# Patient Record
Sex: MALE | Race: ASIAN | NOT HISPANIC OR LATINO | Employment: FULL TIME | URBAN - METROPOLITAN AREA
[De-identification: names, ages, dates, MRNs, and addresses within clinical notes are randomized per-mention and may not be internally consistent; named-entity substitution may affect disease eponyms.]

---

## 2018-06-18 DIAGNOSIS — Z76.89 ENCOUNTER TO ESTABLISH CARE: Primary | ICD-10-CM

## 2018-06-18 DIAGNOSIS — B19.10 HEPATITIS B INFECTION WITHOUT DELTA AGENT WITHOUT HEPATIC COMA, UNSPECIFIED CHRONICITY: ICD-10-CM

## 2018-06-20 ENCOUNTER — TELEPHONE (OUTPATIENT)
Dept: GASTROENTEROLOGY | Facility: CLINIC | Age: 58
End: 2018-06-20

## 2018-06-20 NOTE — TELEPHONE ENCOUNTER
Left message for pt reminding them of upcoming appointment.  Instructed pt to bring updated medications list.  Instructed pt to arrive an hour and a half to two hours prior to appt time for labs.  Gonsalo Nieves, CMA

## 2018-06-21 ENCOUNTER — OFFICE VISIT (OUTPATIENT)
Dept: GASTROENTEROLOGY | Facility: CLINIC | Age: 58
End: 2018-06-21
Payer: MEDICARE

## 2018-06-21 VITALS
BODY MASS INDEX: 24.36 KG/M2 | HEIGHT: 65 IN | SYSTOLIC BLOOD PRESSURE: 140 MMHG | HEART RATE: 70 BPM | DIASTOLIC BLOOD PRESSURE: 87 MMHG | OXYGEN SATURATION: 98 % | WEIGHT: 146.2 LBS | TEMPERATURE: 98 F

## 2018-06-21 DIAGNOSIS — K74.60 HEPATIC CIRRHOSIS, UNSPECIFIED HEPATIC CIRRHOSIS TYPE (H): Primary | ICD-10-CM

## 2018-06-21 DIAGNOSIS — B19.10 HEPATITIS B INFECTION WITHOUT DELTA AGENT WITHOUT HEPATIC COMA, UNSPECIFIED CHRONICITY: ICD-10-CM

## 2018-06-21 DIAGNOSIS — Z76.89 ENCOUNTER TO ESTABLISH CARE: ICD-10-CM

## 2018-06-21 DIAGNOSIS — B18.1 CHRONIC VIRAL HEPATITIS B WITHOUT DELTA AGENT AND WITHOUT COMA (H): ICD-10-CM

## 2018-06-21 LAB
ALBUMIN SERPL-MCNC: 3 G/DL (ref 3.4–5)
ALP SERPL-CCNC: 177 U/L (ref 40–150)
ALT SERPL W P-5'-P-CCNC: 47 U/L (ref 0–70)
ANION GAP SERPL CALCULATED.3IONS-SCNC: 6 MMOL/L (ref 3–14)
AST SERPL W P-5'-P-CCNC: 46 U/L (ref 0–45)
BILIRUB DIRECT SERPL-MCNC: 0.4 MG/DL (ref 0–0.2)
BILIRUB SERPL-MCNC: 1.1 MG/DL (ref 0.2–1.3)
BUN SERPL-MCNC: 9 MG/DL (ref 7–30)
CALCIUM SERPL-MCNC: 7.9 MG/DL (ref 8.5–10.1)
CHLORIDE SERPL-SCNC: 107 MMOL/L (ref 94–109)
CO2 SERPL-SCNC: 27 MMOL/L (ref 20–32)
CREAT SERPL-MCNC: 0.91 MG/DL (ref 0.66–1.25)
ERYTHROCYTE [DISTWIDTH] IN BLOOD BY AUTOMATED COUNT: 14.1 % (ref 10–15)
GFR SERPL CREATININE-BSD FRML MDRD: 86 ML/MIN/1.7M2
GLUCOSE SERPL-MCNC: 138 MG/DL (ref 70–99)
HCT VFR BLD AUTO: 43.6 % (ref 40–53)
HCV AB SERPL QL IA: NONREACTIVE
HGB BLD-MCNC: 14.5 G/DL (ref 13.3–17.7)
INR PPP: 1.37 (ref 0.86–1.14)
MCH RBC QN AUTO: 29.4 PG (ref 26.5–33)
MCHC RBC AUTO-ENTMCNC: 33.3 G/DL (ref 31.5–36.5)
MCV RBC AUTO: 88 FL (ref 78–100)
PLATELET # BLD AUTO: 86 10E9/L (ref 150–450)
POTASSIUM SERPL-SCNC: 4.1 MMOL/L (ref 3.4–5.3)
PROT SERPL-MCNC: 7.7 G/DL (ref 6.8–8.8)
RBC # BLD AUTO: 4.94 10E12/L (ref 4.4–5.9)
SODIUM SERPL-SCNC: 140 MMOL/L (ref 133–144)
WBC # BLD AUTO: 2.9 10E9/L (ref 4–11)

## 2018-06-21 PROCEDURE — G0463 HOSPITAL OUTPT CLINIC VISIT: HCPCS | Mod: ZF

## 2018-06-21 PROCEDURE — 80076 HEPATIC FUNCTION PANEL: CPT | Performed by: PHYSICIAN ASSISTANT

## 2018-06-21 PROCEDURE — 85610 PROTHROMBIN TIME: CPT | Performed by: PHYSICIAN ASSISTANT

## 2018-06-21 PROCEDURE — 36415 COLL VENOUS BLD VENIPUNCTURE: CPT | Performed by: PHYSICIAN ASSISTANT

## 2018-06-21 PROCEDURE — 80048 BASIC METABOLIC PNL TOTAL CA: CPT | Performed by: PHYSICIAN ASSISTANT

## 2018-06-21 PROCEDURE — 87517 HEPATITIS B DNA QUANT: CPT | Performed by: PHYSICIAN ASSISTANT

## 2018-06-21 PROCEDURE — 85027 COMPLETE CBC AUTOMATED: CPT | Performed by: PHYSICIAN ASSISTANT

## 2018-06-21 PROCEDURE — G0472 HEP C SCREEN HIGH RISK/OTHER: HCPCS | Performed by: PHYSICIAN ASSISTANT

## 2018-06-21 RX ORDER — TENOFOVIR DISOPROXIL FUMARATE 300 MG/1
300 TABLET, FILM COATED ORAL DAILY
Qty: 30 TABLET | Refills: 5 | Status: SHIPPED | OUTPATIENT
Start: 2018-06-21 | End: 2019-04-04

## 2018-06-21 RX ORDER — TENOFOVIR DISOPROXIL FUMARATE 300 MG/1
300 TABLET, FILM COATED ORAL DAILY
COMMUNITY
End: 2019-04-04

## 2018-06-21 ASSESSMENT — PAIN SCALES - GENERAL: PAINLEVEL: MODERATE PAIN (5)

## 2018-06-21 NOTE — LETTER
6/21/2018      RE: Rishabh Cabrera  [address]  [facility] [address]         Hepatology Clinic note  Rishabh Cabrera   Date of Birth 1960  Date of Service 6/21/2018    REASON FOR CONSULTATION: Hepatitis B  REFERRING PROVIDER: Helena Camejo MD          Assessment/plan:   Rishabh Cabrera is a 58 year old male with cirrhosis and chronic hepatitis B. 6/8/2018 labs showing HBV DNA level of 250016. His transaminases are mildly elevated. Patient admits taking Viread intermittently due to cost of prescriptions which would explain elevated Hep B viral level. He has history of low platelets and imaging noting cirrhotic configuration. Patient does have some mild liver dysfuction at this time likely due to active Hepatitis B. For HCC screening, will obtain a baseline MRI and continue with abdominal ultrasounds every six months. We discussed the importance of medication compliance to prevent worsening of his liver disease. He does have some liver dysfunction at this time, which will hopefully improve with consistent use of anti-viral medication. He otherwise appears well-compensated without signs of hepatic encephalopathy     - Start tenofovir  mg daily . Instructed patient to contact clinic if having difficulty affording medications. May need to involve social work.   - HCC screening: will obtain baseline abdominal MRI in near future  - Will discuss variceal screening in the future with EGD  - Follow-up in clinic in one month. Encouraged patient to bring his girlfriend or family to visit      Dorian Levy PA-C   [facility] Hepatology     -----------------------------------------------------       HPI:   Rishabh Cabrera is a 58 year [place] [facility] [place] for the evaluation of Hepatitis B    Hepatitis B  - e antigen - negative 12/2006  - e antibody - positive 12/18/2006  Diagnosed: 2013  Treatment:   - Started tenofovir a few years ago     Cirrhosis:   - diagnosed 2015  - No history GI bleed  - No history  of ascites  - No history of HE  HCC screening: 3/2017 -   EGD: Done in 2015 due to symptoms of GERD, gastric ulcer     Patient states that he was diagnosed with Hepatitis B in 2013. He has been prescribed Viread but he has taken it intermittently due to cost of co-payment. He has been troubled by muscle pains in his legs, feet and hands. He has traveled over to [facility] and [place] and sought treatment traditional herbs for his aches for a few months at a time. During this time, he was not taking anti-viral medication. Patient had a partial colectomy for tubulovillous adenoma in 2015 and his liver was noted to be cirrhotic at the time of operation. Surgical was complicated by leak/abscess at which time his weight dropped from 175 to 130. He states he is now at 145 lbs. He admits to being very fatigued and tired. He has a fair appetite and regular bowel movements.     Patient denies jaundice, lower extremity edema, abdominal distension or confusion.  Patient also denies melena, hematochezia or hematemesis. Patient denies weight loss, fevers, sweats or chills.    PMH: diabetes mellitus, chronic hepatitis B, history of gastritis     SMH: extended right hemicolectomy for tubullovillous adenoma     Medications: metformin and Viread, advil on occasion     No alcohol intake since 2015. He states he previously would have one to two drinks with friends on occasion. No history of smoking or IV/RASHMI. He has five children in their 20's. He lives with long-term girlfriend. He denies a known family history of liver disease or liver cancer. He is not sure if his kids have been all screening for Hepatitis B.     Labs:   HBV surface antibody - nonreactive  Hep C antibody - negative  Hep B surface antigen - 2006  Hep B e antigen - negative 12/2006    Medical hx Surgical hx   Past Medical History:   Diagnosis Date     Chronic hepatitis B (H)      Diabetes mellitus (H)     No past surgical history on file.              Medications:  "    Current Outpatient Prescriptions   Medication     METFORMIN HCL PO     tenofovir (VIREAD) 300 MG tablet     tenofovir (VIREAD) 300 MG tablet     No current facility-administered medications for this visit.             Allergies:     Allergies   Allergen Reactions     Crabs [Crustaceans]             Social History:     Social History     Social History     Marital status:      Spouse name: N/A     Number of children: N/A     Years of education: N/A     Occupational History     Not on file.     Social History Main Topics     Smoking status: Not on file     Smokeless tobacco: Not on file     Alcohol use Not on file     Drug use: Not on file     Sexual activity: Not on file     Other Topics Concern     Not on file     Social History Narrative     No narrative on file            Family History:   No family history on file.         Review of Systems:   GEN: See HPI  HEENT: No change in vision or hearing, mouth sores, dysphagia, lymph nodes  Resp: No shortness of breath, coughing, hx of asthma  CV: No chest pain, palpitations, syncope   GI: See HPI  : No dysuria, history of stones, urine color    Skin: No rash; no pruritus or psoriasis  MS: No arthralgias, myalgias, joint swelling  Neuro: No memory changes, confusion, numbness    Heme: No difficulty clotting, bruising, bleeding  Psych:  No anxiety, depression, agitation          Physical Exam:   VS:  /87  Pulse 70  Temp 98  F (36.7  C) (Oral)  Ht 1.651 m (5' 5\")  Wt 66.3 kg (146 lb 3.2 oz)  SpO2 98%  BMI 24.33 kg/m2      Gen: A&Ox3, NAD, well developed  HEENT: non-icteric, no cervical lymphadenopathy, no lesions or ulcers in oropharynx  CV: RRR, no overt murmurs  Lung: CTA Bilatererally, no wheezing or crackles.   Lym- no palpable lymphadenopathy  Abd: soft, NT, ND,  no organomegaly.   Ext: no edema, intact pulses.   Skin: No rash, no palmar erythema, telangiectasias or jaundice  Neuro: grossly intact, no asterixis   Psych: appropriate mood and " affects         Data:   Reviewed in person and significant for:    Lab Results   Component Value Date     09/21/2009      Lab Results   Component Value Date    POTASSIUM 4.1 09/21/2009     Lab Results   Component Value Date    CHLORIDE 109 09/21/2009     Lab Results   Component Value Date    CO2 26 09/21/2009     Lab Results   Component Value Date    BUN 14 09/21/2009     Lab Results   Component Value Date    CR 0.87 09/21/2009       Lab Results   Component Value Date    WBC 2.9 06/21/2018     Lab Results   Component Value Date    HGB 14.5 06/21/2018     Lab Results   Component Value Date    HCT 43.6 06/21/2018     Lab Results   Component Value Date    MCV 88 06/21/2018     Lab Results   Component Value Date    PLT 86 06/21/2018       No results found for: AST  No results found for: ALT  No results found for: BILICONJ   No results found for: BILITOTAL    No results found for: ALBUMIN  No results found for: PROTTOTAL   No results found for: ALKPHOS    Lab Results   Component Value Date    INR 1.37 06/21/2018       Dorian Levy PA-C

## 2018-06-21 NOTE — MR AVS SNAPSHOT
After Visit Summary   6/21/2018    Rishabh Cabrera    MRN: [id number]           Patient Information     Date Of Birth          1960        Visit Information        Provider Department      6/21/2018 11:00 AM Dorian Levy PA-C [facility] Hepatology        Today's Diagnoses     Chronic hepatitis C without hepatic coma (H)    -  1    Hepatic cirrhosis, unspecified hepatic cirrhosis type (H)           Follow-ups after your visit        Follow-up notes from your care team     Return in about 1 month (around 7/21/2018).      Your next 10 appointments already scheduled     Jun 30, 2018  7:45 AM CDT   (Arrive by 7:15 AM)   MR ABDOMEN W/O & W CONTRAST with [address] MRI ([facility])    [address] [place] [address] [phone]           Take your medicines as usual, unless your doctor tells you not to. Bring a list of your current medicines to your exam (including vitamins, minerals and over-the-counter drugs). Also bring the results of similar scans you may have had.    You may or may not receive IV contrast for this exam pending the discretion of the Radiologist.   Do not eat or drink for 6 hours prior to exam.  The MRI machine uses a strong magnet. Please wear clothes without metal (snaps, zippers). A sweatsuit works well, or we may give you a hospital gown.  Please remove any body piercings and hair extensions before you arrive. You will also remove watches, jewelry, hairpins, wallets, dentures, partial dental plates and hearing aids. You may wear contact lenses, and you may be able to wear your rings. We have a safe place to keep your personal items, but it is safer to leave them at home.  **IMPORTANT** THE INSTRUCTIONS BELOW ARE ONLY FOR THOSE PATIENTS WHO HAVE BEEN PRESCRIBED SEDATION OR GENERAL ANESTHESIA DURING THEIR MRI PROCEDURE:  IF YOUR DOCTOR PRESCRIBED ORAL SEDATION (take medicine to help you relax during your  exam):   You must get the medicine from your doctor (oral medication) before you arrive. Bring the medicine to the exam. Do not take it at home. You ll be told when to take it upon arriving for your exam.   Arrive one hour early. Bring someone who can take you home after the test. Your medicine will make you sleepy. After the exam, you may not drive, take a bus or take a taxi by yourself.  IF YOUR DOCTOR PRESCRIBED IV SEDATION:   Arrive one hour early. Bring someone who can take you home after the test. Your medicine will make you sleepy. After the exam, you may not drive, take a bus or take a taxi by yourself.   No eating 6 hours before your exam. You may have clear liquids up until 4 hours before your exam. (Clear liquids include water, clear tea, black coffee and fruit juice without pulp.)  IF YOUR DOCTOR PRESCRIBED ANESTHESIA (be asleep for your exam):   Arrive 1 1/2 hours early. Bring someone who can take you home after the test. You may not drive, take a bus or take a taxi by yourself.   No eating 8 hours before your exam. You may have clear liquids up until 4 hours before your exam. (Clear liquids include water, clear tea, black coffee and fruit juice without pulp.)   You will spend four to five hours in the recovery room.  If you have any questions, please contact your Imaging Department exam site.            Jul 16, 2018  9:45 AM CDT   Lab with  LAB   [facility] ([facility])    [address]  1st Floor  [facility] [address] [phone]            Jul 16, 2018 10:30 AM CDT   (Arrive by 10:15 AM)   Return General Liver with Dorian Levy PA-C   [facility] Hepatology ([facility])    [address]  [address]  [facility] [address] [phone]              Future tests that were ordered for you today     Open Future Orders        Priority Expected Expires Ordered    Fibrosis Scan (In-Clinic) Routine 6/21/2018 6/21/2019  "2018    MR Abdomen w/o & w Contrast Routine  2019            Who to contact     If you have questions or need follow up information about today's clinic visit or your schedule please contact [facility] HEPATOLOGY directly at [phone].  Normal or non-critical lab and imaging results will be communicated to you by MyChart, letter or phone within 4 business days after the clinic has received the results. If you do not hear from us within 7 days, please contact the clinic through [employer] or phone. If you have a critical or abnormal lab result, we will notify you by phone as soon as possible.  Submit refill requests through [employer] or call your pharmacy and they will forward the refill request to us. Please allow 3 business days for your refill to be completed.          Additional Information About Your Visit        MyChart Information     [employer] lets you send messages to your doctor, view your test results, renew your prescriptions, schedule appointments and more. To sign up, go to [email or web address] . Click on \"Log in\" on the left side of the screen, which will take you to the Welcome page. Then click on \"Sign up Now\" on the right side of the page.     You will be asked to enter the access code listed below, as well as some personal information. Please follow the directions to create your username and password.     Your access code is: [email or web address]  Expires: 2018  6:30 AM     Your access code will  in 90 days. If you need help or a new code, please call your [facility] clinic or [phone].        Care EveryWhere ID     This is your Care EveryWhere ID. This could be used by other organizations to access your [facility] medical records  [id number]        Your Vitals Were     Pulse Temperature Height Pulse Oximetry BMI (Body Mass Index)       70 98  F (36.7  C) (Oral) 1.651 m (5' 5\") 98% 24.33 kg/m2        Blood Pressure from Last 3 Encounters:   18 140/87    Weight from Last " 3 Encounters:   06/21/18 66.3 kg (146 lb 3.2 oz)                 Today's Medication Changes          These changes are accurate as of 6/21/18 11:58 AM.  If you have any questions, ask your nurse or doctor.               These medicines have changed or have updated prescriptions.        Dose/Directions    * tenofovir 300 MG tablet   Commonly known as:  VIREAD   This may have changed:  Another medication with the same name was added. Make sure you understand how and when to take each.   Changed by:  Dorian Levy PA-C        Dose:  300 mg   Take 300 mg by mouth daily   Refills:  0       * tenofovir 300 MG tablet   Commonly known as:  VIREAD   This may have changed:  You were already taking a medication with the same name, and this prescription was added. Make sure you understand how and when to take each.   Used for:  Chronic hepatitis C without hepatic coma (H), Hepatic cirrhosis, unspecified hepatic cirrhosis type (H)   Changed by:  Dorian Levy PA-C        Dose:  300 mg   Take 1 tablet (300 mg) by mouth daily   Quantity:  30 tablet   Refills:  5       * Notice:  This list has 2 medication(s) that are the same as other medications prescribed for you. Read the directions carefully, and ask your doctor or other care provider to review them with you.         Where to get your medicines      These medications were sent to [facility] - [address], [facility] [address]    Hours:  Mon-Fri 8:30am-5:00pm Toll Free [phone] Phone:  [phone]     tenofovir 300 MG tablet                Primary Care Provider Fax #    Physician No Ref-Primary [phone]       No address on file        Equal Access to Services     [facility] : Miah Hansen, anabella milner, everett phipps. So [facility] [phone].    ATENCIÓN: Si habla español, tiene a belle disposición  servicios gratuitos de asistencia lingüística. S[employer] sage [phone].    We comply with applicable federal civil rights laws and [place] laws. We do not discriminate on the basis of race, color, national origin, age, disability, sex, sexual orientation, or gender identity.            Thank you!     Thank you for choosing [facility] HEPATOLOGY  for your care. Our goal is always to provide you with excellent care. Hearing back from our patients is one way we can continue to improve our services. Please take a few minutes to complete the written survey that you may receive in the mail after your visit with us. Thank you!             Your Updated Medication List - Protect others around you: Learn how to safely use, store and throw away your medicines at www.disposemymeds.org.          This list is accurate as of 6/21/18 11:58 AM.  Always use your most recent med list.                   Brand Name Dispense Instructions for use Diagnosis    METFORMIN HCL PO           * tenofovir 300 MG tablet    VIREAD     Take 300 mg by mouth daily        * tenofovir 300 MG tablet    VIREAD    30 tablet    Take 1 tablet (300 mg) by mouth daily    Chronic hepatitis C without hepatic coma (H), Hepatic cirrhosis, unspecified hepatic cirrhosis type (H)       * Notice:  This list has 2 medication(s) that are the same as other medications prescribed for you. Read the directions carefully, and ask your doctor or other care provider to review them with you.

## 2018-06-21 NOTE — PROGRESS NOTES
Hepatology Clinic note  Rishabh Cabrera   Date of Birth 1960  Date of Service 6/21/2018    REASON FOR CONSULTATION: Hepatitis B  REFERRING PROVIDER: Helena Camejo MD          Assessment/plan:   Rishabh Cabrera is a 58 year old male with cirrhosis and chronic hepatitis B. 6/8/2018 labs showing HBV DNA level of 616823. His transaminases are mildly elevated. Patient admits taking Viread intermittently due to cost of prescriptions which would explain elevated Hep B viral level. He has history of low platelets and imaging noting cirrhotic configuration. Patient does have some mild liver dysfuction at this time likely due to active Hepatitis B. For HCC screening, will obtain a baseline MRI and continue with abdominal ultrasounds every six months. We discussed the importance of medication compliance to prevent worsening of his liver disease. He does have some liver dysfunction at this time, which will hopefully improve with consistent use of anti-viral medication. He otherwise appears well-compensated without signs of hepatic encephalopathy     - Start tenofovir  mg daily . Instructed patient to contact clinic if having difficulty affording medications. May need to involve social work.   - HCC screening: will obtain baseline abdominal MRI in near future  - Will discuss variceal screening in the future with EGD  - Follow-up in clinic in one month. Encouraged patient to bring his girlfriend or family to visit      Dorian Levy PA-C   [facility] Hepatology     -----------------------------------------------------       HPI:   Rishabh Cabrera is a 58 [place] [facility] [place] for the evaluation of Hepatitis B    Hepatitis B  - e antigen - negative 12/2006  - e antibody - positive 12/18/2006  Diagnosed: 2013  Treatment:   - Started tenofovir a few years ago     Cirrhosis:   - diagnosed 2015  - No history GI bleed  - No history of ascites  - No history of HE  HCC screening: 3/2017 -   EGD: Done in 2015 due to  symptoms of GERD, gastric ulcer     Patient states that he was diagnosed with Hepatitis B in 2013. He has been prescribed Viread but he has taken it intermittently due to cost of co-payment. He has been troubled by muscle pains in his legs, feet and hands. He has traveled over to [facility] and [place] and sought treatment traditional herbs for his aches for a few months at a time. During this time, he was not taking anti-viral medication. Patient had a partial colectomy for tubulovillous adenoma in 2015 and his liver was noted to be cirrhotic at the time of operation. Surgical was complicated by leak/abscess at which time his weight dropped from 175 to 130. He states he is now at 145 lbs. He admits to being very fatigued and tired. He has a fair appetite and regular bowel movements.     Patient denies jaundice, lower extremity edema, abdominal distension or confusion.  Patient also denies melena, hematochezia or hematemesis. Patient denies weight loss, fevers, sweats or chills.    PMH: diabetes mellitus, chronic hepatitis B, history of gastritis     SMH: extended right hemicolectomy for tubullovillous adenoma     Medications: metformin and Viread, advil on occasion     No alcohol intake since 2015. He states he previously would have one to two drinks with friends on occasion. No history of smoking or IV/RASHMI. He has five children in their 20's. He lives with long-term girlfriend. He denies a known family history of liver disease or liver cancer. He is not sure if his kids have been all screening for Hepatitis B.     Labs:   HBV surface antibody - nonreactive  Hep C antibody - negative  Hep B surface antigen - 2006  Hep B e antigen - negative 12/2006    Medical hx Surgical hx   Past Medical History:   Diagnosis Date     Chronic hepatitis B (H)      Diabetes mellitus (H)     No past surgical history on file.              Medications:     Current Outpatient Prescriptions   Medication     METFORMIN HCL PO      "tenofovir (VIREAD) 300 MG tablet     tenofovir (VIREAD) 300 MG tablet     No current facility-administered medications for this visit.             Allergies:     Allergies   Allergen Reactions     Crabs [Crustaceans]             Social History:     Social History     Social History     Marital status:      Spouse name: N/A     Number of children: N/A     Years of education: N/A     Occupational History     Not on file.     Social History Main Topics     Smoking status: Not on file     Smokeless tobacco: Not on file     Alcohol use Not on file     Drug use: Not on file     Sexual activity: Not on file     Other Topics Concern     Not on file     Social History Narrative     No narrative on file            Family History:   No family history on file.         Review of Systems:   GEN: See HPI  HEENT: No change in vision or hearing, mouth sores, dysphagia, lymph nodes  Resp: No shortness of breath, coughing, hx of asthma  CV: No chest pain, palpitations, syncope   GI: See HPI  : No dysuria, history of stones, urine color    Skin: No rash; no pruritus or psoriasis  MS: No arthralgias, myalgias, joint swelling  Neuro: No memory changes, confusion, numbness    Heme: No difficulty clotting, bruising, bleeding  Psych:  No anxiety, depression, agitation          Physical Exam:   VS:  /87  Pulse 70  Temp 98  F (36.7  C) (Oral)  Ht 1.651 m (5' 5\")  Wt 66.3 kg (146 lb 3.2 oz)  SpO2 98%  BMI 24.33 kg/m2      Gen: A&Ox3, NAD, well developed  HEENT: non-icteric, no cervical lymphadenopathy, no lesions or ulcers in oropharynx  CV: RRR, no overt murmurs  Lung: CTA Bilatererally, no wheezing or crackles.   Lym- no palpable lymphadenopathy  Abd: soft, NT, ND,  no organomegaly.   Ext: no edema, intact pulses.   Skin: No rash, no palmar erythema, telangiectasias or jaundice  Neuro: grossly intact, no asterixis   Psych: appropriate mood and affects         Data:   Reviewed in person and significant for:    Lab " Results   Component Value Date     09/21/2009      Lab Results   Component Value Date    POTASSIUM 4.1 09/21/2009     Lab Results   Component Value Date    CHLORIDE 109 09/21/2009     Lab Results   Component Value Date    CO2 26 09/21/2009     Lab Results   Component Value Date    BUN 14 09/21/2009     Lab Results   Component Value Date    CR 0.87 09/21/2009       Lab Results   Component Value Date    WBC 2.9 06/21/2018     Lab Results   Component Value Date    HGB 14.5 06/21/2018     Lab Results   Component Value Date    HCT 43.6 06/21/2018     Lab Results   Component Value Date    MCV 88 06/21/2018     Lab Results   Component Value Date    PLT 86 06/21/2018       No results found for: AST  No results found for: ALT  No results found for: BILICONJ   No results found for: BILITOTAL    No results found for: ALBUMIN  No results found for: PROTTOTAL   No results found for: ALKPHOS    Lab Results   Component Value Date    INR 1.37 06/21/2018

## 2018-06-22 LAB
HBV DNA SERPL NAA+PROBE-ACNC: ABNORMAL [IU]/ML
HBV DNA SERPL NAA+PROBE-LOG IU: 4.6 {LOG_IU}/ML

## 2018-06-30 ENCOUNTER — RADIANT APPOINTMENT (OUTPATIENT)
Dept: MRI IMAGING | Facility: CLINIC | Age: 58
End: 2018-06-30
Attending: PHYSICIAN ASSISTANT
Payer: MEDICARE

## 2018-06-30 DIAGNOSIS — K74.60 HEPATIC CIRRHOSIS, UNSPECIFIED HEPATIC CIRRHOSIS TYPE (H): ICD-10-CM

## 2018-06-30 RX ORDER — GADOBUTROL 604.72 MG/ML
7.5 INJECTION INTRAVENOUS ONCE
Status: COMPLETED | OUTPATIENT
Start: 2018-06-30 | End: 2018-06-30

## 2018-06-30 RX ADMIN — GADOBUTROL 6.5 ML: 604.72 INJECTION INTRAVENOUS at 07:47

## 2018-06-30 NOTE — LETTER
"    July 3, 2018       TO: Rishabh Cabrera  [address]  [facility] [address]       DearMr.Rick,    We are writing to inform you of your test results. Plan on following with your primary care clinic/HC TB clinic regarding your findings in the lung. You also have a liver lesion. This requires follow up in 3 months to evaluate any potential changes.    Resulted Orders   MR Abdomen w/o & w Contrast    Addendum: 7/2/2018    The LIRADS 4 lesion described below could be better characterized as  having a \"nodule in nodule\" appearance.  The equivocal, though  probably enhancing component, is small and measures 8-9 mm.  This is  suspicious, though remains a LIRADS 4 lesion and 3 month follow up is  again recommended.    IMANI SELF MD      Narrative    MRI ABDOMEN    CLINICAL HISTORY:  Hepatocellular carcinoma screening    TECHNIQUE:  Images were acquired with and without intravenous contrast  through the abdomen. The following MR images were acquired: TrueFISP,  multiplanar T2 weighted, axial T1 in/out of phase, axial fat-saturated  T1, diffusion-weighted. Multiplanar T1-weighted images with fat  saturation were before contrast administration and at multiple time  points following the administration of intravenous contrast. Contrast  dose: 6.5 mL Gadavist     FINDINGS:    Comparison study: None available    Liver: Nodular, cirrhotic liver morphology with caudate hypertrophy  and widening of the fissures. There are a few scattered T2  hyperintense nonenhancing hepatic cysts. There is mild hepatic  steatosis. No features of iron deposition.    Lesion 1: There is a nonenhancing, precontrast T1 hyperintense, T2  hypointense rounded lesion peripherally in the right hepatic lobe,  segment 5 which measures 2.0 x 2.5 cm (series 18, image 21; series 5,  image 42). Allowing for slight image misregistration, there is not  convincing hyperenhancement  on the subtracted images. There is  washout and there is a pseudocapsule " on the 3 minute delayed phase. No  diffusion restriction. No features of vascular invasion. LIRADS 4.    There are geographic areas of atrophic hyperenhancement peripherally  in the right hepatic lobe, for example series 8, image 40) without  additional abnormal signal or enhancement. This is likely perfusional.    No suspicious arterial enhancing lesions. Patent portal and hepatic  veins. Small gastrohepatic and splenorenal varices. Few scattered  cysts.    Gallbladder: No cholelithiasis. Mild gallbladder wall edema presumably  relates to adjacent hepatic parenchymal disease.    Spleen: Splenomegaly measuring 14 cm.    Kidneys: Punctate cortical cysts bilaterally. No suspicious masses or  hydronephrosis.    Adrenal glands: Normal    Pancreas: Normal T1 signal. No pancreatic ductal dilatation. No  suspicious lesions.    Bowel: No dilated or thickened loops of bowel.    Lymph nodes: No significant adenopathy in the upper abdomen.    Blood vessels: The major abdominal vessels are widely patent. The  abdominal aorta is nonaneurysmal.    Lung bases: Right basilar pleural collection measuring 9.3 x 2.0 cm in  greatest axial diameter with complex internal septations and  enhancement as well as diffusion restriction. Heart size is normal.    Bones and soft tissues: Normal.    Mesentery and abdominal wall: Normal.    Ascites: None.      Impression    IMPRESSION:  1. Complex right pleural collection, likely empyematous, correlation  for symptoms of respiratory/pulmonary infection, including  tuberculous, would be beneficial. Consider dedicated chest CT and  serologic evaluation.   2. Cirrhotic hepatic appearance with evidence of portal hypertension  including splenomegaly and portosystemic collateral vessel formation.  3. No hepatic lesions which meet imaging criteria for HCC.  4. There is a LIRADS 4 lesion peripherally in the right hepatic lobe.  Short-term (3 month) MR follow-up is recommended.    [Result: Complex right  pleural collection]    Finding was identified on 6/30/2018 2:43 PM.     Dr Douglas was contacted by Dr. Castellano at 6/30/2018 3:08 PM and  verbalized understanding of the urgent finding.     I have personally reviewed the examination and initial interpretation  and I agree with the findings.    IMANI SELF MD       It was a pleasure to see you at your recent visit. Please let me know if you have any questions or concerns.     Clinic Staff - [phone] option 3     Sincerely,     KRYSTAL Conrad  [address] [place] [address], Mail [id number]  [place]  [address].

## 2018-06-30 NOTE — DISCHARGE INSTRUCTIONS
MRI Contrast Discharge Instructions    The IV contrast you received today will pass out of your body in your  urine. This will happen in the next 24 hours. You will not feel this process.  Your urine will not change color.    Drink at least 4 extra glasses of water or juice today (unless your doctor  has restricted your fluids). This reduces the stress on your kidneys.  You may take your regular medicines.    If you are on dialysis: It is best to have dialysis today.    If you have a reaction: Most reactions happen right away. If you have  any new symptoms after leaving the hospital (such as hives or swelling),  call your hospital at the correct number below. Or call your family doctor.  If you have breathing distress or wheezing, call 911.    Special instructions: ***    I have read and understand the above information.    Signature:______________________________________ Date:___________    Staff:__________________________________________ Date:___________     Time:__________    [place] [facility] Departments:    ___[place]: [phone]  ___[facility]: [phone]  ___[place]: [phone] ___[facility]: [phone]  ___[facility]: [phone]  ___[facility]: [phone]  ___[phone]  ___[facility]: [phone]  ___[phone]

## 2018-07-02 ENCOUNTER — TELEPHONE (OUTPATIENT)
Dept: GASTROENTEROLOGY | Facility: CLINIC | Age: 58
End: 2018-07-02

## 2018-07-02 DIAGNOSIS — K76.9 LIVER LESION: ICD-10-CM

## 2018-07-02 DIAGNOSIS — B18.1 CHRONIC VIRAL HEPATITIS B WITHOUT DELTA AGENT AND WITHOUT COMA (H): Primary | ICD-10-CM

## 2018-07-03 NOTE — TELEPHONE ENCOUNTER
Left message with pt's PCP clinic regarding results of recent MRI. Dorian Levy PA-C referring pt back to PCP for tuberculosis work up.    Did discuss the liver lesion and the need for repeat MRI in 3 months. Pt also has a follow up appointment in clinic with Dorian on 7/16/18.       Pt's PCP put in referral for pt to have tuberculosis work up through their system. Pt was contacted by PCP nurse too (Loyda Yanes RN - Available via [facility]).

## 2018-07-11 DIAGNOSIS — B18.1 CHRONIC VIRAL HEPATITIS B WITHOUT DELTA AGENT AND WITHOUT COMA (H): Primary | ICD-10-CM

## 2018-07-16 ENCOUNTER — HOSPITAL ENCOUNTER (OUTPATIENT)
Facility: CLINIC | Age: 58
End: 2018-07-16
Attending: INTERNAL MEDICINE | Admitting: INTERNAL MEDICINE
Payer: MEDICARE

## 2018-07-16 ENCOUNTER — OFFICE VISIT (OUTPATIENT)
Dept: GASTROENTEROLOGY | Facility: CLINIC | Age: 58
End: 2018-07-16
Attending: PHYSICIAN ASSISTANT
Payer: MEDICARE

## 2018-07-16 VITALS
HEART RATE: 74 BPM | BODY MASS INDEX: 24.22 KG/M2 | SYSTOLIC BLOOD PRESSURE: 146 MMHG | WEIGHT: 145.4 LBS | DIASTOLIC BLOOD PRESSURE: 86 MMHG | HEIGHT: 65 IN | OXYGEN SATURATION: 98 % | TEMPERATURE: 97.7 F

## 2018-07-16 DIAGNOSIS — B18.1 VIRAL HEPATITIS B CHRONIC (H): Primary | ICD-10-CM

## 2018-07-16 DIAGNOSIS — B18.1 CHRONIC VIRAL HEPATITIS B WITHOUT DELTA AGENT AND WITHOUT COMA (H): ICD-10-CM

## 2018-07-16 DIAGNOSIS — K74.60 HEPATIC CIRRHOSIS, UNSPECIFIED HEPATIC CIRRHOSIS TYPE (H): ICD-10-CM

## 2018-07-16 LAB
ALBUMIN SERPL-MCNC: 3.2 G/DL (ref 3.4–5)
ALP SERPL-CCNC: 144 U/L (ref 40–150)
ALT SERPL W P-5'-P-CCNC: 30 U/L (ref 0–70)
ANION GAP SERPL CALCULATED.3IONS-SCNC: 5 MMOL/L (ref 3–14)
AST SERPL W P-5'-P-CCNC: 24 U/L (ref 0–45)
BILIRUB DIRECT SERPL-MCNC: 0.3 MG/DL (ref 0–0.2)
BILIRUB SERPL-MCNC: 0.7 MG/DL (ref 0.2–1.3)
BUN SERPL-MCNC: 13 MG/DL (ref 7–30)
CALCIUM SERPL-MCNC: 7.9 MG/DL (ref 8.5–10.1)
CHLORIDE SERPL-SCNC: 109 MMOL/L (ref 94–109)
CO2 SERPL-SCNC: 25 MMOL/L (ref 20–32)
CREAT SERPL-MCNC: 0.88 MG/DL (ref 0.66–1.25)
ERYTHROCYTE [DISTWIDTH] IN BLOOD BY AUTOMATED COUNT: 14.6 % (ref 10–15)
GFR SERPL CREATININE-BSD FRML MDRD: 90 ML/MIN/1.7M2
GLUCOSE SERPL-MCNC: 116 MG/DL (ref 70–99)
HCT VFR BLD AUTO: 43.8 % (ref 40–53)
HGB BLD-MCNC: 14.7 G/DL (ref 13.3–17.7)
INR PPP: 1.3 (ref 0.86–1.14)
MCH RBC QN AUTO: 29.5 PG (ref 26.5–33)
MCHC RBC AUTO-ENTMCNC: 33.6 G/DL (ref 31.5–36.5)
MCV RBC AUTO: 88 FL (ref 78–100)
PLATELET # BLD AUTO: 85 10E9/L (ref 150–450)
POTASSIUM SERPL-SCNC: 4.6 MMOL/L (ref 3.4–5.3)
PROT SERPL-MCNC: 7.4 G/DL (ref 6.8–8.8)
RBC # BLD AUTO: 4.98 10E12/L (ref 4.4–5.9)
SODIUM SERPL-SCNC: 140 MMOL/L (ref 133–144)
WBC # BLD AUTO: 5.8 10E9/L (ref 4–11)

## 2018-07-16 PROCEDURE — 85610 PROTHROMBIN TIME: CPT | Performed by: PHYSICIAN ASSISTANT

## 2018-07-16 PROCEDURE — 80076 HEPATIC FUNCTION PANEL: CPT | Performed by: PHYSICIAN ASSISTANT

## 2018-07-16 PROCEDURE — 36415 COLL VENOUS BLD VENIPUNCTURE: CPT | Performed by: PHYSICIAN ASSISTANT

## 2018-07-16 PROCEDURE — 80048 BASIC METABOLIC PNL TOTAL CA: CPT | Performed by: PHYSICIAN ASSISTANT

## 2018-07-16 PROCEDURE — G0463 HOSPITAL OUTPT CLINIC VISIT: HCPCS | Mod: ZF

## 2018-07-16 PROCEDURE — 85027 COMPLETE CBC AUTOMATED: CPT | Performed by: PHYSICIAN ASSISTANT

## 2018-07-16 RX ORDER — INSULIN GLARGINE 100 [IU]/ML
6 INJECTION, SOLUTION SUBCUTANEOUS AT BEDTIME
Refills: 3 | COMMUNITY
Start: 2018-06-13 | End: 2022-05-13

## 2018-07-16 ASSESSMENT — PAIN SCALES - GENERAL: PAINLEVEL: MILD PAIN (3)

## 2018-07-16 NOTE — PATIENT INSTRUCTIONS
UPPER GI ENDOSCOPY is scheduled for Thursday August 16, 2018 at 8:30am, check-in at 7:30am.  Their phone number is [phone].  You will need a drive for this day

## 2018-07-16 NOTE — PROGRESS NOTES
Hepatology Clinic note  Rishabh Cabrera   Date of Birth 1960  Date of Service 7/16/2018         Assessment/plan:   Rishabh Cabrera is a 58 year old male with history of Hep B cirrhosis. His cirrhosis is well compensated at this time. HBV DNA was 44,041 and transaminases were mildly elevated last month,. He previously admitted to inconsistent compliance with Viread. He states he is taking it more consistently now.. Last MRI on 6/30/18 showing LIRADs 4 lesion, which we reviewed and discussed need for close follow-up. We discussed cirrhosis and need for regular screenings for varices and HCC. We also discussed the importance of medication compliance to prevent further decompensation of his liver disease. He has no clinical signs of ascites or HE.     - Continue 300 mg of Viread daily   - History of LIRADs 4 lesion in right hepatic lobe:  Repeat MRI in 2 months   - Variceal screening with EGD in the near future   - Follow-up in clinic in 3 months     Dorian Levy PA-C   [facility] Hepatology clinic    -----------------------------------------------------       HPI:   Rishabh Cabrera is a 58 year old male presenting for the follow-up.     Hepatitis B  - e antigen - negative 12/2006  - e antibody - positive 12/18/2006  Diagnosed: 2013  Treatment:   - Started tenofovir a few years ago      Cirrhosis:   - diagnosed 2015  - No history GI bleed  - No history of ascites  - No history of HE  HCC screening: MRI 6/30/2018  EGD: Done in 2015 due to symptoms of GERD, gastric ulcer     Patient was last seen by me on 6/21/2018, at which time he had been taking his Hep B medication intermittently. Last HBV DNA was 05144 on 6/21/2018. He had an MRI for HCC screening on 6/30/18 at which time he had a LIRADs-4 lesion in right hepatic lobe. He also had a complex right pleural collection was concerning for infectious etiology.  He has since followed up with ID at [facility] and his work-up is still underway.Initial labs for TB are negative.  "    No recent hospitalizations or ER visits. He does note that he does have some shortness of breath singing. His appetite is good and his weight has remained stable.     Patient denies jaundice, lower extremity edema, abdominal distension or confusion.  Patient also denies melena, hematochezia or hematemesis. Patient denies weight loss, fevers, sweats or chills.    He does not drink alcohol. He lives with his long-term girlfriend.       Medical hx Surgical hx   Past Medical History:   Diagnosis Date     Chronic hepatitis B (H)      Diabetes mellitus (H)     No past surgical history on file.              Medications:     Current Outpatient Prescriptions   Medication     METFORMIN HCL PO     tenofovir (VIREAD) 300 MG tablet     tenofovir (VIREAD) 300 MG tablet     LANTUS SOLOSTAR 100 UNIT/ML soln     No current facility-administered medications for this visit.             Allergies:     Allergies   Allergen Reactions     Crabs [Crustaceans]             Review of Systems:   10 points ROS was obtained and highlighted in the HPI, otherwise negative.          Physical Exam:   VS:  /86  Pulse 74  Temp 97.7  F (36.5  C) (Oral)  Ht 1.651 m (5' 5\")  Wt 66 kg (145 lb 6.4 oz)  SpO2 98%  BMI 24.2 kg/m2      Gen- well, NAD, A+Ox3, normal color  Lym- no palpable LAD  CVS- RRR  RS- CTA  Abd- soft, nontender. No palpable organomegaly. No ascites.   Extr- hands normal, no LEDY  Skin- no rash or jaundice  Neuro- no asterixis  Psych- normal mood         Data:   Reviewed in person and significant for:    Lab Results   Component Value Date     06/21/2018      Lab Results   Component Value Date    POTASSIUM 4.1 06/21/2018     Lab Results   Component Value Date    CHLORIDE 107 06/21/2018     Lab Results   Component Value Date    CO2 27 06/21/2018     Lab Results   Component Value Date    BUN 9 06/21/2018     Lab Results   Component Value Date    CR 0.91 06/21/2018       Lab Results   Component Value Date    WBC 2.9 " "06/21/2018     Lab Results   Component Value Date    HGB 14.5 06/21/2018     Lab Results   Component Value Date    HCT 43.6 06/21/2018     Lab Results   Component Value Date    MCV 88 06/21/2018     Lab Results   Component Value Date    PLT 86 06/21/2018       Lab Results   Component Value Date    AST 46 06/21/2018     Lab Results   Component Value Date    ALT 47 06/21/2018     No results found for: BILICONJ   Lab Results   Component Value Date    BILITOTAL 1.1 06/21/2018       Lab Results   Component Value Date    ALBUMIN 3.0 06/21/2018     Lab Results   Component Value Date    PROTTOTAL 7.7 06/21/2018      Lab Results   Component Value Date    ALKPHOS 177 06/21/2018       Lab Results   Component Value Date    INR 1.37 06/21/2018     MRI ABDOMEN:   1. Complex right pleural collection, likely empyematous, correlation  for symptoms of respiratory/pulmonary infection, including  tuberculous, would be beneficial. Consider dedicated chest CT and  serologic evaluation.   2. Cirrhotic hepatic appearance with evidence of portal hypertension  including splenomegaly and portosystemic collateral vessel formation.  3. No hepatic lesions which meet imaging criteria for HCC.  4. There is a LIRADS 4 lesion peripherally in the right hepatic lobe.  Short-term (3 month) MR follow-up is recommended.     The LIRADS 4 lesion described below could be better characterized as  having a \"nodule in nodule\" appearance.  The equivocal, though  probably enhancing component, is small and measures 8-9 mm.  This is  suspicious, though remains a LIRADS 4 lesion and 3 month follow up is  again recommended.  "

## 2018-07-16 NOTE — MR AVS SNAPSHOT
After Visit Summary   7/16/2018    Rishabh Cabrera    MRN: [id number]           Patient Information     Date Of Birth          1960        Visit Information        Provider Department      7/16/2018 10:30 AM Dorian Levy PA-C [facility] Hepatology        Today's Diagnoses     Viral hepatitis B chronic (H)    -  1    Hepatic cirrhosis, unspecified hepatic cirrhosis type (H)          Care Instructions    UPPER GI ENDOSCOPY is scheduled for Thursday August 16, 2018 at 8:30am, check-in at 7:30am.  Their phone number is [phone].  You will need a drive for this day            Follow-ups after your visit        Additional Services     GASTROENTEROLOGY ADULT REF PROCEDURE ONLY       Screen for varices   Schedule with Dr. Crockett, Dr. Mendoza, Dr. Noguera                  Your next 10 appointments already scheduled     Jul 28, 2018 10:45 AM CDT   MR ABDOMEN W/O & W CONTRAST with [address] [facility] ([facility])    [address] [address] [phone]           Take your medicines as usual, unless your doctor tells you not to. Bring a list of your current medicines to your exam (including vitamins, minerals and over-the-counter drugs). Also bring the results of similar scans you may have had.    You may or may not receive IV contrast for this exam pending the discretion of the Radiologist.   Do not eat or drink for 6 hours prior to exam.  The MRI machine uses a strong magnet. Please wear clothes without metal (snaps, zippers). A sweatsuit works well, or we may give you a hospital gown.  Please remove any body piercings and hair extensions before you arrive. You will also remove watches, jewelry, hairpins, wallets, dentures, partial dental plates and hearing aids. You may wear contact lenses, and you may be able to wear your rings. We have a safe place to keep your personal items, but it is safer to leave them at home.   **IMPORTANT** THE INSTRUCTIONS BELOW ARE ONLY FOR THOSE PATIENTS WHO HAVE BEEN PRESCRIBED SEDATION OR GENERAL ANESTHESIA DURING THEIR MRI PROCEDURE:  IF YOUR DOCTOR PRESCRIBED ORAL SEDATION (take medicine to help you relax during your exam):   You must get the medicine from your doctor (oral medication) before you arrive. Bring the medicine to the exam. Do not take it at home. You ll be told when to take it upon arriving for your exam.   Arrive one hour early. Bring someone who can take you home after the test. Your medicine will make you sleepy. After the exam, you may not drive, take a bus or take a taxi by yourself.  IF YOUR DOCTOR PRESCRIBED IV SEDATION:   Arrive one hour early. Bring someone who can take you home after the test. Your medicine will make you sleepy. After the exam, you may not drive, take a bus or take a taxi by yourself.   No eating 6 hours before your exam. You may have clear liquids up until 4 hours before your exam. (Clear liquids include water, clear tea, black coffee and fruit juice without pulp.)  IF YOUR DOCTOR PRESCRIBED ANESTHESIA (be asleep for your exam):   Arrive 1 1/2 hours early. Bring someone who can take you home after the test. You may not drive, take a bus or take a taxi by yourself.   No eating 8 hours before your exam. You may have clear liquids up until 4 hours before your exam. (Clear liquids include water, clear tea, black coffee and fruit juice without pulp.)   You will spend four to five hours in the recovery room.  If you have any questions, please contact your Imaging Department exam site.            Aug 16, 2018   Procedure with Elan Rajan MD   [facility], Endoscopy ([facility])    [address] [address]   [phone]           The [facility] is located on the corner of [address] on the [address]. It is easily accessible from  "virtually any point in the [place] [place] [facility] [place] [facility] [place] area, via [address] and [address].            Oct 15, 2018 11:00 AM CDT   Lab with  LAB   [facility] ([facility])    [address]  [address]  [facility] [address] [phone]            Oct 15, 2018 12:00 PM CDT   (Arrive by 11:45 AM)   Return General Liver with Dorian Levy PA-C   [facility] Hepatology ([facility])    [address]  [address]  [place] [facility] [place] [address] [phone]              Future tests that were ordered for you today     Open Future Orders        Priority Expected Expires Ordered    GASTROENTEROLOGY ADULT REF PROCEDURE ONLY Routine  1/16/2019 7/16/2018            Who to contact     If you have questions or need follow up information about today's clinic visit or your schedule please contact [facility] HEPATOLOGY directly at [phone].  Normal or non-critical lab and imaging results will be communicated to you by [employer], letter or phone within 4 business days after the clinic has received the results. If you do not hear from us within 7 days, please contact the clinic through [employer] or phone. If you have a critical or abnormal lab result, we will notify you by phone as soon as possible.  Submit refill requests through [employer] or call your pharmacy and they will forward the refill request to us. Please allow 3 business days for your refill to be completed.          Additional Information About Your Visit        [employer] Information     [employer] lets you send messages to your doctor, view your test results, renew your prescriptions, schedule appointments and more. To sign up, go to [email or web address] . Click on \"Log in\" on the left side of the screen, which will take you to the Welcome page. Then click on \"Sign up Now\" on the right side of the page.     You will be asked to enter the access code listed below, as well as some personal information. Please follow the directions to " "create your username and password.     Your access code is: [email or web address]  Expires: 2018  6:30 AM     Your access code will  in 90 days. If you need help or a new code, please call your [facility] or [phone].        Care EveryWhere ID     This is your Care EveryWhere ID. This could be used by other organizations to access your [facility] medical records  [id number]        Your Vitals Were     Pulse Temperature Height Pulse Oximetry BMI (Body Mass Index)       74 97.7  F (36.5  C) (Oral) 1.651 m (5' 5\") 98% 24.2 kg/m2        Blood Pressure from Last 3 Encounters:   18 146/86   18 140/87    Weight from Last 3 Encounters:   18 66 kg (145 lb 6.4 oz)   18 66.3 kg (146 lb 3.2 oz)               Primary Care Provider Fax #    Physician No Ref-Primary [phone]       No address on file        Equal Access to Services     [facility] : Hadii rachell maza hadasho Soomaali, waaxda luqadaha, qaybta kaalmada adeegyaumu, everett carreno . So [facility] [phone].    ATENCIÓN: Si habla español, tiene a belle disposición servicios gratuitos de asistencia lingüística. Llame al [phone].    We comply with applicable federal civil rights laws and [place] laws. We do not discriminate on the basis of race, color, national origin, age, disability, sex, sexual orientation, or gender identity.            Thank you!     Thank you for choosing [facility] HEPATOLOGY  for your care. Our goal is always to provide you with excellent care. Hearing back from our patients is one way we can continue to improve our services. Please take a few minutes to complete the written survey that you may receive in the mail after your visit with us. Thank you!             Your Updated Medication List - Protect others around you: Learn how to safely use, store and throw away your medicines at www.disposemymeds.org.          This list is accurate as of 7/16/18 12:02 PM.  Always use your most recent med " list.                   Brand Name Dispense Instructions for use Diagnosis    LANTUS SOLOSTAR 100 UNIT/ML injection   Generic drug:  insulin glargine           METFORMIN HCL PO           * tenofovir 300 MG tablet    VIREAD     Take 300 mg by mouth daily        * tenofovir 300 MG tablet    VIREAD    30 tablet    Take 1 tablet (300 mg) by mouth daily    Hepatic cirrhosis, unspecified hepatic cirrhosis type (H)       * Notice:  This list has 2 medication(s) that are the same as other medications prescribed for you. Read the directions carefully, and ask your doctor or other care provider to review them with you.

## 2018-07-16 NOTE — LETTER
7/16/2018      RE: Rishabh Cabrera  [address]  [facility] [id number]       Hepatology Clinic note  Rishabh Cabrera   Date of Birth 1960  Date of Service 7/16/2018         Assessment/plan:   Rishabh Cabrera is a 58 year old male with history of Hep B cirrhosis. His cirrhosis is well compensated at this time. HBV DNA was 44,041 and transaminases were mildly elevated last month,. He previously admitted to inconsistent compliance with Viread. He states he is taking it more consistently now.. Last MRI on 6/30/18 showing LIRADs 4 lesion, which we reviewed and discussed need for close follow-up. We discussed cirrhosis and need for regular screenings for varices and HCC. We also discussed the importance of medication compliance to prevent further decompensation of his liver disease. He has no clinical signs of ascites or HE.     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     Hepatitis B  - e antigen - negative 12/2006  - e antibody - positive 12/18/2006  Diagnosed: 2013  Treatment:   - Started tenofovir a few years ago      Cirrhosis:   - diagnosed 2015  - No history GI bleed  - No history of ascites  - No history of HE  HCC screening: MRI 6/30/2018  EGD: Done in 2015 due to symptoms of GERD, gastric ulcer     Patient was last seen by me on 6/21/2018, at which time he had been taking his Hep B medication intermittently. Last HBV DNA was 67936 on 6/21/2018. He had an MRI for HCC screening on 6/30/18 at which time he had a LIRADs-4 lesion in right hepatic lobe. He also had a complex right pleural collection was concerning for infectious etiology.  He has since followed up  "with ID at [facility] and his work-up is still underway.Initial labs for TB are negative.     No recent hospitalizations or ER visits. He does note that he does have some shortness of breath singing. His appetite is good and his weight has remained stable.     Patient denies jaundice, lower extremity edema, abdominal distension or confusion.  Patient also denies melena, hematochezia or hematemesis. Patient denies weight loss, fevers, sweats or chills.    He does not drink alcohol. He lives with his long-term girlfriend.       Medical hx Surgical hx   Past Medical History:   Diagnosis Date     Chronic hepatitis B (H)      Diabetes mellitus (H)     No past surgical history on file.              Medications:     Current Outpatient Prescriptions   Medication     METFORMIN HCL PO     tenofovir (VIREAD) 300 MG tablet     tenofovir (VIREAD) 300 MG tablet     LANTUS SOLOSTAR 100 UNIT/ML soln     No current facility-administered medications for this visit.             Allergies:     Allergies   Allergen Reactions     Crabs [Crustaceans]             Review of Systems:   10 points ROS was obtained and highlighted in the HPI, otherwise negative.          Physical Exam:   VS:  /86  Pulse 74  Temp 97.7  F (36.5  C) (Oral)  Ht 1.651 m (5' 5\")  Wt 66 kg (145 lb 6.4 oz)  SpO2 98%  BMI 24.2 kg/m2      Gen- well, NAD, A+Ox3, normal color  Lym- no palpable LAD  CVS- RRR  RS- CTA  Abd- soft, nontender. No palpable organomegaly. No ascites.   Extr- hands normal, no LEDY  Skin- no rash or jaundice  Neuro- no asterixis  Psych- normal mood         Data:   Reviewed in person and significant for:    Lab Results   Component Value Date     06/21/2018      Lab Results   Component Value Date    POTASSIUM 4.1 06/21/2018     Lab Results   Component Value Date    CHLORIDE 107 06/21/2018     Lab Results   Component Value Date    CO2 27 06/21/2018     Lab Results   Component Value Date    BUN 9 06/21/2018     Lab Results   Component Value " "Date    CR 0.91 06/21/2018       Lab Results   Component Value Date    WBC 2.9 06/21/2018     Lab Results   Component Value Date    HGB 14.5 06/21/2018     Lab Results   Component Value Date    HCT 43.6 06/21/2018     Lab Results   Component Value Date    MCV 88 06/21/2018     Lab Results   Component Value Date    PLT 86 06/21/2018       Lab Results   Component Value Date    AST 46 06/21/2018     Lab Results   Component Value Date    ALT 47 06/21/2018     No results found for: BILICONJ   Lab Results   Component Value Date    BILITOTAL 1.1 06/21/2018       Lab Results   Component Value Date    ALBUMIN 3.0 06/21/2018     Lab Results   Component Value Date    PROTTOTAL 7.7 06/21/2018      Lab Results   Component Value Date    ALKPHOS 177 06/21/2018       Lab Results   Component Value Date    INR 1.37 06/21/2018     MRI ABDOMEN:   1. Complex right pleural collection, likely empyematous, correlation  for symptoms of respiratory/pulmonary infection, including  tuberculous, would be beneficial. Consider dedicated chest CT and  serologic evaluation.   2. Cirrhotic hepatic appearance with evidence of portal hypertension  including splenomegaly and portosystemic collateral vessel formation.  3. No hepatic lesions which meet imaging criteria for HCC.  4. There is a LIRADS 4 lesion peripherally in the right hepatic lobe.  Short-term (3 month) MR follow-up is recommended.     The LIRADS 4 lesion described below could be better characterized as  having a \"nodule in nodule\" appearance.  The equivocal, though  probably enhancing component, is small and measures 8-9 mm.  This is  suspicious, though remains a LIRADS 4 lesion and 3 month follow up is  again recommended.    Dorian Lvey PA-C      "

## 2018-08-09 ENCOUNTER — TELEPHONE (OUTPATIENT)
Dept: GASTROENTEROLOGY | Facility: CLINIC | Age: 58
End: 2018-08-09

## 2018-08-14 ENCOUNTER — TELEPHONE (OUTPATIENT)
Dept: GASTROENTEROLOGY | Facility: CLINIC | Age: 58
End: 2018-08-14

## 2018-08-15 RX ORDER — LIDOCAINE 40 MG/G
CREAM TOPICAL
Status: CANCELLED | OUTPATIENT
Start: 2018-08-15

## 2018-08-15 RX ORDER — ONDANSETRON 2 MG/ML
4 INJECTION INTRAMUSCULAR; INTRAVENOUS
Status: CANCELLED | OUTPATIENT
Start: 2018-08-15

## 2018-10-10 DIAGNOSIS — B18.1 CHRONIC VIRAL HEPATITIS B WITHOUT DELTA AGENT AND WITHOUT COMA (H): Primary | ICD-10-CM

## 2019-04-04 ENCOUNTER — OFFICE VISIT (OUTPATIENT)
Dept: GASTROENTEROLOGY | Facility: CLINIC | Age: 59
End: 2019-04-04
Attending: PHYSICIAN ASSISTANT
Payer: MEDICARE

## 2019-04-04 VITALS
HEIGHT: 65 IN | DIASTOLIC BLOOD PRESSURE: 82 MMHG | HEART RATE: 47 BPM | BODY MASS INDEX: 22.89 KG/M2 | SYSTOLIC BLOOD PRESSURE: 158 MMHG | RESPIRATION RATE: 16 BRPM | OXYGEN SATURATION: 95 % | TEMPERATURE: 98.2 F | WEIGHT: 137.4 LBS

## 2019-04-04 DIAGNOSIS — K74.60 HEPATIC CIRRHOSIS, UNSPECIFIED HEPATIC CIRRHOSIS TYPE (H): ICD-10-CM

## 2019-04-04 DIAGNOSIS — B18.1 CHRONIC VIRAL HEPATITIS B WITHOUT DELTA AGENT AND WITHOUT COMA (H): ICD-10-CM

## 2019-04-04 LAB
ALBUMIN SERPL-MCNC: 2.8 G/DL (ref 3.4–5)
ALP SERPL-CCNC: 142 U/L (ref 40–150)
ALT SERPL W P-5'-P-CCNC: 64 U/L (ref 0–70)
ANION GAP SERPL CALCULATED.3IONS-SCNC: 4 MMOL/L (ref 3–14)
AST SERPL W P-5'-P-CCNC: 40 U/L (ref 0–45)
BILIRUB DIRECT SERPL-MCNC: 0.4 MG/DL (ref 0–0.2)
BILIRUB SERPL-MCNC: 1.2 MG/DL (ref 0.2–1.3)
BUN SERPL-MCNC: 12 MG/DL (ref 7–30)
CALCIUM SERPL-MCNC: 8.9 MG/DL (ref 8.5–10.1)
CHLORIDE SERPL-SCNC: 106 MMOL/L (ref 94–109)
CO2 SERPL-SCNC: 27 MMOL/L (ref 20–32)
CREAT SERPL-MCNC: 0.68 MG/DL (ref 0.66–1.25)
ERYTHROCYTE [DISTWIDTH] IN BLOOD BY AUTOMATED COUNT: 16 % (ref 10–15)
GFR SERPL CREATININE-BSD FRML MDRD: >90 ML/MIN/{1.73_M2}
GLUCOSE SERPL-MCNC: 285 MG/DL (ref 70–99)
HCT VFR BLD AUTO: 43.6 % (ref 40–53)
HGB BLD-MCNC: 14.6 G/DL (ref 13.3–17.7)
INR PPP: 1.32 (ref 0.86–1.14)
MCH RBC QN AUTO: 30.3 PG (ref 26.5–33)
MCHC RBC AUTO-ENTMCNC: 33.5 G/DL (ref 31.5–36.5)
MCV RBC AUTO: 91 FL (ref 78–100)
PLATELET # BLD AUTO: 77 10E9/L (ref 150–450)
POTASSIUM SERPL-SCNC: 3.8 MMOL/L (ref 3.4–5.3)
PROT SERPL-MCNC: 6.7 G/DL (ref 6.8–8.8)
RBC # BLD AUTO: 4.82 10E12/L (ref 4.4–5.9)
SODIUM SERPL-SCNC: 137 MMOL/L (ref 133–144)
WBC # BLD AUTO: 6.3 10E9/L (ref 4–11)

## 2019-04-04 PROCEDURE — 80048 BASIC METABOLIC PNL TOTAL CA: CPT | Performed by: PHYSICIAN ASSISTANT

## 2019-04-04 PROCEDURE — 36415 COLL VENOUS BLD VENIPUNCTURE: CPT | Performed by: PHYSICIAN ASSISTANT

## 2019-04-04 PROCEDURE — 80076 HEPATIC FUNCTION PANEL: CPT | Performed by: PHYSICIAN ASSISTANT

## 2019-04-04 PROCEDURE — 87517 HEPATITIS B DNA QUANT: CPT | Performed by: PHYSICIAN ASSISTANT

## 2019-04-04 PROCEDURE — 85027 COMPLETE CBC AUTOMATED: CPT | Performed by: PHYSICIAN ASSISTANT

## 2019-04-04 PROCEDURE — T1013 SIGN LANG/ORAL INTERPRETER: HCPCS | Mod: U3,ZF

## 2019-04-04 PROCEDURE — G0463 HOSPITAL OUTPT CLINIC VISIT: HCPCS | Mod: ZF

## 2019-04-04 PROCEDURE — 85610 PROTHROMBIN TIME: CPT | Performed by: PHYSICIAN ASSISTANT

## 2019-04-04 RX ORDER — TENOFOVIR DISOPROXIL FUMARATE 300 MG/1
300 TABLET, FILM COATED ORAL DAILY
Qty: 30 TABLET | Refills: 11 | Status: SHIPPED | OUTPATIENT
Start: 2019-04-04 | End: 2020-04-14

## 2019-04-04 RX ORDER — FUROSEMIDE 20 MG
40 TABLET ORAL DAILY
Qty: 60 TABLET | Refills: 5 | Status: SHIPPED | OUTPATIENT
Start: 2019-04-04 | End: 2021-06-11

## 2019-04-04 RX ORDER — NAPROXEN 500 MG/1
1 TABLET ORAL 2 TIMES DAILY PRN
Status: ON HOLD | COMMUNITY
Start: 2006-01-13 | End: 2022-05-13

## 2019-04-04 RX ORDER — SPIRONOLACTONE 50 MG/1
50 TABLET, FILM COATED ORAL DAILY
Qty: 30 TABLET | Refills: 5 | Status: SHIPPED | OUTPATIENT
Start: 2019-04-04 | End: 2021-06-11

## 2019-04-04 ASSESSMENT — PAIN SCALES - GENERAL: PAINLEVEL: SEVERE PAIN (7)

## 2019-04-04 ASSESSMENT — MIFFLIN-ST. JEOR: SCORE: 1375.12

## 2019-04-04 NOTE — PATIENT INSTRUCTIONS
You have scarring in your liver called cirrhosis.     Cirrhosis can lead to problems causing fluid in your abdomen or legs. Cirrhosis can cause you be tired and loss your muscles.     You likely have fluid in your abdomen:    - Start taking 50 mg spironolactone daily   - Start taking 40 mg lasix daily   These are water pills that will help you pee out the extra fluid.   - Repeat labs in one week to make sure you kidney     Continue taking Hepatitis B medication (viread) daily  - call if you need help with refills.   - This keeps the virus level low    Eat regular meals and try to keep up your weight. Eat good sources of protein in your diet.     Please schedule an MRI within the next week to evaluate the concerning spot in the liver. We will discuss the results at your next follow up.

## 2019-04-04 NOTE — NURSING NOTE
"Chief Complaint   Patient presents with     RECHECK     Hep B Cirrhosis       Vital signs:  Temp: 98.2  F (36.8  C) Temp src: Oral BP: 158/82 Pulse: (!) 47   Resp: 16 SpO2: 95 %     Height: 165.1 cm (5' 5\") Weight: 62.3 kg (137 lb 6.4 oz)  Estimated body mass index is 22.86 kg/m  as calculated from the following:    Height as of this encounter: 1.651 m (5' 5\").    Weight as of this encounter: 62.3 kg (137 lb 6.4 oz).        Lexii Clinton [facility]  4/4/2019 1:03 PM      "

## 2019-04-04 NOTE — LETTER
4/4/2019       RE: Rishabh Cabrera  [address]  [facility] [id number]     Dear Colleague,    Thank you for referring your patient, Rishabh Cabrera, to the [facility] HEPATOLOGY at [facility]. Please see a copy of my visit note below.    Hepatology Follow-up Clinic note  Rishabh Cabrera   Date of Birth 1960  Date of Service 4/4/2019    Reason for follow-up: Hep B cirrhosis          Assessment/plan:   Rishabh Cabrera is a 58 year old male with history of Hepatitis B cirrhosis, who has been maintained on tenofovir disoproxil.  He has a history of noncompliance and has recently been without his Hepatitis B medication for the past 2 months.Today on exam, he appears to have a moderate amount of ascites.  He does not have overt hepatic encephalopathy or asterixis on exam.  He has a history of a LIRADs-4 lesion on MRI in June 2018 and is overdue for repeat MRI. He will need imaging within the week to assess for ascites and evaluate previous LIRADs-4 lesion in the liver. I strongly encouraged patient to bring his wife or children along to next office visit to discuss his liver disease. He is also due for variceal screening, which he previous canceled. This was not discussed today.     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 He is accompanied by a Pashto  today.     Hepatitis B  - e antigen - negative 12/2006  - e antibody - positive 12/18/2006  Diagnosed:  2013  Treatment:   - Started tenofovir a few years ago      Cirrhosis:   - diagnosed 2015  - No history GI bleed  - No history of ascites  - No history of HE  HCC screening: MRI 6/30/2018  EGD: Done in 2015 due to symptoms of GERD, gastric ulcer     Patient was last seen by me July 2018. He had been recommended to have repeat MRI due to LIRADs 4 lesion around October 2018. He did not show for scheduled EGD or repeat MRI. He states he did not have the MRI last winter as he was feeling good. He was taking his Hepatitis B medications diligently until he ran out in February.    Patient states that he is still developed cold in March and purchased some Tylenol Cold and flu.  At this time his blood glucose levels jumped up to 500's and he noticed that his abdomen became very bloated.  It has remained bloated since that March.  He is only able to eat small meals throughout the day he gets full very quickly.  He is having regular bowel movements.  He does note decrease in muscle mass.  He denies any significant weight changes.   He denies any lower extremity edema. Per chart review, his weight is down 8 lbs. He has a lot less energy around the house.     Patient denies jaundice, lower extremity edema or confusion.  Patient also denies melena, hematochezia or hematemesis. Patient denies fevers, sweats or chills.     He does not drink alcohol. He lives with his wife. He has three adult children.     Medical hx Surgical hx   Past Medical History:   Diagnosis Date     Chronic hepatitis B (H)      Diabetes mellitus (H)     No past surgical history on file.              Medications:     Current Outpatient Medications   Medication     furosemide (LASIX) 20 MG tablet     LANTUS SOLOSTAR 100 UNIT/ML soln     metFORMIN (GLUCOPHAGE) 1000 MG tablet     naproxen (NAPROSYN) 500 MG tablet     spironolactone (ALDACTONE) 50 MG tablet     tenofovir (VIREAD) 300 MG tablet     No current facility-administered medications for this visit.   "    Facility-Administered Medications Ordered in Other Visits   Medication     gadobutrol (GADAVIST) injection 7.5 mL            Allergies:     Allergies   Allergen Reactions     Crabs [Crustaceans]             Review of Systems:   10 points ROS was obtained and highlighted in the HPI, otherwise negative.          Physical Exam:   VS:  /82 (BP Location: Right arm, Patient Position: Sitting, Cuff Size: Adult Regular)   Pulse (!) 47   Temp 98.2  F (36.8  C) (Oral)   Resp 16   Ht 1.651 m (5' 5\")   Wt 62.3 kg (137 lb 6.4 oz)   SpO2 95%   BMI 22.86 kg/m         Gen: A&Ox3, NAD, peripheral muscle wasting  HEENT: non-icteric   CV: RRR, no overt murmurs  Lung: CTA Bilatererally, no wheezing or crackles.   Lym- no palpable lymphadenopathy  Abd: distended, soft, NT, ND, no organomegaly, moderate amount of shifting dullness   Ext: no edema, intact pulses.   Skin: No rash,  no palmar erythema, telangiectasias or jaundice  Neuro: grossly intact, no asterixis   Psych: appropriate mood and affects           Data:   Reviewed in person and significant for:    Lab Results   Component Value Date     07/16/2018      Lab Results   Component Value Date    POTASSIUM 4.6 07/16/2018     Lab Results   Component Value Date    CHLORIDE 109 07/16/2018     Lab Results   Component Value Date    CO2 25 07/16/2018     Lab Results   Component Value Date    BUN 13 07/16/2018     Lab Results   Component Value Date    CR 0.88 07/16/2018       Lab Results   Component Value Date    WBC 5.8 07/16/2018     Lab Results   Component Value Date    HGB 14.7 07/16/2018     Lab Results   Component Value Date    HCT 43.8 07/16/2018     Lab Results   Component Value Date    MCV 88 07/16/2018     Lab Results   Component Value Date    PLT 85 07/16/2018       Lab Results   Component Value Date    AST 24 07/16/2018     Lab Results   Component Value Date    ALT 30 07/16/2018     No results found for: BILICONJ   Lab Results   Component Value Date    " BILITOTAL 0.7 07/16/2018       Lab Results   Component Value Date    ALBUMIN 3.2 07/16/2018     Lab Results   Component Value Date    PROTTOTAL 7.4 07/16/2018      Lab Results   Component Value Date    ALKPHOS 144 07/16/2018       Lab Results   Component Value Date    INR 1.30 07/16/2018 6/30/2018:   MR Abdomen w/o and w/ contrast:   1. Complex right pleural collection, likely empyematous, correlation  for symptoms of respiratory/pulmonary infection, including  tuberculous, would be beneficial. Consider dedicated chest CT and  serologic evaluation.   2. Cirrhotic hepatic appearance with evidence of portal hypertension  including splenomegaly and portosystemic collateral vessel formation.  3. No hepatic lesions which meet imaging criteria for HCC.  4. There is a LIRADS 4 lesion peripherally in the right hepatic lobe.  Short-term (3 month) MR follow-up is recommended.       Again, thank you for allowing me to participate in the care of your patient.      Sincerely,    Dorian Levy PA-C

## 2019-04-04 NOTE — PROGRESS NOTES
Hepatology Follow-up Clinic note  Rishabh Cabrera   Date of Birth 1960  Date of Service 4/4/2019    Reason for follow-up: Hep B cirrhosis          Assessment/plan:   Rishabh Cabrera is a 58 year old male with history of Hepatitis B cirrhosis, who has been maintained on tenofovir disoproxil.  He has a history of noncompliance and has recently been without his Hepatitis B medication for the past 2 months.Today on exam, he appears to have a moderate amount of ascites.  He does not have overt hepatic encephalopathy or asterixis on exam.  He has a history of a LIRADs-4 lesion on MRI in June 2018 and is overdue for repeat MRI. He will need imaging within the week to assess for ascites and evaluate previous LIRADs-4 lesion in the liver. I strongly encouraged patient to bring his wife or children along to next office visit to discuss his liver disease. He is also due for variceal screening, which he previous canceled. This was not discussed today.     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 He is accompanied by a Algerian  today.     Hepatitis B  - e antigen - negative 12/2006  - e antibody - positive 12/18/2006  Diagnosed: 2013  Treatment:   - Started tenofovir a few years ago      Cirrhosis:   - diagnosed 2015  - No history GI bleed  - No history of ascites  - No history of HE  HCC screening: MRI 6/30/2018  EGD: Done in 2015 due to symptoms of GERD, gastric ulcer     Patient was last seen by me  July 2018. He had been recommended to have repeat MRI due to LIRADs 4 lesion around October 2018. He did not show for scheduled EGD or repeat MRI. He states he did not have the MRI last winter as he was feeling good. He was taking his Hepatitis B medications diligently until he ran out in February.    Patient states that he is still developed cold in March and purchased some Tylenol Cold and flu.  At this time his blood glucose levels jumped up to 500's and he noticed that his abdomen became very bloated.  It has remained bloated since that March.  He is only able to eat small meals throughout the day he gets full very quickly.  He is having regular bowel movements.  He does note decrease in muscle mass.  He denies any significant weight changes.   He denies any lower extremity edema. Per chart review, his weight is down 8 lbs. He has a lot less energy around the house.     Patient denies jaundice, lower extremity edema or confusion.  Patient also denies melena, hematochezia or hematemesis. Patient denies fevers, sweats or chills.     He does not drink alcohol. He lives with his wife. He has three adult children.     Medical hx Surgical hx   Past Medical History:   Diagnosis Date     Chronic hepatitis B (H)      Diabetes mellitus (H)     No past surgical history on file.              Medications:     Current Outpatient Medications   Medication     furosemide (LASIX) 20 MG tablet     LANTUS SOLOSTAR 100 UNIT/ML soln     metFORMIN (GLUCOPHAGE) 1000 MG tablet     naproxen (NAPROSYN) 500 MG tablet     spironolactone (ALDACTONE) 50 MG tablet     tenofovir (VIREAD) 300 MG tablet     No current facility-administered medications for this visit.      Facility-Administered Medications Ordered in Other Visits   Medication     gadobutrol (GADAVIST) injection 7.5 mL            Allergies:     Allergies   Allergen Reactions     Crabs [Crustaceans]             Review of Systems:   10 points ROS was obtained and highlighted in the  "HPI, otherwise negative.          Physical Exam:   VS:  /82 (BP Location: Right arm, Patient Position: Sitting, Cuff Size: Adult Regular)   Pulse (!) 47   Temp 98.2  F (36.8  C) (Oral)   Resp 16   Ht 1.651 m (5' 5\")   Wt 62.3 kg (137 lb 6.4 oz)   SpO2 95%   BMI 22.86 kg/m        Gen: A&Ox3, NAD, peripheral muscle wasting  HEENT: non-icteric   CV: RRR, no overt murmurs  Lung: CTA Bilatererally, no wheezing or crackles.   Lym- no palpable lymphadenopathy  Abd: distended, soft, NT, ND, no organomegaly, moderate amount of shifting dullness   Ext: no edema, intact pulses.   Skin: No rash,  no palmar erythema, telangiectasias or jaundice  Neuro: grossly intact, no asterixis   Psych: appropriate mood and affects           Data:   Reviewed in person and significant for:    Lab Results   Component Value Date     07/16/2018      Lab Results   Component Value Date    POTASSIUM 4.6 07/16/2018     Lab Results   Component Value Date    CHLORIDE 109 07/16/2018     Lab Results   Component Value Date    CO2 25 07/16/2018     Lab Results   Component Value Date    BUN 13 07/16/2018     Lab Results   Component Value Date    CR 0.88 07/16/2018       Lab Results   Component Value Date    WBC 5.8 07/16/2018     Lab Results   Component Value Date    HGB 14.7 07/16/2018     Lab Results   Component Value Date    HCT 43.8 07/16/2018     Lab Results   Component Value Date    MCV 88 07/16/2018     Lab Results   Component Value Date    PLT 85 07/16/2018       Lab Results   Component Value Date    AST 24 07/16/2018     Lab Results   Component Value Date    ALT 30 07/16/2018     No results found for: BILICONJ   Lab Results   Component Value Date    BILITOTAL 0.7 07/16/2018       Lab Results   Component Value Date    ALBUMIN 3.2 07/16/2018     Lab Results   Component Value Date    PROTTOTAL 7.4 07/16/2018      Lab Results   Component Value Date    ALKPHOS 144 07/16/2018       Lab Results   Component Value Date    INR 1.30 " 07/16/2018 6/30/2018:   MR Abdomen w/o and w/ contrast:   1. Complex right pleural collection, likely empyematous, correlation  for symptoms of respiratory/pulmonary infection, including  tuberculous, would be beneficial. Consider dedicated chest CT and  serologic evaluation.   2. Cirrhotic hepatic appearance with evidence of portal hypertension  including splenomegaly and portosystemic collateral vessel formation.  3. No hepatic lesions which meet imaging criteria for HCC.  4. There is a LIRADS 4 lesion peripherally in the right hepatic lobe.  Short-term (3 month) MR follow-up is recommended.

## 2019-04-04 NOTE — LETTER
4/4/2019      RE: Rishabh Cabrera  [address]  [facility] [address]       Hepatology Follow-up Clinic note  Rishabh Cabrera   Date of Birth 1960  Date of Service 4/4/2019    Reason for follow-up: Hep B cirrhosis          Assessment/plan:   Rishabh Cabrera is a 58 year old male with history of Hepatitis B cirrhosis, who has been maintained on tenofovir disoproxil.  He has a history of noncompliance and has recently been without his Hepatitis B medication for the past 2 months.Today on exam, he appears to have a moderate amount of ascites.  He does not have overt hepatic encephalopathy or asterixis on exam.  He has a history of a LIRADs-4 lesion on MRI in June 2018 and is overdue for repeat MRI. He will need imaging within the week to assess for ascites and evaluate previous LIRADs-4 lesion in the liver. I strongly encouraged patient to bring his wife or children along to next office visit to discuss his liver disease. He is also due for variceal screening, which he previous canceled. This was not discussed today.     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 He is accompanied by a Latvian  today.     Hepatitis B  - e antigen - negative 12/2006  - e antibody - positive 12/18/2006  Diagnosed: 2013  Treatment:   - Started tenofovir a few years ago      Cirrhosis:   - diagnosed 2015  - No history GI bleed  - No history of ascites  - No history of HE  HCC screening: MRI 6/30/2018  EGD:  Done in 2015 due to symptoms of GERD, gastric ulcer     Patient was last seen by me July 2018. He had been recommended to have repeat MRI due to LIRADs 4 lesion around October 2018. He did not show for scheduled EGD or repeat MRI. He states he did not have the MRI last winter as he was feeling good. He was taking his Hepatitis B medications diligently until he ran out in February.    Patient states that he is still developed cold in March and purchased some Tylenol Cold and flu.  At this time his blood glucose levels jumped up to 500's and he noticed that his abdomen became very bloated.  It has remained bloated since that March.  He is only able to eat small meals throughout the day he gets full very quickly.  He is having regular bowel movements.  He does note decrease in muscle mass.  He denies any significant weight changes.   He denies any lower extremity edema. Per chart review, his weight is down 8 lbs. He has a lot less energy around the house.     Patient denies jaundice, lower extremity edema or confusion.  Patient also denies melena, hematochezia or hematemesis. Patient denies fevers, sweats or chills.     He does not drink alcohol. He lives with his wife. He has three adult children.     Medical hx Surgical hx   Past Medical History:   Diagnosis Date     Chronic hepatitis B (H)      Diabetes mellitus (H)     No past surgical history on file.              Medications:     Current Outpatient Medications   Medication     furosemide (LASIX) 20 MG tablet     LANTUS SOLOSTAR 100 UNIT/ML soln     metFORMIN (GLUCOPHAGE) 1000 MG tablet     naproxen (NAPROSYN) 500 MG tablet     spironolactone (ALDACTONE) 50 MG tablet     tenofovir (VIREAD) 300 MG tablet     No current facility-administered medications for this visit.      Facility-Administered Medications Ordered in Other Visits   Medication     gadobutrol (GADAVIST) injection 7.5 mL            Allergies:     Allergies   Allergen Reactions     Crabs [Crustaceans]   "           Review of Systems:   10 points ROS was obtained and highlighted in the HPI, otherwise negative.          Physical Exam:   VS:  /82 (BP Location: Right arm, Patient Position: Sitting, Cuff Size: Adult Regular)   Pulse (!) 47   Temp 98.2  F (36.8  C) (Oral)   Resp 16   Ht 1.651 m (5' 5\")   Wt 62.3 kg (137 lb 6.4 oz)   SpO2 95%   BMI 22.86 kg/m         Gen: A&Ox3, NAD, peripheral muscle wasting  HEENT: non-icteric   CV: RRR, no overt murmurs  Lung: CTA Bilatererally, no wheezing or crackles.   Lym- no palpable lymphadenopathy  Abd: distended, soft, NT, ND, no organomegaly, moderate amount of shifting dullness   Ext: no edema, intact pulses.   Skin: No rash,  no palmar erythema, telangiectasias or jaundice  Neuro: grossly intact, no asterixis   Psych: appropriate mood and affects           Data:   Reviewed in person and significant for:    Lab Results   Component Value Date     07/16/2018      Lab Results   Component Value Date    POTASSIUM 4.6 07/16/2018     Lab Results   Component Value Date    CHLORIDE 109 07/16/2018     Lab Results   Component Value Date    CO2 25 07/16/2018     Lab Results   Component Value Date    BUN 13 07/16/2018     Lab Results   Component Value Date    CR 0.88 07/16/2018       Lab Results   Component Value Date    WBC 5.8 07/16/2018     Lab Results   Component Value Date    HGB 14.7 07/16/2018     Lab Results   Component Value Date    HCT 43.8 07/16/2018     Lab Results   Component Value Date    MCV 88 07/16/2018     Lab Results   Component Value Date    PLT 85 07/16/2018       Lab Results   Component Value Date    AST 24 07/16/2018     Lab Results   Component Value Date    ALT 30 07/16/2018     No results found for: BILICONJ   Lab Results   Component Value Date    BILITOTAL 0.7 07/16/2018       Lab Results   Component Value Date    ALBUMIN 3.2 07/16/2018     Lab Results   Component Value Date    PROTTOTAL 7.4 07/16/2018      Lab Results   Component Value Date    " ALKPHOS 144 07/16/2018       Lab Results   Component Value Date    INR 1.30 07/16/2018 6/30/2018:   MR Abdomen w/o and w/ contrast:   1. Complex right pleural collection, likely empyematous, correlation  for symptoms of respiratory/pulmonary infection, including  tuberculous, would be beneficial. Consider dedicated chest CT and  serologic evaluation.   2. Cirrhotic hepatic appearance with evidence of portal hypertension  including splenomegaly and portosystemic collateral vessel formation.  3. No hepatic lesions which meet imaging criteria for HCC.  4. There is a LIRADS 4 lesion peripherally in the right hepatic lobe.  Short-term (3 month) MR follow-up is recommended.       Dorian Levy PA-C

## 2019-04-05 LAB
HBV DNA SERPL NAA+PROBE-ACNC: ABNORMAL [IU]/ML
HBV DNA SERPL NAA+PROBE-LOG IU: 5.1 {LOG_IU}/ML

## 2019-04-13 ENCOUNTER — ANCILLARY PROCEDURE (OUTPATIENT)
Dept: MRI IMAGING | Facility: CLINIC | Age: 59
End: 2019-04-13
Attending: PHYSICIAN ASSISTANT
Payer: MEDICARE

## 2019-04-13 DIAGNOSIS — K74.60 HEPATIC CIRRHOSIS, UNSPECIFIED HEPATIC CIRRHOSIS TYPE (H): ICD-10-CM

## 2019-04-13 LAB
ANION GAP SERPL CALCULATED.3IONS-SCNC: 2 MMOL/L (ref 3–14)
BUN SERPL-MCNC: 14 MG/DL (ref 7–30)
CALCIUM SERPL-MCNC: 8.8 MG/DL (ref 8.5–10.1)
CHLORIDE SERPL-SCNC: 102 MMOL/L (ref 94–109)
CO2 SERPL-SCNC: 33 MMOL/L (ref 20–32)
CREAT SERPL-MCNC: 0.96 MG/DL (ref 0.66–1.25)
GFR SERPL CREATININE-BSD FRML MDRD: 87 ML/MIN/{1.73_M2}
GLUCOSE SERPL-MCNC: 143 MG/DL (ref 70–99)
POTASSIUM SERPL-SCNC: 4 MMOL/L (ref 3.4–5.3)
SODIUM SERPL-SCNC: 137 MMOL/L (ref 133–144)

## 2019-04-13 RX ORDER — GADOBUTROL 604.72 MG/ML
7.5 INJECTION INTRAVENOUS ONCE
Status: COMPLETED | OUTPATIENT
Start: 2019-04-13 | End: 2019-04-13

## 2019-04-13 RX ADMIN — GADOBUTROL 7 ML: 604.72 INJECTION INTRAVENOUS at 08:58

## 2019-04-13 NOTE — DISCHARGE INSTRUCTIONS
MRI Contrast Discharge Instructions    The IV contrast you received today will pass out of your body in your  urine. This will happen in the next 24 hours. You will not feel this process.  Your urine will not change color.    Drink at least 4 extra glasses of water or juice today (unless your doctor  has restricted your fluids). This reduces the stress on your kidneys.  You may take your regular medicines.    If you are on dialysis: It is best to have dialysis today.    If you have a reaction: Most reactions happen right away. If you have  any new symptoms after leaving the hospital (such as hives or swelling),  call your hospital at the correct number below. Or call your family doctor.  If you have breathing distress or wheezing, call 911.    Special instructions: ***    I have read and understand the above information.    Signature:______________________________________ Date:___________    Staff:__________________________________________ Date:___________     Time:__________    [facility] Departments:    ___[place]: [phone]  ___[facility]: [phone]  ___[facility]: [phone] ___[facility]: [phone]  ___[facility]: [phone]  ___[facility]: [phone]  ___[phone]  ___[facility]: [phone]  ___[phone]

## 2019-04-19 ENCOUNTER — OFFICE VISIT (OUTPATIENT)
Dept: GASTROENTEROLOGY | Facility: CLINIC | Age: 59
End: 2019-04-19
Attending: PHYSICIAN ASSISTANT
Payer: MEDICARE

## 2019-04-19 VITALS
DIASTOLIC BLOOD PRESSURE: 80 MMHG | HEIGHT: 65 IN | WEIGHT: 129.8 LBS | OXYGEN SATURATION: 98 % | HEART RATE: 81 BPM | BODY MASS INDEX: 21.63 KG/M2 | SYSTOLIC BLOOD PRESSURE: 120 MMHG

## 2019-04-19 DIAGNOSIS — K74.60 HEPATIC CIRRHOSIS, UNSPECIFIED HEPATIC CIRRHOSIS TYPE (H): Primary | ICD-10-CM

## 2019-04-19 PROCEDURE — T1013 SIGN LANG/ORAL INTERPRETER: HCPCS | Mod: U3,ZF

## 2019-04-19 ASSESSMENT — PAIN SCALES - GENERAL: PAINLEVEL: NO PAIN (0)

## 2019-04-19 ASSESSMENT — MIFFLIN-ST. JEOR: SCORE: 1340.65

## 2019-04-19 NOTE — PROGRESS NOTES
Hepatology Follow-up Clinic note  Rishabh Cabrera   Date of Birth 1960  Date of Service 4/19/2019    Reason for follow-up: Hep B cirrhosis          Assessment/plan:   Rishabh Cabrera is a 58 year old male with history of Hep B cirrhosis that is well compensated at this time. He has been maintained on tenofovir DF for viral suppression but has history of noncompliance due to socioeconomic and cultural barriers. Recent HBV DNA level was 115,127. There was concern that he was developing ascites, but there was no ascites on recent abdominal MRI after he had started diuretics. He has no overt hepatic encephalopathy. His MELD-Na is 10. He has an elevated INR (1.32) and low platelets (77).      Diagnosis of cirrhosis was discussed with patient's wife and son. We also discussed potential complications and the need for variceal screening and HCC screening. Additionally, we discussed his compliance with Hepatitis B medication is important to prevent further decompensation of his liver disease. He had an MRI of abdomen last week that showed LIRAD's 4 lesions. He has not had variceal screening.     # Hepatitis B  - Continue taking 300 mg tenofovir DF (Viread) daily   # Continue 40 mg Lasix and 50 mg spironolactone  # 2000 mg sodium/high protein diet   # Variceal screening  - Needs EGD in the near future   # History of LIRADs-4 lesion  - Abdominal MRI in 3 months   # Social work referral help patient without dental insurance   # Follow-up in clinic in 3 months or sooner as needed    Dorian Levy PA-C   [facility] Hepatology clinic    -----------------------------------------------------       HPI:   Rishabh Cabrera is a 58 year old male presenting for follow-up. He is accompanied today by a ong , his wife and his son.     Hepatitis B  - e antigen - negative 12/2006  - e antibody - positive 12/18/2006  Diagnosed: 2013  Treatment:   - Started tenofovir a few years ago      Cirrhosis:   - diagnosed 2015  - No history GI  "bleed  - No history of ascites  - No history of HE  HCC screening: MRI   EGD: Done in 2015 due to symptoms of GERD, gastric ulcer     Patient was last seen by me on 4/4/2019. No recent hospitalizations, ER visits or new medications.     He did start taking Lasix and Spironolactone, which he thought seems to help bloating. He complains of muscle loss. Wife, son and patient note that he is very forgetful at times. No overt confusion. His HBV DNA level on 4/4/2019 was 115,127. Since last year, his weight is down 17 lbs.     He states he continues to get full very quickly. He prefers that his wife does more cooking, but it is difficult to do during the day as she works during the day. He states he protein intake right now is limited.     Patient denies jaundice, lower extremity edema, or confusion.  Patient also denies melena, hematochezia or hematemesis. Patient denies  fevers, sweats or chills.    He does not drink alcohol. He is not currently working.     Medical hx Surgical hx   Past Medical History:   Diagnosis Date     Chronic hepatitis B (H)      Diabetes mellitus (H)     No past surgical history on file.              Medications:     Current Outpatient Medications   Medication     metFORMIN (GLUCOPHAGE) 1000 MG tablet     furosemide (LASIX) 20 MG tablet     LANTUS SOLOSTAR 100 UNIT/ML soln     naproxen (NAPROSYN) 500 MG tablet     spironolactone (ALDACTONE) 50 MG tablet     tenofovir (VIREAD) 300 MG tablet     No current facility-administered medications for this visit.      Facility-Administered Medications Ordered in Other Visits   Medication     gadobutrol (GADAVIST) injection 7.5 mL            Allergies:     Allergies   Allergen Reactions     Crabs [Crustaceans]             Review of Systems:   10 points ROS was obtained and highlighted in the HPI, otherwise negative.          Physical Exam:   /80   Pulse 81   Ht 1.651 m (5' 5\")   Wt 58.9 kg (129 lb 12.8 oz)   SpO2 98%   BMI 21.60 kg/m        Gen: " A&Ox3, NAD, peripheral muscle wasting  HEENT: non-icteric  CV: RRR, no overt murmurs  Lung: CTA Bilatererally, no wheezing or crackles.   Lym- no palpable lymphadenopathy  Abd: soft, NT, distended, hepatomegaly.   Ext: no edema, intact pulses.   Skin: No rash, no palmar erythema, telangiectasias or jaundice  Neuro: grossly intact, no asterixis   Psych: appropriate mood and affects           Data:   Reviewed in person and significant for:    Lab Results   Component Value Date     04/13/2019      Lab Results   Component Value Date    POTASSIUM 4.0 04/13/2019     Lab Results   Component Value Date    CHLORIDE 102 04/13/2019     Lab Results   Component Value Date    CO2 33 04/13/2019     Lab Results   Component Value Date    BUN 14 04/13/2019     Lab Results   Component Value Date    CR 0.96 04/13/2019       Lab Results   Component Value Date    WBC 6.3 04/04/2019     Lab Results   Component Value Date    HGB 14.6 04/04/2019     Lab Results   Component Value Date    HCT 43.6 04/04/2019     Lab Results   Component Value Date    MCV 91 04/04/2019     Lab Results   Component Value Date    PLT 77 04/04/2019       Lab Results   Component Value Date    AST 40 04/04/2019     Lab Results   Component Value Date    ALT 64 04/04/2019     No results found for: BILICONJ   Lab Results   Component Value Date    BILITOTAL 1.2 04/04/2019       Lab Results   Component Value Date    ALBUMIN 2.8 04/04/2019     Lab Results   Component Value Date    PROTTOTAL 6.7 04/04/2019      Lab Results   Component Value Date    ALKPHOS 142 04/04/2019       Lab Results   Component Value Date    INR 1.32 04/04/2019     MRI ABDOMEN W/O and W contrast:   4/13/2019:   1. Cirrhotic hepatic morphology with evidence of portal hypertension,  including splenomegaly and small gastroesophageal varices.  2. Slightly increased size (since 6/30/2018) of a focus of late  arterial enhancement within a regenerative nodule in hepatic segment 5  without associated  washout, pseudocapsule, or other suspicious adrenal  abnormality. The washout or pseudocapsule associated with the  surrounding regenerative nodule.  LIRADS 4 based on slight growth of  the enhancing focus.  3. No new hepatic lesion.  4. Based on this exam only, the patient is within Girard criteria.  5. Stable chronic loculated pleural fluid along the lateral aspect of  the right lower lobe.

## 2019-04-19 NOTE — LETTER
4/19/2019       RE: Rishabh Cabrera  [address]  [facility] [address]     Dear Colleague,    Thank you for referring your patient, Rishabh Cabrera, to the [facility] HEPATOLOGY at [facility]. Please see a copy of my visit note below.    Hepatology Follow-up Clinic note  Rishabh Cabrera   Date of Birth 1960  Date of Service 4/19/2019    Reason for follow-up: Hep B cirrhosis          Assessment/plan:   Rishabh Cabrera is a 58 year old male with history of Hep B cirrhosis that is well compensated at this time. He has been maintained on tenofovir DF for viral suppression but has history of noncompliance due to socioeconomic and cultural barriers. Recent HBV DNA level was 115,127. There was concern that he was developing ascites, but there was no ascites on recent abdominal MRI after he had started diuretics. He has no overt hepatic encephalopathy. His MELD-Na is 10. He has an elevated INR (1.32) and low platelets (77).      Diagnosis of cirrhosis was discussed with patient's wife and son. We also discussed potential complications and the need for variceal screening and HCC screening. Additionally, we discussed his compliance with Hepatitis B medication is important to prevent further decompensation of his liver disease. He had an MRI of abdomen last week that showed LIRAD's 4 lesions. He has not had variceal screening.     # Hepatitis B  - Continue taking 300 mg tenofovir DF (Viread) daily   # Continue 40 mg Lasix and 50 mg spironolactone  # 2000 mg sodium/high protein diet   # Variceal screening  - Needs EGD in the near future   # History of LIRADs-4 lesion  - Abdominal MRI in October 2019  # Social work referral to   # Follow-up in clinic in 3 months or sooner as needed    Dorian Levy PA-C   [facility] Hepatology clinic    -----------------------------------------------------       HPI:   Rishabh Cabrera is a 58 year old male presenting for follow-up. He is accompanied today by  a [employer] , his wife and his son.     Hepatitis B  - e antigen - negative 12/2006  - e antibody - positive 12/18/2006  Diagnosed: 2013  Treatment:   - Started tenofovir a few years ago      Cirrhosis:   - diagnosed 2015  - No history GI bleed  - No history of ascites  - No history of HE  HCC screening: MRI   EGD: Done in 2015 due to symptoms of GERD, gastric ulcer     Patient was last seen by me on 4/4/2019. No recent hospitalizations, ER visits or new medications.     He did start taking Lasix and Spironolactone, which he thought seems to help bloating. He complains of muscle loss. Wife, son and patient note that he is very forgetful at times. No overt confusion. His HBV DNA level on 4/4/2019 was 115,127. Since last year, his weight is down 17 lbs.     He states he continues to get full very quickly. He prefers that his wife does more cooking, but it is difficult to do during the day as she works during the day. He states he protein intake right now is limited.     Patient denies jaundice, lower extremity edema, or confusion.  Patient also denies melena, hematochezia or hematemesis. Patient denies  fevers, sweats or chills.    He does not drink alcohol. He is not currently working.     Medical hx Surgical hx   Past Medical History:   Diagnosis Date     Chronic hepatitis B (H)      Diabetes mellitus (H)     No past surgical history on file.              Medications:     Current Outpatient Medications   Medication     metFORMIN (GLUCOPHAGE) 1000 MG tablet     furosemide (LASIX) 20 MG tablet     LANTUS SOLOSTAR 100 UNIT/ML soln     naproxen (NAPROSYN) 500 MG tablet     spironolactone (ALDACTONE) 50 MG tablet     tenofovir (VIREAD) 300 MG tablet     No current facility-administered medications for this visit.      Facility-Administered Medications Ordered in Other Visits   Medication     gadobutrol (GADAVIST) injection 7.5 mL            Allergies:     Allergies   Allergen Reactions     Crabs [Crustaceans]   "           Review of Systems:   10 points ROS was obtained and highlighted in the HPI, otherwise negative.          Physical Exam:   /80   Pulse 81   Ht 1.651 m (5' 5\")   Wt 58.9 kg (129 lb 12.8 oz)   SpO2 98%   BMI 21.60 kg/m         Gen: A&Ox3, NAD, peripheral muscle wasting  HEENT: non-icteric  CV: RRR, no overt murmurs  Lung: CTA Bilatererally, no wheezing or crackles.   Lym- no palpable lymphadenopathy  Abd: soft, NT, distended, hepatomegaly.   Ext: no edema, intact pulses.   Skin: No rash, no palmar erythema, telangiectasias or jaundice  Neuro: grossly intact, no asterixis   Psych: appropriate mood and affects           Data:   Reviewed in person and significant for:    Lab Results   Component Value Date     04/13/2019      Lab Results   Component Value Date    POTASSIUM 4.0 04/13/2019     Lab Results   Component Value Date    CHLORIDE 102 04/13/2019     Lab Results   Component Value Date    CO2 33 04/13/2019     Lab Results   Component Value Date    BUN 14 04/13/2019     Lab Results   Component Value Date    CR 0.96 04/13/2019       Lab Results   Component Value Date    WBC 6.3 04/04/2019     Lab Results   Component Value Date    HGB 14.6 04/04/2019     Lab Results   Component Value Date    HCT 43.6 04/04/2019     Lab Results   Component Value Date    MCV 91 04/04/2019     Lab Results   Component Value Date    PLT 77 04/04/2019       Lab Results   Component Value Date    AST 40 04/04/2019     Lab Results   Component Value Date    ALT 64 04/04/2019     No results found for: BILICONJ   Lab Results   Component Value Date    BILITOTAL 1.2 04/04/2019       Lab Results   Component Value Date    ALBUMIN 2.8 04/04/2019     Lab Results   Component Value Date    PROTTOTAL 6.7 04/04/2019      Lab Results   Component Value Date    ALKPHOS 142 04/04/2019       Lab Results   Component Value Date    INR 1.32 04/04/2019     MRI ABDOMEN W/O and W contrast:   4/13/2019:   1. Cirrhotic hepatic morphology with " evidence of portal hypertension,  including splenomegaly and small gastroesophageal varices.  2. Slightly increased size (since 6/30/2018) of a focus of late  arterial enhancement within a regenerative nodule in hepatic segment 5  without associated washout, pseudocapsule, or other suspicious adrenal  abnormality. The washout or pseudocapsule associated with the  surrounding regenerative nodule.  LIRADS 4 based on slight growth of  the enhancing focus.  3. No new hepatic lesion.  4. Based on this exam only, the patient is within [place] criteria.  5. Stable chronic loculated pleural fluid along the lateral aspect of  the right lower lobe.    Again, thank you for allowing me to participate in the care of your patient.      Sincerely,    Dorian Levy PA-C

## 2019-04-19 NOTE — NURSING NOTE
"Chief Complaint   Patient presents with     RECHECK     Hep B       /80   Pulse 81   Ht 1.651 m (5' 5\")   Wt 58.9 kg (129 lb 12.8 oz)   SpO2 98%   BMI 21.60 kg/m      Cristiano Son CMA    "

## 2019-05-09 ENCOUNTER — TELEPHONE (OUTPATIENT)
Dept: GASTROENTEROLOGY | Facility: CLINIC | Age: 59
End: 2019-05-09

## 2019-05-23 ENCOUNTER — TELEPHONE (OUTPATIENT)
Dept: GASTROENTEROLOGY | Facility: CLINIC | Age: 59
End: 2019-05-23

## 2019-05-23 NOTE — TELEPHONE ENCOUNTER
Order:  GASTROENTEROLOGY ADULT REF PROCEDURE ONLY (Order: [id number]) - 4/19/2019   Referring   Dorian Alas  ID#  [id number]       Unable to leave voicemail on home, mobile phones.

## 2019-05-29 ENCOUNTER — HOSPITAL ENCOUNTER (OUTPATIENT)
Facility: CLINIC | Age: 59
Discharge: HOME | End: 2019-05-29
Attending: INTERNAL MEDICINE | Admitting: INTERNAL MEDICINE
Payer: MEDICARE

## 2019-05-29 VITALS
SYSTOLIC BLOOD PRESSURE: 132 MMHG | DIASTOLIC BLOOD PRESSURE: 88 MMHG | OXYGEN SATURATION: 98 % | RESPIRATION RATE: 26 BRPM | HEART RATE: 70 BPM

## 2019-05-29 DIAGNOSIS — K74.60 HEPATIC CIRRHOSIS, UNSPECIFIED HEPATIC CIRRHOSIS TYPE (H): ICD-10-CM

## 2019-05-29 DIAGNOSIS — K74.60 HEPATIC CIRRHOSIS, UNSPECIFIED HEPATIC CIRRHOSIS TYPE (H): Primary | ICD-10-CM

## 2019-05-29 LAB
ANION GAP SERPL CALCULATED.3IONS-SCNC: 6 MMOL/L (ref 3–14)
BUN SERPL-MCNC: 16 MG/DL (ref 7–30)
CALCIUM SERPL-MCNC: 9.3 MG/DL (ref 8.5–10.1)
CHLORIDE SERPL-SCNC: 103 MMOL/L (ref 94–109)
CO2 SERPL-SCNC: 27 MMOL/L (ref 20–32)
CREAT SERPL-MCNC: 0.91 MG/DL (ref 0.66–1.25)
GFR SERPL CREATININE-BSD FRML MDRD: >90 ML/MIN/{1.73_M2}
GLUCOSE BLDC GLUCOMTR-MCNC: 263 MG/DL (ref 70–99)
GLUCOSE SERPL-MCNC: 300 MG/DL (ref 70–99)
POTASSIUM SERPL-SCNC: 4 MMOL/L (ref 3.4–5.3)
SODIUM SERPL-SCNC: 136 MMOL/L (ref 133–144)
UPPER GI ENDOSCOPY: NORMAL

## 2019-05-29 PROCEDURE — 25000128 H RX IP 250 OP 636: Performed by: INTERNAL MEDICINE

## 2019-05-29 PROCEDURE — 82962 GLUCOSE BLOOD TEST: CPT

## 2019-05-29 PROCEDURE — 43235 EGD DIAGNOSTIC BRUSH WASH: CPT | Performed by: INTERNAL MEDICINE

## 2019-05-29 PROCEDURE — 40000104 ZZH STATISTIC MODERATE SEDATION < 10 MIN: Performed by: INTERNAL MEDICINE

## 2019-05-29 PROCEDURE — 25000125 ZZHC RX 250: Performed by: INTERNAL MEDICINE

## 2019-05-29 PROCEDURE — 25000132 ZZH RX MED GY IP 250 OP 250 PS 637: Performed by: INTERNAL MEDICINE

## 2019-05-29 RX ORDER — ONDANSETRON 2 MG/ML
4 INJECTION INTRAMUSCULAR; INTRAVENOUS EVERY 6 HOURS PRN
Status: DISCONTINUED | OUTPATIENT
Start: 2019-05-29 | End: 2019-05-29 | Stop reason: HOSPADM

## 2019-05-29 RX ORDER — SIMETHICONE
LIQUID (ML) MISCELLANEOUS PRN
Status: DISCONTINUED | OUTPATIENT
Start: 2019-05-29 | End: 2019-05-29 | Stop reason: HOSPADM

## 2019-05-29 RX ORDER — FLUMAZENIL 0.1 MG/ML
0.2 INJECTION, SOLUTION INTRAVENOUS
Status: DISCONTINUED | OUTPATIENT
Start: 2019-05-29 | End: 2019-05-29 | Stop reason: HOSPADM

## 2019-05-29 RX ORDER — LIDOCAINE 40 MG/G
CREAM TOPICAL
Status: DISCONTINUED | OUTPATIENT
Start: 2019-05-29 | End: 2019-05-29 | Stop reason: HOSPADM

## 2019-05-29 RX ORDER — ONDANSETRON 2 MG/ML
4 INJECTION INTRAMUSCULAR; INTRAVENOUS
Status: DISCONTINUED | OUTPATIENT
Start: 2019-05-29 | End: 2019-05-29 | Stop reason: HOSPADM

## 2019-05-29 RX ORDER — NALOXONE HYDROCHLORIDE 0.4 MG/ML
.1-.4 INJECTION, SOLUTION INTRAMUSCULAR; INTRAVENOUS; SUBCUTANEOUS
Status: DISCONTINUED | OUTPATIENT
Start: 2019-05-29 | End: 2019-05-29 | Stop reason: HOSPADM

## 2019-05-29 RX ORDER — ONDANSETRON 4 MG/1
4 TABLET, ORALLY DISINTEGRATING ORAL EVERY 6 HOURS PRN
Status: DISCONTINUED | OUTPATIENT
Start: 2019-05-29 | End: 2019-05-29 | Stop reason: HOSPADM

## 2019-05-29 RX ORDER — FENTANYL CITRATE 50 UG/ML
INJECTION, SOLUTION INTRAMUSCULAR; INTRAVENOUS PRN
Status: DISCONTINUED | OUTPATIENT
Start: 2019-05-29 | End: 2019-05-29 | Stop reason: HOSPADM

## 2019-05-29 NOTE — OR NURSING
Procedure: EGD no interventions  Sedation: conscious sedation  Specimens: none.   O2: 2L/NC  Tolerated procedure: well  Pt to recovery area in stable condition accompanied by RN.   Other:  none    Cassie Fitzgerald RN

## 2019-06-21 ENCOUNTER — TELEPHONE (OUTPATIENT)
Dept: GASTROENTEROLOGY | Facility: CLINIC | Age: 59
End: 2019-06-21

## 2019-06-21 NOTE — TELEPHONE ENCOUNTER
No voicemails set up for home or mobile numbers. Will try connecting with patient next week.    [employer]  line for patient callback is [phone]  - - Then call clinic @ [phone] and follow prompts for Hepatology.     Message   Received: 3 weeks ago   Message Contents   Dorian Levy PA-C Guidarelli, Jacob, RN Jake,     Please tell patient that his labs looked good today except his blood glucose was very elevated. He should be checking his blood sugars at home. If there remain consistent elevated, he should follow up with his PCP.     He should continue Hep B medication daily.   Please remind him of any upcoming appointments.     Thanks, Dorian

## 2019-07-15 DIAGNOSIS — B18.1 CHRONIC VIRAL HEPATITIS B WITHOUT DELTA AGENT AND WITHOUT COMA (H): Primary | ICD-10-CM

## 2019-07-15 DIAGNOSIS — K76.9 LIVER LESION: ICD-10-CM

## 2019-07-17 DIAGNOSIS — K76.9 LIVER LESION: ICD-10-CM

## 2019-07-17 DIAGNOSIS — B18.1 CHRONIC VIRAL HEPATITIS B WITHOUT DELTA AGENT AND WITHOUT COMA (H): ICD-10-CM

## 2019-07-17 LAB
ALBUMIN SERPL-MCNC: 2.9 G/DL (ref 3.4–5)
ALP SERPL-CCNC: 135 U/L (ref 40–150)
ALT SERPL W P-5'-P-CCNC: 33 U/L (ref 0–70)
ANION GAP SERPL CALCULATED.3IONS-SCNC: 1 MMOL/L (ref 3–14)
AST SERPL W P-5'-P-CCNC: 31 U/L (ref 0–45)
BILIRUB DIRECT SERPL-MCNC: 0.3 MG/DL (ref 0–0.2)
BILIRUB SERPL-MCNC: 1.2 MG/DL (ref 0.2–1.3)
BUN SERPL-MCNC: 8 MG/DL (ref 7–30)
CALCIUM SERPL-MCNC: 8.1 MG/DL (ref 8.5–10.1)
CHLORIDE SERPL-SCNC: 106 MMOL/L (ref 94–109)
CO2 SERPL-SCNC: 30 MMOL/L (ref 20–32)
CREAT SERPL-MCNC: 0.93 MG/DL (ref 0.66–1.25)
ERYTHROCYTE [DISTWIDTH] IN BLOOD BY AUTOMATED COUNT: 14 % (ref 10–15)
GFR SERPL CREATININE-BSD FRML MDRD: >90 ML/MIN/{1.73_M2}
GLUCOSE SERPL-MCNC: 185 MG/DL (ref 70–99)
HCT VFR BLD AUTO: 45.2 % (ref 40–53)
HGB BLD-MCNC: 14.8 G/DL (ref 13.3–17.7)
INR PPP: 1.32 (ref 0.86–1.14)
MCH RBC QN AUTO: 30.4 PG (ref 26.5–33)
MCHC RBC AUTO-ENTMCNC: 32.7 G/DL (ref 31.5–36.5)
MCV RBC AUTO: 93 FL (ref 78–100)
PLATELET # BLD AUTO: 86 10E9/L (ref 150–450)
POTASSIUM SERPL-SCNC: 4 MMOL/L (ref 3.4–5.3)
PROT SERPL-MCNC: 6.9 G/DL (ref 6.8–8.8)
RBC # BLD AUTO: 4.87 10E12/L (ref 4.4–5.9)
SODIUM SERPL-SCNC: 137 MMOL/L (ref 133–144)
WBC # BLD AUTO: 5.6 10E9/L (ref 4–11)

## 2019-07-17 PROCEDURE — 87517 HEPATITIS B DNA QUANT: CPT | Performed by: PHYSICIAN ASSISTANT

## 2019-07-17 NOTE — LETTER
July 22, 2019       TO: Rishabh Cabrera  [address]  [facility] [address]       Dear Mr. Cabrera,    We are writing to inform you of your test results.    Your Hepatitis B level was very low. You are doing a good job taking your medication.     Continue taking your medication daily!     It was a pleasure to see you at your recent visit. Please let me know if you have any questions or concerns.     Clinic Staff - [phone] option 3     Sincerely,     Dorian Levy PA-C   [address] [place] [address], Mail [id number]  [place]  [address].

## 2019-07-18 ENCOUNTER — OFFICE VISIT (OUTPATIENT)
Dept: GASTROENTEROLOGY | Facility: CLINIC | Age: 59
End: 2019-07-18
Attending: PHYSICIAN ASSISTANT
Payer: MEDICARE

## 2019-07-18 VITALS
BODY MASS INDEX: 21.97 KG/M2 | SYSTOLIC BLOOD PRESSURE: 128 MMHG | OXYGEN SATURATION: 97 % | HEART RATE: 95 BPM | WEIGHT: 132 LBS | DIASTOLIC BLOOD PRESSURE: 83 MMHG

## 2019-07-18 DIAGNOSIS — K74.60 HEPATIC CIRRHOSIS, UNSPECIFIED HEPATIC CIRRHOSIS TYPE (H): Primary | ICD-10-CM

## 2019-07-18 DIAGNOSIS — B18.1 CHRONIC VIRAL HEPATITIS B WITHOUT DELTA AGENT AND WITHOUT COMA (H): ICD-10-CM

## 2019-07-18 PROCEDURE — G0463 HOSPITAL OUTPT CLINIC VISIT: HCPCS | Mod: ZF

## 2019-07-18 ASSESSMENT — PAIN SCALES - GENERAL: PAINLEVEL: MODERATE PAIN (4)

## 2019-07-18 NOTE — NURSING NOTE
"Chief Complaint   Patient presents with     RECHECK     hep b , cirrhosis      Vital signs:      BP: 128/83 Pulse: 95     SpO2: 97 %       Weight: 59.9 kg (132 lb)  Estimated body mass index is 21.97 kg/m  as calculated from the following:    Height as of 4/19/19: 1.651 m (5' 5\").    Weight as of this encounter: 59.9 kg (132 lb).        Kristin Rios    "

## 2019-07-18 NOTE — LETTER
7/18/2019      RE: Rishabh Cabrera  [address]  [place] [address]         Hepatology Follow-up Clinic note  Rishabh Cabrera   Date of Birth 1960  Date of Service 7/18/2019    Reason for follow-up: Hep B cirrhosis          Assessment/plan:   Rishabh Cabrera is a 59 year old male with history of Hep B cirrhosis, complicated by esophageal varices. History of intermittent non-compliance with medications, but has been taking them regularly over the past 3 months. His transaminases are normal and his Hepatitis B virus level is low, correlating with compliance. No clinical evidence of significant ascites or overt HE. MELD-Na 10, mildly elevated INR. HE is up to date with variceal screening. We discussed importance of good nutrition to maintain his weight and maintain strength.     # History of LIRADS 4 lesion:   - MRI scheduled for October 2019  # High protein diet  # Continue low dose diuretics  # Hepatitis B: Continue 300 mg tenofovir disoproxil daily   # Follow up with PCP for on-going health maintenance and optimization of blood glucose levels.   # Follow-up in clinic in 6 months to establish care with Hepatologist     Dorian Levy PA-C   [facility] Hepatology clinic    -----------------------------------------------------       HPI:   Rishabh Cabrera is a 59 year old male presenting for follow-up.     Hepatitis B  - e antigen - negative 12/2006  - e antibody - positive 12/18/2006  Diagnosed: 2013  Treatment:   - Started tenofovir a few years ago      Cirrhosis:   - diagnosed 2015  - No history GI bleed  - No history of ascites  - No history of HE  HCC screening: MRI, history of LIRADS 4 lesion   EGD: 5/29/2019    PMH: History of chronic pleural effusion, previously worked up by ID for Tb negative, history of DM. Follows with PCP at [facility].     Patient was last seen by me April 2019. He states he has been compliant with his Hep B medication. He is up 9 lbs since April. Overall, he down about  20-25 lbs since 2015.     He states his appetite remains poor as nothing taste good. He forces himself to eat more.     He states he has bowel movements twice daily. He states he continues to have intermittent pain over pain and shoulders.     Patient denies jaundice, lower extremity edema, abdominal distension or confusion.      Patient also denies melena, hematochezia or hematemesis. Patient denies fevers, sweats or chills.    He does not drink alcohol or smoke cigarettes.         Medical hx Surgical hx   Past Medical History:   Diagnosis Date     Chronic hepatitis B (H)      Diabetes mellitus (H)     Past Surgical History:   Procedure Laterality Date     ESOPHAGOSCOPY, GASTROSCOPY, DUODENOSCOPY (EGD), COMBINED N/A 5/29/2019    Procedure: ESOPHAGOGASTRODUODENOSCOPY (EGD);  Surgeon: Leventhal, Thomas Michael, MD;  Location:  GI                 Medications:     Current Outpatient Medications   Medication     furosemide (LASIX) 20 MG tablet     LANTUS SOLOSTAR 100 UNIT/ML soln     metFORMIN (GLUCOPHAGE) 1000 MG tablet     naproxen (NAPROSYN) 500 MG tablet     spironolactone (ALDACTONE) 50 MG tablet     tenofovir (VIREAD) 300 MG tablet     No current facility-administered medications for this visit.      Facility-Administered Medications Ordered in Other Visits   Medication     gadobutrol (GADAVIST) injection 7.5 mL            Allergies:     Allergies   Allergen Reactions     Crabs [Crustaceans]             Review of Systems:   10 points ROS was obtained and highlighted in the HPI, otherwise negative.          Physical Exam:   VS:  /83   Pulse 95   Wt 59.9 kg (132 lb)   SpO2 97%   BMI 21.97 kg/m         Gen: A&Ox3, NAD, thin  HEENT: non-icteric   CV: RRR, no overt murmurs  Lung: CTA Bilatererally, no wheezing or crackles.   Lym- no palpable lymphadenopathy  Abd: soft, NT, ND, no organomegaly. No ascites   Ext: no edema, intact pulses.   Skin: No rash, no palmar erythema, telangiectasias or jaundice  Neuro:  grossly intact, no asterixis   Psych: appropriate mood and affects           Data:   Reviewed in person and significant for:    Lab Results   Component Value Date     07/17/2019      Lab Results   Component Value Date    POTASSIUM 4.0 07/17/2019     Lab Results   Component Value Date    CHLORIDE 106 07/17/2019     Lab Results   Component Value Date    CO2 30 07/17/2019     Lab Results   Component Value Date    BUN 8 07/17/2019     Lab Results   Component Value Date    CR 0.93 07/17/2019       Lab Results   Component Value Date    WBC 5.6 07/17/2019     Lab Results   Component Value Date    HGB 14.8 07/17/2019     Lab Results   Component Value Date    HCT 45.2 07/17/2019     Lab Results   Component Value Date    MCV 93 07/17/2019     Lab Results   Component Value Date    PLT 86 07/17/2019       Lab Results   Component Value Date    AST 31 07/17/2019     Lab Results   Component Value Date    ALT 33 07/17/2019     No results found for: BILICONJ   Lab Results   Component Value Date    BILITOTAL 1.2 07/17/2019       Lab Results   Component Value Date    ALBUMIN 2.9 07/17/2019     Lab Results   Component Value Date    PROTTOTAL 6.9 07/17/2019      Lab Results   Component Value Date    ALKPHOS 135 07/17/2019       Lab Results   Component Value Date    INR 1.32 07/17/2019     MRI W/ W/O Contrast:   4/13/2019:   1. Cirrhotic hepatic morphology with evidence of portal hypertension,  including splenomegaly and small gastroesophageal varices.  2. Slightly increased size (since 6/30/2018) of a focus of late  arterial enhancement within a regenerative nodule in hepatic segment 5  without associated washout, pseudocapsule, or other suspicious adrenal  abnormality. The washout or pseudocapsule associated with the  surrounding regenerative nodule.  LIRADS 4 based on slight growth of  the enhancing focus.  3. No new hepatic lesion.  4. Based on this exam only, the patient is within [place] criteria.  5. Stable chronic loculated  pleural fluid along the lateral aspect of  the right lower lobe.       Dorian Levy PA-C       no

## 2019-07-18 NOTE — PROGRESS NOTES
Hepatology Follow-up Clinic note  Rishabh Cabrera   Date of Birth 1960  Date of Service 7/18/2019    Reason for follow-up: Hep B cirrhosis          Assessment/plan:   Rishabh Cabrera is a 59 year old male with history of Hep B cirrhosis, complicated by esophageal varices. History of intermittent non-compliance with medications, but has been taking them regularly over the past 3 months. His transaminases are normal and his Hepatitis B virus level is low, correlating with compliance. No clinical evidence of significant ascites or overt HE. MELD-Na 10, mildly elevated INR. HE is up to date with variceal screening. We discussed importance of good nutrition to maintain his weight and maintain strength.     # History of LIRADS 4 lesion:   - MRI scheduled for October 2019  # High protein diet  # Continue low dose diuretics  # Hepatitis B: Continue 300 mg tenofovir disoproxil daily   # Follow up with PCP for on-going health maintenance and optimization of blood glucose levels.   # Follow-up in clinic in 6 months to establish care with Hepatologist     Dorian Levy PA-C   [facility] Hepatology clinic    -----------------------------------------------------       HPI:   Rishabh Cabrera is a 59 year old male presenting for follow-up.     Hepatitis B  - e antigen - negative 12/2006  - e antibody - positive 12/18/2006  Diagnosed: 2013  Treatment:   - Started tenofovir a few years ago      Cirrhosis:   - diagnosed 2015  - No history GI bleed  - No history of ascites  - No history of HE  HCC screening: MRI, history of LIRADS 4 lesion   EGD: 5/29/2019    PMH: History of chronic pleural effusion, previously worked up by ID for Tb negative, history of DM. Follows with PCP at [facility].     Patient was last seen by me April 2019. He states he has been compliant with his Hep B medication. He is up 9 lbs since April. Overall, he down about 20-25 lbs since 2015.     He states his appetite remains poor as nothing taste good. He  forces himself to eat more.     He states he has bowel movements twice daily. He states he continues to have intermittent pain over pain and shoulders.     Patient denies jaundice, lower extremity edema, abdominal distension or confusion.      Patient also denies melena, hematochezia or hematemesis. Patient denies fevers, sweats or chills.    He does not drink alcohol or smoke cigarettes.         Medical hx Surgical hx   Past Medical History:   Diagnosis Date     Chronic hepatitis B (H)      Diabetes mellitus (H)     Past Surgical History:   Procedure Laterality Date     ESOPHAGOSCOPY, GASTROSCOPY, DUODENOSCOPY (EGD), COMBINED N/A 5/29/2019    Procedure: ESOPHAGOGASTRODUODENOSCOPY (EGD);  Surgeon: Leventhal, Thomas Michael, MD;  Location:  GI                 Medications:     Current Outpatient Medications   Medication     furosemide (LASIX) 20 MG tablet     LANTUS SOLOSTAR 100 UNIT/ML soln     metFORMIN (GLUCOPHAGE) 1000 MG tablet     naproxen (NAPROSYN) 500 MG tablet     spironolactone (ALDACTONE) 50 MG tablet     tenofovir (VIREAD) 300 MG tablet     No current facility-administered medications for this visit.      Facility-Administered Medications Ordered in Other Visits   Medication     gadobutrol (GADAVIST) injection 7.5 mL            Allergies:     Allergies   Allergen Reactions     Crabs [Crustaceans]             Review of Systems:   10 points ROS was obtained and highlighted in the HPI, otherwise negative.          Physical Exam:   VS:  /83   Pulse 95   Wt 59.9 kg (132 lb)   SpO2 97%   BMI 21.97 kg/m        Gen: A&Ox3, NAD, thin  HEENT: non-icteric   CV: RRR, no overt murmurs  Lung: CTA Bilatererally, no wheezing or crackles.   Lym- no palpable lymphadenopathy  Abd: soft, NT, ND, no organomegaly. No ascites   Ext: no edema, intact pulses.   Skin: No rash, no palmar erythema, telangiectasias or jaundice  Neuro: grossly intact, no asterixis   Psych: appropriate mood and affects           Data:    Reviewed in person and significant for:    Lab Results   Component Value Date     07/17/2019      Lab Results   Component Value Date    POTASSIUM 4.0 07/17/2019     Lab Results   Component Value Date    CHLORIDE 106 07/17/2019     Lab Results   Component Value Date    CO2 30 07/17/2019     Lab Results   Component Value Date    BUN 8 07/17/2019     Lab Results   Component Value Date    CR 0.93 07/17/2019       Lab Results   Component Value Date    WBC 5.6 07/17/2019     Lab Results   Component Value Date    HGB 14.8 07/17/2019     Lab Results   Component Value Date    HCT 45.2 07/17/2019     Lab Results   Component Value Date    MCV 93 07/17/2019     Lab Results   Component Value Date    PLT 86 07/17/2019       Lab Results   Component Value Date    AST 31 07/17/2019     Lab Results   Component Value Date    ALT 33 07/17/2019     No results found for: BILICONJ   Lab Results   Component Value Date    BILITOTAL 1.2 07/17/2019       Lab Results   Component Value Date    ALBUMIN 2.9 07/17/2019     Lab Results   Component Value Date    PROTTOTAL 6.9 07/17/2019      Lab Results   Component Value Date    ALKPHOS 135 07/17/2019       Lab Results   Component Value Date    INR 1.32 07/17/2019     MRI W/ W/O Contrast:   4/13/2019:   1. Cirrhotic hepatic morphology with evidence of portal hypertension,  including splenomegaly and small gastroesophageal varices.  2. Slightly increased size (since 6/30/2018) of a focus of late  arterial enhancement within a regenerative nodule in hepatic segment 5  without associated washout, pseudocapsule, or other suspicious adrenal  abnormality. The washout or pseudocapsule associated with the  surrounding regenerative nodule.  LIRADS 4 based on slight growth of  the enhancing focus.  3. No new hepatic lesion.  4. Based on this exam only, the patient is within Caio criteria.  5. Stable chronic loculated pleural fluid along the lateral aspect of  the right lower lobe.

## 2019-07-19 LAB
HBV DNA SERPL NAA+PROBE-ACNC: <20 [IU]/ML
HBV DNA SERPL NAA+PROBE-LOG IU: <1.3 {LOG_IU}/ML

## 2019-10-01 ENCOUNTER — ANCILLARY PROCEDURE (OUTPATIENT)
Dept: MRI IMAGING | Facility: CLINIC | Age: 59
End: 2019-10-01
Attending: PHYSICIAN ASSISTANT
Payer: MEDICARE

## 2019-10-01 DIAGNOSIS — B18.1 CHRONIC VIRAL HEPATITIS B WITHOUT DELTA AGENT AND WITHOUT COMA (H): ICD-10-CM

## 2019-10-01 DIAGNOSIS — K76.9 LIVER LESION: ICD-10-CM

## 2019-10-01 RX ORDER — GADOBUTROL 604.72 MG/ML
7.5 INJECTION INTRAVENOUS ONCE
Status: COMPLETED | OUTPATIENT
Start: 2019-10-01 | End: 2019-10-01

## 2019-10-01 RX ADMIN — GADOBUTROL 6 ML: 604.72 INJECTION INTRAVENOUS at 10:46

## 2019-10-01 NOTE — LETTER
October 5, 2019       TO: Rishabh Cabrera  [address]  [place] [address]       Dear Mr. Cabrera,    We are writing to inform you of your test results.    Your ultrasound shows changes consistent with cirrhosis but no worrisome liver masses. We will plan to repeat the MRI in 6 months for ongoing liver cancer screening.     Please let me know if you have any questions or concerns.     Clinic Staff - [phone] option [phone]     Sincerely,     Dorian Levy PA-C  [address] [place] [address], Mail Code [id number]  [place]  [address].

## 2019-12-20 ENCOUNTER — TELEPHONE (OUTPATIENT)
Dept: GASTROENTEROLOGY | Facility: CLINIC | Age: 59
End: 2019-12-20

## 2019-12-20 NOTE — TELEPHONE ENCOUNTER
GAMAL for patient to call me back. Needs to know about his Viread medication. If he is still taking medication and where he is filling his Rx. Or did he stopped taking his Viread , when and why?    Keya Beal LPN

## 2020-01-02 ENCOUNTER — TELEPHONE (OUTPATIENT)
Dept: GASTROENTEROLOGY | Facility: CLINIC | Age: 60
End: 2020-01-02

## 2020-01-02 NOTE — TELEPHONE ENCOUNTER
GAMAL for patient to call me back about his Viread medication. Was notified by [facility] via fax that the patient has not filled his Viread medication since 10/21/19. Need to know where and when his medication is being filled and if not why is he not filling his Viread.     Keya Beal LPN

## 2020-01-03 ENCOUNTER — TELEPHONE (OUTPATIENT)
Dept: GASTROENTEROLOGY | Facility: CLINIC | Age: 60
End: 2020-01-03

## 2020-01-03 NOTE — TELEPHONE ENCOUNTER
Still unable to contact patient about his Viread medication and why he has not filled his Rx since 10/21/19. Attempted many times to contact him. Home # LM, now VM is full; Work # not in service, Mobile # person who answered does not know Rishabh.    Keya Beal LPN

## 2020-01-06 ENCOUNTER — DOCUMENTATION ONLY (OUTPATIENT)
Dept: CARE COORDINATION | Facility: CLINIC | Age: 60
End: 2020-01-06

## 2020-01-11 ENCOUNTER — TELEPHONE (OUTPATIENT)
Dept: GASTROENTEROLOGY | Facility: CLINIC | Age: 60
End: 2020-01-11

## 2020-01-21 ENCOUNTER — PATIENT OUTREACH (OUTPATIENT)
Dept: CARE COORDINATION | Facility: CLINIC | Age: 60
End: 2020-01-21

## 2020-01-21 DIAGNOSIS — B18.1 CHRONIC VIRAL HEPATITIS B WITHOUT DELTA AGENT AND WITHOUT COMA (H): Primary | ICD-10-CM

## 2020-01-21 DIAGNOSIS — B18.1 CHRONIC VIRAL HEPATITIS B WITHOUT DELTA AGENT AND WITHOUT COMA (H): ICD-10-CM

## 2020-01-21 LAB
ALBUMIN SERPL-MCNC: 3.1 G/DL (ref 3.4–5)
ALP SERPL-CCNC: 189 U/L (ref 40–150)
ALT SERPL W P-5'-P-CCNC: 391 U/L (ref 0–70)
ANION GAP SERPL CALCULATED.3IONS-SCNC: 2 MMOL/L (ref 3–14)
AST SERPL W P-5'-P-CCNC: 224 U/L (ref 0–45)
BILIRUB DIRECT SERPL-MCNC: 0.6 MG/DL (ref 0–0.2)
BILIRUB SERPL-MCNC: 2.1 MG/DL (ref 0.2–1.3)
BUN SERPL-MCNC: 10 MG/DL (ref 7–30)
CALCIUM SERPL-MCNC: 8.8 MG/DL (ref 8.5–10.1)
CHLORIDE SERPL-SCNC: 103 MMOL/L (ref 94–109)
CO2 SERPL-SCNC: 31 MMOL/L (ref 20–32)
CREAT SERPL-MCNC: 0.69 MG/DL (ref 0.66–1.25)
ERYTHROCYTE [DISTWIDTH] IN BLOOD BY AUTOMATED COUNT: 14.1 % (ref 10–15)
GFR SERPL CREATININE-BSD FRML MDRD: >90 ML/MIN/{1.73_M2}
GLUCOSE SERPL-MCNC: 298 MG/DL (ref 70–99)
HCT VFR BLD AUTO: 48.1 % (ref 40–53)
HGB BLD-MCNC: 16.3 G/DL (ref 13.3–17.7)
INR PPP: 1.44 (ref 0.86–1.14)
MCH RBC QN AUTO: 30.1 PG (ref 26.5–33)
MCHC RBC AUTO-ENTMCNC: 33.9 G/DL (ref 31.5–36.5)
MCV RBC AUTO: 89 FL (ref 78–100)
PLATELET # BLD AUTO: 74 10E9/L (ref 150–450)
POTASSIUM SERPL-SCNC: 4.4 MMOL/L (ref 3.4–5.3)
PROT SERPL-MCNC: 8 G/DL (ref 6.8–8.8)
RBC # BLD AUTO: 5.42 10E12/L (ref 4.4–5.9)
SODIUM SERPL-SCNC: 136 MMOL/L (ref 133–144)
WBC # BLD AUTO: 6.8 10E9/L (ref 4–11)

## 2020-01-21 PROCEDURE — 87517 HEPATITIS B DNA QUANT: CPT | Performed by: PHYSICIAN ASSISTANT

## 2020-01-21 NOTE — PROGRESS NOTES
"Social Work Intervention  [facility]    Data/Intervention:    Patient Name:  Rishabh Cabrera  /Age:  1960 (59 year old)    Visit Type: In person (two visits on 20 and 20)  Referral Source: Pt request via clinic coordinator (Hepatology pt)   Reason for Referral:  Financial issues     Collaborated With:    - Patient   - Dr. Leventhal   - Pharmacy [facility] Nafisa Zavala     Patient Concerns/Issues:   Pt requested to speak with SW re: financial issues on . Met with pt in consult room after lab visit. Introduced self and role of SW. Pt's first language is Hmong, but he reported that he didn't need an  to meet with me. Pt reported that he has been experiencing financial issues, unable to pay his medical bills and for prescriptions. Pt reported that he has \"three diseases\" (hepatitis B, diabetes and third was unclear) and has been on disability since  (his Medicare started in ). It seems he's on disability due to medical conditions that may have resulted from his work. Pt showed SW pictures of himself from when he was well, noting the difference in how he currently looks and how he is now de-conditioned and weak. He discussed frustrations about not knowing why he is not getting better and feels that none of his doctors can tell him this. He is not sure which doctor can help him. Encouraged him to discuss this with his provider at [facility] this week. Pt does not have a primary care provider but did not think this would help. Inquired further about living/social situation. Pt is  and lives in a duplex with his fiancee, along with three of his adult kids. He and his fiancee live on the first floor and the adult kids live on the second floor. Pt's emergency contact is his brother, but he lives in [place]. He is not receiving home care or other services in the community. Pt receives ~$1500/month in SSDI and stated that he'd been previously denied for MA, as he is over " income and also has money in his 401k. Discussed applying for Community Care program to assist with clinic bills. Provided pt with application for this and went through all the documents he would need to submit with the application.     Pt presented to clinic later in the week (1/23) for scheduled appt and brought documents for Community Care application for SW to submit for him. Per Dr. Leventhal, pt has not been filling his Viread medication as he cannot afford it, so discussed patient assistance program for this and filled out online application with pt. Agreed to be in touch about this as needed. Pt said that he was agreeable to SW being the alternate contact for the form so I can assist with the process. Also provided pt with information on low-cost dental, as well as places he could go to get assistance figuring out insurance options, as pt was not sure if he had a Part D plan or if he would be better suited with a different insurance set up given his medication needs. Found two clinics that have low-cost dental in addition to Hmong speaking MNSure assisters and circled these on the sheet (which was written in ong).     After visit with pt, called [facility] to see if there was any additional info regarding coverage for pt's Viread. Pt apparently has a [employer] Part D plan and coverage for Viread but has a $60+ co-pay. Pt was previously on a patricia program to fill his prescriptions. Spoke with  Pharmacy PFA Nafisa Zavala who was able to look into this further and re-apply for pt. Called pt to update on plan but reached voicemail, so left message requesting call back. On 1/24/20, confirmed with Nafisa that pt had been re-approved and she would contact pt to schedule delivery of medication. Confirmed phone numbers in chart for Nafisa, as they've had trouble reaching pt.     Intervention/Education/Resources Provided:  - Brief needs/resources assessment  - Assistance navigating insurance & financial  resources   - Collaboration with care team & pharmacy  - Submitted pt's Comm Care application    Assessment/Plan:  Pt was pleasant and receptive to SW, easily engaged, open to support. Pt followed through with bringing documents for Community Care application and seemed to understand the information discussed. Based on pt's income and assets, he's not eligible for MA or for Medicare Extra Help program, unfortunately. Pt would also likely not be eligible for manufacturer patient assistance program for Viread due to having Medicare, so [employer] patricia is his only option for assistance with Viread. Per Nafisa, it should cover the entire year of co-pays for pt. Pt to reach out with further questions or concerns.     Provided patient/family with contact information and availability.    ZAIN Das, [facility]  Clinical   Outpatient Speciality Clinics   [facility]  Ph. [phone]    NO LETTER

## 2020-01-22 ENCOUNTER — TELEPHONE (OUTPATIENT)
Dept: GASTROENTEROLOGY | Facility: CLINIC | Age: 60
End: 2020-01-22

## 2020-01-22 LAB
HBV DNA SERPL NAA+PROBE-ACNC: ABNORMAL [IU]/ML
HBV DNA SERPL NAA+PROBE-LOG IU: 7.4 {LOG_IU}/ML

## 2020-01-22 NOTE — TELEPHONE ENCOUNTER
Left message for pt reminding them of upcoming appointment.  Instructed pt to bring updated medications list.  lydia lobo

## 2020-01-23 ENCOUNTER — OFFICE VISIT (OUTPATIENT)
Dept: GASTROENTEROLOGY | Facility: CLINIC | Age: 60
End: 2020-01-23
Attending: INTERNAL MEDICINE
Payer: MEDICARE

## 2020-01-23 VITALS
OXYGEN SATURATION: 95 % | TEMPERATURE: 98 F | HEIGHT: 65 IN | DIASTOLIC BLOOD PRESSURE: 84 MMHG | BODY MASS INDEX: 21.19 KG/M2 | HEART RATE: 77 BPM | WEIGHT: 127.2 LBS | SYSTOLIC BLOOD PRESSURE: 150 MMHG

## 2020-01-23 DIAGNOSIS — I85.10 SECONDARY ESOPHAGEAL VARICES WITHOUT BLEEDING (H): ICD-10-CM

## 2020-01-23 DIAGNOSIS — B18.1 CHRONIC HEPATITIS B WITH CIRRHOSIS (H): Primary | ICD-10-CM

## 2020-01-23 DIAGNOSIS — Z59.9 FINANCIAL DIFFICULTIES: ICD-10-CM

## 2020-01-23 DIAGNOSIS — E44.1 MILD PROTEIN-CALORIE MALNUTRITION (H): ICD-10-CM

## 2020-01-23 DIAGNOSIS — Z12.9 SCREENING FOR CANCER: ICD-10-CM

## 2020-01-23 DIAGNOSIS — K74.60 CHRONIC HEPATITIS B WITH CIRRHOSIS (H): Primary | ICD-10-CM

## 2020-01-23 PROCEDURE — G0463 HOSPITAL OUTPT CLINIC VISIT: HCPCS | Mod: ZF

## 2020-01-23 SDOH — ECONOMIC STABILITY - INCOME SECURITY: PROBLEM RELATED TO HOUSING AND ECONOMIC CIRCUMSTANCES, UNSPECIFIED: Z59.9

## 2020-01-23 ASSESSMENT — MIFFLIN-ST. JEOR: SCORE: 1323.86

## 2020-01-23 ASSESSMENT — PAIN SCALES - GENERAL: PAINLEVEL: NO PAIN (0)

## 2020-01-23 NOTE — LETTER
1/23/2020      RE: Rishabh Cabrera  [address]  [facility] [id number]       Date of Service: 1/23/2020    Subjective:            Rishabh Cabrera is a 59 year old male presenting for evaluation of liver disease    Due to language barrier, an  was present during the history-taking and subsequent discussion (and for part of the physical exam) with this patient.      History of Present Illness   Rishabh Cabrera is a 59 year old male with past medical history of chronic hepatitis B on longstanding treatment with compensated cirrhosis who presents in routine follow-up.    Since last seen, the patient lost his co-pay coverage for his tenofovir and discontinued the medication secondary to financial constraints.    Through the  the patient verbalizes that the cost of the medication is so great that he had to make the decision between making his home payments and affording food versus getting the medication.  He did not reach out to our clinic early for unclear reasons.  This is been an ongoing issue with the patient over the past several years.  Labs from January 21 demonstrate a 10 fold increase in serum ALT and AST and his serum hepatitis B DNA levels went from undetectable to greater than 26 million.    Denies issues with confusion, or changes in his bowel movements.  Denies issues with fluid retention.  He notes that he continues to have some issues with diabetes and an upper chest discomfort.  He reports that since last seen he has had progressive issues with his teeth and is lost several of them.  He is attempted to get insurance for dental care, but it is still been unaffordable to him    History of Liver Disease:  Patient states that he was diagnosed with Hepatitis B in 2013. He has been prescribed Viread but he has taken it intermittently due to cost of co-payment. He has been troubled by muscle pains in his legs, feet and hands. He has traveled over to [facility] and [place] and sought  "treatment traditional herbs for his aches for a few months at a time. During this time, he was not taking anti-viral medication. Patient had a partial colectomy for tubulovillous adenoma in 2015 and his liver was noted to be cirrhotic at the time of operation. Surgical was complicated by leak/abscess at which time his weight dropped from 175 to 130.    Past Medical History:  Past Medical History:   Diagnosis Date     Chronic hepatitis B (H)      Diabetes mellitus (H)    - h/o tubulovillous adenoma s/p hemicolectomy  - H. Pylori 2015    Surgical History:  Past Surgical History:   Procedure Laterality Date     ESOPHAGOSCOPY, GASTROSCOPY, DUODENOSCOPY (EGD), COMBINED N/A 5/29/2019    Procedure: ESOPHAGOGASTRODUODENOSCOPY (EGD);  Surgeon: Leventhal, Thomas Michael, MD;  Location: [facility]       Social History:  Social History     Tobacco Use     Smoking status: Never Smoker     Smokeless tobacco: Never Used   Substance Use Topics     Alcohol use: No     Drug use: Yes     Types: Marijuana       Family History:   no known history of liver disease or liver cancer    Medications:  Current Outpatient Medications   Medication     furosemide (LASIX) 20 MG tablet     LANTUS SOLOSTAR 100 UNIT/ML soln     metFORMIN (GLUCOPHAGE) 1000 MG tablet     naproxen (NAPROSYN) 500 MG tablet     spironolactone (ALDACTONE) 50 MG tablet     tenofovir (VIREAD) 300 MG tablet     No current facility-administered medications for this visit.      Facility-Administered Medications Ordered in Other Visits   Medication     gadobutrol (GADAVIST) injection 7.5 mL       Review of Systems  A complete 10 point review of systems was asked and answered in the negative unless specifically commented upon in the HPI    Objective:         Vitals:    01/23/20 0953   BP: (!) 150/84   Pulse: 77   Temp: 98  F (36.7  C)   TempSrc: Oral   SpO2: 95%   Weight: 57.7 kg (127 lb 3.2 oz)   Height: 1.651 m (5' 5\")     Body mass index is 21.17 kg/m .     Physical " Exam  Constitutional: thin, in no apparent distress.    HEENT: Normocephalic. no scleral icterus. Moist oral mucosa. Dentition poor  Neck/Lymph: Normal ROM, supple. No thyromegaly.  No lymphadenopathy  Cardiac:  Regular rate and rhythm.  No overt murmurs  Respiratory: Clear to auscultation bilaterally.  No wheezes or rales  GI:  Abdomen soft, non-distended, non-tender. BS present. no shifting dullness. No overt hepatosplenomegaly.     Skin:  Skin is warm and dry. No rash noted.  no jaundice. no spider nevi noted.  no palmar erythema  Peripheral Vascular: no lower extremity edema. 2+ pulses in all extremities  Musculoskeletal:  ROM intact, decreased muscle bulk    Psychiatric: Normal mood and affect. Behavior is normal.  Neuro:  no asterixis, no tremor    Labs and Diagnostic tests:  Lab Results   Component Value Date     01/21/2020    POTASSIUM 4.4 01/21/2020    CHLORIDE 103 01/21/2020    CO2 31 01/21/2020    BUN 10 01/21/2020    CR 0.69 01/21/2020     Lab Results   Component Value Date    BILITOTAL 2.1 01/21/2020     01/21/2020     01/21/2020    ALKPHOS 189 01/21/2020     Lab Results   Component Value Date    ALBUMIN 3.1 01/21/2020    PROTTOTAL 8.0 01/21/2020      Lab Results   Component Value Date    WBC 6.8 01/21/2020    HGB 16.3 01/21/2020    MCV 89 01/21/2020    PLT 74 01/21/2020     Lab Results   Component Value Date    INR 1.44 01/21/2020       MELD-Na score: 14 at 1/21/2020  9:55 AM  MELD score: 13 at 1/21/2020  9:55 AM  Calculated from:  Serum Creatinine: 0.69 mg/dL (Rounded to 1 mg/dL) at 1/21/2020  9:55 AM  Serum Sodium: 136 mmol/L at 1/21/2020  9:55 AM  Total Bilirubin: 2.1 mg/dL at 1/21/2020  9:55 AM  INR(ratio): 1.44 at 1/21/2020  9:55 AM  Age: 59 years    Imaging:  MRI Abdomen 10/1/2019  FINDINGS:     Liver: Nodular hepatic contour with diffuse lacelike T2 hyperintensity  and progressive enhancement, compatible with fibrosis as well as  scattered T1 hyperintense and T2 hypointense  regenerative nodules. No  hepatic fat or iron deposition. Scattered subcentimeter T2  hyperintense, nonenhancing cysts.     2.6 cm T1 hyperintense, T2 markedly hypointense subcapsular lesion in  segment 5/6 junction. The size of this lesion is unchanged since  6/30/2018. Scattered arterial nodular enhancement in the periphery of  the lesion without significant washout or pseudocapsule. The  enhancement is not similar to prior exams. No abnormal signal on DWI  or T2-weighted series. Overall this is most compatible with a  dysplastic nodule with internal wandering arterial venous shunts.  LIRADS 3.     Numerous punctate nodular arterial enhancing lesions throughout the  liver, particularly in segment 5 and 6, most of these are new since  MRI from 6/30/2018. None of these lesions are demonstrating abnormal  T2 signal or diffusion imaging findings. These are most likely  secondary to underlying regenerative nodules or increased arterial  venous shunting. LIRADS 3     Gallbladder/biliary tree: Normal gallbladder. No intra or extrahepatic  biliary dilation.     Spleen: Mildly enlarged measuring 13 cm in craniocaudal dimension .  Some Gamna-Shelley bodies.     Kidneys: Normal size kidneys. No suspicious renal lesion. Normal  enhancement. No hydronephrosis. Scattered tiny renal cortical cyst in  the left. No suspicious renal lesion. No hydronephrosis.     Adrenal glands: Normal.     Pancreas: Normal T1 parenchymal signal. No main pancreatic ductal  dilation or parenchymal mass.     Bowel: No bowel obstruction or inflammation.     Lymph nodes: No lymphadenopathy.     Blood vessels: The major abdominal vessels are patent. Small  gastroesophageal varices.     Lung bases: Stable chronic right-sided loculated pleural fluid with  associated prominent extrapleural fat.     Bones and soft tissues: No aggressive osseous lesion.     Mesentery and abdominal wall: Unremarkable.     Ascites: None.      Procedures:    EGD:  5/2019  Findings:        Small (< 5 mm) varices were found in the lower third of the esophagus.        Mild portal hypertensive gastropathy was found in the cardia, in the        gastric fundus and in the gastric body.        There is no endoscopic evidence of varices in the cardia and in the        gastric fundus.        The exam of the stomach was otherwise normal.        The examined duodenum was normal.     Colonoscopy:5/8/2015  Benign-appearing colon polyps, successfully treated   -a broad polypoid mass at the region of the hepatic flexure.  Based on its size and involvement of at least half the luminal circumference I did not feel confident that this could be removed completely or safely endoscopically.  Biopsies for histology were performed and the site was tattooed.  Pathology: tubulovillous adenoma    Assessment and Plan:    Cirrhosis:    - Secondary to chronic hepatitis B  - His MELD is elevated at this time secondary to flare of hepatitis B virus as off medication  - Appears well compensated    Chronic hepatitis B  - Patient is working with [employer] and insurance company to obtain another prescription for hepatitis B medications   - Will attempt to obtain tenofovir 300mg daily  - Will need repeat labs in 1 month      Hepatocellular Cancer Screening:   - MRI 10/19 with LI RADS 3 lesions; repeat MRI this spring  - Recommend screening for HCC every 6 months with either abdominal ultrasound or by alternating abdominal ultrasound with EITHER a triple/quad phase CT Liver with IV contrast OR a Quad phase MRI Liver with IV contrast.  AFP levels should be checked every 6 months at time of imaging screen.    Ascites:  - no acute issues  - Continue to follow a sodium restricted (<2g sodium diet)     Hepatic Encephalopathy:  - no acute issues    Esophageal Varices:   - small varices on EGD from 5/2019  - Recommend repeating an upper GI endoscopy 1-2 years .  If evidence of medium/large esophageal varices, would recommend  esophageal varix band ligation or the initiation of a non-selective beta-blocker (carvedilol or propranolol).  If band ligation is performed, please continue to repeat until eraditation of varices.    History of Tubulovillous adenoma:  - S/p surgical resection, however, now 5+ years from event and should have surveillance colonoscopy  - Repeat colonoscopy at same time as EGD    Nutrition:  As with most patients with chronic liver disease, there is a significant degree of protein malnutrition.  Dicussed need to change dietary habits to improve overall protein balance.  Discussed the importance of eating between 1.2-1.5g/kg/day lean protein like eggs, fish, chicken, nuts, and legumes, in addition to a diet rich in fresh fruits and vegetables.  Continue to follow a sodium restricted (<2g sodium diet) and discussed the need to minimize the intake of carbohydrates and sugars, to avoid obesity.   - Strongly encourage protein supplements 2-3 times daily (Boost, Ensure, [place] Instant Milk, etc.) to meet protein and caloric intake.  - Recommend a bedtime snack with protein and complex carbohydrate to minimize risk of muscle catabolism overnight    Routine Health Care in Patient with Chronic Liver Disease:  - We recommend screening for hepatitis A and B, please vaccinate if not immune  - All patients with liver disease, particularly those with cholestatic liver disease, are at an increased risk for osteoporosis.  We strongly recommend screening for Vitamin D deficiency at least twice yearly with aggressive supplementation/replacement as indicated.    - We also recommend a screening DEXA scan to evaluate for osteoporosis.  If present, should treat with calcium, Vitamin D supplementation, and recommend consideration of bisphosphonate therapy.  Also recommend follow up DEXA scans to evaluate for improvement of bone density on therapy.  - All patients with liver disease should avoid the use of Non-steroidal Anti-Inflammatory  (NSAID) medications as they can cause significant injury to the kidneys in this population    Follow Up:  4-6 months     Thank you very much for the opportunity to participate in the care of this patient.  If you have any further questions, please don't hesitate to contact our office.    Thomas M. Leventhal, M.D.   of Medicine  Advanced & Transplant Hepatology  [facility]

## 2020-01-23 NOTE — PATIENT INSTRUCTIONS
- we will get you the hepatitis B medication    Cirrhosis Education  Nutrition  - For patients with cirrhosis, it is very important to eat the right types and amounts of foods.  We recommend a diet low in carbohydrates/sugars and high in fresh fruits/vegetables, with the right amount of protein.  We typically recommend 1.5gm/kg/day of protein, or  grams of protein every day.  - In regard to protein intake, you can focus on: All meats, cooked fish and seafood, vegetable-based protein meat products, tofu, eggs, legumes, dairy products (including milk and yogurt and cheese), unsalted nuts.  - You should eat at least three meals a day and three to four snacks between meals  - Bedtime snacks are especially important (preferrably something with some protein)    - Patients with malnutrition and/or loss of muscular mass can improve their nutrition and muscular mass by drinking two cans of dietary supplements daily, particularly at bedtime.  These would include: Ensure, Boost, [place] Instant Milk, Glucerna, (or similar supplements)  - Please avoid eating raw seafood, especially shellfish, because of risk of serious illness  - We recommend all patients with cirrhosis limit their daily sodium intake to less than 2,000mg      General Liver Health  - Continue to avoid the use of all non-steroid anti-inflammatory medicines (ibuprofen, Aleve, naproxen, aspirin, etc.) as this can cause serious injury to your kidneys in the setting of liver disease  - If you see a doctor and they prescribe you a new medication, please contact our clinic to let us know what changes are being made  - If you become acutely ill and present to an ER or are admitted to a hospital, please let us know as soon as you are able.  - We recommend that all patients with chronic liver disease be vaccinated against hepatitis A and B.  Please discuss with your primary provider the need for these vaccines  - As patients with liver disease are at an increased  risk of developing osteoporosis, we recommend that you have a DEXA scan with appropriate follow up and treatment.   - We recommend screening for Vitamin D deficiency (at least yearly) and aggressive replacement/supplementation as warranted.    Sleep Troubles:  - Sleep issues are very common in patients with chronic liver disease  - Recommend strict avoidance of medications such as narcotics, sedatives and sleep aids.    - Low dose benadryl or melatonin can be used for insomnia, if needed.

## 2020-01-24 ENCOUNTER — TELEPHONE (OUTPATIENT)
Dept: GASTROENTEROLOGY | Facility: CLINIC | Age: 60
End: 2020-01-24

## 2020-01-24 NOTE — PROGRESS NOTES
Date of Service: 1/23/2020    Subjective:            Rishabh Cabrera is a 59 year old male presenting for evaluation of liver disease    Due to language barrier, an  was present during the history-taking and subsequent discussion (and for part of the physical exam) with this patient.      History of Present Illness   Rishabh Cabrera is a 59 year old male with past medical history of chronic hepatitis B on longstanding treatment with compensated cirrhosis who presents in routine follow-up.    Since last seen, the patient lost his co-pay coverage for his tenofovir and discontinued the medication secondary to financial constraints.    Through the  the patient verbalizes that the cost of the medication is so great that he had to make the decision between making his home payments and affording food versus getting the medication.  He did not reach out to our clinic early for unclear reasons.  This is been an ongoing issue with the patient over the past several years.  Labs from January 21 demonstrate a 10 fold increase in serum ALT and AST and his serum hepatitis B DNA levels went from undetectable to greater than 26 million.    Denies issues with confusion, or changes in his bowel movements.  Denies issues with fluid retention.  He notes that he continues to have some issues with diabetes and an upper chest discomfort.  He reports that since last seen he has had progressive issues with his teeth and is lost several of them.  He is attempted to get insurance for dental care, but it is still been unaffordable to him    History of Liver Disease:  Patient states that he was diagnosed with Hepatitis B in 2013. He has been prescribed Viread but he has taken it intermittently due to cost of co-payment. He has been troubled by muscle pains in his legs, feet and hands. He has traveled over to [facility] and [place] and sought treatment traditional herbs for his aches for a few months at a time. During this time, he was  "not taking anti-viral medication. Patient had a partial colectomy for tubulovillous adenoma in 2015 and his liver was noted to be cirrhotic at the time of operation. Surgical was complicated by leak/abscess at which time his weight dropped from 175 to 130.    Past Medical History:  Past Medical History:   Diagnosis Date     Chronic hepatitis B (H)      Diabetes mellitus (H)    - h/o tubulovillous adenoma s/p hemicolectomy  - H. Pylori 2015    Surgical History:  Past Surgical History:   Procedure Laterality Date     ESOPHAGOSCOPY, GASTROSCOPY, DUODENOSCOPY (EGD), COMBINED N/A 5/29/2019    Procedure: ESOPHAGOGASTRODUODENOSCOPY (EGD);  Surgeon: Leventhal, Thomas Michael, MD;  Location:  GI       Social History:  Social History     Tobacco Use     Smoking status: Never Smoker     Smokeless tobacco: Never Used   Substance Use Topics     Alcohol use: No     Drug use: Yes     Types: Marijuana       Family History:   no known history of liver disease or liver cancer    Medications:  Current Outpatient Medications   Medication     furosemide (LASIX) 20 MG tablet     LANTUS SOLOSTAR 100 UNIT/ML soln     metFORMIN (GLUCOPHAGE) 1000 MG tablet     naproxen (NAPROSYN) 500 MG tablet     spironolactone (ALDACTONE) 50 MG tablet     tenofovir (VIREAD) 300 MG tablet     No current facility-administered medications for this visit.      Facility-Administered Medications Ordered in Other Visits   Medication     gadobutrol (GADAVIST) injection 7.5 mL       Review of Systems  A complete 10 point review of systems was asked and answered in the negative unless specifically commented upon in the HPI    Objective:         Vitals:    01/23/20 0953   BP: (!) 150/84   Pulse: 77   Temp: 98  F (36.7  C)   TempSrc: Oral   SpO2: 95%   Weight: 57.7 kg (127 lb 3.2 oz)   Height: 1.651 m (5' 5\")     Body mass index is 21.17 kg/m .     Physical Exam  Constitutional: thin, in no apparent distress.    HEENT: Normocephalic. no scleral icterus. Moist oral " mucosa. Dentition poor  Neck/Lymph: Normal ROM, supple. No thyromegaly.  No lymphadenopathy  Cardiac:  Regular rate and rhythm.  No overt murmurs  Respiratory: Clear to auscultation bilaterally.  No wheezes or rales  GI:  Abdomen soft, non-distended, non-tender. BS present. no shifting dullness. No overt hepatosplenomegaly.     Skin:  Skin is warm and dry. No rash noted.  no jaundice. no spider nevi noted.  no palmar erythema  Peripheral Vascular: no lower extremity edema. 2+ pulses in all extremities  Musculoskeletal:  ROM intact, decreased muscle bulk    Psychiatric: Normal mood and affect. Behavior is normal.  Neuro:  no asterixis, no tremor    Labs and Diagnostic tests:  Lab Results   Component Value Date     01/21/2020    POTASSIUM 4.4 01/21/2020    CHLORIDE 103 01/21/2020    CO2 31 01/21/2020    BUN 10 01/21/2020    CR 0.69 01/21/2020     Lab Results   Component Value Date    BILITOTAL 2.1 01/21/2020     01/21/2020     01/21/2020    ALKPHOS 189 01/21/2020     Lab Results   Component Value Date    ALBUMIN 3.1 01/21/2020    PROTTOTAL 8.0 01/21/2020      Lab Results   Component Value Date    WBC 6.8 01/21/2020    HGB 16.3 01/21/2020    MCV 89 01/21/2020    PLT 74 01/21/2020     Lab Results   Component Value Date    INR 1.44 01/21/2020       MELD-Na score: 14 at 1/21/2020  9:55 AM  MELD score: 13 at 1/21/2020  9:55 AM  Calculated from:  Serum Creatinine: 0.69 mg/dL (Rounded to 1 mg/dL) at 1/21/2020  9:55 AM  Serum Sodium: 136 mmol/L at 1/21/2020  9:55 AM  Total Bilirubin: 2.1 mg/dL at 1/21/2020  9:55 AM  INR(ratio): 1.44 at 1/21/2020  9:55 AM  Age: 59 years    Imaging:  MRI Abdomen 10/1/2019  FINDINGS:     Liver: Nodular hepatic contour with diffuse lacelike T2 hyperintensity  and progressive enhancement, compatible with fibrosis as well as  scattered T1 hyperintense and T2 hypointense regenerative nodules. No  hepatic fat or iron deposition. Scattered subcentimeter T2  hyperintense,  nonenhancing cysts.     2.6 cm T1 hyperintense, T2 markedly hypointense subcapsular lesion in  segment 5/6 junction. The size of this lesion is unchanged since  6/30/2018. Scattered arterial nodular enhancement in the periphery of  the lesion without significant washout or pseudocapsule. The  enhancement is not similar to prior exams. No abnormal signal on DWI  or T2-weighted series. Overall this is most compatible with a  dysplastic nodule with internal wandering arterial venous shunts.  LIRADS 3.     Numerous punctate nodular arterial enhancing lesions throughout the  liver, particularly in segment 5 and 6, most of these are new since  MRI from 6/30/2018. None of these lesions are demonstrating abnormal  T2 signal or diffusion imaging findings. These are most likely  secondary to underlying regenerative nodules or increased arterial  venous shunting. LIRADS 3     Gallbladder/biliary tree: Normal gallbladder. No intra or extrahepatic  biliary dilation.     Spleen: Mildly enlarged measuring 13 cm in craniocaudal dimension .  Some [place] bodies.     Kidneys: Normal size kidneys. No suspicious renal lesion. Normal  enhancement. No hydronephrosis. Scattered tiny renal cortical cyst in  the left. No suspicious renal lesion. No hydronephrosis.     Adrenal glands: Normal.     Pancreas: Normal T1 parenchymal signal. No main pancreatic ductal  dilation or parenchymal mass.     Bowel: No bowel obstruction or inflammation.     Lymph nodes: No lymphadenopathy.     Blood vessels: The major abdominal vessels are patent. Small  gastroesophageal varices.     Lung bases: Stable chronic right-sided loculated pleural fluid with  associated prominent extrapleural fat.     Bones and soft tissues: No aggressive osseous lesion.     Mesentery and abdominal wall: Unremarkable.     Ascites: None.      Procedures:    EGD: 5/2019  Findings:        Small (< 5 mm) varices were found in the lower third of the esophagus.        Mild portal  hypertensive gastropathy was found in the cardia, in the        gastric fundus and in the gastric body.        There is no endoscopic evidence of varices in the cardia and in the        gastric fundus.        The exam of the stomach was otherwise normal.        The examined duodenum was normal.     Colonoscopy:5/8/2015  Benign-appearing colon polyps, successfully treated   -a broad polypoid mass at the region of the hepatic flexure.  Based on its size and involvement of at least half the luminal circumference I did not feel confident that this could be removed completely or safely endoscopically.  Biopsies for histology were performed and the site was tattooed.  Pathology: tubulovillous adenoma    Assessment and Plan:    Cirrhosis:    - Secondary to chronic hepatitis B  - His MELD is elevated at this time secondary to flare of hepatitis B virus as off medication  - Appears well compensated    Chronic hepatitis B  - Patient is working with SW and insurance company to obtain another prescription for hepatitis B medications   - Will attempt to obtain tenofovir 300mg daily  - Will need repeat labs in 1 month      Hepatocellular Cancer Screening:   - MRI 10/19 with LI RADS 3 lesions; repeat MRI this spring  - Recommend screening for HCC every 6 months with either abdominal ultrasound or by alternating abdominal ultrasound with EITHER a triple/quad phase CT Liver with IV contrast OR a Quad phase MRI Liver with IV contrast.  AFP levels should be checked every 6 months at time of imaging screen.    Ascites:  - no acute issues  - Continue to follow a sodium restricted (<2g sodium diet)     Hepatic Encephalopathy:  - no acute issues    Esophageal Varices:   - small varices on EGD from 5/2019  - Recommend repeating an upper GI endoscopy 1-2 years .  If evidence of medium/large esophageal varices, would recommend esophageal varix band ligation or the initiation of a non-selective beta-blocker (carvedilol or propranolol).  If  band ligation is performed, please continue to repeat until eraditation of varices.    History of Tubulovillous adenoma:  - S/p surgical resection, however, now 5+ years from event and should have surveillance colonoscopy  - Repeat colonoscopy at same time as EGD    Nutrition:  As with most patients with chronic liver disease, there is a significant degree of protein malnutrition.  Dicussed need to change dietary habits to improve overall protein balance.  Discussed the importance of eating between 1.2-1.5g/kg/day lean protein like eggs, fish, chicken, nuts, and legumes, in addition to a diet rich in fresh fruits and vegetables.  Continue to follow a sodium restricted (<2g sodium diet) and discussed the need to minimize the intake of carbohydrates and sugars, to avoid obesity.   - Strongly encourage protein supplements 2-3 times daily (Boost, Ensure, [place] Instant Milk, etc.) to meet protein and caloric intake.  - Recommend a bedtime snack with protein and complex carbohydrate to minimize risk of muscle catabolism overnight    Routine Health Care in Patient with Chronic Liver Disease:  - We recommend screening for hepatitis A and B, please vaccinate if not immune  - All patients with liver disease, particularly those with cholestatic liver disease, are at an increased risk for osteoporosis.  We strongly recommend screening for Vitamin D deficiency at least twice yearly with aggressive supplementation/replacement as indicated.    - We also recommend a screening DEXA scan to evaluate for osteoporosis.  If present, should treat with calcium, Vitamin D supplementation, and recommend consideration of bisphosphonate therapy.  Also recommend follow up DEXA scans to evaluate for improvement of bone density on therapy.  - All patients with liver disease should avoid the use of Non-steroidal Anti-Inflammatory (NSAID) medications as they can cause significant injury to the kidneys in this population    Follow Up:  4-6  months     Thank you very much for the opportunity to participate in the care of this patient.  If you have any further questions, please don't hesitate to contact our office.    Thomas M. Leventhal, M.D.   of Medicine  Advanced & Transplant Hepatology  [facility]

## 2020-01-24 NOTE — TELEPHONE ENCOUNTER
Called pt to schedule follow up and had some complications with conference call. Pt hung up but LVM to call to schedule MRI

## 2020-04-14 DIAGNOSIS — K74.60 HEPATIC CIRRHOSIS, UNSPECIFIED HEPATIC CIRRHOSIS TYPE (H): ICD-10-CM

## 2020-04-14 RX ORDER — TENOFOVIR DISOPROXIL FUMARATE 300 MG/1
300 TABLET, FILM COATED ORAL DAILY
Qty: 30 TABLET | Refills: 11 | Status: SHIPPED | OUTPATIENT
Start: 2020-04-14 | End: 2021-04-27

## 2020-06-10 ENCOUNTER — VIRTUAL VISIT (OUTPATIENT)
Dept: GASTROENTEROLOGY | Facility: CLINIC | Age: 60
End: 2020-06-10
Attending: PHYSICIAN ASSISTANT
Payer: MEDICARE

## 2020-06-10 DIAGNOSIS — Z53.9 ERRONEOUS ENCOUNTER--DISREGARD: Primary | ICD-10-CM

## 2020-06-10 DIAGNOSIS — K74.60 CHRONIC HEPATITIS B WITH CIRRHOSIS (H): Primary | ICD-10-CM

## 2020-06-10 DIAGNOSIS — B18.1 CHRONIC HEPATITIS B WITH CIRRHOSIS (H): Primary | ICD-10-CM

## 2020-06-10 NOTE — LETTER
6/10/2020         RE: Rishabh Cabrera  [address]  [place] [address]        Dear Colleague,    Thank you for referring your patient, Rishabh Cabrera, to the [facility]PAT[facility]. Please see a copy of my visit note below.    Voicemail left, mobile number incorrect.   This encounter was opened in error. Please disregard.      Again, thank you for allowing me to participate in the care of your patient.        Sincerely,        Dorian Levy PA-C

## 2020-06-10 NOTE — PROGRESS NOTES
"Rishabh Cabrera is a 60 year old male who is being evaluated via a billable telephone visit.      The patient has been notified of following:     \"This telephone visit will be conducted via a call between you and your physician/provider. We have found that certain health care needs can be provided without the need for a physical exam.  This service lets us provide the care you need with a short phone conversation.  If a prescription is necessary we can send it directly to your pharmacy.  If lab work is needed we can place an order for that and you can then stop by our lab to have the test done at a later time.    Telephone visits are billed at different rates depending on your insurance coverage. During this emergency period, for some insurers they may be billed the same as an in-person visit.  Please reach out to your insurance provider with any questions.    If during the course of the call the physician/provider feels a telephone visit is not appropriate, you will not be charged for this service.\"    Patient has given verbal consent for Telephone visit?  {YES-NO  Default Yes:4444::\"Yes\"}    What phone number would you like to be contacted at? ***    How would you like to obtain your AVS? {AVS Preference:102902}    Phone call duration: *** minutes    {signature options:293328}                 "

## 2020-07-01 ENCOUNTER — ANCILLARY PROCEDURE (OUTPATIENT)
Dept: MRI IMAGING | Facility: CLINIC | Age: 60
End: 2020-07-01
Attending: INTERNAL MEDICINE
Payer: MEDICARE

## 2020-07-01 DIAGNOSIS — K74.60 CHRONIC HEPATITIS B WITH CIRRHOSIS (H): ICD-10-CM

## 2020-07-01 DIAGNOSIS — B18.1 CHRONIC HEPATITIS B WITH CIRRHOSIS (H): ICD-10-CM

## 2020-07-01 RX ORDER — GADOBUTROL 604.72 MG/ML
7.5 INJECTION INTRAVENOUS ONCE
Status: COMPLETED | OUTPATIENT
Start: 2020-07-01 | End: 2020-07-01

## 2020-07-01 RX ADMIN — GADOBUTROL 6.5 ML: 604.72 INJECTION INTRAVENOUS at 12:10

## 2020-07-01 NOTE — DISCHARGE INSTRUCTIONS
MRI Contrast Discharge Instructions    The IV contrast you received today will pass out of your body in your  urine. This will happen in the next 24 hours. You will not feel this process.  Your urine will not change color.    Drink at least 4 extra glasses of water or juice today (unless your doctor  has restricted your fluids). This reduces the stress on your kidneys.  You may take your regular medicines.    If you are on dialysis: It is best to have dialysis today.    If you have a reaction: Most reactions happen right away. If you have  any new symptoms after leaving the hospital (such as hives or swelling),  call your hospital at the correct number below. Or call your family doctor.  If you have breathing distress or wheezing, call 911.    Special instructions: ***    I have read and understand the above information.    Signature:______________________________________ Date:___________    Staff:__________________________________________ Date:___________     Time:__________    [facility] Departments:    ___[place]: [phone]  ___[facility]: [phone]  ___[place]: [phone] ___[facility]: [phone]  ___[facility]: [phone]  ___[facility]: [phone]  ___[phone]  ___[facility]: [phone]  ___[phone]

## 2020-07-01 NOTE — LETTER
July 16, 2020      Rishabh LUIS Rick  [address]  [place] [address]        Dear ,    We are writing to inform you of your test results.    Resulted Orders   MRI Abdomen w & w/o contrast*    Narrative    MRI ABDOMEN    CLINICAL HISTORY:  hepatitis B cirrhosis with LIRADS 3 lesions we are  following; Liver lesion, <1cm, liver disease with risk of hcc    TECHNIQUE: Images were acquired with and without intravenous contrast  through the abdomen. The following MR images were acquired: TrueFISP,  multiplanar T2 weighted, axial T1 in/out of phase, diffusion-weighted.  Multiplanar T1-weighted images with fat saturation were before  contrast administration and at multiple time points following the  administration of intravenous contrast. Contrast dose: 6.5mL Gadavist    FINDINGS:    Comparison study: MR abdomen 10/1/2019, 4/13/2019    Liver: Cirrhotic configuration of the liver with prominence of the  caudate lobe, surface nodularity, numerous T1 hyperintense  regenerative nodules, and diffuse T2 hyperintense reticulation. No  hepatic fat or iron deposition. Scattered subcentimeter T2  hyperintense, nonenhancing cysts.    Lesion 1: 2.6 cm T1 hyperintense, T2 markedly hypointense subcapsular  lesion in segment 5/6 junction dating back to 4/13/2019. There is  equivocal washout which is only seen on subtracted images and there is  not a definitive pseudocapsule. Mild heterogeneous diffusion  restriction. Slightly increased in size of the scattered arterial  nodular enhancement within the nodule.  Size is unchanged. Overall  this is most compatible with a LIRADS 4 based on slight growth of the  enhancement as well as new areas of abnormal T2 weighted and  diffusion-weighted signal in the regions of enhancement.     Numerous additional arterially enhancing foci, particularly in segment  5 and 6, without washout, pseudocapsule or additional suspicious  characteristics likely representing vascular  shunt.    Gallbladder/biliary tree: Normal gallbladder. Layering sludge. No  intra or extrahepatic biliary dilation.    Spleen: Mildly enlarged, craniocaudal dimension of the spleen is 13  cm.    Kidneys: T2 hyperintense, nonenhancing cortical cysts. No suspicious  renal lesion. No hydronephrosis.    Adrenal glands: Unremarkable    Pancreas: Normal T1 parenchymal signal. No main pancreatic ductal  dilation or parenchymal mass.    Bowel: Unremarkable    Lymph nodes: No upper abdominal adenopathy.    Blood vessels: No abdominal aortic aneurysm. Small gastroesophageal  varices.    Lung bases: Unchanged chronic right-sided loculated pleural fluid with  associated prominent extrapleural fat.    Bones and soft tissues: Unremarkable.    Mesentery and abdominal wall: Unremarkable.    Ascites: Trace perihepatic ascites.      Impression    IMPRESSION:    1. Cirrhosis and evidence of portal hypertension including  splenomegaly, trace perihepatic ascites and small gastroesophageal  varices.  2. 2.6 cm T1 hyperintense nodule with increased areas of arterial  enhancement and diffusion-weighted signal in hepatic segment 5/6. This  there is characterized as LI-RADS 4, suspicious for malignancy.  Consider biopsy or 3 month follow-up.  3. Based on this exam only, the patient is within [facility] criteria.      OPTN/LIRADS definitions.  LIRADS v2018.    LIRADS 1:  Definitely benign.  LIRADS 2:  Probably benign.  LIRADS 3:  Indeterminate for HCC.  LIRADS 4:  Probably HCC.  LIRADS M:  Probably malignant.  Not specific for HCC.  LIRADS 5TR- nonviable:  Previously treated HCC without residual  malignancy identified  LIRADS 5TR- viable:  Previously treated HCC with findings indicating  residual viable malignancy  LIRADS 5TR- equivocal:  Previously treated HCC with findings that may  be treatment changes or viable malignancy    OPTN 5a:  Diagnostic for HCC.  < 2 cm.  OPTN 5b:  Diagnostic for HCC.  > Or = 2 cm and < 5 cm.  OPTN 5x:  Diagnostic  for HCC.  > Or = 5 cm.      Caio criteria for liver transplantation:    1. Presence of no HCCs greater than 5 cm. One HCC measuring 3-5 cm is  allowed if no other HCCs are present.  2. Maximum of 3 HCCs measuring 3 cm or less.  3. No vascular invasion.  4. No extrahepatic metastases.    I have personally reviewed the examination and initial interpretation  and I agree with the findings.    MD Solomon LANGE,      Wanted to make sure you have a copy of your most recent imaging for your records.      I have tried to call with the , but have been unable to reach you.    There is a small abnormality in the right part of your liver that needs further evaluation.  The MRI was high quality, so we need to get a tissue sample of this area to discover what it is.  The radiologists will be calling you regarding this procedure.    It has been a pleasure to participate in your care.  Please call our clinic if you have any questions or concerns.    Thomas M. Leventhal, M.D.   of Medicine  Advanced & Transplant Hepatology  [facility]

## 2020-07-08 ENCOUNTER — TELEPHONE (OUTPATIENT)
Dept: GASTROENTEROLOGY | Facility: CLINIC | Age: 60
End: 2020-07-08

## 2020-08-11 ENCOUNTER — TELEPHONE (OUTPATIENT)
Dept: GASTROENTEROLOGY | Facility: CLINIC | Age: 60
End: 2020-08-11

## 2020-08-13 ENCOUNTER — DOCUMENTATION ONLY (OUTPATIENT)
Dept: GASTROENTEROLOGY | Facility: CLINIC | Age: 60
End: 2020-08-13

## 2020-08-13 NOTE — PROGRESS NOTES
Due to language barrier, an  was present during the history-taking and subsequent discussion (and for part of the physical exam) with this patient.    Been trying to contact the patient for the past month in regards to his MRI results.  These were reviewed in the tumor board at the [facility] and he does have a LR-4 lesion in segment 5/6 that does raise concerns for potential cancer.  Noted that it was present on MRI from October, however there was diffusion changes around the lesion that increased it from LR 3 to LR 4.    Based on the group discussion was felt appropriate to biopsy and ablate    Contacted the patient today via an  to discuss our plan.  He voices very significant concerns about injury to his liver ultimate liver failure from the procedure and need for transplantation.  These were all discussed in depth    Plan:  -We will proceed with biopsy of segment 5-6 lesion and ablation after biopsy    Thomas M. Leventhal, M.D.   of Medicine  Advanced & Transplant Hepatology  [facility]

## 2020-08-21 ENCOUNTER — TELEPHONE (OUTPATIENT)
Dept: GASTROENTEROLOGY | Facility: CLINIC | Age: 60
End: 2020-08-21

## 2020-08-21 NOTE — TELEPHONE ENCOUNTER
Called patient twice but no answer, so left voicemail message using  services and went over the following:    Per Dr. Leventhal:     Having diabetes does NOT increase his risk when undergoing a liver biopsy or undergoing treatment of a lesion within the liver.     He can discuss risks of the procedure with interventional radiology.    Hanny PERALES LPN  Hepatology Clinic

## 2020-08-21 NOTE — TELEPHONE ENCOUNTER
[facility] Call Center    Phone Message    May a detailed message be left on voicemail: yes     Reason for Call: Other: Patient called in and stated he was told he has diabetes and it is very dangerous for him to do a biopsy due to the diabetes. He would like to speak with someone regarding this. Please call the patient to discuss. Thank you.     Action Taken: Message routed to:  [facility] ([facility] hep    Travel Screening: Not Applicable

## 2020-08-26 NOTE — TELEPHONE ENCOUNTER
DIAGNOSIS: liver biopsy consult   DATE RECEIVED: 8.31.20   NOTES STATUS DETAILS   OFFICE NOTE from referring provider Internal 8.13.20, 7.7.20, 1.23.20  Dr. Thomas Leventhal  [facility]    OFFICE NOTE from other specialist N/A    DISCHARGE SUMMARY from hospital N/A    DISCHARGE REPORT from the ER N/A    OPERATIVE REPORT N/A    MEDICATION LIST Internal    XRAYS (IMAGES & REPORTS) Internal 7.1.20, 10.1.19, 4.13.19, 6.30.18   Abd   PATHOLOGY  Slides & report N/A

## 2020-08-31 ENCOUNTER — PRE VISIT (OUTPATIENT)
Dept: INTERVENTIONAL RADIOLOGY/VASCULAR | Facility: CLINIC | Age: 60
End: 2020-08-31

## 2020-09-02 ENCOUNTER — TELEPHONE (OUTPATIENT)
Dept: VASCULAR SURGERY | Facility: CLINIC | Age: 60
End: 2020-09-02

## 2020-09-02 NOTE — TELEPHONE ENCOUNTER
Multiple msgs left for pt regarding the consult appt with Dr. Vallejo    Attempts x 4.       Leonela TORRE RN, BSN  Interventional Radiology/Vascular  Nurse Coordinator   Phone: [phone]  Fax: [phone]

## 2020-10-01 ENCOUNTER — DOCUMENTATION ONLY (OUTPATIENT)
Dept: GASTROENTEROLOGY | Facility: CLINIC | Age: 60
End: 2020-10-01

## 2020-10-01 NOTE — PROGRESS NOTES
Attempted to call patient with a Hmong-speaking  this AM as he has not returned calls from IR staff or IR scheduling.    No answer.    Will try again next week    Thomas M. Leventhal, M.D.   of Medicine  Advanced & Transplant Hepatology  [facility]

## 2021-04-27 DIAGNOSIS — K74.60 HEPATIC CIRRHOSIS, UNSPECIFIED HEPATIC CIRRHOSIS TYPE (H): ICD-10-CM

## 2021-04-27 RX ORDER — TENOFOVIR DISOPROXIL FUMARATE 300 MG/1
300 TABLET, FILM COATED ORAL DAILY
Qty: 90 TABLET | Refills: 0 | Status: SHIPPED | OUTPATIENT
Start: 2021-04-27 | End: 2021-05-13

## 2021-05-10 DIAGNOSIS — B18.1 CHRONIC HEPATITIS B WITH CIRRHOSIS (H): ICD-10-CM

## 2021-05-10 DIAGNOSIS — K74.60 CHRONIC HEPATITIS B WITH CIRRHOSIS (H): ICD-10-CM

## 2021-05-10 LAB
ALBUMIN SERPL-MCNC: 2.7 G/DL (ref 3.4–5)
ALP SERPL-CCNC: 290 U/L (ref 40–150)
ALT SERPL W P-5'-P-CCNC: 287 U/L (ref 0–70)
ANION GAP SERPL CALCULATED.3IONS-SCNC: 6 MMOL/L (ref 3–14)
AST SERPL W P-5'-P-CCNC: 346 U/L (ref 0–45)
BILIRUB DIRECT SERPL-MCNC: 0.5 MG/DL (ref 0–0.2)
BILIRUB SERPL-MCNC: 1.3 MG/DL (ref 0.2–1.3)
BUN SERPL-MCNC: 12 MG/DL (ref 7–30)
CALCIUM SERPL-MCNC: 8 MG/DL (ref 8.5–10.1)
CHLORIDE SERPL-SCNC: 103 MMOL/L (ref 94–109)
CO2 SERPL-SCNC: 26 MMOL/L (ref 20–32)
CREAT SERPL-MCNC: 0.85 MG/DL (ref 0.66–1.25)
ERYTHROCYTE [DISTWIDTH] IN BLOOD BY AUTOMATED COUNT: 14.9 % (ref 10–15)
GFR SERPL CREATININE-BSD FRML MDRD: >90 ML/MIN/{1.73_M2}
GLUCOSE SERPL-MCNC: 335 MG/DL (ref 70–99)
HCT VFR BLD AUTO: 39.1 % (ref 40–53)
HGB BLD-MCNC: 13 G/DL (ref 13.3–17.7)
INR PPP: 1.39 (ref 0.86–1.14)
MCH RBC QN AUTO: 28.6 PG (ref 26.5–33)
MCHC RBC AUTO-ENTMCNC: 33.2 G/DL (ref 31.5–36.5)
MCV RBC AUTO: 86 FL (ref 78–100)
PLATELET # BLD AUTO: 52 10E9/L (ref 150–450)
POTASSIUM SERPL-SCNC: 4.7 MMOL/L (ref 3.4–5.3)
PROT SERPL-MCNC: 6.7 G/DL (ref 6.8–8.8)
RBC # BLD AUTO: 4.55 10E12/L (ref 4.4–5.9)
SODIUM SERPL-SCNC: 136 MMOL/L (ref 133–144)
WBC # BLD AUTO: 3.5 10E9/L (ref 4–11)

## 2021-05-10 PROCEDURE — 85610 PROTHROMBIN TIME: CPT | Performed by: PATHOLOGY

## 2021-05-10 PROCEDURE — 80076 HEPATIC FUNCTION PANEL: CPT | Performed by: PATHOLOGY

## 2021-05-10 PROCEDURE — 36415 COLL VENOUS BLD VENIPUNCTURE: CPT | Performed by: PATHOLOGY

## 2021-05-10 PROCEDURE — 85027 COMPLETE CBC AUTOMATED: CPT | Performed by: PATHOLOGY

## 2021-05-10 PROCEDURE — 80048 BASIC METABOLIC PNL TOTAL CA: CPT | Performed by: PATHOLOGY

## 2021-05-10 PROCEDURE — 87517 HEPATITIS B DNA QUANT: CPT | Performed by: PHYSICIAN ASSISTANT

## 2021-05-11 LAB
HBV DNA SERPL NAA+PROBE-ACNC: ABNORMAL [IU]/ML
HBV DNA SERPL NAA+PROBE-LOG IU: 8.2 {LOG_IU}/ML

## 2021-05-13 ENCOUNTER — VIRTUAL VISIT (OUTPATIENT)
Dept: GASTROENTEROLOGY | Facility: CLINIC | Age: 61
End: 2021-05-13
Attending: PHYSICIAN ASSISTANT
Payer: MEDICARE

## 2021-05-13 DIAGNOSIS — K74.60 HEPATIC CIRRHOSIS, UNSPECIFIED HEPATIC CIRRHOSIS TYPE (H): ICD-10-CM

## 2021-05-13 DIAGNOSIS — B18.1 CHRONIC HEPATITIS B WITH CIRRHOSIS (H): Primary | ICD-10-CM

## 2021-05-13 DIAGNOSIS — K74.60 CHRONIC HEPATITIS B WITH CIRRHOSIS (H): Primary | ICD-10-CM

## 2021-05-13 PROCEDURE — 99442 PR PHYSICIAN TELEPHONE EVALUATION 11-20 MIN: CPT | Mod: 95 | Performed by: PHYSICIAN ASSISTANT

## 2021-05-13 RX ORDER — TENOFOVIR DISOPROXIL FUMARATE 300 MG/1
300 TABLET, FILM COATED ORAL DAILY
Qty: 90 TABLET | Refills: 3 | Status: SHIPPED | OUTPATIENT
Start: 2021-05-13 | End: 2021-07-22

## 2021-05-13 ASSESSMENT — PAIN SCALES - GENERAL: PAINLEVEL: SEVERE PAIN (6)

## 2021-05-13 NOTE — Clinical Note
Patient needs an MRI at his next convenience.   Follow up with Dr. Leventhal in 3 months. .  Thanks, Dorian

## 2021-05-13 NOTE — PROGRESS NOTES
Rishabh is a 60 year old who is being evaluated via a billable telephone visit.      What phone number would you like to be contacted at? [phone]     services [phone] Opt 3 for Hmong       How would you like to obtain your AVS? Mail a copy  Phone call duration:12 minutes

## 2021-05-13 NOTE — PROGRESS NOTES
Hepatology Follow-up Clinic note  Rishabh Cabrera   Date of Birth 1960  Date of Service 5/13/2021    Reason for follow-up: Hep B cirrhosis         Assessment/plan:   Rishabh Cabrera is a 60 year old male with history of Hep B cirrhosis, complicated by history of portal hypertension. No overt hepatic encephalopathy or clinical signs of ascites at this time. History of inconsistent use of Hepatitis B anti-viral use. He states he's been taking his Hep B medication over the past year, but has been without the past few weeks. Most recent HBV DNA was 170 million, so likely not taking the medication over the past year. Recent ,  and Alk Phos 290. MELD-Na 11. History of LIRADs-4 lesion. Patient declined liver biopsy last year, last MRI was July 2020 so we discussed importance of getting updated imaging. He is up to date with variceal screening.     # History of LIRADs-4 lesion:   - MRI in the near future     # Chronic hepatitis B  - Start taking tenofovir  mg daily     # Follow-up in clinic with Dr. Leventhal in 3 months     Dorian Levy PA-C   [facility] Hepatology clinic    -----------------------------------------------------       HPI:   Rishabh Cabrera is a 60 year old male presenting for follow-up.     Hepatitis B  - e antigen - negative 12/2006  - e antibody - positive 12/18/2006  Diagnosed: 2013  Treatment:   - Started tenofovir a few years ago      Cirrhosis:   - diagnosed 2015  - No history GI bleed  - No history of ascites  - No history of HE  HCC screening: MRI, history of LIRADS 4 lesion   EGD: 5/29/2019    Patient was last seen by Dr. Leventhal on 7/7/2020. At that time, his MRI imaging was discussed at multidisciplanary tumor conference and it was recommended that he have a liver biopsy. He has other friends/family that have had complications from biopsies so he has not wanted to do it.     States he's been taking medication over the past year. States there was a delay in getting the  medication from the pharmacy so he had been without for the past few weeks. Most recent HBV DNA level was 147 million.     Appetite is okay. He has gained some weight over the past year, up about 5 lbs. He does feel bloating his abdomen. He is still having joint pain - ankles, toes and knees.     Stools sometimes are very dark. Feeling tired a lot. No overt confusion, but does state his memory    Patient denies jaundice, lower extremity edema, abdominal distension. Patient also denies melena, hematochezia or hematemesis. Patient denies fevers, sweats or chills.    Medical hx Surgical hx   Past Medical History:   Diagnosis Date     Chronic hepatitis B (H)      Diabetes mellitus (H)     Past Surgical History:   Procedure Laterality Date     ESOPHAGOSCOPY, GASTROSCOPY, DUODENOSCOPY (EGD), COMBINED N/A 5/29/2019    Procedure: ESOPHAGOGASTRODUODENOSCOPY (EGD);  Surgeon: Leventhal, Thomas Michael, MD;  Location:  GI                 Medications:     Current Outpatient Medications   Medication     LANTUS SOLOSTAR 100 UNIT/ML soln     metFORMIN (GLUCOPHAGE) 1000 MG tablet     naproxen (NAPROSYN) 500 MG tablet     furosemide (LASIX) 20 MG tablet     spironolactone (ALDACTONE) 50 MG tablet     tenofovir (VIREAD) 300 MG tablet     No current facility-administered medications for this visit.      Facility-Administered Medications Ordered in Other Visits   Medication     gadobutrol (GADAVIST) injection 7.5 mL            Allergies:     Allergies   Allergen Reactions     Crabs [Crustaceans]      Pork (Porcine) Protein Unknown     Seafood Unknown     Shellfish Allergy Unknown            Review of Systems:   10 points ROS was obtained and highlighted in the HPI, otherwise negative.          Physical Exam:     GENERAL: healthy, alert and no distress  EYES: Eyes grossly normal to inspection, conjunctivae and sclerae normal  RESP: no audible wheeze, cough, or visible cyanosis.  No visible retractions or increased work of breathing.   Able to speak fully in complete sentences  NEURO: Cranial nerves grossly intact, mentation intact and speech normal  PSYCH: mentation appears normal, affect normal/bright, judgement and insight intact, normal speech and appearance well-groomed           Data:   Reviewed in person and significant for:    Lab Results   Component Value Date     05/10/2021      Lab Results   Component Value Date    POTASSIUM 4.7 05/10/2021     Lab Results   Component Value Date    CHLORIDE 103 05/10/2021     Lab Results   Component Value Date    CO2 26 05/10/2021     Lab Results   Component Value Date    BUN 12 05/10/2021     Lab Results   Component Value Date    CR 0.85 05/10/2021       Lab Results   Component Value Date    WBC 3.5 05/10/2021     Lab Results   Component Value Date    HGB 13.0 05/10/2021     Lab Results   Component Value Date    HCT 39.1 05/10/2021     Lab Results   Component Value Date    MCV 86 05/10/2021     Lab Results   Component Value Date    PLT 52 05/10/2021       Lab Results   Component Value Date     05/10/2021     Lab Results   Component Value Date     05/10/2021     No results found for: BILICONJ   Lab Results   Component Value Date    BILITOTAL 1.3 05/10/2021       Lab Results   Component Value Date    ALBUMIN 2.7 05/10/2021     Lab Results   Component Value Date    PROTTOTAL 6.7 05/10/2021      Lab Results   Component Value Date    ALKPHOS 290 05/10/2021       Lab Results   Component Value Date    INR 1.39 05/10/2021     MR ABDOMEN WO W CONTRAST:   7/1/2020:   IMPRESSION:    1. Cirrhosis and evidence of portal hypertension including  splenomegaly, trace perihepatic ascites and small gastroesophageal  varices.  2. 2.6 cm T1 hyperintense nodule with increased areas of arterial  enhancement and diffusion-weighted signal in hepatic segment 5/6. This  there is characterized as LI-RADS 4, suspicious for malignancy.  Consider biopsy or 3 month follow-up.

## 2021-05-13 NOTE — LETTER
5/13/2021         RE: Rishabh Cabrera  [address]  [facility] [address]        Dear Colleague,    Thank you for referring your patient, Rishabh Cabrera, to the [facility] [place]. Please see a copy of my visit note below.    Hepatology Follow-up Clinic note  Rishabh Cabrera   Date of Birth 1960  Date of Service 5/13/2021    Reason for follow-up: Hep B cirrhosis         Assessment/plan:   Rishabh Cabrera is a 60 year old male with history of Hep B cirrhosis, complicated by history of portal hypertension. No overt hepatic encephalopathy or clinical signs of ascites at this time. History of inconsistent use of Hepatitis B anti-viral use. He states he's been taking his Hep B medication over the past year, but has been without the past few weeks. Most recent HBV DNA was 170 million, so likely not taking the medication over the past year. Recent ,  and Alk Phos 290. MELD-Na 11. History of LIRADs-4 lesion. Patient declined liver biopsy last year, last MRI was July 2020 so we discussed importance of getting updated imaging. He is up to date with variceal screening.     # History of LIRADs-4 lesion:   - MRI in the near future     # Chronic hepatitis B  - Start taking tenofovir  mg daily     # Follow-up in clinic with Dr. Leventhal in 3 months     Dorian Levy PA-C   [facility] Hepatology clinic    -----------------------------------------------------       HPI:   Rishabh Cabrera is a 60 year old male presenting for follow-up.     Hepatitis B  - e antigen - negative 12/2006  - e antibody - positive 12/18/2006  Diagnosed: 2013  Treatment:   - Started tenofovir a few years ago      Cirrhosis:   - diagnosed 2015  - No history GI bleed  - No history of ascites  - No history of HE  HCC screening: MRI, history of LIRADS 4 lesion   EGD: 5/29/2019    Patient was last seen by Dr. Leventhal on 7/7/2020. At that time, [facility] and it was recommended that he have a liver biopsy. He has other friends/family that have had complications from biopsies so he has not wanted to do it.     States he's been taking medication over the past year. States there was a delay in getting the medication from the pharmacy so he had been without for the past few weeks. Most recent HBV DNA level was 147 million.     Appetite is okay. He has gained some weight over the past year, up about 5 lbs. He does feel bloating his abdomen. He is still having joint pain - ankles, toes and knees.     Stools sometimes are very dark. Feeling tired a lot. No overt confusion, but does state his memory    Patient denies jaundice, lower extremity edema, abdominal distension. Patient also denies melena, hematochezia or hematemesis. Patient denies fevers, sweats or chills.    Medical hx Surgical hx   Past Medical History:   Diagnosis Date     Chronic hepatitis B (H)      Diabetes mellitus (H)     Past Surgical History:   Procedure Laterality Date     ESOPHAGOSCOPY, GASTROSCOPY, DUODENOSCOPY (EGD), COMBINED N/A 5/29/2019    Procedure: ESOPHAGOGASTRODUODENOSCOPY (EGD);  Surgeon: Leventhal, Thomas Michael, MD;  Location:  GI                 Medications:     Current Outpatient Medications   Medication     LANTUS SOLOSTAR 100 UNIT/ML soln     metFORMIN (GLUCOPHAGE) 1000 MG tablet     naproxen (NAPROSYN) 500 MG tablet     furosemide (LASIX) 20 MG tablet     spironolactone (ALDACTONE) 50 MG tablet     tenofovir (VIREAD) 300 MG tablet     No current facility-administered medications for this visit.      Facility-Administered Medications Ordered in Other Visits   Medication     gadobutrol (GADAVIST) injection 7.5 mL            Allergies:     Allergies   Allergen Reactions     Crabs [Crustaceans]      Pork (Porcine) Protein Unknown     Seafood Unknown     Shellfish Allergy Unknown            Review of Systems:   10 points ROS was obtained and highlighted in the HPI, otherwise  negative.          Physical Exam:     GENERAL: healthy, alert and no distress  EYES: Eyes grossly normal to inspection, conjunctivae and sclerae normal  RESP: no audible wheeze, cough, or visible cyanosis.  No visible retractions or increased work of breathing.  Able to speak fully in complete sentences  NEURO: Cranial nerves grossly intact, mentation intact and speech normal  PSYCH: mentation appears normal, affect normal/bright, judgement and insight intact, normal speech and appearance well-groomed           Data:   Reviewed in person and significant for:    Lab Results   Component Value Date     05/10/2021      Lab Results   Component Value Date    POTASSIUM 4.7 05/10/2021     Lab Results   Component Value Date    CHLORIDE 103 05/10/2021     Lab Results   Component Value Date    CO2 26 05/10/2021     Lab Results   Component Value Date    BUN 12 05/10/2021     Lab Results   Component Value Date    CR 0.85 05/10/2021       Lab Results   Component Value Date    WBC 3.5 05/10/2021     Lab Results   Component Value Date    HGB 13.0 05/10/2021     Lab Results   Component Value Date    HCT 39.1 05/10/2021     Lab Results   Component Value Date    MCV 86 05/10/2021     Lab Results   Component Value Date    PLT 52 05/10/2021       Lab Results   Component Value Date     05/10/2021     Lab Results   Component Value Date     05/10/2021     No results found for: BILICONJ   Lab Results   Component Value Date    BILITOTAL 1.3 05/10/2021       Lab Results   Component Value Date    ALBUMIN 2.7 05/10/2021     Lab Results   Component Value Date    PROTTOTAL 6.7 05/10/2021      Lab Results   Component Value Date    ALKPHOS 290 05/10/2021       Lab Results   Component Value Date    INR 1.39 05/10/2021     MR ABDOMEN WO W CONTRAST:   7/1/2020:   IMPRESSION:    1. Cirrhosis and evidence of portal hypertension including  splenomegaly, trace perihepatic ascites and small gastroesophageal  varices.  2. 2.6 cm T1  hyperintense nodule with increased areas of arterial  enhancement and diffusion-weighted signal in hepatic segment 5/6. This  there is characterized as LI-RADS 4, suspicious for malignancy.  Consider biopsy or 3 month follow-up.    Rishabh is a 60 year old who is being evaluated via a billable telephone visit.      What phone number would you like to be contacted at? [phone]     services [phone] Opt 3 for [place]       How would you like to obtain your AVS? Mail a copy  Phone call duration:12 minutes        Again, thank you for allowing me to participate in the care of your patient.        Sincerely,        Dorian Levy PA-C

## 2021-06-11 ENCOUNTER — TELEPHONE (OUTPATIENT)
Dept: GASTROENTEROLOGY | Facility: CLINIC | Age: 61
End: 2021-06-11

## 2021-06-11 DIAGNOSIS — B18.1 CHRONIC HEPATITIS B WITH CIRRHOSIS (H): Primary | ICD-10-CM

## 2021-06-11 DIAGNOSIS — R18.8 OTHER ASCITES: ICD-10-CM

## 2021-06-11 DIAGNOSIS — K74.60 HEPATIC CIRRHOSIS, UNSPECIFIED HEPATIC CIRRHOSIS TYPE (H): Primary | ICD-10-CM

## 2021-06-11 DIAGNOSIS — Z11.59 ENCOUNTER FOR SCREENING FOR OTHER VIRAL DISEASES: ICD-10-CM

## 2021-06-11 DIAGNOSIS — K74.60 CHRONIC HEPATITIS B WITH CIRRHOSIS (H): Primary | ICD-10-CM

## 2021-06-11 DIAGNOSIS — K70.31 ALCOHOLIC CIRRHOSIS OF LIVER WITH ASCITES (H): ICD-10-CM

## 2021-06-11 DIAGNOSIS — K74.60 HEPATIC CIRRHOSIS, UNSPECIFIED HEPATIC CIRRHOSIS TYPE (H): ICD-10-CM

## 2021-06-11 RX ORDER — METHYLPREDNISOLONE SODIUM SUCCINATE 125 MG/2ML
125 INJECTION, POWDER, LYOPHILIZED, FOR SOLUTION INTRAMUSCULAR; INTRAVENOUS
Status: CANCELLED
Start: 2021-06-11

## 2021-06-11 RX ORDER — MEPERIDINE HYDROCHLORIDE 25 MG/ML
25 INJECTION INTRAMUSCULAR; INTRAVENOUS; SUBCUTANEOUS EVERY 30 MIN PRN
Status: CANCELLED | OUTPATIENT
Start: 2021-06-11

## 2021-06-11 RX ORDER — DIPHENHYDRAMINE HYDROCHLORIDE 50 MG/ML
50 INJECTION INTRAMUSCULAR; INTRAVENOUS
Status: CANCELLED
Start: 2021-06-11

## 2021-06-11 RX ORDER — SPIRONOLACTONE 50 MG/1
50 TABLET, FILM COATED ORAL DAILY
Qty: 30 TABLET | Refills: 5 | Status: SHIPPED | OUTPATIENT
Start: 2021-06-11 | End: 2022-05-13

## 2021-06-11 RX ORDER — NALOXONE HYDROCHLORIDE 0.4 MG/ML
0.2 INJECTION, SOLUTION INTRAMUSCULAR; INTRAVENOUS; SUBCUTANEOUS
Status: CANCELLED | OUTPATIENT
Start: 2021-06-11

## 2021-06-11 RX ORDER — EPINEPHRINE 1 MG/ML
0.3 INJECTION, SOLUTION, CONCENTRATE INTRAVENOUS EVERY 5 MIN PRN
Status: CANCELLED | OUTPATIENT
Start: 2021-06-11

## 2021-06-11 RX ORDER — FUROSEMIDE 20 MG
40 TABLET ORAL DAILY
Qty: 60 TABLET | Refills: 5 | Status: SHIPPED | OUTPATIENT
Start: 2021-06-11 | End: 2022-02-08

## 2021-06-11 RX ORDER — HEPARIN SODIUM,PORCINE 10 UNIT/ML
5 VIAL (ML) INTRAVENOUS
Status: CANCELLED | OUTPATIENT
Start: 2021-06-11

## 2021-06-11 RX ORDER — FUROSEMIDE 20 MG
40 TABLET ORAL DAILY
Qty: 60 TABLET | Refills: 5 | Status: SHIPPED | OUTPATIENT
Start: 2021-06-11 | End: 2021-06-11

## 2021-06-11 RX ORDER — ALBUTEROL SULFATE 90 UG/1
1-2 AEROSOL, METERED RESPIRATORY (INHALATION)
Status: CANCELLED
Start: 2021-06-11

## 2021-06-11 RX ORDER — ALBUMIN (HUMAN) 12.5 G/50ML
12.5 SOLUTION INTRAVENOUS
Status: CANCELLED
Start: 2021-06-11

## 2021-06-11 RX ORDER — LIDOCAINE HYDROCHLORIDE 10 MG/ML
20 INJECTION, SOLUTION EPIDURAL; INFILTRATION; INTRACAUDAL; PERINEURAL ONCE
Status: CANCELLED
Start: 2021-06-11 | End: 2021-06-11

## 2021-06-11 RX ORDER — HEPARIN SODIUM (PORCINE) LOCK FLUSH IV SOLN 100 UNIT/ML 100 UNIT/ML
5 SOLUTION INTRAVENOUS
Status: CANCELLED | OUTPATIENT
Start: 2021-06-11

## 2021-06-11 RX ORDER — ALBUTEROL SULFATE 0.83 MG/ML
2.5 SOLUTION RESPIRATORY (INHALATION)
Status: CANCELLED | OUTPATIENT
Start: 2021-06-11

## 2021-06-11 NOTE — TELEPHONE ENCOUNTER
Called patient using  service and let him know he should restart 40 mg furosemide and 50 mg spironolactone to help get fluid off prior to paracentesis.  Refills sent to [facility].  Orders for paracentesis placed.     He is overdue for MRI for HCC surveillance.  MRI orders placed.      Hanny PERALES LPN  Hepatology Clinic       [facility]    Phone Message    May a detailed message be left on voicemail: yes     Reason for Call: Other: calling from [facility]. States that they saw patient yesterday and Dr. Jack Nichole is thinking patient is needing a paracentesis. States that patient is wanting to get done with [facility]. States that since they aren't out of [place] can't place orders. Wondering if Dorian Levy would be able to place orders for patient.     Please advise and call Mark back with any questions or concerns    Action Taken: Other:  HEPATOLOGY    Travel Screening: Not Applicable

## 2021-06-13 DIAGNOSIS — Z11.59 ENCOUNTER FOR SCREENING FOR OTHER VIRAL DISEASES: ICD-10-CM

## 2021-06-13 LAB
LABORATORY COMMENT REPORT: NORMAL
SARS-COV-2 RNA RESP QL NAA+PROBE: NEGATIVE
SARS-COV-2 RNA RESP QL NAA+PROBE: NORMAL
SPECIMEN SOURCE: NORMAL
SPECIMEN SOURCE: NORMAL

## 2021-06-13 PROCEDURE — U0005 INFEC AGEN DETEC AMPLI PROBE: HCPCS | Performed by: PHYSICIAN ASSISTANT

## 2021-06-13 PROCEDURE — U0003 INFECTIOUS AGENT DETECTION BY NUCLEIC ACID (DNA OR RNA); SEVERE ACUTE RESPIRATORY SYNDROME CORONAVIRUS 2 (SARS-COV-2) (CORONAVIRUS DISEASE [COVID-19]), AMPLIFIED PROBE TECHNIQUE, MAKING USE OF HIGH THROUGHPUT TECHNOLOGIES AS DESCRIBED BY CMS-2020-01-R: HCPCS | Performed by: PHYSICIAN ASSISTANT

## 2021-06-14 ENCOUNTER — TELEPHONE (OUTPATIENT)
Dept: MEDSURG UNIT | Facility: CLINIC | Age: 61
End: 2021-06-14

## 2021-06-14 NOTE — TELEPHONE ENCOUNTER
Pre-Procedure Negative COVID Test Results    Results Reviewed  The patient has a negative COVID test result within the required timeframe for the scheduled procedure.     No COVID pre-call needed.     EYAL Otero RN

## 2021-06-14 NOTE — TELEPHONE ENCOUNTER

## 2021-06-16 ENCOUNTER — HOSPITAL ENCOUNTER (OUTPATIENT)
Dept: ULTRASOUND IMAGING | Facility: CLINIC | Age: 61
End: 2021-06-16
Attending: PHYSICIAN ASSISTANT
Payer: MEDICARE

## 2021-06-16 ENCOUNTER — HOSPITAL ENCOUNTER (OUTPATIENT)
Facility: CLINIC | Age: 61
Discharge: HOME | End: 2021-06-16
Admitting: RADIOLOGY
Payer: MEDICARE

## 2021-06-16 VITALS
SYSTOLIC BLOOD PRESSURE: 118 MMHG | HEART RATE: 93 BPM | OXYGEN SATURATION: 97 % | TEMPERATURE: 97.8 F | RESPIRATION RATE: 18 BRPM | DIASTOLIC BLOOD PRESSURE: 65 MMHG

## 2021-06-16 DIAGNOSIS — R18.8 OTHER ASCITES: Primary | ICD-10-CM

## 2021-06-16 DIAGNOSIS — R18.8 OTHER ASCITES: ICD-10-CM

## 2021-06-16 PROCEDURE — 88112 CYTOPATH CELL ENHANCE TECH: CPT | Mod: TC | Performed by: PHYSICIAN ASSISTANT

## 2021-06-16 PROCEDURE — 88305 TISSUE EXAM BY PATHOLOGIST: CPT | Mod: 26 | Performed by: PATHOLOGY

## 2021-06-16 PROCEDURE — 999N001018 HC STATISTIC H-CELL BLOCK W/STAIN: Performed by: PHYSICIAN ASSISTANT

## 2021-06-16 PROCEDURE — 88305 TISSUE EXAM BY PATHOLOGIST: CPT | Mod: TC | Performed by: PHYSICIAN ASSISTANT

## 2021-06-16 PROCEDURE — 999N000154 HC STATISTIC RADIOLOGY XRAY, US, CT, MAR, NM

## 2021-06-16 PROCEDURE — 49083 ABD PARACENTESIS W/IMAGING: CPT

## 2021-06-16 PROCEDURE — 88112 CYTOPATH CELL ENHANCE TECH: CPT | Mod: 26 | Performed by: PATHOLOGY

## 2021-06-16 PROCEDURE — 999N001014 HC STATISTIC CYTO WRIGHT STAIN TC: Performed by: PHYSICIAN ASSISTANT

## 2021-06-16 RX ORDER — NALOXONE HYDROCHLORIDE 0.4 MG/ML
0.2 INJECTION, SOLUTION INTRAMUSCULAR; INTRAVENOUS; SUBCUTANEOUS
Status: CANCELLED | OUTPATIENT
Start: 2021-06-16

## 2021-06-16 RX ORDER — ALBUMIN (HUMAN) 12.5 G/50ML
12.5 SOLUTION INTRAVENOUS
Status: DISCONTINUED | OUTPATIENT
Start: 2021-06-16 | End: 2021-06-16 | Stop reason: HOSPADM

## 2021-06-16 RX ORDER — ALBUMIN (HUMAN) 12.5 G/50ML
12.5 SOLUTION INTRAVENOUS
Status: CANCELLED
Start: 2021-06-16

## 2021-06-16 RX ORDER — LIDOCAINE HYDROCHLORIDE 10 MG/ML
10 INJECTION, SOLUTION EPIDURAL; INFILTRATION; INTRACAUDAL; PERINEURAL ONCE
Status: COMPLETED | OUTPATIENT
Start: 2021-06-16 | End: 2021-06-16

## 2021-06-16 RX ORDER — ALBUMIN (HUMAN) 12.5 G/50ML
12.5 SOLUTION INTRAVENOUS ONCE
Status: DISCONTINUED | OUTPATIENT
Start: 2021-06-16 | End: 2021-06-16 | Stop reason: HOSPADM

## 2021-06-16 RX ORDER — LIDOCAINE HYDROCHLORIDE 10 MG/ML
20 INJECTION, SOLUTION EPIDURAL; INFILTRATION; INTRACAUDAL; PERINEURAL ONCE
Status: DISCONTINUED | OUTPATIENT
Start: 2021-06-16 | End: 2021-06-16 | Stop reason: HOSPADM

## 2021-06-16 RX ORDER — LIDOCAINE HYDROCHLORIDE 10 MG/ML
20 INJECTION, SOLUTION EPIDURAL; INFILTRATION; INTRACAUDAL; PERINEURAL ONCE
Status: CANCELLED
Start: 2021-06-16 | End: 2021-06-16

## 2021-06-16 RX ORDER — EPINEPHRINE 1 MG/ML
0.3 INJECTION, SOLUTION, CONCENTRATE INTRAVENOUS EVERY 5 MIN PRN
Status: CANCELLED | OUTPATIENT
Start: 2021-06-16

## 2021-06-16 RX ORDER — MEPERIDINE HYDROCHLORIDE 25 MG/ML
25 INJECTION INTRAMUSCULAR; INTRAVENOUS; SUBCUTANEOUS EVERY 30 MIN PRN
Status: CANCELLED | OUTPATIENT
Start: 2021-06-16

## 2021-06-16 RX ORDER — METHYLPREDNISOLONE SODIUM SUCCINATE 125 MG/2ML
125 INJECTION, POWDER, LYOPHILIZED, FOR SOLUTION INTRAMUSCULAR; INTRAVENOUS
Status: CANCELLED
Start: 2021-06-16

## 2021-06-16 RX ORDER — DIPHENHYDRAMINE HYDROCHLORIDE 50 MG/ML
50 INJECTION INTRAMUSCULAR; INTRAVENOUS
Status: CANCELLED
Start: 2021-06-16

## 2021-06-16 RX ORDER — HEPARIN SODIUM (PORCINE) LOCK FLUSH IV SOLN 100 UNIT/ML 100 UNIT/ML
5 SOLUTION INTRAVENOUS
Status: CANCELLED | OUTPATIENT
Start: 2021-06-16

## 2021-06-16 RX ORDER — ALBUTEROL SULFATE 90 UG/1
1-2 AEROSOL, METERED RESPIRATORY (INHALATION)
Status: CANCELLED
Start: 2021-06-16

## 2021-06-16 RX ORDER — HEPARIN SODIUM,PORCINE 10 UNIT/ML
5 VIAL (ML) INTRAVENOUS
Status: CANCELLED | OUTPATIENT
Start: 2021-06-16

## 2021-06-16 RX ORDER — LIDOCAINE 40 MG/G
CREAM TOPICAL
Status: DISCONTINUED | OUTPATIENT
Start: 2021-06-16 | End: 2021-06-16 | Stop reason: HOSPADM

## 2021-06-16 RX ORDER — ALBUTEROL SULFATE 0.83 MG/ML
2.5 SOLUTION RESPIRATORY (INHALATION)
Status: CANCELLED | OUTPATIENT
Start: 2021-06-16

## 2021-06-16 RX ADMIN — LIDOCAINE HYDROCHLORIDE 8 ML: 10 INJECTION, SOLUTION EPIDURAL; INFILTRATION; INTRACAUDAL; PERINEURAL at 12:54

## 2021-06-16 NOTE — IP AVS SNAPSHOT
[place] [address] [place] [address]  Phone: [phone]                                    After Visit Summary   6/16/2021    Rishabh Cabrera    MRN: [id number]           After Visit Summary Signature Page    I have received my discharge instructions, and my questions have been answered. I have discussed any challenges I see with this plan with the nurse or doctor.    ..........................................................................................................................................  Patient/Patient Representative Signature      ..........................................................................................................................................  Patient Representative Print Name and Relationship to Patient    ..................................................               ................................................  Date                                   Time    ..........................................................................................................................................  Reviewed by Signature/Title    ...................................................              ..............................................  Date                                               Time          22EPIC Rev 08/18

## 2021-06-16 NOTE — PROGRESS NOTES
Patient Wellness Screening  1. In the last month, have you been in contact with someone who was confirmed or suspected to have Coronavirus/COVID-19? No    2. Do you have the following symptoms?  Fever/Chills? No   Cough? No   Shortness of breath? No   New loss of taste or smell? No  Sore throat? No  Muscle or body aches? No  Headaches? No  Fatigue? No  Vomiting or diarrhea? No    Admission Note:  Reason for admission: Paracentesis  Time of arrival:1230    Accompanied by:  Name/phone of discontinue : self    Pre-procedure assessment complete: Yes  If abnormal assessment/labs, provider notified: N/A    Medications held per instructions/orders: n/a  Procedure explained. All questions & concerns addressed: Yes  Consent: obtained    Discharge instructions reviewed: Yes  Patient verbalizes understanding: Yes    Paracentesis:   Patient tolerated well. VSS. 200 cc pink colored fluid removed from left abdomen w/o difficulty. Bandaid applied to site - CDI.   Albumin given per protocol / standing orders.      Discharge Note:  Discharge criteria met and vital signs stable: Yes    1305 Patient discharged per ambulatory to private vehicle. All personal belongings taken with patient.

## 2021-06-16 NOTE — DISCHARGE INSTRUCTIONS

## 2021-06-16 NOTE — IP AVS SNAPSHOT
After Visit Summary Template Not Found    This Print Group is only intended to be used in the After Visit Summary and can only be used in a report that uses a released After Visit Summary Template.                       MRN:[id number]                      After Visit Summary   6/16/2021    Rishabh Cabrera    MRN: [id number]           Visit Information        Department      6/16/2021 12:07 PM [facility]          Review of your medicines      UNREVIEWED medicines. Ask your doctor about these medicines       Dose / Directions   furosemide 20 MG tablet  Commonly known as: LASIX  Used for: Hepatic cirrhosis, unspecified hepatic cirrhosis type (H)      Dose: 40 mg  Take 2 tablets (40 mg) by mouth daily  Quantity: 60 tablet  Refills: 5     Lantus SoloStar 100 UNIT/ML pen  Generic drug: insulin glargine      Dose: 10 Units  Inject 10 Units Subcutaneous At Bedtime  Refills: 3     metFORMIN 1000 MG tablet  Commonly known as: GLUCOPHAGE      Dose: 1 tablet  Take 1 tablet by mouth 2 times daily (with meals)  Refills: 3     naproxen 500 MG tablet  Commonly known as: NAPROSYN      Dose: 1 tablet  Take 1 tablet by mouth 2 times daily as needed for pain  Refills: 0     spironolactone 50 MG tablet  Commonly known as: ALDACTONE  Used for: Hepatic cirrhosis, unspecified hepatic cirrhosis type (H)      Dose: 50 mg  Take 1 tablet (50 mg) by mouth daily  Quantity: 30 tablet  Refills: 5     tenofovir 300 MG tablet  Commonly known as: VIREAD  Used for: Hepatic cirrhosis, unspecified hepatic cirrhosis type (H)      Dose: 300 mg  Take 1 tablet (300 mg) by mouth daily  Quantity: 90 tablet  Refills: 3              Protect others around you: Learn how to safely use, store and throw away your medicines at www.disposemymeds.org.       Follow-ups after your visit       Your next 10 appointments already scheduled    Jun 16, 2021 12:45 PM  US PARACENTESIS with QING,  IMAGING NURSE,  BODY RADDAVID  [facility]  [facility] ([facility] ) [address]  [place] [id number] [phone]   How do I prepare for my exam? (Food and drink instructions)  No Food and Drink Restrictions.    How do I prepare for my exam? (Other instructions)  IF YOUR DOCTOR ALSO PRESCRIBED SEDATION DURING THE EXAM (medicine to help you relax): You will receive separate instructions about driving, eating, and additional tests that may be necessary prior to your exam day.    What should I wear: Wear comfortable clothes.    How long does the exam take: Aspiration (no sedation treatment takes about an hour).  For paracentesis, thoracentesis or sedation plan to spend at least three hours at the hospital.    What should I bring: Bring a list of your medicines, including vitamins, minerals and over-the-counter drugs. It is safest to leave personal items at home. Bring your 's license or photo ID and your insurance card.    Do I need a :  No  is needed.    What do I need to tell my doctor:  Tell your doctor in advance:  * If you are or may be pregnant.  * If you are taking Coumadin (or any other blood thinners) 5 days prior to the exam for any special instructions.  * If you are diabetic to determine if your insulin needs have to be adjusted for the exam.    What should I do after the exam: Take it easy the rest of the day. You can return to normal activities the next day.    What is this test: This test uses a long, thin needle or tube to remove tissue, fluid, or other cells from your body. Pictures from an ultrasound will guide the needle to the right place. (Ultrasound uses sound waves to create pictures of the body on a video screen. You will not feel the sound waves.)    * A biopsy removes tissue or other cells from the body; we send the tissue or cells to a lab for testing.  * Paracentesis removes fluid from the belly (abdomen) to relieve pressure, to test the fluid or both.  *  Thoracentesis removes fluid from the sac around the lungs to relieve pressure, to test the fluid or both.  * Aspiration removes fluid from any part of the body, then the fluid is tested for disease or infection.    Who should I call with questions: If you have any questions, please call the Imaging Department where you will have your exam. Directions, parking instructions, and other information are available on our website, [email or web address].     Jun 22, 2021  6:00 PM  (Arrive by 5:30 PM)  MR ABDOMEN W/O & W CONTRAST with URMR2  [facility] ([facility] ) [address] [place] [address] [phone]   How do I prepare for my exam? (Food and drink instructions)  No food or drink restrictions  **If you will be receiving sedation or general anesthesia, please see special notes below.**    How do I prepare for my exam? (Other instructions)  Take your medicines as usual, unless your doctor tells you not to.  You may or may not receive IV contrast for this exam pending the discretion of the Radiologist.    **If you will be receiving sedation or general anesthesia, please see special notes below.**    What should I wear: The MRI machine uses a strong magnet. Due to increased risk of metallic materials/threading in clothing, for your safety, you will be requested to change into a hospital gown. Please remove any body piercings and hair extensions before you arrive. You will also remove watches, jewelry, hairpins, wallets, dentures, partial dental plates and hearing aids. You may wear contact lenses, and you may be able to wear your rings. We have a safe place to keep your personal items, but it is safer to leave them at home.    How long does the exam take: Most tests take 30 to 60 minutes.  HOWEVER, IF YOUR DOCTOR PRESCRIBES ANESTHESIA please plan on spending four to five hours in the recovery room.    What should I bring:  Bring a list of your current medicines to your exam (including vitamins, minerals and over-the-counter drugs). Also bring the results of similar scans you may have had.  If you are a minor (under age 18) you will need to bring a parent or legal guardian with you to the exam.    Do I need a : **If you will be receiving sedation or general anesthesia, please see special notes below.**    What should I do after the exam: No restrictions, you may resume normal activities.    What is this test: MRI (magnetic resonance imaging) uses a strong magnet and radio waves to look inside the body. An MRA (magnetic resonance angiogram) does the same thing, but it lets us look at your blood vessels. A computer turns the radio waves into pictures showing cross sections of the body, much like slices of bread. This helps us see any problems more clearly. You may receive fluid (called  contrast ) before or during your scan. The fluid helps us see the pictures better. We give the fluid through an IV (small needle in your arm).    Who should I call with questions: If you have any questions, please contact your Imaging Department exam site. Directions, parking instructions, and other information are available on our website, [email or web address].    How do I prepare if I m having sedation or anesthesia?  **IMPORTANT**  THE INSTRUCTIONS BELOW ARE ONLY FOR THOSE PATIENTS WHO HAVE BEEN TOLD THEY WILL RECEIVE SEDATION OR GENERAL ANESTHESIA DURING THEIR MRI PROCEDURE:    IF YOU WILL RECEIVE ORAL SEDATION (take medicine by mouth to help you relax during your exam):  You must get the medicine from your doctor before you arrive. Bring the medicine to the exam. Do not take it at home.  Arrive one hour early with a drive. Your  must remain on site for the duration of the exam. Your medicine may make you sleepy. After the exam, you may not drive, take a bus or take a taxi by yourself.    IF YOU WILL RECEIVE SEDATION WITH AN IV OR  ANESTHESIA (deeper level of sedation ordered and administered by a health professional):  Arrive 1 1/2 hours early. Bring someone who can take you home after the test. You may not drive, take a bus or take a taxi by yourself.  No eating 8 hours before your exam. You may have clear liquids up until 4 hours before your exam. (Clear liquids include water, clear tea, black coffee and fruit juice without pulp.)  You may spend up to four to five hours in the recovery room.     Jun 23, 2021  1:30 PM  (Arrive by 1:15 PM)  Paracentesis Visit with  Spec Inf Para Provider, [facility] INFUSION NURSE, [address] ([facility] ) [address] [facility] [address] [phone]         Care Instructions       Further instructions from your care team       Paracentesis Discharge Instructions     After you go home:      You may resume your normal diet.    Care of Puncture Site:      For the first 48 hrs, check your puncture site every couple hours while you are awake     If there is a bandaid - you may remove it tomorrow morning    You may shower tomorrow    No tub baths, whirlpools or swimming until your puncture site has fully healed    Fluid may leak from the site. Change the bandaid as needed - keep the site dry    If the fluid leaks for more than 48 hours, call your ordering provider     Activity:      You may go back to normal activity in 24 hours     Wait 48 hours before lifting, straining, exercise or other strenuous activity    Medicines:      You may resume all your medications    For minor pain, you may take Acetaminophen (Tylenol) or Ibuprofen (Advil)            Call the provider who ordered this procedure if:      The site is red, swollen, hot or tender    Blood or fluid is draining from the site    Chills or a fever greater than 101 F (38 C)    Pain that is getting worse    Leaking from the site that does not  "stop    Any questions or concerns      If you have questions call:        [facility] Radiology Dept @ [phone]            Additional Information About Your Visit       [employer] Information    [email or web address] lets you send messages to your doctor, view your test results, renew your prescriptions, schedule appointments and more. To sign up, go to [email or web address] . Click on \"Log in\" on the left side of the screen, which will take you to the Welcome page. Then click on \"Sign up Now\" on the right side of the page.     You will be asked to enter the access code listed below, as well as some personal information. Please follow the directions to create your username and password.     Your access code is: [email or web address]  Expires: 2021  6:31 AM     Your access code will  in 60 days. If you need help or a new code, please call your   [facility] clinic or [phone].       Care EveryWhere ID    This is your Care EveryWhere ID. This could be used by other organizations to access your [facility] medical records  [id number]        Primary Care Provider Fax #    Physician No Ref-Primary [phone]      Equal Access to Services    ROSMERY WEBER : Miah willardo Socampbell, waaxda luqadaha, qaybta kaalmada adeguerdayada, everett duke. So [facility] [phone].    ATENCIÓN: Si habla español, tiene a belle disposición servicios gratuitos de asistencia lingüística. Llame al [phone].    We comply with applicable federal and state civil rights laws, including the [place] Human Rights Act. We do not discriminate on the basis of race, color, creed, Moravian, national origin, marital status, age, disability, sex, sexual orientation, or gender identity.    If you would like an itemization of your charges they will now be available in [email or web address] 30 days after discharge. To access the itemized statements in [email or web address] go to billing/billing summary. From there select view account. There " will be multiple tabs showing an overview of your account, detail, payments, and communications. From the communications tab you can see your monthly statements, your itemized statements, and any billing letters generated for your account. If you do not have a [employer] account and need help getting access please contact [employer] support at [phone].  If you would prefer to have your itemized statements mailed please contact our automated itemized bill request line at [phone] option  2.       Thank you!    Thank you for choosing [facility] for your care. Our goal is always to provide you with excellent care. Hearing back from our patients is one way we can continue to improve our services. Please take a few minutes to complete the written survey that you may receive in the mail after you visit with us. Thank you!            Medication List      ASK your doctor about these medications          Morning Afternoon Evening Bedtime As Needed    furosemide 20 MG tablet  Also known as: LASIX  INSTRUCTIONS: Take 2 tablets (40 mg) by mouth daily                     Lantus SoloStar 100 UNIT/ML pen  INSTRUCTIONS: Inject 10 Units Subcutaneous At Bedtime  Generic drug: insulin glargine                     metFORMIN 1000 MG tablet  Also known as: GLUCOPHAGE  INSTRUCTIONS: Take 1 tablet by mouth 2 times daily (with meals)                     naproxen 500 MG tablet  Also known as: NAPROSYN  INSTRUCTIONS: Take 1 tablet by mouth 2 times daily as needed for pain                     spironolactone 50 MG tablet  Also known as: ALDACTONE  INSTRUCTIONS: Take 1 tablet (50 mg) by mouth daily                     tenofovir 300 MG tablet  Also known as: VIREAD  INSTRUCTIONS: Take 1 tablet (300 mg) by mouth daily

## 2021-06-18 LAB — COPATH REPORT: NORMAL

## 2021-06-22 ENCOUNTER — HOSPITAL ENCOUNTER (OUTPATIENT)
Dept: MRI IMAGING | Facility: CLINIC | Age: 61
Discharge: HOME | End: 2021-06-22
Attending: PHYSICIAN ASSISTANT | Admitting: PHYSICIAN ASSISTANT
Payer: MEDICARE

## 2021-06-22 DIAGNOSIS — K74.60 CHRONIC HEPATITIS B WITH CIRRHOSIS (H): ICD-10-CM

## 2021-06-22 DIAGNOSIS — R18.8 OTHER ASCITES: ICD-10-CM

## 2021-06-22 DIAGNOSIS — K74.60 HEPATIC CIRRHOSIS, UNSPECIFIED HEPATIC CIRRHOSIS TYPE (H): ICD-10-CM

## 2021-06-22 DIAGNOSIS — B18.1 CHRONIC HEPATITIS B WITH CIRRHOSIS (H): ICD-10-CM

## 2021-06-22 PROCEDURE — 74183 MRI ABD W/O CNTR FLWD CNTR: CPT | Mod: ME

## 2021-06-22 PROCEDURE — 258N000003 HC RX IP 258 OP 636: Performed by: PHYSICIAN ASSISTANT

## 2021-06-22 PROCEDURE — A9585 GADOBUTROL INJECTION: HCPCS | Performed by: PHYSICIAN ASSISTANT

## 2021-06-22 PROCEDURE — 74183 MRI ABD W/O CNTR FLWD CNTR: CPT | Mod: 26 | Performed by: RADIOLOGY

## 2021-06-22 PROCEDURE — 255N000002 HC RX 255 OP 636: Performed by: PHYSICIAN ASSISTANT

## 2021-06-22 PROCEDURE — G1004 CDSM NDSC: HCPCS | Performed by: RADIOLOGY

## 2021-06-22 RX ORDER — GADOBUTROL 604.72 MG/ML
7.5 INJECTION INTRAVENOUS ONCE
Status: COMPLETED | OUTPATIENT
Start: 2021-06-22 | End: 2021-06-22

## 2021-06-22 RX ADMIN — GADOBUTROL 5.7 ML: 604.72 INJECTION INTRAVENOUS at 17:58

## 2021-06-22 RX ADMIN — SODIUM CHLORIDE 30 ML: 9 INJECTION, SOLUTION INTRAVENOUS at 17:57

## 2021-06-25 DIAGNOSIS — C22.0 HEPATOCELLULAR CARCINOMA (H): Primary | ICD-10-CM

## 2021-06-25 NOTE — PROGRESS NOTES
Reviewed recent MRI abdomen    >6cm LR 5 lesion diagnostic of HCC    Evidence of changes of portal hypertension    PLAN:  - will plan consult to IR  - Will place order for CT Chest   - Will place order for [place] Bone Scan    Thomas M. Leventhal, M.D.   of Medicine  Advanced/Transplant Hepatology & Critical Care Medicine  [facility]  June 25, 2021

## 2021-07-02 ENCOUNTER — TELEPHONE (OUTPATIENT)
Dept: GASTROENTEROLOGY | Facility: CLINIC | Age: 61
End: 2021-07-02

## 2021-07-08 ENCOUNTER — HOSPITAL ENCOUNTER (OUTPATIENT)
Dept: NUCLEAR MEDICINE | Facility: CLINIC | Age: 61
Setting detail: NUCLEAR MEDICINE
End: 2021-07-08
Attending: INTERNAL MEDICINE
Payer: MEDICARE

## 2021-07-08 ENCOUNTER — HOSPITAL ENCOUNTER (OUTPATIENT)
Dept: CT IMAGING | Facility: CLINIC | Age: 61
Discharge: HOME | End: 2021-07-08
Attending: INTERNAL MEDICINE | Admitting: INTERNAL MEDICINE
Payer: MEDICARE

## 2021-07-08 DIAGNOSIS — C22.0 HEPATOCELLULAR CARCINOMA (H): ICD-10-CM

## 2021-07-08 PROCEDURE — 343N000001 HC RX 343: Performed by: INTERNAL MEDICINE

## 2021-07-08 PROCEDURE — G1004 CDSM NDSC: HCPCS | Mod: GC | Performed by: RADIOLOGY

## 2021-07-08 PROCEDURE — 78306 BONE IMAGING WHOLE BODY: CPT | Mod: 26 | Performed by: RADIOLOGY

## 2021-07-08 PROCEDURE — A9503 TC99M MEDRONATE: HCPCS | Performed by: INTERNAL MEDICINE

## 2021-07-08 PROCEDURE — 71250 CT THORAX DX C-: CPT | Mod: 26 | Performed by: RADIOLOGY

## 2021-07-08 PROCEDURE — 78306 BONE IMAGING WHOLE BODY: CPT

## 2021-07-08 PROCEDURE — 71250 CT THORAX DX C-: CPT | Mod: MG

## 2021-07-08 RX ORDER — TC 99M MEDRONATE 20 MG/10ML
25 INJECTION, POWDER, LYOPHILIZED, FOR SOLUTION INTRAVENOUS ONCE
Status: COMPLETED | OUTPATIENT
Start: 2021-07-08 | End: 2021-07-08

## 2021-07-08 RX ADMIN — TC 99M MEDRONATE 25.3 MCI.: 20 INJECTION, POWDER, LYOPHILIZED, FOR SOLUTION INTRAVENOUS at 12:36

## 2021-07-15 ENCOUNTER — OFFICE VISIT (OUTPATIENT)
Dept: GASTROENTEROLOGY | Facility: CLINIC | Age: 61
End: 2021-07-15
Attending: PHYSICIAN ASSISTANT
Payer: MEDICARE

## 2021-07-15 VITALS
WEIGHT: 133.3 LBS | HEART RATE: 67 BPM | BODY MASS INDEX: 22.21 KG/M2 | RESPIRATION RATE: 16 BRPM | OXYGEN SATURATION: 94 % | SYSTOLIC BLOOD PRESSURE: 131 MMHG | TEMPERATURE: 97.8 F | DIASTOLIC BLOOD PRESSURE: 77 MMHG | HEIGHT: 65 IN

## 2021-07-15 DIAGNOSIS — K74.60 CHRONIC HEPATITIS B WITH CIRRHOSIS (H): ICD-10-CM

## 2021-07-15 DIAGNOSIS — B18.1 CHRONIC HEPATITIS B WITH CIRRHOSIS (H): ICD-10-CM

## 2021-07-15 DIAGNOSIS — E44.1 MILD PROTEIN-CALORIE MALNUTRITION (H): ICD-10-CM

## 2021-07-15 DIAGNOSIS — C22.0 HEPATOCELLULAR CARCINOMA (H): ICD-10-CM

## 2021-07-15 DIAGNOSIS — R91.1 LESION OF LUNG: Primary | ICD-10-CM

## 2021-07-15 PROCEDURE — 99215 OFFICE O/P EST HI 40 MIN: CPT | Performed by: PHYSICIAN ASSISTANT

## 2021-07-15 PROCEDURE — G0463 HOSPITAL OUTPT CLINIC VISIT: HCPCS

## 2021-07-15 ASSESSMENT — PAIN SCALES - GENERAL: PAINLEVEL: NO PAIN (0)

## 2021-07-15 ASSESSMENT — MIFFLIN-ST. JEOR: SCORE: 1341.52

## 2021-07-15 NOTE — LETTER
7/15/2021     RE: Rishabh Cabrera  [address]  [facility] [id number]    Hepatology Follow-up Clinic note  Rishabh Cabrera   Date of Birth 1960  Date of Service 7/15/2021    Reason for follow-up: Hep B cirrhosis / HCC          Assessment/plan:   Rishabh Cabrera is a 61 year old male with history of Hepatitis B cirrhosis, complicated by history of ascites and large HCC (6.2 cm lesion in segment 5/6), who has been recommended to have liver directed therapy. Care has been complicated by non-compliance and hesitancy to follow recommendations. He had staging CT chest which showed a concerning lung mass. He has not scheduled IR appointment at this point, but is willing to discuss treatment. We also discussed meeting with pulmonology to discuss further evaluation of lung lesion, but is very hesitate about moving forward with a biopsy.     He has mild liver dysfunction. No overt ascites or LEDY. He stopped taking diuretics.     He has not been taking Hep B medications, partly due to cost. Will look into alternative or patricia assistance. We also reviewed importance of this as his viral load and transaminases are elevated due to active disease. Daughter and son accompany patient today so they have better understanding of recommendations and co-morbidities.     # AFP, hepatic panel, BMP, CBC, INR    # HCC:  - IR referral to discuss liver directed therapy     # Lung mass noted on CT Chest staging:   - Pulmonology referral for evaluation of lung mass     # Chronic Hepatitis B:   - Continue Hep B anti-viral -  tenofovir  mg daily   - Will also see what coverage is for TAF 25 mg daily   - Will have RN follow up     # Protein calorie malnutrition   - Small frequent meals  - Intake limited by poor dentition     # Poor dentition:   - Will have  provide resources    # Ascites, improved:   - Discussed restarting diuretics if recurrent fluid overload     # Follow-up in clinic with Dr. Leventhal in 3 months or sooner  as needed    Dorian Levy PA-C   [facility] Hepatology clinic    Approximately 60 minutes of time were spent in review of the patient's EMR including and discussing/coordinating plan of care.     -----------------------------------------------------       HPI:   Rishabh Cabrera is a 61 year old male presenting for follow-up. His son and daughter accompany him today. [place]  also joins via [employer].     Hepatitis B  - e antigen - negative 12/2006  - e antibody - positive 12/18/2006  Diagnosed: 2013  Treatment:   - Started tenofovir a few years ago. Inconsistent use.      Cirrhosis:   - diagnosed 2015  - No history GI bleed  - History of ascites  - No history of HE  HCC screening: See below   EGD: 5/29/2019    HCC:   Diagnosed: 6/22/2021:   MRI: 6/22/2021  - segment 5/6 subcapsular lesion now  measures 6.1 x 4.7 cm    Patient was last see by me on 5/13/2021. No recent hospitalizations or ER visits.     MRI on 6/22/21 showing significant interval increase with radiologic findings of HCC (6.2 cm). Staging CT of chest showing Discussed results of MRI and CT Chest findings with him over the phone on several occassions. He has not make appointment with IR yet.     Appetite is still pretty good. He did not have any significant fluid in abdomen with attempted paracentesis one more ago. He does not feel any bloating now. Regular bowel movement daily. He stopped diuretics after a few weeks. States he didn't know if they were long-term.     He has poor dentition, needs dental work but doesn't have insurance.     Patient denies jaundice, lower extremity edema, abdominal distension or confusion.  Patient also denies melena, hematochezia or hematemesis.Patient denies fevers, sweats or chills.    Medical hx Surgical hx   Past Medical History:   Diagnosis Date     Chronic hepatitis B (H)      Diabetes mellitus (H)     Past Surgical History:   Procedure Laterality Date     ESOPHAGOSCOPY, GASTROSCOPY, DUODENOSCOPY (EGD),  "COMBINED N/A 5/29/2019    Procedure: ESOPHAGOGASTRODUODENOSCOPY (EGD);  Surgeon: Leventhal, Thomas Michael, MD;  Location:  GI                 Medications:     Current Outpatient Medications   Medication     LANTUS SOLOSTAR 100 UNIT/ML soln     metFORMIN (GLUCOPHAGE) 1000 MG tablet     tenofovir alafenamide fumarate (VEMLIDY) 25 MG tablet     furosemide (LASIX) 20 MG tablet     naproxen (NAPROSYN) 500 MG tablet     spironolactone (ALDACTONE) 50 MG tablet     tenofovir (VIREAD) 300 MG tablet     No current facility-administered medications for this visit.     Facility-Administered Medications Ordered in Other Visits   Medication     gadobutrol (GADAVIST) injection 7.5 mL            Allergies:     Allergies   Allergen Reactions     Crabs [Crustaceans]      Pork (Porcine) Protein Unknown     Seafood Unknown     Shellfish Allergy Unknown            Review of Systems:   10 points ROS was obtained and highlighted in the HPI, otherwise negative.          Physical Exam:   VS:  /77 (BP Location: Right arm, Patient Position: Sitting, Cuff Size: Adult Regular)   Pulse 67   Temp 97.8  F (36.6  C) (Oral)   Resp 16   Ht 1.651 m (5' 5\")   Wt 60.5 kg (133 lb 4.8 oz)   SpO2 94%   BMI 22.18 kg/m        Gen: A&Ox3, NAD, thin, muscle wasting   HEENT: non-icteric   CV: RRR, no overt murmurs  Lung: CTA Bilatererally, no wheezing or crackles.   Lym- no palpable lymphadenopathy  Abd: soft, NT, ND, no overt ascites  Ext: no edema, intact pulses.   Skin: No rash, no palmar erythema, telangiectasias or jaundice  Neuro: grossly intact, no asterixis   Psych: appropriate mood and affects           Data:   Reviewed in person and significant for:    Lab Results   Component Value Date     05/10/2021      Lab Results   Component Value Date    POTASSIUM 4.7 05/10/2021     Lab Results   Component Value Date    CHLORIDE 103 05/10/2021     Lab Results   Component Value Date    CO2 26 05/10/2021     Lab Results   Component Value Date "    BUN 12 05/10/2021     Lab Results   Component Value Date    CR 0.85 05/10/2021       Lab Results   Component Value Date    WBC 3.5 05/10/2021     Lab Results   Component Value Date    HGB 13.0 05/10/2021     Lab Results   Component Value Date    HCT 39.1 05/10/2021     Lab Results   Component Value Date    MCV 86 05/10/2021     Lab Results   Component Value Date    PLT 52 05/10/2021       Lab Results   Component Value Date     05/10/2021     Lab Results   Component Value Date     05/10/2021     No results found for: BILICONJ   Lab Results   Component Value Date    BILITOTAL 1.3 05/10/2021       Lab Results   Component Value Date    ALBUMIN 2.7 05/10/2021     Lab Results   Component Value Date    PROTTOTAL 6.7 05/10/2021      Lab Results   Component Value Date    ALKPHOS 290 05/10/2021       Lab Results   Component Value Date    INR 1.39 05/10/2021     MR ABDOMEN WO AND W CONTRAST:   6/22/2021:   FINDINGS:     Comparison study: MRI abdomen 7/1/2020) of the multiple MRA exams.     Liver: Cirrhotic configuration of the liver with prominence of the  caudate lobe, surface nodularity, numerous T1 hyperintense  regenerative nodules, and diffuse T2 hyperintense reticulation. No  hepatic fat or iron deposition. Scattered subcentimeter T2  hyperintense, nonenhancing cysts.     Lesion 1: Previously noted 2.6 cm segment 5/6 subcapsular lesion now  measures 6.1 x 4.7 cm. There is no clear washout and pseudocapsule  along with restricted diffusion and T2 hyperintensity. This would be  in keeping with OPTN/LIRADS 5X lesion.     Lesion 2: 9 mm arterially enhancing focus in segment 7 on image 23  series 6 is difficult to appreciate on prior exam due to differences  in contrast phase, however was seen as a 6 mm focus on exam dated  10/1/2019. This lesion does not demonstrate washout or pseudocapsule  formation. There is no associated diffusion restriction or T2  hyperintensity. This would be in keeping with LIRADS 3  lesion.     Multiple T2 hypointense foci within the liver in a bilobar  distribution, in keeping with degenerative/dysplastic nodules. Among  these, the dominant one measures 1.5 cm on series 16 image 12,  essentially unchanged since prior exam.     Otherwise, there are numerous additional arterially enhancing foci,  particularly in segment 5 and 6, without washout, pseudocapsule or  additional suspicious characteristics likely representing vascular  shunt. LIRADS 3.     Gallbladder/biliary tree: Normal gallbladder. Layering sludge. No  intra or extrahepatic biliary dilation.     Spleen: Mild thyromegaly, unchanged.     Kidneys: T2 hyperintense, nonenhancing cortical cysts. No suspicious  renal lesion. No hydronephrosis.     Adrenal glands: Unremarkable     Pancreas: Normal T1 parenchymal signal. No main pancreatic ductal  dilation or parenchymal mass.     Bowel: Unremarkable     Lymph nodes: No upper abdominal adenopathy.     Blood vessels: No abdominal aortic aneurysm. Small gastroesophageal  varices.     Lung bases: Unchanged chronic right-sided loculated pleural fluid with  associated prominent extrapleural fat. There is delayed enhancement  internally in this area, likely reflecting granulomatous/fibrous  tissue, while noting a stability since 2018.     Bones and soft tissues: Unremarkable.     Mesentery and abdominal wall: Unremarkable.     Ascites: Trace perihepatic ascites.                                                                      IMPRESSION:    1. Cirrhosis and evidence of portal hypertension including  splenomegaly, trace perihepatic ascites and small gastroesophageal  varices.  2. Interval significant enlargement of previously noted 2.6 cm lesion  in segment 5/6, now measuring 6.2 cm.  2a. LIRADS 3 9 mm lesion in segment 7 is somewhat suspicious for HCC,  based on slight growth since 2019 and appearance similar to arterial  enhancement of larger hepatocellular carcinoma within the  dysplastic  nodule in 2018 and 2019. Close attention on follow-up.  2b. Multiple arterially enhancing foci in the liver without associated  abnormalities on other sequences, likely represent vascular shunts.  Attention on follow-up.  3. Based on this exam only, the patient is not within [place] criteria.      CT CHEST WO CONTRAST:   7/8/2021:   IMPRESSION:   1. Spiculated nodule in the left lung apex measuring up to 15 mm  concerning for primary lung malignancy. Comparison with prior outside  imaging would be helpful, and PET-CT and tissue sampling could be  considered.   2. Other scattered nodules, possibly sequela of old granulomatous  disease, but interval follow up recommended.  3. Stable, chronic loculated right pleural effusion with peripheral  calcifications.  4. Cirrhotic configuration of liver with splenomegaly, ascites.    NM BONE SCAN WHOLE BODY  7/8/2021:   IMPRESSION:   1. No scintigraphic evidence of osteoblastic metastatic disease.   2. Focal small uptake to the right mandible, nonspecific and may  represent dental disease.  Previously noted hepatic lesions better appreciated on prior abdominal  MR.      Dorian Levy PA-C

## 2021-07-15 NOTE — PROGRESS NOTES
Hepatology Follow-up Clinic note  Rishabh Cabrera   Date of Birth 1960  Date of Service 7/15/2021    Reason for follow-up: Hep B cirrhosis / HCC          Assessment/plan:   Rishabh Cabrera is a 61 year old male with history of Hepatitis B cirrhosis, complicated by history of ascites and large HCC (6.2 cm lesion in segment 5/6), who has been recommended to have liver directed therapy. Care has been complicated by non-compliance and hesitancy to follow recommendations. He had staging CT chest which showed a concerning lung mass. He has not scheduled IR appointment at this point, but is willing to discuss treatment. We also discussed meeting with pulmonology to discuss further evaluation of lung lesion, but is very hesitate about moving forward with a biopsy.     He has mild liver dysfunction. No overt ascites or LEDY. He stopped taking diuretics.     He has not been taking Hep B medications, partly due to cost. Will look into alternative or patricia assistance. We also reviewed importance of this as his viral load and transaminases are elevated due to active disease. Daughter and son accompany patient today so they have better understanding of recommendations and co-morbidities.     # AFP, hepatic panel, BMP, CBC, INR    # HCC:  - IR referral to discuss liver directed therapy     # Lung mass noted on CT Chest staging:   - Pulmonology referral for evaluation of lung mass     # Chronic Hepatitis B:   - Continue Hep B anti-viral -  tenofovir  mg daily   - Will also see what coverage is for TAF 25 mg daily   - Will have RN follow up     # Protein calorie malnutrition   - Small frequent meals  - Intake limited by poor dentition     # Poor dentition:   - Will have  provide resources    # Ascites, improved:   - Discussed restarting diuretics if recurrent fluid overload     # Follow-up in clinic with Dr. Leventhal in 3 months or sooner as needed    Dorian Levy PA-C   [facility] Hepatology  clinic    Approximately 60 minutes of time were spent in review of the patient's EMR including and discussing/coordinating plan of care.     -----------------------------------------------------       HPI:   Rishabh Cabrera is a 61 year old male presenting for follow-up. His son and daughter accompany him today. [place]  also joins via [employer].     Hepatitis B  - e antigen - negative 12/2006  - e antibody - positive 12/18/2006  Diagnosed: 2013  Treatment:   - Started tenofovir a few years ago. Inconsistent use.      Cirrhosis:   - diagnosed 2015  - No history GI bleed  - History of ascites  - No history of HE  HCC screening: See below   EGD: 5/29/2019    HCC:   Diagnosed: 6/22/2021:   MRI: 6/22/2021  - segment 5/6 subcapsular lesion now  measures 6.1 x 4.7 cm    Patient was last see by me on 5/13/2021. No recent hospitalizations or ER visits.     MRI on 6/22/21 showing significant interval increase with radiologic findings of HCC (6.2 cm). Staging CT of chest showing Discussed results of MRI and CT Chest findings with him over the phone on several occassions. He has not make appointment with IR yet.     Appetite is still pretty good. He did not have any significant fluid in abdomen with attempted paracentesis one more ago. He does not feel any bloating now. Regular bowel movement daily. He stopped diuretics after a few weeks. States he didn't know if they were long-term.     He has poor dentition, needs dental work but doesn't have insurance.     Patient denies jaundice, lower extremity edema, abdominal distension or confusion.  Patient also denies melena, hematochezia or hematemesis.Patient denies fevers, sweats or chills.    Medical hx Surgical hx   Past Medical History:   Diagnosis Date     Chronic hepatitis B (H)      Diabetes mellitus (H)     Past Surgical History:   Procedure Laterality Date     ESOPHAGOSCOPY, GASTROSCOPY, DUODENOSCOPY (EGD), COMBINED N/A 5/29/2019    Procedure: ESOPHAGOGASTRODUODENOSCOPY  "(EGD);  Surgeon: Leventhal, Thomas Michael, MD;  Location:  GI                 Medications:     Current Outpatient Medications   Medication     LANTUS SOLOSTAR 100 UNIT/ML soln     metFORMIN (GLUCOPHAGE) 1000 MG tablet     tenofovir alafenamide fumarate (VEMLIDY) 25 MG tablet     furosemide (LASIX) 20 MG tablet     naproxen (NAPROSYN) 500 MG tablet     spironolactone (ALDACTONE) 50 MG tablet     tenofovir (VIREAD) 300 MG tablet     No current facility-administered medications for this visit.     Facility-Administered Medications Ordered in Other Visits   Medication     gadobutrol (GADAVIST) injection 7.5 mL            Allergies:     Allergies   Allergen Reactions     Crabs [Crustaceans]      Pork (Porcine) Protein Unknown     Seafood Unknown     Shellfish Allergy Unknown            Review of Systems:   10 points ROS was obtained and highlighted in the HPI, otherwise negative.          Physical Exam:   VS:  /77 (BP Location: Right arm, Patient Position: Sitting, Cuff Size: Adult Regular)   Pulse 67   Temp 97.8  F (36.6  C) (Oral)   Resp 16   Ht 1.651 m (5' 5\")   Wt 60.5 kg (133 lb 4.8 oz)   SpO2 94%   BMI 22.18 kg/m        Gen: A&Ox3, NAD, thin, muscle wasting   HEENT: non-icteric   CV: RRR, no overt murmurs  Lung: CTA Bilatererally, no wheezing or crackles.   Lym- no palpable lymphadenopathy  Abd: soft, NT, ND, no overt ascites  Ext: no edema, intact pulses.   Skin: No rash, no palmar erythema, telangiectasias or jaundice  Neuro: grossly intact, no asterixis   Psych: appropriate mood and affects           Data:   Reviewed in person and significant for:    Lab Results   Component Value Date     05/10/2021      Lab Results   Component Value Date    POTASSIUM 4.7 05/10/2021     Lab Results   Component Value Date    CHLORIDE 103 05/10/2021     Lab Results   Component Value Date    CO2 26 05/10/2021     Lab Results   Component Value Date    BUN 12 05/10/2021     Lab Results   Component Value Date    " CR 0.85 05/10/2021       Lab Results   Component Value Date    WBC 3.5 05/10/2021     Lab Results   Component Value Date    HGB 13.0 05/10/2021     Lab Results   Component Value Date    HCT 39.1 05/10/2021     Lab Results   Component Value Date    MCV 86 05/10/2021     Lab Results   Component Value Date    PLT 52 05/10/2021       Lab Results   Component Value Date     05/10/2021     Lab Results   Component Value Date     05/10/2021     No results found for: BILICONJ   Lab Results   Component Value Date    BILITOTAL 1.3 05/10/2021       Lab Results   Component Value Date    ALBUMIN 2.7 05/10/2021     Lab Results   Component Value Date    PROTTOTAL 6.7 05/10/2021      Lab Results   Component Value Date    ALKPHOS 290 05/10/2021       Lab Results   Component Value Date    INR 1.39 05/10/2021     MR ABDOMEN WO AND W CONTRAST:   6/22/2021:   FINDINGS:     Comparison study: MRI abdomen 7/1/2020) of the multiple MRA exams.     Liver: Cirrhotic configuration of the liver with prominence of the  caudate lobe, surface nodularity, numerous T1 hyperintense  regenerative nodules, and diffuse T2 hyperintense reticulation. No  hepatic fat or iron deposition. Scattered subcentimeter T2  hyperintense, nonenhancing cysts.     Lesion 1: Previously noted 2.6 cm segment 5/6 subcapsular lesion now  measures 6.1 x 4.7 cm. There is no clear washout and pseudocapsule  along with restricted diffusion and T2 hyperintensity. This would be  in keeping with OPTN/LIRADS 5X lesion.     Lesion 2: 9 mm arterially enhancing focus in segment 7 on image 23  series 6 is difficult to appreciate on prior exam due to differences  in contrast phase, however was seen as a 6 mm focus on exam dated  10/1/2019. This lesion does not demonstrate washout or pseudocapsule  formation. There is no associated diffusion restriction or T2  hyperintensity. This would be in keeping with LIRADS 3 lesion.     Multiple T2 hypointense foci within the liver in a  bilobar  distribution, in keeping with degenerative/dysplastic nodules. Among  these, the dominant one measures 1.5 cm on series 16 image 12,  essentially unchanged since prior exam.     Otherwise, there are numerous additional arterially enhancing foci,  particularly in segment 5 and 6, without washout, pseudocapsule or  additional suspicious characteristics likely representing vascular  shunt. LIRADS 3.     Gallbladder/biliary tree: Normal gallbladder. Layering sludge. No  intra or extrahepatic biliary dilation.     Spleen: Mild thyromegaly, unchanged.     Kidneys: T2 hyperintense, nonenhancing cortical cysts. No suspicious  renal lesion. No hydronephrosis.     Adrenal glands: Unremarkable     Pancreas: Normal T1 parenchymal signal. No main pancreatic ductal  dilation or parenchymal mass.     Bowel: Unremarkable     Lymph nodes: No upper abdominal adenopathy.     Blood vessels: No abdominal aortic aneurysm. Small gastroesophageal  varices.     Lung bases: Unchanged chronic right-sided loculated pleural fluid with  associated prominent extrapleural fat. There is delayed enhancement  internally in this area, likely reflecting granulomatous/fibrous  tissue, while noting a stability since 2018.     Bones and soft tissues: Unremarkable.     Mesentery and abdominal wall: Unremarkable.     Ascites: Trace perihepatic ascites.                                                                      IMPRESSION:    1. Cirrhosis and evidence of portal hypertension including  splenomegaly, trace perihepatic ascites and small gastroesophageal  varices.  2. Interval significant enlargement of previously noted 2.6 cm lesion  in segment 5/6, now measuring 6.2 cm.  2a. LIRADS 3 9 mm lesion in segment 7 is somewhat suspicious for HCC,  based on slight growth since 2019 and appearance similar to arterial  enhancement of larger hepatocellular carcinoma within the dysplastic  nodule in 2018 and 2019. Close attention on follow-up.  2b.  Multiple arterially enhancing foci in the liver without associated  abnormalities on other sequences, likely represent vascular shunts.  Attention on follow-up.  3. Based on this exam only, the patient is not within [place] criteria.      CT CHEST WO CONTRAST:   7/8/2021:   IMPRESSION:   1. Spiculated nodule in the left lung apex measuring up to 15 mm  concerning for primary lung malignancy. Comparison with prior outside  imaging would be helpful, and PET-CT and tissue sampling could be  considered.   2. Other scattered nodules, possibly sequela of old granulomatous  disease, but interval follow up recommended.  3. Stable, chronic loculated right pleural effusion with peripheral  calcifications.  4. Cirrhotic configuration of liver with splenomegaly, ascites.      NM BONE SCAN WHOLE BODY  7/8/2021:   IMPRESSION:   1. No scintigraphic evidence of osteoblastic metastatic disease.   2. Focal small uptake to the right mandible, nonspecific and may  represent dental disease.  Previously noted hepatic lesions better appreciated on prior abdominal  MR.

## 2021-07-15 NOTE — LETTER
7/15/2021     RE: Rishabh Cabrera  [address]  [facility] [address]    Dear Colleague,    Thank you for referring your patient, Rishabh Cabrera, to the [facility] [place]. Please see a copy of my visit note below.    Hepatology Follow-up Clinic note  Rishabh Cabrera   Date of Birth 1960  Date of Service 7/15/2021    Reason for follow-up: Hep B cirrhosis / HCC          Assessment/plan:   Rishabh Cabrera is a 61 year old male with history of Hepatitis B cirrhosis, complicated by history of ascites and large HCC (6.2 cm lesion in segment 5/6), who has been recommended to have liver directed therapy. Care has been complicated by non-compliance and hesitancy to follow recommendations. He had staging CT chest which showed a concerning lung mass. He has not scheduled IR appointment at this point, but is willing to discuss treatment. We also discussed meeting with pulmonology to discuss further evaluation of lung lesion, but is very hesitate about moving forward with a biopsy.     He has mild liver dysfunction. No overt ascites or LEDY. He stopped taking diuretics.     He has not been taking Hep B medications, partly due to cost. Will look into alternative or patricia assistance. We also reviewed importance of this as his viral load and transaminases are elevated due to active disease. Daughter and son accompany patient today so they have better understanding of recommendations and co-morbidities.     # AFP, hepatic panel, BMP, CBC, INR    # HCC:  - IR referral to discuss liver directed therapy     # Lung mass noted on CT Chest staging:   - Pulmonology referral for evaluation of lung mass     # Chronic Hepatitis B:   - Continue Hep B anti-viral -  tenofovir  mg daily   - Will also see what coverage is for TAF 25 mg daily   - Will have RN follow up     # Protein calorie malnutrition   - Small frequent meals  - Intake limited by poor dentition     # Poor dentition:   - Will have   provide resources    # Ascites, improved:   - Discussed restarting diuretics if recurrent fluid overload     # Follow-up in clinic with Dr. Leventhal in 3 months or sooner as needed    Dorian Levy PA-C   [facility] Hepatology clinic    Approximately 60 minutes of time were spent in review of the patient's EMR including and discussing/coordinating plan of care.     -----------------------------------------------------       HPI:   Rishabh Cabrera is a 61 year old male presenting for follow-up. His son and daughter accompany him today. [facility]  also joins via Ipad.     Hepatitis B  - e antigen - negative 12/2006  - e antibody - positive 12/18/2006  Diagnosed: 2013  Treatment:   - Started tenofovir a few years ago. Inconsistent use.      Cirrhosis:   - diagnosed 2015  - No history GI bleed  - History of ascites  - No history of HE  HCC screening: See below   EGD: 5/29/2019    HCC:   Diagnosed: 6/22/2021:   MRI: 6/22/2021  - segment 5/6 subcapsular lesion now  measures 6.1 x 4.7 cm    Patient was last see by me on 5/13/2021. No recent hospitalizations or ER visits.     MRI on 6/22/21 showing significant interval increase with radiologic findings of HCC (6.2 cm). Staging CT of chest showing Discussed results of MRI and CT Chest findings with him over the phone on several occassions. He has not make appointment with IR yet.     Appetite is still pretty good. He did not have any significant fluid in abdomen with attempted paracentesis one more ago. He does not feel any bloating now. Regular bowel movement daily. He stopped diuretics after a few weeks. States he didn't know if they were long-term.     He has poor dentition, needs dental work but doesn't have insurance.     Patient denies jaundice, lower extremity edema, abdominal distension or confusion.  Patient also denies melena, hematochezia or hematemesis.Patient denies fevers, sweats or chills.    Medical hx Surgical hx   Past Medical History:  "  Diagnosis Date     Chronic hepatitis B (H)      Diabetes mellitus (H)     Past Surgical History:   Procedure Laterality Date     ESOPHAGOSCOPY, GASTROSCOPY, DUODENOSCOPY (EGD), COMBINED N/A 5/29/2019    Procedure: ESOPHAGOGASTRODUODENOSCOPY (EGD);  Surgeon: Leventhal, Thomas Michael, MD;  Location:  GI                 Medications:     Current Outpatient Medications   Medication     LANTUS SOLOSTAR 100 UNIT/ML soln     metFORMIN (GLUCOPHAGE) 1000 MG tablet     tenofovir alafenamide fumarate (VEMLIDY) 25 MG tablet     furosemide (LASIX) 20 MG tablet     naproxen (NAPROSYN) 500 MG tablet     spironolactone (ALDACTONE) 50 MG tablet     tenofovir (VIREAD) 300 MG tablet     No current facility-administered medications for this visit.     Facility-Administered Medications Ordered in Other Visits   Medication     gadobutrol (GADAVIST) injection 7.5 mL            Allergies:     Allergies   Allergen Reactions     Crabs [Crustaceans]      Pork (Porcine) Protein Unknown     Seafood Unknown     Shellfish Allergy Unknown            Review of Systems:   10 points ROS was obtained and highlighted in the HPI, otherwise negative.          Physical Exam:   VS:  /77 (BP Location: Right arm, Patient Position: Sitting, Cuff Size: Adult Regular)   Pulse 67   Temp 97.8  F (36.6  C) (Oral)   Resp 16   Ht 1.651 m (5' 5\")   Wt 60.5 kg (133 lb 4.8 oz)   SpO2 94%   BMI 22.18 kg/m        Gen: A&Ox3, NAD, thin, muscle wasting   HEENT: non-icteric   CV: RRR, no overt murmurs  Lung: CTA Bilatererally, no wheezing or crackles.   Lym- no palpable lymphadenopathy  Abd: soft, NT, ND, no overt ascites  Ext: no edema, intact pulses.   Skin: No rash, no palmar erythema, telangiectasias or jaundice  Neuro: grossly intact, no asterixis   Psych: appropriate mood and affects           Data:   Reviewed in person and significant for:    Lab Results   Component Value Date     05/10/2021      Lab Results   Component Value Date    POTASSIUM " 4.7 05/10/2021     Lab Results   Component Value Date    CHLORIDE 103 05/10/2021     Lab Results   Component Value Date    CO2 26 05/10/2021     Lab Results   Component Value Date    BUN 12 05/10/2021     Lab Results   Component Value Date    CR 0.85 05/10/2021       Lab Results   Component Value Date    WBC 3.5 05/10/2021     Lab Results   Component Value Date    HGB 13.0 05/10/2021     Lab Results   Component Value Date    HCT 39.1 05/10/2021     Lab Results   Component Value Date    MCV 86 05/10/2021     Lab Results   Component Value Date    PLT 52 05/10/2021       Lab Results   Component Value Date     05/10/2021     Lab Results   Component Value Date     05/10/2021     No results found for: BILICONJ   Lab Results   Component Value Date    BILITOTAL 1.3 05/10/2021       Lab Results   Component Value Date    ALBUMIN 2.7 05/10/2021     Lab Results   Component Value Date    PROTTOTAL 6.7 05/10/2021      Lab Results   Component Value Date    ALKPHOS 290 05/10/2021       Lab Results   Component Value Date    INR 1.39 05/10/2021     MR ABDOMEN WO AND W CONTRAST:   6/22/2021:   FINDINGS:     Comparison study: MRI abdomen 7/1/2020) of the multiple MRA exams.     Liver: Cirrhotic configuration of the liver with prominence of the  caudate lobe, surface nodularity, numerous T1 hyperintense  regenerative nodules, and diffuse T2 hyperintense reticulation. No  hepatic fat or iron deposition. Scattered subcentimeter T2  hyperintense, nonenhancing cysts.     Lesion 1: Previously noted 2.6 cm segment 5/6 subcapsular lesion now  measures 6.1 x 4.7 cm. There is no clear washout and pseudocapsule  along with restricted diffusion and T2 hyperintensity. This would be  in keeping with OPTN/LIRADS 5X lesion.     Lesion 2: 9 mm arterially enhancing focus in segment 7 on image 23  series 6 is difficult to appreciate on prior exam due to differences  in contrast phase, however was seen as a 6 mm focus on exam  dated  10/1/2019. This lesion does not demonstrate washout or pseudocapsule  formation. There is no associated diffusion restriction or T2  hyperintensity. This would be in keeping with LIRADS 3 lesion.     Multiple T2 hypointense foci within the liver in a bilobar  distribution, in keeping with degenerative/dysplastic nodules. Among  these, the dominant one measures 1.5 cm on series 16 image 12,  essentially unchanged since prior exam.     Otherwise, there are numerous additional arterially enhancing foci,  particularly in segment 5 and 6, without washout, pseudocapsule or  additional suspicious characteristics likely representing vascular  shunt. LIRADS 3.     Gallbladder/biliary tree: Normal gallbladder. Layering sludge. No  intra or extrahepatic biliary dilation.     Spleen: Mild thyromegaly, unchanged.     Kidneys: T2 hyperintense, nonenhancing cortical cysts. No suspicious  renal lesion. No hydronephrosis.     Adrenal glands: Unremarkable     Pancreas: Normal T1 parenchymal signal. No main pancreatic ductal  dilation or parenchymal mass.     Bowel: Unremarkable     Lymph nodes: No upper abdominal adenopathy.     Blood vessels: No abdominal aortic aneurysm. Small gastroesophageal  varices.     Lung bases: Unchanged chronic right-sided loculated pleural fluid with  associated prominent extrapleural fat. There is delayed enhancement  internally in this area, likely reflecting granulomatous/fibrous  tissue, while noting a stability since 2018.     Bones and soft tissues: Unremarkable.     Mesentery and abdominal wall: Unremarkable.     Ascites: Trace perihepatic ascites.                                                                      IMPRESSION:    1. Cirrhosis and evidence of portal hypertension including  splenomegaly, trace perihepatic ascites and small gastroesophageal  varices.  2. Interval significant enlargement of previously noted 2.6 cm lesion  in segment 5/6, now measuring 6.2 cm.  2a. LIRADS 3 9 mm  lesion in segment 7 is somewhat suspicious for HCC,  based on slight growth since 2019 and appearance similar to arterial  enhancement of larger hepatocellular carcinoma within the dysplastic  nodule in 2018 and 2019. Close attention on follow-up.  2b. Multiple arterially enhancing foci in the liver without associated  abnormalities on other sequences, likely represent vascular shunts.  Attention on follow-up.  3. Based on this exam only, the patient is not within Kuna criteria.      CT CHEST WO CONTRAST:   7/8/2021:   IMPRESSION:   1. Spiculated nodule in the left lung apex measuring up to 15 mm  concerning for primary lung malignancy. Comparison with prior outside  imaging would be helpful, and PET-CT and tissue sampling could be  considered.   2. Other scattered nodules, possibly sequela of old granulomatous  disease, but interval follow up recommended.  3. Stable, chronic loculated right pleural effusion with peripheral  calcifications.  4. Cirrhotic configuration of liver with splenomegaly, ascites.    NM BONE SCAN WHOLE BODY  7/8/2021:   IMPRESSION:   1. No scintigraphic evidence of osteoblastic metastatic disease.   2. Focal small uptake to the right mandible, nonspecific and may  represent dental disease.  Previously noted hepatic lesions better appreciated on prior abdominal  MR.    Again, thank you for allowing me to participate in the care of your patient.      Sincerely,      Dorian Levy PA-C

## 2021-07-15 NOTE — PATIENT INSTRUCTIONS
Lung mass - Recommend Lung Specialist referral for evaluation  - this is concerning for a lung cancer    Liver cancer -   Referral to interventional radiology to discuss liver cancer treatment.   TACE or Y-90     If you have fluid in your abdomen or legs again, please let us know and we can prescribe more of the water pills (see name below).   - Furosemide   - Spironolactone     It is VERY important that you take Hep B medication daily to bring down inflammation in your liver.     It was a pleasure to see you at your recent visit. Please let me know if you have any questions or concerns.    Clinic Staff - [phone] option 3     Sincerely,     Dorian Levy PA-C    [facility], [id number]  [place]  [address].

## 2021-07-15 NOTE — NURSING NOTE
"Chief Complaint   Patient presents with     RECHECK     liver lesion       Vital signs:  Temp: 97.8  F (36.6  C) Temp src: Oral BP: 131/77 Pulse: 67   Resp: 16 SpO2: 94 %     Height: 165.1 cm (5' 5\") Weight: 60.5 kg (133 lb 4.8 oz)  Estimated body mass index is 22.18 kg/m  as calculated from the following:    Height as of this encounter: 1.651 m (5' 5\").    Weight as of this encounter: 60.5 kg (133 lb 4.8 oz).          Lexii Clinton, [facility]  7/15/2021 1:55 PM      "

## 2021-07-16 NOTE — TELEPHONE ENCOUNTER
DIAGNOSIS: New HCC per Leonela  for phone interp please call [phone] ext [phone] or use Ipad    DATE RECEIVED: 7.20.21   NOTES STATUS DETAILS   OFFICE NOTE from referring provider Internal 7.15.21, 5.13.21  Dorian Levy PA-C  [facility]   OFFICE NOTE from other specialist Internal 7.7.20  Dr. Thomas Leventhal  [facility]   DISCHARGE SUMMARY from hospital na    DISCHARGE REPORT from the ER na    OPERATIVE REPORT na    MEDICATION LIST Internal    XRAYS (IMAGES & REPORTS) Internal 7.8.21  NM Bone Scan Whole Body    7.8.21  CT Chest    6.22.21, 7.1.20, 10.1.19  MR Abd   PATHOLOGY  Slides & report na

## 2021-07-20 ENCOUNTER — PRE VISIT (OUTPATIENT)
Dept: RADIOLOGY | Facility: CLINIC | Age: 61
End: 2021-07-20

## 2021-07-20 ENCOUNTER — TELEPHONE (OUTPATIENT)
Dept: GASTROENTEROLOGY | Facility: CLINIC | Age: 61
End: 2021-07-20

## 2021-07-20 ENCOUNTER — VIRTUAL VISIT (OUTPATIENT)
Dept: RADIOLOGY | Facility: CLINIC | Age: 61
End: 2021-07-20
Attending: RADIOLOGY
Payer: MEDICARE

## 2021-07-20 VITALS — BODY MASS INDEX: 22.47 KG/M2 | WEIGHT: 135 LBS

## 2021-07-20 DIAGNOSIS — C22.0 HEPATOCELLULAR CARCINOMA (H): ICD-10-CM

## 2021-07-20 PROCEDURE — 999N001193 HC VIDEO/TELEPHONE VISIT; NO CHARGE

## 2021-07-20 PROCEDURE — 99207 PR SATISFY VISIT NUMBER: CPT | Performed by: RADIOLOGY

## 2021-07-20 ASSESSMENT — PAIN SCALES - GENERAL: PAINLEVEL: MODERATE PAIN (4)

## 2021-07-20 NOTE — PROGRESS NOTES
The patient visit on 7/20 was cancelled because of technical issues. We saw the patient in person on 7/21.  Mr. Cabrera is a 61 year-old patient with Hepatitis B and a large HCC measuring 6.2 cm in segment 5/6, who is referred by Dr. Leventhal for evaluation for liver directed therapy.  The patient is accompanied by two sons and a daughter. The encounter was performed with the help of an interpretor.   The patient is overall doing well. He is oriented and understands the situation. His performance status is 0. He has some ascites and recent paracentesis. His appetite is good and he denies any pain.  I have looked at his labs and images.   MRI shows a large mass in segment 6/5. The chest CT shows a nodule that needs further evaluation. He is going to be discussed at the nodule conference. Bone scan was also performed recently with no bone mets.    Lab Results   Component Value Date     05/10/2021     Lab Results   Component Value Date     05/10/2021     No results found for: BILICONJ   Lab Results   Component Value Date    BILITOTAL 1.3 05/10/2021     Lab Results   Component Value Date    ALBUMIN 2.7 05/10/2021     Lab Results   Component Value Date    PROTTOTAL 6.7 05/10/2021      Lab Results   Component Value Date    ALKPHOS 290 05/10/2021     Past Medical History:   Diagnosis Date     Chronic hepatitis B (H)      Diabetes mellitus (H)        Past Surgical History:   Procedure Laterality Date     ESOPHAGOSCOPY, GASTROSCOPY, DUODENOSCOPY (EGD), COMBINED N/A 5/29/2019    Procedure: ESOPHAGOGASTRODUODENOSCOPY (EGD);  Surgeon: Leventhal, Thomas Michael, MD;  Location:  GI       No family history on file.    Social History     Tobacco Use     Smoking status: Never Smoker     Smokeless tobacco: Never Used   Substance Use Topics     Alcohol use: No     Impression and plan:  I have reviewed Y90 and cTACE with the patient and his family. We discussed the risks and benefits. He seems a good candidate for Y90 but  the patient mentions some insurance problem that may affect the treatment. The patient and his family have asked many questions that I tried to respond to the best of my knowledge. We will discuss with the patient after more evaluation of the lung nodule and discussion with Hepatology. The patient agreed with the plan.  Total encounter: 30 min  Total Time: 45 min

## 2021-07-20 NOTE — LETTER
7/20/2021         RE: Rishabh Cabrera  [address]  [facility] [address]        Dear Colleague,    Thank you for referring your patient, Rishabh Cabrera, to the [facility]. Please see a copy of my visit note below.    The patient visit on 7/20 was cancelled because of technical issues. We saw the patient in person on 7/21.  Mr. Cabrera is a 61 year-old patient with Hepatitis B and a large HCC measuring 6.2 cm in segment 5/6, who is referred by Dr. Leventhal for evaluation for liver directed therapy.  The patient is accompanied by two sons and a daughter. The encounter was performed with the help of an interpretor.   The patient is overall doing well. He is oriented and understands the situation. His performance status is 0. He has some ascites and recent paracentesis. His appetite is good and he denies any pain.  I have looked at his labs and images.   MRI shows a large mass in segment 6/5. The chest CT shows a nodule that needs further evaluation. He is going to be discussed at the nodule conference. Bone scan was also performed recently with no bone mets.    Lab Results   Component Value Date     05/10/2021     Lab Results   Component Value Date     05/10/2021     No results found for: BILICONJ   Lab Results   Component Value Date    BILITOTAL 1.3 05/10/2021     Lab Results   Component Value Date    ALBUMIN 2.7 05/10/2021     Lab Results   Component Value Date    PROTTOTAL 6.7 05/10/2021      Lab Results   Component Value Date    ALKPHOS 290 05/10/2021     Past Medical History:   Diagnosis Date     Chronic hepatitis B (H)      Diabetes mellitus (H)        Past Surgical History:   Procedure Laterality Date     ESOPHAGOSCOPY, GASTROSCOPY, DUODENOSCOPY (EGD), COMBINED N/A 5/29/2019    Procedure: ESOPHAGOGASTRODUODENOSCOPY (EGD);  Surgeon: Leventhal, Thomas Michael, MD;  Location:  GI       No family history on file.    Social History     Tobacco Use     Smoking  status: Never Smoker     Smokeless tobacco: Never Used   Substance Use Topics     Alcohol use: No     Impression and plan:  I have reviewed Y90 and cTACE with the patient and his family. We discussed the risks and benefits. He seems a good candidate for Y90 but the patient mentions some insurance problem that may affect the treatment. The patient and his family have asked many questions that I tried to respond to the best of my knowledge. We will discuss with the patient after more evaluation of the lung nodule and discussion with Hepatology. The patient agreed with the plan.  Total encounter: 30 min  Total Time: 45 min    HCC management      Again, thank you for allowing me to participate in the care of your patient.        Sincerely,        Kenneth So MD

## 2021-07-20 NOTE — TELEPHONE ENCOUNTER
Vemlidy is considered non-formulary for patient's plan, preferred agents are tenofovir, lamivudine, entecavir, adefovir. Patient would need contraindication to preferred agents for approval. I see that the switch has been made from tenofovir to get better coverage, however, if we do get Vemlidy approved it will be more expensive as it is a name brand medication. His current copay is just under $68/30 days. Unfortunately, there are no grants available for hep B at this time, and patient has a part D plan so he would not qualify for a copay card through the . Would you like us to try to pursue PA for Vemlidy, or reach out to the patient to see if he would like us to fill the tenofovir? I did run testclaims on the other preferred agents and they are all about the same cost if not a little bit more. I also saw that he may be getting in touch with social work regarding this? Please advise, thank you.

## 2021-07-21 DIAGNOSIS — C22.0 HCC (HEPATOCELLULAR CARCINOMA) (H): Primary | ICD-10-CM

## 2021-07-22 ENCOUNTER — TELEPHONE (OUTPATIENT)
Dept: GASTROENTEROLOGY | Facility: CLINIC | Age: 61
End: 2021-07-22

## 2021-07-22 DIAGNOSIS — K74.60 HEPATIC CIRRHOSIS, UNSPECIFIED HEPATIC CIRRHOSIS TYPE (H): ICD-10-CM

## 2021-07-22 RX ORDER — TENOFOVIR DISOPROXIL FUMARATE 300 MG/1
300 TABLET, FILM COATED ORAL DAILY
Qty: 90 TABLET | Refills: 3 | Status: SHIPPED | OUTPATIENT
Start: 2021-07-22 | End: 2022-02-08

## 2021-07-22 NOTE — TELEPHONE ENCOUNTER
Called patient using  service and let him know prescription for 90 day supply tenofovir 300 mg tablet sent to [facility] using [email or web address] coupon costing $56.  [place] than [place].  Let him know he should take the medication every day and to not miss a dose.  Patient verbalized understanding and had no further questions or concerns.

## 2021-07-27 ENCOUNTER — TELEPHONE (OUTPATIENT)
Dept: VASCULAR SURGERY | Facility: CLINIC | Age: 61
End: 2021-07-27

## 2021-07-27 ENCOUNTER — PATIENT OUTREACH (OUTPATIENT)
Dept: CARE COORDINATION | Facility: CLINIC | Age: 61
End: 2021-07-27

## 2021-07-27 NOTE — TELEPHONE ENCOUNTER
Called daughter Riana Cabrera and left a msg.    Informed her that I did email her a Y90 consent form as well as request to confirm her email address    Informed her that I did talk to the referring and the plan is to wait til after his 8/4 appt re: the pulmonary nodule consult    I did inform her that I sent some information through to [facility] as well and asked if there were any questions.     Asked her to return my call with any questions.    Leonela TORRE RN, BSN  Interventional Radiology/Vascular  Nurse Coordinator   Phone: [phone]  Fax: [phone]

## 2021-07-29 ENCOUNTER — APPOINTMENT (OUTPATIENT)
Dept: INTERPRETER SERVICES | Facility: CLINIC | Age: 61
End: 2021-07-29
Payer: MEDICARE

## 2021-07-29 ENCOUNTER — PATIENT OUTREACH (OUTPATIENT)
Dept: CARE COORDINATION | Facility: CLINIC | Age: 61
End: 2021-07-29

## 2021-07-29 NOTE — PROGRESS NOTES
Oncology Distress Screening Follow-up  Clinical Social Work  [facility]    Identified Concern and Score From Distress Screening:   How concerned are you about feeling anxious or very scared?   10Abnormal                         How concerned are you about work and home life issues that may be affected by your cancer?   9Abnormal            How concerned are you about knowing what resources are available to help you?   7Abnormal                    Date of Distress Screenin2021      Data: Mr. Cabrera is a 61 year-old patient with Hepatitis B and a large HCC. At time of last visit, Patient scored positive on distress screen.  called Patient today (Via [employer]  with intention of introducing them to psychosocial services and support, and following up on elevated distress.        Intervention/Education Provided: Phone call to patient about distress screening. No answer - message left. Provided contact information in order to reach writer.       Follow-up Required: Will remain available for support and await patient's return call.          KRYSTLE Coker  Clinical , Outpatient Specialty Clinics  Direct Phone: [phone]

## 2021-07-29 NOTE — PROGRESS NOTES
[facility]  Medical Oncology Initial Patient Visit Note    Patient name: Rishabh Cabrera  YOB: 1960  Visit date: 7/29/2021    Oncologic History:  Diagnosis/ stage of cancer: Hep B cirrhosis associated HCC, liver limited.  Prior treatment(s): None, on Hep B treatment  Current treatment(s): None, considering cTACE vs Y90  Treatment intent: Will be curative for now    Care Team  Medical oncologist: Arturo Lund  Surgical oncologist:-   Radiation oncologist: Dr. Kenneth So  Hepatology: Dr. Thomas Leventhal    Reason for consultation/ patient visit: recent ascites, recent diagnosis of 6 cm HCC    Visit accomplished using Dexcom :  ID [id number]    History of presenting illness:  Mr. Rishabh Cabrera is a 61 year old man with known DM2, chronic Hep B infection and related cirrhosis, complicated by small varices and ascites, who was recently found to have a 6 cm HCC by imaging criteria In segments 5/6 and a possible separate  focus in segment 7.  On tenofovir for Hep B, viral load log 8 in 5/2021.  Has had 1 LVP in 6/2021-- cytology neg.  MR abdomen 6/2021  1. Cirrhosis and evidence of portal hypertension including  splenomegaly, trace perihepatic ascites and small gastroesophageal  varices.  2. Interval significant enlargement of previously noted 2.6 cm lesion  in segment 5/6, now measuring 6.2 cm.  2a. LIRADS 3 9 mm lesion in segment 7 is somewhat suspicious for HCC,  based on slight growth since 2019 and appearance similar to arterial  enhancement of larger hepatocellular carcinoma within the dysplastic  nodule in 2018 and 2019. Close attention on follow-up.  2b. Multiple arterially enhancing foci in the liver without associated  abnormalities on other sequences, likely represent vascular shunts.  Attention on follow-up.  3. Based on this exam only, the patient is not within [place] criteria.  Bone scan with no bone lesions, CT chest with 1.5 cm L apex spiculated lesion, MR abdomen as  above.    Interval history:  Presents today with daughter and grand-daughter.  Reports feeling relatively ok.  Able to do ADL and iADL.  No falls.  Some ongoing weight loss.  No jaundice.    Review of Systems: 14 point ROS negative other than the symptoms noted above in the HPI.    Past medical history:  DM2- A1c 7.9  Chronic Hep B  Cirrhosis  HCC    Past surgical history:  Colon surgery    Social history:   Likes fishing and helping others  Retired     Lives with sister, soon moving in with son    Family history:  Mother with Hep B    Allergies:   No drug allergies    Outpatient medications:   Tenofovir daily  Lantus 10-12 units daily  Not taking diuretics (furosemide/ spironolactone) now      Physical examination:  Vital signs: . /72   Pulse (!) 125   Temp 98.3  F (36.8  C) (Oral)   Wt 59.8 kg (131 lb 12.8 oz)   SpO2 95%   BMI 21.93 kg/m      ECOG performance status: 1  Vascular access: None    GENERAL: Man in chair, muscle wasting in proximal muscles, angiomas on chest  HEENT: No icterus, no pallor. Moist mucous membranes. Oropharynx is clear.   NECK: Supple, no JVD/LAD.  LUNGS: Clear to ausculation bilaterally, normal work of breathing.   CARDIOVASCULAR: Regular rate and rhythm,  ABDOMEN: Soft, nontender, slightly distended-- vertical scar  EXTREMITIES: No cyanosis, no clubbing, no edema.   NEUROLOGIC: No focal deficits, alert/ oriented  LYMPH NODE EXAM: No palpable adenopathy - cervical, axillary or inguinal.     Lab data:  I have personally reviewed the following lab data:  Hb 13, platelets 50-70k, Cr 0.8, bili 1.2, AST/ALT in 200/300 range.  Could not locate an AFP on file.    Radiology data:  I have personally reviewed the images of the following radiology data: bone scan with no bone lesions, CT chest with 1.5 cm L apex spiculated lesion, MR abdomen as above.    Pathology and other data:  I have personally reviewed the following pathology data: cytology of ascitic fluid in June 2021--  negative    Assessment and Plan:  Mr. Rishabh Cabrera is a 61 year old  male with prior history of HepB/ cirrhosis who presented with ascites and has since been found to have segment 5/6 6 cm HCC (by imaging criteria) and possible focus of HCC in segment 7. Perhaps a separate primary in L lung apex. Otherwise no extrahepatic spread.  He has fairly good functional status and no other major co-morbidities other than liver dysfunction.  He has seen Dr. So and discussed the role of liver directed therapies in managing more unifocal, large tumors. Considering Y90 vs TACE.   We discussed today the role of systemic therapies in managing HCC. We discussed that ''adjuvant'' therapy is generally not given.   If he were to develop more extensive disease or metastatic disease, systemic therapy would be more appropriate.   We discussed current FDA-approved treatments for advanced HCC are atezo/ linwood, lenvatiniv, and sorafenib. We discussed pros and cons of each treatment. We discussed expected benefit. We discussed that the hope would be that liver-directed therapy control the tumor for as long as possible.  I discussed the care of Mr. Cabrera with Dr. So on the phone in detail.    Problem list:  - Hep B related cirrhosis and HCC  - Possible separate primary tumor in L lung apex    Plan:  - continue follow up with hepatology and IR for cTACE vs Y90  - workup of L left mass-- seeing Dr. Purcell on 8/4/21  - no planned systemic therapy at this time  - continue tenofovir or other Hep B treatment, emphasized importance of compliance  - would advise also following AFP levels   - daughter, Riana Blair at [phone] has care coordination questions and I think we could benefit from heavy SW involvement      Arturo Lund M.D.   of Medicine  Division of Hematology, Oncology and Transplantation  [facility]

## 2021-07-29 NOTE — TELEPHONE ENCOUNTER
RECORDS STATUS - ALL OTHER DIAGNOSIS      RECORDS RECEIVED FROM: [facility]   DATE RECEIVED: 7/29   NOTES STATUS DETAILS   OFFICE NOTE from referring provider Kenneth Torres MD in [facility]    Gastro: 7/15/21   OFFICE NOTE from medical oncologist     DISCHARGE SUMMARY from hospital [facility] 6/19/21   DISCHARGE REPORT from the ER     OPERATIVE REPORT     MEDICATION LIST [facility] 7/22/21   CLINICAL TRIAL TREATMENTS TO DATE     LABS     PATHOLOGY REPORTS     ANYTHING RELATED TO DIAGNOSIS Epic/CE 7/27/21   GENONOMIC TESTING     TYPE:     IMAGING (NEED IMAGES & REPORT)     NM Bone Scan PACS 7/8/21: Epic   CT SCANS PACS 7/8/21: [facility]   MRI PACS 6/22/21, 7/1/20, 10/1/19, 4/13/19, 6/30/18: Epic   MAMMO     ULTRASOUND PACS 6/16/21: Epic   PET

## 2021-07-29 NOTE — PROGRESS NOTES
Social Work Telephone Message Note  [facility]    Patient Name:  Rishabh Cabrera  /Age:  1960 (61 year old)    Referral Source: Dorian Levy PA-C  Reason for Referral: Dental Resources      contacted Patient via telephone on 2021 along with [facility] . Patient was busy and stated that he would call back at a later time.   will await Patient's return phone call and will provide assistance at that time.    ZAIN Carvajal, LGSW  Social Work   [facility]   Phone: [phone]

## 2021-07-30 ENCOUNTER — PRE VISIT (OUTPATIENT)
Dept: ONCOLOGY | Facility: CLINIC | Age: 61
End: 2021-07-30

## 2021-07-30 ENCOUNTER — ONCOLOGY VISIT (OUTPATIENT)
Dept: ONCOLOGY | Facility: CLINIC | Age: 61
End: 2021-07-30
Attending: RADIOLOGY
Payer: MEDICARE

## 2021-07-30 VITALS
OXYGEN SATURATION: 95 % | SYSTOLIC BLOOD PRESSURE: 116 MMHG | TEMPERATURE: 98.3 F | DIASTOLIC BLOOD PRESSURE: 72 MMHG | BODY MASS INDEX: 21.93 KG/M2 | HEART RATE: 125 BPM | WEIGHT: 131.8 LBS

## 2021-07-30 DIAGNOSIS — C22.0 HCC (HEPATOCELLULAR CARCINOMA) (H): ICD-10-CM

## 2021-07-30 PROCEDURE — 99205 OFFICE O/P NEW HI 60 MIN: CPT | Performed by: STUDENT IN AN ORGANIZED HEALTH CARE EDUCATION/TRAINING PROGRAM

## 2021-07-30 PROCEDURE — G0463 HOSPITAL OUTPT CLINIC VISIT: HCPCS

## 2021-07-30 ASSESSMENT — PAIN SCALES - GENERAL: PAINLEVEL: MODERATE PAIN (4)

## 2021-07-30 NOTE — LETTER
7/30/2021         RE: Rishabh Cabrera  [address]  [place] [address]        Dear Colleague,    Thank you for referring your patient, Rishabh Cabrera, to the [facility] CANCER [facility]. Please see a copy of my visit note below.    [facility]  Medical Oncology Initial Patient Visit Note    Patient name: Rishabh Cabrera  YOB: 1960  Visit date: 7/29/2021    Oncologic History:  Diagnosis/ stage of cancer: Hep B cirrhosis associated HCC, liver limited.  Prior treatment(s): None, on Hep B treatment  Current treatment(s): None, considering cTACE vs Y90  Treatment intent: Will be curative for now    Care Team  Medical oncologist: Arturo Lund  Surgical oncologist:-   Radiation oncologist: Dr. Kenneth So  Hepatology: Dr. Thomas Leventhal    Reason for consultation/ patient visit: recent ascites, recent diagnosis of 6 cm HCC    Visit accomplished using rumr: turn off the lights :  ID [id number]    History of presenting illness:  Mr. Rishabh Cabrera is a 61 year old man with known DM2, chronic Hep B infection and related cirrhosis, complicated by small varices and ascites, who was recently found to have a 6 cm HCC by imaging criteria In segments 5/6 and a possible separate  focus in segment 7.  On tenofovir for Hep B, viral load log 8 in 5/2021.  Has had 1 LVP in 6/2021-- cytology neg.  MR abdomen 6/2021  1. Cirrhosis and evidence of portal hypertension including  splenomegaly, trace perihepatic ascites and small gastroesophageal  varices.  2. Interval significant enlargement of previously noted 2.6 cm lesion  in segment 5/6, now measuring 6.2 cm.  2a. LIRADS 3 9 mm lesion in segment 7 is somewhat suspicious for HCC,  based on slight growth since 2019 and appearance similar to arterial  enhancement of larger hepatocellular carcinoma within the dysplastic  nodule in 2018 and 2019. Close attention on follow-up.  2b. Multiple arterially enhancing foci in the liver without associated  abnormalities on  other sequences, likely represent vascular shunts.  Attention on follow-up.  3. Based on this exam only, the patient is not within [place] criteria.  Bone scan with no bone lesions, CT chest with 1.5 cm L apex spiculated lesion, MR abdomen as above.    Interval history:  Presents today with daughter and grand-daughter.  Reports feeling relatively ok.  Able to do ADL and iADL.  No falls.  Some ongoing weight loss.  No jaundice.    Review of Systems: 14 point ROS negative other than the symptoms noted above in the HPI.    Past medical history:  DM2- A1c 7.9  Chronic Hep B  Cirrhosis  HCC    Past surgical history:  Colon surgery    Social history:   Likes fishing and helping others  Retired     Lives with sister, soon moving in with son    Family history:  Mother with Hep B    Allergies:   No drug allergies    Outpatient medications:   Tenofovir daily  Lantus 10-12 units daily  Not taking diuretics (furosemide/ spironolactone) now      Physical examination:  Vital signs: . /72   Pulse (!) 125   Temp 98.3  F (36.8  C) (Oral)   Wt 59.8 kg (131 lb 12.8 oz)   SpO2 95%   BMI 21.93 kg/m      ECOG performance status: 1  Vascular access: None    GENERAL: Man in chair, muscle wasting in proximal muscles, angiomas on chest  HEENT: No icterus, no pallor. Moist mucous membranes. Oropharynx is clear.   NECK: Supple, no JVD/LAD.  LUNGS: Clear to ausculation bilaterally, normal work of breathing.   CARDIOVASCULAR: Regular rate and rhythm,  ABDOMEN: Soft, nontender, slightly distended-- vertical scar  EXTREMITIES: No cyanosis, no clubbing, no edema.   NEUROLOGIC: No focal deficits, alert/ oriented  LYMPH NODE EXAM: No palpable adenopathy - cervical, axillary or inguinal.     Lab data:  I have personally reviewed the following lab data:  Hb 13, platelets 50-70k, Cr 0.8, bili 1.2, AST/ALT in 200/300 range.  Could not locate an AFP on file.    Radiology data:  I have personally reviewed the images of the following  radiology data: bone scan with no bone lesions, CT chest with 1.5 cm L apex spiculated lesion, MR abdomen as above.    Pathology and other data:  I have personally reviewed the following pathology data: cytology of ascitic fluid in June 2021-- negative    Assessment and Plan:  Mr. Rishabh Cabrera is a 61 year old  male with prior history of HepB/ cirrhosis who presented with ascites and has since been found to have segment 5/6 6 cm HCC (by imaging criteria) and possible focus of HCC in segment 7. Perhaps a separate primary in L lung apex. Otherwise no extrahepatic spread.  He has fairly good functional status and no other major co-morbidities other than liver dysfunction.  He has seen Dr. So and discussed the role of liver directed therapies in managing more unifocal, large tumors. Considering Y90 vs TACE.   We discussed today the role of systemic therapies in managing HCC. We discussed that ''adjuvant'' therapy is generally not given.   If he were to develop more extensive disease or metastatic disease, systemic therapy would be more appropriate.   We discussed current FDA-approved treatments for advanced HCC are atezo/ linwood, lenvatiniv, and sorafenib. We discussed pros and cons of each treatment. We discussed expected benefit. We discussed that the hope would be that liver-directed therapy control the tumor for as long as possible.  I discussed the care of Mr. Cabrera with Dr. So on the phone in detail.    Problem list:  - Hep B related cirrhosis and HCC  - Possible separate primary tumor in L lung apex    Plan:  - continue follow up with hepatology and IR for cTACE vs Y90  - workup of L left mass-- seeing Dr. Purcell on 8/4/21  - no planned systemic therapy at this time  - continue tenofovir or other Hep B treatment, emphasized importance of compliance  - would advise also following AFP levels   - daughter, Riana Blair at [phone] has care coordination questions and I think we could benefit from heavy  SW involvement      Arturo Lund M.D.   of Medicine  Division of Hematology, Oncology and Transplantation  [facility]        Again, thank you for allowing me to participate in the care of your patient.        Sincerely,        Arturo Lund MD

## 2021-07-30 NOTE — NURSING NOTE
"Oncology Rooming Note    July 30, 2021 2:22 PM   Rishabh Cabrera is a 61 year old male who presents for:    Chief Complaint   Patient presents with     Oncology Clinic Visit     New; HCC     Initial Vitals: /72   Pulse (!) 125   Temp 98.3  F (36.8  C) (Oral)   Wt 59.8 kg (131 lb 12.8 oz)   SpO2 95%   BMI 21.93 kg/m   Estimated body mass index is 21.93 kg/m  as calculated from the following:    Height as of 7/15/21: 1.651 m (5' 5\").    Weight as of this encounter: 59.8 kg (131 lb 12.8 oz). Body surface area is 1.66 meters squared.  Moderate Pain (4) Comment: Data Unavailable   No LMP for male patient.  Allergies reviewed: Yes  Medications reviewed: Yes    Medications: Medication refills not needed today.  Pharmacy name entered into Cubikal:    [facility] - [place], [place] - [address] [id number] - [place] [address] [id number] [place] [place] - [address] [facility] [address] [facility] [place] - [address] [facility].    Clinical concerns:        Mei Corbett   (Student MA)              "

## 2021-08-02 ENCOUNTER — PATIENT OUTREACH (OUTPATIENT)
Dept: CARE COORDINATION | Facility: CLINIC | Age: 61
End: 2021-08-02

## 2021-08-02 NOTE — PROGRESS NOTES
Social Work Telephone Message Note  [facility]    Patient Name:  Rishabh Cabrera  /Age:  1960 (61 year old)    Referral Source: Dr. Lund, Oncology   Reason for Referral:  Contact Patient's daughter to answer many insurance and care coordination questions.     DaughterRiana, [phone]     contacted Patient's daughter via telephone on 2021. Unable to leave a message as mailbox was full.  will make two more attempts before closing this referrals.     Michelle Koroma [facility]  Clinical , Outpatient Specialty Clinics  Direct Phone: [phone]      ADDENDUM 2021:     contacted Patient's daughter via telephone in two additional attempts:  8/3/2021 and 2021 (all different times of day), with no success. Unable to leave messages as mailbox continues to be full.  will make no more attempts to contact Patient's daughter at this time.    Michelle Koroma [facility]  Clinical , Outpatient Specialty Clinics  Direct Phone: [phone]

## 2021-08-04 ENCOUNTER — OFFICE VISIT (OUTPATIENT)
Dept: PULMONOLOGY | Facility: CLINIC | Age: 61
End: 2021-08-04
Attending: PHYSICIAN ASSISTANT
Payer: MEDICARE

## 2021-08-04 VITALS
WEIGHT: 129.4 LBS | BODY MASS INDEX: 21.56 KG/M2 | HEIGHT: 65 IN | DIASTOLIC BLOOD PRESSURE: 80 MMHG | TEMPERATURE: 98.2 F | HEART RATE: 89 BPM | OXYGEN SATURATION: 98 % | RESPIRATION RATE: 16 BRPM | SYSTOLIC BLOOD PRESSURE: 131 MMHG

## 2021-08-04 DIAGNOSIS — R91.1 LESION OF LUNG: ICD-10-CM

## 2021-08-04 PROCEDURE — G0463 HOSPITAL OUTPT CLINIC VISIT: HCPCS

## 2021-08-04 PROCEDURE — 99204 OFFICE O/P NEW MOD 45 MIN: CPT | Performed by: INTERNAL MEDICINE

## 2021-08-04 ASSESSMENT — MIFFLIN-ST. JEOR: SCORE: 1316.95

## 2021-08-04 ASSESSMENT — PAIN SCALES - GENERAL: PAINLEVEL: MILD PAIN (3)

## 2021-08-04 NOTE — LETTER
8/4/2021       RE: Rishabh Cabrera  [address]  [place] [address]     Dear Colleague,    Thank you for referring your patient, Rishabh Cabrera, to the [facility] CANCER CLINIC at [facility]. Please see a copy of my visit note below.    [facility]  Interventional Pulmonary Clinic Visit Note    August 4, 2021    Chief complaint:  Rishabh Cabrera is a 61 year old male seen for   Chief Complaint   Patient presents with     Oncology Clinic Visit     New; lesion of Lung.        Reason for clinic visit / Chief complaint:   Abnormal CT chest    Assessment and Plan:  Left upper lobe spiculated nodule concerning for primary lung cancer.  No previous CT scans available for comparison.  Another scarlike density in the right upper lobe.  We discussed possible etiologies and need for further evaluation.  We will discuss his case this Friday in nodule meeting which is multidisciplinary.  We will consider CT-guided biopsy.  We will call his daughter with the recommendation.  Right lung pleural thickening calcifications likely related to prior infection however patient does not remember having any lung issues in the past  Liver mass likely HCC, evaluated by hepatology as well as interventional radiology plan to have directed treatment by IR in near future.  His pulmonary work-up should not delay his liver treatment as they are probably independent to each other.      Billing: Based on Medical Decision Making (Complexity):  L4    History of Present Illness:  This is a 61 years old gentleman accompanied by his daughter as well as an among  on iPad.  He was recently found to have lung lesions on his CT scan when he was worked up for his liver mass.  He has no respiratory symptoms.  No history of TB.  [place] he immig[place] the [place] he did have a chest x-ray in 1980s when he was noted to have small right lung.  However he was not aware of his CT findings in  the right pleural space is indicated above.  He is a lifetime non-smoker.         CALL daughter (Riana) at [phone] after Friday am meeting      Allergies   Allergen Reactions     Crabs [Crustaceans]      Pork (Porcine) Protein Unknown     Seafood Unknown     Shellfish Allergy Unknown        Past Medical History:   Diagnosis Date     Chronic hepatitis B (H)      Diabetes mellitus (H)         Past Surgical History:   Procedure Laterality Date     ESOPHAGOSCOPY, GASTROSCOPY, DUODENOSCOPY (EGD), COMBINED N/A 5/29/2019    Procedure: ESOPHAGOGASTRODUODENOSCOPY (EGD);  Surgeon: Leventhal, Thomas Michael, MD;  Location:  GI        Social History     Socioeconomic History     Marital status:      Spouse name: Not on file     Number of children: Not on file     Years of education: Not on file     Highest education level: Not on file   Occupational History     Not on file   Tobacco Use     Smoking status: Never Smoker     Smokeless tobacco: Never Used   Substance and Sexual Activity     Alcohol use: No     Drug use: Not Currently     Types: Marijuana     Comment: past marijuana use     Sexual activity: Not on file   Other Topics Concern     Parent/sibling w/ CABG, MI or angioplasty before 65F 55M? Not Asked   Social History Narrative     Not on file     Social Determinants of Health     Financial Resource Strain:      Difficulty of Paying Living Expenses:    Food Insecurity:      Worried About Running Out of Food in the Last Year:      Ran Out of Food in the Last Year:    Transportation Needs:      Lack of Transportation (Medical):      Lack of Transportation (Non-Medical):    Physical Activity:      Days of Exercise per Week:      Minutes of Exercise per Session:    Stress:      Feeling of Stress :    Social Connections:      Frequency of Communication with Friends and Family:      Frequency of Social Gatherings with Friends and Family:      Attends [employer] Services:      Active Member of Clubs or  Organizations:      Attends Club or Organization Meetings:      Marital Status:    Intimate Partner Violence:      Fear of Current or Ex-Partner:      Emotionally Abused:      Physically Abused:      Sexually Abused:         No family history on file.       There is no immunization history on file for this patient.    Current Outpatient Medications   Medication Sig     furosemide (LASIX) 20 MG tablet Take 2 tablets (40 mg) by mouth daily     LANTUS SOLOSTAR 100 UNIT/ML soln Inject 10 Units Subcutaneous At Bedtime      metFORMIN (GLUCOPHAGE) 1000 MG tablet Take 1 tablet by mouth 2 times daily (with meals)     naproxen (NAPROSYN) 500 MG tablet Take 1 tablet by mouth 2 times daily as needed for pain      tenofovir (VIREAD) 300 MG tablet Take 1 tablet (300 mg) by mouth daily     tenofovir alafenamide fumarate (VEMLIDY) 25 MG tablet Take 1 tablet (25 mg) by mouth daily with food (dispense only in the original container).     spironolactone (ALDACTONE) 50 MG tablet Take 1 tablet (50 mg) by mouth daily (Patient not taking: Reported on 7/15/2021)     No current facility-administered medications for this visit.     Facility-Administered Medications Ordered in Other Visits   Medication     gadobutrol (GADAVIST) injection 7.5 mL        Review of Systems:  I have done 10 points of review systems and all negative except for those mentioned in HPI    Physical examination  Constitutional: Oriented, not in distress  Vitals: unavailable  Eyes: No icterus, nystagmus, pupils isocoric  Head and neck: normal posture and movements  Respiratory: Normal tidal breathing, no shortness of breath, no audible wheezing or stridor over the phone or video visit  Musculoskeletal: Normal muscle mass, no deformity on hands/fingers  Integumentary:  No rash on visible skin areas on video visit  Neurological: Alert, orientedx3, no motor deficits  Psychiatric:  Mood and affect are appropriate with insight into his/her medical condition    Data:  Lab  Results   Component Value Date    WBC 3.5 05/10/2021     Lab Results   Component Value Date    RBC 4.55 05/10/2021     Lab Results   Component Value Date    HGB 13.0 05/10/2021     Lab Results   Component Value Date    HCT 39.1 05/10/2021     Lab Results   Component Value Date    MCV 86 05/10/2021     Lab Results   Component Value Date    MCH 28.6 05/10/2021     Lab Results   Component Value Date    MCHC 33.2 05/10/2021     Lab Results   Component Value Date    RDW 14.9 05/10/2021     Lab Results   Component Value Date    PLT 52 05/10/2021       Lab Results   Component Value Date     05/10/2021      Lab Results   Component Value Date    POTASSIUM 4.7 05/10/2021     Lab Results   Component Value Date    CHLORIDE 103 05/10/2021     Lab Results   Component Value Date    MANDY 8.0 05/10/2021     Lab Results   Component Value Date    CO2 26 05/10/2021     Lab Results   Component Value Date    BUN 12 05/10/2021     Lab Results   Component Value Date    CR 0.85 05/10/2021     Lab Results   Component Value Date     05/10/2021         HMariola Purcell MD      Again, thank you for allowing me to participate in the care of your patient.      Sincerely,    Miki Purcell MD

## 2021-08-04 NOTE — NURSING NOTE
"Oncology Rooming Note    August 4, 2021 11:16 AM   Rsihabh Cabrera is a 61 year old male who presents for:    Chief Complaint   Patient presents with     Oncology Clinic Visit     New; lesion of Lung.      Initial Vitals: /80   Pulse 89   Temp 98.2  F (36.8  C) (Oral)   Resp 16   Ht 1.64 m (5' 4.57\")   Wt 58.7 kg (129 lb 6.4 oz)   SpO2 98%   BMI 21.82 kg/m   Estimated body mass index is 21.82 kg/m  as calculated from the following:    Height as of this encounter: 1.64 m (5' 4.57\").    Weight as of this encounter: 58.7 kg (129 lb 6.4 oz). Body surface area is 1.64 meters squared.  Mild Pain (3) Comment: Data Unavailable   No LMP for male patient.  Allergies reviewed: Yes  Medications reviewed: Yes    Medications: Medication refills not needed today.  Pharmacy name entered into Tigermed:    [facility] - [place], [place] - [address] [id number] - [place], [place] - [address] [facility] #[id number] - [place], [place] - [address] [facility] [address] [facility] [address] [facility]ACY [facility] [address] [facility].    Clinical concerns:        Mei Corbett,   (Student MA)              "

## 2021-08-04 NOTE — PROGRESS NOTES
[facility]  Interventional Pulmonary Clinic Visit Note    August 4, 2021    Chief complaint:  Rishabh Cabrera is a 61 year old male seen for   Chief Complaint   Patient presents with     Oncology Clinic Visit     New; lesion of Lung.        Reason for clinic visit / Chief complaint:   Abnormal CT chest    Assessment and Plan:  Left upper lobe spiculated nodule concerning for primary lung cancer.  No previous CT scans available for comparison.  Another scarlike density in the right upper lobe.  We discussed possible etiologies and need for further evaluation.  We will discuss his case this Friday in nodule meeting which is multidisciplinary.  We will consider CT-guided biopsy.  We will call his daughter with the recommendation.  Right lung pleural thickening calcifications likely related to prior infection however patient does not remember having any lung issues in the past  Liver mass likely HCC, evaluated by hepatology as well as interventional radiology plan to have directed treatment by IR in near future.  His pulmonary work-up should not delay his liver treatment as they are probably independent to each other.      Billing: Based on Medical Decision Making (Complexity):  L4    History of Present Illness:  This is a 61 years old gentleman accompanied by his daughter as well as an among  on iPad.  He was recently found to have lung lesions on his CT scan when he was worked up for his liver mass.  He has no respiratory symptoms.  No history of TB.  [place] he immigrated to the [place] he did have a chest x-ray in 1980s when he was noted to have small right lung.  However he was not aware of his CT findings in the right pleural space is indicated above.  He is a lifetime non-smoker.         CALL daughter (Riana) at [phone] after Friday am meeting      Allergies   Allergen Reactions     Crabs [Crustaceans]      Pork (Porcine) Protein Unknown     Seafood Unknown     Shellfish Allergy Unknown        Past  Medical History:   Diagnosis Date     Chronic hepatitis B (H)      Diabetes mellitus (H)         Past Surgical History:   Procedure Laterality Date     ESOPHAGOSCOPY, GASTROSCOPY, DUODENOSCOPY (EGD), COMBINED N/A 5/29/2019    Procedure: ESOPHAGOGASTRODUODENOSCOPY (EGD);  Surgeon: Leventhal, Thomas Michael, MD;  Location:  GI        Social History     Socioeconomic History     Marital status:      Spouse name: Not on file     Number of children: Not on file     Years of education: Not on file     Highest education level: Not on file   Occupational History     Not on file   Tobacco Use     Smoking status: Never Smoker     Smokeless tobacco: Never Used   Substance and Sexual Activity     Alcohol use: No     Drug use: Not Currently     Types: Marijuana     Comment: past marijuana use     Sexual activity: Not on file   Other Topics Concern     Parent/sibling w/ CABG, MI or angioplasty before 65F 55M? Not Asked   Social History Narrative     Not on file     Social Determinants of Health     Financial Resource Strain:      Difficulty of Paying Living Expenses:    Food Insecurity:      Worried About Running Out of Food in the Last Year:      Ran Out of Food in the Last Year:    Transportation Needs:      Lack of Transportation (Medical):      Lack of Transportation (Non-Medical):    Physical Activity:      Days of Exercise per Week:      Minutes of Exercise per Session:    Stress:      Feeling of Stress :    Social Connections:      Frequency of Communication with Friends and Family:      Frequency of Social Gatherings with Friends and Family:      Attends Congregational Services:      Active Member of Clubs or Organizations:      Attends Club or Organization Meetings:      Marital Status:    Intimate Partner Violence:      Fear of Current or Ex-Partner:      Emotionally Abused:      Physically Abused:      Sexually Abused:         No family history on file.       There is no immunization history on file for this  patient.    Current Outpatient Medications   Medication Sig     furosemide (LASIX) 20 MG tablet Take 2 tablets (40 mg) by mouth daily     LANTUS SOLOSTAR 100 UNIT/ML soln Inject 10 Units Subcutaneous At Bedtime      metFORMIN (GLUCOPHAGE) 1000 MG tablet Take 1 tablet by mouth 2 times daily (with meals)     naproxen (NAPROSYN) 500 MG tablet Take 1 tablet by mouth 2 times daily as needed for pain      tenofovir (VIREAD) 300 MG tablet Take 1 tablet (300 mg) by mouth daily     tenofovir alafenamide fumarate (VEMLIDY) 25 MG tablet Take 1 tablet (25 mg) by mouth daily with food (dispense only in the original container).     spironolactone (ALDACTONE) 50 MG tablet Take 1 tablet (50 mg) by mouth daily (Patient not taking: Reported on 7/15/2021)     No current facility-administered medications for this visit.     Facility-Administered Medications Ordered in Other Visits   Medication     gadobutrol (GADAVIST) injection 7.5 mL        Review of Systems:  I have done 10 points of review systems and all negative except for those mentioned in HPI    Physical examination  Constitutional: Oriented, not in distress  Vitals: unavailable  Eyes: No icterus, nystagmus, pupils isocoric  Head and neck: normal posture and movements  Respiratory: Normal tidal breathing, no shortness of breath, no audible wheezing or stridor over the phone or video visit  Musculoskeletal: Normal muscle mass, no deformity on hands/fingers  Integumentary:  No rash on visible skin areas on video visit  Neurological: Alert, orientedx3, no motor deficits  Psychiatric:  Mood and affect are appropriate with insight into his/her medical condition    Data:  Lab Results   Component Value Date    WBC 3.5 05/10/2021     Lab Results   Component Value Date    RBC 4.55 05/10/2021     Lab Results   Component Value Date    HGB 13.0 05/10/2021     Lab Results   Component Value Date    HCT 39.1 05/10/2021     Lab Results   Component Value Date    MCV 86 05/10/2021     Lab  Results   Component Value Date    MCH 28.6 05/10/2021     Lab Results   Component Value Date    MCHC 33.2 05/10/2021     Lab Results   Component Value Date    RDW 14.9 05/10/2021     Lab Results   Component Value Date    PLT 52 05/10/2021       Lab Results   Component Value Date     05/10/2021      Lab Results   Component Value Date    POTASSIUM 4.7 05/10/2021     Lab Results   Component Value Date    CHLORIDE 103 05/10/2021     Lab Results   Component Value Date    MANDY 8.0 05/10/2021     Lab Results   Component Value Date    CO2 26 05/10/2021     Lab Results   Component Value Date    BUN 12 05/10/2021     Lab Results   Component Value Date    CR 0.85 05/10/2021     Lab Results   Component Value Date     05/10/2021         MELINDA Purcell MD

## 2021-08-04 NOTE — TELEPHONE ENCOUNTER
Pulmonary Nodule Conference      Patient Name: Rishabh Cabrera    Reason for conference discussion (brief overview):     62 y/o non-smoker with liver cirrhosis and liver mass, likely HCC (hepatocellular carcinoma). Being evaluated by IR for directed therapy for the liver mass.     Spiculated RICH lesion, 15 x 13 mm (series 6, image 53) concerning for lung primary. Also RUL nodular density-16 x 7 mm with adjacent nodularity (series 6, image 68).     Specific Question:  PET vs CT guided biopsy for the RICH lesion      Referring Physician: Christopher Purcell MD    The patient's case was presented at the multidisciplinary conference for the above noted reason.  There was a consensus recommendation for the following actions:   Discussed possibility of IR biopsy of the RICH lesion.  Patient has been hesitant to deal with this due to other health issues.  IR is in agreement to biopsy.    This case was reviewed by Dr. Blackwood. Patient was approved for CT guided left upper lobe lung nodule biopsy. Image reference series 3 image 12.   Called patient's daughter Riana to discuss.  She is in agreement and is the  for this patient.    Case Lead:  Emma Howell RN    Interventional Radiology Staff Present:    Ranjit Alex PA-C

## 2021-08-05 ENCOUNTER — TELEPHONE (OUTPATIENT)
Dept: VASCULAR SURGERY | Facility: CLINIC | Age: 61
End: 2021-08-05

## 2021-08-05 NOTE — TELEPHONE ENCOUNTER
Called daughter Ronaldo again and tried to leave a VM    Called son Ajit and also left an email    Following up on an email consent that I had sent Ronaldo.   I've not heard back yet so therefore will try to send another email test to see if she gets my email.    Called pt and left a VM for him in regards to the information above.     Left direct line for someone to return my call.       Leonela TORRE RN, BSN  Interventional Radiology/Vascular  Nurse Coordinator   Phone: [phone]  Fax: [phone]

## 2021-08-06 ENCOUNTER — TEAM CONFERENCE (OUTPATIENT)
Dept: PULMONOLOGY | Facility: CLINIC | Age: 61
End: 2021-08-06

## 2021-08-06 DIAGNOSIS — C22.0 HCC (HEPATOCELLULAR CARCINOMA) (H): Primary | ICD-10-CM

## 2021-08-06 DIAGNOSIS — R91.8 PULMONARY NODULES: ICD-10-CM

## 2021-08-06 DIAGNOSIS — R91.1 LESION OF LUNG: Primary | ICD-10-CM

## 2021-08-06 NOTE — PROGRESS NOTES
Outpatient IR Biopsy Referral    Patient is a 60 y/o male with a PMH of DM2, chronic hep B infection, non-smoker with liver cirrhosis and liver mass, likely hepatocellular carcinoma. Being evaluated by IR for directed therapy for the liver mass. Imaging work up 7/8/21 notes a spiculated RCIH lesion, 15 x 13 mm (series 6, image 53) concerning for lung primary. Also a RUL nodular density-16 x 7 mm with adjacent nodularity (series 6, image 68). Patient case was reviewed at pulmonary node conference 8/6/21. IR has been asked to biopsy RICH hernando lung lesion for possible primary lung cancer by Dr. Purcell.    Case and imaging CT 7/8/21 was reviewed with Dr. Blackwood at pulmonary hernando conference 8/6/21 from IR and CT guided biopsy of the RICH is recommended. Series 3 Image 12        Procedure order and surgical pathology and leukemia lymphoma orders placed.    If requesting team would like sample sent for anything else please enter them prior to scheduled procedure.     Primary team and  made aware of IR recommendations via epic messaging.     Telephone clinic visit with IR PA provider will be coordinated to discuss biopsy procedure, risks and benefits.     JOSIE Abraham CNP  Interventional Radiology   IR on-call pager: [phone]

## 2021-08-10 ENCOUNTER — VIRTUAL VISIT (OUTPATIENT)
Dept: INTERVENTIONAL RADIOLOGY/VASCULAR | Facility: CLINIC | Age: 61
End: 2021-08-10
Payer: MEDICARE

## 2021-08-10 DIAGNOSIS — Z11.59 ENCOUNTER FOR SCREENING FOR OTHER VIRAL DISEASES: ICD-10-CM

## 2021-08-10 DIAGNOSIS — R91.1 LESION OF LEFT LUNG: Primary | ICD-10-CM

## 2021-08-10 PROCEDURE — 99203 OFFICE O/P NEW LOW 30 MIN: CPT | Mod: 95

## 2021-08-10 NOTE — PROGRESS NOTES
INTERVENTIONAL RADIOLOGY CLINIC NOTE    Requested procedure: Left upper lobe lung biopsy    Indication/History:  Patient is a 62 y/o male with a PMH of DM2, chronic hep B infection, non-smoker with liver cirrhosis and liver mass, likely hepatocellular carcinoma. Being evaluated by IR for directed therapy for the liver mass. Imaging work up 7/8/21 notes a spiculated RICH lesion, 15 x 13 mm (series 6, image 53) concerning for lung primary. Also a RUL nodular density-16 x 7 mm with adjacent nodularity (series 6, image 68). Patient case was reviewed at pulmonary node conference 8/6/21. IR has been asked to biopsy RICH hernando lung lesion for possible primary lung cancer by Dr. Purcell.    Imaging Reviewed:  CT 7/8/21  IMPRESSION:   1. Spiculated nodule in the left lung apex measuring up to 15 mm  concerning for primary lung malignancy. Comparison with prior outside  imaging would be helpful, and PET-CT and tissue sampling could be  considered.   2. Other scattered nodules, possibly sequela of old granulomatous  disease, but interval follow up recommended.  3. Stable, chronic loculated right pleural effusion with peripheral  calcifications.  4. Cirrhotic configuration of liver with splenomegaly, ascites.  Previously noted hepatic lesions better appreciated on prior abdominal  MR.    Pertinent imaging studies were reviewed by Nedra Harper CNP and Dr Blackwood.      Impression/Plan:  Rishabh Cabrera is a 61 year old male h a PMH of DM2, chronic hep B infection, non-smoker with liver cirrhosis and liver mass, likely hepatocellular carcinoma with incidentally found RICH lesion with request for IR biopsy.    Case approved at pulmonary nodule conference 8/6/21. Recommend CT guided biopsy of RICH pulmonary nodule. Series 3 Image 12.        No further imaging will be necessary prior to the procedure. Updated labs can be checked the day of the procedure.     Message sent to IR schedulers to coordinate procedure timing.    Rosina  DINO Simental  Interventional Radiology  [phone] (pager)    =====    Past Medical History:  Past Medical History:   Diagnosis Date     Chronic hepatitis B (H)      Diabetes mellitus (H)        Past Surgical History:  Past Surgical History:   Procedure Laterality Date     ESOPHAGOSCOPY, GASTROSCOPY, DUODENOSCOPY (EGD), COMBINED N/A 5/29/2019    Procedure: ESOPHAGOGASTRODUODENOSCOPY (EGD);  Surgeon: Leventhal, Thomas Michael, MD;  Location:  GI       Problem List:  Patient Active Problem List    Diagnosis Date Noted     HCC (hepatocellular carcinoma) (H) 07/22/2021     Priority: Medium     Other ascites 06/11/2021     Priority: Medium     Liver lesion 09/14/2020     Priority: Medium       Allergies:  Allergies   Allergen Reactions     Crabs [Crustaceans]      Pork (Porcine) Protein Unknown     Seafood Unknown     Shellfish Allergy Unknown       Results Reviewed: Labs will be updated preprocedure  Lab Results   Component Value Date    PLT 52 05/10/2021     Lab Results   Component Value Date    INR 1.39 05/10/2021       Vital Signs:  There were no vitals taken for this visit.    =====    Visit Details:    The patient's medical record and pertinent imaging were reviewed.      Education was given on the planned procedure and why it was requested, including a detailed description of interventional radiology's role.      The risks of the procedure were discussed.     The use of moderate sedation was reviewed.     All of the patient's questions were answered to their satisfaction.    Patient instructions:    The procedure will be performed in IR at the [facility] located at [address]. Arrive in the Gold Waiting room at your scheduled arrival time on the day of your procedure.    No solid foods or milk products 8 hours prior to the procedure.  You may have clear liquids including coffee (without cream or sugar) up to 2 hours prior to the procedure.    If sedation  is planned, a responsible adult must accompany you after the procedure.  Hospital policy requires you not drive 24 hours after sedation is administered.    Medicines to hold:  Patient is not on anticoagulation  =====    A total of 25 minutes was spent with the patient. Greater than 50% of the time was spent in counseling, education, and coordination of care.

## 2021-08-10 NOTE — PROGRESS NOTES
Rishabh is a 61 year old who is being evaluated via a billable telephone visit.      What phone number would you like to be contacted at? [phone]  How would you like to obtain your AVS? Mail a copy    Chief Complaint   Patient presents with     Consult     New consultation to discuss procedure. Patient reports upper  neck 5/10 pain.      Vitals were taken and medications reconciled.     Rk Love CMA  11:19 AM

## 2021-08-16 ENCOUNTER — TELEPHONE (OUTPATIENT)
Dept: VASCULAR SURGERY | Facility: CLINIC | Age: 61
End: 2021-08-16

## 2021-08-16 DIAGNOSIS — C22.0 HCC (HEPATOCELLULAR CARCINOMA) (H): Primary | ICD-10-CM

## 2021-08-16 NOTE — TELEPHONE ENCOUNTER
Returning daughter Riana's VM    Left a VM also    Informed her that I will try to send another email test to her primary address so we can have her father sign a consent form    Informed her that I did get her VM and so will move fwd with treatment. Informed her that once again as we had discussed her father only has medicare so therefore it will only cover 80% of treatments and the other 20% will be out of pocket or through a patient patricia. Inquired if she has looked into adding a supplementary insurance of inquire on a patient patricia via Oncology services.     Informed her that we will need to touchbase again and provided her my direct line.     Leonela TORRE RN, BSN  Interventional Radiology/Vascular  Nurse Coordinator   Phone: [phone]  Fax: [phone]

## 2021-08-17 ENCOUNTER — PATIENT OUTREACH (OUTPATIENT)
Dept: CARE COORDINATION | Facility: CLINIC | Age: 61
End: 2021-08-17

## 2021-08-17 NOTE — PROGRESS NOTES
Social Work Follow-Up  [facility] [place]    Data/Intervention:  Patient Name:  Rishabh Cabrera  /Age:  1960 (61 year old)    Reason for Follow-Up:  Contacting Patient's daughter to go over financial/insurance resources.      received a [employer] message from Patient (presumably from his daughter, Riana) asking for financial resources to help pay for medical care.  has made several past attempts to contact both Patient (with [employer] ) and Riana, with no success to connect.    Collaborated With:    -Patient's daughter Riana via [employer] and email (email address was provided in My Chart message)    Intervention/Education/Resources Provided:  Patient only has Medicare for insurance which only covers 80%. Patient needs to find a plan to supplement his Medicare.  replied to the [employer] message and emailed Riana the following information:    Senior Linkage Line to speak with an  to talk over specific programs Patient may qualify for.    [facility] through [place] Billing office.     Assessment/Plan:  Patient and his daughter will look over options.  will remain available as needed.    Provided patient/family with writer's contact information and availability.       Michelle Koroma [facility]  Clinical , Outpatient Specialty Clinics  Direct Phone: [phone]

## 2021-08-24 ENCOUNTER — LAB (OUTPATIENT)
Dept: LAB | Facility: CLINIC | Age: 61
End: 2021-08-24
Attending: NURSE PRACTITIONER

## 2021-08-24 DIAGNOSIS — Z11.59 ENCOUNTER FOR SCREENING FOR OTHER VIRAL DISEASES: ICD-10-CM

## 2021-08-24 DIAGNOSIS — C22.0 HCC (HEPATOCELLULAR CARCINOMA) (H): Primary | ICD-10-CM

## 2021-08-24 LAB — SARS-COV-2 RNA RESP QL NAA+PROBE: NEGATIVE

## 2021-08-24 PROCEDURE — U0003 INFECTIOUS AGENT DETECTION BY NUCLEIC ACID (DNA OR RNA); SEVERE ACUTE RESPIRATORY SYNDROME CORONAVIRUS 2 (SARS-COV-2) (CORONAVIRUS DISEASE [COVID-19]), AMPLIFIED PROBE TECHNIQUE, MAKING USE OF HIGH THROUGHPUT TECHNOLOGIES AS DESCRIBED BY CMS-2020-01-R: HCPCS | Performed by: NURSE PRACTITIONER

## 2021-08-26 ENCOUNTER — APPOINTMENT (OUTPATIENT)
Dept: MEDSURG UNIT | Facility: CLINIC | Age: 61
End: 2021-08-26
Attending: INTERNAL MEDICINE
Payer: MEDICARE

## 2021-08-26 ENCOUNTER — TELEPHONE (OUTPATIENT)
Dept: VASCULAR SURGERY | Facility: CLINIC | Age: 61
End: 2021-08-26

## 2021-08-26 ENCOUNTER — HOSPITAL ENCOUNTER (OUTPATIENT)
Facility: CLINIC | Age: 61
Discharge: HOME | End: 2021-08-26
Attending: INTERNAL MEDICINE | Admitting: INTERNAL MEDICINE
Payer: MEDICARE

## 2021-08-26 VITALS
HEART RATE: 83 BPM | DIASTOLIC BLOOD PRESSURE: 84 MMHG | TEMPERATURE: 98.1 F | OXYGEN SATURATION: 99 % | RESPIRATION RATE: 16 BRPM | SYSTOLIC BLOOD PRESSURE: 129 MMHG

## 2021-08-26 DIAGNOSIS — R91.8 PULMONARY NODULES: ICD-10-CM

## 2021-08-26 DIAGNOSIS — Z11.59 ENCOUNTER FOR SCREENING FOR OTHER VIRAL DISEASES: Primary | ICD-10-CM

## 2021-08-26 DIAGNOSIS — C22.0 HCC (HEPATOCELLULAR CARCINOMA) (H): ICD-10-CM

## 2021-08-26 LAB
APTT PPP: 35 SECONDS (ref 22–38)
ERYTHROCYTE [DISTWIDTH] IN BLOOD BY AUTOMATED COUNT: 16.6 % (ref 10–15)
HCT VFR BLD AUTO: 42.5 % (ref 40–53)
HGB BLD-MCNC: 13.6 G/DL (ref 13.3–17.7)
INR PPP: 1.63 (ref 0.85–1.15)
MCH RBC QN AUTO: 29.1 PG (ref 26.5–33)
MCHC RBC AUTO-ENTMCNC: 32 G/DL (ref 31.5–36.5)
MCV RBC AUTO: 91 FL (ref 78–100)
PLATELET # BLD AUTO: 88 10E3/UL (ref 150–450)
RBC # BLD AUTO: 4.67 10E6/UL (ref 4.4–5.9)
WBC # BLD AUTO: 4.9 10E3/UL (ref 4–11)

## 2021-08-26 PROCEDURE — 999N000054 HC STATISTIC EKG NON-CHARGEABLE

## 2021-08-26 PROCEDURE — 999N000142 HC STATISTIC PROCEDURE PREP ONLY

## 2021-08-26 PROCEDURE — 93010 ELECTROCARDIOGRAM REPORT: CPT | Mod: 59 | Performed by: INTERNAL MEDICINE

## 2021-08-26 PROCEDURE — 85730 THROMBOPLASTIN TIME PARTIAL: CPT | Performed by: NURSE PRACTITIONER

## 2021-08-26 PROCEDURE — 85610 PROTHROMBIN TIME: CPT | Performed by: NURSE PRACTITIONER

## 2021-08-26 PROCEDURE — 258N000003 HC RX IP 258 OP 636: Performed by: NURSE PRACTITIONER

## 2021-08-26 PROCEDURE — 36415 COLL VENOUS BLD VENIPUNCTURE: CPT | Performed by: NURSE PRACTITIONER

## 2021-08-26 PROCEDURE — 85018 HEMOGLOBIN: CPT | Performed by: NURSE PRACTITIONER

## 2021-08-26 RX ORDER — SODIUM CHLORIDE 9 MG/ML
INJECTION, SOLUTION INTRAVENOUS CONTINUOUS
Status: DISCONTINUED | OUTPATIENT
Start: 2021-08-26 | End: 2021-08-26 | Stop reason: HOSPADM

## 2021-08-26 RX ORDER — NICOTINE POLACRILEX 4 MG
15-30 LOZENGE BUCCAL
Status: DISCONTINUED | OUTPATIENT
Start: 2021-08-26 | End: 2021-08-26 | Stop reason: HOSPADM

## 2021-08-26 RX ORDER — DEXTROSE MONOHYDRATE 25 G/50ML
25-50 INJECTION, SOLUTION INTRAVENOUS
Status: DISCONTINUED | OUTPATIENT
Start: 2021-08-26 | End: 2021-08-26 | Stop reason: HOSPADM

## 2021-08-26 RX ORDER — LIDOCAINE 40 MG/G
CREAM TOPICAL
Status: DISCONTINUED | OUTPATIENT
Start: 2021-08-26 | End: 2021-08-26 | Stop reason: HOSPADM

## 2021-08-26 RX ADMIN — SODIUM CHLORIDE: 9 INJECTION, SOLUTION INTRAVENOUS at 10:17

## 2021-08-26 NOTE — PROGRESS NOTES
"Patient reported eating a ramin due to feelings of \"low blood sugar\" at 0830 this morning.  Patient was given preprocedure handout detailing the need for n.p.o. status prior to procedure. Patient was informed that he will need to remain n.p.o. for 8 hours in order to receive moderate sedation.  It was discussed with patient options of rescheduling a CT-guided lung biopsy with conscious sedation versus undergoing the procedure today with only local anesthetic.  Patient and daughter decided to reschedule procedure for future date.  Patient expressed understanding of all the options as well as the risks and benefits of the procedure.  Consent was obtained.      A [facility]  was used for the entirety of the interaction.    Ivelisse Goodwin MD    Intevertional Radiology, PGY-3    "

## 2021-08-26 NOTE — TELEPHONE ENCOUNTER
Called daughter Riana    Informed her of pt's appt for Y90 procedure.   Mapping Weds 9/8  Check in at 7am     Delivery thurs 9/9  Check in at 1130am    All appt information and details reviewed with daughter    She did take notes    Informed of covid testing in which I advised Mon 9/6 so this covers both dates    Talked about having her father sign sirs consent form      She states that pt was supposed to get covid testing however he did eat. I did provide her the rescheduling number for IR    Advised that perhaps to avoid the week of Y90 treatment and to call and see if she can get him in next week or so.    She also mentions that she is doing a documentary on her father in which she is wondering if Dr. So would be open to at times being in videos or so, I informed her that she should inquire with him       She will call back with any other questions.     Leonela TORRE RN, BSN  Interventional Radiology/Vascular  Nurse Coordinator   Phone: [phone]  Fax: [phone]

## 2021-08-28 LAB
ATRIAL RATE - MUSE: 84 BPM
DIASTOLIC BLOOD PRESSURE - MUSE: NORMAL MMHG
INTERPRETATION ECG - MUSE: NORMAL
P AXIS - MUSE: 45 DEGREES
PR INTERVAL - MUSE: 172 MS
QRS DURATION - MUSE: 94 MS
QT - MUSE: 376 MS
QTC - MUSE: 444 MS
R AXIS - MUSE: 31 DEGREES
SYSTOLIC BLOOD PRESSURE - MUSE: NORMAL MMHG
T AXIS - MUSE: 21 DEGREES
VENTRICULAR RATE- MUSE: 84 BPM

## 2021-08-29 ENCOUNTER — HEALTH MAINTENANCE LETTER (OUTPATIENT)
Age: 61
End: 2021-08-29

## 2021-09-02 ENCOUNTER — TELEPHONE (OUTPATIENT)
Dept: INTERVENTIONAL RADIOLOGY/VASCULAR | Facility: CLINIC | Age: 61
End: 2021-09-02

## 2021-09-02 ENCOUNTER — APPOINTMENT (OUTPATIENT)
Dept: INTERPRETER SERVICES | Facility: CLINIC | Age: 61
End: 2021-09-02
Payer: MEDICARE

## 2021-09-02 NOTE — PROGRESS NOTES
Spoke with patient at length with .  He had some confusion about when the treatment would happen and why he needed another Covid test.  I answered his questions and referred him to review his [employer] message sent by Leonela George.  The  was going to work with Rishabh Cabrera to get a Covid test set up through the Covid scheduling line.

## 2021-09-04 ENCOUNTER — LAB (OUTPATIENT)
Dept: URGENT CARE | Facility: URGENT CARE | Age: 61
End: 2021-09-04
Attending: RADIOLOGY
Payer: MEDICARE

## 2021-09-04 DIAGNOSIS — Z11.59 ENCOUNTER FOR SCREENING FOR OTHER VIRAL DISEASES: ICD-10-CM

## 2021-09-04 PROCEDURE — U0005 INFEC AGEN DETEC AMPLI PROBE: HCPCS

## 2021-09-04 PROCEDURE — U0003 INFECTIOUS AGENT DETECTION BY NUCLEIC ACID (DNA OR RNA); SEVERE ACUTE RESPIRATORY SYNDROME CORONAVIRUS 2 (SARS-COV-2) (CORONAVIRUS DISEASE [COVID-19]), AMPLIFIED PROBE TECHNIQUE, MAKING USE OF HIGH THROUGHPUT TECHNOLOGIES AS DESCRIBED BY CMS-2020-01-R: HCPCS

## 2021-09-05 LAB — SARS-COV-2 RNA RESP QL NAA+PROBE: NEGATIVE

## 2021-09-08 ENCOUNTER — APPOINTMENT (OUTPATIENT)
Dept: INTERVENTIONAL RADIOLOGY/VASCULAR | Facility: CLINIC | Age: 61
End: 2021-09-08
Attending: RADIOLOGY
Payer: MEDICARE

## 2021-09-08 ENCOUNTER — HOSPITAL ENCOUNTER (OUTPATIENT)
Dept: NUCLEAR MEDICINE | Facility: CLINIC | Age: 61
Setting detail: NUCLEAR MEDICINE
End: 2021-09-08
Attending: RADIOLOGY | Admitting: RADIOLOGY
Payer: MEDICARE

## 2021-09-08 ENCOUNTER — APPOINTMENT (OUTPATIENT)
Dept: MEDSURG UNIT | Facility: CLINIC | Age: 61
End: 2021-09-08
Attending: RADIOLOGY
Payer: MEDICARE

## 2021-09-08 ENCOUNTER — HOSPITAL ENCOUNTER (OUTPATIENT)
Facility: CLINIC | Age: 61
Discharge: HOME | End: 2021-09-08
Attending: RADIOLOGY | Admitting: RADIOLOGY
Payer: MEDICARE

## 2021-09-08 VITALS
WEIGHT: 135 LBS | HEART RATE: 81 BPM | DIASTOLIC BLOOD PRESSURE: 64 MMHG | RESPIRATION RATE: 18 BRPM | TEMPERATURE: 98.4 F | SYSTOLIC BLOOD PRESSURE: 123 MMHG | HEIGHT: 64 IN | BODY MASS INDEX: 23.05 KG/M2 | OXYGEN SATURATION: 98 %

## 2021-09-08 DIAGNOSIS — C22.0 HCC (HEPATOCELLULAR CARCINOMA) (H): ICD-10-CM

## 2021-09-08 LAB
ALBUMIN SERPL-MCNC: 2 G/DL (ref 3.4–5)
ALP SERPL-CCNC: 279 U/L (ref 40–150)
ALT SERPL W P-5'-P-CCNC: 40 U/L (ref 0–70)
ANION GAP SERPL CALCULATED.3IONS-SCNC: 3 MMOL/L (ref 3–14)
APTT PPP: 36 SECONDS (ref 22–38)
AST SERPL W P-5'-P-CCNC: 65 U/L (ref 0–45)
BILIRUB DIRECT SERPL-MCNC: 0.9 MG/DL (ref 0–0.2)
BILIRUB SERPL-MCNC: 2.5 MG/DL (ref 0.2–1.3)
BUN SERPL-MCNC: 10 MG/DL (ref 7–30)
CALCIUM SERPL-MCNC: 8 MG/DL (ref 8.5–10.1)
CHLORIDE BLD-SCNC: 110 MMOL/L (ref 94–109)
CO2 SERPL-SCNC: 26 MMOL/L (ref 20–32)
CREAT SERPL-MCNC: 0.89 MG/DL (ref 0.66–1.25)
ERYTHROCYTE [DISTWIDTH] IN BLOOD BY AUTOMATED COUNT: 16.4 % (ref 10–15)
GFR SERPL CREATININE-BSD FRML MDRD: >90 ML/MIN/1.73M2
GLUCOSE BLD-MCNC: 110 MG/DL (ref 70–99)
GLUCOSE BLDC GLUCOMTR-MCNC: 219 MG/DL (ref 70–99)
HCT VFR BLD AUTO: 35.4 % (ref 40–53)
HGB BLD-MCNC: 12 G/DL (ref 13.3–17.7)
INR PPP: 1.72 (ref 0.85–1.15)
MCH RBC QN AUTO: 30.1 PG (ref 26.5–33)
MCHC RBC AUTO-ENTMCNC: 33.9 G/DL (ref 31.5–36.5)
MCV RBC AUTO: 89 FL (ref 78–100)
PLATELET # BLD AUTO: 87 10E3/UL (ref 150–450)
POTASSIUM BLD-SCNC: 3.5 MMOL/L (ref 3.4–5.3)
PROT SERPL-MCNC: 7.5 G/DL (ref 6.8–8.8)
RBC # BLD AUTO: 3.99 10E6/UL (ref 4.4–5.9)
SODIUM SERPL-SCNC: 139 MMOL/L (ref 133–144)
WBC # BLD AUTO: 4 10E3/UL (ref 4–11)

## 2021-09-08 PROCEDURE — 77261 THER RADIOLOGY TX PLNG SMPL: CPT | Mod: GC | Performed by: RADIOLOGY

## 2021-09-08 PROCEDURE — 85610 PROTHROMBIN TIME: CPT | Performed by: NURSE PRACTITIONER

## 2021-09-08 PROCEDURE — 250N000009 HC RX 250: Performed by: RADIOLOGY

## 2021-09-08 PROCEDURE — 75726 ARTERY X-RAYS ABDOMEN: CPT

## 2021-09-08 PROCEDURE — 76380 CAT SCAN FOLLOW-UP STUDY: CPT | Mod: 26 | Performed by: RADIOLOGY

## 2021-09-08 PROCEDURE — 85730 THROMBOPLASTIN TIME PARTIAL: CPT | Performed by: NURSE PRACTITIONER

## 2021-09-08 PROCEDURE — 36415 COLL VENOUS BLD VENIPUNCTURE: CPT | Performed by: NURSE PRACTITIONER

## 2021-09-08 PROCEDURE — 82248 BILIRUBIN DIRECT: CPT | Performed by: NURSE PRACTITIONER

## 2021-09-08 PROCEDURE — 272N000504 HC NEEDLE CR4

## 2021-09-08 PROCEDURE — 75726 ARTERY X-RAYS ABDOMEN: CPT | Mod: 26 | Performed by: RADIOLOGY

## 2021-09-08 PROCEDURE — G1004 CDSM NDSC: HCPCS | Mod: GC | Performed by: RADIOLOGY

## 2021-09-08 PROCEDURE — 78830 RP LOCLZJ TUM SPECT W/CT 1: CPT | Mod: MG

## 2021-09-08 PROCEDURE — 75774 ARTERY X-RAY EACH VESSEL: CPT | Mod: 26 | Performed by: RADIOLOGY

## 2021-09-08 PROCEDURE — C1769 GUIDE WIRE: HCPCS

## 2021-09-08 PROCEDURE — C1887 CATHETER, GUIDING: HCPCS

## 2021-09-08 PROCEDURE — 99152 MOD SED SAME PHYS/QHP 5/>YRS: CPT

## 2021-09-08 PROCEDURE — 36247 INS CATH ABD/L-EXT ART 3RD: CPT | Mod: GC | Performed by: RADIOLOGY

## 2021-09-08 PROCEDURE — 258N000003 HC RX IP 258 OP 636: Performed by: NURSE PRACTITIONER

## 2021-09-08 PROCEDURE — 255N000002 HC RX 255 OP 636: Performed by: RADIOLOGY

## 2021-09-08 PROCEDURE — 36248 INS CATH ABD/L-EXT ART ADDL: CPT

## 2021-09-08 PROCEDURE — C1760 CLOSURE DEV, VASC: HCPCS

## 2021-09-08 PROCEDURE — 272N000566 HC SHEATH CR3

## 2021-09-08 PROCEDURE — 36247 INS CATH ABD/L-EXT ART 3RD: CPT

## 2021-09-08 PROCEDURE — 82374 ASSAY BLOOD CARBON DIOXIDE: CPT | Performed by: NURSE PRACTITIONER

## 2021-09-08 PROCEDURE — 85014 HEMATOCRIT: CPT | Performed by: NURSE PRACTITIONER

## 2021-09-08 PROCEDURE — 99153 MOD SED SAME PHYS/QHP EA: CPT

## 2021-09-08 PROCEDURE — 77300 RADIATION THERAPY DOSE PLAN: CPT | Mod: 26 | Performed by: RADIOLOGY

## 2021-09-08 PROCEDURE — 78830 RP LOCLZJ TUM SPECT W/CT 1: CPT | Mod: 26 | Performed by: RADIOLOGY

## 2021-09-08 PROCEDURE — 76937 US GUIDE VASCULAR ACCESS: CPT | Mod: 26 | Performed by: RADIOLOGY

## 2021-09-08 PROCEDURE — 999N000134 HC STATISTIC PP CARE STAGE 3

## 2021-09-08 PROCEDURE — 250N000011 HC RX IP 250 OP 636: Performed by: RADIOLOGY

## 2021-09-08 PROCEDURE — 272N000143 HC KIT CR3

## 2021-09-08 PROCEDURE — A9540 TC99M MAA: HCPCS | Performed by: RADIOLOGY

## 2021-09-08 PROCEDURE — 343N000001 HC RX 343: Performed by: RADIOLOGY

## 2021-09-08 PROCEDURE — 75774 ARTERY X-RAY EACH VESSEL: CPT

## 2021-09-08 RX ORDER — NALOXONE HYDROCHLORIDE 0.4 MG/ML
0.2 INJECTION, SOLUTION INTRAMUSCULAR; INTRAVENOUS; SUBCUTANEOUS
Status: DISCONTINUED | OUTPATIENT
Start: 2021-09-08 | End: 2021-09-08 | Stop reason: HOSPADM

## 2021-09-08 RX ORDER — SODIUM CHLORIDE 9 MG/ML
INJECTION, SOLUTION INTRAVENOUS CONTINUOUS
Status: DISCONTINUED | OUTPATIENT
Start: 2021-09-08 | End: 2021-09-08 | Stop reason: HOSPADM

## 2021-09-08 RX ORDER — NALOXONE HYDROCHLORIDE 0.4 MG/ML
0.4 INJECTION, SOLUTION INTRAMUSCULAR; INTRAVENOUS; SUBCUTANEOUS
Status: DISCONTINUED | OUTPATIENT
Start: 2021-09-08 | End: 2021-09-08 | Stop reason: HOSPADM

## 2021-09-08 RX ORDER — LIDOCAINE 40 MG/G
CREAM TOPICAL
Status: DISCONTINUED | OUTPATIENT
Start: 2021-09-08 | End: 2021-09-08 | Stop reason: HOSPADM

## 2021-09-08 RX ORDER — DEXTROSE MONOHYDRATE 25 G/50ML
25-50 INJECTION, SOLUTION INTRAVENOUS
Status: DISCONTINUED | OUTPATIENT
Start: 2021-09-08 | End: 2021-09-08 | Stop reason: HOSPADM

## 2021-09-08 RX ORDER — NICOTINE POLACRILEX 4 MG
15-30 LOZENGE BUCCAL
Status: DISCONTINUED | OUTPATIENT
Start: 2021-09-08 | End: 2021-09-08 | Stop reason: HOSPADM

## 2021-09-08 RX ORDER — HYDROMORPHONE HYDROCHLORIDE 2 MG/1
2-4 TABLET ORAL EVERY 4 HOURS PRN
Status: DISCONTINUED | OUTPATIENT
Start: 2021-09-08 | End: 2021-09-08 | Stop reason: HOSPADM

## 2021-09-08 RX ORDER — FENTANYL CITRATE 50 UG/ML
25-50 INJECTION, SOLUTION INTRAMUSCULAR; INTRAVENOUS EVERY 5 MIN PRN
Status: DISCONTINUED | OUTPATIENT
Start: 2021-09-08 | End: 2021-09-08 | Stop reason: HOSPADM

## 2021-09-08 RX ORDER — IODIXANOL 320 MG/ML
150 INJECTION, SOLUTION INTRAVASCULAR ONCE
Status: COMPLETED | OUTPATIENT
Start: 2021-09-08 | End: 2021-09-08

## 2021-09-08 RX ORDER — FLUMAZENIL 0.1 MG/ML
0.2 INJECTION, SOLUTION INTRAVENOUS
Status: DISCONTINUED | OUTPATIENT
Start: 2021-09-08 | End: 2021-09-08 | Stop reason: HOSPADM

## 2021-09-08 RX ADMIN — MIDAZOLAM 0.5 MG: 1 INJECTION INTRAMUSCULAR; INTRAVENOUS at 09:32

## 2021-09-08 RX ADMIN — LIDOCAINE HYDROCHLORIDE 8 ML: 10 INJECTION, SOLUTION EPIDURAL; INFILTRATION; INTRACAUDAL; PERINEURAL at 09:04

## 2021-09-08 RX ADMIN — FENTANYL CITRATE 25 MCG: 50 INJECTION, SOLUTION INTRAMUSCULAR; INTRAVENOUS at 09:03

## 2021-09-08 RX ADMIN — MIDAZOLAM 0.5 MG: 1 INJECTION INTRAMUSCULAR; INTRAVENOUS at 09:50

## 2021-09-08 RX ADMIN — IODIXANOL 70 ML: 320 INJECTION, SOLUTION INTRAVASCULAR at 10:15

## 2021-09-08 RX ADMIN — SODIUM CHLORIDE: 9 INJECTION, SOLUTION INTRAVENOUS at 07:42

## 2021-09-08 RX ADMIN — FENTANYL CITRATE 25 MCG: 50 INJECTION, SOLUTION INTRAMUSCULAR; INTRAVENOUS at 08:58

## 2021-09-08 RX ADMIN — KIT FOR THE PREPARATION OF TECHNETIUM TC 99M ALBUMIN AGGREGATED 4.8 MCI.: 2.5 INJECTION, POWDER, FOR SOLUTION INTRAVENOUS at 12:07

## 2021-09-08 RX ADMIN — MIDAZOLAM 0.5 MG: 1 INJECTION INTRAMUSCULAR; INTRAVENOUS at 09:03

## 2021-09-08 RX ADMIN — FENTANYL CITRATE 25 MCG: 50 INJECTION, SOLUTION INTRAMUSCULAR; INTRAVENOUS at 09:50

## 2021-09-08 RX ADMIN — FENTANYL CITRATE 25 MCG: 50 INJECTION, SOLUTION INTRAMUSCULAR; INTRAVENOUS at 09:32

## 2021-09-08 RX ADMIN — MIDAZOLAM 0.5 MG: 1 INJECTION INTRAMUSCULAR; INTRAVENOUS at 08:58

## 2021-09-08 ASSESSMENT — MIFFLIN-ST. JEOR: SCORE: 1333.36

## 2021-09-08 NOTE — DISCHARGE INSTRUCTIONS
Going Home after a Thereasphere Mapping or Delivery      For 24 hours:    Have an adult stay with you for 24 hours.    Drink plenty of fluids.    You may eat your normal diet, unless your doctor tells you otherwise.:  - Relax and take it easy.  - Do NOT smoke.  - Do NOT make any important or legal decisions.  - Do NOT drive or operate machines at home or at work.  - Do NOT drink alcohol    Care of the groin site:    It is normal to have soreness at the puncture site and mild tingling in your hand for up to 3 days. There should not be a lump at the site.    Remove the Band-Aid after 24 hours, If there is minor oozing, apply another Band-Aid and remove it after 12 hours     You may shower but leave the Band-Aid on until after the shower.    Do not Scrub the site vigorously for 3 days .     Do not use lotion or powder near the puncture site for 3 days    For 4 days, do not lift more than 10 pounds or exercise your legs (running, weight lifting, biking, etc)    No tub bath, hot tubs, or swimming for 1 week.     If you start bleeding from the site in your leg: Lay down and press firmly on the site with your fingers for 10 minutes. Call your doctor as soon as you can.    Isaak 911 if you have bleeding that is heavy or does not stop.    Call your doctor if:     You have a large or growing hard lump around the site.    The site is red, swollen, hot or tender.    Blood or fluid is draining from the site.    You have chills or a fever greater that 101'F (38'C)    Your arm turns bluish, feels numb or cool.    You have hives, a rash or unusual itching    ADDITIONAL INSTRUCTIONS    Additional Information:     Procedural Physician: Dr. Peterson                                    Date: 9/8/21    [phone]: Ask for the Interventional Radiologist on call. Someone is available 24 hours a day

## 2021-09-08 NOTE — PROGRESS NOTES
Arrived back to [address] from Y-90 mapping procedure in IR. VSS. Sleeping; but, arouseable to voice. Denies pain. R groin site C/D/I, no hematoma/bleeding noted. Bilat pp & pt +3. Daughter at bedside. Hold off on PO until pt more awake & alert. Pt stable.

## 2021-09-08 NOTE — SEDATION DOCUMENTATION
Pt arrives to  with daughter Riana for Y90 mapping. All prep complete at this time with the assistance of [facility]  . Consent has been signed. All questions answered. Riana will be providing ride home. Will continue cares until pt taken for procedure.

## 2021-09-08 NOTE — PROCEDURES
[facility]    Procedure: IR Procedure Note    Date/Time: 9/8/2021 10:13 AM  Performed by: Gio Peterson DO  Authorized by: Gio Peterson DO   IR Fellow Physician: Nicholas    UNIVERSAL PROTOCOL   Site Marked: NA  Prior Images Obtained and Reviewed:  Yes  Required items: Required blood products, implants, devices and special equipment available    Patient identity confirmed:  Verbally with patient, arm band, provided demographic data and hospital-assigned identification number  Patient was reevaluated immediately before administering moderate or deep sedation or anesthesia  Confirmation Checklist:  Patient's identity using two indicators, relevant allergies, procedure was appropriate and matched the consent or emergent situation and correct equipment/implants were available  Time out: Immediately prior to the procedure a time out was called    Universal Protocol: the Joint Commission Universal Protocol was followed    Preparation: Patient was prepped and draped in usual sterile fashion           ANESTHESIA    Anesthesia: Local infiltration  Local Anesthetic:  Lidocaine 1% without epinephrine      SEDATION    Patient Sedated: Yes    Sedation Type:  Moderate (conscious) sedation  Sedation:  Fentanyl and midazolam  Vital signs: Vital signs monitored during sedation    See dictated procedure note for full details.  Findings: Uncomplicated mapping for radio embolization of hepatic segment 5/6 tumor via right groin puncture.    Specimens: none    Complications: None    Condition: Stable    Plan: Bedrest for 3 hours with right leg straight.  Nuclear medicine scan prior to discharge.  Patient can be discharged when meeting appropriate discharge criteria.  Delivery scheduled for tomorrow.    PROCEDURE   Patient Tolerance:  Patient tolerated the procedure well with no immediate complications    Length of time physician/provider present for 1:1 monitoring during  sedation: 75

## 2021-09-08 NOTE — PROGRESS NOTES
Patient Name: Rishabh Cabrera  Medical Record Number: [id number]  Today's Date: 9/8/2021    Procedure: Visceral aniogram with Ytrium-90 mapping  Proceduralist: Dr. Nicholas Suarez    Procedure Start: 0855  Procedure end: 1010  Sedation medications administered: 100 mcg fentanyl, 2 mg versed     Report given to: Sierra CURRY 2A  : Discussed with patient,  not used intra-procedure due to lag in information and patient's understanding of English.    Other Notes: Pt arrived to IR room 1 from . Consent reviewed. Pt denies any questions or concerns regarding procedure. Pt positioned supine and monitored per protocol. Pt tolerated procedure without any noted complications. Pt transferred back to .    Mynx closure device deployed to right groin at 0959. Bedrest x 3 hours until 1310.

## 2021-09-08 NOTE — PRE-PROCEDURE
GENERAL PRE-PROCEDURE:   Procedure:  Y90 mapping    Written consent obtained?: Yes    Risks and benefits: Risks, benefits and alternatives were discussed    Consent given by:  Patient  Patient states understanding of procedure being performed: Yes    Patient's understanding of procedure matches consent: Yes    Procedure consent matches procedure scheduled: Yes    Expected level of sedation:  Moderate  Appropriately NPO:  Yes  ASA Class:  2  Mallampati  :  Grade 2- soft palate, base of uvula, tonsillar pillars, and portion of posterior pharyngeal wall visible  Lungs:  Lungs clear with good breath sounds bilaterally  Heart:  Normal heart sounds and rate  History & Physical reviewed:  History and physical reviewed and no updates needed  Statement of review:  I have reviewed the lab findings, diagnostic data, medications, and the plan for sedation

## 2021-09-08 NOTE — SEDATION DOCUMENTATION
Patient tolerated recovery stage well. VSS, R groin site clean/dry/intact, no hematoma, and denies pain. Patient tolerated PO food and fluids. Teaching was done and discharge instructions were given with daughter present and with ipad . Patient ambulated, voided, and PIV was removed. Patient discharged from the hospital via wheel chair to home with daughter, Riana.

## 2021-09-09 ENCOUNTER — HOSPITAL ENCOUNTER (OUTPATIENT)
Dept: NUCLEAR MEDICINE | Facility: CLINIC | Age: 61
Setting detail: NUCLEAR MEDICINE
End: 2021-09-09
Attending: RADIOLOGY | Admitting: RADIOLOGY
Payer: MEDICARE

## 2021-09-09 ENCOUNTER — HOSPITAL ENCOUNTER (OUTPATIENT)
Facility: CLINIC | Age: 61
Discharge: HOME | End: 2021-09-09
Attending: RADIOLOGY | Admitting: RADIOLOGY
Payer: MEDICARE

## 2021-09-09 ENCOUNTER — APPOINTMENT (OUTPATIENT)
Dept: MEDSURG UNIT | Facility: CLINIC | Age: 61
End: 2021-09-09
Attending: RADIOLOGY
Payer: MEDICARE

## 2021-09-09 ENCOUNTER — APPOINTMENT (OUTPATIENT)
Dept: INTERVENTIONAL RADIOLOGY/VASCULAR | Facility: CLINIC | Age: 61
End: 2021-09-09
Attending: RADIOLOGY
Payer: MEDICARE

## 2021-09-09 VITALS
OXYGEN SATURATION: 100 % | RESPIRATION RATE: 15 BRPM | TEMPERATURE: 98.3 F | SYSTOLIC BLOOD PRESSURE: 109 MMHG | HEART RATE: 105 BPM | DIASTOLIC BLOOD PRESSURE: 74 MMHG

## 2021-09-09 DIAGNOSIS — C22.0 HCC (HEPATOCELLULAR CARCINOMA) (H): ICD-10-CM

## 2021-09-09 LAB
ALBUMIN SERPL-MCNC: 2.1 G/DL (ref 3.4–5)
ALP SERPL-CCNC: 257 U/L (ref 40–150)
ALT SERPL W P-5'-P-CCNC: 43 U/L (ref 0–70)
AST SERPL W P-5'-P-CCNC: 73 U/L (ref 0–45)
BILIRUB DIRECT SERPL-MCNC: 0.9 MG/DL (ref 0–0.2)
BILIRUB SERPL-MCNC: 2.9 MG/DL (ref 0.2–1.3)
GLUCOSE BLDC GLUCOMTR-MCNC: 89 MG/DL (ref 70–99)
GLUCOSE BLDC GLUCOMTR-MCNC: 92 MG/DL (ref 70–99)
PROT SERPL-MCNC: 7.5 G/DL (ref 6.8–8.8)

## 2021-09-09 PROCEDURE — C1769 GUIDE WIRE: HCPCS

## 2021-09-09 PROCEDURE — 76937 US GUIDE VASCULAR ACCESS: CPT

## 2021-09-09 PROCEDURE — 80076 HEPATIC FUNCTION PANEL: CPT | Performed by: NURSE PRACTITIONER

## 2021-09-09 PROCEDURE — C1887 CATHETER, GUIDING: HCPCS

## 2021-09-09 PROCEDURE — 76937 US GUIDE VASCULAR ACCESS: CPT | Mod: 26 | Performed by: RADIOLOGY

## 2021-09-09 PROCEDURE — 99152 MOD SED SAME PHYS/QHP 5/>YRS: CPT | Mod: XU

## 2021-09-09 PROCEDURE — C9113 INJ PANTOPRAZOLE SODIUM, VIA: HCPCS | Performed by: NURSE PRACTITIONER

## 2021-09-09 PROCEDURE — 272N000504 HC NEEDLE CR4

## 2021-09-09 PROCEDURE — 272N000143 HC KIT CR3

## 2021-09-09 PROCEDURE — 999N000134 HC STATISTIC PP CARE STAGE 3

## 2021-09-09 PROCEDURE — 250N000011 HC RX IP 250 OP 636: Performed by: STUDENT IN AN ORGANIZED HEALTH CARE EDUCATION/TRAINING PROGRAM

## 2021-09-09 PROCEDURE — 36247 INS CATH ABD/L-EXT ART 3RD: CPT | Mod: GC | Performed by: RADIOLOGY

## 2021-09-09 PROCEDURE — C2616 BRACHYTX, NON-STR,YTTRIUM-90: HCPCS | Performed by: RADIOLOGY

## 2021-09-09 PROCEDURE — 255N000002 HC RX 255 OP 636: Performed by: RADIOLOGY

## 2021-09-09 PROCEDURE — 77300 RADIATION THERAPY DOSE PLAN: CPT | Mod: 26 | Performed by: STUDENT IN AN ORGANIZED HEALTH CARE EDUCATION/TRAINING PROGRAM

## 2021-09-09 PROCEDURE — 79445 NUCLEAR RX INTRA-ARTERIAL: CPT | Mod: 26 | Performed by: RADIOLOGY

## 2021-09-09 PROCEDURE — 78830 RP LOCLZJ TUM SPECT W/CT 1: CPT | Mod: MG

## 2021-09-09 PROCEDURE — 36248 INS CATH ABD/L-EXT ART ADDL: CPT | Mod: XS | Performed by: RADIOLOGY

## 2021-09-09 PROCEDURE — 250N000011 HC RX IP 250 OP 636: Performed by: NURSE PRACTITIONER

## 2021-09-09 PROCEDURE — 36247 INS CATH ABD/L-EXT ART 3RD: CPT

## 2021-09-09 PROCEDURE — 278N000001 HC RX 278: Performed by: RADIOLOGY

## 2021-09-09 PROCEDURE — 37243 VASC EMBOLIZE/OCCLUDE ORGAN: CPT | Mod: GC | Performed by: RADIOLOGY

## 2021-09-09 PROCEDURE — 272N000192 HC ACCESSORY CR2

## 2021-09-09 PROCEDURE — G1004 CDSM NDSC: HCPCS | Performed by: STUDENT IN AN ORGANIZED HEALTH CARE EDUCATION/TRAINING PROGRAM

## 2021-09-09 PROCEDURE — 75726 ARTERY X-RAYS ABDOMEN: CPT

## 2021-09-09 PROCEDURE — 99152 MOD SED SAME PHYS/QHP 5/>YRS: CPT | Mod: GC | Performed by: RADIOLOGY

## 2021-09-09 PROCEDURE — 258N000003 HC RX IP 258 OP 636: Performed by: NURSE PRACTITIONER

## 2021-09-09 PROCEDURE — 37243 VASC EMBOLIZE/OCCLUDE ORGAN: CPT

## 2021-09-09 PROCEDURE — 250N000009 HC RX 250: Performed by: STUDENT IN AN ORGANIZED HEALTH CARE EDUCATION/TRAINING PROGRAM

## 2021-09-09 PROCEDURE — 99153 MOD SED SAME PHYS/QHP EA: CPT | Mod: XU

## 2021-09-09 PROCEDURE — 36415 COLL VENOUS BLD VENIPUNCTURE: CPT | Performed by: NURSE PRACTITIONER

## 2021-09-09 PROCEDURE — 75774 ARTERY X-RAY EACH VESSEL: CPT | Mod: XU,XS

## 2021-09-09 PROCEDURE — 272N000566 HC SHEATH CR3

## 2021-09-09 PROCEDURE — 79445 NUCLEAR RX INTRA-ARTERIAL: CPT | Mod: 26 | Performed by: STUDENT IN AN ORGANIZED HEALTH CARE EDUCATION/TRAINING PROGRAM

## 2021-09-09 RX ORDER — FLUMAZENIL 0.1 MG/ML
0.2 INJECTION, SOLUTION INTRAVENOUS
Status: DISCONTINUED | OUTPATIENT
Start: 2021-09-09 | End: 2021-09-09 | Stop reason: HOSPADM

## 2021-09-09 RX ORDER — NALOXONE HYDROCHLORIDE 0.4 MG/ML
0.4 INJECTION, SOLUTION INTRAMUSCULAR; INTRAVENOUS; SUBCUTANEOUS
Status: DISCONTINUED | OUTPATIENT
Start: 2021-09-09 | End: 2021-09-09 | Stop reason: HOSPADM

## 2021-09-09 RX ORDER — DIPHENHYDRAMINE HYDROCHLORIDE 50 MG/ML
25-50 INJECTION INTRAMUSCULAR; INTRAVENOUS
Status: COMPLETED | OUTPATIENT
Start: 2021-09-09 | End: 2021-09-09

## 2021-09-09 RX ORDER — DEXTROSE MONOHYDRATE 25 G/50ML
25-50 INJECTION, SOLUTION INTRAVENOUS
Status: DISCONTINUED | OUTPATIENT
Start: 2021-09-09 | End: 2021-09-09 | Stop reason: HOSPADM

## 2021-09-09 RX ORDER — METHYLPREDNISOLONE 4 MG
4 TABLET, DOSE PACK ORAL ONCE
Qty: 21 TABLET | Refills: 0 | Status: SHIPPED | OUTPATIENT
Start: 2021-09-10 | End: 2021-09-10

## 2021-09-09 RX ORDER — IODIXANOL 320 MG/ML
150 INJECTION, SOLUTION INTRAVASCULAR ONCE
Status: COMPLETED | OUTPATIENT
Start: 2021-09-09 | End: 2021-09-09

## 2021-09-09 RX ORDER — ONDANSETRON 4 MG/1
4-8 TABLET, FILM COATED ORAL EVERY 6 HOURS PRN
Qty: 40 TABLET | Refills: 0 | Status: ON HOLD | OUTPATIENT
Start: 2021-09-09 | End: 2022-05-13

## 2021-09-09 RX ORDER — NALOXONE HYDROCHLORIDE 0.4 MG/ML
0.2 INJECTION, SOLUTION INTRAMUSCULAR; INTRAVENOUS; SUBCUTANEOUS
Status: DISCONTINUED | OUTPATIENT
Start: 2021-09-09 | End: 2021-09-09 | Stop reason: HOSPADM

## 2021-09-09 RX ORDER — LIDOCAINE 40 MG/G
CREAM TOPICAL
Status: DISCONTINUED | OUTPATIENT
Start: 2021-09-09 | End: 2021-09-09 | Stop reason: HOSPADM

## 2021-09-09 RX ORDER — AMPICILLIN AND SULBACTAM 2; 1 G/1; G/1
3 INJECTION, POWDER, FOR SOLUTION INTRAMUSCULAR; INTRAVENOUS
Status: COMPLETED | OUTPATIENT
Start: 2021-09-09 | End: 2021-09-09

## 2021-09-09 RX ORDER — OXYCODONE HYDROCHLORIDE 5 MG/1
5-10 TABLET ORAL EVERY 6 HOURS PRN
Qty: 10 TABLET | Refills: 0 | Status: ON HOLD | OUTPATIENT
Start: 2021-09-09 | End: 2022-05-13

## 2021-09-09 RX ORDER — NICOTINE POLACRILEX 4 MG
15-30 LOZENGE BUCCAL
Status: DISCONTINUED | OUTPATIENT
Start: 2021-09-09 | End: 2021-09-09 | Stop reason: HOSPADM

## 2021-09-09 RX ORDER — ONDANSETRON 2 MG/ML
4 INJECTION INTRAMUSCULAR; INTRAVENOUS ONCE
Status: COMPLETED | OUTPATIENT
Start: 2021-09-09 | End: 2021-09-09

## 2021-09-09 RX ORDER — HYDROMORPHONE HYDROCHLORIDE 2 MG/1
2-4 TABLET ORAL EVERY 4 HOURS PRN
Status: DISCONTINUED | OUTPATIENT
Start: 2021-09-09 | End: 2021-09-09 | Stop reason: HOSPADM

## 2021-09-09 RX ORDER — HEPARIN SODIUM 200 [USP'U]/100ML
1 INJECTION, SOLUTION INTRAVENOUS CONTINUOUS PRN
Status: DISCONTINUED | OUTPATIENT
Start: 2021-09-09 | End: 2021-09-09 | Stop reason: HOSPADM

## 2021-09-09 RX ORDER — FENTANYL CITRATE 50 UG/ML
25-50 INJECTION, SOLUTION INTRAMUSCULAR; INTRAVENOUS EVERY 5 MIN PRN
Status: DISCONTINUED | OUTPATIENT
Start: 2021-09-09 | End: 2021-09-09 | Stop reason: HOSPADM

## 2021-09-09 RX ORDER — PANTOPRAZOLE SODIUM 40 MG/1
40 TABLET, DELAYED RELEASE ORAL DAILY
Qty: 30 TABLET | Refills: 0 | Status: ON HOLD | OUTPATIENT
Start: 2021-09-09 | End: 2022-05-13

## 2021-09-09 RX ORDER — SODIUM CHLORIDE 9 MG/ML
INJECTION, SOLUTION INTRAVENOUS CONTINUOUS
Status: DISCONTINUED | OUTPATIENT
Start: 2021-09-09 | End: 2021-09-09 | Stop reason: HOSPADM

## 2021-09-09 RX ADMIN — IODIXANOL 100 ML: 320 INJECTION, SOLUTION INTRAVASCULAR at 15:30

## 2021-09-09 RX ADMIN — DIPHENHYDRAMINE HYDROCHLORIDE 25 MG: 50 INJECTION INTRAMUSCULAR; INTRAVENOUS at 14:12

## 2021-09-09 RX ADMIN — FENTANYL CITRATE 25 MCG: 50 INJECTION, SOLUTION INTRAMUSCULAR; INTRAVENOUS at 14:25

## 2021-09-09 RX ADMIN — HEPARIN SODIUM 2 BAG: 200 INJECTION, SOLUTION INTRAVENOUS at 14:35

## 2021-09-09 RX ADMIN — MIDAZOLAM 0.5 MG: 1 INJECTION INTRAMUSCULAR; INTRAVENOUS at 14:51

## 2021-09-09 RX ADMIN — AMPICILLIN AND SULBACTAM 3 G: 1; 2 INJECTION, POWDER, FOR SOLUTION INTRAMUSCULAR; INTRAVENOUS at 12:34

## 2021-09-09 RX ADMIN — HYDROCORTISONE SODIUM SUCCINATE 100 MG: 100 INJECTION, POWDER, FOR SOLUTION INTRAMUSCULAR; INTRAVENOUS at 12:30

## 2021-09-09 RX ADMIN — FENTANYL CITRATE 25 MCG: 50 INJECTION, SOLUTION INTRAMUSCULAR; INTRAVENOUS at 14:08

## 2021-09-09 RX ADMIN — SODIUM CHLORIDE: 9 INJECTION, SOLUTION INTRAVENOUS at 12:27

## 2021-09-09 RX ADMIN — MIDAZOLAM 0.5 MG: 1 INJECTION INTRAMUSCULAR; INTRAVENOUS at 14:07

## 2021-09-09 RX ADMIN — LIDOCAINE HYDROCHLORIDE 14 ML: 10 INJECTION, SOLUTION EPIDURAL; INFILTRATION; INTRACAUDAL; PERINEURAL at 14:32

## 2021-09-09 RX ADMIN — FENTANYL CITRATE 25 MCG: 50 INJECTION, SOLUTION INTRAMUSCULAR; INTRAVENOUS at 14:52

## 2021-09-09 RX ADMIN — Medication 18.42 MCI.: at 14:50

## 2021-09-09 RX ADMIN — MIDAZOLAM 0.5 MG: 1 INJECTION INTRAMUSCULAR; INTRAVENOUS at 14:25

## 2021-09-09 RX ADMIN — PANTOPRAZOLE SODIUM 40 MG: 40 INJECTION, POWDER, FOR SOLUTION INTRAVENOUS at 12:26

## 2021-09-09 RX ADMIN — ONDANSETRON 4 MG: 2 INJECTION INTRAMUSCULAR; INTRAVENOUS at 12:45

## 2021-09-09 RX ADMIN — FENTANYL CITRATE 25 MCG: 50 INJECTION, SOLUTION INTRAMUSCULAR; INTRAVENOUS at 15:20

## 2021-09-09 NOTE — PROGRESS NOTES
Reviewed discharge instructions with daughter and pt using  ipad. Pt tolerated food and drink. Ambulated in clark with SBA. Right groin site intact. Old bruising from Mapping. Daughter signed discharge papers and picked up meds from pharmacy.

## 2021-09-09 NOTE — PROCEDURES
[facility]    Procedure: IR Procedure Note    Date/Time: 9/9/2021 3:31 PM  Performed by: Gio Peterson DO  Authorized by: Gio Peterson DO   IR Fellow Physician: Nicholas    UNIVERSAL PROTOCOL   Site Marked: NA  Prior Images Obtained and Reviewed:  Yes  Required items: Required blood products, implants, devices and special equipment available    Patient identity confirmed:  Verbally with patient, arm band, provided demographic data and hospital-assigned identification number  Patient was reevaluated immediately before administering moderate or deep sedation or anesthesia  Confirmation Checklist:  Patient's identity using two indicators, relevant allergies, procedure was appropriate and matched the consent or emergent situation and correct equipment/implants were available  Time out: Immediately prior to the procedure a time out was called    Universal Protocol: the Joint Commission Universal Protocol was followed    Preparation: Patient was prepped and draped in usual sterile fashion           ANESTHESIA    Anesthesia: Local infiltration  Local Anesthetic:  Lidocaine 1% without epinephrine      SEDATION    Patient Sedated: Yes    Sedation Type:  Moderate (conscious) sedation  Sedation:  Fentanyl and midazolam  Vital signs: Vital signs monitored during sedation    See dictated procedure note for full details.  Findings: Uncomplicated to dose delivery for radioembolization of unresectable hepatocellular carcinoma.  Right groin puncture closed with Angio-Seal.    Specimens: none    Complications: None    Condition: Stable    Plan: Bedrest for 2 hours with right leg straight.  Patient can be discharged after nuclear medicine scan and meeting appropriate discharge criteria.    PROCEDURE   Patient Tolerance:  Patient tolerated the procedure well with no immediate complications    Length of time physician/provider present for 1:1 monitoring during sedation:  75

## 2021-09-09 NOTE — PROGRESS NOTES
Patient tolerated recovery stage satisfactorily. VSS, right groin site clean/dry/intact, no hematoma, and denies pain. Patient tolerated PO food and fluids. Teaching was done and discharge instructions were given to patient & his daughter.   Patient ambulated and PIV was removed. Did not urinate, when asked said he didn't need to, but did drink two glasses of water & half carton of milk.  Patient discharged from the hospital via wheel chair to home with his daughter.

## 2021-09-09 NOTE — DISCHARGE INSTRUCTIONS
[facility]   Interventional Radiology  Discharge Instructions Post Angiography for Insertion of   Radioactive Microspheres to the Liver      AFTER YOU GO HOME          Relax and take it easy for 24 hours.       Drink plenty of fluids.       Resume your regular diet, unless otherwise instructed by your Primary Physician.       DO NOT smoke for at least 24 hours, if you were given any sedation.       DO NOT drink alcoholic beverages the day of your procedure.       Do not drive or operate machinery at home or at work for 24 hours.          DO NOT do any strenuous exercise or lifting for at least 2 days following your procedure.       DO NOT take a bath or shower for at least 12 hours following your procedure.       DO NOT make any important or legal decisions for 24 hours following your procedure.    CALL THE PHYSICIAN IF:       - You start bleeding from the procedure site. lie down flat and hold pressure on the site. A small lump or bruise is common at the puncture site. Your physician will tell you if you need to return to the hospital.      - You develop numbness, coolness or a change in color of the leg that was punctured.      - You experience increased pain or redness at the puncture site.      - You develop hives or a rash or unexplained itching.      - You develop a temperature of 101 degrees F or greater.    Additional Information:           Support the puncture site for coughing, sneezing, or moving your bowels for the first 48 hours  No tub bath, hot tubs, or swimming for 5 days  No lotion or powder to the puncture site for 3 day  Hold Metformin x 48 hours    Follow the Discharge Instructions for the Liver Brachytherapy; Contrast Instructions and Instructions for the Radiopharmaceuticals.     Closure Device: Angio Seal- booklet given            [facility] INTERVENTIONAL RADIOLOGY DEPARTMENT         Procedure Physician: [place] and Dr. Peterson   Date of Procedure:September 9, 2021        Telephone Numbers:   [phone]     Monday-Friday 8:00 to 4:30 pm                                        [phone]     After 4:30 pm Monday-Friday, Weekend and Holidays. Ask for the Interventional Radiologist on call. Someone is on call 24 hrs/day.

## 2021-09-09 NOTE — PROGRESS NOTES
S/p Y90 delivery. Pt sleeping. Arouses to voice. VSS. No signs of pain Right groin site intact. Bedrest x 2 hours.

## 2021-09-09 NOTE — SEDATION DOCUMENTATION
[facility] Procedure Note        Sedation:      Performed by: Gio Peterson DO  Authorized by: Gio Peterson DO    Pre-Procedure Assessment done at 1300.    Expected Level:  Moderate Sedation    Indication:  Sedation is required to allow for y-90 delivery     Consent obtained from patient after discussing the risks, benefits and alternatives.     Hmong  present     PO Intake:  Appropriately NPO for procedure    ASA Class:  Class 2 - MILD SYSTEMIC DISEASE, NO ACUTE PROBLEMS, NO FUNCTIONAL LIMITATIONS.    Mallampati:  Grade 1:  Soft palate, uvula, tonsillar pillars, and posterior pharyngeal wall visible    Lungs: Lungs Clear with good breath sounds bilaterally.     Heart: Normal heart sounds and rate    History and physical reviewed and no updates needed. I have reviewed the lab findings, diagnostic data, medications, and the plan for sedation. I have determined this patient to be an appropriate candidate for the planned sedation and procedure and have reassessed the patient IMMEDIATELY PRIOR to sedation and procedure.

## 2021-09-09 NOTE — PROGRESS NOTES
Pt felt nauseated and itchy on face neck after Protonix then 4 min gave Solu Cortef. Zofran given. 5 hives noted on chest and face flushed. VSS     aware

## 2021-09-09 NOTE — PROGRESS NOTES
2A prep for Y 90 Delivery. Using Anonymess  iPad to complete prep. Pt approprietly NPO. Left PIV in place and infusing. Labs sent. Pt stated he took full dose of Lantus and Metformin last evening 9/8/21. Blood sugar now 89. Right groin site tender and bruised. Groin is clipped. Pedal pulses strong. Gave pre procedure Protonix and SoluCortef. Pt became itchy and nauseated. 5 hives on chest. (MD aware). Zofran given.     Contrast reviewed with /patient and daughter. Emphasized Holding Metformin for 48hrs. Copied and copy given to pt.

## 2021-09-09 NOTE — PROGRESS NOTES
Shahnaz Flores notified of emesis after Protonix and Solumedrol given; pt reports if sugar is low he usually feels lethargic not nauseated. BLood sugar checked, 89, reported to CHRIS Flores. Per CHRIS Flores zofran given now per IV. Bedside RN, Emelina aware.

## 2021-09-10 ENCOUNTER — TELEPHONE (OUTPATIENT)
Dept: VASCULAR SURGERY | Facility: CLINIC | Age: 61
End: 2021-09-10

## 2021-09-10 DIAGNOSIS — C22.0 HCC (HEPATOCELLULAR CARCINOMA) (H): Primary | ICD-10-CM

## 2021-09-10 NOTE — TELEPHONE ENCOUNTER
Called pt with [facility]  in which we left a msg.    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       Asked her to reach out to me with any question, and will let our  contact them with 1 mos fup appt    She agrees to plan.     Leonela TORRE RN, BSN  Interventional Radiology/Vascular  Nurse Coordinator   Phone: [phone]  Fax: [phone]

## 2021-09-15 ENCOUNTER — TELEPHONE (OUTPATIENT)
Dept: RADIOLOGY | Facility: CLINIC | Age: 61
End: 2021-09-15

## 2021-09-15 NOTE — TELEPHONE ENCOUNTER
From: Leonela George RN  Sent: 9/10/2021  12:26 PM CDT  To: Leonela George RN, Elan Fuentes  Subject: fup 10/5 at 1230pm jg                            Hello    1 mo follow-up s/p y90 with dr alonzo    Date: 10/5 at 230pm   Video     Then get mri and labs a few days prior to    Then send out letter    Thank smary

## 2021-10-02 NOTE — PROGRESS NOTES
Rishabh is a 61 year old who is being evaluated via a billable telephone visit.      What phone number would you like to be contacted at? [phone]  How would you like to obtain your AVS? Buddy  Phone call duration: 28 minutes          INTERVENTIONAL RADIOLOGY CLINIC VISIT - FOLLOW UP    Name: Rishabh Cabrera  Age: 61 year old       HPI: Rishabh Cabrera is a 61 year old with history of hepatits B and HCC s/p Y90 (9/9/21) who returns to clinic for follow up.    Patient did well following his procedure. Patient developed fatigue starting about one week after his procedure. These symptoms have not improved. Has some mild abdominal pain at night he described as bloating. The patient is concerned about systemic chemotherapy. He is focused on getting a transplant.       PAST MEDICAL HISTORY:   Past Medical History:   Diagnosis Date     Cancer (H)      Chronic hepatitis B (H)      Diabetes mellitus (H)        PAST SURGICAL HISTORY:   Past Surgical History:   Procedure Laterality Date     ESOPHAGOSCOPY, GASTROSCOPY, DUODENOSCOPY (EGD), COMBINED N/A 5/29/2019    Procedure: ESOPHAGOGASTRODUODENOSCOPY (EGD);  Surgeon: Leventhal, Thomas Michael, MD;  Location: [facility]     IR SIRT (SELECTIVE INTERNAL RADIO THERAPY)  9/8/2021     IR VISCERAL ANGIOGRAM  9/8/2021     IR VISCERAL EMBOLIZATION  9/9/2021       FAMILY HISTORY:   No family history on file.    SOCIAL HISTORY:   Social History     Tobacco Use     Smoking status: Never Smoker     Smokeless tobacco: Never Used   Substance Use Topics     Alcohol use: No       PROBLEM LIST:   Patient Active Problem List    Diagnosis Date Noted     HCC (hepatocellular carcinoma) (H) 07/22/2021     Priority: Medium     Other ascites 06/11/2021     Priority: Medium     Liver lesion 09/14/2020     Priority: Medium       MEDICATIONS:   Prescription Medications as of 10/5/2021       Rx Number Disp Refills Start End Last Dispensed Date Next Fill Date Owning Pharmacy    furosemide (LASIX) 20 MG tablet  60 tablet 5  6/11/2021    [id number] [place], [place] - [address] AT [facility] [address]    Sig: Take 2 tablets (40 mg) by mouth daily    Class: E-Prescribe    Route: Oral    ibuprofen (ADVIL/MOTRIN) 100 MG tablet            Sig: Take 100 mg by mouth every 4 hours as needed    Class: Historical    Route: Oral    LANTUS SOLOSTAR 100 UNIT/ML soln   3 6/13/2018        Sig: Inject 10 Units Subcutaneous At Bedtime     Class: Historical    Route: Subcutaneous    metFORMIN (GLUCOPHAGE) 1000 MG tablet   3 3/15/2019        Sig: Take 1 tablet by mouth 2 times daily (with meals)    Class: Historical    Route: Oral    naproxen (NAPROSYN) 500 MG tablet    1/13/2006        Sig: Take 1 tablet by mouth 2 times daily as needed for pain     Class: Historical    Route: Oral    ondansetron (ZOFRAN) 4 MG tablet  40 tablet 0 9/9/2021    [place], [place] - [address] [facility]    Sig: Take 1-2 tablets (4-8 mg) by mouth every 6 hours as needed for nausea (vomiting)    Class: E-Prescribe    Route: Oral    oxyCODONE (ROXICODONE) 5 MG tablet  10 tablet 0 9/9/2021        Sig: Take 1-2 tablets (5-10 mg) by mouth every 6 hours as needed for moderate to severe pain    Class: Local Print    Earliest Fill Date: 9/9/2021    Route: Oral    pantoprazole (PROTONIX) 40 MG EC tablet  30 tablet 0 9/9/2021    [facility] [place], [place] - [address] [facility]    Sig: Take 1 tablet (40 mg) by mouth daily    Class: E-Prescribe    Route: Oral    spironolactone (ALDACTONE) 50 MG tablet  30 tablet 5 6/11/2021    [facility] [place], [place] - [address] [place] [address] [place]    Sig: Take 1 tablet (50 mg) by mouth daily    Class: E-Prescribe    Route: Oral    tenofovir (VIREAD) 300 MG tablet  90 tablet 3 7/22/2021    [facility] [place] [facility] [address] [facility]    Sig: Take 1 tablet (300 mg) by mouth daily    Class: E-Prescribe    Route: Oral    tenofovir  alafenamide fumarate (VEMLIDY) 25 MG tablet  30 tablet 11 7/16/2021    [facility] - [place], [place] - [address]    Sig: Take 1 tablet (25 mg) by mouth daily with food (dispense only in the original container).    Class: E-Prescribe    Route: Oral      Clinic-Administered Medications as of 10/5/2021       Dose Frequency Start End    gadobutrol (GADAVIST) injection 7.5 mL 7.5 mL ONCE 7/28/2018     Class: E-Prescribe    Route: Intravenous          ALLERGIES:   Crabs [crustaceans], Pork (porcine) protein, Seafood, and Shellfish allergy    ROS:  A brief review of systems was negative as detailed in the HPI.        Physical Examination:   VITALS:   There were no vitals taken for this visit.  No physical exam was performed on this telephone interview        Labs:    BMP RESULTS:  Lab Results   Component Value Date     10/04/2021     05/10/2021    POTASSIUM 4.0 10/04/2021    POTASSIUM 4.7 05/10/2021    CHLORIDE 111 (H) 10/04/2021    CHLORIDE 103 05/10/2021    CO2 28 10/04/2021    CO2 26 05/10/2021    ANIONGAP 1 (L) 10/04/2021    ANIONGAP 6 05/10/2021    GLC 74 10/04/2021     (H) 05/10/2021    BUN 11 10/04/2021    BUN 12 05/10/2021    CR 0.84 10/04/2021    CR 0.85 05/10/2021    GFRESTIMATED >90 10/04/2021    GFRESTIMATED >90 05/10/2021    GFRESTBLACK >90 05/10/2021    MANDY 7.9 (L) 10/04/2021    MANDY 8.0 (L) 05/10/2021        CBC RESULTS:  Lab Results   Component Value Date    WBC 4.3 10/04/2021    WBC 3.5 (L) 05/10/2021    RBC 3.94 (L) 10/04/2021    RBC 4.55 05/10/2021    HGB 11.9 (L) 10/04/2021    HGB 13.0 (L) 05/10/2021    HCT 36.1 (L) 10/04/2021    HCT 39.1 (L) 05/10/2021    MCV 92 10/04/2021    MCV 86 05/10/2021    MCH 30.2 10/04/2021    MCH 28.6 05/10/2021    MCHC 33.0 10/04/2021    MCHC 33.2 05/10/2021    RDW 17.0 (H) 10/04/2021    RDW 14.9 05/10/2021    PLT 68 (L) 10/04/2021    PLT 52 (L) 05/10/2021       INR/PTT:  Lab Results   Component Value Date    INR 1.71 (H)  10/04/2021    INR 1.39 (H) 05/10/2021    PTT 36 09/08/2021       Liver Function Studies - Recent Labs   Lab Test 10/04/21  1415   PROTTOTAL 7.0   ALBUMIN 2.0*   BILITOTAL 3.2*   ALKPHOS 243*   AST 45   ALT 42         Diagnostic studies:   MR 10/4/21   Enhancing lesion in hepatic segment 5/6 appears minimally changed following Y90 ablation    Assessment Rishabh Cabrera is a 61 year old with history of hepatits B and HCC s/p Y90 (9/9/21) who returns to clinic for follow up. The patient has had fatigue following his procedure. Anticipate that this would start to improve. Unfortunately his follow up MRI demonstrates persistent tumor without significant decrease in tumor size. The patient may benefit from cTACE. He would require multiple cTACE interventions given his poor liver function. Patient would only eligible transplantation if the tumor was adequately treated, placing the patient in Caio criteria. Patient would proceed with chemoembolization if it remains indicated after formal MRI read.    Plan Proceed to cTACE after if formal MRI read describes persistent treatable tumor.      I have visited this patient virtually with the resident and agree with the note.        CC  Patient Care Team:  No Ref-Primary, Physician as PCP - General  Helena Camejo MD (Student in organized health care education/training program)  Michelle De, RN as Specialty Care Coordinator (Hematology & Oncology)  Arturo Lund MD as MD (Hematology)  Kenneth So MD as Assigned Heart and Vascular Provider  Miki Purcell MD as Assigned Pulmonology Provider  Arturo Lund MD as Assigned Cancer Care Provider  SELF, REFERRED

## 2021-10-04 ENCOUNTER — HOSPITAL ENCOUNTER (OUTPATIENT)
Dept: MRI IMAGING | Facility: CLINIC | Age: 61
Discharge: HOME | End: 2021-10-04
Attending: RADIOLOGY | Admitting: RADIOLOGY
Payer: MEDICARE

## 2021-10-04 DIAGNOSIS — C22.0 HCC (HEPATOCELLULAR CARCINOMA) (H): ICD-10-CM

## 2021-10-04 LAB
AFP SERPL-MCNC: 12.3 UG/L (ref 0–8)
ALBUMIN SERPL-MCNC: 2 G/DL (ref 3.4–5)
ALP SERPL-CCNC: 243 U/L (ref 40–150)
ALT SERPL W P-5'-P-CCNC: 42 U/L (ref 0–70)
ANION GAP SERPL CALCULATED.3IONS-SCNC: 1 MMOL/L (ref 3–14)
AST SERPL W P-5'-P-CCNC: 45 U/L (ref 0–45)
BILIRUB DIRECT SERPL-MCNC: 0.9 MG/DL (ref 0–0.2)
BILIRUB SERPL-MCNC: 3.2 MG/DL (ref 0.2–1.3)
BUN SERPL-MCNC: 11 MG/DL (ref 7–30)
CALCIUM SERPL-MCNC: 7.9 MG/DL (ref 8.5–10.1)
CHLORIDE BLD-SCNC: 111 MMOL/L (ref 94–109)
CO2 SERPL-SCNC: 28 MMOL/L (ref 20–32)
CREAT SERPL-MCNC: 0.84 MG/DL (ref 0.66–1.25)
ERYTHROCYTE [DISTWIDTH] IN BLOOD BY AUTOMATED COUNT: 17 % (ref 10–15)
GFR SERPL CREATININE-BSD FRML MDRD: >90 ML/MIN/1.73M2
GLUCOSE BLD-MCNC: 74 MG/DL (ref 70–99)
HCT VFR BLD AUTO: 36.1 % (ref 40–53)
HGB BLD-MCNC: 11.9 G/DL (ref 13.3–17.7)
INR PPP: 1.71 (ref 0.85–1.15)
MCH RBC QN AUTO: 30.2 PG (ref 26.5–33)
MCHC RBC AUTO-ENTMCNC: 33 G/DL (ref 31.5–36.5)
MCV RBC AUTO: 92 FL (ref 78–100)
PLATELET # BLD AUTO: 68 10E3/UL (ref 150–450)
POTASSIUM BLD-SCNC: 4 MMOL/L (ref 3.4–5.3)
PROT SERPL-MCNC: 7 G/DL (ref 6.8–8.8)
RBC # BLD AUTO: 3.94 10E6/UL (ref 4.4–5.9)
SODIUM SERPL-SCNC: 140 MMOL/L (ref 133–144)
WBC # BLD AUTO: 4.3 10E3/UL (ref 4–11)

## 2021-10-04 PROCEDURE — 36415 COLL VENOUS BLD VENIPUNCTURE: CPT | Performed by: RADIOLOGY

## 2021-10-04 PROCEDURE — G1004 CDSM NDSC: HCPCS | Performed by: STUDENT IN AN ORGANIZED HEALTH CARE EDUCATION/TRAINING PROGRAM

## 2021-10-04 PROCEDURE — A9585 GADOBUTROL INJECTION: HCPCS | Performed by: RADIOLOGY

## 2021-10-04 PROCEDURE — 82105 ALPHA-FETOPROTEIN SERUM: CPT | Performed by: RADIOLOGY

## 2021-10-04 PROCEDURE — 80048 BASIC METABOLIC PNL TOTAL CA: CPT | Performed by: RADIOLOGY

## 2021-10-04 PROCEDURE — 74183 MRI ABD W/O CNTR FLWD CNTR: CPT | Mod: MG

## 2021-10-04 PROCEDURE — 85610 PROTHROMBIN TIME: CPT | Performed by: RADIOLOGY

## 2021-10-04 PROCEDURE — 85027 COMPLETE CBC AUTOMATED: CPT | Performed by: RADIOLOGY

## 2021-10-04 PROCEDURE — 255N000002 HC RX 255 OP 636: Performed by: RADIOLOGY

## 2021-10-04 PROCEDURE — 74183 MRI ABD W/O CNTR FLWD CNTR: CPT | Mod: 26 | Performed by: STUDENT IN AN ORGANIZED HEALTH CARE EDUCATION/TRAINING PROGRAM

## 2021-10-04 PROCEDURE — 82040 ASSAY OF SERUM ALBUMIN: CPT | Performed by: RADIOLOGY

## 2021-10-04 RX ORDER — GADOBUTROL 604.72 MG/ML
6 INJECTION INTRAVENOUS ONCE
Status: COMPLETED | OUTPATIENT
Start: 2021-10-04 | End: 2021-10-04

## 2021-10-04 RX ADMIN — GADOBUTROL 6 ML: 604.72 INJECTION INTRAVENOUS at 13:25

## 2021-10-05 ENCOUNTER — VIRTUAL VISIT (OUTPATIENT)
Dept: RADIOLOGY | Facility: CLINIC | Age: 61
End: 2021-10-05
Attending: RADIOLOGY
Payer: MEDICARE

## 2021-10-05 DIAGNOSIS — C22.0 HEPATOCELLULAR CARCINOMA (H): Primary | ICD-10-CM

## 2021-10-05 PROCEDURE — 999N001193 HC VIDEO/TELEPHONE VISIT; NO CHARGE

## 2021-10-05 PROCEDURE — 99214 OFFICE O/P EST MOD 30 MIN: CPT | Mod: 95 | Performed by: RADIOLOGY

## 2021-10-05 NOTE — PATIENT INSTRUCTIONS
We will call you with the appointment details of your Chemoembolization procedure.     This procedure is where they will perform an arterial puncture in your groin, and then thread a microcatheter to where the lesions are in the liver. They will then inject medications called : Doxorubicin and Mitomycin into the tumor itself and then block the artery that feed the tumor. This  Is called Embolization.     This will be done under a CT scan in which we will also be able to see other feeding arteries to other tumors as well.     The chemo drug WILL only stay in the liver and not go through out the body.         Please check into the Gold Waiting room, located on the main level, at [facility] [place] [place], located at [address] [place] [address]    -You will check in 1.5 hours prior  to the appointment time.    Reminders:    -No solids or milk products 8 hours prior to procedure time.    -No clear liquids 2 hours prior to procedure time.     -Please take your morning medications as indicated with enough water to swallow, with the exception of the medications listed below:    METFORMIN: DO NOT TAKE THIS THE MORNING OF THE PROCEDURE.     LANTUS: ONLY TAKE 80% OF THIS MEDICATION ON THE DAY OF THE PROCEDURE      --You will be going home the same day; so make sure you have a .     -You will also need to get a covid test 4 days prior to the procedure.     -Post follow up: We will call you 2-3 days after you leave to follow up after the procedure.     We will also have you return in 1 mos with an MRI and follow upappt.       *Please keep in mind that you may have Post Embolization syndrome.  The reason for this is because the tumor is dying.   These symptoms can last for up to 14 days.     This includes:    -high fevers 101-102, which may last for a couple of days. We recommend using over the counter medications such as Tylenol or Ibuprofen.     -Nausea, in which we will give you medications after  you are discharged home.     -Decreased appetite: We don't expect you to eat 3 full meals. Instead, we prefer that you  snack through out the day.     -Feeling fatigue: this is normal, however we recommend that you get up and walk around.     **Please keep in mind to stay hydrated after the procedure. At  Least 6 glasses of water a day.      *Abnormal symptoms in which to call or seek immediate help:  1. Confusion!!-GO TO THE EMERGENCY ROOM.  2. Swelling of the lower legs  3. Severe abdominal pain that doesn't go away with pain medications.   4. High fevers that do not come down with over the counter medications.     Should ANY of the above symptoms are exhibited, please seek immediate help or call our hospital at [phone] and ask for the Interventional Radiologist On-call (after hours) or you can contact me (during business hours) 8-4pm.    Should you have any further questions, please call us at anytime. It has been a pleasure to see you today.      **Please note:      Visiting hours are 8 a.m. to 8:30 p.m.      Arrive wearing a mask over your mouth and nose.  Keep it on during your visit. If you don t wear a  mask, we ll ask you to leave.    Clean your hands with alcohol hand . Do  this when you arrive at and leave the building and  patient room, and again after you touch your mask  or anything in the room.    We will ask screening questions to check if you are  healthy. You can t visit if you have a fever, cough,  shortness of breath, muscle aches, headaches,  sore throat or diarrhea (loose, watery stools).    Stay 6 feet away from others during your visit and  between visits.    Go directly to and from the room you are visiting.    Stay in the patient s room during your visit. Limit  going to other places in the hospital as much as  you can.    Leave bags and jackets at home or in the car.    For everyone s health, please don t come and go  during your visit. That includes for smoking.      No  visitors under age 18 are allowed for adult  hospital patients. Children may have visitors ages  12 and older.    Adult Patients:  Two visitors are allowed at a time for hospital, long-term acute care hospital (LTACH), transitional care unit, antepartum, and acute rehabilitation unit patients staying in a private room.   One visitor is allowed for patients staying in a semi-private room.   Every four days, the visitor(s) can rotate (during the four-day period, the visitor(s) must be consistent).  One visitor is allowed for clinic appointments and emergency department visits. The one visitor may rotate as needed.  Adult surgical patients can have one visitor wait in the surgical waiting lounge if there is adequate space for social distancing. No changes for pediatric patients (children can have visitors throughout the surgery process).        Children under 18:  Two visitors are allowed for hospital visits, clinic appointments, emergency department visits, and surgeries or procedures. The individuals may rotate daily.  Visitors ages 12 and older are allowed.  Family pets can visit (must follow any applicable pet policies).      COVID TESTING      *it is now expected that ALL patients will have a Covid 19 testing 4 days before their procedure. There is a Covid 19 Team that will be contacting you with more information when it gets closer to your procedure.     **If you do not hear from this team within a week before your scheduled procedure please call their scheduling line at [phone].    *If you decide to have Covid testing outside of the [facility] system please make sure that it is completed 4 days prior to procedure and that all results are faxed directly to:    Attn: Interventional Radiology  Fax: [phone].      *WE DO NOT ACCEPT ANY ORAL SWAB OR SPUTUM COVID TESTING. THE TESTING MUST BE A NASAL SWAB.               Leonela TORRE RN  Interventional Radiology Nurse Coordinator   Phone:  [phone]

## 2021-10-05 NOTE — NURSING NOTE
States he has been feeling bloated more often after his meals.   MRI was completed yesterday.   Michelle Khan, Virtual Facilitator

## 2021-10-05 NOTE — LETTER
10/5/2021         RE: Rishabh Cabrera  [address]  [facility] [address]        Dear Colleague,    Thank you for referring your patient, Rishabh Cabrera, to the [facility]. Please see a copy of my visit note below.    Rishabh is a 61 year old who is being evaluated via a billable telephone visit.      What phone number would you like to be contacted at? [phone]  How would you like to obtain your AVS? Lisaharrolanda  Phone call duration: 28 minutes          INTERVENTIONAL RADIOLOGY CLINIC VISIT - FOLLOW UP    Name: Rishabh Cabrera  Age: 61 year old       HPI: Rishabh Cabrera is a 61 year old with history of hepatits B and HCC s/p Y90 (9/9/21) who returns to clinic for follow up.    Patient did well following his procedure. Patient developed fatigue starting about one week after his procedure. These symptoms have not improved. Has some mild abdominal pain at night he described as bloating. The patient is concerned about systemic chemotherapy. He is focused on getting a transplant.       PAST MEDICAL HISTORY:   Past Medical History:   Diagnosis Date     Cancer (H)      Chronic hepatitis B (H)      Diabetes mellitus (H)        PAST SURGICAL HISTORY:   Past Surgical History:   Procedure Laterality Date     ESOPHAGOSCOPY, GASTROSCOPY, DUODENOSCOPY (EGD), COMBINED N/A 5/29/2019    Procedure: ESOPHAGOGASTRODUODENOSCOPY (EGD);  Surgeon: Leventhal, Thomas Michael, MD;  Location: [facility]     IR SIRT (SELECTIVE INTERNAL RADIO THERAPY)  9/8/2021     IR VISCERAL ANGIOGRAM  9/8/2021     IR VISCERAL EMBOLIZATION  9/9/2021       FAMILY HISTORY:   No family history on file.    SOCIAL HISTORY:   Social History     Tobacco Use     Smoking status: Never Smoker     Smokeless tobacco: Never Used   Substance Use Topics     Alcohol use: No       PROBLEM LIST:   Patient Active Problem List    Diagnosis Date Noted     HCC (hepatocellular carcinoma) (H) 07/22/2021     Priority: Medium     Other ascites 06/11/2021     Priority: Medium      Liver lesion 09/14/2020     Priority: Medium       MEDICATIONS:   Prescription Medications as of 10/5/2021       Rx Number Disp Refills Start End Last Dispensed Date Next Fill Date Owning Pharmacy    furosemide (LASIX) 20 MG tablet  60 tablet 5 6/11/2021    [id number] - [place], [place] - [address] AT [facility] [address]    Sig: Take 2 tablets (40 mg) by mouth daily    Class: E-Prescribe    Route: Oral    ibuprofen (ADVIL/MOTRIN) 100 MG tablet            Sig: Take 100 mg by mouth every 4 hours as needed    Class: Historical    Route: Oral    LANTUS SOLOSTAR 100 UNIT/ML soln   3 6/13/2018        Sig: Inject 10 Units Subcutaneous At Bedtime     Class: Historical    Route: Subcutaneous    metFORMIN (GLUCOPHAGE) 1000 MG tablet   3 3/15/2019        Sig: Take 1 tablet by mouth 2 times daily (with meals)    Class: Historical    Route: Oral    naproxen (NAPROSYN) 500 MG tablet    1/13/2006        Sig: Take 1 tablet by mouth 2 times daily as needed for pain     Class: Historical    Route: Oral    ondansetron (ZOFRAN) 4 MG tablet  40 tablet 0 9/9/2021    [facility] - [place], [place] - [address]    Sig: Take 1-2 tablets (4-8 mg) by mouth every 6 hours as needed for nausea (vomiting)    Class: E-Prescribe    Route: Oral    oxyCODONE (ROXICODONE) 5 MG tablet  10 tablet 0 9/9/2021        Sig: Take 1-2 tablets (5-10 mg) by mouth every 6 hours as needed for moderate to severe pain    Class: Local Print    Earliest Fill Date: 9/9/2021    Route: Oral    pantoprazole (PROTONIX) 40 MG EC tablet  30 tablet 0 9/9/2021    [facility] - [place], [place] - [address]    Sig: Take 1 tablet (40 mg) by mouth daily    Class: E-Prescribe    Route: Oral    spironolactone (ALDACTONE) 50 MG tablet  30 tablet 5 6/11/2021    [facility] - [place], [place] - [address]    Sig: Take 1 tablet (50 mg) by mouth daily    Class: E-Prescribe     Route: Oral    tenofovir (VIREAD) 300 MG tablet  90 tablet 3 7/22/2021    [place], [place] - [address]    Sig: Take 1 tablet (300 mg) by mouth daily    Class: E-Prescribe    Route: Oral    tenofovir alafenamide fumarate (VEMLIDY) 25 MG tablet  30 tablet 11 7/16/2021    [facility] - [place], [place] - [address]    Sig: Take 1 tablet (25 mg) by mouth daily with food (dispense only in the original container).    Class: E-Prescribe    Route: Oral      Clinic-Administered Medications as of 10/5/2021       Dose Frequency Start End    gadobutrol (GADAVIST) injection 7.5 mL 7.5 mL ONCE 7/28/2018     Class: E-Prescribe    Route: Intravenous          ALLERGIES:   Crabs [crustaceans], Pork (porcine) protein, Seafood, and Shellfish allergy    ROS:  A brief review of systems was negative as detailed in the HPI.        Physical Examination:   VITALS:   There were no vitals taken for this visit.  No physical exam was performed on this telephone interview        Labs:    BMP RESULTS:  Lab Results   Component Value Date     10/04/2021     05/10/2021    POTASSIUM 4.0 10/04/2021    POTASSIUM 4.7 05/10/2021    CHLORIDE 111 (H) 10/04/2021    CHLORIDE 103 05/10/2021    CO2 28 10/04/2021    CO2 26 05/10/2021    ANIONGAP 1 (L) 10/04/2021    ANIONGAP 6 05/10/2021    GLC 74 10/04/2021     (H) 05/10/2021    BUN 11 10/04/2021    BUN 12 05/10/2021    CR 0.84 10/04/2021    CR 0.85 05/10/2021    GFRESTIMATED >90 10/04/2021    GFRESTIMATED >90 05/10/2021    GFRESTBLACK >90 05/10/2021    MANDY 7.9 (L) 10/04/2021    MANDY 8.0 (L) 05/10/2021        CBC RESULTS:  Lab Results   Component Value Date    WBC 4.3 10/04/2021    WBC 3.5 (L) 05/10/2021    RBC 3.94 (L) 10/04/2021    RBC 4.55 05/10/2021    HGB 11.9 (L) 10/04/2021    HGB 13.0 (L) 05/10/2021    HCT 36.1 (L) 10/04/2021    HCT 39.1 (L) 05/10/2021    MCV 92 10/04/2021    MCV 86 05/10/2021    MCH 30.2 10/04/2021     MCH 28.6 05/10/2021    MCHC 33.0 10/04/2021    MCHC 33.2 05/10/2021    RDW 17.0 (H) 10/04/2021    RDW 14.9 05/10/2021    PLT 68 (L) 10/04/2021    PLT 52 (L) 05/10/2021       INR/PTT:  Lab Results   Component Value Date    INR 1.71 (H) 10/04/2021    INR 1.39 (H) 05/10/2021    PTT 36 09/08/2021       Liver Function Studies - Recent Labs   Lab Test 10/04/21  1415   PROTTOTAL 7.0   ALBUMIN 2.0*   BILITOTAL 3.2*   ALKPHOS 243*   AST 45   ALT 42         Diagnostic studies:   MR 10/4/21   Enhancing lesion in hepatic segment 5/6 appears minimally changed following Y90 ablation    Assessment Rishabh Cabrera is a 61 year old with history of hepatits B and HCC s/p Y90 (9/9/21) who returns to clinic for follow up. The patient has had fatigue following his procedure. Anticipate that this would start to improve. Unfortunately his follow up MRI demonstrates persistent tumor without significant decrease in tumor size. The patient may benefit from cTACE. He would require multiple cTACE interventions given his poor liver function. Patient would only eligible transplantation if the tumor was adequately treated, placing the patient in Caio criteria. Patient would proceed with chemoembolization if it remains indicated after formal MRI read.    Plan Proceed to cTACE after if formal MRI read describes persistent treatable tumor.      I have visited this patient virtually with the resident and agree with the note.        CC  Patient Care Team:  No Ref-Primary, Physician as PCP - General  Helena Camejo MD (Student in organized health care education/training program)  Michelle De, RN as Specialty Care Coordinator (Hematology & Oncology)  Arturo Lund MD as MD (Hematology)  Kenneth So MD as Assigned Heart and Vascular Provider  Miki Purcell MD as Assigned Pulmonology Provider  Arturo Lund MD as Assigned Cancer Care Provider  SELF, REFERRED        Again, thank you for allowing me to participate in the care of your  patient.        Sincerely,        Kenneth So MD

## 2021-10-06 ENCOUNTER — HOSPITAL ENCOUNTER (EMERGENCY)
Facility: CLINIC | Age: 61
Discharge: HOME | End: 2021-10-06
Attending: EMERGENCY MEDICINE | Admitting: EMERGENCY MEDICINE
Payer: MEDICARE

## 2021-10-06 ENCOUNTER — APPOINTMENT (OUTPATIENT)
Dept: CT IMAGING | Facility: CLINIC | Age: 61
End: 2021-10-06
Attending: EMERGENCY MEDICINE
Payer: MEDICARE

## 2021-10-06 VITALS
WEIGHT: 135 LBS | RESPIRATION RATE: 18 BRPM | BODY MASS INDEX: 23.17 KG/M2 | SYSTOLIC BLOOD PRESSURE: 111 MMHG | HEART RATE: 96 BPM | OXYGEN SATURATION: 98 % | DIASTOLIC BLOOD PRESSURE: 61 MMHG | TEMPERATURE: 98.4 F

## 2021-10-06 DIAGNOSIS — R07.9 CHEST PAIN, UNSPECIFIED TYPE: ICD-10-CM

## 2021-10-06 DIAGNOSIS — R07.89 OTHER CHEST PAIN: ICD-10-CM

## 2021-10-06 LAB
ALBUMIN SERPL-MCNC: 1.8 G/DL (ref 3.4–5)
ALP SERPL-CCNC: 223 U/L (ref 40–150)
ALT SERPL W P-5'-P-CCNC: 36 U/L (ref 0–70)
ANION GAP SERPL CALCULATED.3IONS-SCNC: 1 MMOL/L (ref 3–14)
AST SERPL W P-5'-P-CCNC: 42 U/L (ref 0–45)
ATRIAL RATE - MUSE: 97 BPM
BASOPHILS # BLD AUTO: 0 10E3/UL (ref 0–0.2)
BASOPHILS NFR BLD AUTO: 1 %
BILIRUB SERPL-MCNC: 1.4 MG/DL (ref 0.2–1.3)
BUN SERPL-MCNC: 11 MG/DL (ref 7–30)
CALCIUM SERPL-MCNC: 7.9 MG/DL (ref 8.5–10.1)
CHLORIDE BLD-SCNC: 109 MMOL/L (ref 94–109)
CO2 SERPL-SCNC: 29 MMOL/L (ref 20–32)
CREAT SERPL-MCNC: 0.96 MG/DL (ref 0.66–1.25)
DIASTOLIC BLOOD PRESSURE - MUSE: NORMAL MMHG
EOSINOPHIL # BLD AUTO: 0.1 10E3/UL (ref 0–0.7)
EOSINOPHIL NFR BLD AUTO: 3 %
ERYTHROCYTE [DISTWIDTH] IN BLOOD BY AUTOMATED COUNT: 17.2 % (ref 10–15)
GFR SERPL CREATININE-BSD FRML MDRD: 86 ML/MIN/1.73M2
GLUCOSE BLD-MCNC: 82 MG/DL (ref 70–99)
HCT VFR BLD AUTO: 34.7 % (ref 40–53)
HGB BLD-MCNC: 11.4 G/DL (ref 13.3–17.7)
HOLD SPECIMEN: NORMAL
IMM GRANULOCYTES # BLD: 0 10E3/UL
IMM GRANULOCYTES NFR BLD: 0 %
INR PPP: 1.81 (ref 0.85–1.15)
INTERPRETATION ECG - MUSE: NORMAL
LIPASE SERPL-CCNC: 131 U/L (ref 73–393)
LYMPHOCYTES # BLD AUTO: 0.4 10E3/UL (ref 0.8–5.3)
LYMPHOCYTES NFR BLD AUTO: 12 %
MCH RBC QN AUTO: 30 PG (ref 26.5–33)
MCHC RBC AUTO-ENTMCNC: 32.9 G/DL (ref 31.5–36.5)
MCV RBC AUTO: 91 FL (ref 78–100)
MONOCYTES # BLD AUTO: 0.5 10E3/UL (ref 0–1.3)
MONOCYTES NFR BLD AUTO: 15 %
NEUTROPHILS # BLD AUTO: 2.3 10E3/UL (ref 1.6–8.3)
NEUTROPHILS NFR BLD AUTO: 69 %
NRBC # BLD AUTO: 0 10E3/UL
NRBC BLD AUTO-RTO: 0 /100
P AXIS - MUSE: 34 DEGREES
PLATELET # BLD AUTO: 81 10E3/UL (ref 150–450)
POTASSIUM BLD-SCNC: 3.6 MMOL/L (ref 3.4–5.3)
PR INTERVAL - MUSE: 182 MS
PROT SERPL-MCNC: 6.8 G/DL (ref 6.8–8.8)
QRS DURATION - MUSE: 96 MS
QT - MUSE: 368 MS
QTC - MUSE: 467 MS
R AXIS - MUSE: 22 DEGREES
RBC # BLD AUTO: 3.8 10E6/UL (ref 4.4–5.9)
SODIUM SERPL-SCNC: 139 MMOL/L (ref 133–144)
SYSTOLIC BLOOD PRESSURE - MUSE: NORMAL MMHG
T AXIS - MUSE: 22 DEGREES
TROPONIN I SERPL-MCNC: <0.015 UG/L (ref 0–0.04)
TROPONIN I SERPL-MCNC: <0.015 UG/L (ref 0–0.04)
VENTRICULAR RATE- MUSE: 97 BPM
WBC # BLD AUTO: 3.3 10E3/UL (ref 4–11)

## 2021-10-06 PROCEDURE — 83690 ASSAY OF LIPASE: CPT | Performed by: EMERGENCY MEDICINE

## 2021-10-06 PROCEDURE — 93010 ELECTROCARDIOGRAM REPORT: CPT | Performed by: EMERGENCY MEDICINE

## 2021-10-06 PROCEDURE — 71275 CT ANGIOGRAPHY CHEST: CPT | Mod: ME

## 2021-10-06 PROCEDURE — 84484 ASSAY OF TROPONIN QUANT: CPT | Mod: 91 | Performed by: EMERGENCY MEDICINE

## 2021-10-06 PROCEDURE — 99285 EMERGENCY DEPT VISIT HI MDM: CPT | Mod: 25 | Performed by: EMERGENCY MEDICINE

## 2021-10-06 PROCEDURE — G1004 CDSM NDSC: HCPCS | Performed by: RADIOLOGY

## 2021-10-06 PROCEDURE — 71275 CT ANGIOGRAPHY CHEST: CPT | Mod: 26 | Performed by: RADIOLOGY

## 2021-10-06 PROCEDURE — 250N000009 HC RX 250: Performed by: EMERGENCY MEDICINE

## 2021-10-06 PROCEDURE — 99285 EMERGENCY DEPT VISIT HI MDM: CPT | Mod: 25

## 2021-10-06 PROCEDURE — 85025 COMPLETE CBC W/AUTO DIFF WBC: CPT | Performed by: EMERGENCY MEDICINE

## 2021-10-06 PROCEDURE — 36415 COLL VENOUS BLD VENIPUNCTURE: CPT | Performed by: EMERGENCY MEDICINE

## 2021-10-06 PROCEDURE — 250N000011 HC RX IP 250 OP 636: Performed by: EMERGENCY MEDICINE

## 2021-10-06 PROCEDURE — 93005 ELECTROCARDIOGRAM TRACING: CPT

## 2021-10-06 PROCEDURE — 80053 COMPREHEN METABOLIC PANEL: CPT | Performed by: EMERGENCY MEDICINE

## 2021-10-06 PROCEDURE — 85610 PROTHROMBIN TIME: CPT | Performed by: EMERGENCY MEDICINE

## 2021-10-06 PROCEDURE — 250N000013 HC RX MED GY IP 250 OP 250 PS 637: Performed by: EMERGENCY MEDICINE

## 2021-10-06 PROCEDURE — 84484 ASSAY OF TROPONIN QUANT: CPT | Performed by: EMERGENCY MEDICINE

## 2021-10-06 RX ORDER — OXYCODONE HYDROCHLORIDE 5 MG/1
5 TABLET ORAL ONCE
Status: COMPLETED | OUTPATIENT
Start: 2021-10-06 | End: 2021-10-06

## 2021-10-06 RX ORDER — OXYCODONE HYDROCHLORIDE 5 MG/1
5 TABLET ORAL EVERY 6 HOURS PRN
Qty: 12 TABLET | Refills: 0 | Status: ON HOLD | OUTPATIENT
Start: 2021-10-06 | End: 2022-05-13

## 2021-10-06 RX ORDER — IOPAMIDOL 755 MG/ML
53 INJECTION, SOLUTION INTRAVASCULAR ONCE
Status: COMPLETED | OUTPATIENT
Start: 2021-10-06 | End: 2021-10-06

## 2021-10-06 RX ORDER — ACETAMINOPHEN 500 MG
1000 TABLET ORAL ONCE
Status: DISCONTINUED | OUTPATIENT
Start: 2021-10-06 | End: 2021-10-06

## 2021-10-06 RX ADMIN — OXYCODONE HYDROCHLORIDE 5 MG: 5 TABLET ORAL at 05:14

## 2021-10-06 RX ADMIN — SODIUM CHLORIDE, PRESERVATIVE FREE 84 ML: 5 INJECTION INTRAVENOUS at 06:09

## 2021-10-06 RX ADMIN — IOPAMIDOL 53 ML: 755 INJECTION, SOLUTION INTRAVENOUS at 06:08

## 2021-10-06 ASSESSMENT — ENCOUNTER SYMPTOMS
FEVER: 0
PALPITATIONS: 0
NAUSEA: 0
ABDOMINAL PAIN: 0
SHORTNESS OF BREATH: 0
CHILLS: 0
BACK PAIN: 0
DIARRHEA: 0
CONFUSION: 0
DYSURIA: 0
WEAKNESS: 0
VOMITING: 0
BRUISES/BLEEDS EASILY: 1
COUGH: 0

## 2021-10-06 NOTE — ED TRIAGE NOTES
"Pt presents for evaluation of chest pain. Pt reports he has had intermittent CP for a week but it worsened last night to the point he could no longer take it, so he presents. Pt states that it \"feels like blood cannot get through my heart.\"    Pt in obvious discomfort. EKG, PIV, and labs complete.  "

## 2021-10-06 NOTE — DISCHARGE INSTRUCTIONS
Thank you for your patience today.  Please follow-up with your regular doctor and oncologist in the next 2-3 days for further evaluation and follow-up care.  Please call to schedule an appointment.    You will need to follow up with your oncologist/hematologist regarding your CT results today and further treatment plan  Please continue your own medications.  Please take oxycodone as needed for severe pain. Please return to the ER if you develop high fever, severe pain, difficulty breathing, or any worsening of your current symptoms.  It was a pleasure taking care of you today.  We hope you feel better soon.

## 2021-10-06 NOTE — ED PROVIDER NOTES
ED Provider Note  [facility]      History     Chief Complaint   Patient presents with     Chest Pain     HPI     The history is provided by patient, son, and medical records    Rishabh Cabrera is a 61 year old male who is a past medical history of hepatitis B and HCC s/p Y90 (9/9/21) who presents to the ED from home with c/o of chest pain.  Patient complains of chest pain that started around 2 AM.  Patient reports that he woke up with chest pain that he describes as a sharp pain on the left side of his chest that is worse with movement and worse when trying to roll over or move while in bed.  Patient denies any radiation of the pain, pain is better while resting.  Patient does note that approximately 5 days ago he had similar symptoms with pain in the left side of the chest however this was not as severe and went away so patient did not say anything to his son about it.  This morning patient woke up his son because he was worried the pain came back and states he does not want to die from this pain.  Patient denies any shortness of breath, weakness, dizziness, no cough or cold-like symptoms.  Patient denies any fever, chills, abdominal pain, nausea, vomiting, no lower extremity pain or swelling.  patient recently diagnosed with HCC and being followed closely for this and in process of coming up with treatment plan.  Son reports that patient has been working outside in the garden, doing exercises, and is concerned that he may have pulled a muscle.  No medications prior to arrival.  No other complaints.    Past Medical History  Past Medical History:   Diagnosis Date     Cancer (H)      Chronic hepatitis B (H)      Diabetes mellitus (H)      Past Surgical History:   Procedure Laterality Date     ESOPHAGOSCOPY, GASTROSCOPY, DUODENOSCOPY (EGD), COMBINED N/A 5/29/2019    Procedure: ESOPHAGOGASTRODUODENOSCOPY (EGD);  Surgeon: Leventhal, Thomas Michael, MD;  Location:  GI     IR SIRT (SELECTIVE  INTERNAL RADIO THERAPY)  9/8/2021     IR VISCERAL ANGIOGRAM  9/8/2021     IR VISCERAL EMBOLIZATION  9/9/2021     furosemide (LASIX) 20 MG tablet  ibuprofen (ADVIL/MOTRIN) 100 MG tablet  LANTUS SOLOSTAR 100 UNIT/ML soln  metFORMIN (GLUCOPHAGE) 1000 MG tablet  naproxen (NAPROSYN) 500 MG tablet  ondansetron (ZOFRAN) 4 MG tablet  oxyCODONE (ROXICODONE) 5 MG tablet  pantoprazole (PROTONIX) 40 MG EC tablet  spironolactone (ALDACTONE) 50 MG tablet  tenofovir (VIREAD) 300 MG tablet  tenofovir alafenamide fumarate (VEMLIDY) 25 MG tablet      Allergies   Allergen Reactions     Crabs [Crustaceans]      Pork (Porcine) Protein Unknown     Seafood Unknown     Shellfish Allergy Unknown     Family History  No family history on file.  Social History   Social History     Tobacco Use     Smoking status: Never Smoker     Smokeless tobacco: Never Used   Substance Use Topics     Alcohol use: No     Drug use: Not Currently     Types: Marijuana     Comment: past marijuana use      Past medical history, past surgical history, medications, allergies, family history, and social history were reviewed with the patient. No additional pertinent items.       Review of Systems   Constitutional: Negative for chills and fever.   HENT: Negative for congestion.    Eyes: Negative for visual disturbance.   Respiratory: Negative for cough and shortness of breath.    Cardiovascular: Positive for chest pain. Negative for palpitations and leg swelling.   Gastrointestinal: Negative for abdominal pain, diarrhea, nausea and vomiting.   Endocrine: Negative for polyuria.   Genitourinary: Negative for dysuria.   Musculoskeletal: Negative for back pain.   Skin: Negative for rash.   Allergic/Immunologic: Negative for immunocompromised state.   Neurological: Negative for weakness.   Hematological: Bruises/bleeds easily.   Psychiatric/Behavioral: Negative for confusion.     Physical Exam   BP: 122/76  Pulse: 95  Temp: 98.4  F (36.9  C)  Resp: 16  Weight: 61.2 kg (135  lb)  SpO2: 100 %  Physical Exam  General: Afebrile, no acute distress   HEENT: Normocephalic, atraumatic, conjunctivae normal. MMM  Neck: non-tender, supple  Cardio: regular rate. regular rhythm   Resp: Normal work of breathing, no respiratory distress, lungs clear bilaterally, no wheezing, rhonchi, rales  Chest/Back: no visual signs of trauma, no CVA tenderness   Abdomen: soft, non distension, no tenderness, no peritoneal signs   Neuro: alert and fully oriented. CN II-XII grossly intact. Grossly normal strength and sensation in all extremities.   MSK: no deformities. Normal range of motion  Integumentary/Skin: no rash visualized, normal color  Psych: normal affect, normal behavior  ED Course      Procedures    Interpreted by Aracelis Venegas MD  Time reviewed: 0335  Symptoms at time of EKG: chest pain   Rhythm: normal sinus   Rate: normal - 97  Axis: normal  Ectopy: none  Conduction: normal  ST Segments/ T Waves: No ST-T wave changes  Q Waves: none  Comparison to prior: Unchanged    Clinical Impression: normal sinus rhythm with no acute ischemic change     Results for orders placed or performed during the hospital encounter of 10/06/21   CT Chest Pulmonary Embolism w Contrast     Status: None    Narrative    EXAM: CT CHEST PULMONARY EMBOLISM W CONTRAST  LOCATION: [facility]  DATE/TIME: 10/6/2021 6:01 AM    INDICATION: PE suspected, high prob. Chest pain.  COMPARISON: 07/08/2021  TECHNIQUE: CT chest pulmonary angiogram during arterial phase injection of IV contrast. Multiplanar reformats and MIP reconstructions were performed. Dose reduction techniques were used.   CONTRAST: 53 ml isovue 370     FINDINGS:  ANGIOGRAM CHEST: No evidence for pulmonary embolism. No evidence for thoracic or abdominal aortic dissection. Branch vessels from the abdominal and thoracic aorta remain patent. Minimal vascular calcification.    LUNGS AND PLEURA: Stable chronic right-sided pleural  fluid collection with peripheral calcification. Consolidative opacities involving the posterior aspect of the right upper lung remain unchanged. Spiculated opacity involving the left upper lobe   anteriorly unchanged at 13 x 15 mm up. Mild volume loss right lung base associated with the peripherally calcified pleural fluid collection. No left-sided pleural fluid. Area of linear atelectasis micronodular to the anterior left lower lobe unchanged.    MEDIASTINUM/AXILLAE: Minimal distal esophageal wall thickening with adjacent fluid increased since prior. No lymphadenopathy. Calcified lymph node in the mediastinum.    CORONARY ARTERY CALCIFICATION: Mild.    UPPER ABDOMEN: Interval increase in upper abdominal ascites. Hepatic cirrhosis. Partially visualized postoperative changes to the bowel.    MUSCULOSKELETAL: Degenerative changes within the spine.      Impression    IMPRESSION:  1.  No evidence for pulmonary embolism.    2.  No evidence for aortic dissection.    3.  Stable volume loss with chronic right-sided pleural fluid collection with peripheral calcification.    4.  Stable consolidative opacities and spiculated opacity involving the upper lungs as detailed above. The spiculated opacity in the left upper lungs concerning for pulmonary malignancy should undergo further evaluation with PET/CT if this has not   already been performed. If these have not already undergone PET/CT evaluation consider follow-up in 3 months.    5.  Interval increase in upper abdominal ascites. Hepatic cirrhosis.   Extra Blue Top Tube     Status: None   Result Value Ref Range    Hold Specimen JIC    Extra Red Top Tube     Status: None   Result Value Ref Range    Hold Specimen JIC    Extra Green Top (Lithium Heparin) Tube     Status: None   Result Value Ref Range    Hold Specimen JIC    Extra Purple Top Tube     Status: None   Result Value Ref Range    Hold Specimen JIC    Troponin I     Status: Normal   Result Value Ref Range    Troponin I  <0.015 0.000 - 0.045 ug/L   Lipase     Status: Normal   Result Value Ref Range    Lipase 131 73 - 393 U/L   INR     Status: Abnormal   Result Value Ref Range    INR 1.81 (H) 0.85 - 1.15   Comprehensive metabolic panel     Status: Abnormal   Result Value Ref Range    Sodium 139 133 - 144 mmol/L    Potassium 3.6 3.4 - 5.3 mmol/L    Chloride 109 94 - 109 mmol/L    Carbon Dioxide (CO2) 29 20 - 32 mmol/L    Anion Gap 1 (L) 3 - 14 mmol/L    Urea Nitrogen 11 7 - 30 mg/dL    Creatinine 0.96 0.66 - 1.25 mg/dL    Calcium 7.9 (L) 8.5 - 10.1 mg/dL    Glucose 82 70 - 99 mg/dL    Alkaline Phosphatase 223 (H) 40 - 150 U/L    AST 42 0 - 45 U/L    ALT 36 0 - 70 U/L    Protein Total 6.8 6.8 - 8.8 g/dL    Albumin 1.8 (L) 3.4 - 5.0 g/dL    Bilirubin Total 1.4 (H) 0.2 - 1.3 mg/dL    GFR Estimate 86 >60 mL/min/1.73m2   CBC with platelets and differential     Status: Abnormal   Result Value Ref Range    WBC Count 3.3 (L) 4.0 - 11.0 10e3/uL    RBC Count 3.80 (L) 4.40 - 5.90 10e6/uL    Hemoglobin 11.4 (L) 13.3 - 17.7 g/dL    Hematocrit 34.7 (L) 40.0 - 53.0 %    MCV 91 78 - 100 fL    MCH 30.0 26.5 - 33.0 pg    MCHC 32.9 31.5 - 36.5 g/dL    RDW 17.2 (H) 10.0 - 15.0 %    Platelet Count 81 (L) 150 - 450 10e3/uL    % Neutrophils 69 %    % Lymphocytes 12 %    % Monocytes 15 %    % Eosinophils 3 %    % Basophils 1 %    % Immature Granulocytes 0 %    NRBCs per 100 WBC 0 <1 /100    Absolute Neutrophils 2.3 1.6 - 8.3 10e3/uL    Absolute Lymphocytes 0.4 (L) 0.8 - 5.3 10e3/uL    Absolute Monocytes 0.5 0.0 - 1.3 10e3/uL    Absolute Eosinophils 0.1 0.0 - 0.7 10e3/uL    Absolute Basophils 0.0 0.0 - 0.2 10e3/uL    Absolute Immature Granulocytes 0.0 <=0.0 10e3/uL    Absolute NRBCs 0.0 10e3/uL   EKG 12 lead     Status: None   Result Value Ref Range    Systolic Blood Pressure  mmHg    Diastolic Blood Pressure  mmHg    Ventricular Rate 97 BPM    Atrial Rate 97 BPM    AR Interval 182 ms    QRS Duration 96 ms     ms    QTc 467 ms    P Axis 34 degrees    R  AXIS 22 degrees    T Axis 22 degrees    Interpretation ECG       Sinus rhythm  Normal ECG  Unconfirmed report - interpretation of this ECG is computer generated - see medical record for final interpretation  Confirmed by - EMERGENCY ROOM, PHYSICIAN ([id number]),  IMANI WETZEL ([id number]) on 10/6/2021 4:58:55 AM     [place] Draw     Status: None    Narrative    The following orders were created for panel order [place] Draw.  Procedure                               Abnormality         Status                     ---------                               -----------         ------                     Extra Blue Top Tube[[id number]]                              Final result               Extra Red Top Tube[[id number]]                               Final result               Extra Green Top (Lithium...[[id number]]                      Final result               Extra Purple Top Tube[[id number]]                            Final result                 Please view results for these tests on the individual orders.   CBC with platelets differential     Status: Abnormal    Narrative    The following orders were created for panel order CBC with platelets differential.  Procedure                               Abnormality         Status                     ---------                               -----------         ------                     CBC with platelets and d...[[id number]]  Abnormal            Final result                 Please view results for these tests on the individual orders.     Medications   oxyCODONE (ROXICODONE) tablet 5 mg (5 mg Oral Given 10/6/21 0514)   iopamidol (ISOVUE-370) solution 53 mL (53 mLs Intravenous Given 10/6/21 0608)   sodium chloride 0.9 % bag 500mL for CT scan flush use (84 mLs Intravenous Given 10/6/21 0609)        Assessments & Plan (with Medical Decision Making)   Rishabh Cabrera is a 61 year old male who is a past medical history of hepatitis B and HCC s/p Y90 (9/9/21) who presents to the ED from home  with c/o of chest pain.  On arrival patient is well-appearing, afebrile, no distress.  Patient here with chest pain that started around 2 AM that is worse with movement, and improved by rest.  No hypoxia, no shortness of breath, no tachycardia.  Differential diagnosis includes but is not limited to ACS versus atypical chest pain versus pneumonia versus pulmonary embolism versus pleural effusion versus musculoskeletal/inflammatory among others.  At this time plan for EKG, comprehensive labs, chest imaging, will give oxycodone for pain, and reevaluate.    I reviewed EKG which demonstrates normal sinus rhythm with ventricular rate of 97 bpm, normal axis, no acute ischemic change, no prolonged QTC, no significant change when compared to prior EKG. I reviewed comprehensive labs which are remarkable for white blood cell count of 3.3, hemoglobin 11.4, no acute metabolic electrolyte abnormality, alkaline phosphatase 223, bilirubin 1.4, normal lipase, INR 1.81, negative troponin. (Per chart review laboratory testing near patient's baseline).  Patient's chest pain along with history of cancer, will obtain CT scan of the chest to further evaluate patient's symptoms and rule out pulmonary embolism, metastasis, effusion.    I Reviewed CT scan which demonstrates no evidence of pulmonary embolism no evidence for aortic dissection, there is consolidative opacities and spiculated opacity in the upper lungs, spiculated opacity in the left upper lung concerning for pulmonary malignancy recommend further evaluation with PET/CT scan.  This could be etiology of patient's symptoms.  I discussed results with patient and recommend close outpatient follow-up with his oncologist for further evaluation and treatment plan.    Patient pending repeat troponin, if negative plan for discharge home with close outpatient follow-up.  Patient signed out to morning provider pending troponin.    I have reviewed the nursing notes. I have reviewed the  findings, diagnosis, plan and need for follow up with the patient.    New Prescriptions    No medications on file       Final diagnoses:   Chest pain, unspecified type       --  Aracelis Venegas MD  [facility] EMERGENCY DEPARTMENT  10/6/2021     Aracelis Venegas MD  10/06/21 0702

## 2021-10-07 ENCOUNTER — APPOINTMENT (OUTPATIENT)
Dept: INTERPRETER SERVICES | Facility: CLINIC | Age: 61
End: 2021-10-07
Payer: MEDICARE

## 2021-10-07 ENCOUNTER — PATIENT OUTREACH (OUTPATIENT)
Dept: CARE COORDINATION | Facility: CLINIC | Age: 61
End: 2021-10-07

## 2021-10-07 DIAGNOSIS — Z71.89 OTHER SPECIFIED COUNSELING: ICD-10-CM

## 2021-10-07 NOTE — PROGRESS NOTES
Clinic Care Coordination Contact  [facility]/Voicemail       Clinical Data: Care Coordinator Outreach  Outreach attempted x 1.  Left message on patient's voicemail with call back information and requested return call.  Plan: Care Coordinator will try to reach patient again in 1-2 business days.          AG Ling  [phone]  [facility]

## 2021-10-08 ENCOUNTER — TELEPHONE (OUTPATIENT)
Dept: VASCULAR SURGERY | Facility: CLINIC | Age: 61
End: 2021-10-08

## 2021-10-08 NOTE — TELEPHONE ENCOUNTER
Called daughter again to [facility] on Tues consult with Dr. So re: TACE    Left another msg asking that she return my call.        Leonela TORRE RN, BSN  Interventional Radiology/Vascular  Nurse Coordinator   Phone: [phone]  Fax: [phone]

## 2021-10-08 NOTE — PROGRESS NOTES
Clinic Care Coordination Contact  [facility]/Voicemail       Clinical Data: Care Coordinator Outreach  Outreach attempted x 2.  Left message on patient's  Daughter voicemail with call back information and requested return call.  Plan: CCare Coordinator will try to reach patient again in 1-2 business days.      AG Ling  [phone]  [facility]

## 2021-10-12 ENCOUNTER — VIRTUAL VISIT (OUTPATIENT)
Dept: ONCOLOGY | Facility: CLINIC | Age: 61
End: 2021-10-12
Attending: STUDENT IN AN ORGANIZED HEALTH CARE EDUCATION/TRAINING PROGRAM
Payer: MEDICARE

## 2021-10-12 DIAGNOSIS — C22.0 HCC (HEPATOCELLULAR CARCINOMA) (H): Primary | ICD-10-CM

## 2021-10-12 PROCEDURE — 99417 PROLNG OP E/M EACH 15 MIN: CPT | Performed by: STUDENT IN AN ORGANIZED HEALTH CARE EDUCATION/TRAINING PROGRAM

## 2021-10-12 PROCEDURE — 99215 OFFICE O/P EST HI 40 MIN: CPT | Mod: 95 | Performed by: STUDENT IN AN ORGANIZED HEALTH CARE EDUCATION/TRAINING PROGRAM

## 2021-10-12 PROCEDURE — 999N001193 HC VIDEO/TELEPHONE VISIT; NO CHARGE

## 2021-10-12 NOTE — PROGRESS NOTES
Rishabh is a 61 year old who is being evaluated via a billable video visit.      How would you like to obtain your AVS? Mail a copy  If the video visit is dropped, the invitation should be resent by: Text to cell phone: [phone]  Will anyone else be joining your video visit? No      Video Start Time: 845 am  Video-Visit Details    Type of service:  Video Visit    Video End Time:9:20 AM    Originating Location (pt. Location): Home    Distant Location (provider location):  [facility]CER [facility]     Platform used for Video Visit: [employer]

## 2021-10-12 NOTE — LETTER
10/12/2021         RE: Rishabh Cabrera  [address]  [place] [address]        Dear Colleague,    Thank you for referring your patient, Rishabh Cabrera, to the [facility]CER [facility]. Please see a copy of my visit note below.    Rishabh is a 61 year old who is being evaluated via a billable video visit.      How would you like to obtain your AVS? Mail a copy  If the video visit is dropped, the invitation should be resent by: Text to cell phone: [phone]  Will anyone else be joining your video visit? No      Video Start Time: 845 am  Video-Visit Details    Type of service:  Video Visit    Video End Time:9:20 AM    Originating Location (pt. Location): Home    Distant Location (provider location):  [facility]     Platform used for Video Visit: [facility]  Medical Oncology Initial Patient Visit Note    Patient name: Rishabh Cabrera  YOB: 1960  Visit date: 7/29/2021    Oncologic History:  Diagnosis/ stage of cancer: Hep B cirrhosis associated HCC, liver limited.    Prior treatment(s):   On Hep B treatment  Y90 to R liver in 9/2021    Care Team  Medical oncologist: Arturo Lund  Surgical oncologist:-   Radiation oncologist: Dr. Kenneth So  Hepatology: Dr. Carpenter Leventhal Nhia, son [phone]    Reason for consultation/ patient visit: recent ascites, recent diagnosis of 6 cm HCC    Visit accomplished using [employer] :  Jostin Cedeño    History of presenting illness:  Mr. Rishabh Cabrera is a 61 year old man with known DM2, chronic Hep B infection and related cirrhosis, complicated by small varices and ascites, who was recently found to have a 6 cm HCC by imaging criteria In segments 5/6 and a possible separate  focus in segment 7.  On tenofovir for Hep B, viral load log 8 in 5/2021.  Has had 1 LVP in 6/2021-- cytology neg.  MR abdomen 6/2021  1. Cirrhosis and evidence of portal hypertension including  splenomegaly, trace perihepatic ascites  and small gastroesophageal  varices.  2. Interval significant enlargement of previously noted 2.6 cm lesion  in segment 5/6, now measuring 6.2 cm.  2a. LIRADS 3 9 mm lesion in segment 7 is somewhat suspicious for HCC,  based on slight growth since 2019 and appearance similar to arterial  enhancement of larger hepatocellular carcinoma within the dysplastic  nodule in 2018 and 2019. Close attention on follow-up.  2b. Multiple arterially enhancing foci in the liver without associated  abnormalities on other sequences, likely represent vascular shunts.  Attention on follow-up.  3. Based on this exam only, the patient is not within [place] criteria.  Bone scan with no bone lesions, CT chest with 1.5 cm L apex spiculated lesion, MR abdomen as above.    We met in July 2021 and decided to biopsy lung nodule and proceed with locorgeional therapy    9/9/2021: y90 to R liver lobe  Had some fatigue after    10/4/21: follow up liver MRI  1. Cirrhosis and evidence of portal hypertension including  splenomegaly and mild to moderate volume ascites.  2. Post Y-90 therapy involving the hepatic segment 5/6 lesion.  Compared to 6/22/2021, overall size and enhancement pattern of the  lesion is grossly unchanged; LIRADS 5TR- Viable  3. Stable 8 mm arterial enhancing lesion in hepatic segment 7 without  washout or pseudocapsule formation, LIRADS 3. No new suspicious  hepatic lesions.  3. Based on this exam only, the patient is not within [place] criteria.    ED visit for chest pain on 10/6/21-- ruled out ACS and PE    Interval history:  Virtual visit today  Son, Ajit, on line  Reports weight loss-- 185 lb to 135 lbs  Some fatigue after y90  Climb flights of stairs very slowly.   Doing some light housework and gardening.  Able to do ADL, but needs help with advanced iADL-- able to cook rice but not much more  No bleeding from anywhere, but easy bruising.     Review of Systems: 14 point ROS negative other than the symptoms noted above in the  HPI.    Past medical history:  DM2- A1c 7.9  Chronic Hep B  Cirrhosis  HCC    Past surgical history:  Colon surgery  Y90 procedure    Social history:   Likes fishing and helping others  Retired     Lives with sister, soon moving in with son    Family history:  Mother with Hep B    Allergies:   No drug allergies    Outpatient medications:   Tenofovir daily  Lantus 10-12 units daily  Not taking diuretics (furosemide/ spironolactone) now      Physical examination:    Man in chair, muscle wasting in proximal muscles, angiomas on chest    General: patient appears well in no acute distress, alert and oriented, speech clear and fluid  Skin: no visualized rash or lesions on visualized skin  Resp: Appears to be breathing comfortably without accessory muscle usage, speaking in full sentences, no audible wheezes or cough.  Psych: Coherent speech, normal rate and volume, able to articulate logical thoughts, able to abstract reason, no tangential thoughts, no hallucinations or delusions.      Lab data:  I have personally reviewed the following lab data:  Hb 11, platelets 81k, Cr 0.9, bili 1.4, AST/ALT wnl.  AFP 12    Radiology data:  I have personally reviewed the images of the following radiology data: bone scan with no bone lesions, CT chest with 1.5 cm L apex spiculated lesion, MR abdomen as above.    Pathology and other data:  I have personally reviewed the following pathology data: cytology of ascitic fluid in June 2021-- negative    Assessment and Plan:  Mr. Rishabh Cabrera is a 61 year old  male with prior history of HepB/ cirrhosis who presented with ascites and has since been found to have segment 5/6 6 cm HCC (by imaging criteria) and possible focus of HCC in segment 7. Perhaps a separate primary in L lung apex. Otherwise no extrahepatic spread.  Underwent Y90 in 9/2021 with minimal imaging response.  He is ECOG 1 going on 2 with weight loss.  He is Child Olivera B8-9 perhaps. Bili <2, albumin <2.8, INR 1.7-2.2, some  ascites, and no encephalopathy that we know of. 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      We discussed today the role of systemic therapies in managing HCC. We discussed current FDA-approved treatments for advanced HCC are atezo/ linwood, lenvatiniv, and sorafenib. We discussed pros and cons of each treatment. We discussed expected benefit. We discussed that the hope would be that liver-directed therapy control the tumor for as long as possible. I discussed the care of Mr. Cabrera with Dr. So on the phone in detail previously.  The atezo-linwood trial excluded people with viral hepatitis and platelet count <75k. He has known active Hep B and platelet counts have been 60-80k. His last endoscopy was in 5/2019 and trial required EGD and treating varices within 6 months. Thus, if choosing systemic therapy, linwood use would be high risk and we would need and EGD.  If we do proceed with systemic therapy, it will be high risk. We discussed general concepts of chemotherapy and immunotherapy. These drugs are toxins that are used to kill cancer cells. Unfortunately none of the currently available chemotherapeutic agents have toxicity that only effects the cancer cell alone, most have some toxic effects on normal cells. The general side effects from chemotherapy include dermatologic side effects of hair loss and skin and nail changes; gastro-intestinal side effects of nausea, vomiting, diarrhea, constipation, heartburn and mouth sores; hematologic effects of lowering of the blood counts including lowered white blood cell count and neutrophil count with risk of infection, lowered red blood cell count (anemia) with fatigue and possible requirement for red blood cell transfusion, and lowered platelet count with risk of bruising and bleeding; and other effects including runny nose, tearing of the eyes, taste changes, irritation of the bladder, allergic reactions, weight gain, neuropathy (numbness and tingling in the hands and feet) and  joint and muscle pain. Rare side effects include heart damage, leukemia and death. The patient will be receiving chemotherapy teaching through our nurse coordinators with handouts describing all of the side effects again. Urgent clinical signs and ER warnings discussed. Verbal consent for chemotherapy obtained. Proceeding with this treatment has a number of risks, which we discussed. We will need to intensively monitor for toxicity following the treatment with scheduled bloodwork and clinical assessment.     I spent a long time chatting to patient about HCC, treatments, outcomes, transplant criteria, locoregional options, systemic therapy options.  We can consider single-agent IO. Nivolumab has a 15% RR or so.    Problem list:  - Hep B related cirrhosis and HCC  - Possible separate primary tumor in L lung apex  - Thrombocytopenia      Plan:  - continue follow up with IR for possibly TACE-- will message them to coordinate  - workup of L lung mass-- advised to biopsy but fell of the radar, I feel it is less critical now, as liver is more sick  - continue consideration of systemic therapy as he considers TACE  - continue tenofovir or other Hep B treatment, emphasized importance of compliance  - Ajit, son [phone] and daughter, Riana Blair at [phone] has care coordination questions and I think we could benefit from heavy SW involvement  - would like to involve palliative care now, will       90 minutes spent on the date of the encounter doing chart review, review of test results, interpretation of tests, patient visit, documentation, discussion with other provider(s) and discussion with family       Arturo Lund M.D.   of Medicine  Division of Hematology, Oncology and Transplantation  [facility]        Again, thank you for allowing me to participate in the care of your patient.        Sincerely,        Arturo Lund MD

## 2021-10-12 NOTE — PROGRESS NOTES
[facility]  Medical Oncology Initial Patient Visit Note    Patient name: Rishabh Cabrera  YOB: 1960  Visit date: 7/29/2021    Oncologic History:  Diagnosis/ stage of cancer: Hep B cirrhosis associated HCC, liver limited.    Prior treatment(s):   On Hep B treatment  Y90 to R liver in 9/2021    Care Team  Medical oncologist: Arturo Lund  Surgical oncologist:-   Radiation oncologist: Dr. Kenneth So  Hepatology: Dr. Carpenter Leventhal Nhia, son [phone]    Reason for consultation/ patient visit: recent ascites, recent diagnosis of 6 cm HCC    Visit accomplished using [employer] :  Jostin Cedeño    History of presenting illness:  Mr. Rishabh Cabrera is a 61 year old man with known DM2, chronic Hep B infection and related cirrhosis, complicated by small varices and ascites, who was recently found to have a 6 cm HCC by imaging criteria In segments 5/6 and a possible separate  focus in segment 7.  On tenofovir for Hep B, viral load log 8 in 5/2021.  Has had 1 LVP in 6/2021-- cytology neg.  MR abdomen 6/2021  1. Cirrhosis and evidence of portal hypertension including  splenomegaly, trace perihepatic ascites and small gastroesophageal  varices.  2. Interval significant enlargement of previously noted 2.6 cm lesion  in segment 5/6, now measuring 6.2 cm.  2a. LIRADS 3 9 mm lesion in segment 7 is somewhat suspicious for HCC,  based on slight growth since 2019 and appearance similar to arterial  enhancement of larger hepatocellular carcinoma within the dysplastic  nodule in 2018 and 2019. Close attention on follow-up.  2b. Multiple arterially enhancing foci in the liver without associated  abnormalities on other sequences, likely represent vascular shunts.  Attention on follow-up.  3. Based on this exam only, the patient is not within [place] criteria.  Bone scan with no bone lesions, CT chest with 1.5 cm L apex spiculated lesion, MR abdomen as above.    We met in July 2021 and decided to biopsy lung nodule  and proceed with locorgeional therapy    9/9/2021: y90 to R liver lobe  Had some fatigue after    10/4/21: follow up liver MRI  1. Cirrhosis and evidence of portal hypertension including  splenomegaly and mild to moderate volume ascites.  2. Post Y-90 therapy involving the hepatic segment 5/6 lesion.  Compared to 6/22/2021, overall size and enhancement pattern of the  lesion is grossly unchanged; LIRADS 5TR- Viable  3. Stable 8 mm arterial enhancing lesion in hepatic segment 7 without  washout or pseudocapsule formation, LIRADS 3. No new suspicious  hepatic lesions.  3. Based on this exam only, the patient is not within Caio criteria.    ED visit for chest pain on 10/6/21-- ruled out ACS and PE    Interval history:  Virtual visit today  Son, Ajit, on line  Reports weight loss-- 185 lb to 135 lbs  Some fatigue after y90  Climb flights of stairs very slowly.   Doing some light housework and gardening.  Able to do ADL, but needs help with advanced iADL-- able to cook rice but not much more  No bleeding from anywhere, but easy bruising.     Review of Systems: 14 point ROS negative other than the symptoms noted above in the HPI.    Past medical history:  DM2- A1c 7.9  Chronic Hep B  Cirrhosis  HCC    Past surgical history:  Colon surgery  Y90 procedure    Social history:   Likes fishing and helping others  Retired     Lives with sister, soon moving in with son    Family history:  Mother with Hep B    Allergies:   No drug allergies    Outpatient medications:   Tenofovir daily  Lantus 10-12 units daily  Not taking diuretics (furosemide/ spironolactone) now      Physical examination:    Man in chair, muscle wasting in proximal muscles, angiomas on chest    General: patient appears well in no acute distress, alert and oriented, speech clear and fluid  Skin: no visualized rash or lesions on visualized skin  Resp: Appears to be breathing comfortably without accessory muscle usage, speaking in full sentences, no  audible wheezes or cough.  Psych: Coherent speech, normal rate and volume, able to articulate logical thoughts, able to abstract reason, no tangential thoughts, no hallucinations or delusions.      Lab data:  I have personally reviewed the following lab data:  Hb 11, platelets 81k, Cr 0.9, bili 1.4, AST/ALT wnl.  AFP 12    Radiology data:  I have personally reviewed the images of the following radiology data: bone scan with no bone lesions, CT chest with 1.5 cm L apex spiculated lesion, MR abdomen as above.    Pathology and other data:  I have personally reviewed the following pathology data: cytology of ascitic fluid in June 2021-- negative    Assessment and Plan:  Mr. Rishabh Cabrera is a 61 year old  male with prior history of HepB/ cirrhosis who presented with ascites and has since been found to have segment 5/6 6 cm HCC (by imaging criteria) and possible focus of HCC in segment 7. Perhaps a separate primary in L lung apex. Otherwise no extrahepatic spread.  Underwent Y90 in 9/2021 with minimal imaging response.  He is ECOG 1 going on 2 with weight loss.  He is Child Olivera B8-9 perhaps. Bili <2, albumin <2.8, INR 1.7-2.2, some ascites, and no encephalopathy that we know of. 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      We discussed today the role of systemic therapies in managing HCC. We discussed current FDA-approved treatments for advanced HCC are atezo/ linwood, lenvatiniv, and sorafenib. We discussed pros and cons of each treatment. We discussed expected benefit. We discussed that the hope would be that liver-directed therapy control the tumor for as long as possible. I discussed the care of Mr. Cabrera with Dr. So on the phone in detail previously.  The atezo-linwood trial excluded people with viral hepatitis and platelet count <75k. He has known active Hep B and platelet counts have been 60-80k. His last endoscopy was in 5/2019 and trial required EGD and treating varices within 6 months. Thus, if choosing systemic  therapy, linwood use would be high risk and we would need and EGD.  If we do proceed with systemic therapy, it will be high risk. We discussed general concepts of chemotherapy and immunotherapy. These drugs are toxins that are used to kill cancer cells. Unfortunately none of the currently available chemotherapeutic agents have toxicity that only effects the cancer cell alone, most have some toxic effects on normal cells. The general side effects from chemotherapy include dermatologic side effects of hair loss and skin and nail changes; gastro-intestinal side effects of nausea, vomiting, diarrhea, constipation, heartburn and mouth sores; hematologic effects of lowering of the blood counts including lowered white blood cell count and neutrophil count with risk of infection, lowered red blood cell count (anemia) with fatigue and possible requirement for red blood cell transfusion, and lowered platelet count with risk of bruising and bleeding; and other effects including runny nose, tearing of the eyes, taste changes, irritation of the bladder, allergic reactions, weight gain, neuropathy (numbness and tingling in the hands and feet) and joint and muscle pain. Rare side effects include heart damage, leukemia and death. The patient will be receiving chemotherapy teaching through our nurse coordinators with handouts describing all of the side effects again. Urgent clinical signs and ER warnings discussed. Verbal consent for chemotherapy obtained. Proceeding with this treatment has a number of risks, which we discussed. We will need to intensively monitor for toxicity following the treatment with scheduled bloodwork and clinical assessment.     I spent a long time chatting to patient about HCC, treatments, outcomes, transplant criteria, locoregional options, systemic therapy options.  We can consider single-agent IO. Nivolumab has a 15% RR or so.    Problem list:  - Hep B related cirrhosis and HCC  - Possible separate primary  tumor in L lung apex  - Thrombocytopenia      Plan:  - continue follow up with IR for possibly TACE-- will message them to coordinate  - workup of L lung mass-- advised to biopsy but fell of the radar, I feel it is less critical now, as liver is more sick  - continue consideration of systemic therapy as he considers TACE  - continue tenofovir or other Hep B treatment, emphasized importance of compliance  - Ajit, son [phone] and daughter, Riana Blair at [phone] has care coordination questions and I think we could benefit from heavy SW involvement  - would like to involve palliative care now, will       90 minutes spent on the date of the encounter doing chart review, review of test results, interpretation of tests, patient visit, documentation, discussion with other provider(s) and discussion with family       Arturo Lund M.D.   of Medicine  Division of Hematology, Oncology and Transplantation  [facility]

## 2021-10-13 ENCOUNTER — PATIENT OUTREACH (OUTPATIENT)
Dept: CARE COORDINATION | Facility: CLINIC | Age: 61
End: 2021-10-13

## 2021-10-13 NOTE — PROGRESS NOTES
Oncology Distress Screening Follow-up  Clinical Social Work  [facility]    Identified Concern and Score From Distress Screening: Oncology Distress Screening    Row Name  10/12/21   0832           Please tell me how concerned you feel regarding the following common issues people with cancer face. Please answer with a number from zero to ten where 0=not concerned and 10=very concerned.    1. How concerned are you about your ability to eat?   6Abnormal             2. How concerned are you about unintended weight loss or your current weight?   --   stated could not give #           3. How concerned are you about feeling depressed or very sad?   6Abnormal             4. How concerned are you about feeling anxious or very scared?   6Abnormal             5. Do you struggle with the loss of meaning and milan in your life?   A great dealAbnormal    states he is just waiting to die.           6. How concerned are you about work and home life issues that may be affected by your cancer?   7Abnormal             7. How concerned are you about knowing what resources are available to help you?   8Abnormal             8. Do you currently have what you would describe as Holiness or spiritual struggles?              Somewhat                   Date of Distress Screening: 10/12/2021      Data: Mr. Rishabh Cabrera is a 61 year old  male with prior history of HepB/ cirrhosis who has been diagnosed with and perhaps a separate primary in L lung apex. At time of last visit, Patient scored positive on distress screen.  called Patient today with intention of introducing them to psychosocial services and support, and following up on elevated distress.        Intervention/Education Provided:  contacted Patient today with the assistance of a [employer]  over the phone. Patient reported he does have depression, but it is due to his inability to physically cook and clean for himself. Patient does not wish to have mental  health counseling services. Patient wishes to have PCA and Homemaker services, but his insurance does not cover it. Over income for MA ($1400/mo income and has a 401K). Cannot afford a Medicare supplement.      asked Patient if free meal delivery would help him. Patient reported he was interested in applying for meals trough [employer].  also mentioned the availability of financial assistance through [employer]. Patient was also interested in applying for this.    With Patient's verbal consent,  submitted applications for the following services:  [facility] Financial [facility]    Patient requested that  speak with one or both of his children to help coordinate services. Patient lives with both his son and daughter, but they work all day, every day and he is alone most of the time. Patient reported he is worried he is bothering them too much.      called and left voicemails for both his son Ajit ([phone]) and his daughter, Riana ([phone]).    Follow-up Required: [employer] and [employer] will communicate directly with Patient regarding the status of the referrals.  will help coordinate with Patient's son and/or daughter once phone calls are returned.  will remain available as needed.        Michelle Koroma [facility]  Clinical , Outpatient Specialty Clinics  Direct Phone: [phone]

## 2021-10-18 ENCOUNTER — TELEPHONE (OUTPATIENT)
Dept: VASCULAR SURGERY | Facility: CLINIC | Age: 61
End: 2021-10-18

## 2021-10-18 NOTE — TELEPHONE ENCOUNTER
Called son Ajit to fu on TACE inquiry  Left a msg asking for call back re: consult and treatment with Dr So    Informed him that I've also called and inquired with Riana in which I haven't heard back either.     Left direct line for him to return my call.     *called daughter Riana and left the same msg as well.         Leonela TORRE RN, BSN  Interventional Radiology/Vascular  Nurse Coordinator   Phone: [phone]  Fax: [phone]

## 2021-10-19 ENCOUNTER — TELEPHONE (OUTPATIENT)
Dept: VASCULAR SURGERY | Facility: CLINIC | Age: 61
End: 2021-10-19

## 2021-10-19 NOTE — TELEPHONE ENCOUNTER
Called pt and informed him that we have tried several times to reach his children in which there's not been any response.       I informed him that I have tried several times to reach his children to consult with them.     I informed pt that I am following up on the conversation regarding the TACE procedure.     He is very frustrated and disappointed that his children has also not responded.       He states that he did consult with the oncologist regarding chemotherapy. He would prefer not to have this due to he's seen many deaths with this option.     He feels that he's weak now as well as weight loss. He states that he is now losing weight as well.     He states that he understands that the Y90 did not shrink the tumor and therefore TACE was offered.     Pt states that he is still frustrated that he's not a transplant candidate.   We did go over the the criteria over in which our goal is to downsize the tumor however pt continues to state that he'd prefer no chemotherapy due to his experience is death.     He states that if he's not a candidate for transplant then it's a death sentence.     I did try to explain again that there's a certain criteria that he needs to meet prior to becoming a transplant candidate. I informed him that there are many patients and criteria that pts are evaluated for.       Pt states that he would choose transplant and not the other two options regarding chemotherapy and TACE treatment.     He states that chemotherapy and TACE , his experience is death in which if the cancer hasn't spread then a transplant will save his life.    I informed him that I will send the message back to the team and get back to him.     He agrees to plan.     Leonela TORRE RN, BSN  Interventional Radiology/Vascular  Nurse Coordinator   Phone: [phone]  Fax: [phone]

## 2021-10-24 ENCOUNTER — HEALTH MAINTENANCE LETTER (OUTPATIENT)
Age: 61
End: 2021-10-24

## 2021-10-26 ENCOUNTER — TELEPHONE (OUTPATIENT)
Dept: ONCOLOGY | Facility: CLINIC | Age: 61
End: 2021-10-26

## 2021-10-26 NOTE — CONFIDENTIAL NOTE
Patient calls in today and states he is unable to get chemoembolization to liver due to him being to skinny and not having enough strength.   Wondering what the next steps would be if he can not have the chemoembolization to his liver? States he needs another plan that is not the chemoembolization to his liver.

## 2021-10-28 ENCOUNTER — TELEPHONE (OUTPATIENT)
Dept: VASCULAR SURGERY | Facility: CLINIC | Age: 61
End: 2021-10-28

## 2021-10-28 NOTE — TELEPHONE ENCOUNTER
Called pt to fup on our conversation.    Used [employer] .     Left a msg asking that pt return my call to further discuss TACE.    Leonela TORRE RN, BSN  Interventional Radiology/Vascular  Nurse Coordinator   Phone: [phone]  Fax: [phone]

## 2021-11-19 ENCOUNTER — TELEPHONE (OUTPATIENT)
Dept: VASCULAR SURGERY | Facility: CLINIC | Age: 61
End: 2021-11-19
Payer: MEDICARE

## 2021-11-19 DIAGNOSIS — Z11.59 ENCOUNTER FOR SCREENING FOR OTHER VIRAL DISEASES: ICD-10-CM

## 2021-11-19 NOTE — TELEPHONE ENCOUNTER
Called daughter Riana    Informed her that we have pt scheduled for his TACE procedure with Dr. So    Date: Thursday 12/2/21  Check in : 1030 AM  Procedure: 12pm       All prep provided to her  Needs covid test 4 days prior to  Need  to go home    Will send letter to pt and send a mychart     Daughter Riana verbalized understanding .    Leonela TORRE RN, BSN  Interventional Radiology/Vascular  Nurse Coordinator   Phone: [phone]  Fax: [phone]

## 2021-11-19 NOTE — TELEPHONE ENCOUNTER
Called and spoke to Riana.    I inquired on the TACE procedure and inquired on what pt's decision is regarding treatment.      I informed her that  He does mention a lot about transplant and that per our last conversation he wasn't sure what he wanted in which lots of explanation went into chemotherapy versus chemoembolization.     I informed her that there was a lot of back and forth of not wanting to do anything but wanting to do what is best advised by Dr. So.     Daughter Riana has informed me that she did have a thorough conversation with her father in which he has decided to move forward with TACE.    I informed her that I will see what I can do in terms of scheduling and then get back to her.     She agrees to plan.     Leonela TORRE RN, BSN  Interventional Radiology/Vascular  Nurse Coordinator   Phone: [phone]  Fax: [phone]

## 2021-11-30 ENCOUNTER — LAB (OUTPATIENT)
Dept: LAB | Facility: CLINIC | Age: 61
End: 2021-11-30
Attending: RADIOLOGY

## 2021-11-30 DIAGNOSIS — Z11.59 ENCOUNTER FOR SCREENING FOR OTHER VIRAL DISEASES: ICD-10-CM

## 2021-11-30 PROCEDURE — U0005 INFEC AGEN DETEC AMPLI PROBE: HCPCS | Performed by: RADIOLOGY

## 2021-12-01 LAB — SARS-COV-2 RNA RESP QL NAA+PROBE: NEGATIVE

## 2021-12-02 ENCOUNTER — APPOINTMENT (OUTPATIENT)
Dept: CT IMAGING | Facility: CLINIC | Age: 61
End: 2021-12-02
Attending: RADIOLOGY
Payer: MEDICARE

## 2021-12-02 ENCOUNTER — APPOINTMENT (OUTPATIENT)
Dept: MEDSURG UNIT | Facility: CLINIC | Age: 61
End: 2021-12-02
Attending: RADIOLOGY
Payer: MEDICARE

## 2021-12-02 ENCOUNTER — HOSPITAL ENCOUNTER (OUTPATIENT)
Facility: CLINIC | Age: 61
Discharge: HOME | End: 2021-12-02
Attending: RADIOLOGY | Admitting: RADIOLOGY
Payer: MEDICARE

## 2021-12-02 ENCOUNTER — APPOINTMENT (OUTPATIENT)
Dept: INTERVENTIONAL RADIOLOGY/VASCULAR | Facility: CLINIC | Age: 61
End: 2021-12-02
Attending: RADIOLOGY
Payer: MEDICARE

## 2021-12-02 VITALS
DIASTOLIC BLOOD PRESSURE: 68 MMHG | WEIGHT: 135 LBS | HEART RATE: 98 BPM | OXYGEN SATURATION: 96 % | TEMPERATURE: 98.3 F | SYSTOLIC BLOOD PRESSURE: 114 MMHG | RESPIRATION RATE: 20 BRPM | BODY MASS INDEX: 23.17 KG/M2

## 2021-12-02 DIAGNOSIS — C22.0 HEPATOCELLULAR CARCINOMA (H): ICD-10-CM

## 2021-12-02 DIAGNOSIS — C22.0 HCC (HEPATOCELLULAR CARCINOMA) (H): Primary | ICD-10-CM

## 2021-12-02 LAB
ALBUMIN SERPL-MCNC: 1.9 G/DL (ref 3.4–5)
ALP SERPL-CCNC: 214 U/L (ref 40–150)
ALT SERPL W P-5'-P-CCNC: 23 U/L (ref 0–70)
ANION GAP SERPL CALCULATED.3IONS-SCNC: 5 MMOL/L (ref 3–14)
APTT PPP: 37 SECONDS (ref 22–38)
AST SERPL W P-5'-P-CCNC: 34 U/L (ref 0–45)
BILIRUB DIRECT SERPL-MCNC: 1.1 MG/DL (ref 0–0.2)
BILIRUB SERPL-MCNC: 3.1 MG/DL (ref 0.2–1.3)
BUN SERPL-MCNC: 10 MG/DL (ref 7–30)
CALCIUM SERPL-MCNC: 8.2 MG/DL (ref 8.5–10.1)
CHLORIDE BLD-SCNC: 108 MMOL/L (ref 94–109)
CO2 SERPL-SCNC: 25 MMOL/L (ref 20–32)
CREAT SERPL-MCNC: 0.83 MG/DL (ref 0.66–1.25)
ERYTHROCYTE [DISTWIDTH] IN BLOOD BY AUTOMATED COUNT: 15.9 % (ref 10–15)
GFR SERPL CREATININE-BSD FRML MDRD: >90 ML/MIN/1.73M2
GLUCOSE BLD-MCNC: 108 MG/DL (ref 70–99)
HCT VFR BLD AUTO: 38.7 % (ref 40–53)
HGB BLD-MCNC: 12.6 G/DL (ref 13.3–17.7)
INR PPP: 1.73 (ref 0.85–1.15)
MCH RBC QN AUTO: 30.7 PG (ref 26.5–33)
MCHC RBC AUTO-ENTMCNC: 32.6 G/DL (ref 31.5–36.5)
MCV RBC AUTO: 94 FL (ref 78–100)
PLATELET # BLD AUTO: 97 10E3/UL (ref 150–450)
POTASSIUM BLD-SCNC: 3.9 MMOL/L (ref 3.4–5.3)
PROT SERPL-MCNC: 7.6 G/DL (ref 6.8–8.8)
RBC # BLD AUTO: 4.11 10E6/UL (ref 4.4–5.9)
SODIUM SERPL-SCNC: 138 MMOL/L (ref 133–144)
WBC # BLD AUTO: 4.1 10E3/UL (ref 4–11)

## 2021-12-02 PROCEDURE — 250N000011 HC RX IP 250 OP 636: Performed by: NURSE PRACTITIONER

## 2021-12-02 PROCEDURE — 250N000009 HC RX 250: Performed by: NURSE PRACTITIONER

## 2021-12-02 PROCEDURE — C1887 CATHETER, GUIDING: HCPCS

## 2021-12-02 PROCEDURE — 36248 INS CATH ABD/L-EXT ART ADDL: CPT

## 2021-12-02 PROCEDURE — 255N000002 HC RX 255 OP 636: Performed by: RADIOLOGY

## 2021-12-02 PROCEDURE — 250N000013 HC RX MED GY IP 250 OP 250 PS 637: Performed by: PHYSICIAN ASSISTANT

## 2021-12-02 PROCEDURE — 75774 ARTERY X-RAY EACH VESSEL: CPT | Mod: XU

## 2021-12-02 PROCEDURE — 272N000143 HC KIT CR3

## 2021-12-02 PROCEDURE — 250N000009 HC RX 250: Performed by: PHYSICIAN ASSISTANT

## 2021-12-02 PROCEDURE — 36247 INS CATH ABD/L-EXT ART 3RD: CPT

## 2021-12-02 PROCEDURE — 74150 CT ABDOMEN W/O CONTRAST: CPT | Mod: MG

## 2021-12-02 PROCEDURE — 99152 MOD SED SAME PHYS/QHP 5/>YRS: CPT

## 2021-12-02 PROCEDURE — 85610 PROTHROMBIN TIME: CPT | Performed by: NURSE PRACTITIONER

## 2021-12-02 PROCEDURE — 999N000142 HC STATISTIC PROCEDURE PREP ONLY

## 2021-12-02 PROCEDURE — 258N000003 HC RX IP 258 OP 636: Performed by: RADIOLOGY

## 2021-12-02 PROCEDURE — 85730 THROMBOPLASTIN TIME PARTIAL: CPT | Performed by: NURSE PRACTITIONER

## 2021-12-02 PROCEDURE — C1769 GUIDE WIRE: HCPCS

## 2021-12-02 PROCEDURE — 36248 INS CATH ABD/L-EXT ART ADDL: CPT | Mod: GC | Performed by: RADIOLOGY

## 2021-12-02 PROCEDURE — 250N000011 HC RX IP 250 OP 636: Performed by: RADIOLOGY

## 2021-12-02 PROCEDURE — G1004 CDSM NDSC: HCPCS | Mod: GC | Performed by: RADIOLOGY

## 2021-12-02 PROCEDURE — 37243 VASC EMBOLIZE/OCCLUDE ORGAN: CPT | Mod: GC | Performed by: RADIOLOGY

## 2021-12-02 PROCEDURE — 82248 BILIRUBIN DIRECT: CPT | Performed by: NURSE PRACTITIONER

## 2021-12-02 PROCEDURE — 999N000134 HC STATISTIC PP CARE STAGE 3

## 2021-12-02 PROCEDURE — 85014 HEMATOCRIT: CPT | Performed by: NURSE PRACTITIONER

## 2021-12-02 PROCEDURE — 272N000631 HC COIL/EMBOLIC DEVICE CR12

## 2021-12-02 PROCEDURE — 36415 COLL VENOUS BLD VENIPUNCTURE: CPT | Performed by: NURSE PRACTITIONER

## 2021-12-02 PROCEDURE — 37243 VASC EMBOLIZE/OCCLUDE ORGAN: CPT

## 2021-12-02 PROCEDURE — 36247 INS CATH ABD/L-EXT ART 3RD: CPT | Mod: GC | Performed by: RADIOLOGY

## 2021-12-02 PROCEDURE — 96420 CHEMO IA PUSH TECNIQUE: CPT

## 2021-12-02 PROCEDURE — 76937 US GUIDE VASCULAR ACCESS: CPT

## 2021-12-02 PROCEDURE — 272N000566 HC SHEATH CR3

## 2021-12-02 PROCEDURE — 250N000011 HC RX IP 250 OP 636: Performed by: PHYSICIAN ASSISTANT

## 2021-12-02 PROCEDURE — 80053 COMPREHEN METABOLIC PANEL: CPT | Performed by: NURSE PRACTITIONER

## 2021-12-02 PROCEDURE — 250N000009 HC RX 250: Performed by: RADIOLOGY

## 2021-12-02 PROCEDURE — 272N000504 HC NEEDLE CR4

## 2021-12-02 PROCEDURE — 75726 ARTERY X-RAYS ABDOMEN: CPT | Mod: 26 | Performed by: RADIOLOGY

## 2021-12-02 PROCEDURE — 74150 CT ABDOMEN W/O CONTRAST: CPT | Mod: 26 | Performed by: RADIOLOGY

## 2021-12-02 PROCEDURE — C1760 CLOSURE DEV, VASC: HCPCS

## 2021-12-02 PROCEDURE — 76937 US GUIDE VASCULAR ACCESS: CPT | Mod: 26 | Performed by: RADIOLOGY

## 2021-12-02 PROCEDURE — 258N000003 HC RX IP 258 OP 636: Performed by: NURSE PRACTITIONER

## 2021-12-02 PROCEDURE — 75726 ARTERY X-RAYS ABDOMEN: CPT

## 2021-12-02 PROCEDURE — 99153 MOD SED SAME PHYS/QHP EA: CPT

## 2021-12-02 RX ORDER — IODIXANOL 320 MG/ML
150 INJECTION, SOLUTION INTRAVASCULAR ONCE
Status: COMPLETED | OUTPATIENT
Start: 2021-12-02 | End: 2021-12-02

## 2021-12-02 RX ORDER — NICOTINE POLACRILEX 4 MG
15-30 LOZENGE BUCCAL
Status: DISCONTINUED | OUTPATIENT
Start: 2021-12-02 | End: 2021-12-02 | Stop reason: HOSPADM

## 2021-12-02 RX ORDER — FLUMAZENIL 0.1 MG/ML
0.2 INJECTION, SOLUTION INTRAVENOUS
Status: DISCONTINUED | OUTPATIENT
Start: 2021-12-02 | End: 2021-12-02 | Stop reason: HOSPADM

## 2021-12-02 RX ORDER — PROCHLORPERAZINE MALEATE 10 MG
10 TABLET ORAL EVERY 6 HOURS PRN
Status: DISCONTINUED | OUTPATIENT
Start: 2021-12-02 | End: 2021-12-02 | Stop reason: HOSPADM

## 2021-12-02 RX ORDER — AMPICILLIN AND SULBACTAM 2; 1 G/1; G/1
3 INJECTION, POWDER, FOR SOLUTION INTRAMUSCULAR; INTRAVENOUS
Status: COMPLETED | OUTPATIENT
Start: 2021-12-02 | End: 2021-12-02

## 2021-12-02 RX ORDER — FENTANYL CITRATE 50 UG/ML
25-50 INJECTION, SOLUTION INTRAMUSCULAR; INTRAVENOUS EVERY 5 MIN PRN
Status: DISCONTINUED | OUTPATIENT
Start: 2021-12-02 | End: 2021-12-02 | Stop reason: HOSPADM

## 2021-12-02 RX ORDER — NALOXONE HYDROCHLORIDE 0.4 MG/ML
0.4 INJECTION, SOLUTION INTRAMUSCULAR; INTRAVENOUS; SUBCUTANEOUS
Status: DISCONTINUED | OUTPATIENT
Start: 2021-12-02 | End: 2021-12-02 | Stop reason: HOSPADM

## 2021-12-02 RX ORDER — LIDOCAINE 40 MG/G
CREAM TOPICAL
Status: DISCONTINUED | OUTPATIENT
Start: 2021-12-02 | End: 2021-12-02 | Stop reason: HOSPADM

## 2021-12-02 RX ORDER — ONDANSETRON 4 MG/1
4 TABLET, ORALLY DISINTEGRATING ORAL EVERY 6 HOURS PRN
Qty: 30 TABLET | Refills: 1 | Status: ON HOLD | OUTPATIENT
Start: 2021-12-02 | End: 2022-05-13

## 2021-12-02 RX ORDER — OXYCODONE HYDROCHLORIDE 5 MG/1
5-10 TABLET ORAL EVERY 4 HOURS PRN
Qty: 20 TABLET | Refills: 0 | Status: ON HOLD | OUTPATIENT
Start: 2021-12-02 | End: 2022-05-13

## 2021-12-02 RX ORDER — PROCHLORPERAZINE 25 MG
25 SUPPOSITORY, RECTAL RECTAL EVERY 12 HOURS PRN
Status: DISCONTINUED | OUTPATIENT
Start: 2021-12-02 | End: 2021-12-02 | Stop reason: HOSPADM

## 2021-12-02 RX ORDER — NALOXONE HYDROCHLORIDE 0.4 MG/ML
0.2 INJECTION, SOLUTION INTRAMUSCULAR; INTRAVENOUS; SUBCUTANEOUS
Status: DISCONTINUED | OUTPATIENT
Start: 2021-12-02 | End: 2021-12-02 | Stop reason: HOSPADM

## 2021-12-02 RX ORDER — OXYCODONE HYDROCHLORIDE 5 MG/1
5 TABLET ORAL EVERY 4 HOURS PRN
Status: DISCONTINUED | OUTPATIENT
Start: 2021-12-02 | End: 2021-12-02 | Stop reason: HOSPADM

## 2021-12-02 RX ORDER — DEXTROSE MONOHYDRATE 25 G/50ML
25-50 INJECTION, SOLUTION INTRAVENOUS
Status: DISCONTINUED | OUTPATIENT
Start: 2021-12-02 | End: 2021-12-02 | Stop reason: HOSPADM

## 2021-12-02 RX ORDER — ONDANSETRON 4 MG/1
4 TABLET, ORALLY DISINTEGRATING ORAL EVERY 6 HOURS PRN
Status: DISCONTINUED | OUTPATIENT
Start: 2021-12-02 | End: 2021-12-02 | Stop reason: HOSPADM

## 2021-12-02 RX ORDER — SCOLOPAMINE TRANSDERMAL SYSTEM 1 MG/1
1 PATCH, EXTENDED RELEASE TRANSDERMAL ONCE
Status: DISCONTINUED | OUTPATIENT
Start: 2021-12-02 | End: 2021-12-02 | Stop reason: HOSPADM

## 2021-12-02 RX ORDER — ONDANSETRON 2 MG/ML
4 INJECTION INTRAMUSCULAR; INTRAVENOUS ONCE
Status: COMPLETED | OUTPATIENT
Start: 2021-12-02 | End: 2021-12-02

## 2021-12-02 RX ORDER — SODIUM CHLORIDE 9 MG/ML
INJECTION, SOLUTION INTRAVENOUS CONTINUOUS
Status: DISCONTINUED | OUTPATIENT
Start: 2021-12-02 | End: 2021-12-02 | Stop reason: HOSPADM

## 2021-12-02 RX ORDER — ONDANSETRON 2 MG/ML
4 INJECTION INTRAMUSCULAR; INTRAVENOUS EVERY 6 HOURS PRN
Status: DISCONTINUED | OUTPATIENT
Start: 2021-12-02 | End: 2021-12-02 | Stop reason: HOSPADM

## 2021-12-02 RX ORDER — OXYCODONE HYDROCHLORIDE 5 MG/1
10 TABLET ORAL EVERY 4 HOURS PRN
Status: DISCONTINUED | OUTPATIENT
Start: 2021-12-02 | End: 2021-12-02 | Stop reason: HOSPADM

## 2021-12-02 RX ORDER — NITROGLYCERIN 5 MG/ML
100-500 VIAL (ML) INTRAVENOUS
Status: COMPLETED | OUTPATIENT
Start: 2021-12-02 | End: 2021-12-02

## 2021-12-02 RX ADMIN — SCOPALAMINE 1 PATCH: 1 PATCH, EXTENDED RELEASE TRANSDERMAL at 11:39

## 2021-12-02 RX ADMIN — OXYCODONE HYDROCHLORIDE 10 MG: 5 TABLET ORAL at 16:07

## 2021-12-02 RX ADMIN — LIDOCAINE HYDROCHLORIDE 5 ML: 10 INJECTION, SOLUTION EPIDURAL; INFILTRATION; INTRACAUDAL; PERINEURAL at 14:39

## 2021-12-02 RX ADMIN — MITOMYCIN: 5 INJECTION, POWDER, LYOPHILIZED, FOR SOLUTION INTRAVENOUS at 13:43

## 2021-12-02 RX ADMIN — AMPICILLIN SODIUM AND SULBACTAM SODIUM 3 G: 2; 1 INJECTION, POWDER, FOR SOLUTION INTRAMUSCULAR; INTRAVENOUS at 11:42

## 2021-12-02 RX ADMIN — NITROGLYCERIN 250 MCG: 20 INJECTION INTRAVENOUS at 14:00

## 2021-12-02 RX ADMIN — HYDROCORTISONE SODIUM SUCCINATE 100 MG: 100 INJECTION, POWDER, FOR SOLUTION INTRAMUSCULAR; INTRAVENOUS at 11:40

## 2021-12-02 RX ADMIN — SODIUM CHLORIDE: 9 INJECTION, SOLUTION INTRAVENOUS at 11:43

## 2021-12-02 RX ADMIN — IODIXANOL 70 ML: 320 INJECTION, SOLUTION INTRAVASCULAR at 14:30

## 2021-12-02 RX ADMIN — MIDAZOLAM 2 MG: 1 INJECTION INTRAMUSCULAR; INTRAVENOUS at 14:34

## 2021-12-02 RX ADMIN — FENTANYL CITRATE 100 MCG: 50 INJECTION INTRAMUSCULAR; INTRAVENOUS at 14:35

## 2021-12-02 RX ADMIN — ONDANSETRON 4 MG: 2 INJECTION INTRAMUSCULAR; INTRAVENOUS at 14:03

## 2021-12-02 NOTE — DISCHARGE INSTRUCTIONS
[facility]   Interventional Radiology  Discharge Instructions Post Angiography for   Chemo Embolization of Liver      AFTER YOU GO HOME:        Relax and take it easy for 24 hours.       Drink plenty of fluids.       Resume your regular diet, unless otherwise instructed by your Primary Physician.       DO NOT smoke for at least 24 hours, if you were given any sedation.       DO NOT drink alcoholic beverages the day of your procedure.       Do not drive or operate machinery at home or at work for 24 hours.          DO NOT do any strenuous exercise or lifting for at least 2 days following your procedure.       DO NOT take a bath or shower for at least 12 hours following your procedure.       DO NOT make any important or legal decisions for 24 hours following your procedure.    CALL THE PHYSICIAN IF:       - You start bleeding from the procedure site. lie down flat and hold pressure on the site. A small lump or bruise is common at the puncture site. Your physician will tell you if you need to return to the hospital.      - You develop numbness, coolness or a change in color of the leg that was punctured.      - You experience increased pain or redness at the puncture site.      - You develop hives or a rash or unexplained itching.      - You develop a temperature of 101 degrees F or greater.    Additional Information:           Support the puncture site for coughing, sneezing, or moving your bowels for the first 48 hours  No tub bath, hot tubs, or swimming for 5 days  No lotion or powder to the puncture site for 3 day    Closure Device: Mynx Closure Device          [facility] INTERVENTIONAL RADIOLOGY DEPARTMENT       Procedure Physician:  Dr. Kern/Dr. Garcia Date of Procedure:December 2, 2021       Telephone Numbers:   [phone]     Monday-Friday 8:00 to 4:30 pm                                        [phone]     After 4:30 pm Monday-Friday, Weekend and Holidays. Ask for the Interventional  Radiologist on call. Someone is on call 24 hrs/day.

## 2021-12-02 NOTE — PROGRESS NOTES
"Pt tolerating oral intake. Abdominal CT completed. Rx sent to discharge pharmacy. Pt experiencing increased pain in lower back and abdomen, oxycodone 10mg given oral. Pt now resting comfortably and rates pain a \"4\". Son at bedside. Report given to Viviana POWELL Rn.  "

## 2021-12-02 NOTE — PROGRESS NOTES
Patient Name: Rishabh Cabrera  Medical Record Number: [id number]  Today's Date: 12/2/2021    Procedure: Transarterial chemoembolization of liver mass  Proceduralist: Dr. So    Procedure Start: 1330  Procedure end: 1430  Sedation medications administered: 2 mg Versed, 150 mcg Fentanyl     Report given to: 2A RN    Other Notes: Pt arrived to IR room 4 from . Consent reviewed. Pt denies any questions or concerns regarding procedure. Pt positioned supine and monitored per protocol. Pt tolerated procedure without any noted complications. Pt transferred back to .    Mynx closure deployed at 1430. Patient on bedrest until 1530

## 2021-12-02 NOTE — PRE-PROCEDURE
GENERAL PRE-PROCEDURE:   Procedure:  CTACE  Date/Time:  12/2/2021 11:32 AM    Verbal consent obtained?: Yes    Written consent obtained?: Yes    Risks and benefits: Risks, benefits and alternatives were discussed    Consent given by:  Patient  Patient states understanding of procedure being performed: Yes    Patient's understanding of procedure matches consent: Yes    Procedure consent matches procedure scheduled: Yes    Expected level of sedation:  Moderate  Appropriately NPO:  Yes  ASA Class:  2  Mallampati  :  Grade 2- soft palate, base of uvula, tonsillar pillars, and portion of posterior pharyngeal wall visible  Lungs:  Lungs clear with good breath sounds bilaterally  Heart:  Normal heart sounds and rate  History & Physical reviewed:  History and physical reviewed and no updates needed  Statement of review:  I have reviewed the lab findings, diagnostic data, medications, and the plan for sedation

## 2021-12-02 NOTE — PROCEDURES
[facility]    Procedure: IR Procedure Note    Date/Time: 12/2/2021 3:02 PM  Performed by: Rk Kern DO  Authorized by: Kenneth So MD       UNIVERSAL PROTOCOL   Site Marked: NA  Prior Images Obtained and Reviewed:  Yes  Required items: Required blood products, implants, devices and special equipment available    Patient identity confirmed:  Verbally with patient, arm band, provided demographic data and hospital-assigned identification number  Patient was reevaluated immediately before administering moderate or deep sedation or anesthesia  Confirmation Checklist:  Patient's identity using two indicators, relevant allergies, procedure was appropriate and matched the consent or emergent situation and correct equipment/implants were available  Time out: Immediately prior to the procedure a time out was called    Universal Protocol: the Joint Commission Universal Protocol was followed    Preparation: Patient was prepped and draped in usual sterile fashion       ANESTHESIA    Anesthesia: See MAR for details  Local Anesthetic:  Lidocaine 1% without epinephrine      SEDATION  Patient Sedated: Yes    Sedation:  See MAR for details  Vital signs: Vital signs monitored during sedation    See dictated procedure note for full details.  Findings: Hepatic angiogram with right hepatic tumor blush fed from the subsegmental branches of segment 5. Subselective delivery of lipiodol and doxorubicin and mitomycin followed by embospheres with minimal reflux. Mild delivery of lipiodol to segment 8 stemming from the more superior branch of segment 5.    Specimens: none    Complications: None    Condition: Stable    Plan: Right femoral device closure with Mynx , 3 hours bedrest.  Pain and nausea control, discharge when meeting criteria.  Noncon abd CT to track lipiodol treatment distribution.      PROCEDURE    Patient Tolerance:  Patient tolerated the procedure well with no  immediate complications  Length of time physician/provider present for 1:1 monitoring during sedation: 120

## 2021-12-02 NOTE — PROGRESS NOTES
Pt arrived on 2a post TACE. VSS Ra. Right groin f/d/i. Pt experiencing mild pain. Family at bedside. Pt eating and drinking.

## 2021-12-03 NOTE — PROGRESS NOTES
Condition is stable s/p chemoembolization. Vital Signs are stable/WNL. Right groin site clean, dry and intact, cms intact. Discharge instructions reviewed with patient and questions answered. Patient verbalizes understanding. IV removed. Pain under control. Patient is ambulating and voiding spontaneously. Patient is tolerating regular diet and denies any N/V. Patient to be discharged to home via son. Patient has all belongings

## 2021-12-06 ENCOUNTER — TELEPHONE (OUTPATIENT)
Dept: VASCULAR SURGERY | Facility: CLINIC | Age: 61
End: 2021-12-06
Payer: MEDICARE

## 2021-12-06 DIAGNOSIS — C22.0 HCC (HEPATOCELLULAR CARCINOMA) (H): Primary | ICD-10-CM

## 2021-12-06 NOTE — TELEPHONE ENCOUNTER
Called pt's daughter to fup on his TACE treatment.    Asked that she return my call to check in on how pt is doing.     Leonela TORRE RN, BSN  Interventional Radiology/Vascular  Nurse Coordinator   Phone: [phone]  Fax: [phone]

## 2021-12-20 ENCOUNTER — TEAM CONFERENCE (OUTPATIENT)
Dept: VASCULAR SURGERY | Facility: CLINIC | Age: 61
End: 2021-12-20
Payer: MEDICARE

## 2021-12-20 NOTE — TELEPHONE ENCOUNTER
Multiple attempts to reach family regarding pt's fup appts with Dr. So as well as to inquire on how pt is doing post treatment.     Will send letter of appt details to pt and family.    Leonela TORRE RN, BSN  Interventional Radiology/Vascular  Nurse Coordinator   Phone: [phone]  Fax: [phone]

## 2021-12-27 ENCOUNTER — TELEPHONE (OUTPATIENT)
Dept: VASCULAR SURGERY | Facility: CLINIC | Age: 61
End: 2021-12-27
Payer: MEDICARE

## 2021-12-27 NOTE — TELEPHONE ENCOUNTER
Called Jeremy Nick Daughter of pt and tried to leave a msg.    We went over pt's appt information as well as details of each appt that he has upcoming    Informed her that we've sent letters as well as mychart msgs and emails with no response.     I will update pt's chart to reflect the new contact for pt    Daughter has my direct line and will call with any other questions.     Will also send out an MEERA to include her on the list that we can speak to her as well.       Leonela TORRE RN, BSN  Interventional Radiology/Vascular  Nurse Coordinator   Phone: [phone]  Fax: [phone]

## 2021-12-28 ENCOUNTER — APPOINTMENT (OUTPATIENT)
Dept: INTERPRETER SERVICES | Facility: CLINIC | Age: 61
End: 2021-12-28
Payer: MEDICARE

## 2021-12-29 ENCOUNTER — LAB (OUTPATIENT)
Dept: LAB | Facility: CLINIC | Age: 61
End: 2021-12-29
Attending: RADIOLOGY
Payer: MEDICARE

## 2021-12-29 ENCOUNTER — HOSPITAL ENCOUNTER (OUTPATIENT)
Dept: MRI IMAGING | Facility: CLINIC | Age: 61
End: 2021-12-29
Attending: RADIOLOGY
Payer: MEDICARE

## 2021-12-29 DIAGNOSIS — C22.0 HCC (HEPATOCELLULAR CARCINOMA) (H): ICD-10-CM

## 2021-12-29 LAB
AFP SERPL-MCNC: 3.7 UG/L (ref 0–8)
ALBUMIN SERPL-MCNC: 1.9 G/DL (ref 3.4–5)
ALP SERPL-CCNC: 209 U/L (ref 40–150)
ALT SERPL W P-5'-P-CCNC: 26 U/L (ref 0–70)
ANION GAP SERPL CALCULATED.3IONS-SCNC: 7 MMOL/L (ref 3–14)
AST SERPL W P-5'-P-CCNC: 45 U/L (ref 0–45)
BILIRUB DIRECT SERPL-MCNC: 1.2 MG/DL (ref 0–0.2)
BILIRUB SERPL-MCNC: 3.4 MG/DL (ref 0.2–1.3)
BUN SERPL-MCNC: 12 MG/DL (ref 7–30)
CALCIUM SERPL-MCNC: 8.3 MG/DL (ref 8.5–10.1)
CHLORIDE BLD-SCNC: 106 MMOL/L (ref 94–109)
CO2 SERPL-SCNC: 25 MMOL/L (ref 20–32)
CREAT SERPL-MCNC: 0.85 MG/DL (ref 0.66–1.25)
ERYTHROCYTE [DISTWIDTH] IN BLOOD BY AUTOMATED COUNT: 17.6 % (ref 10–15)
GFR SERPL CREATININE-BSD FRML MDRD: >90 ML/MIN/1.73M2
GLUCOSE BLD-MCNC: 127 MG/DL (ref 70–99)
HCT VFR BLD AUTO: 41.2 % (ref 40–53)
HGB BLD-MCNC: 13.8 G/DL (ref 13.3–17.7)
HOLD SPECIMEN: NORMAL
INR PPP: 1.83 (ref 0.85–1.15)
MCH RBC QN AUTO: 31.2 PG (ref 26.5–33)
MCHC RBC AUTO-ENTMCNC: 33.5 G/DL (ref 31.5–36.5)
MCV RBC AUTO: 93 FL (ref 78–100)
PLATELET # BLD AUTO: 103 10E3/UL (ref 150–450)
POTASSIUM BLD-SCNC: 4.2 MMOL/L (ref 3.4–5.3)
PROT SERPL-MCNC: 8.3 G/DL (ref 6.8–8.8)
RBC # BLD AUTO: 4.42 10E6/UL (ref 4.4–5.9)
SODIUM SERPL-SCNC: 138 MMOL/L (ref 133–144)
WBC # BLD AUTO: 6 10E3/UL (ref 4–11)

## 2021-12-29 PROCEDURE — 80048 BASIC METABOLIC PNL TOTAL CA: CPT

## 2021-12-29 PROCEDURE — A9585 GADOBUTROL INJECTION: HCPCS | Performed by: RADIOLOGY

## 2021-12-29 PROCEDURE — G1004 CDSM NDSC: HCPCS | Mod: GC | Performed by: RADIOLOGY

## 2021-12-29 PROCEDURE — 74183 MRI ABD W/O CNTR FLWD CNTR: CPT | Mod: 26 | Performed by: RADIOLOGY

## 2021-12-29 PROCEDURE — 85610 PROTHROMBIN TIME: CPT

## 2021-12-29 PROCEDURE — 82105 ALPHA-FETOPROTEIN SERUM: CPT

## 2021-12-29 PROCEDURE — 36415 COLL VENOUS BLD VENIPUNCTURE: CPT

## 2021-12-29 PROCEDURE — 74183 MRI ABD W/O CNTR FLWD CNTR: CPT | Mod: MG

## 2021-12-29 PROCEDURE — 255N000002 HC RX 255 OP 636: Performed by: RADIOLOGY

## 2021-12-29 PROCEDURE — 85027 COMPLETE CBC AUTOMATED: CPT

## 2021-12-29 PROCEDURE — 82248 BILIRUBIN DIRECT: CPT

## 2021-12-29 RX ORDER — GADOBUTROL 604.72 MG/ML
7.5 INJECTION INTRAVENOUS ONCE
Status: COMPLETED | OUTPATIENT
Start: 2021-12-29 | End: 2021-12-29

## 2021-12-29 RX ADMIN — GADOBUTROL 6 ML: 604.72 INJECTION INTRAVENOUS at 16:49

## 2022-01-03 NOTE — PROGRESS NOTES
Rishabh is a 61 year old who is being evaluated via a billable telephone visit.      What phone number would you like to be contacted at? [phone].  How would you like to obtain your AVS? Buddy Salas     Phone call duration:35 minutes              INTERVENTIONAL RADIOLOGY ESTABLISHED PATIENT FOLLOW UP      HPI: Rishabh Cabrera is a 61 year old with history of hepatits B and HCC s/p Y90 (9/9/21) and cTACE (12/2/21) who returns to clinic for follow up after cTACE.     Today, patient reports he is very fatigued but his energy level is not quite improving yet. He continues to have abdominal pain which also has not improved significantly. He had a paracentesis on 12/23 which was uncomfortable but did help with his abdominal distention and discomfort. No fevers, chills, nausea/vomiting.        ROS:  Negative unless otherwise stated in HPI.      Physical Examination:   VITALS:   There were no vitals taken for this visit.     Breathing comfortably on room air. Remainder of exam deferred due to phone visit.     Lab Results   Component Value Date    AST 45 12/29/2021     05/10/2021     Lab Results   Component Value Date    ALT 26 12/29/2021     05/10/2021     No results found for: BILICONJ   Lab Results   Component Value Date    BILITOTAL 3.4 12/29/2021    BILITOTAL 1.3 05/10/2021     Lab Results   Component Value Date    ALBUMIN 1.9 12/29/2021    ALBUMIN 2.7 05/10/2021     Lab Results   Component Value Date    PROTTOTAL 8.3 12/29/2021    PROTTOTAL 6.7 05/10/2021      Lab Results   Component Value Date    ALKPHOS 209 12/29/2021    ALKPHOS 290 05/10/2021     Current Outpatient Medications   Medication     ibuprofen (ADVIL/MOTRIN) 100 MG tablet     LANTUS SOLOSTAR 100 UNIT/ML soln     metFORMIN (GLUCOPHAGE) 1000 MG tablet     naproxen (NAPROSYN) 500 MG tablet     oxyCODONE (ROXICODONE) 5 MG tablet     furosemide (LASIX) 20 MG tablet     ondansetron (ZOFRAN) 4 MG tablet     ondansetron (ZOFRAN-ODT) 4 MG  ODT tab     oxyCODONE (ROXICODONE) 5 MG tablet     oxyCODONE (ROXICODONE) 5 MG tablet     pantoprazole (PROTONIX) 40 MG EC tablet     spironolactone (ALDACTONE) 50 MG tablet     tenofovir (VIREAD) 300 MG tablet     tenofovir alafenamide fumarate (VEMLIDY) 25 MG tablet     No current facility-administered medications for this visit.     Facility-Administered Medications Ordered in Other Visits   Medication     gadobutrol (GADAVIST) injection 7.5 mL     Past Medical History:   Diagnosis Date     Cancer (H)      Chronic hepatitis B (H)      Diabetes mellitus (H)        Past Surgical History:   Procedure Laterality Date     ESOPHAGOSCOPY, GASTROSCOPY, DUODENOSCOPY (EGD), COMBINED N/A 5/29/2019    Procedure: ESOPHAGOGASTRODUODENOSCOPY (EGD);  Surgeon: Leventhal, Thomas Michael, MD;  Location: [facility]     IR CHEMO EMBOLIZATION  12/2/2021     IR SIRT (SELECTIVE INTERNAL RADIO THERAPY)  9/8/2021     IR VISCERAL ANGIOGRAM  9/8/2021     IR VISCERAL EMBOLIZATION  9/9/2021       No family history on file.    Social History     Tobacco Use     Smoking status: Never Smoker     Smokeless tobacco: Never Used   Substance Use Topics     Alcohol use: No         Labs:    AFP 12/29/21: 3.7. This is significantly improved since 10/4/21 when it was 12.3.    Diagnostic studies: Personally reviewed and interpreted.    MR Abdomen 12/29/21: Expected post treatment changes of the known HCC with minimal peripherally enhancing capsule, likely treatment related changes. This is significantly improved compared to the post Y90 MR abdomen dated 10/4/21 (which was essentially unchanged compared to the pre-Y90 MR abdomen dated 6/22/21).      Assessment: 61 year old with history of hepatits B and HCC s/p Y90 (9/9/21) and cTACE (12/2/21) who returns to clinic for follow up after cTACE. Patient is doing well. He has excellent treatment response by both imaging and lab markers (AFP now 3.7 from 12.3).     Plan: Follow up in 3 months with repeat abdominal  MRI, chest CT, and bone scan in 6 months. A repeat AFP should be drawn at that time as well.     Discussed with Dr. So.    Jai Barillas MD  Interventional Radiology Fellow, PGY-5.      I have visited with the patient virtually together with the resident and agree with this note.          CC  Patient Care Team:  No Ref-Primary, Physician as PCP - General  Helena Camejo MD (Student in organized health care education/training program)  Michelle De, RN as Specialty Care Coordinator (Hematology & Oncology)  Arturo Lund MD as MD (Hematology)  Kenneth So MD as Assigned Heart and Vascular Provider  Miki Purcell MD as Assigned Pulmonology Provider  Arturo Lund MD as Assigned Cancer Care Provider  Kenneth So MD as MD (Vascular and Interventional Radiology)  SELF, REFERRED

## 2022-01-04 ENCOUNTER — VIRTUAL VISIT (OUTPATIENT)
Dept: RADIOLOGY | Facility: CLINIC | Age: 62
End: 2022-01-04
Attending: RADIOLOGY
Payer: MEDICARE

## 2022-01-04 DIAGNOSIS — C22.0 HEPATOCELLULAR CARCINOMA (H): Primary | ICD-10-CM

## 2022-01-04 PROCEDURE — 999N001193 HC VIDEO/TELEPHONE VISIT; NO CHARGE

## 2022-01-04 PROCEDURE — 99443 PR PHYSICIAN TELEPHONE EVALUATION 21-30 MIN: CPT | Mod: 95 | Performed by: RADIOLOGY

## 2022-01-04 NOTE — LETTER
1/4/2022         RE: Rishabh Cabrera  [address]  [facility] [address]        Dear Colleague,    Thank you for referring your patient, Rishabh Cabrera, to the [facility]. Please see a copy of my visit note below.        INTERVENTIONAL RADIOLOGY ESTABLISHED PATIENT FOLLOW UP      HPI: Rishabh Cabrera is a 61 year old with history of hepatits B and HCC s/p Y90 (9/9/21) and cTACE (12/2/21) who returns to clinic for follow up after cTACE.     Today, patient reports he is very fatigued but his energy level is not quite improving yet. He continues to have abdominal pain which also has not improved significantly. He had a paracentesis on 12/23 which was uncomfortable but did help with his abdominal distention and discomfort. No fevers, chills, nausea/vomiting.        ROS:  Negative unless otherwise stated in HPI.      Physical Examination:   VITALS:   There were no vitals taken for this visit.     Breathing comfortably on room air. Remainder of exam deferred due to phone visit.     Lab Results   Component Value Date    AST 45 12/29/2021     05/10/2021     Lab Results   Component Value Date    ALT 26 12/29/2021     05/10/2021     No results found for: BILICONJ   Lab Results   Component Value Date    BILITOTAL 3.4 12/29/2021    BILITOTAL 1.3 05/10/2021     Lab Results   Component Value Date    ALBUMIN 1.9 12/29/2021    ALBUMIN 2.7 05/10/2021     Lab Results   Component Value Date    PROTTOTAL 8.3 12/29/2021    PROTTOTAL 6.7 05/10/2021      Lab Results   Component Value Date    ALKPHOS 209 12/29/2021    ALKPHOS 290 05/10/2021     Current Outpatient Medications   Medication     ibuprofen (ADVIL/MOTRIN) 100 MG tablet     LANTUS SOLOSTAR 100 UNIT/ML soln     metFORMIN (GLUCOPHAGE) 1000 MG tablet     naproxen (NAPROSYN) 500 MG tablet     oxyCODONE (ROXICODONE) 5 MG tablet     furosemide (LASIX) 20 MG tablet     ondansetron (ZOFRAN) 4 MG tablet     ondansetron (ZOFRAN-ODT) 4 MG ODT tab      oxyCODONE (ROXICODONE) 5 MG tablet     oxyCODONE (ROXICODONE) 5 MG tablet     pantoprazole (PROTONIX) 40 MG EC tablet     spironolactone (ALDACTONE) 50 MG tablet     tenofovir (VIREAD) 300 MG tablet     tenofovir alafenamide fumarate (VEMLIDY) 25 MG tablet     No current facility-administered medications for this visit.     Facility-Administered Medications Ordered in Other Visits   Medication     gadobutrol (GADAVIST) injection 7.5 mL     Past Medical History:   Diagnosis Date     Cancer (H)      Chronic hepatitis B (H)      Diabetes mellitus (H)        Past Surgical History:   Procedure Laterality Date     ESOPHAGOSCOPY, GASTROSCOPY, DUODENOSCOPY (EGD), COMBINED N/A 5/29/2019    Procedure: ESOPHAGOGASTRODUODENOSCOPY (EGD);  Surgeon: Leventhal, Thomas Michael, MD;  Location: [facility]     IR CHEMO EMBOLIZATION  12/2/2021     IR SIRT (SELECTIVE INTERNAL RADIO THERAPY)  9/8/2021     IR VISCERAL ANGIOGRAM  9/8/2021     IR VISCERAL EMBOLIZATION  9/9/2021       No family history on file.    Social History     Tobacco Use     Smoking status: Never Smoker     Smokeless tobacco: Never Used   Substance Use Topics     Alcohol use: No         Labs:    AFP 12/29/21: 3.7. This is significantly improved since 10/4/21 when it was 12.3.    Diagnostic studies: Personally reviewed and interpreted.    MR Abdomen 12/29/21: Expected post treatment changes of the known HCC with minimal peripherally enhancing capsule, likely treatment related changes. This is significantly improved compared to the post Y90 MR abdomen dated 10/4/21 (which was essentially unchanged compared to the pre-Y90 MR abdomen dated 6/22/21).      Assessment: 61 year old with history of hepatits B and HCC s/p Y90 (9/9/21) and cTACE (12/2/21) who returns to clinic for follow up after cTACE. Patient is doing well. He has excellent treatment response by both imaging and lab markers (AFP now 3.7 from 12.3).     Plan: Follow up in 3 months with repeat abdominal MRI, chest CT,  and bone scan in 6 months. A repeat AFP should be drawn at that time as well.     Discussed with Dr. So.    Jai Barillas MD  Interventional Radiology Fellow, PGY-5.      I have visited with the patient virtually together with the resident and agree with this note.          CC  Patient Care Team:  No Ref-Primary, Physician as PCP - General  Helena Camejo MD (Student in organized health care education/training program)  Michelle De, RN as Specialty Care Coordinator (Hematology & Oncology)  Arturo Lund MD as MD (Hematology)  Kenneth So MD as Assigned Heart and Vascular Provider  Jazzy, Miki White MD as Assigned Pulmonology Provider  Arturo Lund MD as Assigned Cancer Care Provider  Kenneth So MD as MD (Vascular and Interventional Radiology)  SELF, REFERRED        Kenneth So MD

## 2022-01-04 NOTE — PATIENT INSTRUCTIONS
1. Our  will call you with an appointment to follow up in 3 months with Dr So.    We will get an MRI and labs along with a bone scan and chest ct.    2. I've sent a message to our Hepatology team to make an appointment for follow up with them along with sending a refill to your local pharmacy for the viread medication.    Please call me with any other questions.     Leonela TORRE RN, BSN  Interventional Radiology/Vascular  Nurse Coordinator   Phone: [phone]  Fax: [phone]

## 2022-02-08 ENCOUNTER — OFFICE VISIT (OUTPATIENT)
Dept: GASTROENTEROLOGY | Facility: CLINIC | Age: 62
End: 2022-02-08
Attending: INTERNAL MEDICINE
Payer: MEDICARE

## 2022-02-08 ENCOUNTER — LAB (OUTPATIENT)
Dept: LAB | Facility: CLINIC | Age: 62
End: 2022-02-08
Attending: INTERNAL MEDICINE
Payer: MEDICARE

## 2022-02-08 VITALS
BODY MASS INDEX: 22.55 KG/M2 | HEIGHT: 64 IN | SYSTOLIC BLOOD PRESSURE: 116 MMHG | DIASTOLIC BLOOD PRESSURE: 77 MMHG | HEART RATE: 103 BPM | OXYGEN SATURATION: 99 % | WEIGHT: 132.1 LBS

## 2022-02-08 DIAGNOSIS — Z01.812 ENCOUNTER FOR PREPROCEDURE SCREENING LABORATORY TESTING FOR COVID-19: ICD-10-CM

## 2022-02-08 DIAGNOSIS — R91.8 LUNG MASS: ICD-10-CM

## 2022-02-08 DIAGNOSIS — Z11.52 ENCOUNTER FOR PREPROCEDURE SCREENING LABORATORY TESTING FOR COVID-19: ICD-10-CM

## 2022-02-08 DIAGNOSIS — K74.60 HEPATIC CIRRHOSIS, UNSPECIFIED HEPATIC CIRRHOSIS TYPE (H): ICD-10-CM

## 2022-02-08 DIAGNOSIS — E43 SEVERE PROTEIN-CALORIE MALNUTRITION (H): ICD-10-CM

## 2022-02-08 DIAGNOSIS — R18.8 OTHER ASCITES: ICD-10-CM

## 2022-02-08 DIAGNOSIS — B18.1 CHRONIC HEPATITIS B WITH CIRRHOSIS (H): ICD-10-CM

## 2022-02-08 DIAGNOSIS — B18.1 CHRONIC HEPATITIS B WITH CIRRHOSIS (H): Primary | ICD-10-CM

## 2022-02-08 DIAGNOSIS — R18.8 OTHER ASCITES: Primary | ICD-10-CM

## 2022-02-08 DIAGNOSIS — C22.0 HCC (HEPATOCELLULAR CARCINOMA) (H): ICD-10-CM

## 2022-02-08 DIAGNOSIS — K74.60 CHRONIC HEPATITIS B WITH CIRRHOSIS (H): Primary | ICD-10-CM

## 2022-02-08 DIAGNOSIS — K74.60 CHRONIC HEPATITIS B WITH CIRRHOSIS (H): ICD-10-CM

## 2022-02-08 LAB
AFP SERPL-MCNC: 1.9 UG/L (ref 0–8)
ALBUMIN SERPL-MCNC: 1.9 G/DL (ref 3.4–5)
ALP SERPL-CCNC: 170 U/L (ref 40–150)
ALT SERPL W P-5'-P-CCNC: 19 U/L (ref 0–70)
ANION GAP SERPL CALCULATED.3IONS-SCNC: 9 MMOL/L (ref 3–14)
AST SERPL W P-5'-P-CCNC: 35 U/L (ref 0–45)
BILIRUB DIRECT SERPL-MCNC: 1.2 MG/DL (ref 0–0.2)
BILIRUB SERPL-MCNC: 3.1 MG/DL (ref 0.2–1.3)
BUN SERPL-MCNC: 10 MG/DL (ref 7–30)
CALCIUM SERPL-MCNC: 8.5 MG/DL (ref 8.5–10.1)
CHLORIDE BLD-SCNC: 105 MMOL/L (ref 94–109)
CO2 SERPL-SCNC: 24 MMOL/L (ref 20–32)
CREAT SERPL-MCNC: 1.08 MG/DL (ref 0.66–1.25)
ERYTHROCYTE [DISTWIDTH] IN BLOOD BY AUTOMATED COUNT: 15.2 % (ref 10–15)
GFR SERPL CREATININE-BSD FRML MDRD: 79 ML/MIN/1.73M2
GLUCOSE BLD-MCNC: 140 MG/DL (ref 70–99)
HCT VFR BLD AUTO: 38.3 % (ref 40–53)
HGB BLD-MCNC: 12.7 G/DL (ref 13.3–17.7)
INR PPP: 1.97 (ref 0.85–1.15)
MCH RBC QN AUTO: 31.4 PG (ref 26.5–33)
MCHC RBC AUTO-ENTMCNC: 33.2 G/DL (ref 31.5–36.5)
MCV RBC AUTO: 95 FL (ref 78–100)
PLATELET # BLD AUTO: 88 10E3/UL (ref 150–450)
POTASSIUM BLD-SCNC: 4.6 MMOL/L (ref 3.4–5.3)
PROT SERPL-MCNC: 8.7 G/DL (ref 6.8–8.8)
RBC # BLD AUTO: 4.05 10E6/UL (ref 4.4–5.9)
SODIUM SERPL-SCNC: 138 MMOL/L (ref 133–144)
WBC # BLD AUTO: 5.1 10E3/UL (ref 4–11)

## 2022-02-08 PROCEDURE — 85610 PROTHROMBIN TIME: CPT | Performed by: PATHOLOGY

## 2022-02-08 PROCEDURE — 85027 COMPLETE CBC AUTOMATED: CPT | Performed by: PATHOLOGY

## 2022-02-08 PROCEDURE — 82105 ALPHA-FETOPROTEIN SERUM: CPT | Performed by: INTERNAL MEDICINE

## 2022-02-08 PROCEDURE — 99215 OFFICE O/P EST HI 40 MIN: CPT | Performed by: INTERNAL MEDICINE

## 2022-02-08 PROCEDURE — G0463 HOSPITAL OUTPT CLINIC VISIT: HCPCS

## 2022-02-08 PROCEDURE — 82248 BILIRUBIN DIRECT: CPT | Performed by: PATHOLOGY

## 2022-02-08 PROCEDURE — 36415 COLL VENOUS BLD VENIPUNCTURE: CPT | Performed by: PATHOLOGY

## 2022-02-08 PROCEDURE — 80053 COMPREHEN METABOLIC PANEL: CPT | Performed by: PATHOLOGY

## 2022-02-08 RX ORDER — NALOXONE HYDROCHLORIDE 0.4 MG/ML
0.2 INJECTION, SOLUTION INTRAMUSCULAR; INTRAVENOUS; SUBCUTANEOUS
Status: CANCELLED | OUTPATIENT
Start: 2022-02-08

## 2022-02-08 RX ORDER — ALBUTEROL SULFATE 0.83 MG/ML
2.5 SOLUTION RESPIRATORY (INHALATION)
Status: CANCELLED | OUTPATIENT
Start: 2022-02-08

## 2022-02-08 RX ORDER — METHYLPREDNISOLONE SODIUM SUCCINATE 125 MG/2ML
125 INJECTION, POWDER, LYOPHILIZED, FOR SOLUTION INTRAMUSCULAR; INTRAVENOUS
Status: CANCELLED
Start: 2022-02-08

## 2022-02-08 RX ORDER — ALBUMIN (HUMAN) 12.5 G/50ML
12.5 SOLUTION INTRAVENOUS
Status: CANCELLED | OUTPATIENT
Start: 2022-02-08

## 2022-02-08 RX ORDER — HEPARIN SODIUM,PORCINE 10 UNIT/ML
5 VIAL (ML) INTRAVENOUS
Status: CANCELLED | OUTPATIENT
Start: 2022-02-08

## 2022-02-08 RX ORDER — LIDOCAINE HYDROCHLORIDE 10 MG/ML
20 INJECTION, SOLUTION EPIDURAL; INFILTRATION; INTRACAUDAL; PERINEURAL ONCE
Status: CANCELLED | OUTPATIENT
Start: 2022-02-08 | End: 2022-02-08

## 2022-02-08 RX ORDER — ALBUTEROL SULFATE 90 UG/1
1-2 AEROSOL, METERED RESPIRATORY (INHALATION)
Status: CANCELLED
Start: 2022-02-08

## 2022-02-08 RX ORDER — FUROSEMIDE 20 MG
40 TABLET ORAL DAILY
Qty: 60 TABLET | Refills: 11 | Status: SHIPPED | OUTPATIENT
Start: 2022-02-08 | End: 2022-05-13

## 2022-02-08 RX ORDER — HEPARIN SODIUM (PORCINE) LOCK FLUSH IV SOLN 100 UNIT/ML 100 UNIT/ML
5 SOLUTION INTRAVENOUS
Status: CANCELLED | OUTPATIENT
Start: 2022-02-08

## 2022-02-08 RX ORDER — TENOFOVIR DISOPROXIL FUMARATE 300 MG/1
300 TABLET, FILM COATED ORAL DAILY
Qty: 90 TABLET | Refills: 3 | Status: SHIPPED | OUTPATIENT
Start: 2022-02-08 | End: 2022-05-13

## 2022-02-08 RX ORDER — EPINEPHRINE 1 MG/ML
0.3 INJECTION, SOLUTION, CONCENTRATE INTRAVENOUS EVERY 5 MIN PRN
Status: CANCELLED | OUTPATIENT
Start: 2022-02-08

## 2022-02-08 RX ORDER — DIPHENHYDRAMINE HYDROCHLORIDE 50 MG/ML
50 INJECTION INTRAMUSCULAR; INTRAVENOUS
Status: CANCELLED
Start: 2022-02-08

## 2022-02-08 ASSESSMENT — MIFFLIN-ST. JEOR: SCORE: 1320.2

## 2022-02-08 ASSESSMENT — PAIN SCALES - GENERAL: PAINLEVEL: NO PAIN (0)

## 2022-02-08 NOTE — LETTER
2/8/2022         RE: Rishabh Cabrera  [address]  [facility] [address]      Date of Service: 2/8/2022     Subjective:            Rishabh Cabrera is a 61 year old male presenting for evaluation of liver disease    History of Present Illness   Rishabh Cabrera is a 61 year old male with past medical history of diabetes, chronic hepatitis B with resultant cirrhosis, and hepatocellular carcinoma who presents in follow-up.    The patient was initially diagnosed with hepatocellular carcinoma in 2020, and there were significant difficulties with regard to communicating with the patient, his understanding of the process, and his willingness to move forward with treatment of the cancer.  Ongoing surveillance imaging did demonstrate increasing size of HCC, and fortunately he agreed to move forward with Y 90 therapy in September 2021.  There was concerns for residual disease after this and ultimately underwent chemoembolization in December 2021.  He notes that he felt quite poorly, and shooting pains down his legs and new onset abdominal pains after the procedures, so he discontinued all of his medications, including the tenofovir, with hopes that it would improve his symptoms.  It is unclear if he reached out to the healthcare providers to seek help in his management.  An MRI 1 month after his chemo embolization suggested treated equivocal lesion, and plan for follow-up imaging of chest abdomen and pelvis as well as a bone scan was ordered.  He did meet with Dr. So in January and this plan was reaffirmed.    Today he proceeds with the clinical encounter without , and his son is available to help as needed.  He has significant concerns about abdominal pain that seems worse with bowel movements.  He has pain in his legs and swelling of his left knee..  He is very focused on needing a transplant.  We have a long discussion with the patient and his son regarding the evaluation process for transplant candidacy.    He  admits that he does not currently have any active prescription for tenofovir    Past Medical History:  Past Medical History:   Diagnosis Date     Cancer (H)      Chronic hepatitis B (H)      Diabetes mellitus (H)        Surgical History:  Past Surgical History:   Procedure Laterality Date     ESOPHAGOSCOPY, GASTROSCOPY, DUODENOSCOPY (EGD), COMBINED N/A 5/29/2019    Procedure: ESOPHAGOGASTRODUODENOSCOPY (EGD);  Surgeon: Leventhal, Thomas Michael, MD;  Location: [facility]     IR CHEMO EMBOLIZATION  12/2/2021     IR SIRT (SELECTIVE INTERNAL RADIO THERAPY)  9/8/2021     IR VISCERAL ANGIOGRAM  9/8/2021     IR VISCERAL EMBOLIZATION  9/9/2021       Social History:  Social History     Tobacco Use     Smoking status: Never Smoker     Smokeless tobacco: Never Used   Substance Use Topics     Alcohol use: No     Drug use: Not Currently     Types: Marijuana     Comment: past marijuana use       Family History:  No family history on file.    Medications:  Current Outpatient Medications   Medication     furosemide (LASIX) 20 MG tablet     ibuprofen (ADVIL/MOTRIN) 100 MG tablet     LANTUS SOLOSTAR 100 UNIT/ML soln     metFORMIN (GLUCOPHAGE) 1000 MG tablet     naproxen (NAPROSYN) 500 MG tablet     oxyCODONE (ROXICODONE) 5 MG tablet     oxyCODONE (ROXICODONE) 5 MG tablet     oxyCODONE (ROXICODONE) 5 MG tablet     tenofovir (VIREAD) 300 MG tablet     ondansetron (ZOFRAN) 4 MG tablet     ondansetron (ZOFRAN-ODT) 4 MG ODT tab     pantoprazole (PROTONIX) 40 MG EC tablet     spironolactone (ALDACTONE) 50 MG tablet     tenofovir alafenamide fumarate (VEMLIDY) 25 MG tablet     No current facility-administered medications for this visit.     Facility-Administered Medications Ordered in Other Visits   Medication     gadobutrol (GADAVIST) injection 7.5 mL       Review of Systems  A complete 10 point review of systems was asked and answered in the negative unless specifically commented upon in the HPI    Objective:         Vitals:    02/08/22 1040  "  BP: 116/77   Pulse: 103   SpO2: 99%   Weight: 59.9 kg (132 lb 1.6 oz)   Height: 1.626 m (5' 4\")     Body mass index is 22.67 kg/m .     Physical Exam  Constitutional: cachetic, mild distress  HEENT: mild icterus  CV: tachycardic  Lungs: Normal respiratory excursion  Abd: protuberant, diffusely tender  Ext: swelling of left lower extremity (1+) vs right (trace)  MSK: muscle wasting      Labs and Diagnostic tests:  Lab Results   Component Value Date     02/08/2022     05/10/2021    POTASSIUM 4.6 02/08/2022    POTASSIUM 4.7 05/10/2021    CHLORIDE 105 02/08/2022    CHLORIDE 103 05/10/2021    CO2 24 02/08/2022    CO2 26 05/10/2021    BUN 10 02/08/2022    BUN 12 05/10/2021    CR 1.08 02/08/2022    CR 0.85 05/10/2021     Lab Results   Component Value Date    BILITOTAL 3.1 02/08/2022    BILITOTAL 1.3 05/10/2021    ALT 19 02/08/2022     05/10/2021    AST 35 02/08/2022     05/10/2021    ALKPHOS 170 02/08/2022    ALKPHOS 290 05/10/2021     Lab Results   Component Value Date    ALBUMIN 1.9 02/08/2022    ALBUMIN 2.7 05/10/2021    PROTTOTAL 8.7 02/08/2022    PROTTOTAL 6.7 05/10/2021      Lab Results   Component Value Date    WBC 5.1 02/08/2022    WBC 3.5 05/10/2021    HGB 12.7 02/08/2022    HGB 13.0 05/10/2021    MCV 95 02/08/2022    MCV 86 05/10/2021    PLT 88 02/08/2022    PLT 52 05/10/2021     Lab Results   Component Value Date    INR 1.97 02/08/2022    INR 1.39 05/10/2021       MELD-Na score: 19 at 2/8/2022 11:38 AM  MELD score: 19 at 2/8/2022 11:38 AM  Calculated from:  Serum Creatinine: 1.08 mg/dL at 2/8/2022 11:38 AM  Serum Sodium: 138 mmol/L (Using max of 137 mmol/L) at 2/8/2022 11:38 AM  Total Bilirubin: 3.1 mg/dL at 2/8/2022 11:38 AM  INR(ratio): 1.97 at 2/8/2022 11:38 AM  Age: 61 years    Imaging:  Reviewed  CT Chest 10/6/21  LUNGS AND PLEURA: Stable chronic right-sided pleural fluid collection with peripheral calcification. Consolidative opacities involving the posterior aspect of the right " upper lung remain unchanged. Spiculated opacity involving the left upper lobe   anteriorly unchanged at 13 x 15 mm up. Mild volume loss right lung base associated with the peripherally calcified pleural fluid collection. No left-sided pleural fluid. Area of linear atelectasis micronodular to the anterior left lower lobe unchanged.    Assessment and Plan:    Cirrhosis:    -Secondary to chronic hepatitis B  -Has ascites as his main sign of decompensation at this time  -ABRAN D from today's labs of 19    Chronic hepatitis B:  -Had been on tenofovir for the past several years, unclear how frequently he was taking it.  Have renewed his prescription today  -Discussed in detail the need for ongoing need to take this medication as his risk of acute flare could be lethal    Liver Transplant Candidacy:   -Based on his tumor burden within the liver, and his history of hesitancy and following up with the treatment care plan, we discussed in detail that we are not completing a transplant evaluation at this time  -If he is to continue to follow with the treatment plan and continue to take his medications as prescribed, this can be reassessed    Hepatocellular Cancer:   -Diagnosed in early 2020, unfortunately due to communication factors and patient hesitancy he did not undergo treatment with Y 90 until September 2021.  Had follow-up TACE in December 2021  -Surveillance imaging in late December without overt worsening of liver disease  -Will work-up spiculated lung mass as per below  -Scheduled for nuc med bone scan and imaging of chest abdomen and pelvis in March    Lung mass:  -His imaging has been reviewed in thoracic tumor board, and recommendations were that we move forward with biopsy  -Have sent messages to interventional radiology and his primary oncologist regarding need for moving forward with this evaluation    Ascites:  -Appears to have significant ascites on exam.  Not necessarily compliant with a low-sodium diet and  not currently taking diuretics  -I have reordered furosemide to be taken daily  -We will order one-time paracentesis  - Continue to follow a sodium restricted (<2g sodium diet)     Hepatic Encephalopathy:  -No acute issues    Esophageal Varices:   - 5/29/2019  -He will be due for EGD this spring, will reassess as I do feel that evaluation for malignancy is a priority    Kidney Health:   - no acute issues    Immobility/Frailty:   He has become quite frail in the setting of severe protein calorie malnutrition and we did discuss this at great length as far as his need to remain physically active    Nutrition:  As with most patients with chronic liver disease, there is a significant degree of protein malnutrition.  Dicussed need to change dietary habits to improve overall protein balance.  Discussed the importance of eating between 1.2-1.5g/kg/day lean protein like eggs, fish, chicken, nuts, and legumes, in addition to a diet rich in fresh fruits and vegetables.  Continue to follow a sodium restricted (<2g sodium diet) and discussed the need to minimize the intake of carbohydrates and sugars, to avoid obesity.   - Strongly encourage protein supplements 2-3 times daily (Boost, Ensure, [place] Instant Milk, etc.) to meet protein and caloric intake.  - Recommend a bedtime snack with protein and complex carbohydrate to minimize risk of muscle catabolism overnight    Routine Health Care in Patient with Chronic Liver Disease:  - We recommend screening for hepatitis A and B, please vaccinate if not immune  - All patients with liver disease, particularly those with cholestatic liver disease, are at an increased risk for osteoporosis.  We strongly recommend screening for Vitamin D deficiency at least twice yearly with aggressive supplementation/replacement as indicated.    - We also recommend a screening DEXA scan to evaluate for osteoporosis.  If present, should treat with calcium, Vitamin D supplementation, and recommend  consideration of bisphosphonate therapy.  Also recommend follow up DEXA scans to evaluate for improvement of bone density on therapy.  - All patients with liver disease should avoid the use of Non-steroidal Anti-Inflammatory (NSAID) medications as they can cause significant injury to the kidneys in this population    Follow Up:  3 months with KRYSTAL Conrad     Thank you very much for the opportunity to participate in the care of this patient.  If you have any further questions, please don't hesitate to contact our office.    Thomas M. Leventhal, M.D.   of Medicine  Advanced & Transplant Hepatology  [facility]          Thomas M. Leventhal, MD

## 2022-02-08 NOTE — PROGRESS NOTES
One time para order entered. [facility]  will call pt to schedule appointment.    Linda PERALES LPN  Hepatology Clinic      --------  Leventhal, Thomas Michael, MD [place], [place]  Can you please help arrange paracentesis, one time, with up to 6L removed and albumin per protocol

## 2022-02-08 NOTE — LETTER
Date:February 9, 2022      Patient was self referred, no letter generated. Do not send.        [facility] Health Information

## 2022-02-08 NOTE — LETTER
2/8/2022         RE: Rishabh Cabrera  [address]  [facility] [address]        Dear Colleague,    Thank you for referring your patient, Rishabh Cabrera, to the [facility] [place]. Please see a copy of my visit note below.    Date of Service: 2/8/2022     Subjective:            Rishabh Cabrera is a 61 year old male presenting for evaluation of liver disease    History of Present Illness   Rishabh Cabrera is a 61 year old male with past medical history of diabetes, chronic hepatitis B with resultant cirrhosis, and hepatocellular carcinoma who presents in follow-up.    The patient was initially diagnosed with hepatocellular carcinoma in 2020, and there were significant difficulties with regard to communicating with the patient, his understanding of the process, and his willingness to move forward with treatment of the cancer.  Ongoing surveillance imaging did demonstrate increasing size of HCC, and fortunately he agreed to move forward with Y 90 therapy in September 2021.  There was concerns for residual disease after this and ultimately underwent chemoembolization in December 2021.  He notes that he felt quite poorly, and shooting pains down his legs and new onset abdominal pains after the procedures, so he discontinued all of his medications, including the tenofovir, with hopes that it would improve his symptoms.  It is unclear if he reached out to the healthcare providers to seek help in his management.  An MRI 1 month after his chemo embolization suggested treated equivocal lesion, and plan for follow-up imaging of chest abdomen and pelvis as well as a bone scan was ordered.  He did meet with Dr. So in January and this plan was reaffirmed.    Today he proceeds with the clinical encounter without , and his son is available to help as needed.  He has significant concerns about abdominal pain that seems worse with bowel movements.  He has pain in his legs and swelling of his left  knee..  He is very focused on needing a transplant.  We have a long discussion with the patient and his son regarding the evaluation process for transplant candidacy.    He admits that he does not currently have any active prescription for tenofovir    Past Medical History:  Past Medical History:   Diagnosis Date     Cancer (H)      Chronic hepatitis B (H)      Diabetes mellitus (H)        Surgical History:  Past Surgical History:   Procedure Laterality Date     ESOPHAGOSCOPY, GASTROSCOPY, DUODENOSCOPY (EGD), COMBINED N/A 5/29/2019    Procedure: ESOPHAGOGASTRODUODENOSCOPY (EGD);  Surgeon: Leventhal, Thomas Michael, MD;  Location: [facility]     IR CHEMO EMBOLIZATION  12/2/2021     IR SIRT (SELECTIVE INTERNAL RADIO THERAPY)  9/8/2021     IR VISCERAL ANGIOGRAM  9/8/2021     IR VISCERAL EMBOLIZATION  9/9/2021       Social History:  Social History     Tobacco Use     Smoking status: Never Smoker     Smokeless tobacco: Never Used   Substance Use Topics     Alcohol use: No     Drug use: Not Currently     Types: Marijuana     Comment: past marijuana use       Family History:  No family history on file.    Medications:  Current Outpatient Medications   Medication     furosemide (LASIX) 20 MG tablet     ibuprofen (ADVIL/MOTRIN) 100 MG tablet     LANTUS SOLOSTAR 100 UNIT/ML soln     metFORMIN (GLUCOPHAGE) 1000 MG tablet     naproxen (NAPROSYN) 500 MG tablet     oxyCODONE (ROXICODONE) 5 MG tablet     oxyCODONE (ROXICODONE) 5 MG tablet     oxyCODONE (ROXICODONE) 5 MG tablet     tenofovir (VIREAD) 300 MG tablet     ondansetron (ZOFRAN) 4 MG tablet     ondansetron (ZOFRAN-ODT) 4 MG ODT tab     pantoprazole (PROTONIX) 40 MG EC tablet     spironolactone (ALDACTONE) 50 MG tablet     tenofovir alafenamide fumarate (VEMLIDY) 25 MG tablet     No current facility-administered medications for this visit.     Facility-Administered Medications Ordered in Other Visits   Medication     gadobutrol (GADAVIST) injection 7.5 mL       Review of  "Systems  A complete 10 point review of systems was asked and answered in the negative unless specifically commented upon in the HPI    Objective:         Vitals:    02/08/22 1040   BP: 116/77   Pulse: 103   SpO2: 99%   Weight: 59.9 kg (132 lb 1.6 oz)   Height: 1.626 m (5' 4\")     Body mass index is 22.67 kg/m .     Physical Exam  Constitutional: cachetic, mild distress  HEENT: mild icterus  CV: tachycardic  Lungs: Normal respiratory excursion  Abd: protuberant, diffusely tender  Ext: swelling of left lower extremity (1+) vs right (trace)  MSK: muscle wasting      Labs and Diagnostic tests:  Lab Results   Component Value Date     02/08/2022     05/10/2021    POTASSIUM 4.6 02/08/2022    POTASSIUM 4.7 05/10/2021    CHLORIDE 105 02/08/2022    CHLORIDE 103 05/10/2021    CO2 24 02/08/2022    CO2 26 05/10/2021    BUN 10 02/08/2022    BUN 12 05/10/2021    CR 1.08 02/08/2022    CR 0.85 05/10/2021     Lab Results   Component Value Date    BILITOTAL 3.1 02/08/2022    BILITOTAL 1.3 05/10/2021    ALT 19 02/08/2022     05/10/2021    AST 35 02/08/2022     05/10/2021    ALKPHOS 170 02/08/2022    ALKPHOS 290 05/10/2021     Lab Results   Component Value Date    ALBUMIN 1.9 02/08/2022    ALBUMIN 2.7 05/10/2021    PROTTOTAL 8.7 02/08/2022    PROTTOTAL 6.7 05/10/2021      Lab Results   Component Value Date    WBC 5.1 02/08/2022    WBC 3.5 05/10/2021    HGB 12.7 02/08/2022    HGB 13.0 05/10/2021    MCV 95 02/08/2022    MCV 86 05/10/2021    PLT 88 02/08/2022    PLT 52 05/10/2021     Lab Results   Component Value Date    INR 1.97 02/08/2022    INR 1.39 05/10/2021       MELD-Na score: 19 at 2/8/2022 11:38 AM  MELD score: 19 at 2/8/2022 11:38 AM  Calculated from:  Serum Creatinine: 1.08 mg/dL at 2/8/2022 11:38 AM  Serum Sodium: 138 mmol/L (Using max of 137 mmol/L) at 2/8/2022 11:38 AM  Total Bilirubin: 3.1 mg/dL at 2/8/2022 11:38 AM  INR(ratio): 1.97 at 2/8/2022 11:38 AM  Age: 61 years    Imaging:  Reviewed  CT " Chest 10/6/21  LUNGS AND PLEURA: Stable chronic right-sided pleural fluid collection with peripheral calcification. Consolidative opacities involving the posterior aspect of the right upper lung remain unchanged. Spiculated opacity involving the left upper lobe   anteriorly unchanged at 13 x 15 mm up. Mild volume loss right lung base associated with the peripherally calcified pleural fluid collection. No left-sided pleural fluid. Area of linear atelectasis micronodular to the anterior left lower lobe unchanged.    Assessment and Plan:    Cirrhosis:    -Secondary to chronic hepatitis B  -Has ascites as his main sign of decompensation at this time  -ABRAN D from today's labs of 19    Chronic hepatitis B:  -Had been on tenofovir for the past several years, unclear how frequently he was taking it.  Have renewed his prescription today  -Discussed in detail the need for ongoing need to take this medication as his risk of acute flare could be lethal    Liver Transplant Candidacy:   -Based on his tumor burden within the liver, and his history of hesitancy and following up with the treatment care plan, we discussed in detail that we are not completing a transplant evaluation at this time  -If he is to continue to follow with the treatment plan and continue to take his medications as prescribed, this can be reassessed    Hepatocellular Cancer:   -Diagnosed in early 2020, unfortunately due to communication factors and patient hesitancy he did not undergo treatment with Y 90 until September 2021.  Had follow-up TACE in December 2021  -Surveillance imaging in late December without overt worsening of liver disease  -Will work-up spiculated lung mass as per below  -Scheduled for nuc med bone scan and imaging of chest abdomen and pelvis in March    Lung mass:  -His imaging has been reviewed in thoracic tumor board, and recommendations were that we move forward with biopsy  -Have sent messages to interventional radiology and his  primary oncologist regarding need for moving forward with this evaluation    Ascites:  -Appears to have significant ascites on exam.  Not necessarily compliant with a low-sodium diet and not currently taking diuretics  -I have reordered furosemide to be taken daily  -We will order one-time paracentesis  - Continue to follow a sodium restricted (<2g sodium diet)     Hepatic Encephalopathy:  -No acute issues    Esophageal Varices:   - 5/29/2019  -He will be due for EGD this spring, will reassess as I do feel that evaluation for malignancy is a priority    Kidney Health:   - no acute issues    Immobility/Frailty:   He has become quite frail in the setting of severe protein calorie malnutrition and we did discuss this at great length as far as his need to remain physically active    Nutrition:  As with most patients with chronic liver disease, there is a significant degree of protein malnutrition.  Dicussed need to change dietary habits to improve overall protein balance.  Discussed the importance of eating between 1.2-1.5g/kg/day lean protein like eggs, fish, chicken, nuts, and legumes, in addition to a diet rich in fresh fruits and vegetables.  Continue to follow a sodium restricted (<2g sodium diet) and discussed the need to minimize the intake of carbohydrates and sugars, to avoid obesity.   - Strongly encourage protein supplements 2-3 times daily (Boost, Ensure, [place] Instant Milk, etc.) to meet protein and caloric intake.  - Recommend a bedtime snack with protein and complex carbohydrate to minimize risk of muscle catabolism overnight    Routine Health Care in Patient with Chronic Liver Disease:  - We recommend screening for hepatitis A and B, please vaccinate if not immune  - All patients with liver disease, particularly those with cholestatic liver disease, are at an increased risk for osteoporosis.  We strongly recommend screening for Vitamin D deficiency at least twice yearly with aggressive  supplementation/replacement as indicated.    - We also recommend a screening DEXA scan to evaluate for osteoporosis.  If present, should treat with calcium, Vitamin D supplementation, and recommend consideration of bisphosphonate therapy.  Also recommend follow up DEXA scans to evaluate for improvement of bone density on therapy.  - All patients with liver disease should avoid the use of Non-steroidal Anti-Inflammatory (NSAID) medications as they can cause significant injury to the kidneys in this population    Follow Up:  3 months with KRYSTAL Conrad     Thank you very much for the opportunity to participate in the care of this patient.  If you have any further questions, please don't hesitate to contact our office.    Thomas M. Leventhal, M.D.   of Medicine  Advanced & Transplant Hepatology  [facility]        Again, thank you for allowing me to participate in the care of your patient.        Sincerely,        Thomas M. Leventhal, MD

## 2022-02-08 NOTE — Clinical Note
Can you please help arrange paracentesis, one time, with up to 6L removed and albumin per protocol
no

## 2022-02-08 NOTE — PATIENT INSTRUCTIONS
- Please get labs today    - We have sent in a prescriptions for tenofovir and furosemide    - We will arrange for a paracentesis for fluid removal from the abdomen    Cirrhosis Education  Nutrition  - For patients with cirrhosis, it is very important to eat the right types and amounts of foods.  We recommend a diet low in carbohydrates/sugars and high in fresh fruits/vegetables, with the right amount of protein.  We typically recommend 1.5gm/kg/day of protein, or  grams of protein every day.  - In regard to protein intake, you can focus on: All meats, cooked fish and seafood, vegetable-based protein meat products, tofu, eggs, legumes, dairy products (including milk and yogurt and cheese), unsalted nuts.  - You should eat at least three meals a day and three to four snacks between meals  - Bedtime snacks are especially important (preferrably something with some protein)    - Patients with malnutrition and/or loss of muscular mass can improve their nutrition and muscular mass by drinking two cans of dietary supplements daily, particularly at bedtime.  These would include: Ensure, Boost, [place] Instant Milk, Glucerna, (or similar supplements)  - Please avoid eating raw seafood, especially shellfish, because of risk of serious illness  - We recommend all patients with cirrhosis limit their daily sodium intake to less than 2,000mg      General Liver Health  - Continue to avoid the use of all non-steroid anti-inflammatory medicines (ibuprofen, Aleve, naproxen, aspirin, etc.) as this can cause serious injury to your kidneys in the setting of liver disease  - If you see a doctor and they prescribe you a new medication, please contact our clinic to let us know what changes are being made  - If you become acutely ill and present to an ER or are admitted to a hospital, please let us know as soon as you are able.  - We recommend that all patients with chronic liver disease be vaccinated against hepatitis A and B.   Please discuss with your primary provider the need for these vaccines  - As patients with liver disease are at an increased risk of developing osteoporosis, we recommend that you have a DEXA scan with appropriate follow up and treatment.   - We recommend screening for Vitamin D deficiency (at least yearly) and aggressive replacement/supplementation as warranted.    Sleep Troubles:  - Sleep issues are very common in patients with chronic liver disease  - Recommend strict avoidance of medications such as narcotics, sedatives and sleep aids.    - Low dose benadryl or melatonin can be used for insomnia, if needed.

## 2022-02-08 NOTE — PROGRESS NOTES
Date of Service: 2/8/2022     Subjective:            Rishabh Cabrera is a 61 year old male presenting for evaluation of liver disease    History of Present Illness   Rishabh Cabrera is a 61 year old male with past medical history of diabetes, chronic hepatitis B with resultant cirrhosis, and hepatocellular carcinoma who presents in follow-up.    The patient was initially diagnosed with hepatocellular carcinoma in 2020, and there were significant difficulties with regard to communicating with the patient, his understanding of the process, and his willingness to move forward with treatment of the cancer.  Ongoing surveillance imaging did demonstrate increasing size of HCC, and fortunately he agreed to move forward with Y 90 therapy in September 2021.  There was concerns for residual disease after this and ultimately underwent chemoembolization in December 2021.  He notes that he felt quite poorly, and shooting pains down his legs and new onset abdominal pains after the procedures, so he discontinued all of his medications, including the tenofovir, with hopes that it would improve his symptoms.  It is unclear if he reached out to the healthcare providers to seek help in his management.  An MRI 1 month after his chemo embolization suggested treated equivocal lesion, and plan for follow-up imaging of chest abdomen and pelvis as well as a bone scan was ordered.  He did meet with Dr. So in January and this plan was reaffirmed.    Today he proceeds with the clinical encounter without , and his son is available to help as needed.  He has significant concerns about abdominal pain that seems worse with bowel movements.  He has pain in his legs and swelling of his left knee..  He is very focused on needing a transplant.  We have a long discussion with the patient and his son regarding the evaluation process for transplant candidacy.    He admits that he does not currently have any active prescription for tenofovir    Past  Medical History:  Past Medical History:   Diagnosis Date     Cancer (H)      Chronic hepatitis B (H)      Diabetes mellitus (H)        Surgical History:  Past Surgical History:   Procedure Laterality Date     ESOPHAGOSCOPY, GASTROSCOPY, DUODENOSCOPY (EGD), COMBINED N/A 5/29/2019    Procedure: ESOPHAGOGASTRODUODENOSCOPY (EGD);  Surgeon: Leventhal, Thomas Michael, MD;  Location: [facility]     IR CHEMO EMBOLIZATION  12/2/2021     IR SIRT (SELECTIVE INTERNAL RADIO THERAPY)  9/8/2021     IR VISCERAL ANGIOGRAM  9/8/2021     IR VISCERAL EMBOLIZATION  9/9/2021       Social History:  Social History     Tobacco Use     Smoking status: Never Smoker     Smokeless tobacco: Never Used   Substance Use Topics     Alcohol use: No     Drug use: Not Currently     Types: Marijuana     Comment: past marijuana use       Family History:  No family history on file.    Medications:  Current Outpatient Medications   Medication     furosemide (LASIX) 20 MG tablet     ibuprofen (ADVIL/MOTRIN) 100 MG tablet     LANTUS SOLOSTAR 100 UNIT/ML soln     metFORMIN (GLUCOPHAGE) 1000 MG tablet     naproxen (NAPROSYN) 500 MG tablet     oxyCODONE (ROXICODONE) 5 MG tablet     oxyCODONE (ROXICODONE) 5 MG tablet     oxyCODONE (ROXICODONE) 5 MG tablet     tenofovir (VIREAD) 300 MG tablet     ondansetron (ZOFRAN) 4 MG tablet     ondansetron (ZOFRAN-ODT) 4 MG ODT tab     pantoprazole (PROTONIX) 40 MG EC tablet     spironolactone (ALDACTONE) 50 MG tablet     tenofovir alafenamide fumarate (VEMLIDY) 25 MG tablet     No current facility-administered medications for this visit.     Facility-Administered Medications Ordered in Other Visits   Medication     gadobutrol (GADAVIST) injection 7.5 mL       Review of Systems  A complete 10 point review of systems was asked and answered in the negative unless specifically commented upon in the HPI    Objective:         Vitals:    02/08/22 1040   BP: 116/77   Pulse: 103   SpO2: 99%   Weight: 59.9 kg (132 lb 1.6 oz)   Height:  "1.626 m (5' 4\")     Body mass index is 22.67 kg/m .     Physical Exam  Constitutional: cachetic, mild distress  HEENT: mild icterus  CV: tachycardic  Lungs: Normal respiratory excursion  Abd: protuberant, diffusely tender  Ext: swelling of left lower extremity (1+) vs right (trace)  MSK: muscle wasting      Labs and Diagnostic tests:  Lab Results   Component Value Date     02/08/2022     05/10/2021    POTASSIUM 4.6 02/08/2022    POTASSIUM 4.7 05/10/2021    CHLORIDE 105 02/08/2022    CHLORIDE 103 05/10/2021    CO2 24 02/08/2022    CO2 26 05/10/2021    BUN 10 02/08/2022    BUN 12 05/10/2021    CR 1.08 02/08/2022    CR 0.85 05/10/2021     Lab Results   Component Value Date    BILITOTAL 3.1 02/08/2022    BILITOTAL 1.3 05/10/2021    ALT 19 02/08/2022     05/10/2021    AST 35 02/08/2022     05/10/2021    ALKPHOS 170 02/08/2022    ALKPHOS 290 05/10/2021     Lab Results   Component Value Date    ALBUMIN 1.9 02/08/2022    ALBUMIN 2.7 05/10/2021    PROTTOTAL 8.7 02/08/2022    PROTTOTAL 6.7 05/10/2021      Lab Results   Component Value Date    WBC 5.1 02/08/2022    WBC 3.5 05/10/2021    HGB 12.7 02/08/2022    HGB 13.0 05/10/2021    MCV 95 02/08/2022    MCV 86 05/10/2021    PLT 88 02/08/2022    PLT 52 05/10/2021     Lab Results   Component Value Date    INR 1.97 02/08/2022    INR 1.39 05/10/2021       MELD-Na score: 19 at 2/8/2022 11:38 AM  MELD score: 19 at 2/8/2022 11:38 AM  Calculated from:  Serum Creatinine: 1.08 mg/dL at 2/8/2022 11:38 AM  Serum Sodium: 138 mmol/L (Using max of 137 mmol/L) at 2/8/2022 11:38 AM  Total Bilirubin: 3.1 mg/dL at 2/8/2022 11:38 AM  INR(ratio): 1.97 at 2/8/2022 11:38 AM  Age: 61 years    Imaging:  Reviewed  CT Chest 10/6/21  LUNGS AND PLEURA: Stable chronic right-sided pleural fluid collection with peripheral calcification. Consolidative opacities involving the posterior aspect of the right upper lung remain unchanged. Spiculated opacity involving the left upper lobe "   anteriorly unchanged at 13 x 15 mm up. Mild volume loss right lung base associated with the peripherally calcified pleural fluid collection. No left-sided pleural fluid. Area of linear atelectasis micronodular to the anterior left lower lobe unchanged.    Assessment and Plan:    Cirrhosis:    -Secondary to chronic hepatitis B  -Has ascites as his main sign of decompensation at this time  -ABRAN D from today's labs of 19    Chronic hepatitis B:  -Had been on tenofovir for the past several years, unclear how frequently he was taking it.  Have renewed his prescription today  -Discussed in detail the need for ongoing need to take this medication as his risk of acute flare could be lethal    Liver Transplant Candidacy:   -Based on his tumor burden within the liver, and his history of hesitancy and following up with the treatment care plan, we discussed in detail that we are not completing a transplant evaluation at this time  -If he is to continue to follow with the treatment plan and continue to take his medications as prescribed, this can be reassessed    Hepatocellular Cancer:   -Diagnosed in early 2020, unfortunately due to communication factors and patient hesitancy he did not undergo treatment with Y 90 until September 2021.  Had follow-up TACE in December 2021  -Surveillance imaging in late December without overt worsening of liver disease  -Will work-up spiculated lung mass as per below  -Scheduled for nuc med bone scan and imaging of chest abdomen and pelvis in March    Lung mass:  -His imaging has been reviewed in thoracic tumor board, and recommendations were that we move forward with biopsy  -Have sent messages to interventional radiology and his primary oncologist regarding need for moving forward with this evaluation    Ascites:  -Appears to have significant ascites on exam.  Not necessarily compliant with a low-sodium diet and not currently taking diuretics  -I have reordered furosemide to be taken  daily  -We will order one-time paracentesis  - Continue to follow a sodium restricted (<2g sodium diet)     Hepatic Encephalopathy:  -No acute issues    Esophageal Varices:   - 5/29/2019  -He will be due for EGD this spring, will reassess as I do feel that evaluation for malignancy is a priority    Kidney Health:   - no acute issues    Immobility/Frailty:   He has become quite frail in the setting of severe protein calorie malnutrition and we did discuss this at great length as far as his need to remain physically active    Nutrition:  As with most patients with chronic liver disease, there is a significant degree of protein malnutrition.  Dicussed need to change dietary habits to improve overall protein balance.  Discussed the importance of eating between 1.2-1.5g/kg/day lean protein like eggs, fish, chicken, nuts, and legumes, in addition to a diet rich in fresh fruits and vegetables.  Continue to follow a sodium restricted (<2g sodium diet) and discussed the need to minimize the intake of carbohydrates and sugars, to avoid obesity.   - Strongly encourage protein supplements 2-3 times daily (Boost, Ensure, [place] Instant Milk, etc.) to meet protein and caloric intake.  - Recommend a bedtime snack with protein and complex carbohydrate to minimize risk of muscle catabolism overnight    Routine Health Care in Patient with Chronic Liver Disease:  - We recommend screening for hepatitis A and B, please vaccinate if not immune  - All patients with liver disease, particularly those with cholestatic liver disease, are at an increased risk for osteoporosis.  We strongly recommend screening for Vitamin D deficiency at least twice yearly with aggressive supplementation/replacement as indicated.    - We also recommend a screening DEXA scan to evaluate for osteoporosis.  If present, should treat with calcium, Vitamin D supplementation, and recommend consideration of bisphosphonate therapy.  Also recommend follow up DEXA  scans to evaluate for improvement of bone density on therapy.  - All patients with liver disease should avoid the use of Non-steroidal Anti-Inflammatory (NSAID) medications as they can cause significant injury to the kidneys in this population    Follow Up:  3 months with KRYSTAL Conrad     Thank you very much for the opportunity to participate in the care of this patient.  If you have any further questions, please don't hesitate to contact our office.    Thomas M. Leventhal, M.D.   of Medicine  Advanced & Transplant Hepatology  [facility]

## 2022-02-08 NOTE — NURSING NOTE
"    Chief Complaint   Patient presents with     RECHECK     f/u     /77   Pulse 103   Ht 1.626 m (5' 4\")   Wt 59.9 kg (132 lb 1.6 oz)   SpO2 99%   BMI 22.67 kg/m       Natalie Anderson MA  "

## 2022-02-14 DIAGNOSIS — Z11.52 ENCOUNTER FOR SCREENING LABORATORY TESTING FOR COVID-19 VIRUS: Primary | ICD-10-CM

## 2022-02-18 ENCOUNTER — LAB (OUTPATIENT)
Dept: LAB | Facility: CLINIC | Age: 62
End: 2022-02-18
Attending: INTERNAL MEDICINE
Payer: MEDICARE

## 2022-02-18 DIAGNOSIS — Z11.52 ENCOUNTER FOR SCREENING LABORATORY TESTING FOR COVID-19 VIRUS: ICD-10-CM

## 2022-02-18 PROCEDURE — U0003 INFECTIOUS AGENT DETECTION BY NUCLEIC ACID (DNA OR RNA); SEVERE ACUTE RESPIRATORY SYNDROME CORONAVIRUS 2 (SARS-COV-2) (CORONAVIRUS DISEASE [COVID-19]), AMPLIFIED PROBE TECHNIQUE, MAKING USE OF HIGH THROUGHPUT TECHNOLOGIES AS DESCRIBED BY CMS-2020-01-R: HCPCS | Performed by: INTERNAL MEDICINE

## 2022-02-19 LAB — SARS-COV-2 RNA RESP QL NAA+PROBE: NEGATIVE

## 2022-02-22 ENCOUNTER — OFFICE VISIT (OUTPATIENT)
Dept: INFUSION THERAPY | Facility: CLINIC | Age: 62
End: 2022-02-22
Attending: INTERNAL MEDICINE
Payer: MEDICARE

## 2022-02-22 ENCOUNTER — ANCILLARY PROCEDURE (OUTPATIENT)
Dept: ULTRASOUND IMAGING | Facility: CLINIC | Age: 62
End: 2022-02-22
Attending: INTERNAL MEDICINE
Payer: MEDICARE

## 2022-02-22 VITALS
OXYGEN SATURATION: 99 % | DIASTOLIC BLOOD PRESSURE: 71 MMHG | SYSTOLIC BLOOD PRESSURE: 116 MMHG | HEART RATE: 90 BPM | WEIGHT: 125 LBS | BODY MASS INDEX: 21.46 KG/M2 | RESPIRATION RATE: 18 BRPM | TEMPERATURE: 97.5 F

## 2022-02-22 DIAGNOSIS — R18.8 OTHER ASCITES: Primary | ICD-10-CM

## 2022-02-22 PROCEDURE — 250N000009 HC RX 250: Performed by: INTERNAL MEDICINE

## 2022-02-22 PROCEDURE — 49083 ABD PARACENTESIS W/IMAGING: CPT | Performed by: PHYSICIAN ASSISTANT

## 2022-02-22 PROCEDURE — 272N000706 US PARACENTESIS

## 2022-02-22 PROCEDURE — P9047 ALBUMIN (HUMAN), 25%, 50ML: HCPCS | Performed by: INTERNAL MEDICINE

## 2022-02-22 PROCEDURE — 250N000011 HC RX IP 250 OP 636: Performed by: INTERNAL MEDICINE

## 2022-02-22 RX ORDER — METHYLPREDNISOLONE SODIUM SUCCINATE 125 MG/2ML
125 INJECTION, POWDER, LYOPHILIZED, FOR SOLUTION INTRAMUSCULAR; INTRAVENOUS
Status: CANCELLED
Start: 2022-02-22

## 2022-02-22 RX ORDER — HEPARIN SODIUM (PORCINE) LOCK FLUSH IV SOLN 100 UNIT/ML 100 UNIT/ML
5 SOLUTION INTRAVENOUS
Status: CANCELLED | OUTPATIENT
Start: 2022-02-22

## 2022-02-22 RX ORDER — ALBUMIN (HUMAN) 12.5 G/50ML
12.5 SOLUTION INTRAVENOUS
Status: DISCONTINUED | OUTPATIENT
Start: 2022-02-22 | End: 2022-02-22 | Stop reason: HOSPADM

## 2022-02-22 RX ORDER — NALOXONE HYDROCHLORIDE 0.4 MG/ML
0.2 INJECTION, SOLUTION INTRAMUSCULAR; INTRAVENOUS; SUBCUTANEOUS
Status: CANCELLED | OUTPATIENT
Start: 2022-02-22

## 2022-02-22 RX ORDER — LIDOCAINE HYDROCHLORIDE 10 MG/ML
20 INJECTION, SOLUTION EPIDURAL; INFILTRATION; INTRACAUDAL; PERINEURAL ONCE
Status: CANCELLED | OUTPATIENT
Start: 2022-02-22 | End: 2022-02-22

## 2022-02-22 RX ORDER — HEPARIN SODIUM,PORCINE 10 UNIT/ML
5 VIAL (ML) INTRAVENOUS
Status: CANCELLED | OUTPATIENT
Start: 2022-02-22

## 2022-02-22 RX ORDER — LIDOCAINE HYDROCHLORIDE 10 MG/ML
20 INJECTION, SOLUTION EPIDURAL; INFILTRATION; INTRACAUDAL; PERINEURAL ONCE
Status: COMPLETED | OUTPATIENT
Start: 2022-02-22 | End: 2022-02-22

## 2022-02-22 RX ORDER — ALBUTEROL SULFATE 0.83 MG/ML
2.5 SOLUTION RESPIRATORY (INHALATION)
Status: CANCELLED | OUTPATIENT
Start: 2022-02-22

## 2022-02-22 RX ORDER — DIPHENHYDRAMINE HYDROCHLORIDE 50 MG/ML
50 INJECTION INTRAMUSCULAR; INTRAVENOUS
Status: CANCELLED
Start: 2022-02-22

## 2022-02-22 RX ORDER — EPINEPHRINE 1 MG/ML
0.3 INJECTION, SOLUTION INTRAMUSCULAR; SUBCUTANEOUS EVERY 5 MIN PRN
Status: CANCELLED | OUTPATIENT
Start: 2022-02-22

## 2022-02-22 RX ORDER — ALBUMIN (HUMAN) 12.5 G/50ML
12.5 SOLUTION INTRAVENOUS
Status: CANCELLED | OUTPATIENT
Start: 2022-02-22

## 2022-02-22 RX ORDER — ALBUTEROL SULFATE 90 UG/1
1-2 AEROSOL, METERED RESPIRATORY (INHALATION)
Status: CANCELLED
Start: 2022-02-22

## 2022-02-22 RX ADMIN — LIDOCAINE HYDROCHLORIDE 10 ML: 10 INJECTION, SOLUTION EPIDURAL; INFILTRATION; INTRACAUDAL; PERINEURAL at 14:12

## 2022-02-22 RX ADMIN — ALBUMIN HUMAN 12.5 G: 0.25 SOLUTION INTRAVENOUS at 14:11

## 2022-02-22 RX ADMIN — ALBUMIN HUMAN 12.5 G: 0.25 SOLUTION INTRAVENOUS at 14:07

## 2022-02-22 ASSESSMENT — PAIN SCALES - GENERAL: PAINLEVEL: NO PAIN (0)

## 2022-02-22 NOTE — LETTER
2/22/2022         RE: Rishabh Cabrera  [address]  [facility] [address]        Dear Colleague,    Thank you for referring your patient, Rishabh Cabrera, to the [facility]. Please see a copy of my visit note below.    Paracentesis Nursing Note  Rishabh Cabrera [facility].    During today's appointment orders from Thomas Michael Leventhal, MD were completed.    Progress Note:  Patient identification verified by name and date of birth.  Assessment completed.  Vitals monitored throughout appointment and recorded in Doc Flowsheets.  See proceduralist note in ultrasound.    Vascular Access: peripheral IV placed today.  Labs: were not ordered for this appointment.    Date of consent or authorization: 2/22/22.  Invasive Procedure Safety Checklist was completed and sent for scanning.     Paracentesis performed by Samir Wooten PA-C Radiology.    The following labs were communicated to provider performing paracentesis:  Lab Results   Component Value Date    PLT 88 02/08/2022    PLT 52 05/10/2021       Total amount of ascites fluid drained: 6 liters.  Color of ascites fluid: clear, slightly cloudy.  Total amount of albumin given: 25  grams.    Patient tolerated procedure well.    Post procedure,denies pain or discomfort post paracentesis.    Asymptomatic COVID test date: 2/18/22 (If next appt is within 2 weeks, COVID test to be done in [facility])      Discharge Plan:  Discharge instructions were reviewed with patient.  Patient/Representative verbalized understanding and all questions were answered.   Discharged from [facility] in stable condition.    Jaclyn Peterson RN      /84   Pulse 95   Temp 97.5  F (36.4  C) (Oral)   Resp 18   Wt 62.6 kg (138 lb)   SpO2 99%   BMI 23.69 kg/m      Administrations This Visit     albumin human 25 % injection 12.5 g     Admin Date  02/22/2022  Action  New Bag Dose  12.5 g Rate  200 mL/hr Route  Intravenous Administered By  Jaclyn Peterson RN           Admin Date  02/22/2022 Action  New Bag Dose  12.5 g Rate   Route  Intravenous Administered By  Jaclyn Peterson, ANTOINETTE          lidocaine (PF) (XYLOCAINE) 1 % injection 20 mL     Admin Date  02/22/2022 Action  Given by Other Dose  10 mL Route  Subcutaneous Administered By  Jaclyn Peterson, ANTOINETTE                  Again, thank you for allowing me to participate in the care of your patient.      Sincerely,    [facility] Paracentesis Provider

## 2022-02-22 NOTE — PATIENT INSTRUCTIONS
Dear Rishabh Cabrera    Thank you for choosing [facility] ([facility]) for your paracentesis.  The following information is a summary of our appointment as well as important reminders.      We look forward in seeing you on your next appointment here at [facility] ([facility]).  Please don t hesitate to call us at [phone] to reschedule any of your appointments or to speak with one of the [facility] registered nurses.  It was a pleasure taking care of you today.    Sincerely,    [facility]  [facility]  [address] [place] [address]  [id number]  Phone:  [phone]

## 2022-02-22 NOTE — PROGRESS NOTES
Paracentesis Nursing Note  Rishabh Cabrera presents today to [facility] for a [facility]esis.    During today's appointment orders from Thomas Michael Leventhal, MD were completed.    Progress Note:  Patient identification verified by name and date of birth.  Assessment completed.  Vitals monitored throughout appointment and recorded in Doc Flowsheets.  See proceduralist note in ultrasound.    Vascular Access: peripheral IV placed today.  Labs: were not ordered for this appointment.    Date of consent or authorization: 2/22/22.  Invasive Procedure Safety Checklist was completed and sent for scanning.     Paracentesis performed by Samir Wooten PA-C Radiology.    The following labs were communicated to provider performing paracentesis:  Lab Results   Component Value Date    PLT 88 02/08/2022    PLT 52 05/10/2021       Total amount of ascites fluid drained: 6 liters.  Color of ascites fluid: clear, slightly cloudy.  Total amount of albumin given: 25  grams.    Patient tolerated procedure well.    Post procedure,denies pain or discomfort post paracentesis.    Asymptomatic COVID test date: 2/18/22 (If next appt is within 2 weeks, COVID test to be done in [facility])      Discharge Plan:  Discharge instructions were reviewed with patient.  Patient/Representative verbalized understanding and all questions were answered.   Discharged [facility] in stable condition.    Jaclyn Peterson RN      /84   Pulse 95   Temp 97.5  F (36.4  C) (Oral)   Resp 18   Wt 62.6 kg (138 lb)   SpO2 99%   BMI 23.69 kg/m      Administrations This Visit     albumin human 25 % injection 12.5 g     Admin Date  02/22/2022 Action  New Bag Dose  12.5 g Rate  200 mL/hr Route  Intravenous Administered By  Jaclyn Peterson RN           Admin Date  02/22/2022 Action  New Bag Dose  12.5 g Rate   Route  Intravenous Administered By  Jaclyn Peterson, ANTOINETTE          lidocaine (PF) (XYLOCAINE) 1  % injection 20 mL     Admin Date  02/22/2022 Action  Given by Other Dose  10 mL Route  Subcutaneous Administered By  Jaclyn Peterson, RN

## 2022-03-17 ENCOUNTER — ANCILLARY PROCEDURE (OUTPATIENT)
Dept: CT IMAGING | Facility: CLINIC | Age: 62
End: 2022-03-17
Attending: RADIOLOGY
Payer: MEDICARE

## 2022-03-17 ENCOUNTER — LAB (OUTPATIENT)
Dept: LAB | Facility: CLINIC | Age: 62
End: 2022-03-17
Attending: RADIOLOGY
Payer: MEDICARE

## 2022-03-17 ENCOUNTER — HOSPITAL ENCOUNTER (OUTPATIENT)
Dept: MRI IMAGING | Facility: CLINIC | Age: 62
Discharge: HOME | End: 2022-03-17
Attending: RADIOLOGY | Admitting: RADIOLOGY
Payer: MEDICARE

## 2022-03-17 DIAGNOSIS — C22.0 HEPATOCELLULAR CARCINOMA (H): ICD-10-CM

## 2022-03-17 DIAGNOSIS — R91.8 PULMONARY NODULES: Primary | ICD-10-CM

## 2022-03-17 LAB
AFP SERPL-MCNC: 3.9 UG/L (ref 0–8)
ALBUMIN SERPL-MCNC: 2 G/DL (ref 3.4–5)
ALP SERPL-CCNC: 141 U/L (ref 40–150)
ALT SERPL W P-5'-P-CCNC: 21 U/L (ref 0–70)
ANION GAP SERPL CALCULATED.3IONS-SCNC: 7 MMOL/L (ref 3–14)
AST SERPL W P-5'-P-CCNC: 31 U/L (ref 0–45)
BILIRUB DIRECT SERPL-MCNC: 1.1 MG/DL (ref 0–0.2)
BILIRUB SERPL-MCNC: 2.8 MG/DL (ref 0.2–1.3)
BUN SERPL-MCNC: 16 MG/DL (ref 7–30)
CALCIUM SERPL-MCNC: 8.4 MG/DL (ref 8.5–10.1)
CHLORIDE BLD-SCNC: 103 MMOL/L (ref 94–109)
CO2 SERPL-SCNC: 26 MMOL/L (ref 20–32)
CREAT SERPL-MCNC: 1.09 MG/DL (ref 0.66–1.25)
ERYTHROCYTE [DISTWIDTH] IN BLOOD BY AUTOMATED COUNT: 15.5 % (ref 10–15)
GFR SERPL CREATININE-BSD FRML MDRD: 78 ML/MIN/1.73M2
GLUCOSE BLD-MCNC: 91 MG/DL (ref 70–99)
HCT VFR BLD AUTO: 33.2 % (ref 40–53)
HGB BLD-MCNC: 10.9 G/DL (ref 13.3–17.7)
INR PPP: 1.75 (ref 0.85–1.15)
MCH RBC QN AUTO: 32 PG (ref 26.5–33)
MCHC RBC AUTO-ENTMCNC: 32.8 G/DL (ref 31.5–36.5)
MCV RBC AUTO: 97 FL (ref 78–100)
PLATELET # BLD AUTO: 98 10E3/UL (ref 150–450)
POTASSIUM BLD-SCNC: 3.7 MMOL/L (ref 3.4–5.3)
PROT SERPL-MCNC: 7.6 G/DL (ref 6.8–8.8)
RBC # BLD AUTO: 3.41 10E6/UL (ref 4.4–5.9)
SODIUM SERPL-SCNC: 136 MMOL/L (ref 133–144)
WBC # BLD AUTO: 4.6 10E3/UL (ref 4–11)

## 2022-03-17 PROCEDURE — 85027 COMPLETE CBC AUTOMATED: CPT | Performed by: PATHOLOGY

## 2022-03-17 PROCEDURE — G1004 CDSM NDSC: HCPCS | Mod: GC | Performed by: RADIOLOGY

## 2022-03-17 PROCEDURE — A9585 GADOBUTROL INJECTION: HCPCS | Performed by: RADIOLOGY

## 2022-03-17 PROCEDURE — G1004 CDSM NDSC: HCPCS | Mod: GC | Performed by: STUDENT IN AN ORGANIZED HEALTH CARE EDUCATION/TRAINING PROGRAM

## 2022-03-17 PROCEDURE — 85610 PROTHROMBIN TIME: CPT | Performed by: PATHOLOGY

## 2022-03-17 PROCEDURE — 255N000002 HC RX 255 OP 636: Performed by: RADIOLOGY

## 2022-03-17 PROCEDURE — 74183 MRI ABD W/O CNTR FLWD CNTR: CPT | Mod: ME

## 2022-03-17 PROCEDURE — 71250 CT THORAX DX C-: CPT | Mod: ME | Performed by: RADIOLOGY

## 2022-03-17 PROCEDURE — 74183 MRI ABD W/O CNTR FLWD CNTR: CPT | Mod: 26 | Performed by: STUDENT IN AN ORGANIZED HEALTH CARE EDUCATION/TRAINING PROGRAM

## 2022-03-17 PROCEDURE — 80053 COMPREHEN METABOLIC PANEL: CPT | Performed by: PATHOLOGY

## 2022-03-17 PROCEDURE — 82105 ALPHA-FETOPROTEIN SERUM: CPT | Performed by: RADIOLOGY

## 2022-03-17 PROCEDURE — 36415 COLL VENOUS BLD VENIPUNCTURE: CPT | Performed by: PATHOLOGY

## 2022-03-17 PROCEDURE — 82248 BILIRUBIN DIRECT: CPT | Performed by: PATHOLOGY

## 2022-03-17 RX ORDER — GADOBUTROL 604.72 MG/ML
6 INJECTION INTRAVENOUS ONCE
Status: COMPLETED | OUTPATIENT
Start: 2022-03-17 | End: 2022-03-17

## 2022-03-17 RX ADMIN — GADOBUTROL 6 ML: 604.72 INJECTION INTRAVENOUS at 13:07

## 2022-03-17 NOTE — PROGRESS NOTES
Outpatient IR Biopsy Referral    Patient is a 60 y/o male with a PMH of DM2, chronic Hep B, non smoker liver cirrhosis, s/p , cTACE 12/2/21, had been approved for a lung biopsy on 8/6/21 but patient did not follow through with scheduling, prep instructions for procedure scheduled CT lung biopsy 8/26/22  Patient case was reviewed at pulmonary node conference 8/6/21. IR has been asked to biopsy RICH nodule lung lesion for possible primary lung cancer by Dr. Purcell. Dr. Leventhal and Dr. Lund would like to move forward with lung biopsy. Dr. So approved moving forward with biopsy after updating imaging, CT chest scheduled 3/17/22.    CT 3/17/22          IMPRESSION:  1. Enlarging spiculated left upper lobe nodule remains suspicious for  primary malignancy. Per chart review biopsy is planned. Lepidic  neoplasm is not entirely excluded though less likely.   2. New patchy antidependent groundglass densities in the upper lobes  bilaterally, representing atypical infection or possibly organizing  pneumonia.    3. No other new or enlarging pulmonary nodules/masses. No suspicious  lymph nodes.  4. New multiple loculated pleural effusions along the oblique fissure  and anterior lingula with associated atelectasis/consolidation.  5. Partially imaged cirrhotic configuration of the liver with increase  in abdominal ascites.  6. Small to moderate pericardial effusion.    CT 10/6/22 LUNGS AND PLEURA:   Stable chronic right-sided pleural fluid collection with peripheral calcification. Consolidative opacities involving the posterior aspect of the right upper lung remain unchanged. Spiculated opacity involving the left upper lobe   anteriorly unchanged at 13 x 15 mm up. Mild volume loss right lung base associated with the peripherally calcified pleural fluid collection. No left-sided pleural fluid. Area of linear atelectasis micronodular to the anterior left lower lobe unchanged.       Case and imaging CT 7/8/21 was reviewed  with Dr. Blackwood at pulmonary hernando conference 8/6/21 from IR and CT guided biopsy of the RICH was recommended. Series 3 Image 12    I    Imaging Reviewed:  CT 7/8/21  IMPRESSION:   1. Spiculated nodule in the left lung apex measuring up to 15 mm  concerning for primary lung malignancy. Comparison with prior outside  imaging would be helpful, and PET-CT and tissue sampling could be  considered.   2. Other scattered nodules, possibly sequela of old granulomatous  disease, but interval follow up recommended.  3. Stable, chronic loculated right pleural effusion with peripheral  calcifications.  4. Cirrhotic configuration of liver with splenomegaly, ascites.  Previously noted hepatic lesions better appreciated on prior abdominal  MR.    Procedure order, surgical pathology and leukemia lymphoma orders placed.    If requesting team would like samples sent for anything else please enter them or notify IR prior to scheduled procedure.    Primary team Dr. Leventhal and Dr. Lund made aware of IR recommendations and recent CT report.    Telephone Clinic visit with IR PA provider will be coordinated to discuss biopsy procedure, risks and benefits.     JOSIE Abraham CNP  Interventional Radiology   IR on-call pager: [phone]

## 2022-03-22 ENCOUNTER — VIRTUAL VISIT (OUTPATIENT)
Dept: RADIOLOGY | Facility: CLINIC | Age: 62
End: 2022-03-22
Attending: RADIOLOGY
Payer: MEDICARE

## 2022-03-22 DIAGNOSIS — C22.0 HCC (HEPATOCELLULAR CARCINOMA) (H): Primary | ICD-10-CM

## 2022-03-22 DIAGNOSIS — C22.0 HEPATOCELLULAR CARCINOMA (H): Primary | ICD-10-CM

## 2022-03-22 PROCEDURE — 99443 PR PHYSICIAN TELEPHONE EVALUATION 21-30 MIN: CPT | Mod: 95 | Performed by: RADIOLOGY

## 2022-03-22 NOTE — PROGRESS NOTES
Rishabh is a 61 year old who is being evaluated via a billable telephone visit.     Pt has some pain in chest and bloating in stomach. Pt is taking a water pill to help.    What phone number would you like to be contacted at? [phone]  How would you like to obtain your AVS? Buddy  Phone call duration: 24 minutes    Yesica LAUREN      HPI: Rishabh Cabrera is a 61 year old with history of hepatits B and HCC s/p Y90 (9/9/21) and cTACE (12/2/21) who returns to clinic for follow up after cTACE.      Today, patient reports he continues to have abdominal pain. He had a new CT of the chest that shows a new apical left lobe lesion that needs biopsy. I have reviewed his labs, Chest CT and MRI. The MRI shows that there is a shrinkage of the lesion and no enhancement.   I have spent 24 min to explain the imaging findings, discuss his liver cancer and to convince the patient that he needs a lung biopsy.    The patient would like to proceed but is very concerned of the risks of the biopsy.  I told him that he also needs to continue his follow up with Dr. Lund and Dr. Leventhal.    He expressed good understanding of the situation.    Total time: 35 min.

## 2022-03-22 NOTE — LETTER
3/22/2022         RE: Risahbh Cabrera  [address]  [facility] [address]        Dear Colleague,    Thank you for referring your patient, Rishabh Cabrera, to the [facility]. Please see a copy of my visit note below.    Rishabh is a 61 year old who is being evaluated via a billable telephone visit.     Pt has some pain in chest and bloating in stomach. Pt is taking a water pill to help.    What phone number would you like to be contacted at? [phone]  How would you like to obtain your AVS? [employer]  Phone call duration: 24 minutes    Yesica Payton     HPI: Rishabh Cabrera is a 61 year old with history of hepatits B and HCC s/p Y90 (9/9/21) and cTACE (12/2/21) who returns to clinic for follow up after cTACE.      Today, patient reports he continues to have abdominal pain. He had a new CT of the chest that shows a new apical left lobe lesion that needs biopsy. I have reviewed his labs, Chest CT and MRI. The MRI shows that there is a shrinkage of the lesion and no enhancement.   I have spent 24 min to explain the imaging findings, discuss his liver cancer and to convince the patient that he needs a lung biopsy.    The patient would like to proceed but is very concerned of the risks of the biopsy.  I told him that he also needs to continue his follow up with Dr. Lund and Dr. Leventhal.    He expressed good understanding of the situation.    Total time: 35 min.          Again, thank you for allowing me to participate in the care of your patient.      Sincerely,    Kenneth So MD

## 2022-03-24 ENCOUNTER — APPOINTMENT (OUTPATIENT)
Dept: INTERPRETER SERVICES | Facility: CLINIC | Age: 62
End: 2022-03-24
Payer: MEDICARE

## 2022-03-25 NOTE — TELEPHONE ENCOUNTER
DIAGNOSIS: To Discuss Procedure    DATE RECEIVED: 3.31.22   NOTES STATUS DETAILS   OFFICE NOTE from referring provider Internal 3.22.22, 1.4.22  Dr. So  [facility] IR/Vascular   OFFICE NOTE from other specialist Internal 2.8.22  Dr. Thomas Leventhal  [facility] Hepatology   MEDICATION LIST Internal    XRAYS (IMAGES & REPORTS) Internal 3.17.22, 122.9.21, 10.4.21  MR Abd    3.17.22, 10.6.21  CT Chest    12.2.21  CT Abd

## 2022-03-28 ENCOUNTER — APPOINTMENT (OUTPATIENT)
Dept: INTERPRETER SERVICES | Facility: CLINIC | Age: 62
End: 2022-03-28
Payer: MEDICARE

## 2022-03-30 ENCOUNTER — TELEPHONE (OUTPATIENT)
Dept: INTERVENTIONAL RADIOLOGY/VASCULAR | Facility: CLINIC | Age: 62
End: 2022-03-30

## 2022-03-30 NOTE — TELEPHONE ENCOUNTER
I called the pt the pt assisted by  Agustin ID #[id number] to reschedule appointment that was scheduled for 3/31/22 with BOSTON RICE.  I Left a voice mail and a [employer] message for the pt. I also called and Left a voicemail for the pts daughter Sacha at [phone].  Erasmo Bolanos on 3/30/2022 at 6:52 PM

## 2022-03-31 ENCOUNTER — PRE VISIT (OUTPATIENT)
Dept: INTERVENTIONAL RADIOLOGY/VASCULAR | Facility: CLINIC | Age: 62
End: 2022-03-31

## 2022-04-06 ENCOUNTER — APPOINTMENT (OUTPATIENT)
Dept: INTERPRETER SERVICES | Facility: CLINIC | Age: 62
End: 2022-04-06
Payer: MEDICARE

## 2022-04-08 ENCOUNTER — TELEPHONE (OUTPATIENT)
Dept: GASTROENTEROLOGY | Facility: CLINIC | Age: 62
End: 2022-04-08
Payer: MEDICARE

## 2022-04-25 ENCOUNTER — VIRTUAL VISIT (OUTPATIENT)
Dept: INTERVENTIONAL RADIOLOGY/VASCULAR | Facility: CLINIC | Age: 62
End: 2022-04-25
Payer: MEDICARE

## 2022-04-25 DIAGNOSIS — C22.0 HCC (HEPATOCELLULAR CARCINOMA) (H): ICD-10-CM

## 2022-04-25 DIAGNOSIS — R91.1 LESION OF LEFT LUNG: Primary | ICD-10-CM

## 2022-04-25 PROCEDURE — 99207 PR NO BILLABLE SERVICE THIS VISIT: CPT | Mod: 95 | Performed by: PHYSICIAN ASSISTANT

## 2022-04-25 NOTE — NURSING NOTE
Chief Complaint   Patient presents with     Consult     Lung biopsy - Left  - PLEASE see note per patients concerns in Notes. Also Interp # [id number]       Patient confirms medications and allergies are accurate via patients echeck in completion, and or denies any changes since last reviewed/verified.     Michelle Khan, Virtual Facilitator

## 2022-04-25 NOTE — PROGRESS NOTES
"[facility] Vascular      Type of Visit: Virtual: Other: [phone]    Patient is here for a new visit to discuss lung biospy.     Vitals - Patient Reported  Weight (Patient Reported): 135 lb  Height (Patient Reported): 5' 4\"  BMI (Based on Pt Reported Ht/Wt): 23.17  Pain Score: Moderate Pain (5)  Pain Loc:  (feet, liver, stomach)    Questions patient would like addressed today are: Patent's verbalized questions/concerns regarding patient has expressed he states that the cancer is in his liver not his lung and he feels fine in his lungs, he feels that no one is wanting to listen tto him and that they are only wanting to get money out of him, as again states the cancer is in his liver not his lungs. . This has been communicated to the provider.     Refills are needed: No    How would you like to obtain your AVS? Mail a copy    Michelle Khan      INTERVENTIONAL RADIOLOGY CLINIC NOTE  04/25/22    Referring Provider:  Dr. Leventhal and Dr. Lund     Impression/Plan:  CT guided RICH lung biopsy see CT 7/8/21 series 3 image 12 (also reviewed at pulmonary nodule conference.   Send surg path and leukemia lymphoma panel.   No medications need to be held.   ong .     HPI: Patient with hep B and HCC found to have lung mass. Oncology and hematology requesting biopsy of lung mass to determine next steps of oncology care which may include TACE with Golzarian. Patient with many good questions about size of needle, approach, side effects from the procedure as well as voicing that he does not feel anything wrong with his lungs. He is scared because he nor anyone he knows has had this procedure before.    Imaging Reviewed:        Kalia Wootne PA-C  Interventional Radiology  [phone] (IR control)  [phone] (pager)    =====    "

## 2022-05-06 ENCOUNTER — MYC MEDICAL ADVICE (OUTPATIENT)
Dept: INTERVENTIONAL RADIOLOGY/VASCULAR | Facility: CLINIC | Age: 62
End: 2022-05-06
Payer: MEDICARE

## 2022-05-06 DIAGNOSIS — Z11.59 ENCOUNTER FOR SCREENING FOR OTHER VIRAL DISEASES: Primary | ICD-10-CM

## 2022-05-09 ENCOUNTER — LAB (OUTPATIENT)
Dept: URGENT CARE | Facility: URGENT CARE | Age: 62
End: 2022-05-09
Attending: NURSE PRACTITIONER
Payer: MEDICARE

## 2022-05-09 DIAGNOSIS — Z11.59 ENCOUNTER FOR SCREENING FOR OTHER VIRAL DISEASES: ICD-10-CM

## 2022-05-09 LAB — SARS-COV-2 RNA RESP QL NAA+PROBE: NEGATIVE

## 2022-05-09 PROCEDURE — U0005 INFEC AGEN DETEC AMPLI PROBE: HCPCS

## 2022-05-09 PROCEDURE — U0003 INFECTIOUS AGENT DETECTION BY NUCLEIC ACID (DNA OR RNA); SEVERE ACUTE RESPIRATORY SYNDROME CORONAVIRUS 2 (SARS-COV-2) (CORONAVIRUS DISEASE [COVID-19]), AMPLIFIED PROBE TECHNIQUE, MAKING USE OF HIGH THROUGHPUT TECHNOLOGIES AS DESCRIBED BY CMS-2020-01-R: HCPCS

## 2022-05-11 ENCOUNTER — APPOINTMENT (OUTPATIENT)
Dept: INTERPRETER SERVICES | Facility: CLINIC | Age: 62
End: 2022-05-11
Payer: MEDICARE

## 2022-05-12 ENCOUNTER — HOSPITAL ENCOUNTER (INPATIENT)
Facility: CLINIC | Age: 62
LOS: 1 days | Discharge: HOME | DRG: 987 | End: 2022-05-13
Attending: EMERGENCY MEDICINE | Admitting: INTERNAL MEDICINE
Payer: MEDICARE

## 2022-05-12 ENCOUNTER — APPOINTMENT (OUTPATIENT)
Dept: INTERPRETER SERVICES | Facility: CLINIC | Age: 62
DRG: 987 | End: 2022-05-12
Attending: RADIOLOGY
Payer: MEDICARE

## 2022-05-12 ENCOUNTER — HOSPITAL ENCOUNTER (OUTPATIENT)
Facility: CLINIC | Age: 62
Discharge: STILL ADMITTED OR ADMINISTRATIVE | DRG: 987 | End: 2022-05-12
Attending: RADIOLOGY | Admitting: RADIOLOGY
Payer: MEDICARE

## 2022-05-12 ENCOUNTER — APPOINTMENT (OUTPATIENT)
Dept: INTERVENTIONAL RADIOLOGY/VASCULAR | Facility: CLINIC | Age: 62
DRG: 987 | End: 2022-05-12
Attending: INTERNAL MEDICINE
Payer: MEDICARE

## 2022-05-12 ENCOUNTER — APPOINTMENT (OUTPATIENT)
Dept: MEDSURG UNIT | Facility: CLINIC | Age: 62
DRG: 987 | End: 2022-05-12
Attending: RADIOLOGY
Payer: MEDICARE

## 2022-05-12 VITALS
DIASTOLIC BLOOD PRESSURE: 66 MMHG | RESPIRATION RATE: 29 BRPM | TEMPERATURE: 98.2 F | SYSTOLIC BLOOD PRESSURE: 116 MMHG | HEART RATE: 92 BPM | OXYGEN SATURATION: 94 %

## 2022-05-12 DIAGNOSIS — R04.2 HEMOPTYSIS: ICD-10-CM

## 2022-05-12 DIAGNOSIS — R91.8 PULMONARY NODULES: ICD-10-CM

## 2022-05-12 DIAGNOSIS — C22.0 HEPATOCELLULAR CARCINOMA (H): ICD-10-CM

## 2022-05-12 DIAGNOSIS — R18.8 OTHER ASCITES: Primary | ICD-10-CM

## 2022-05-12 DIAGNOSIS — C22.0 HCC (HEPATOCELLULAR CARCINOMA) (H): ICD-10-CM

## 2022-05-12 DIAGNOSIS — K76.9 LIVER LESION: ICD-10-CM

## 2022-05-12 DIAGNOSIS — Z20.822 COVID-19 RULED OUT BY LABORATORY TESTING: ICD-10-CM

## 2022-05-12 LAB
ABO/RH(D): NORMAL
ALBUMIN SERPL-MCNC: 1.6 G/DL (ref 3.4–5)
ALP SERPL-CCNC: 112 U/L (ref 40–150)
ALT SERPL W P-5'-P-CCNC: 20 U/L (ref 0–70)
ANION GAP SERPL CALCULATED.3IONS-SCNC: 3 MMOL/L (ref 3–14)
ANTIBODY SCREEN: NEGATIVE
AST SERPL W P-5'-P-CCNC: 27 U/L (ref 0–45)
BASOPHILS # BLD AUTO: 0 10E3/UL (ref 0–0.2)
BASOPHILS NFR BLD AUTO: 1 %
BILIRUB SERPL-MCNC: 1.7 MG/DL (ref 0.2–1.3)
BLD PROD TYP BPU: NORMAL
BLD PROD TYP BPU: NORMAL
BLOOD COMPONENT TYPE: NORMAL
BLOOD COMPONENT TYPE: NORMAL
BUN SERPL-MCNC: 7 MG/DL (ref 7–30)
CALCIUM SERPL-MCNC: 7.1 MG/DL (ref 8.5–10.1)
CHLORIDE BLD-SCNC: 112 MMOL/L (ref 94–109)
CO2 SERPL-SCNC: 27 MMOL/L (ref 20–32)
CODING SYSTEM: NORMAL
CODING SYSTEM: NORMAL
CREAT SERPL-MCNC: 0.63 MG/DL (ref 0.66–1.25)
EOSINOPHIL # BLD AUTO: 0.1 10E3/UL (ref 0–0.7)
EOSINOPHIL NFR BLD AUTO: 2 %
ERYTHROCYTE [DISTWIDTH] IN BLOOD BY AUTOMATED COUNT: 16.5 % (ref 10–15)
ERYTHROCYTE [DISTWIDTH] IN BLOOD BY AUTOMATED COUNT: 16.8 % (ref 10–15)
GFR SERPL CREATININE-BSD FRML MDRD: >90 ML/MIN/1.73M2
GLUCOSE BLD-MCNC: 102 MG/DL (ref 70–99)
GLUCOSE BLDC GLUCOMTR-MCNC: 100 MG/DL (ref 70–99)
HCT VFR BLD AUTO: 26.9 % (ref 40–53)
HCT VFR BLD AUTO: 28.3 % (ref 40–53)
HGB BLD-MCNC: 8.7 G/DL (ref 13.3–17.7)
HGB BLD-MCNC: 9.2 G/DL (ref 13.3–17.7)
HOLD SPECIMEN: NORMAL
HOLD SPECIMEN: NORMAL
IMM GRANULOCYTES # BLD: 0 10E3/UL
IMM GRANULOCYTES NFR BLD: 0 %
INR PPP: 1.96 (ref 0.85–1.15)
ISSUE DATE AND TIME: NORMAL
ISSUE DATE AND TIME: NORMAL
LYMPHOCYTES # BLD AUTO: 0.5 10E3/UL (ref 0.8–5.3)
LYMPHOCYTES NFR BLD AUTO: 9 %
MCH RBC QN AUTO: 32 PG (ref 26.5–33)
MCH RBC QN AUTO: 32.4 PG (ref 26.5–33)
MCHC RBC AUTO-ENTMCNC: 32.3 G/DL (ref 31.5–36.5)
MCHC RBC AUTO-ENTMCNC: 32.5 G/DL (ref 31.5–36.5)
MCV RBC AUTO: 100 FL (ref 78–100)
MCV RBC AUTO: 99 FL (ref 78–100)
MONOCYTES # BLD AUTO: 0.5 10E3/UL (ref 0–1.3)
MONOCYTES NFR BLD AUTO: 8 %
NEUTROPHILS # BLD AUTO: 4.6 10E3/UL (ref 1.6–8.3)
NEUTROPHILS NFR BLD AUTO: 80 %
NRBC # BLD AUTO: 0 10E3/UL
NRBC BLD AUTO-RTO: 0 /100
PLATELET # BLD AUTO: 58 10E3/UL (ref 150–450)
PLATELET # BLD AUTO: 85 10E3/UL (ref 150–450)
POTASSIUM BLD-SCNC: 4.1 MMOL/L (ref 3.4–5.3)
PROT SERPL-MCNC: 7.1 G/DL (ref 6.8–8.8)
RBC # BLD AUTO: 2.72 10E6/UL (ref 4.4–5.9)
RBC # BLD AUTO: 2.84 10E6/UL (ref 4.4–5.9)
SARS-COV-2 RNA RESP QL NAA+PROBE: NEGATIVE
SODIUM SERPL-SCNC: 142 MMOL/L (ref 133–144)
SPECIMEN EXPIRATION DATE: NORMAL
UNIT ABO/RH: NORMAL
UNIT ABO/RH: NORMAL
UNIT NUMBER: NORMAL
UNIT NUMBER: NORMAL
UNIT STATUS: NORMAL
UNIT STATUS: NORMAL
UNIT TYPE ISBT: 7300
UNIT TYPE ISBT: 7300
WBC # BLD AUTO: 5.8 10E3/UL (ref 4–11)
WBC # BLD AUTO: 6.7 10E3/UL (ref 4–11)

## 2022-05-12 PROCEDURE — 32408 CORE NDL BX LNG/MED PERQ: CPT

## 2022-05-12 PROCEDURE — 99221 1ST HOSP IP/OBS SF/LOW 40: CPT | Mod: AI | Performed by: INTERNAL MEDICINE

## 2022-05-12 PROCEDURE — 250N000011 HC RX IP 250 OP 636: Performed by: STUDENT IN AN ORGANIZED HEALTH CARE EDUCATION/TRAINING PROGRAM

## 2022-05-12 PROCEDURE — 250N000009 HC RX 250: Performed by: INTERNAL MEDICINE

## 2022-05-12 PROCEDURE — C9803 HOPD COVID-19 SPEC COLLECT: HCPCS | Performed by: EMERGENCY MEDICINE

## 2022-05-12 PROCEDURE — 99152 MOD SED SAME PHYS/QHP 5/>YRS: CPT | Mod: GC | Performed by: RADIOLOGY

## 2022-05-12 PROCEDURE — 85027 COMPLETE CBC AUTOMATED: CPT | Performed by: NURSE PRACTITIONER

## 2022-05-12 PROCEDURE — P9037 PLATE PHERES LEUKOREDU IRRAD: HCPCS | Performed by: EMERGENCY MEDICINE

## 2022-05-12 PROCEDURE — 96365 THER/PROPH/DIAG IV INF INIT: CPT | Mod: 59 | Performed by: EMERGENCY MEDICINE

## 2022-05-12 PROCEDURE — 258N000003 HC RX IP 258 OP 636: Performed by: NURSE PRACTITIONER

## 2022-05-12 PROCEDURE — 250N000011 HC RX IP 250 OP 636: Performed by: INTERNAL MEDICINE

## 2022-05-12 PROCEDURE — 32408 CORE NDL BX LNG/MED PERQ: CPT | Mod: LT | Performed by: RADIOLOGY

## 2022-05-12 PROCEDURE — 85027 COMPLETE CBC AUTOMATED: CPT | Performed by: EMERGENCY MEDICINE

## 2022-05-12 PROCEDURE — 36430 TRANSFUSION BLD/BLD COMPNT: CPT | Performed by: EMERGENCY MEDICINE

## 2022-05-12 PROCEDURE — 36415 COLL VENOUS BLD VENIPUNCTURE: CPT | Performed by: EMERGENCY MEDICINE

## 2022-05-12 PROCEDURE — 272N000631 HC COIL/EMBOLIC DEVICE CR12

## 2022-05-12 PROCEDURE — 99152 MOD SED SAME PHYS/QHP 5/>YRS: CPT

## 2022-05-12 PROCEDURE — 250N000011 HC RX IP 250 OP 636: Performed by: RADIOLOGY

## 2022-05-12 PROCEDURE — 99291 CRITICAL CARE FIRST HOUR: CPT | Performed by: EMERGENCY MEDICINE

## 2022-05-12 PROCEDURE — P9059 PLASMA, FRZ BETWEEN 8-24HOUR: HCPCS | Performed by: EMERGENCY MEDICINE

## 2022-05-12 PROCEDURE — U0003 INFECTIOUS AGENT DETECTION BY NUCLEIC ACID (DNA OR RNA); SEVERE ACUTE RESPIRATORY SYNDROME CORONAVIRUS 2 (SARS-COV-2) (CORONAVIRUS DISEASE [COVID-19]), AMPLIFIED PROBE TECHNIQUE, MAKING USE OF HIGH THROUGHPUT TECHNOLOGIES AS DESCRIBED BY CMS-2020-01-R: HCPCS | Performed by: STUDENT IN AN ORGANIZED HEALTH CARE EDUCATION/TRAINING PROGRAM

## 2022-05-12 PROCEDURE — 80053 COMPREHEN METABOLIC PANEL: CPT | Performed by: EMERGENCY MEDICINE

## 2022-05-12 PROCEDURE — 999N000142 HC STATISTIC PROCEDURE PREP ONLY

## 2022-05-12 PROCEDURE — 250N000011 HC RX IP 250 OP 636: Performed by: PHYSICIAN ASSISTANT

## 2022-05-12 PROCEDURE — 250N000013 HC RX MED GY IP 250 OP 250 PS 637: Performed by: EMERGENCY MEDICINE

## 2022-05-12 PROCEDURE — 250N000009 HC RX 250: Performed by: EMERGENCY MEDICINE

## 2022-05-12 PROCEDURE — 250N000009 HC RX 250: Performed by: PHYSICIAN ASSISTANT

## 2022-05-12 PROCEDURE — 96368 THER/DIAG CONCURRENT INF: CPT | Performed by: EMERGENCY MEDICINE

## 2022-05-12 PROCEDURE — 250N000013 HC RX MED GY IP 250 OP 250 PS 637: Performed by: STUDENT IN AN ORGANIZED HEALTH CARE EDUCATION/TRAINING PROGRAM

## 2022-05-12 PROCEDURE — 85610 PROTHROMBIN TIME: CPT | Performed by: NURSE PRACTITIONER

## 2022-05-12 PROCEDURE — 0BBG8ZX EXCISION OF LEFT UPPER LUNG LOBE, VIA NATURAL OR ARTIFICIAL OPENING ENDOSCOPIC, DIAGNOSTIC: ICD-10-PCS | Performed by: INTERNAL MEDICINE

## 2022-05-12 PROCEDURE — 96366 THER/PROPH/DIAG IV INF ADDON: CPT | Performed by: EMERGENCY MEDICINE

## 2022-05-12 PROCEDURE — 120N000002 HC R&B MED SURG/OB UMMC

## 2022-05-12 PROCEDURE — 86901 BLOOD TYPING SEROLOGIC RH(D): CPT | Performed by: EMERGENCY MEDICINE

## 2022-05-12 PROCEDURE — 88305 TISSUE EXAM BY PATHOLOGIST: CPT | Mod: TC | Performed by: INTERNAL MEDICINE

## 2022-05-12 PROCEDURE — 82962 GLUCOSE BLOOD TEST: CPT | Mod: GZ

## 2022-05-12 PROCEDURE — 36415 COLL VENOUS BLD VENIPUNCTURE: CPT | Performed by: NURSE PRACTITIONER

## 2022-05-12 PROCEDURE — 99285 EMERGENCY DEPT VISIT HI MDM: CPT | Mod: 25 | Performed by: EMERGENCY MEDICINE

## 2022-05-12 RX ORDER — FENTANYL CITRATE 50 UG/ML
25-50 INJECTION, SOLUTION INTRAMUSCULAR; INTRAVENOUS EVERY 5 MIN PRN
Status: DISCONTINUED | OUTPATIENT
Start: 2022-05-12 | End: 2022-05-12 | Stop reason: HOSPADM

## 2022-05-12 RX ORDER — NICOTINE POLACRILEX 4 MG
15-30 LOZENGE BUCCAL
Status: DISCONTINUED | OUTPATIENT
Start: 2022-05-12 | End: 2022-05-13 | Stop reason: HOSPADM

## 2022-05-12 RX ORDER — PANTOPRAZOLE SODIUM 40 MG/1
40 TABLET, DELAYED RELEASE ORAL DAILY
Status: DISCONTINUED | OUTPATIENT
Start: 2022-05-13 | End: 2022-05-13 | Stop reason: HOSPADM

## 2022-05-12 RX ORDER — TENOFOVIR DISOPROXIL FUMARATE 300 MG/1
300 TABLET, FILM COATED ORAL DAILY
Status: DISCONTINUED | OUTPATIENT
Start: 2022-05-12 | End: 2022-05-13 | Stop reason: HOSPADM

## 2022-05-12 RX ORDER — METOPROLOL SUCCINATE 50 MG/1
50 TABLET, EXTENDED RELEASE ORAL DAILY
Status: DISCONTINUED | OUTPATIENT
Start: 2022-05-13 | End: 2022-05-12

## 2022-05-12 RX ORDER — DEXTROSE MONOHYDRATE 25 G/50ML
25-50 INJECTION, SOLUTION INTRAVENOUS
Status: DISCONTINUED | OUTPATIENT
Start: 2022-05-12 | End: 2022-05-13 | Stop reason: HOSPADM

## 2022-05-12 RX ORDER — TRANEXAMIC ACID 10 MG/ML
1 INJECTION, SOLUTION INTRAVENOUS ONCE
Status: COMPLETED | OUTPATIENT
Start: 2022-05-12 | End: 2022-05-12

## 2022-05-12 RX ORDER — OXYCODONE HYDROCHLORIDE 5 MG/1
5 TABLET ORAL EVERY 6 HOURS PRN
Status: DISCONTINUED | OUTPATIENT
Start: 2022-05-12 | End: 2022-05-13 | Stop reason: HOSPADM

## 2022-05-12 RX ORDER — NALOXONE HYDROCHLORIDE 0.4 MG/ML
0.4 INJECTION, SOLUTION INTRAMUSCULAR; INTRAVENOUS; SUBCUTANEOUS
Status: DISCONTINUED | OUTPATIENT
Start: 2022-05-12 | End: 2022-05-12 | Stop reason: HOSPADM

## 2022-05-12 RX ORDER — FLUMAZENIL 0.1 MG/ML
0.2 INJECTION, SOLUTION INTRAVENOUS
Status: DISCONTINUED | OUTPATIENT
Start: 2022-05-12 | End: 2022-05-12 | Stop reason: HOSPADM

## 2022-05-12 RX ORDER — OXYCODONE HYDROCHLORIDE 5 MG/1
5-10 TABLET ORAL EVERY 6 HOURS PRN
Status: DISCONTINUED | OUTPATIENT
Start: 2022-05-12 | End: 2022-05-12

## 2022-05-12 RX ORDER — ONDANSETRON 2 MG/ML
4 INJECTION INTRAMUSCULAR; INTRAVENOUS
Status: COMPLETED | OUTPATIENT
Start: 2022-05-12 | End: 2022-05-12

## 2022-05-12 RX ORDER — NORTRIPTYLINE HCL 25 MG
25 CAPSULE ORAL DAILY
Status: DISCONTINUED | OUTPATIENT
Start: 2022-05-13 | End: 2022-05-12

## 2022-05-12 RX ORDER — SODIUM CHLORIDE 9 MG/ML
INJECTION, SOLUTION INTRAVENOUS CONTINUOUS
Status: DISCONTINUED | OUTPATIENT
Start: 2022-05-12 | End: 2022-05-12 | Stop reason: HOSPADM

## 2022-05-12 RX ORDER — FUROSEMIDE 40 MG
40 TABLET ORAL DAILY
Status: DISCONTINUED | OUTPATIENT
Start: 2022-05-13 | End: 2022-05-13 | Stop reason: HOSPADM

## 2022-05-12 RX ORDER — ACETAMINOPHEN 325 MG/1
650 TABLET ORAL EVERY 6 HOURS PRN
Status: DISCONTINUED | OUTPATIENT
Start: 2022-05-12 | End: 2022-05-13 | Stop reason: HOSPADM

## 2022-05-12 RX ORDER — NALOXONE HYDROCHLORIDE 0.4 MG/ML
0.2 INJECTION, SOLUTION INTRAMUSCULAR; INTRAVENOUS; SUBCUTANEOUS
Status: DISCONTINUED | OUTPATIENT
Start: 2022-05-12 | End: 2022-05-12 | Stop reason: HOSPADM

## 2022-05-12 RX ORDER — CALCIUM GLUCONATE 20 MG/ML
1 INJECTION, SOLUTION INTRAVENOUS ONCE
Status: COMPLETED | OUTPATIENT
Start: 2022-05-12 | End: 2022-05-12

## 2022-05-12 RX ORDER — PHYTONADIONE 5 MG/1
5 TABLET ORAL ONCE
Status: DISCONTINUED | OUTPATIENT
Start: 2022-05-12 | End: 2022-05-12

## 2022-05-12 RX ORDER — SODIUM CHLORIDE, SODIUM LACTATE, POTASSIUM CHLORIDE, CALCIUM CHLORIDE 600; 310; 30; 20 MG/100ML; MG/100ML; MG/100ML; MG/100ML
INJECTION, SOLUTION INTRAVENOUS CONTINUOUS
Status: DISCONTINUED | OUTPATIENT
Start: 2022-05-12 | End: 2022-05-12

## 2022-05-12 RX ORDER — LIDOCAINE 40 MG/G
CREAM TOPICAL
Status: DISCONTINUED | OUTPATIENT
Start: 2022-05-12 | End: 2022-05-13 | Stop reason: HOSPADM

## 2022-05-12 RX ORDER — ACETAMINOPHEN 500 MG
1000 TABLET ORAL ONCE
Status: COMPLETED | OUTPATIENT
Start: 2022-05-12 | End: 2022-05-12

## 2022-05-12 RX ORDER — ENALAPRIL MALEATE 10 MG/1
10 TABLET ORAL DAILY
Status: DISCONTINUED | OUTPATIENT
Start: 2022-05-13 | End: 2022-05-12

## 2022-05-12 RX ORDER — SPIRONOLACTONE 25 MG/1
50 TABLET ORAL DAILY
Status: DISCONTINUED | OUTPATIENT
Start: 2022-05-13 | End: 2022-05-13

## 2022-05-12 RX ADMIN — CALCIUM GLUCONATE 1 G: 20 INJECTION, SOLUTION INTRAVENOUS at 20:04

## 2022-05-12 RX ADMIN — MIDAZOLAM 0.5 MG: 1 INJECTION INTRAMUSCULAR; INTRAVENOUS at 12:23

## 2022-05-12 RX ADMIN — MIDAZOLAM 0.5 MG: 1 INJECTION INTRAMUSCULAR; INTRAVENOUS at 12:02

## 2022-05-12 RX ADMIN — LIDOCAINE HYDROCHLORIDE 5 ML: 10 INJECTION, SOLUTION EPIDURAL; INFILTRATION; INTRACAUDAL; PERINEURAL at 12:30

## 2022-05-12 RX ADMIN — OXYCODONE HYDROCHLORIDE 10 MG: 5 TABLET ORAL at 17:09

## 2022-05-12 RX ADMIN — PHYTONADIONE 5 MG: 10 INJECTION, EMULSION INTRAMUSCULAR; INTRAVENOUS; SUBCUTANEOUS at 20:03

## 2022-05-12 RX ADMIN — FENTANYL CITRATE 25 MCG: 50 INJECTION, SOLUTION INTRAMUSCULAR; INTRAVENOUS at 12:09

## 2022-05-12 RX ADMIN — ONDANSETRON 4 MG: 2 INJECTION INTRAMUSCULAR; INTRAVENOUS at 12:39

## 2022-05-12 RX ADMIN — MIDAZOLAM 0.5 MG: 1 INJECTION INTRAMUSCULAR; INTRAVENOUS at 12:09

## 2022-05-12 RX ADMIN — TRANEXAMIC ACID 1 G: 10 INJECTION, SOLUTION INTRAVENOUS at 14:06

## 2022-05-12 RX ADMIN — ACETAMINOPHEN 1000 MG: 500 TABLET ORAL at 15:49

## 2022-05-12 RX ADMIN — FENTANYL CITRATE 25 MCG: 50 INJECTION, SOLUTION INTRAMUSCULAR; INTRAVENOUS at 12:02

## 2022-05-12 RX ADMIN — FENTANYL CITRATE 25 MCG: 50 INJECTION, SOLUTION INTRAMUSCULAR; INTRAVENOUS at 12:23

## 2022-05-12 RX ADMIN — SODIUM CHLORIDE: 9 INJECTION, SOLUTION INTRAVENOUS at 10:58

## 2022-05-12 RX ADMIN — TENOFOVIR DISOPROXIL FUMARATE 300 MG: 300 TABLET, FILM COATED ORAL at 22:01

## 2022-05-12 ASSESSMENT — ENCOUNTER SYMPTOMS
SHORTNESS OF BREATH: 0
NECK STIFFNESS: 0
ARTHRALGIAS: 0
FEVER: 0
EYE REDNESS: 0
COUGH: 1
CONFUSION: 0
COLOR CHANGE: 0
ABDOMINAL PAIN: 0
DIFFICULTY URINATING: 0
HEADACHES: 0

## 2022-05-12 ASSESSMENT — ACTIVITIES OF DAILY LIVING (ADL)
ADLS_ACUITY_SCORE: 35

## 2022-05-12 NOTE — ED TRIAGE NOTES
Pt arrives to ED from IR procedure after pt noted to be coughing up farhan blood, became tachypneic, desated to 88- CT noted hemorrhage in left lung. Procedure stopped. INR 1.96, Plt 58 this am.  Of note pt currently on 2l/oxymask, placed on left side.

## 2022-05-12 NOTE — IR NOTE
Patient Name: Rishabh Cabrera  Medical Record Number: [id number]  Today's Date: 5/12/2022    Procedure: Left lung nodule biopsy  Proceduralist: Dr. Eran Appiah  Pathology present: Yes    Procedure Start: 1221  Procedure end: 1245  Sedation medications administered: 75 mcg fentanyl and 1.5 mg versed (sedation time 45 mins)    Report given to: ED RN  : Bishop via ipad    Other Notes: Pt arrived to IR room CT 2 from 2A. Consent reviewed. Pt denies any questions or concerns regarding procedure. Pt positioned supine and monitored per protocol.  Pt started coughing up blood after first tissue sample taken. Pat desated to 88% 15 L oxymask was applied to pt.  Turned pt onto left side.  Pt had increased work of breathing RR 30-40. Rapid response called.  Brought patient to the ED.  Pt was on 2 L when brought to the ED and vitally stable.  Pt's belongings brought to the ED from 2A.

## 2022-05-12 NOTE — PROVIDER NOTIFICATION
05/12/22 1300   Call Information   Date of Call 05/12/22   Time of Call 1038   Name of person requesting the team Nathaly   Title of person requesting team RN   RRT Arrival time 1242   Time RRT ended 1302   Reason for call   Type of RRT Adult   Primary reason for call Respiratory   Respiratory Rate greater than 24;O2sat less than 88% for greater than 5 minutes despite O2;Labored breathing   Was patient transferred from the ED, ICU, or PACU within last 24 hours prior to RRT call? No  (Pt a 2A patient- outpatient-procedure in IR.)   SBAR   Situation Pt was in IR for a biopsy to left lung. Lung began to bleed, approximately 10 ml of blood suctioned out. Pt desaturated to 80's and was requiring 15L but was quickly weaned to 2L.   Background Pt is outpatient for lung biopsy. Has a history of Hep B, DM2, hypertension, and hepatocellular carcinoma with mets.   Notable History/Conditions Cancer;Hypertension;Diabetes   Assessment Pt resp rate upper 20-low 30s. Sats ok on 2L. All other VSS. Platelets this am in the 50s and INR elevated.   Interventions O2 per N/C or mask;Portable monitor;Suction  (Pt brought to the ER for monitoring.)   Patient Outcome   Patient Outcome Transferred to  (ER.)   RRT Team   Attending/Primary/Covering Physician Interventional Radiology   Date Attending Physician notified 05/12/22   Time Attending Physician notified 1038   Physician(s) Jai Costa   Lead ANTOINETTE Lee   RT Krishan

## 2022-05-12 NOTE — CODE/RAPID RESPONSE
"Rapid Response Team Note    Assessment   In assessment a rapid response was called on Rishabh Cabrera due to acute hypoxic respiratory failure. This presentation is likely due to lung biopsy and hemoptysis.      Plan   -  Transfer patient to ED for further stabilization given patient's current locations and outpatient status.   -Continue on nasal cannula   -Keep patient on left side   -  The Interventional Radiology primary team was at bedside and plan formulated with them.  -  Disposition: The patient Transfered to the ED  -  Reassessment and plan follow-up will be performed by the Emergency Department      Viviana Shaw PA-C  [facility] RRT [facility] Job Code Contact #[phone]  [facility] Paging/Directory    Hospital Course   Brief Summary of events leading to rapid response:   Rapid response called for hypoxia and hemoptysis following a lung nodule biopsy in CT by IR in the outpatient setting. Initially requiring 15Ls but able to wean down to 2Ls     Patient is conversant using [employer] . He states breathing is \"okay\" and stable.     Admission Diagnosis:   No admission diagnoses are documented for this encounter.    Physical Exam   Temp: 98.1  F (36.7  C) Temp  Min: 98.1  F (36.7  C)  Max: 98.2  F (36.8  C)  Resp: 16 Resp  Min: 14  Max: 38  SpO2: 96 % SpO2  Min: 89 %  Max: 100 %  Pulse: 90 Pulse  Min: 84  Max: 93    No data recorded  BP: 116/65 Systolic (24hrs), Av , Min:105 , Max:130   Diastolic (24hrs), Av, Min:65, Max:80     I/Os: No intake/output data recorded.     Exam:   GENERAL: Alert and oriented x 3. NAD. Cooperative.   HEENT: Anicteric sclera. Mucous membranes moist.   CV: RRR. S1, S2. No murmurs appreciated.   RESPIRATORY: Effort normal on 2Ls Lungs CTAB with no wheezing, rales, rhonchi.       Significant Results and Procedures   Lactic Acid: No results for input(s): LACT, LACTS in the last 25768 hours.  CBC:   Recent Labs   Lab Test 22  1012 22  1125 22  1138   WBC 6.7 4.6 5.1 "   HGB 9.2* 10.9* 12.7*   HCT 28.3* 33.2* 38.3*   PLT 58* 98* 88*        Sepsis Evaluation   The patient is not known to have an infection.  NO EVIDENCE OF SEPSIS at this time.  Vital sign, physical exam, and lab findings are due to lung biopsy.

## 2022-05-12 NOTE — PROGRESS NOTES
Prep complete for Lung Biopsy. Awaiting INR lab result. Patients josette Brunson at bedside will transport patient home post procedure cell# [phone].

## 2022-05-12 NOTE — PROGRESS NOTES
INTERVENTIONAL RADIOLOGY PROGRESS NOTE    Patient came to interventional radiology for left upper lobe lung nodule biopsy.  During the procedure, 1 sample was taken and immediately thereafter the patient developed bloody, productive cough.  The patient had visible pulmonary hemorrhage on intraprocedural CT.  The procedure was aborted.  The patient desatted into the high 80s but his oxygen saturations recovered quickly once he was placed left side down.    Rapid response was called and the patient was transferred to the emergency room.  At that point, he was on 2 L of oxygen.  The patient's vital signs normalized and his oxygen saturations returned to normal.   in the emergency room, he remained stable.  Patient was signed out to Dr. Morgan.  The patient's family was updated.    From the perspective of IR, the patient is now stable.  Provided his bleeding is controlled and there is no signs or evidence of bleeding, the patient may discharge when he is deemed appropriate for discharge per the emergency physician.    Please call interventional radiology if additional questions.    Jai Barillas MD  Interventional Radiology Fellow, PGY-5.

## 2022-05-12 NOTE — PRE-PROCEDURE
GENERAL PRE-PROCEDURE:   Procedure:  CT guided left upper lobe lung nodule biopsy with possible chest tube placement  Date/Time:  5/12/2022 11:10 AM    Written consent obtained?: Yes    Risks and benefits: Risks, benefits and alternatives were discussed    Consent given by:  Patient  Patient states understanding of procedure being performed: Yes    Patient's understanding of procedure matches consent: Yes    Procedure consent matches procedure scheduled: Yes    Expected level of sedation:  Moderate  Appropriately NPO:  Yes  ASA Class:  3  Mallampati  :  Grade 2- soft palate, base of uvula, tonsillar pillars, and portion of posterior pharyngeal wall visible  Lungs:  Lungs clear with good breath sounds bilaterally  Heart:  Normal heart sounds and rate  History & Physical reviewed:  History and physical reviewed and no updates needed  Statement of review:  I have reviewed the lab findings, diagnostic data, medications, and the plan for sedation    
patient

## 2022-05-12 NOTE — PROCEDURES
[facility]    Procedure: RICH lung nodule biopsy    Date/Time: 5/12/2022 1:06 PM  Performed by: Jai Barillas  Authorized by: Jai Barillas Fellow Physician: Eran  Other(s) attending procedure: Talaie - staff      UNIVERSAL PROTOCOL   Site Marked: NA  Prior Images Obtained and Reviewed:  Yes  Required items: Required blood products, implants, devices and special equipment available    Patient identity confirmed:  Verbally with patient, arm band, provided demographic data and hospital-assigned identification number  Patient was reevaluated immediately before administering moderate or deep sedation or anesthesia  Confirmation Checklist:  Patient's identity using two indicators, relevant allergies, procedure was appropriate and matched the consent or emergent situation and correct equipment/implants were available  Time out: Immediately prior to the procedure a time out was called    Universal Protocol: the Joint Commission Universal Protocol was followed    Preparation: Patient was prepped and draped in usual sterile fashion    ESBL (mL):  15     ANESTHESIA    Anesthesia: Local infiltration  Local Anesthetic:  Lidocaine 1% without epinephrine      SEDATION  Patient Sedated: Yes    Sedation Type:  Moderate (conscious) sedation  Sedation:  Fentanyl and midazolam  Vital signs: Vital signs monitored during sedation    See dictated procedure note for full details.  Findings: RICH spiculated lung nodule. Extensive perilesional hemorrhage after one sample, procedure aborted, rapid response called, and patient transferred to ED.     Specimens: core needle biopsy specimens sent for pathological analysis    Complications: None    Condition: Stable    Plan: Patient will be managed in ED.      PROCEDURE  Describe Procedure: RICH spiculated lung nodule. Extensive perilesional hemorrhage after one sample, procedure aborted, rapid response called, and patient transferred to ED.  Patient signed out to Dr. Morgan.     Patient Tolerance:  Patient tolerated the procedure well with no immediate complications  Length of time physician/provider present for 1:1 monitoring during sedation: 20

## 2022-05-12 NOTE — H&P
[facility]    History and Physical - Medicine Service, [place] TEAM 3       Date of Admission:  5/12/2022    Assessment & Plan      Rishabh Cabrera is a 61 year old male who has a history of chronic hepatitis B, HCC with concern for metastases in the lungs, and diabetes and is admitted for monitoring after IR biopsy of left upper lung nodule resulting in productive cough and hemoptysis from pulmonary hemorrhage.     Pulmonary hemorrhage ISO IR percutaneous biopsy  Elevated INR  Chronic Thrombocytopenia  Acute anemia  Clinically stable. No longer requiring O2 and feeling well without dyspnea, chest pain, pain around the biopsy site. Understandably concerned that adequate samples were unable to be obtained. Baseline Hgb >10, 8.7 on admission. PLTs at baseline.   - IR following, ok to discharge if stable overnight  - Pulmonology consulted by ED team  - CBC in AM  - Hold AC, NSAIDs (ibuprogen, meloxicam, naproxen) --> would recommend discontinuing at discharge  - Follow-up with primary oncology team ASAP to determine alternative plan  - Consider repeat chest imaging if clinical change, hemodynamic instability, hypoxia  - Received FFP, TXA, and PLTs in the ED, giving 5 mg PO vitamin K  - Tylenol, Oxycodone PRN for pain    HCC  Chronic Hep B  Chronic severe protein-calorie malnutrition, hypoalbuminemia   Sees Dr. Leventhal for chronic hep B. Has not been taking Tenofovir 300 mg since March per last Pharmacy note. Last hepatology appointment cancelled (was planned for 5/6/22).   - CMP in the AM  - PTA furosemide (HELD pending ON blood pressures), spironolactone, enalapril, and metoprolol daily   - Pantoprazole 40 mg daily  - Tenofovir 300 mg daily  - Ensure Hepatology appointment at discharge  - Follow-up with primary oncologist  - Follow-up with PCP within 1 week    T2DM  - Hold metformin  - PTA Lantus decreased from 10 units --> 5 units given NPO status (this is what patient has  been taking at home as well per notes)  - Hypoglycemia protocol  - Low SSI  - Nortriptyline 25 mg daily    Hypocalcemia (acute)  - 1 g Ca gluconate IV once    *Held clonazepam on admission*     Diet: Advance Diet as Tolerated: Clear Liquid Diet  DVT Prophylaxis: Pneumatic Compression Devices  Davalos Catheter: Not present  Fluids: None  Central Lines: None  Cardiac Monitoring: None  Code Status: Full Code    Disposition Plan   Expected Discharge: 05/13/2022  Anticipated discharge location:  Awaiting care coordination huddle       The patient's care was discussed with the Attending Physician, Dr. Loving.    Hayley Severson, MD  Medicine Service, [facility] TEAM [address] [place] [address]  Securely message with the Vocera Web Console (learn more here)  Text page via [facility] Paging/Directory   Please see signed in provider for up to date coverage information    ______________________________________________________________________    Chief Complaint   Bleeding in setting of lung biopsy    History is obtained from the patient, chart review, IR team    History of Present Illness   Rishabh Cabrera is a 61 year old male who has a history of chronic hepatitis B, HCC with concern for metastases in the lungs, and diabetes and is admitted for monitoring after IR biopsy of left upper lung nodule resulting in productive cough and hemoptysis. Patient had visible pulmonary hemorrhage on intra-procedural CT. Patient was placed in left lateral decubitus position with improvement in oxygen saturation from the 80s to the mid-90s. He was transferred to the ER, was hemodynamically stable but using 2L NC for comfort (never hypoxic in the ED). He had some farhan blood suctioned from his mouth in the ED, but bleeding has now resolved. Of note, only 1/4 biopsy sites was able to be sampled.     On our exam, denies shortness of breath, chest pain, pain from biopsy site. Denies abdominal pain, nausea, vomiting, dysuria. No  dizziness or lightheadedness. Does have easy bruising. Has not been coughing much since arrival from the ED. Received PLTs, FFP, TXA in the ED.     States he has a headache from above his right eye and to the back of head. Has been chronic for him, comes and goes. States the pain is similar to always, dull aching pain. Gets blurry vision with the pain. Always on the right side of the head. Denies photophobia or phonophobia. Unsure of a pattern, sometimes because he eats something bad for it.     Review of Systems    The 10 point Review of Systems is negative other than noted in the HPI or here.     Past Medical History    I have reviewed this patient's medical history and updated it with pertinent information if needed.   Past Medical History:   Diagnosis Date     Cancer (H)      Chronic hepatitis B (H)      Diabetes mellitus (H)         Past Surgical History   I have reviewed this patient's surgical history and updated it with pertinent information if needed.  Past Surgical History:   Procedure Laterality Date     ESOPHAGOSCOPY, GASTROSCOPY, DUODENOSCOPY (EGD), COMBINED N/A 5/29/2019    Procedure: ESOPHAGOGASTRODUODENOSCOPY (EGD);  Surgeon: Leventhal, Thomas Michael, MD;  Location: [facility]     IR CHEMO EMBOLIZATION  12/2/2021     IR SIRT (SELECTIVE INTERNAL RADIO THERAPY)  9/8/2021     IR VISCERAL ANGIOGRAM  9/8/2021     IR VISCERAL EMBOLIZATION  9/9/2021        Social History   I have reviewed this patient's social history and updated it with pertinent information if needed. Rishabh Cabrera  reports that he has never smoked. He has never used smokeless tobacco. He reports previous drug use. Drug: Marijuana. He reports that he does not drink alcohol.    Family History   Family history not discussed.     Prior to Admission Medications   Prior to Admission Medications   Prescriptions Last Dose Informant Patient Reported? Taking?   LANTUS SOLOSTAR 100 UNIT/ML soln   Yes No   Sig: Inject 10 Units Subcutaneous At Bedtime     cholecalciferol 50 MCG (2000 UT) CAPS   Yes No   Sig: every 24 hours   clonazePAM (KLONOPIN) 0.5 MG tablet   Yes No   Sig: clonazepam 0.5 mg tablet   enalapril (VASOTEC) 10 MG tablet   Yes No   Sig: enalapril maleate 10 mg tablet   fluticasone (FLONASE) 50 MCG/ACT nasal spray   Yes No   Sig: fluticasone propionate 50 mcg/actuation nasal spray,suspension   furosemide (LASIX) 20 MG tablet   No No   Sig: Take 2 tablets (40 mg) by mouth daily   ibuprofen (ADVIL/MOTRIN) 100 MG tablet   Yes No   Sig: Take 100 mg by mouth every 4 hours as needed   meloxicam (MOBIC) 15 MG tablet   Yes No   Sig: every 24 hours   metFORMIN (GLUCOPHAGE) 1000 MG tablet   Yes No   Sig: Take 1 tablet by mouth 2 times daily (with meals)   metoprolol succinate ER (TOPROL-XL) 50 MG 24 hr tablet   Yes No   Sig: metoprolol succinate ER 50 mg tablet,extended release 24 hr   naproxen (NAPROSYN) 500 MG tablet   Yes No   Sig: Take 1 tablet by mouth 2 times daily as needed for pain    nortriptyline (PAMELOR) 25 MG capsule   Yes No   Sig: nortriptyline 25 mg capsule   ondansetron (ZOFRAN) 4 MG tablet   No No   Sig: Take 1-2 tablets (4-8 mg) by mouth every 6 hours as needed for nausea (vomiting)   ondansetron (ZOFRAN-ODT) 4 MG ODT tab   No No   Sig: Take 1 tablet (4 mg) by mouth every 6 hours as needed for nausea   oxyCODONE (ROXICODONE) 5 MG tablet   No No   Sig: Take 1-2 tablets (5-10 mg) by mouth every 6 hours as needed for moderate to severe pain   oxyCODONE (ROXICODONE) 5 MG tablet   No No   Sig: Take 1 tablet (5 mg) by mouth every 6 hours as needed for severe pain   oxyCODONE (ROXICODONE) 5 MG tablet   No No   Sig: Take 1-2 tablets (5-10 mg) by mouth every 4 hours as needed for moderate to severe pain   pantoprazole (PROTONIX) 40 MG EC tablet   No No   Sig: Take 1 tablet (40 mg) by mouth daily   spironolactone (ALDACTONE) 50 MG tablet   No No   Sig: Take 1 tablet (50 mg) by mouth daily   tenofovir (VIREAD) 300 MG tablet   No No   Sig: Take 1 tablet  (300 mg) by mouth daily   tenofovir alafenamide fumarate (VEMLIDY) 25 MG tablet   No No   Sig: Take 1 tablet (25 mg) by mouth daily with food (dispense only in the original container).      Facility-Administered Medications: None     Allergies   Allergies   Allergen Reactions     Crabs [Crustaceans]      Pork (Porcine) Protein Unknown     Seafood Unknown     Shellfish Allergy Unknown       Physical Exam   Vital Signs: Temp: 97.9  F (36.6  C) Temp src: Oral BP: 126/86 Pulse: 91   Resp: 18 SpO2: 96 % O2 Device: None (Room air) Oxygen Delivery: 5 LPM  Weight: 142 lbs 1.6 oz    General Appearance: Comfortable appearing, lying in bed, in NAD  Eyes: No scleral icterus, conjunctivae normal  HEENT: NC/AT, Tacky mucous membranes  Respiratory: Mildly tachypneic, on room air, CTAB with good air movement to the bases  Cardiovascular: Regular rate, no murmurs noted  GI: Taut, distended, mildly tender to palpation, hepatomegaly noted  Lymph/Hematologic: No cervical LAD  Skin: Bleeding around biopsy site, some scars on abdomen  Musculoskeletal: Moving all extremities, hands cool bilaterally  Neurologic: Alert, oriented, per RN reports somewhat confused  Psychiatric: Appropriate affect and questions    Data   Data reviewed today: I reviewed all medications, new labs and imaging results over the last 24 hours. I personally reviewed no images or EKG's today.    Recent Labs   Lab 05/12/22  1305 05/12/22  1049 05/12/22  1040 05/12/22  1012   WBC 5.8  --   --  6.7   HGB 8.7*  --   --  9.2*   MCV 99  --   --  100   PLT 85*  --   --  58*   INR  --  1.96*  --   --      --   --   --    POTASSIUM 4.1  --   --   --    CHLORIDE 112*  --   --   --    CO2 27  --   --   --    BUN 7  --   --   --    CR 0.63*  --   --   --    ANIONGAP 3  --   --   --    MANDY 7.1*  --   --   --    *  --  100*  --    ALBUMIN 1.6*  --   --   --    PROTTOTAL 7.1  --   --   --    BILITOTAL 1.7*  --   --   --    ALKPHOS 112  --   --   --    ALT 20  --   --    --    AST 27  --   --   --

## 2022-05-12 NOTE — PLAN OF CARE
Major Shift Events:  Assumed patient care from [id number]. No further hemoptysis this afternoon. C/o headache, states this is chronic, received oxy x1 and endorsed relief. Unit of plasma finished, unit of platelets currently running. Biopsy site looks good, no bleeding at site. Afebrile, HR 70s-80s, BP WDL.     Plan: Finish platelet infusion, run calcium gluconate after platelets finish, transfer to inpatient bed when available    For vital signs and complete assessments, please see documentation flowsheets.     Patient refused the covid test as he had to have one before today's procedure. Will reassess.

## 2022-05-12 NOTE — ED PROVIDER NOTES
[place] EMERGENCY DEPARTMENT ([facility])  5/12/22  History     Chief Complaint   Patient presents with     Post-op Problem     The history is provided by the patient and medical records.     Rishabh Cabrera is a 61 year old male with a past medical history significant for he hepatocellular carcinoma, pulmonary nodule, hemoptysis, cirrhosis of liver, liver lesion, and GERD who presents to the Emergency Department for evaluation of postop problem.    Patient was brought to the ED from IR.  Patient was having a biopsy of the lung mass performed.  Patient began to experience a large amount of bleeding and after the first large bleed IR aborted the procedure.  Post procedure CT reveals perilesional hematoma without any evidence of extravasation or active hemorrhage.  Patient began to cough up farhan blood and they began to suction.  Patient was turned to the left lateral decubitus position which helped decrease amount of blood that was being coughed up.  Rapid response was called and patient was placed on O2 and sent to the ED.  Patient is still sedated while in the ED but denies any pain.  Patient does still have a mild amount of farhan blood suctioned from the mouth.      Past Medical History:   Diagnosis Date     Cancer (H)      Chronic hepatitis B (H)      Diabetes mellitus (H)        Past Surgical History:   Procedure Laterality Date     ESOPHAGOSCOPY, GASTROSCOPY, DUODENOSCOPY (EGD), COMBINED N/A 5/29/2019    Procedure: ESOPHAGOGASTRODUODENOSCOPY (EGD);  Surgeon: Leventhal, Thomas Michael, MD;  Location: [facility]     IR CHEMO EMBOLIZATION  12/2/2021     IR SIRT (SELECTIVE INTERNAL RADIO THERAPY)  9/8/2021     IR VISCERAL ANGIOGRAM  9/8/2021     IR VISCERAL EMBOLIZATION  9/9/2021       No family history on file.    Social History     Tobacco Use     Smoking status: Never Smoker     Smokeless tobacco: Never Used   Substance Use Topics     Alcohol use: No       Current Facility-Administered Medications   Medication      tranexamic acid 1 g in 100 mL NS IV bag (premix)     Current Outpatient Medications   Medication     cholecalciferol 50 MCG (2000 UT) CAPS     clonazePAM (KLONOPIN) 0.5 MG tablet     enalapril (VASOTEC) 10 MG tablet     fluticasone (FLONASE) 50 MCG/ACT nasal spray     furosemide (LASIX) 20 MG tablet     ibuprofen (ADVIL/MOTRIN) 100 MG tablet     LANTUS SOLOSTAR 100 UNIT/ML soln     meloxicam (MOBIC) 15 MG tablet     metFORMIN (GLUCOPHAGE) 1000 MG tablet     metoprolol succinate ER (TOPROL-XL) 50 MG 24 hr tablet     naproxen (NAPROSYN) 500 MG tablet     nortriptyline (PAMELOR) 25 MG capsule     ondansetron (ZOFRAN) 4 MG tablet     ondansetron (ZOFRAN-ODT) 4 MG ODT tab     oxyCODONE (ROXICODONE) 5 MG tablet     oxyCODONE (ROXICODONE) 5 MG tablet     oxyCODONE (ROXICODONE) 5 MG tablet     pantoprazole (PROTONIX) 40 MG EC tablet     spironolactone (ALDACTONE) 50 MG tablet     tenofovir (VIREAD) 300 MG tablet     tenofovir alafenamide fumarate (VEMLIDY) 25 MG tablet     Facility-Administered Medications Ordered in Other Encounters   Medication     gadobutrol (GADAVIST) injection 7.5 mL        Allergies   Allergen Reactions     Crabs [Crustaceans]      Pork (Porcine) Protein Unknown     Seafood Unknown     Shellfish Allergy Unknown        I have reviewed the Medications, Allergies, Past Medical and Surgical History, and Social History in the Epic system.    Review of Systems   Constitutional: Negative for fever.   HENT: Negative for congestion.    Eyes: Negative for redness.   Respiratory: Positive for cough (With blood). Negative for shortness of breath.    Cardiovascular: Negative for chest pain.   Gastrointestinal: Negative for abdominal pain.   Genitourinary: Negative for difficulty urinating.   Musculoskeletal: Negative for arthralgias and neck stiffness.   Skin: Negative for color change.   Neurological: Negative for headaches.   Psychiatric/Behavioral: Negative for confusion.     A complete review of systems  was performed with pertinent positives and negatives noted in the HPI, and all other systems negative.    Physical Exam   BP: 116/65  Pulse: 90  Temp: 98.1  F (36.7  C)  Resp: 16  Weight: 64.5 kg (142 lb 1.6 oz)  SpO2: 100 %      Physical Exam  Vitals and nursing note reviewed.   Constitutional:       General: He is not in acute distress.     Appearance: Normal appearance. He is not diaphoretic.   HENT:      Head: Atraumatic.   Eyes:      General: No scleral icterus.     Pupils: Pupils are equal, round, and reactive to light.   Cardiovascular:      Rate and Rhythm: Normal rate and regular rhythm.      Heart sounds: Normal heart sounds.   Pulmonary:      Effort: No respiratory distress.      Breath sounds: Normal breath sounds.   Abdominal:      General: Bowel sounds are normal.      Palpations: Abdomen is soft.      Tenderness: There is no abdominal tenderness.   Musculoskeletal:         General: No tenderness.   Skin:     General: Skin is warm.      Findings: No rash.   Neurological:      General: No focal deficit present.      Mental Status: He is alert and oriented to person, place, and time.           ED Course     At 1:50 PM the patient was seen and examined by Kraig Morgan MD in Room ED01.        Procedures               Critical Care Addendum    My initial assessment, based on my review of nursing observations, review of vital signs, focused history, physical exam, review of cardiac rhythm monitor and discussion with IR team, established that Rishabh Cabrera has respiratory insufficiency and severe hemorrhage, which requires immediate intervention, and therefore he is critically ill.     After the initial assessment, the care team initiated multiple lab tests, initiated IV fluid administration, initiated medication therapy with Tranexamic acid, fresh frozen plasma, platelet transfusion and consulted with Pulmonary critical care to provide stabilization care. Due to the critical nature of this patient, I  reassessed nursing observations, vital signs, physical exam, review of cardiac rhythm monitor and respiratory status multiple times prior to his disposition.     Time also spent performing discussion with family to obtain medical information for decision making and coordination of care.     Critical care time (excluding teaching time and procedures): 40 minutes.      Results for orders placed or performed during the hospital encounter of 05/12/22 (from the past 24 hour(s))   [place] Draw    Narrative    The following orders were created for panel order [place] Draw.  Procedure                               Abnormality         Status                     ---------                               -----------         ------                     Extra Blue Top Tube[[id number]]                              In process                 Extra Red Top Tube[[id number]]                               In process                   Please view results for these tests on the individual orders.   Comprehensive metabolic panel   Result Value Ref Range    Sodium 142 133 - 144 mmol/L    Potassium 4.1 3.4 - 5.3 mmol/L    Chloride 112 (H) 94 - 109 mmol/L    Carbon Dioxide (CO2) 27 20 - 32 mmol/L    Anion Gap 3 3 - 14 mmol/L    Urea Nitrogen 7 7 - 30 mg/dL    Creatinine 0.63 (L) 0.66 - 1.25 mg/dL    Calcium 7.1 (L) 8.5 - 10.1 mg/dL    Glucose 102 (H) 70 - 99 mg/dL    Alkaline Phosphatase 112 40 - 150 U/L    AST 27 0 - 45 U/L    ALT 20 0 - 70 U/L    Protein Total 7.1 6.8 - 8.8 g/dL    Albumin 1.6 (L) 3.4 - 5.0 g/dL    Bilirubin Total 1.7 (H) 0.2 - 1.3 mg/dL    GFR Estimate >90 >60 mL/min/1.73m2   CBC with platelets differential    Narrative    The following orders were created for panel order CBC with platelets differential.  Procedure                               Abnormality         Status                     ---------                               -----------         ------                     CBC with platelets and d...[[id number]]  Abnormal             Final result                 Please view results for these tests on the individual orders.   ABO/Rh type and screen    Narrative    The following orders were created for panel order ABO/Rh type and screen.  Procedure                               Abnormality         Status                     ---------                               -----------         ------                     Adult Type and Screen[[id number]]                            In process                   Please view results for these tests on the individual orders.   CBC with platelets and differential   Result Value Ref Range    WBC Count 5.8 4.0 - 11.0 10e3/uL    RBC Count 2.72 (L) 4.40 - 5.90 10e6/uL    Hemoglobin 8.7 (L) 13.3 - 17.7 g/dL    Hematocrit 26.9 (L) 40.0 - 53.0 %    MCV 99 78 - 100 fL    MCH 32.0 26.5 - 33.0 pg    MCHC 32.3 31.5 - 36.5 g/dL    RDW 16.5 (H) 10.0 - 15.0 %    Platelet Count 85 (L) 150 - 450 10e3/uL    % Neutrophils 80 %    % Lymphocytes 9 %    % Monocytes 8 %    % Eosinophils 2 %    % Basophils 1 %    % Immature Granulocytes 0 %    NRBCs per 100 WBC 0 <1 /100    Absolute Neutrophils 4.6 1.6 - 8.3 10e3/uL    Absolute Lymphocytes 0.5 (L) 0.8 - 5.3 10e3/uL    Absolute Monocytes 0.5 0.0 - 1.3 10e3/uL    Absolute Eosinophils 0.1 0.0 - 0.7 10e3/uL    Absolute Basophils 0.0 0.0 - 0.2 10e3/uL    Absolute Immature Granulocytes 0.0 <=0.4 10e3/uL    Absolute NRBCs 0.0 10e3/uL     Medications   tranexamic acid 1 g in 100 mL NS IV bag (premix) (has no administration in time range)             Assessments & Plan (with Medical Decision Making)     61 year old male with a past medical history significant for he hepatocellular carcinoma, pulmonary nodule, hemoptysis, cirrhosis of liver, liver lesion, and GERD who presents to the Emergency Department for evaluation of bleeding after IR procedure entailing biopsy of lung nodule.  Patient was brought in after rapid response was called, placed on cardiac monitor in resuscitation bay with  suction and farhan blood from mouth.  Placed on cardiac monitor which revealed blood pressure 116/65, pulse 90, respirations 16, pulse ox 100% on 5 L supplemental oxygen.  Attempts to wean off supplemental resulted in hypoxia into the low 90s, therefore 5 L submental oxygen maintained.    Labs drawn sent reviewed and document in epic remarkable for hemoglobin 8.7 and platelets 85 otherwise normal CBC, INR from earlier in the morning resulted at 1.96, electrolytes remainder of CBC unremarkable.  Patient was given a gram of TXA here in the emergency department and after discussion with he and his son at the bedside was consented for blood product transfusion.  Patient was given plasma and platelets.  Case discussed with pulmonology and then internal medicine with plan to admit for close monitoring further evaluation and care.    I have reviewed the nursing notes.    I have reviewed the findings, diagnosis, plan and need for follow up with the patient.    New Prescriptions    No medications on file       Final diagnoses:   Hemoptysis       IEva am serving as a trained medical scribe to document services personally performed by Kraig Morgan MD, based on the provider's statements to me.      IKraig MD, was physically present and have reviewed and verified the accuracy of this note documented by Eva Terrazas.     Kraig Morgan MD  5/12/2022   [facility], located within [facility] EMERGENCY DEPARTMENT     Kraig Morgan MD  05/12/22 1086

## 2022-05-13 ENCOUNTER — APPOINTMENT (OUTPATIENT)
Dept: GENERAL RADIOLOGY | Facility: CLINIC | Age: 62
End: 2022-05-13
Attending: EMERGENCY MEDICINE
Payer: MEDICARE

## 2022-05-13 ENCOUNTER — HOSPITAL (OUTPATIENT)
Facility: CLINIC | Age: 62
End: 2022-05-13

## 2022-05-13 ENCOUNTER — APPOINTMENT (OUTPATIENT)
Dept: GENERAL RADIOLOGY | Facility: CLINIC | Age: 62
DRG: 987 | End: 2022-05-13
Attending: INTERNAL MEDICINE
Payer: MEDICARE

## 2022-05-13 ENCOUNTER — HOSPITAL ENCOUNTER (EMERGENCY)
Facility: CLINIC | Age: 62
Discharge: HOME | End: 2022-05-13
Attending: EMERGENCY MEDICINE | Admitting: EMERGENCY MEDICINE
Payer: MEDICARE

## 2022-05-13 VITALS — RESPIRATION RATE: 18 BRPM | OXYGEN SATURATION: 100 % | HEART RATE: 94 BPM | TEMPERATURE: 98.7 F

## 2022-05-13 VITALS
TEMPERATURE: 98 F | SYSTOLIC BLOOD PRESSURE: 132 MMHG | RESPIRATION RATE: 16 BRPM | WEIGHT: 135.2 LBS | HEIGHT: 65 IN | BODY MASS INDEX: 22.53 KG/M2 | DIASTOLIC BLOOD PRESSURE: 78 MMHG | HEART RATE: 92 BPM | OXYGEN SATURATION: 92 %

## 2022-05-13 DIAGNOSIS — R93.89 ABNORMAL FINDING ON CHEST XRAY: ICD-10-CM

## 2022-05-13 PROBLEM — R18.8 OTHER ASCITES: Status: ACTIVE | Noted: 2021-06-11

## 2022-05-13 PROBLEM — Z20.822 COVID-19 RULED OUT BY LABORATORY TESTING: Status: ACTIVE | Noted: 2022-05-13

## 2022-05-13 PROBLEM — K76.9 LIVER LESION: Status: ACTIVE | Noted: 2020-09-14

## 2022-05-13 LAB
ALBUMIN SERPL-MCNC: 1.6 G/DL (ref 3.4–5)
ALP SERPL-CCNC: 106 U/L (ref 40–150)
ALT SERPL W P-5'-P-CCNC: 20 U/L (ref 0–70)
ANION GAP SERPL CALCULATED.3IONS-SCNC: 1 MMOL/L (ref 3–14)
AST SERPL W P-5'-P-CCNC: 27 U/L (ref 0–45)
BILIRUB SERPL-MCNC: 1.2 MG/DL (ref 0.2–1.3)
BUN SERPL-MCNC: 12 MG/DL (ref 7–30)
CALCIUM SERPL-MCNC: 7.8 MG/DL (ref 8.5–10.1)
CHLORIDE BLD-SCNC: 113 MMOL/L (ref 94–109)
CO2 SERPL-SCNC: 27 MMOL/L (ref 20–32)
CREAT SERPL-MCNC: 0.7 MG/DL (ref 0.66–1.25)
ERYTHROCYTE [DISTWIDTH] IN BLOOD BY AUTOMATED COUNT: 16.5 % (ref 10–15)
GFR SERPL CREATININE-BSD FRML MDRD: >90 ML/MIN/1.73M2
GLUCOSE BLD-MCNC: 161 MG/DL (ref 70–99)
GLUCOSE BLDC GLUCOMTR-MCNC: 148 MG/DL (ref 70–99)
GLUCOSE BLDC GLUCOMTR-MCNC: 192 MG/DL (ref 70–99)
GLUCOSE BLDC GLUCOMTR-MCNC: 212 MG/DL (ref 70–99)
HCT VFR BLD AUTO: 26.3 % (ref 40–53)
HGB BLD-MCNC: 8.3 G/DL (ref 13.3–17.7)
INR PPP: 1.83 (ref 0.85–1.15)
MCH RBC QN AUTO: 31.3 PG (ref 26.5–33)
MCHC RBC AUTO-ENTMCNC: 31.6 G/DL (ref 31.5–36.5)
MCV RBC AUTO: 99 FL (ref 78–100)
PLATELET # BLD AUTO: 105 10E3/UL (ref 150–450)
POTASSIUM BLD-SCNC: 4.2 MMOL/L (ref 3.4–5.3)
PROT SERPL-MCNC: 6.6 G/DL (ref 6.8–8.8)
RADIOLOGIST FLAGS: ABNORMAL
RBC # BLD AUTO: 2.65 10E6/UL (ref 4.4–5.9)
SODIUM SERPL-SCNC: 141 MMOL/L (ref 133–144)
WBC # BLD AUTO: 5.7 10E3/UL (ref 4–11)

## 2022-05-13 PROCEDURE — 36415 COLL VENOUS BLD VENIPUNCTURE: CPT | Performed by: STUDENT IN AN ORGANIZED HEALTH CARE EDUCATION/TRAINING PROGRAM

## 2022-05-13 PROCEDURE — 250N000012 HC RX MED GY IP 250 OP 636 PS 637: Performed by: INTERNAL MEDICINE

## 2022-05-13 PROCEDURE — 85610 PROTHROMBIN TIME: CPT | Performed by: STUDENT IN AN ORGANIZED HEALTH CARE EDUCATION/TRAINING PROGRAM

## 2022-05-13 PROCEDURE — 250N000013 HC RX MED GY IP 250 OP 250 PS 637: Performed by: STUDENT IN AN ORGANIZED HEALTH CARE EDUCATION/TRAINING PROGRAM

## 2022-05-13 PROCEDURE — 71046 X-RAY EXAM CHEST 2 VIEWS: CPT

## 2022-05-13 PROCEDURE — 99283 EMERGENCY DEPT VISIT LOW MDM: CPT | Mod: 25 | Performed by: EMERGENCY MEDICINE

## 2022-05-13 PROCEDURE — 99238 HOSP IP/OBS DSCHRG MGMT 30/<: CPT | Performed by: INTERNAL MEDICINE

## 2022-05-13 PROCEDURE — 82040 ASSAY OF SERUM ALBUMIN: CPT | Performed by: STUDENT IN AN ORGANIZED HEALTH CARE EDUCATION/TRAINING PROGRAM

## 2022-05-13 PROCEDURE — 82962 GLUCOSE BLOOD TEST: CPT

## 2022-05-13 PROCEDURE — G0378 HOSPITAL OBSERVATION PER HR: HCPCS

## 2022-05-13 PROCEDURE — 71045 X-RAY EXAM CHEST 1 VIEW: CPT | Mod: 26 | Performed by: RADIOLOGY

## 2022-05-13 PROCEDURE — 99221 1ST HOSP IP/OBS SF/LOW 40: CPT | Mod: GC | Performed by: INTERNAL MEDICINE

## 2022-05-13 PROCEDURE — 85027 COMPLETE CBC AUTOMATED: CPT | Performed by: STUDENT IN AN ORGANIZED HEALTH CARE EDUCATION/TRAINING PROGRAM

## 2022-05-13 PROCEDURE — 71046 X-RAY EXAM CHEST 2 VIEWS: CPT | Mod: 26 | Performed by: RADIOLOGY

## 2022-05-13 PROCEDURE — 71045 X-RAY EXAM CHEST 1 VIEW: CPT

## 2022-05-13 PROCEDURE — 250N000012 HC RX MED GY IP 250 OP 636 PS 637: Performed by: STUDENT IN AN ORGANIZED HEALTH CARE EDUCATION/TRAINING PROGRAM

## 2022-05-13 PROCEDURE — 99282 EMERGENCY DEPT VISIT SF MDM: CPT | Performed by: EMERGENCY MEDICINE

## 2022-05-13 PROCEDURE — 80053 COMPREHEN METABOLIC PANEL: CPT | Performed by: STUDENT IN AN ORGANIZED HEALTH CARE EDUCATION/TRAINING PROGRAM

## 2022-05-13 RX ADMIN — INSULIN GLARGINE 3 UNITS: 100 INJECTION, SOLUTION SUBCUTANEOUS at 00:57

## 2022-05-13 RX ADMIN — OXYCODONE HYDROCHLORIDE 5 MG: 5 TABLET ORAL at 10:10

## 2022-05-13 RX ADMIN — PANTOPRAZOLE SODIUM 40 MG: 40 TABLET, DELAYED RELEASE ORAL at 09:28

## 2022-05-13 RX ADMIN — INSULIN ASPART 1 UNITS: 100 INJECTION, SOLUTION INTRAVENOUS; SUBCUTANEOUS at 07:03

## 2022-05-13 RX ADMIN — TENOFOVIR DISOPROXIL FUMARATE 300 MG: 300 TABLET, FILM COATED ORAL at 09:28

## 2022-05-13 ASSESSMENT — ENCOUNTER SYMPTOMS
SHORTNESS OF BREATH: 0
FEVER: 0
COUGH: 0

## 2022-05-13 ASSESSMENT — ACTIVITIES OF DAILY LIVING (ADL)
ADLS_ACUITY_SCORE: 24
ADLS_ACUITY_SCORE: 24
ADLS_ACUITY_SCORE: 35
ADLS_ACUITY_SCORE: 24

## 2022-05-13 NOTE — UTILIZATION REVIEW
"  Admission Status; Secondary Review Determination         Under the authority of the Utilization Management Committee, the utilization review process indicated a secondary review on the above patient.  The review outcome is based on review of the medical records, discussions with staff, and applying clinical experience noted on the date of the review.        ()      Inpatient Status Appropriate - This patient's medical care is consistent with medical management for inpatient care and reasonable inpatient medical practice.      (X) Observation Status Appropriate - This patient does not meet hospital inpatient criteria and is placed in observation status. If this patient's primary payer is Medicare and was admitted as an inpatient, Condition Code 44 should be used and patient status changed to \"observation\".   () Admission Status NOT Appropriate - This patient's medical care is not consistent with medical management for Inpatient or Observation Status.          RATIONALE FOR DETERMINATION     61 year old male who has a history of chronic hepatitis B, HCC with concern for metastases in the lungs, and diabetes and is admitted for monitoring after IR biopsy of left upper lung nodule resulting in productive cough and hemoptysis from pulmonary hemorrhage.  Patient received FFP, TXA, and platelets.  Patient's hemoglobin was trended.  He was able to be weaned from oxygen.  Bleeding seemed to improve with treatment.  He has improved to the point of anticipated discharge today.  Patient is appropriate for observation status.    The severity of illness, intensity of service provided, expected LOS and risk for adverse outcome make the care complex, high risk and appropriate for hospital admission.        The information on this document is developed by the utilization review team in order for the business office to ensure compliance.  This only denotes the appropriateness of proper admission status and does not reflect the " quality of care rendered.         The definitions of Inpatient Status and Observation Status used in making the determination above are those provided in the CMS Coverage Manual, Chapter 1 and Chapter 6, section 70.4.      Sincerely,     Kishor Marie MD  Physician Advisor  Utilization Review/ Case Management  [facility].

## 2022-05-13 NOTE — DISCHARGE SUMMARY
[facility]  Discharge Summary - Medicine & Pediatrics       Date of Admission:  5/12/2022  Date of Discharge:  5/13/2022  Discharging Provider: Dr. Jack Loving  Discharge Service: Medicine Service, [place] TEAM 3    Discharge Diagnoses   Pulmonary hemorrhage ISO IR percutaneous biopsy  Elevated INR  Chronic Thrombocytopenia  Acute anemia  HCC  Chronic Hep B  Coagulopathy related to liver disease with elevated INR    Follow-ups Needed After Discharge   PCP: Please obtain CBC, CMP within 7 days of discharge.   Oncologist: Please follow-up with patient ASAP to determine alternative plan for lung biopsy vs malignancy/metastases evaluation. Oncologist to follow-up lung biopsy sample obtained 5/12/2022.  Hepatology: Patient should follow-up with Hepatology within 1-2 weeks of discharge as 5/6/2022 appointment was cancelled by provider.     Patient also referred to outpatient pharmacy for medication management. Medication reconciliation was unable to be completed because Rishabh and his family were unsure of what medicines he was taking.     Discharge Disposition   Discharged to home  Condition at discharge: Stable    Hospital Course   Rishabh Cabrera is a 61 year old male who has a history of chronic hepatitis B, HCC with concern for metastases in the lungs, and diabetes who was admitted for monitoring after IR biopsy of left upper lung nodule resulting in productive cough and hemoptysis from pulmonary hemorrhage.      Pulmonary hemorrhage ISO IR percutaneous biopsy  Elevated INR  Chronic Thrombocytopenia  Acute anemia  Patient had visible pulmonary hemorrhage on intra-procedural CT. Patient was placed in left lateral decubitus position with improvement in oxygen saturation from the 80s to the mid-90s. He was transferred to the ER, was hemodynamically stable but using 2L NC for comfort (never hypoxic in the ED)--quickly weaned to room air. He had some farhan blood suctioned from his mouth  in the ED, but bleeding resolved by time of admission. Received FFP, TXA, PLTs, and Vitamin K. Of note, only 1/4 biopsy sites was able to be sampled. Patient understandably concerned that adequate samples were unable to be obtained. Patient had an acute hemoglobin drop from baseline Hgb >10 to 8.7 on admission. Hemoglobin was 8.3 at discharge. PLTs and INR at baseline.   - Follow-up with primary oncology team ASAP to determine alternative plan for evaluation.      HCC  Chronic Hep B  Chronic severe protein-calorie malnutrition, hypoalbuminemia   Sees Dr. Leventhal for chronic hepatitis B infection. He has not recently refilled his Tenofovir prescription and it is unclear if he is taking this medication. Last hepatology appointment cancelled (was planned for 5/6/22). Patient with hepatomegaly, abdominal distension during admission.  Regarding his protein calorie malnutrition, he will continue working with nutrition as outpatient.  This was not specifically addressed during this brief admission.  - Follow-up with Hepatology within 1-2 weeks given cancellation of 5/6/22 appt.   - Follow-up with primary oncologist within 1-2 weeks to determine additional evaluation.   - Follow-up with PCP within 1 week.     Hypocalcemia  Given 1 g of calcium gluconate during admission.     Medication management  Rishabh reported taking no medications. Pharmacy attempted to do a medication reconciliation but Rishabh and his family were unsure the medications he takes. He was referred for an outpatient pharmacy appointment to help with medication management.    Consultations This Hospital Stay   PULMONARY GENERAL ADULT IP CONSULT  SPIRITUAL HEALTH SERVICES IP CONSULT  MEDICATION HISTORY IP PHARMACY CONSULT    Code Status   Full Code       The patient was discussed with Dr. Jack Loving.     Karena Sandoval MD  [address] EMERGENCY DEPARTMENT  [address] [facility] [address]  Phone:  [phone]  ______________________________________________________________________    Physical Exam   Vital Signs: Temp: 98  F (36.7  C) Temp src: Oral BP: 132/78 Pulse: 92   Resp: 16 SpO2: 92 % (Ambulating in clark) O2 Device: None (Room air) Oxygen Delivery: 5 LPM  Weight: 135 lbs 3.2 oz     General Appearance: Comfortable appearing, lying in bed, in NAD  Eyes: No scleral icterus, conjunctivae normal  HEENT: NC/AT, Tacky mucous membranes  Respiratory: Mildly tachypneic, on room air, CTAB with good air movement to the bases  Cardiovascular: Regular rate, no murmurs noted. WDWP  GI: Taut, distended, mildly tender to palpation, hepatomegaly noted  Skin: biopsy site c/d/i, no rash  Musculoskeletal: Moving all extremities  Neurologic: Alert, oriented, per RN reports somewhat confused  Psychiatric: Appropriate affect and questions      Primary Care Physician   Physician No Ref-Primary    Discharge Orders      Medication Therapy Management Referral      Reason for your hospital stay    You were admitted after a biopsy of your lung for monitoring due to bleeding. You are doing much better and are safe to go home. You should follow-up with your primary doctor, Oncologist, and GI/hepatology.     Activity    Your activity upon discharge: activity as tolerated     Adult [facility] Follow-up and recommended labs and tests    Follow up with primary care provider within 7 days for hospital follow- up.  The following labs/tests are recommended: CBC, CMP, INR.      Follow-up with your primary oncologist and hepatology ASAP.     Appointments on [place] and/or [facility] (with [facility] or [facility] provider or service). Call [phone] if you haven't heard regarding these appointments within 7 days of discharge.     When to contact your care team    Call your primary doctor if you have any of the following: fever, hemoptysis, chest pain, shortness of breath     Diet    Follow this diet upon discharge: Regular diet       Significant  Results and Procedures   CT Lung IR Procedure Impression: Left upper lobe spiculated nodule biopsy aborted after  obtaining a single sample as the patient developed significant parenchymal hemorrhage in the left upper lobe with associated productive/bloody cough and O2 desaturation to 89%.     Discharge Medications   Current Discharge Medication List      STOP taking these medications       cholecalciferol 50 MCG (2000 UT) CAPS Comments:   Reason for Stopping:         clonazePAM (KLONOPIN) 0.5 MG tablet Comments:   Reason for Stopping:         enalapril (VASOTEC) 10 MG tablet Comments:   Reason for Stopping:         fluticasone (FLONASE) 50 MCG/ACT nasal spray Comments:   Reason for Stopping:         furosemide (LASIX) 20 MG tablet Comments:   Reason for Stopping:         ibuprofen (ADVIL/MOTRIN) 100 MG tablet Comments:   Reason for Stopping:         LANTUS SOLOSTAR 100 UNIT/ML soln Comments:   Reason for Stopping:         meloxicam (MOBIC) 15 MG tablet Comments:   Reason for Stopping:         metFORMIN (GLUCOPHAGE) 1000 MG tablet Comments:   Reason for Stopping:         metoprolol succinate ER (TOPROL-XL) 50 MG 24 hr tablet Comments:   Reason for Stopping:         naproxen (NAPROSYN) 500 MG tablet Comments:   Reason for Stopping:         nortriptyline (PAMELOR) 25 MG capsule Comments:   Reason for Stopping:         ondansetron (ZOFRAN) 4 MG tablet Comments:   Reason for Stopping:         ondansetron (ZOFRAN-ODT) 4 MG ODT tab Comments:   Reason for Stopping:         oxyCODONE (ROXICODONE) 5 MG tablet Comments:   Reason for Stopping:         oxyCODONE (ROXICODONE) 5 MG tablet Comments:   Reason for Stopping:         oxyCODONE (ROXICODONE) 5 MG tablet Comments:   Reason for Stopping:         pantoprazole (PROTONIX) 40 MG EC tablet Comments:   Reason for Stopping:         spironolactone (ALDACTONE) 50 MG tablet Comments:   Reason for Stopping:         tenofovir (VIREAD) 300 MG tablet Comments:   Reason for Stopping:              Allergies   Allergies   Allergen Reactions     Crabs [Crustaceans]      Pork (Porcine) Protein Itching and Unknown     Seafood Hives and Unknown     Shellfish Allergy Unknown

## 2022-05-13 NOTE — CONSULTS
"PULMONARY CONSULT  Date of service: 2022    Patient: Rishabh Cabrera      : 1960      MRN: [id number]    We were consulted  for evaluation of hemoptysis s/p CT guided lung biopsy.        Impressions/Recommendations:   61 year old male with PMHx most significant for HCC, chronic hep B, and enlarging RICH spiculated nodule concerning for lung primary now s/p CT-guided biopsy complicated by parenchymal hemorrhage and hemoptysis.       Spiculated RICH pulmonary nodule concerning for lung primary   Hemoptysis and parenchymal hemorrhage s/p RICH nodule biopsy     - No indication for acute bronchoscopy   - Incentive spirometry and pulmonary toileting to keep airways open   - CXR today      Patient seen & discussed w/  Dr. Ignacio M.D., who is in agreement.     Rupa Headley MD  Pulmonary & Critical Care  [phone]           History of Present Illness:   Rishabh Cabrera is a 61 year old male who has a history of chronic hepatitis B, HCC with concern for metastases in the lungs, and diabetes and is admitted for monitoring after IR biopsy of left upper lung nodule resulting in productive cough and hemoptysis from pulmonary hemorrhage.    Per chart review: \"Immediately after obtaining [biopsy],  the patient began to cough forcefully and a small amount of blood was  expelled. There was expanding perilesional hematoma in the pulmonary  parenchyma. The patient's oxygen saturation dropped to 89%. Due to the  significant respiratory symptoms and pulmonary hemorrhage, the  procedure was aborted. Biosentry was applied. Patient placed left  lateral decubitus.  Rapid response was called and the patient was transferred to the  emergency room. Before transfer, postprocedural scan demonstrated the  known left upper lobe perilesional parenchymal hemorrhage, which  appeared to be contained to the left upper lobe. There was no debris  within the mainstem bronchi or trachea.\"             Review of Symptoms:   10-point ROS reviewed, & found " negative w/ exceptions noted in the HPI.          Past Medical History:     Past Medical History:   Diagnosis Date     Cancer (H)      Chronic hepatitis B (H)      Diabetes mellitus (H)        Past Surgical History:   Procedure Laterality Date     ESOPHAGOSCOPY, GASTROSCOPY, DUODENOSCOPY (EGD), COMBINED N/A 5/29/2019    Procedure: ESOPHAGOGASTRODUODENOSCOPY (EGD);  Surgeon: Leventhal, Thomas Michael, MD;  Location: [facility]     IR CHEMO EMBOLIZATION  12/2/2021     IR SIRT (SELECTIVE INTERNAL RADIO THERAPY)  9/8/2021     IR VISCERAL ANGIOGRAM  9/8/2021     IR VISCERAL EMBOLIZATION  9/9/2021            Allergies:     Allergies   Allergen Reactions     Crabs [Crustaceans]      Pork (Porcine) Protein Itching and Unknown     Seafood Hives and Unknown     Shellfish Allergy Unknown             Outpatient Medications:     gadobutrol (GADAVIST) injection 7.5 mL  [COMPLETED] ondansetron (ZOFRAN) injection 4 mg    cholecalciferol 50 MCG (2000 UT) CAPS, every 24 hours  clonazePAM (KLONOPIN) 0.5 MG tablet, clonazepam 0.5 mg tablet  enalapril (VASOTEC) 10 MG tablet, enalapril maleate 10 mg tablet  fluticasone (FLONASE) 50 MCG/ACT nasal spray, fluticasone propionate 50 mcg/actuation nasal spray,suspension  furosemide (LASIX) 20 MG tablet, Take 2 tablets (40 mg) by mouth daily  ibuprofen (ADVIL/MOTRIN) 100 MG tablet, Take 100 mg by mouth every 4 hours as needed  LANTUS SOLOSTAR 100 UNIT/ML soln, Inject 6 Units Subcutaneous At Bedtime  meloxicam (MOBIC) 15 MG tablet, every 24 hours  metFORMIN (GLUCOPHAGE) 1000 MG tablet, Take 1 tablet by mouth 2 times daily (with meals)  metoprolol succinate ER (TOPROL-XL) 50 MG 24 hr tablet, metoprolol succinate ER 50 mg tablet,extended release 24 hr  naproxen (NAPROSYN) 500 MG tablet, Take 1 tablet by mouth 2 times daily as needed for pain   nortriptyline (PAMELOR) 25 MG capsule, nortriptyline 25 mg capsule  ondansetron (ZOFRAN) 4 MG tablet, Take 1-2 tablets (4-8 mg) by mouth every 6 hours as needed  "for nausea (vomiting)  ondansetron (ZOFRAN-ODT) 4 MG ODT tab, Take 1 tablet (4 mg) by mouth every 6 hours as needed for nausea  oxyCODONE (ROXICODONE) 5 MG tablet, Take 1-2 tablets (5-10 mg) by mouth every 4 hours as needed for moderate to severe pain  oxyCODONE (ROXICODONE) 5 MG tablet, Take 1 tablet (5 mg) by mouth every 6 hours as needed for severe pain  pantoprazole (PROTONIX) 40 MG EC tablet, Take 1 tablet (40 mg) by mouth daily  spironolactone (ALDACTONE) 50 MG tablet, Take 1 tablet (50 mg) by mouth daily  tenofovir (VIREAD) 300 MG tablet, Take 1 tablet (300 mg) by mouth daily  oxyCODONE (ROXICODONE) 5 MG tablet, Take 1-2 tablets (5-10 mg) by mouth every 6 hours as needed for moderate to severe pain              Family History:   No family history on file.            Social History:     Social History     Tobacco Use     Smoking status: Never Smoker     Smokeless tobacco: Never Used   Substance Use Topics     Alcohol use: No     Drug use: Not Currently     Types: Marijuana     Comment: past marijuana use             Physical Exam:   /64 (BP Location: Right arm)   Pulse 87   Temp 98.5  F (36.9  C) (Oral)   Resp 16   Ht 1.651 m (5' 5\")   Wt 61.3 kg (135 lb 3.2 oz)   SpO2 95%   BMI 22.50 kg/m      General: Well appearing gentleman in NAD  HEENT: Anicteric sclera, NCAT  CV: RRR, no m/r/g  Lungs: coarse crackles over left base, no wheezing or rhonci   Skin: No rashes, cyanosis, or jaundice  Neuro: AAOx3, no focal deficits          Data:   Labs (all laboratory studies reviewed by me):   Arterial Blood Gases   No lab results found in last 7 days.  Complete Blood Count   Recent Labs   Lab 05/13/22  0558 05/12/22  1305 05/12/22  1012   WBC 5.7 5.8 6.7   HGB 8.3* 8.7* 9.2*   * 85* 58*     Basic Metabolic Panel  Recent Labs   Lab 05/13/22  0558 05/13/22  0155 05/13/22  0054 05/12/22  1305     --   --  142   POTASSIUM 4.2  --   --  4.1   CHLORIDE 113*  --   --  112*   CO2 27  --   --  27 BUN 12 "  --   --  7   CR 0.70  --   --  0.63*   * 192* 212* 102*     Liver Function Tests  Recent Labs   Lab 05/13/22  0558 05/12/22  1305 05/12/22  1049   AST 27 27  --    ALT 20 20  --    ALKPHOS 106 112  --    BILITOTAL 1.2 1.7*  --    ALBUMIN 1.6* 1.6*  --    INR 1.83*  --  1.96*     Coagulation Profile  Recent Labs   Lab 05/13/22  0558 05/12/22  1049   INR 1.83* 1.96*       Imaging (all imaging studies reviewed by me):  Recent Results (from the past 24 hour(s))   CT Lung Mediastinum Biopsy    Narrative    PROCEDURES 5/12/2022 12:48 PM:  1. CT-guided left upper lobe lung nodule biopsy.    Clinical History: Image guided RICH lung nodule biopsy; Pulmonary  nodules; Hepatocellular carcinoma (H).     Comparisons: Chest CT 3/17/2022    Staff: Rodolfo Rendon MD    Fellow: Jai Barillas MD    Monitoring: Patient was placed on continuous monitoring with  intravenous conscious sedation administered by the IR nursing staff  and supervised by the IR attending. Patient remained stable throughout  the procedure.     Medications:  1. Versed IV: 1.5 mg  2. Fentanyl IV: 75 mcg    Sedation time, face-to-face: 20 minutes     DLP: 1062 mGy*cm    Findings/procedure:    Prior to the procedure, both verbal and written informed consent  obtained from the patient. The patient placed supine on the CT table.  Preprocedure scan obtained revealing left upper lobe spiculated nodule  and adequate percutaneous window for biopsy.     The skin overlying the left anterior chest was prepped and draped.  Timeout performed. 1% Lidocaine used for local analgesia. Under CT  guidance, a 19 gauge Temno introducer cannula with needle stylette  advanced into the left upper lobe nodule. Total of 1 single 20 gauge  core tissue specimens obtained. Immediately after obtaining consent,  the patient began to cough forcefully and a small amount of blood was  expelled. There was expanding perilesional hematoma in the pulmonary  parenchyma. The patient's oxygen  saturation dropped to 89%. Due to the  significant respiratory symptoms and pulmonary hemorrhage, the  procedure was aborted. Biosentry was applied. Patient placed left  lateral decubitus.     Rapid response was called and the patient was transferred to the  emergency room. Before transfer, postprocedural scan demonstrated the  known left upper lobe perilesional parenchymal hemorrhage, which  appeared to be contained to the left upper lobe. There was no debris  within the mainstem bronchi or trachea.      Impression    Impression: Left upper lobe spiculated nodule biopsy aborted after  obtaining a single sample as the patient developed significant  parenchymal hemorrhage in the left upper lobe with associated  productive/bloody cough and O2 desaturation to 89%. The patient was  transferred to the emergency department in stable condition and signed  out to Dr. Morgan.     Plan:   1. Further care and stabilization per emergency room physician.   2. Biopsy sample submitted to pathology.

## 2022-05-13 NOTE — DISCHARGE INSTRUCTIONS
Please make an appointment to follow up with Your Primary Care Provider, GI Clinic (phone: [phone]), Hematology Oncology Clinic (phone: [phone]), and Pulmonology Clinic (phone: [phone]) as recommended at time of discharge. Discharge follow up notes are pasted below.       PCP: Please obtain CBC, CMP within 7 days of discharge.     Oncologist: Please follow-up with patient ASAP to determine alternative plan for lung biopsy vs malignancy/metastases evaluation.     Oncologist to follow-up lung biopsy sample obtained 5/12/2022.    Hepatology: Patient should follow-up with Hepatology within 1-2 weeks of discharge as 5/6/2022 appointment was cancelled by provider.      Patient also referred to outpatient pharmacy for medication management. Medication reconciliation was unable to be completed because Rishabh and his family were unsure of what medicines he was taking.

## 2022-05-13 NOTE — PLAN OF CARE
[id number]    VSS, afebrile and on RA.  services utilized during assessment. A&Ox4. Denies SOB/nausea. Complained of 5/10 headache, gave oxy x1 with effectiveness. Left upper chest dressing CDI. Ambulated in the hallway without difficulty. Voiding spontaneiously, LBM 5/12. Up independently in room. Patient adequate for discharge.     Nursing Focus: Discharge    D: Patient discharged to home at 1400. Patient was picked up by son.    I: No discharge medications. Patient instructed to call clinic triage nurse if he experiences a fever >100.4, uncontrolled nausea, vomiting, diarrhea, or pain; or experiences any signs or symptoms of bleeding. Other phone numbers to call with questions or concerns after discharge reviewed. PIV removed. Education completed.    A:  utilized during discharge and patient verbalized understanding of discharge instructions.     P: Patient to follow-up in clinic on June 15th for labs and MRI.

## 2022-05-13 NOTE — PROGRESS NOTES
Gave report to assigned nurse on duty. Transporter transferred patient to 7D room 21 bed 1 together with all his belongings.

## 2022-05-13 NOTE — PROGRESS NOTES
Nursing Focus: Admission  D: Arrived at 0150  from ED. Patient unaccompanied. Admitted for developing hematoma during operation. No complaints.     I: Admission process began.  Patient oriented to room, enviroment, call light.  Md. notified of patients arrival on unit.     A: Vital signs stable, afebrile.  Patient stable at this time.     P: Implement plan of care when available. Continue to monitor patient. Nursing interventions as appropriate. Notify md with changes in pt status.
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
## 2022-05-13 NOTE — ED PROVIDER NOTES
"ED Provider Note  [facility]      History     Chief Complaint   Patient presents with     Chest Injury     HPI  Rishabh Cabrera is a 61 year old male with a history of DM, chronic hepatitis B, HCC with concern for metastases in the lungs who is s/p IR biopsy of left upper lung nodule 5/12/22 resulting in productive cough and hemoptysis from pulmonary hemorrhage now returning to the ED today as advised for concern for possible pneumothorax. Patient was seen here yesterday following a lung biopsy, during which he was perforated and brought to ED for stabilization. Pt was discharged home, but was called this morning by the on-call doc as they were worried after imaging he had \"air in his lungs\". Patient currently denies any shortness of breath, chest pain, or any new/worsening symptoms.    Past Medical History  Past Medical History:   Diagnosis Date     Cancer (H)      Chronic hepatitis B (H)      Diabetes mellitus (H)      Past Surgical History:   Procedure Laterality Date     ESOPHAGOSCOPY, GASTROSCOPY, DUODENOSCOPY (EGD), COMBINED N/A 5/29/2019    Procedure: ESOPHAGOGASTRODUODENOSCOPY (EGD);  Surgeon: Leventhal, Thomas Michael, MD;  Location: [facility]     IR CHEMO EMBOLIZATION  12/2/2021     IR SIRT (SELECTIVE INTERNAL RADIO THERAPY)  9/8/2021     IR VISCERAL ANGIOGRAM  9/8/2021     IR VISCERAL EMBOLIZATION  9/9/2021     No current outpatient medications on file.    Allergies   Allergen Reactions     Crabs [Crustaceans]      Pork (Porcine) Protein Itching and Unknown     Seafood Hives and Unknown     Shellfish Allergy Unknown     Family History  No family history on file.  Social History   Social History     Tobacco Use     Smoking status: Never Smoker     Smokeless tobacco: Never Used   Substance Use Topics     Alcohol use: No     Drug use: Not Currently     Types: Marijuana     Comment: past marijuana use      Past medical history, past surgical history, medications, allergies, family history, " and social history were reviewed with the patient. No additional pertinent items.       Review of Systems   Constitutional: Negative for fever.   Respiratory: Negative for cough and shortness of breath.    Cardiovascular: Negative for chest pain.   All other systems reviewed and are negative.    A complete review of systems was performed with pertinent positives and negatives noted in the HPI, and all other systems negative.    Physical Exam   Pulse: 94  Temp: 98.7  F (37.1  C)  Resp: 18  SpO2: 100 %  Physical Exam  Vitals and nursing note reviewed.   Constitutional:       General: He is not in acute distress.     Appearance: Normal appearance. He is well-developed and normal weight. He is not ill-appearing or diaphoretic.   HENT:      Head: Normocephalic and atraumatic.      Nose: Nose normal.      Mouth/Throat:      Mouth: Mucous membranes are moist.   Eyes:      General: No scleral icterus.     Conjunctiva/sclera: Conjunctivae normal.   Cardiovascular:      Rate and Rhythm: Normal rate.   Pulmonary:      Effort: Pulmonary effort is normal. No respiratory distress.      Breath sounds: No stridor.   Abdominal:      General: There is no distension.   Musculoskeletal:         General: No deformity or signs of injury. Normal range of motion.      Cervical back: Normal range of motion and neck supple. No rigidity.   Skin:     General: Skin is warm and dry.      Coloration: Skin is not jaundiced or pale.      Findings: No rash.   Neurological:      General: No focal deficit present.      Mental Status: He is alert and oriented to person, place, and time.      Gait: Gait normal.   Psychiatric:         Mood and Affect: Mood normal.         Behavior: Behavior normal.         Thought Content: Thought content normal.         ED Course      Procedures                     Results for orders placed or performed during the hospital encounter of 05/13/22   XR Chest 2 Views     Status: None    Narrative    Exam: XR CHEST 2 VW,  5/13/2022 5:46 PM    Indication: eval ptx    Comparison: Same day    Findings:   Unchanged left hemidiaphragm elevation. Calcification of the right  pleural is also unchanged. Left midlung opacity representing loculated  effusion is similar to prior. Also unchanged bilateral interstitial  opacities with some left lung base atelectasis. No appreciable  pneumothorax. No acute osseous abnormalities.      Impression    Impression:   1. No appreciable pneumothorax.  2. Bilateral loculated pleural effusions. This is similar to prior.  3. Hazy left basilar opacities, likely atelectasis.    I have personally reviewed the examination and initial interpretation  and I agree with the findings.    FRANCES MANN MD         SYSTEM ID:  [id number]   Results for orders placed or performed during the hospital encounter of 05/12/22   XR Chest Port 1 View     Status: Abnormal   Result Value Ref Range    Radiologist flags Question small biapical pneumothoraces. 90 degree (Urgent)     Narrative    EXAM: XR CHEST PORT 1 VIEW  5/13/2022 1:08 PM      HISTORY: FU parenchymal hematoma s/p biopsy    COMPARISON: CT 5/12/2022, 3/17/2022    FINDINGS: Single view of the chest. Bilateral loculated pleural  effusions. Question right and left pleural line suggestive of  bilateral pneumothoraces. Redemonstration of left mid lung pseudomass  measuring 4.4 x 4.3 cm. Bilateral left greater than right interstitial  and airspace opacities. Visualized upper abdomen and bones are  unremarkable.      Impression    IMPRESSION:  1. Question small biapical pneumothoraces. Alternatively skin folds.  2. Bilateral loculated pleural effusions.  3. Bilateral left greater than right interstitial and airspace  opacities.      [Urgent Result: Question small biapical pneumothoraces. 90 degree  upright radiograph recommended.]    Finding was identified on 5/13/2022 3:14 PM.     Dr. Jack Loving was contacted by Dr. Valdivia at 5/13/2022 3:37 PM and  verbalized understanding  of the urgent finding.     I have personally reviewed the examination and initial interpretation  and I agree with the findings.    RASHEED HENNESSY MD         SYSTEM ID:  [id number]   [place] Draw     Status: None    Narrative    The following orders were created for panel order [place] Draw.  Procedure                               Abnormality         Status                     ---------                               -----------         ------                     Extra Blue Top Tube[[id number]]                              Final result               Extra Red Top Tube[[id number]]                               Final result                 Please view results for these tests on the individual orders.   Comprehensive metabolic panel     Status: Abnormal   Result Value Ref Range    Sodium 142 133 - 144 mmol/L    Potassium 4.1 3.4 - 5.3 mmol/L    Chloride 112 (H) 94 - 109 mmol/L    Carbon Dioxide (CO2) 27 20 - 32 mmol/L    Anion Gap 3 3 - 14 mmol/L    Urea Nitrogen 7 7 - 30 mg/dL    Creatinine 0.63 (L) 0.66 - 1.25 mg/dL    Calcium 7.1 (L) 8.5 - 10.1 mg/dL    Glucose 102 (H) 70 - 99 mg/dL    Alkaline Phosphatase 112 40 - 150 U/L    AST 27 0 - 45 U/L    ALT 20 0 - 70 U/L    Protein Total 7.1 6.8 - 8.8 g/dL    Albumin 1.6 (L) 3.4 - 5.0 g/dL    Bilirubin Total 1.7 (H) 0.2 - 1.3 mg/dL    GFR Estimate >90 >60 mL/min/1.73m2   Extra Blue Top Tube     Status: None   Result Value Ref Range    Hold Specimen JIC    Extra Red Top Tube     Status: None   Result Value Ref Range    Hold Specimen JIC    CBC with platelets and differential     Status: Abnormal   Result Value Ref Range    WBC Count 5.8 4.0 - 11.0 10e3/uL    RBC Count 2.72 (L) 4.40 - 5.90 10e6/uL    Hemoglobin 8.7 (L) 13.3 - 17.7 g/dL    Hematocrit 26.9 (L) 40.0 - 53.0 %    MCV 99 78 - 100 fL    MCH 32.0 26.5 - 33.0 pg    MCHC 32.3 31.5 - 36.5 g/dL    RDW 16.5 (H) 10.0 - 15.0 %    Platelet Count 85 (L) 150 - 450 10e3/uL    % Neutrophils 80 %    % Lymphocytes 9 %    % Monocytes  8 %    % Eosinophils 2 %    % Basophils 1 %    % Immature Granulocytes 0 %    NRBCs per 100 WBC 0 <1 /100    Absolute Neutrophils 4.6 1.6 - 8.3 10e3/uL    Absolute Lymphocytes 0.5 (L) 0.8 - 5.3 10e3/uL    Absolute Monocytes 0.5 0.0 - 1.3 10e3/uL    Absolute Eosinophils 0.1 0.0 - 0.7 10e3/uL    Absolute Basophils 0.0 0.0 - 0.2 10e3/uL    Absolute Immature Granulocytes 0.0 <=0.4 10e3/uL    Absolute NRBCs 0.0 10e3/uL   Asymptomatic COVID-19 Virus (Coronavirus) by PCR Nose     Status: Normal    Specimen: Nose; Swab   Result Value Ref Range    SARS CoV2 PCR Negative Negative, Testing sent to reference lab. Results will be returned via unsolicited result    Narrative    Testing was performed using the Xpert Xpress SARS-CoV-2 Assay on the  Cepheid Gene-Xpert Instrument Systems. Additional information about  this Emergency Use Authorization (EUA) assay can be found via the Lab  Guide. This test should be ordered for the detection of SARS-CoV-2 in  individuals who meet SARS-CoV-2 clinical and/or epidemiological  criteria. Test performance is unknown in asymptomatic patients. This  test is for in vitro diagnostic use under the FDA EUA for  laboratories certified under CLIA to perform high complexity testing.  This test has not been FDA cleared or approved. A negative result  does not rule out the presence of PCR inhibitors in the specimen or  target RNA in concentration below the limit of detection for the  assay. The possibility of a false negative should be considered if  the patient's recent exposure or clinical presentation suggests  COVID-19. This test was validated by the [facility]. This laboratory is certified under  the Clinical Laboratory Improvement Amendments of 1988 (CLIA-88) as  qualified to perform high complexity laboratory testing.     Glucose by meter     Status: Abnormal   Result Value Ref Range    GLUCOSE BY METER POCT 212 (H) 70 - 99 mg/dL   Glucose by  meter     Status: Abnormal   Result Value Ref Range    GLUCOSE BY METER POCT 192 (H) 70 - 99 mg/dL   CBC with platelets     Status: Abnormal   Result Value Ref Range    WBC Count 5.7 4.0 - 11.0 10e3/uL    RBC Count 2.65 (L) 4.40 - 5.90 10e6/uL    Hemoglobin 8.3 (L) 13.3 - 17.7 g/dL    Hematocrit 26.3 (L) 40.0 - 53.0 %    MCV 99 78 - 100 fL    MCH 31.3 26.5 - 33.0 pg    MCHC 31.6 31.5 - 36.5 g/dL    RDW 16.5 (H) 10.0 - 15.0 %    Platelet Count 105 (L) 150 - 450 10e3/uL   Comprehensive metabolic panel     Status: Abnormal   Result Value Ref Range    Sodium 141 133 - 144 mmol/L    Potassium 4.2 3.4 - 5.3 mmol/L    Chloride 113 (H) 94 - 109 mmol/L    Carbon Dioxide (CO2) 27 20 - 32 mmol/L    Anion Gap 1 (L) 3 - 14 mmol/L    Urea Nitrogen 12 7 - 30 mg/dL    Creatinine 0.70 0.66 - 1.25 mg/dL    Calcium 7.8 (L) 8.5 - 10.1 mg/dL    Glucose 161 (H) 70 - 99 mg/dL    Alkaline Phosphatase 106 40 - 150 U/L    AST 27 0 - 45 U/L    ALT 20 0 - 70 U/L    Protein Total 6.6 (L) 6.8 - 8.8 g/dL    Albumin 1.6 (L) 3.4 - 5.0 g/dL    Bilirubin Total 1.2 0.2 - 1.3 mg/dL    GFR Estimate >90 >60 mL/min/1.73m2   INR     Status: Abnormal   Result Value Ref Range    INR 1.83 (H) 0.85 - 1.15   Glucose by meter     Status: Abnormal   Result Value Ref Range    GLUCOSE BY METER POCT 148 (H) 70 - 99 mg/dL   Adult Type and Screen     Status: None   Result Value Ref Range    ABO/RH(D) B POS     Antibody Screen Negative Negative    SPECIMEN EXPIRATION DATE [id number]    Prepare pheresed platelets (unit)     Status: None   Result Value Ref Range    UNIT ABO/RH B Pos     Unit Number [id number]     Unit Status Transfused     Blood Component Type Platelets     Product Code W7317F95     CODING SYSTEM GTLN850     UNIT TYPE ISBT 7300     ISSUE DATE AND TIME [id number]    Prepare plasma (unit)     Status: None (Preliminary result)   Result Value Ref Range    UNIT ABO/RH B Pos     Unit Number [id number]     Unit Status Issued     Blood Component  Type FROZEN PLASMA     Product Code V0932T90     CODING SYSTEM [id number]     UNIT TYPE ISBT 7300     ISSUE DATE AND TIME [id number]    CBC with platelets differential     Status: Abnormal    Narrative    The following orders were created for panel order CBC with platelets differential.  Procedure                               Abnormality         Status                     ---------                               -----------         ------                     CBC with platelets and d...[[id number]]  Abnormal            Final result                 Please view results for these tests on the individual orders.   ABO/Rh type and screen *Canceled*     Status: None ()    Narrative    The following orders were created for panel order ABO/Rh type and screen.  Procedure                               Abnormality         Status                     ---------                               -----------         ------                     Adult Type and Screen[[id number]]                                                         Please view results for these tests on the individual orders.   ABO/Rh type and screen     Status: None    Narrative    The following orders were created for panel order ABO/Rh type and screen.  Procedure                               Abnormality         Status                     ---------                               -----------         ------                     Adult Type and Screen[[id number]]                            Final result                 Please view results for these tests on the individual orders.     Medications - No data to display     Assessments & Plan (with Medical Decision Making)   Rishabh Cabrera is a 61 year old male with a history of DM, chronic hepatitis B, HCC with concern for metastases in the lungs who is s/p IR biopsy of left upper lung nodule 5/12/22 resulting in productive cough and hemoptysis from pulmonary hemorrhage now returning to the ED today as advised for concern for possible  "pneumothorax.    Ddx: ptx, xray artifact    Patient with no sxs of sob, cp, cough. Sent in for repeat upright cxr for rule out ptx after overread of prelim portable cxr today with question of \"small biapical pneumothoraces. Alternatively skin folds.\"     2 view cxr obtained and w/o ptx. Patient reassured. Showed him the two different xrays. Copied follow up instructions from discharge summary into AVS and provided contact numbers for GI, Onc, Pulm.     Return precautions provided.            I have reviewed the nursing notes. I have reviewed the findings, diagnosis, plan and need for follow up with the patient.    There are no discharge medications for this patient.      Final diagnoses:   Abnormal finding on chest xray   IAracelis, am serving as a trained medical scribe to document services personally performed by Nafisa Marquez MD, based on the provider's statements to me.     Nafisa GUZMAN MD, was physically present and have reviewed and verified the accuracy of this note documented by Aracelis Walter.      --  Nafisa Marquez  [facility] EMERGENCY DEPARTMENT  5/13/2022     Nafisa Marquez MD  05/13/22 5040    "

## 2022-05-13 NOTE — PROGRESS NOTES
SPIRITUAL HEALTH SERVICES Progress Note  [facility] 7D  On-call Visit    Saw pt Rishabh Cabrera per routine consult.  No one else present.      Illness Narrative - Rishabh has gotten a biopsy for a lung spot. He says he has had liver cancer and this spot may be mets.      Distress - Rishabh is worried the biopsy will come back positive. He says he worries that he may die from this and he says he feels like his prayers are not being answered.      Coping - Rishabh says that he retains his tradition of Hmong spirituality and has also embraced [employer]ar[employer] but he says he feels like his prayers in both traditions are not being heard. We explored elements of both his peg traditions and how he may open up to receive messages in his prayer life. Suggested to Rishabh that he may bring a shaman in to prayer with him in hospital and shs could facilitate this. He says that he may wait to go home to call a shaman. He also says that shaman are not as good at healing as they used to be.    Rishabh welcomed prayer and he indicated that he would be open to another visit from a . Directed Rishabh to ask for SHS as needed.      Meaning-Making -       Plan - Will communicate this encounter to the unit .      Matthew Sanchez MDiv  Chaplain Resident  Pager [phone]    * [facility] remains available 24/7 for emergent requests/referrals, either by having the switchboard page the on-call  or by entering an ASAP/STAT consult in Epic (this will also page the on-call ). Routine Epic consults receive an initial response within 24 hours.*

## 2022-05-13 NOTE — ED TRIAGE NOTES
"Pt ambulatory to triage with potential pneumothorax. Per pt was seen here yesterday following a lung biopsy, during which he was perforated and brought to ED for stabilization. Pt was discharged home, but was called this AM by the on call doc as they were worried after imaging he had \"air in his lungs\". Pt denies any SOB, chest pain, or any new/worsening symptoms. Pt A&Ox4, VSS on RA, denies pain at this time.     Triage Assessment     Row Name 05/13/22 3709       Triage Assessment (Adult)    Airway WDL WDL       Respiratory WDL    Respiratory WDL WDL       Skin Circulation/Temperature WDL    Skin Circulation/Temperature WDL WDL       Cardiac WDL    Cardiac WDL WDL       Peripheral/Neurovascular WDL    Peripheral Neurovascular WDL WDL       Cognitive/Neuro/Behavioral WDL    Cognitive/Neuro/Behavioral WDL WDL              "

## 2022-05-14 ENCOUNTER — PATIENT OUTREACH (OUTPATIENT)
Dept: CARE COORDINATION | Facility: CLINIC | Age: 62
End: 2022-05-14
Payer: MEDICARE

## 2022-05-14 DIAGNOSIS — Z71.89 OTHER SPECIFIED COUNSELING: ICD-10-CM

## 2022-05-14 NOTE — TELEPHONE ENCOUNTER
"Clinic Care Coordination Contact  [facility]: Post-Discharge Note  SITUATION                                                      Admission:    Admission Date: 05/12/22   Reason for Admission: Pulmonary hemorrhage ISO IR percutaneous biopsy  Elevated INR  Chronic Thrombocytopenia  Acute anemia  HCC  Chronic Hep B  Chronic severe protein-calorie malnutrition, hypoalbuminemia  Discharge:   Discharge Date: 05/13/22  Discharge Diagnosis: Pulmonary hemorrhage ISO IR percutaneous biopsy  Elevated INR  Chronic Thrombocytopenia  Acute anemia  HCC  Chronic Hep B  Chronic severe protein-calorie malnutrition, hypoalbuminemia    BACKGROUND                                                      Per hospital discharge summary and inpatient provider notes:    Rishabh Cabrera is a 61 year old male with a history of DM, chronic hepatitis B, HCC with concern for metastases in the lungs who is s/p IR biopsy of left upper lung nodule 5/12/22 resulting in productive cough and hemoptysis from pulmonary hemorrhage now returning to the ED today as advised for concern for possible pneumothorax. Patient was seen here yesterday following a lung biopsy, during which he was perforated and brought to ED for stabilization. Pt was discharged home, but was called this morning by the on-call doc as they were worried after imaging he had \"air in his lungs\". Patient currently denies any shortness of breath, chest pain, or any new/worsening symptoms.  ASSESSMENT           Discharge Assessment  How are you doing now that you are home?: \"The same.Weak and tired\"  How are your symptoms? (Red Flag symptoms escalate to triage hotline per guidelines): Unchanged  Do you feel your condition is stable enough to be safe at home until your provider visit?: Yes  Does the patient have their discharge instructions? : Yes (\"Too tired to look at the instructions but has children at home that can help\")  Does the patient have questions regarding their discharge " instructions? :  (see above)  Were you started on any new medications or were there changes to any of your previous medications? : No  Does the patient have all of their medications?:  (NA)  Do you have all of your needed medical supplies or equipment (DME)?  (i.e. oxygen tank, CPAP, cane, etc.):  (NA)  Discharge follow-up appointment scheduled within 14 calendar days? : Yes (6-1-2022 with PCP. Waiting for scheduling staff to contact him for specialty visits but verifies he has the contact phone numbers if he doesn't hear back)  Discharge Follow Up Appointment Date: 06/01/22  Discharge Follow Up Appointment Scheduled with?: Primary Care Provider         Post-op (Clinicians Only)  Did the patient have surgery or a procedure: Yes (lung biopsy)  Incision:  (none)        PLAN                                                      Outpatient Plan:  Patient to keep appointment as scheduled with PCP 6-1-2022. Patient will review discharge instructions with his children. He will await calls from specialty clinics and will contact them if no response on Monday.     Future Appointments   Date Time Provider Department Center   6/15/2022 12:30 PM [facility]   6/15/2022  1:30 PM [address]   6/21/2022  1:00 PM Kenneth So MD [facility]         For any urgent concerns, please contact our 24 hour nurse triage line: [phone] ([phone])         Darya Venegas RN  [facility]

## 2022-05-16 LAB
PATH REPORT.COMMENTS IMP SPEC: NORMAL
PATH REPORT.FINAL DX SPEC: NORMAL
PATH REPORT.GROSS SPEC: NORMAL
PATH REPORT.MICROSCOPIC SPEC OTHER STN: NORMAL
PATH REPORT.RELEVANT HX SPEC: NORMAL
PHOTO IMAGE: NORMAL

## 2022-05-16 PROCEDURE — 88333 PATH CONSLTJ SURG CYTO XM 1: CPT | Mod: 26 | Performed by: PATHOLOGY

## 2022-05-16 PROCEDURE — 88305 TISSUE EXAM BY PATHOLOGIST: CPT | Mod: 26 | Performed by: PATHOLOGY

## 2022-05-18 ENCOUNTER — TELEPHONE (OUTPATIENT)
Dept: PHARMACY | Facility: OTHER | Age: 62
End: 2022-05-18
Payer: MEDICARE

## 2022-05-18 ENCOUNTER — APPOINTMENT (OUTPATIENT)
Dept: INTERPRETER SERVICES | Facility: CLINIC | Age: 62
End: 2022-05-18
Payer: MEDICARE

## 2022-05-18 NOTE — TELEPHONE ENCOUNTER
MTM referral from: Transitions of Care (recent hospital discharge or ED visit)    MTM referral outreach attempt #2 on May 18, 2022 at 9:08 AM      Outcome: Spoke with patient patient declined visit. Patient did say he was in pain.    THADDEUS Vu

## 2022-05-29 ENCOUNTER — APPOINTMENT (OUTPATIENT)
Dept: GENERAL RADIOLOGY | Facility: CLINIC | Age: 62
DRG: 435 | End: 2022-05-29
Attending: EMERGENCY MEDICINE
Payer: MEDICARE

## 2022-05-29 ENCOUNTER — APPOINTMENT (OUTPATIENT)
Dept: CT IMAGING | Facility: CLINIC | Age: 62
DRG: 435 | End: 2022-05-29
Attending: EMERGENCY MEDICINE
Payer: MEDICARE

## 2022-05-29 ENCOUNTER — HOSPITAL ENCOUNTER (INPATIENT)
Facility: CLINIC | Age: 62
LOS: 3 days | Discharge: HOME | DRG: 435 | End: 2022-06-01
Attending: EMERGENCY MEDICINE | Admitting: STUDENT IN AN ORGANIZED HEALTH CARE EDUCATION/TRAINING PROGRAM
Payer: MEDICARE

## 2022-05-29 DIAGNOSIS — Z98.890 OTHER SPECIFIED POSTPROCEDURAL STATES: ICD-10-CM

## 2022-05-29 DIAGNOSIS — I85.00 ESOPHAGEAL VARICES WITHOUT BLEEDING, UNSPECIFIED ESOPHAGEAL VARICES TYPE (H): ICD-10-CM

## 2022-05-29 DIAGNOSIS — C22.0 HCC (HEPATOCELLULAR CARCINOMA) (H): Primary | ICD-10-CM

## 2022-05-29 DIAGNOSIS — Z20.822 CONTACT WITH AND (SUSPECTED) EXPOSURE TO COVID-19: ICD-10-CM

## 2022-05-29 DIAGNOSIS — D64.9 ANEMIA, UNSPECIFIED TYPE: ICD-10-CM

## 2022-05-29 DIAGNOSIS — B02.9 HERPES ZOSTER WITHOUT COMPLICATION: ICD-10-CM

## 2022-05-29 DIAGNOSIS — D50.9 IRON DEFICIENCY ANEMIA, UNSPECIFIED IRON DEFICIENCY ANEMIA TYPE: ICD-10-CM

## 2022-05-29 DIAGNOSIS — K92.1 MELENA: ICD-10-CM

## 2022-05-29 LAB
ANION GAP SERPL CALCULATED.3IONS-SCNC: 3 MMOL/L (ref 3–14)
BASOPHILS # BLD AUTO: 0 10E3/UL (ref 0–0.2)
BASOPHILS NFR BLD AUTO: 0 %
BLD PROD TYP BPU: NORMAL
BLOOD COMPONENT TYPE: NORMAL
BUN SERPL-MCNC: 6 MG/DL (ref 7–30)
CALCIUM SERPL-MCNC: 7.4 MG/DL (ref 8.5–10.1)
CHLORIDE BLD-SCNC: 108 MMOL/L (ref 94–109)
CO2 SERPL-SCNC: 26 MMOL/L (ref 20–32)
CODING SYSTEM: NORMAL
CREAT SERPL-MCNC: 0.68 MG/DL (ref 0.66–1.25)
CROSSMATCH: NORMAL
EOSINOPHIL # BLD AUTO: 0.1 10E3/UL (ref 0–0.7)
EOSINOPHIL NFR BLD AUTO: 3 %
ERYTHROCYTE [DISTWIDTH] IN BLOOD BY AUTOMATED COUNT: 15.9 % (ref 10–15)
GFR SERPL CREATININE-BSD FRML MDRD: >90 ML/MIN/1.73M2
GLUCOSE BLD-MCNC: 106 MG/DL (ref 70–99)
HCT VFR BLD AUTO: 19.8 % (ref 40–53)
HGB BLD-MCNC: 6.4 G/DL (ref 13.3–17.7)
IMM GRANULOCYTES # BLD: 0 10E3/UL
IMM GRANULOCYTES NFR BLD: 0 %
IRON SATN MFR SERPL: 5 % (ref 15–46)
IRON SERPL-MCNC: 12 UG/DL (ref 35–180)
ISSUE DATE AND TIME: NORMAL
LYMPHOCYTES # BLD AUTO: 0.5 10E3/UL (ref 0.8–5.3)
LYMPHOCYTES NFR BLD AUTO: 14 %
MCH RBC QN AUTO: 29.8 PG (ref 26.5–33)
MCHC RBC AUTO-ENTMCNC: 32.3 G/DL (ref 31.5–36.5)
MCV RBC AUTO: 92 FL (ref 78–100)
MONOCYTES # BLD AUTO: 0.5 10E3/UL (ref 0–1.3)
MONOCYTES NFR BLD AUTO: 13 %
NEUTROPHILS # BLD AUTO: 2.5 10E3/UL (ref 1.6–8.3)
NEUTROPHILS NFR BLD AUTO: 70 %
NRBC # BLD AUTO: 0 10E3/UL
NRBC BLD AUTO-RTO: 0 /100
PLATELET # BLD AUTO: 86 10E3/UL (ref 150–450)
POTASSIUM BLD-SCNC: 3.6 MMOL/L (ref 3.4–5.3)
RADIOLOGIST FLAGS: ABNORMAL
RBC # BLD AUTO: 2.15 10E6/UL (ref 4.4–5.9)
SODIUM SERPL-SCNC: 137 MMOL/L (ref 133–144)
TIBC SERPL-MCNC: 220 UG/DL (ref 240–430)
UNIT ABO/RH: NORMAL
UNIT NUMBER: NORMAL
UNIT STATUS: NORMAL
UNIT TYPE ISBT: 7300
WBC # BLD AUTO: 3.6 10E3/UL (ref 4–11)

## 2022-05-29 PROCEDURE — 82105 ALPHA-FETOPROTEIN SERUM: CPT | Performed by: STUDENT IN AN ORGANIZED HEALTH CARE EDUCATION/TRAINING PROGRAM

## 2022-05-29 PROCEDURE — 83550 IRON BINDING TEST: CPT | Performed by: EMERGENCY MEDICINE

## 2022-05-29 PROCEDURE — 250N000013 HC RX MED GY IP 250 OP 250 PS 637: Performed by: EMERGENCY MEDICINE

## 2022-05-29 PROCEDURE — 71046 X-RAY EXAM CHEST 2 VIEWS: CPT

## 2022-05-29 PROCEDURE — 96361 HYDRATE IV INFUSION ADD-ON: CPT | Performed by: EMERGENCY MEDICINE

## 2022-05-29 PROCEDURE — 120N000002 HC R&B MED SURG/OB UMMC

## 2022-05-29 PROCEDURE — 71046 X-RAY EXAM CHEST 2 VIEWS: CPT | Mod: 26 | Performed by: RADIOLOGY

## 2022-05-29 PROCEDURE — 86850 RBC ANTIBODY SCREEN: CPT | Performed by: EMERGENCY MEDICINE

## 2022-05-29 PROCEDURE — 85025 COMPLETE CBC W/AUTO DIFF WBC: CPT | Performed by: EMERGENCY MEDICINE

## 2022-05-29 PROCEDURE — C9803 HOPD COVID-19 SPEC COLLECT: HCPCS | Performed by: EMERGENCY MEDICINE

## 2022-05-29 PROCEDURE — 96374 THER/PROPH/DIAG INJ IV PUSH: CPT | Performed by: EMERGENCY MEDICINE

## 2022-05-29 PROCEDURE — 80048 BASIC METABOLIC PNL TOTAL CA: CPT | Performed by: EMERGENCY MEDICINE

## 2022-05-29 PROCEDURE — 99285 EMERGENCY DEPT VISIT HI MDM: CPT | Mod: 25 | Performed by: EMERGENCY MEDICINE

## 2022-05-29 PROCEDURE — U0005 INFEC AGEN DETEC AMPLI PROBE: HCPCS | Performed by: EMERGENCY MEDICINE

## 2022-05-29 PROCEDURE — 36430 TRANSFUSION BLD/BLD COMPNT: CPT | Performed by: EMERGENCY MEDICINE

## 2022-05-29 PROCEDURE — 71250 CT THORAX DX C-: CPT | Mod: MG

## 2022-05-29 PROCEDURE — G1004 CDSM NDSC: HCPCS | Mod: GC | Performed by: RADIOLOGY

## 2022-05-29 PROCEDURE — 36415 COLL VENOUS BLD VENIPUNCTURE: CPT | Performed by: EMERGENCY MEDICINE

## 2022-05-29 PROCEDURE — 82728 ASSAY OF FERRITIN: CPT | Performed by: STUDENT IN AN ORGANIZED HEALTH CARE EDUCATION/TRAINING PROGRAM

## 2022-05-29 PROCEDURE — 86923 COMPATIBILITY TEST ELECTRIC: CPT | Performed by: EMERGENCY MEDICINE

## 2022-05-29 PROCEDURE — 96375 TX/PRO/DX INJ NEW DRUG ADDON: CPT | Performed by: EMERGENCY MEDICINE

## 2022-05-29 PROCEDURE — 71250 CT THORAX DX C-: CPT | Mod: 26 | Performed by: RADIOLOGY

## 2022-05-29 PROCEDURE — 99285 EMERGENCY DEPT VISIT HI MDM: CPT | Performed by: EMERGENCY MEDICINE

## 2022-05-29 RX ORDER — HYDROCODONE BITARTRATE AND ACETAMINOPHEN 5; 325 MG/1; MG/1
1 TABLET ORAL ONCE
Status: COMPLETED | OUTPATIENT
Start: 2022-05-29 | End: 2022-05-29

## 2022-05-29 RX ORDER — VALACYCLOVIR HYDROCHLORIDE 1 G/1
1000 TABLET, FILM COATED ORAL 3 TIMES DAILY
Qty: 21 TABLET | Refills: 0 | Status: SHIPPED | OUTPATIENT
Start: 2022-05-29 | End: 2022-06-27

## 2022-05-29 RX ORDER — GABAPENTIN 300 MG/1
300 CAPSULE ORAL ONCE
Status: COMPLETED | OUTPATIENT
Start: 2022-05-29 | End: 2022-05-29

## 2022-05-29 RX ORDER — VALACYCLOVIR HYDROCHLORIDE 1 G/1
1000 TABLET, FILM COATED ORAL 3 TIMES DAILY
Status: DISCONTINUED | OUTPATIENT
Start: 2022-05-29 | End: 2022-06-01 | Stop reason: HOSPADM

## 2022-05-29 RX ORDER — GABAPENTIN 300 MG/1
CAPSULE ORAL
Qty: 30 CAPSULE | Refills: 0 | Status: ON HOLD | OUTPATIENT
Start: 2022-05-29 | End: 2022-09-29

## 2022-05-29 RX ADMIN — GABAPENTIN 300 MG: 300 CAPSULE ORAL at 23:02

## 2022-05-29 RX ADMIN — HYDROCODONE BITARTRATE AND ACETAMINOPHEN 1 TABLET: 5; 325 TABLET ORAL at 21:09

## 2022-05-29 ASSESSMENT — VISUAL ACUITY
OS: 20/25
OD: 20/200

## 2022-05-29 ASSESSMENT — ENCOUNTER SYMPTOMS
SHORTNESS OF BREATH: 0
FEVER: 0

## 2022-05-29 ASSESSMENT — ACTIVITIES OF DAILY LIVING (ADL): ADLS_ACUITY_SCORE: 35

## 2022-05-29 NOTE — ED PROVIDER NOTES
ED Provider Note  [facility]      History     Chief Complaint   Patient presents with     Post-op Problem     Chest Pain     Shingles     The history is provided by the patient and medical records.     Rishabh Cabrera is a 61 year old male with a history of hepatocellular carcinoma on May 12. He had a left lung biopsy that was complicated by hemorrhage resulting in transfer to the emergency department and subsequent admission.  He has been doing well since discharge from the hospital on 5/13, other than ongoing chest pain. Then 5/22 he developed a rash on the left side of his chest in a dermatomal pattern consistent with shingles.  He has been applying Vaseline to the area but has not had any medical visits since it began so was not started on any antiviral medication.  He came in today because the pain has become intolerable.  No fevers, no systemic symptoms, no shortness of breath. Because of his complicated biopsy recently he and his family do have concern for whether or not he is fully healed from that or if there is any resulting complications. No cough      Exam: XR CHEST 2 VW, 5/13/2022 5:46 PM  Indication: eval ptx  Comparison: Same day                                                         Impression:   1. No appreciable pneumothorax.  2. Bilateral loculated pleural effusions. This is similar to prior.  3. Hazy left basilar opacities, likely atelectasis.    Past Medical History  Past Medical History:   Diagnosis Date     Cancer (H)      Chronic hepatitis B (H)      Diabetes mellitus (H)      Past Surgical History:   Procedure Laterality Date     ESOPHAGOSCOPY, GASTROSCOPY, DUODENOSCOPY (EGD), COMBINED N/A 5/29/2019    Procedure: ESOPHAGOGASTRODUODENOSCOPY (EGD);  Surgeon: Leventhal, Thomas Michael, MD;  Location: [facility]     IR CHEMO EMBOLIZATION  12/2/2021     IR SIRT (SELECTIVE INTERNAL RADIO THERAPY)  9/8/2021     IR VISCERAL ANGIOGRAM  9/8/2021     IR VISCERAL EMBOLIZATION  9/9/2021  "    gabapentin (NEURONTIN) 300 MG capsule  valACYclovir (VALTREX) 1000 mg tablet      Allergies   Allergen Reactions     Crabs [Crustaceans]      Pork (Porcine) Protein Itching and Unknown     Seafood Hives and Unknown     Shellfish Allergy Unknown     Family History  No family history on file.  Social History   Social History     Tobacco Use     Smoking status: Never Smoker     Smokeless tobacco: Never Used   Substance Use Topics     Alcohol use: No     Drug use: Not Currently     Types: Marijuana     Comment: past marijuana use      Past medical history, past surgical history, medications, allergies, family history, and social history were reviewed with the patient. No additional pertinent items.       Review of Systems   Constitutional: Negative for fever.   Respiratory: Negative for shortness of breath.    Skin: Positive for rash (L chest and vesicular rash in dermatomal pattern).     A complete review of systems was performed with pertinent positives and negatives noted in the HPI, and all other systems negative.    Physical Exam   BP: 124/69  Pulse: 96  Temp: 98  F (36.7  C)  Resp: (!) 35  Height: 165.1 cm (5' 5\")  Weight: 57.6 kg (127 lb)  SpO2: 100 %     Physical Exam  Gen:A&Ox3, no acute distress  HEENT:PERRL, no facial tenderness or wounds, head atraumatic  Neck:no bony tenderness or step offs, no JVD, trachea midline  Back: no CVA tenderness, no midline bony tenderness  CV:RRR without murmurs  PULM:Clear to auscultation bilaterally, work of breathing not labored, speaking in full sentences  Abd:soft, nontender, nondistended. Bowel sounds present and normal  UE:No traumatic injuries, skin normal  LE:no traumatic injuries, skin normal, no LE edema  Neuro:CN II-XII intact, strength 5/5 throughout  Skin: left chest dermatomal rash of vesicles that include some lesions that are not yet crusted              ED Course       Procedures       Results for orders placed or performed during the hospital encounter of " 05/29/22   Chest XR,  PA & LAT     Status: Abnormal   Result Value Ref Range    Radiologist flags Bilateral pneumothoraces (Urgent)     Narrative    Exam: XR CHEST 2 VW, 5/29/2022 8:27 PM    Indication: chest pain, recent biopsy with complications, eval for  pneumothorax    Comparison: 5/13/2022    Findings:   Mediastinal silhouette is unchanged. Partially imaged bilateral  loculated effusions. Mildly increased effusion on the left. Small  bilateral pneumothoraces are seen. These were not convincingly  identified on the most recent chest x-ray.. No new consolidations.  Left basilar atelectasis. The characterize spiculated nodule in the  left upper lobe.      Impression    Impression:   1. Small bilateral pneumothoraces.  2. Continued loculated pleural effusions with likely increased left  effusion.    [Urgent Result: Bilateral pneumothoraces]    Finding was identified on 5/29/2022 9:05 PM.     Dr Ca was contacted by Dr. Mosquera at 5/29/2022 9:08 PM and  verbalized understanding of the urgent finding.     I have personally reviewed the examination and initial interpretation  and I agree with the findings.    DANIEL SELLERS MD         SYSTEM ID:  [id number]   Chest CT w/o contrast     Status: None    Narrative    EXAMINATION: CT CHEST W/O CONTRAST, 5/29/2022 9:52 PM    CLINICAL HISTORY: Pneumothorax.  Per provider, also concenr for  hemorrhage given hemoglobin drop.    COMPARISON: Same day chest x-ray, procedural CT.    TECHNIQUE: CT imaging obtained through the chest without contrast.  Coronal and axial MIP reformatted images obtained.     CONTRAST:  none.    FINDINGS:    Mediastinum: Continued rightward mediastinal shift. Small to moderate  pericardial effusion. Calcified mediastinal lymph nodes. No suspicious  lymphadenopathy.    Lungs/pleura: The central airways patent. No malignant pneumothorax as  questioned on chest x-ray. Spiculated left apical nodule with a  pleural tail and peripheral groundglass  component today measures 2.2 x  1.3 cm, unchanged since 3/17/2022. Resolution of previously seen  postbiopsy hemorrhage on 5/12/2022. Unchanged calcified right lower  lobe loculated effusion. Multiple loculated effusions along the left  fissure. There may be trace increase in layering left simple pleural  effusion.    Waxing and waning patchy multifocal groundglass opacities for example  these appear increased in the anterior right upper lobe and resolved  within the anterior left upper lobe.    Upper abdomen: Partially imaged simple density ascites in the upper  abdomen, which in comparison to 3-2022, appears overall decreased.  Nodular contour of the liver. Lipiodol staining is partially imaged in  the inferior right hepatic lobe.    Bones/soft tissues: No acute or suspicious osseous or soft tissue  abnormalities.      Impression    IMPRESSION:  1. No pneumothorax. These may have represented skin folds or edges  from pleural effusions.  2. Unchanged spiculated left apical nodule, with resolved post  procedural hemorrhage.  3. Likely trace layering effusion on the left. Otherwise unchanged  left fissural and calcified right lower lobe loculated effusions.  4. Waxing and waning patchy groundglass opacities, which could  represent organizing pneumonia.  5. Cirrhotic configuration of the liver, decreasing ascites since  3/2022.  6. Small to moderate pericardial effusion.    I have personally reviewed the examination and initial interpretation  and I agree with the findings.    DANIEL SELLERS MD         SYSTEM ID:  [id number]   Basic metabolic panel     Status: Abnormal   Result Value Ref Range    Sodium 137 133 - 144 mmol/L    Potassium 3.6 3.4 - 5.3 mmol/L    Chloride 108 94 - 109 mmol/L    Carbon Dioxide (CO2) 26 20 - 32 mmol/L    Anion Gap 3 3 - 14 mmol/L    Urea Nitrogen 6 (L) 7 - 30 mg/dL    Creatinine 0.68 0.66 - 1.25 mg/dL    Calcium 7.4 (L) 8.5 - 10.1 mg/dL    Glucose 106 (H) 70 - 99 mg/dL    GFR Estimate >90 >60  mL/min/1.73m2   CBC with platelets and differential     Status: Abnormal   Result Value Ref Range    WBC Count 3.6 (L) 4.0 - 11.0 10e3/uL    RBC Count 2.15 (L) 4.40 - 5.90 10e6/uL    Hemoglobin 6.4 (LL) 13.3 - 17.7 g/dL    Hematocrit 19.8 (L) 40.0 - 53.0 %    MCV 92 78 - 100 fL    MCH 29.8 26.5 - 33.0 pg    MCHC 32.3 31.5 - 36.5 g/dL    RDW 15.9 (H) 10.0 - 15.0 %    Platelet Count 86 (L) 150 - 450 10e3/uL    % Neutrophils 70 %    % Lymphocytes 14 %    % Monocytes 13 %    % Eosinophils 3 %    % Basophils 0 %    % Immature Granulocytes 0 %    NRBCs per 100 WBC 0 <1 /100    Absolute Neutrophils 2.5 1.6 - 8.3 10e3/uL    Absolute Lymphocytes 0.5 (L) 0.8 - 5.3 10e3/uL    Absolute Monocytes 0.5 0.0 - 1.3 10e3/uL    Absolute Eosinophils 0.1 0.0 - 0.7 10e3/uL    Absolute Basophils 0.0 0.0 - 0.2 10e3/uL    Absolute Immature Granulocytes 0.0 <=0.4 10e3/uL    Absolute NRBCs 0.0 10e3/uL   Iron and iron binding capacity     Status: Abnormal   Result Value Ref Range    Iron 12 (L) 35 - 180 ug/dL    Iron Binding Capacity 220 (L) 240 - 430 ug/dL    Iron Sat Index 5 (L) 15 - 46 %   Asymptomatic COVID-19 Virus (Coronavirus) by PCR Nasopharyngeal     Status: Normal    Specimen: Nasopharyngeal; Swab   Result Value Ref Range    SARS CoV2 PCR Negative Negative, Testing sent to reference lab. Results will be returned via unsolicited result    Narrative    Testing was performed using the Xpert Xpress SARS-CoV-2 Assay on the  Cepheid Gene-Xpert Instrument Systems. Additional information about  this Emergency Use Authorization (EUA) assay can be found via the Lab  Guide. This test should be ordered for the detection of SARS-CoV-2 in  individuals who meet SARS-CoV-2 clinical and/or epidemiological  criteria. Test performance is unknown in asymptomatic patients. This  test is for in vitro diagnostic use under the FDA EUA for  laboratories certified under CLIA to perform high complexity testing.  This test has not been FDA cleared or approved. A  negative result  does not rule out the presence of PCR inhibitors in the specimen or  target RNA in concentration below the limit of detection for the  assay. The possibility of a false negative should be considered if  the patient's recent exposure or clinical presentation suggests  COVID-19. This test was validated by the [facility]. This laboratory is certified under  the Clinical Laboratory Improvement Amendments of 1988 (CLIA-88) as  qualified to perform high complexity laboratory testing.     [place] Draw     Status: None    Narrative    The following orders were created for panel order [place] Draw.  Procedure                               Abnormality         Status                     ---------                               -----------         ------                     Extra Red Top Tube[[id number]]                               Final result               Extra Purple Top Tube[[id number]]                            Final result                 Please view results for these tests on the individual orders.   Extra Red Top Tube     Status: None   Result Value Ref Range    Hold Specimen JIC    Extra Purple Top Tube     Status: None   Result Value Ref Range    Hold Specimen JIC    CBC with platelets differential     Status: Abnormal    Narrative    The following orders were created for panel order CBC with platelets differential.  Procedure                               Abnormality         Status                     ---------                               -----------         ------                     CBC with platelets and d...[[id number]]  Abnormal            Final result                 Please view results for these tests on the individual orders.   ABO/Rh type and screen     Status: None (In process)    Narrative    The following orders were created for panel order ABO/Rh type and screen.  Procedure                               Abnormality         Status                      ---------                               -----------         ------                     Adult Type and Screen[[id number]]                            In process                   Please view results for these tests on the individual orders.     Medications   valACYclovir (VALTREX) tablet 1,000 mg (1,000 mg Oral Given 5/30/22 0024)   lactated ringers BOLUS 500 mL (500 mLs Intravenous New Bag 5/30/22 0042)   calcium gluconate 1 g in 50 mL sodium chloride intermittent infusion (premix) (has no administration in time range)   HYDROcodone-acetaminophen (NORCO) 5-325 MG per tablet 1 tablet (1 tablet Oral Given 5/29/22 2109)   gabapentin (NEURONTIN) capsule 300 mg (300 mg Oral Given 5/29/22 2302)        Assessments & Plan (with Medical Decision Making)   Rishabh Cabrera is a 61-year-old presenting with shingles and chest pain.  Arrives afebrile and hemodynamically stable other than mild tachypnea with a respiratory rate of 35. As the patient rested his respiratory rate returned normal.   Chest x-ray PA and lateral showed a possible left sized pneumothorax. Recommended for further evaluation with CT.  IV access obtained and lab testing done.   Cr stable. Hgb has fallen to 6.4 without a source of bleeding identified.   CT chest shows stable pleural effusions, no signs of new hemothorax, no pneumothorax.    Pt was consented for transfusion of 1 unit pRBCs.      For shingles, some lesions on the mid axillar line are not fully crustedcrusted.  We will plan to start him on a course of valacyclovir 1g TID for 7 days. Has significant pain. Given hydrocodone-acetaminophen PO x1 in the ED and will start gabapentin 300mg 1 daily x2 days, then increase to 300 BID if tolerated.     Admitted to IM service for anemia.     I have reviewed the nursing notes. I have reviewed the findings, diagnosis, plan and need for follow up with the patient.    New Prescriptions    GABAPENTIN (NEURONTIN) 300 MG CAPSULE    Start with 300mg once daily  for days 1 &2, then increase to 300mg twice daily    VALACYCLOVIR (VALTREX) 1000 MG TABLET    Take 1 tablet (1,000 mg) by mouth 3 times daily for 7 days       Final diagnoses:   Herpes zoster without complication   Anemia, unspecified type     This part of the medical record was transcribed by Elizabeth Randolph, Medical Scribe, from a dictation done by Dr. Josy Ca MD.     --  Josy Ca MD  [facility] EMERGENCY DEPARTMENT  5/29/2022     Josy Ca MD  05/30/22 0133

## 2022-05-29 NOTE — ED TRIAGE NOTES
Pt states he had biposy done on May 12 on left lung and he is having pain.  Pt states he has cancer that has spread and they biopsied a cancer site in left lung. He also says he has shingles which was diagnosed May 21, located left anterior chest, wrapping around to his back. Pt states his worst pain is Denies SOB. Denies use of blood thinners currently. No heart issues in the past.

## 2022-05-30 ENCOUNTER — APPOINTMENT (OUTPATIENT)
Dept: INTERPRETER SERVICES | Facility: CLINIC | Age: 62
DRG: 435 | End: 2022-05-30
Payer: MEDICARE

## 2022-05-30 ENCOUNTER — APPOINTMENT (OUTPATIENT)
Dept: CARDIOLOGY | Facility: CLINIC | Age: 62
DRG: 435 | End: 2022-05-30
Payer: MEDICARE

## 2022-05-30 LAB
ABO/RH(D): NORMAL
AFP SERPL-MCNC: 3.1 UG/L (ref 0–8)
ALBUMIN BODY FLUID SOURCE: NORMAL
ALBUMIN FLD-MCNC: 0.1 G/DL
ALBUMIN SERPL-MCNC: 1.6 G/DL (ref 3.4–5)
ALP SERPL-CCNC: 130 U/L (ref 40–150)
ALT SERPL W P-5'-P-CCNC: 22 U/L (ref 0–70)
ANION GAP SERPL CALCULATED.3IONS-SCNC: 3 MMOL/L (ref 3–14)
ANTIBODY SCREEN: NEGATIVE
APPEARANCE FLD: CLEAR
APTT PPP: 42 SECONDS (ref 22–38)
AST SERPL W P-5'-P-CCNC: 32 U/L (ref 0–45)
ATRIAL RATE - MUSE: 96 BPM
BASOPHILS # BLD AUTO: 0 10E3/UL (ref 0–0.2)
BASOPHILS NFR BLD AUTO: 1 %
BASOPHILS NFR FLD MANUAL: 1 %
BILIRUB SERPL-MCNC: 2.4 MG/DL (ref 0.2–1.3)
BUN SERPL-MCNC: 8 MG/DL (ref 7–30)
CALCIUM SERPL-MCNC: 7.4 MG/DL (ref 8.5–10.1)
CELL COUNT BODY FLUID SOURCE: NORMAL
CHLORIDE BLD-SCNC: 109 MMOL/L (ref 94–109)
CO2 SERPL-SCNC: 25 MMOL/L (ref 20–32)
COLOR FLD: YELLOW
CREAT SERPL-MCNC: 0.72 MG/DL (ref 0.66–1.25)
DIASTOLIC BLOOD PRESSURE - MUSE: NORMAL MMHG
EOSINOPHIL # BLD AUTO: 0.1 10E3/UL (ref 0–0.7)
EOSINOPHIL NFR BLD AUTO: 3 %
EOSINOPHIL NFR FLD MANUAL: 3 %
ERYTHROCYTE [DISTWIDTH] IN BLOOD BY AUTOMATED COUNT: 15.2 % (ref 10–15)
FERRITIN SERPL-MCNC: 26 NG/ML (ref 26–388)
GFR SERPL CREATININE-BSD FRML MDRD: >90 ML/MIN/1.73M2
GLUCOSE BLD-MCNC: 150 MG/DL (ref 70–99)
GLUCOSE BLDC GLUCOMTR-MCNC: 128 MG/DL (ref 70–99)
GLUCOSE BLDC GLUCOMTR-MCNC: 132 MG/DL (ref 70–99)
GLUCOSE BLDC GLUCOMTR-MCNC: 160 MG/DL (ref 70–99)
GLUCOSE BLDC GLUCOMTR-MCNC: 181 MG/DL (ref 70–99)
GLUCOSE BLDC GLUCOMTR-MCNC: 219 MG/DL (ref 70–99)
GRAM STAIN RESULT: NORMAL
GRAM STAIN RESULT: NORMAL
HBA1C MFR BLD: NORMAL %
HCT VFR BLD AUTO: 26.3 % (ref 40–53)
HGB BLD-MCNC: 7.8 G/DL (ref 13.3–17.7)
HGB BLD-MCNC: 8.3 G/DL (ref 13.3–17.7)
HOLD SPECIMEN: NORMAL
HOLD SPECIMEN: NORMAL
IMM GRANULOCYTES # BLD: 0 10E3/UL
IMM GRANULOCYTES NFR BLD: 0 %
INR PPP: 2.03 (ref 0.85–1.15)
INR PPP: 2.04 (ref 0.85–1.15)
INTERPRETATION ECG - MUSE: NORMAL
LVEF ECHO: NORMAL
LYMPHOCYTES # BLD AUTO: 0.6 10E3/UL (ref 0.8–5.3)
LYMPHOCYTES NFR BLD AUTO: 14 %
LYMPHOCYTES NFR FLD MANUAL: 61 %
MCH RBC QN AUTO: 30.1 PG (ref 26.5–33)
MCHC RBC AUTO-ENTMCNC: 31.6 G/DL (ref 31.5–36.5)
MCV RBC AUTO: 95 FL (ref 78–100)
MONOCYTES # BLD AUTO: 0.5 10E3/UL (ref 0–1.3)
MONOCYTES NFR BLD AUTO: 11 %
MONOS+MACROS NFR FLD MANUAL: 19 %
NEUTROPHILS # BLD AUTO: 3.2 10E3/UL (ref 1.6–8.3)
NEUTROPHILS NFR BLD AUTO: 71 %
NEUTS BAND NFR FLD MANUAL: 17 %
NRBC # BLD AUTO: 0 10E3/UL
NRBC BLD AUTO-RTO: 0 /100
P AXIS - MUSE: 44 DEGREES
PLATELET # BLD AUTO: 94 10E3/UL (ref 150–450)
POTASSIUM BLD-SCNC: 3.8 MMOL/L (ref 3.4–5.3)
PR INTERVAL - MUSE: 170 MS
PROT FLD-MCNC: 0.3 G/DL
PROT SERPL-MCNC: 7.1 G/DL (ref 6.8–8.8)
PROTEIN BODY FLUID SOURCE: NORMAL
QRS DURATION - MUSE: 88 MS
QT - MUSE: 366 MS
QTC - MUSE: 462 MS
R AXIS - MUSE: 16 DEGREES
RBC # BLD AUTO: 2.76 10E6/UL (ref 4.4–5.9)
SARS-COV-2 RNA RESP QL NAA+PROBE: NEGATIVE
SODIUM SERPL-SCNC: 137 MMOL/L (ref 133–144)
SPECIMEN EXPIRATION DATE: NORMAL
SYSTOLIC BLOOD PRESSURE - MUSE: NORMAL MMHG
T AXIS - MUSE: 26 DEGREES
VENTRICULAR RATE- MUSE: 96 BPM
WBC # BLD AUTO: 4.5 10E3/UL (ref 4–11)
WBC # FLD AUTO: 26 /UL

## 2022-05-30 PROCEDURE — 85610 PROTHROMBIN TIME: CPT | Performed by: STUDENT IN AN ORGANIZED HEALTH CARE EDUCATION/TRAINING PROGRAM

## 2022-05-30 PROCEDURE — 89050 BODY FLUID CELL COUNT: CPT | Performed by: STUDENT IN AN ORGANIZED HEALTH CARE EDUCATION/TRAINING PROGRAM

## 2022-05-30 PROCEDURE — 250N000013 HC RX MED GY IP 250 OP 250 PS 637: Performed by: EMERGENCY MEDICINE

## 2022-05-30 PROCEDURE — 250N000013 HC RX MED GY IP 250 OP 250 PS 637: Performed by: STUDENT IN AN ORGANIZED HEALTH CARE EDUCATION/TRAINING PROGRAM

## 2022-05-30 PROCEDURE — 0W9G3ZZ DRAINAGE OF PERITONEAL CAVITY, PERCUTANEOUS APPROACH: ICD-10-PCS | Performed by: STUDENT IN AN ORGANIZED HEALTH CARE EDUCATION/TRAINING PROGRAM

## 2022-05-30 PROCEDURE — P9016 RBC LEUKOCYTES REDUCED: HCPCS | Performed by: EMERGENCY MEDICINE

## 2022-05-30 PROCEDURE — 99223 1ST HOSP IP/OBS HIGH 75: CPT | Performed by: INTERNAL MEDICINE

## 2022-05-30 PROCEDURE — 250N000011 HC RX IP 250 OP 636: Performed by: STUDENT IN AN ORGANIZED HEALTH CARE EDUCATION/TRAINING PROGRAM

## 2022-05-30 PROCEDURE — 83036 HEMOGLOBIN GLYCOSYLATED A1C: CPT | Performed by: STUDENT IN AN ORGANIZED HEALTH CARE EDUCATION/TRAINING PROGRAM

## 2022-05-30 PROCEDURE — 85018 HEMOGLOBIN: CPT | Performed by: STUDENT IN AN ORGANIZED HEALTH CARE EDUCATION/TRAINING PROGRAM

## 2022-05-30 PROCEDURE — 36415 COLL VENOUS BLD VENIPUNCTURE: CPT | Performed by: STUDENT IN AN ORGANIZED HEALTH CARE EDUCATION/TRAINING PROGRAM

## 2022-05-30 PROCEDURE — 999N000128 HC STATISTIC PERIPHERAL IV START W/O US GUIDANCE

## 2022-05-30 PROCEDURE — 49083 ABD PARACENTESIS W/IMAGING: CPT | Performed by: STUDENT IN AN ORGANIZED HEALTH CARE EDUCATION/TRAINING PROGRAM

## 2022-05-30 PROCEDURE — 258N000003 HC RX IP 258 OP 636: Performed by: STUDENT IN AN ORGANIZED HEALTH CARE EDUCATION/TRAINING PROGRAM

## 2022-05-30 PROCEDURE — 84157 ASSAY OF PROTEIN OTHER: CPT | Performed by: STUDENT IN AN ORGANIZED HEALTH CARE EDUCATION/TRAINING PROGRAM

## 2022-05-30 PROCEDURE — 85730 THROMBOPLASTIN TIME PARTIAL: CPT | Performed by: STUDENT IN AN ORGANIZED HEALTH CARE EDUCATION/TRAINING PROGRAM

## 2022-05-30 PROCEDURE — C9113 INJ PANTOPRAZOLE SODIUM, VIA: HCPCS | Performed by: STUDENT IN AN ORGANIZED HEALTH CARE EDUCATION/TRAINING PROGRAM

## 2022-05-30 PROCEDURE — 99221 1ST HOSP IP/OBS SF/LOW 40: CPT | Mod: AI | Performed by: STUDENT IN AN ORGANIZED HEALTH CARE EDUCATION/TRAINING PROGRAM

## 2022-05-30 PROCEDURE — 82042 OTHER SOURCE ALBUMIN QUAN EA: CPT | Performed by: STUDENT IN AN ORGANIZED HEALTH CARE EDUCATION/TRAINING PROGRAM

## 2022-05-30 PROCEDURE — 80053 COMPREHEN METABOLIC PANEL: CPT | Performed by: STUDENT IN AN ORGANIZED HEALTH CARE EDUCATION/TRAINING PROGRAM

## 2022-05-30 PROCEDURE — 87070 CULTURE OTHR SPECIMN AEROBIC: CPT | Performed by: STUDENT IN AN ORGANIZED HEALTH CARE EDUCATION/TRAINING PROGRAM

## 2022-05-30 PROCEDURE — 120N000002 HC R&B MED SURG/OB UMMC

## 2022-05-30 PROCEDURE — 93306 TTE W/DOPPLER COMPLETE: CPT | Mod: 26 | Performed by: INTERNAL MEDICINE

## 2022-05-30 PROCEDURE — 88305 TISSUE EXAM BY PATHOLOGIST: CPT | Mod: TC | Performed by: STUDENT IN AN ORGANIZED HEALTH CARE EDUCATION/TRAINING PROGRAM

## 2022-05-30 PROCEDURE — 87205 SMEAR GRAM STAIN: CPT | Performed by: STUDENT IN AN ORGANIZED HEALTH CARE EDUCATION/TRAINING PROGRAM

## 2022-05-30 PROCEDURE — 85025 COMPLETE CBC W/AUTO DIFF WBC: CPT | Performed by: STUDENT IN AN ORGANIZED HEALTH CARE EDUCATION/TRAINING PROGRAM

## 2022-05-30 PROCEDURE — 93306 TTE W/DOPPLER COMPLETE: CPT

## 2022-05-30 RX ORDER — AMOXICILLIN 250 MG
2 CAPSULE ORAL 2 TIMES DAILY PRN
Status: DISCONTINUED | OUTPATIENT
Start: 2022-05-30 | End: 2022-06-01 | Stop reason: HOSPADM

## 2022-05-30 RX ORDER — SPIRONOLACTONE 50 MG/1
50 TABLET, FILM COATED ORAL DAILY PRN
Status: ON HOLD | COMMUNITY
End: 2022-06-01

## 2022-05-30 RX ORDER — NALOXONE HYDROCHLORIDE 0.4 MG/ML
0.2 INJECTION, SOLUTION INTRAMUSCULAR; INTRAVENOUS; SUBCUTANEOUS
Status: DISCONTINUED | OUTPATIENT
Start: 2022-05-30 | End: 2022-06-01 | Stop reason: HOSPADM

## 2022-05-30 RX ORDER — SPIRONOLACTONE 50 MG/1
50 TABLET, FILM COATED ORAL DAILY
Status: DISCONTINUED | OUTPATIENT
Start: 2022-05-30 | End: 2022-06-01 | Stop reason: HOSPADM

## 2022-05-30 RX ORDER — NALOXONE HYDROCHLORIDE 0.4 MG/ML
0.4 INJECTION, SOLUTION INTRAMUSCULAR; INTRAVENOUS; SUBCUTANEOUS
Status: DISCONTINUED | OUTPATIENT
Start: 2022-05-30 | End: 2022-06-01 | Stop reason: HOSPADM

## 2022-05-30 RX ORDER — ACETAMINOPHEN 325 MG/1
650 TABLET ORAL EVERY 6 HOURS
Status: DISCONTINUED | OUTPATIENT
Start: 2022-05-30 | End: 2022-06-01 | Stop reason: HOSPADM

## 2022-05-30 RX ORDER — NICOTINE POLACRILEX 4 MG
15-30 LOZENGE BUCCAL
Status: DISCONTINUED | OUTPATIENT
Start: 2022-05-30 | End: 2022-06-01 | Stop reason: HOSPADM

## 2022-05-30 RX ORDER — DEXTROSE MONOHYDRATE 25 G/50ML
25-50 INJECTION, SOLUTION INTRAVENOUS
Status: DISCONTINUED | OUTPATIENT
Start: 2022-05-30 | End: 2022-06-01 | Stop reason: HOSPADM

## 2022-05-30 RX ORDER — ACETAMINOPHEN 325 MG/1
975 TABLET ORAL 3 TIMES DAILY
Status: DISCONTINUED | OUTPATIENT
Start: 2022-05-30 | End: 2022-05-30

## 2022-05-30 RX ORDER — TENOFOVIR DISOPROXIL FUMARATE 300 MG/1
300 TABLET, FILM COATED ORAL DAILY
Status: DISCONTINUED | OUTPATIENT
Start: 2022-05-30 | End: 2022-06-01 | Stop reason: HOSPADM

## 2022-05-30 RX ORDER — OXYCODONE HYDROCHLORIDE 5 MG/1
2.5 TABLET ORAL EVERY 6 HOURS PRN
COMMUNITY
End: 2022-06-27

## 2022-05-30 RX ORDER — DIPHENHYDRAMINE HYDROCHLORIDE 50 MG/ML
50 INJECTION INTRAMUSCULAR; INTRAVENOUS
Status: DISCONTINUED | OUTPATIENT
Start: 2022-05-30 | End: 2022-06-01 | Stop reason: HOSPADM

## 2022-05-30 RX ORDER — INSULIN GLARGINE 100 [IU]/ML
5 INJECTION, SOLUTION SUBCUTANEOUS AT BEDTIME
COMMUNITY
Start: 2022-05-10 | End: 2022-09-29

## 2022-05-30 RX ORDER — METOPROLOL SUCCINATE 50 MG/1
50 TABLET, EXTENDED RELEASE ORAL DAILY
Status: DISCONTINUED | OUTPATIENT
Start: 2022-05-30 | End: 2022-06-01 | Stop reason: HOSPADM

## 2022-05-30 RX ORDER — FUROSEMIDE 20 MG
40 TABLET ORAL DAILY PRN
Status: ON HOLD | COMMUNITY
End: 2022-06-01

## 2022-05-30 RX ORDER — POLYETHYLENE GLYCOL 3350 17 G/17G
17 POWDER, FOR SOLUTION ORAL DAILY
Status: DISCONTINUED | OUTPATIENT
Start: 2022-05-30 | End: 2022-06-01 | Stop reason: HOSPADM

## 2022-05-30 RX ORDER — ENALAPRIL MALEATE 10 MG/1
10 TABLET ORAL DAILY
Status: DISCONTINUED | OUTPATIENT
Start: 2022-05-30 | End: 2022-06-01 | Stop reason: HOSPADM

## 2022-05-30 RX ORDER — GABAPENTIN 100 MG/1
100 CAPSULE ORAL 3 TIMES DAILY PRN
Status: DISCONTINUED | OUTPATIENT
Start: 2022-05-30 | End: 2022-05-30

## 2022-05-30 RX ORDER — CALCIUM GLUCONATE 20 MG/ML
1 INJECTION, SOLUTION INTRAVENOUS ONCE
Status: COMPLETED | OUTPATIENT
Start: 2022-05-30 | End: 2022-05-30

## 2022-05-30 RX ORDER — AMOXICILLIN 250 MG
1 CAPSULE ORAL 2 TIMES DAILY PRN
Status: DISCONTINUED | OUTPATIENT
Start: 2022-05-30 | End: 2022-06-01 | Stop reason: HOSPADM

## 2022-05-30 RX ORDER — FERROUS SULFATE 325(65) MG
325 TABLET ORAL DAILY
Status: DISCONTINUED | OUTPATIENT
Start: 2022-05-30 | End: 2022-05-30

## 2022-05-30 RX ORDER — OXYCODONE HYDROCHLORIDE 5 MG/1
2.5 TABLET ORAL EVERY 6 HOURS PRN
Status: ON HOLD | COMMUNITY
Start: 2022-05-03 | End: 2022-05-30

## 2022-05-30 RX ORDER — LORATADINE 10 MG/1
10 TABLET ORAL DAILY
COMMUNITY
Start: 2022-05-10

## 2022-05-30 RX ORDER — FUROSEMIDE 40 MG
40 TABLET ORAL DAILY PRN
Status: DISCONTINUED | OUTPATIENT
Start: 2022-05-30 | End: 2022-05-30

## 2022-05-30 RX ORDER — TRIAMCINOLONE ACETONIDE 1 MG/G
OINTMENT TOPICAL 2 TIMES DAILY
Status: ON HOLD | COMMUNITY
Start: 2022-05-10 | End: 2022-05-30

## 2022-05-30 RX ORDER — METHYLPREDNISOLONE SODIUM SUCCINATE 125 MG/2ML
125 INJECTION, POWDER, LYOPHILIZED, FOR SOLUTION INTRAMUSCULAR; INTRAVENOUS
Status: DISCONTINUED | OUTPATIENT
Start: 2022-05-30 | End: 2022-06-01 | Stop reason: HOSPADM

## 2022-05-30 RX ORDER — GABAPENTIN 100 MG/1
100 CAPSULE ORAL 3 TIMES DAILY
Status: DISCONTINUED | OUTPATIENT
Start: 2022-05-30 | End: 2022-06-01 | Stop reason: HOSPADM

## 2022-05-30 RX ORDER — GABAPENTIN 300 MG/1
300 CAPSULE ORAL 3 TIMES DAILY PRN
Status: DISCONTINUED | OUTPATIENT
Start: 2022-05-30 | End: 2022-05-30

## 2022-05-30 RX ORDER — LIDOCAINE 40 MG/G
CREAM TOPICAL
Status: DISCONTINUED | OUTPATIENT
Start: 2022-05-30 | End: 2022-06-01 | Stop reason: HOSPADM

## 2022-05-30 RX ORDER — LIDOCAINE HYDROCHLORIDE 30 MG/G
CREAM TOPICAL
Status: ON HOLD | COMMUNITY
Start: 2022-04-22 | End: 2022-05-30

## 2022-05-30 RX ORDER — IBUPROFEN 200 MG
200-400 TABLET ORAL EVERY 6 HOURS PRN
Status: ON HOLD | COMMUNITY
Start: 2022-05-10 | End: 2022-06-01

## 2022-05-30 RX ADMIN — GABAPENTIN 100 MG: 100 CAPSULE ORAL at 05:20

## 2022-05-30 RX ADMIN — ACETAMINOPHEN 650 MG: 325 TABLET ORAL at 21:03

## 2022-05-30 RX ADMIN — ENALAPRIL MALEATE 10 MG: 10 TABLET ORAL at 16:23

## 2022-05-30 RX ADMIN — ACETAMINOPHEN 650 MG: 325 TABLET ORAL at 23:43

## 2022-05-30 RX ADMIN — PANTOPRAZOLE SODIUM 40 MG: 40 INJECTION, POWDER, FOR SOLUTION INTRAVENOUS at 09:24

## 2022-05-30 RX ADMIN — OXYCODONE HYDROCHLORIDE 5 MG: 5 TABLET ORAL at 21:04

## 2022-05-30 RX ADMIN — SODIUM CHLORIDE, POTASSIUM CHLORIDE, SODIUM LACTATE AND CALCIUM CHLORIDE 500 ML: 600; 310; 30; 20 INJECTION, SOLUTION INTRAVENOUS at 00:42

## 2022-05-30 RX ADMIN — GABAPENTIN 100 MG: 100 CAPSULE ORAL at 21:03

## 2022-05-30 RX ADMIN — GABAPENTIN 100 MG: 100 CAPSULE ORAL at 14:34

## 2022-05-30 RX ADMIN — IRON SUCROSE 300 MG: 20 INJECTION, SOLUTION INTRAVENOUS at 11:53

## 2022-05-30 RX ADMIN — FERROUS SULFATE TAB 325 MG (65 MG ELEMENTAL FE) 325 MG: 325 (65 FE) TAB at 09:24

## 2022-05-30 RX ADMIN — SPIRONOLACTONE 50 MG: 50 TABLET ORAL at 14:34

## 2022-05-30 RX ADMIN — VALACYCLOVIR HYDROCHLORIDE 1000 MG: 1 TABLET, FILM COATED ORAL at 00:24

## 2022-05-30 RX ADMIN — TENOFOVIR DISOPROXIL FUMARATE 300 MG: 300 TABLET ORAL at 14:34

## 2022-05-30 RX ADMIN — ACETAMINOPHEN 975 MG: 325 TABLET ORAL at 11:54

## 2022-05-30 RX ADMIN — VALACYCLOVIR HYDROCHLORIDE 1000 MG: 1 TABLET, FILM COATED ORAL at 21:03

## 2022-05-30 RX ADMIN — CALCIUM GLUCONATE 1 G: 20 INJECTION, SOLUTION INTRAVENOUS at 02:30

## 2022-05-30 RX ADMIN — OXYCODONE HYDROCHLORIDE 5 MG: 5 TABLET ORAL at 11:53

## 2022-05-30 RX ADMIN — VALACYCLOVIR HYDROCHLORIDE 1000 MG: 1 TABLET, FILM COATED ORAL at 14:34

## 2022-05-30 RX ADMIN — METOPROLOL SUCCINATE 50 MG: 50 TABLET, EXTENDED RELEASE ORAL at 14:34

## 2022-05-30 RX ADMIN — VALACYCLOVIR HYDROCHLORIDE 1000 MG: 1 TABLET, FILM COATED ORAL at 09:24

## 2022-05-30 ASSESSMENT — ACTIVITIES OF DAILY LIVING (ADL)
ADLS_ACUITY_SCORE: 35
ADLS_ACUITY_SCORE: 37
CHANGE_IN_FUNCTIONAL_STATUS_SINCE_ONSET_OF_CURRENT_ILLNESS/INJURY: NO
ADLS_ACUITY_SCORE: 37
ADLS_ACUITY_SCORE: 24
ADLS_ACUITY_SCORE: 24
ADLS_ACUITY_SCORE: 37
ADLS_ACUITY_SCORE: 24
ADLS_ACUITY_SCORE: 37
ADLS_ACUITY_SCORE: 35
ADLS_ACUITY_SCORE: 24
ADLS_ACUITY_SCORE: 37
ADLS_ACUITY_SCORE: 37
EQUIPMENT_CURRENTLY_USED_AT_HOME: GRAB BAR, TOILET;GRAB BAR, TUB/SHOWER

## 2022-05-30 NOTE — PROGRESS NOTES
Patient completed 1 unit of RBC - no blood transfusion recorded. Gave Gabapentin for shingles pain at left anterior and left posterior chest and lower abdomen area.

## 2022-05-30 NOTE — PROGRESS NOTES
"CLINICAL NUTRITION SERVICES - ASSESSMENT NOTE     Nutrition Prescription    RECOMMENDATIONS FOR MDs/PROVIDERS TO ORDER:  Consider liberalizing diet to minimum 3 gm Na+ diet to improve PO options.     Malnutrition Status:    Severe malnutrition in the context of chronic illness    Recommendations already ordered by Registered Dietitian (RD):  Ordered Glucerna with meals to optimize intake    Future/Additional Recommendations:  PO improvement        REASON FOR ASSESSMENT  Rishabh Cabrera is a/an 61 year old male assessed by the dietitian for Provider Order - Chronic malnutrition. ED consult     Chart reviewed:  PMH: DM2, chronic hepatitis B, HCC with concern for metastases in the lungs,  - Recent admission (5/12-/513) for pulmonary hemorrhage secondary to IR biopsy. - Now presenting with fatigue, weakness, new shingles, acute on chronic anemia, and question of melena      NUTRITION HISTORY  Obtained from chart as patient was busy with paracentesis ( 2.5 liters out)   - Presents with Shingles and was found to incidentally to have acute anemia from discharge 5/13/2022.   - Per chart note, Patient has not been feeling well since he left the hospital. Has been short of breath since the procedure, not able to do normal activities  Weight has been stable (125-135#).     CURRENT NUTRITION ORDERS  Diet: NPO for procedure ( previously moderate carb diet + 2 gm Na+ with FR 2 liters daily)  Intake/Tolerance: consumed 75% of dinner     LABS  Labs reviewed    MEDICATIONS  Diuretics     ANTHROPOMETRICS  Height: 165.1 cm (5' 5\")  Most Recent Weight: 57.6 kg (127 lb) admit wt on 5/29 ( likely reported wt)   IBW: 61.8 kg ( 93% IbW)   BMI: 21.13 kg/m2  Normal BMI  Weight History: 6% wt loss in 2 weeks   Wt Readings from Last 10 Encounters:   05/30/22 57.6 kg (127 lb)   05/13/22 61.3 kg (135 lb 3.2 oz)   02/22/22 56.7 kg (125 lb)   02/08/22 59.9 kg (132 lb 1.6 oz)   12/02/21 61.2 kg (135 lb)   10/06/21 61.2 kg (135 lb)   09/08/21 61.2 kg (135 " lb)   08/04/21 58.7 kg (129 lb 6.4 oz)   07/30/21 59.8 kg (131 lb 12.8 oz)   07/20/21 61.2 kg (135 lb)       Dosing Weight: 58 kg admit wt    ASSESSED NUTRITION NEEDS  Estimated Energy Needs: 1740- 2030 kcals/day (30 - 35 kcals/kg )  Justification: Maintenance  Estimated Protein Needs: 70- 87 grams protein/day (1.2 - 1.5 grams of pro/kg)  Justification: Increased needs  Estimated Fluid Needs:ESLD/ on diuretics  Justification: Per provider pending fluid status    PHYSICAL FINDINGS  See malnutrition section below.    MALNUTRITION  % Intake: < 75% for > 7 days (moderate)  % Weight Loss: > 5% in 1 month,  likely paracentesis is contributing to wt fluctuation   Subcutaneous Fat Loss: Facial region:  Moderate  Muscle Loss: Temporal: Severe and Facial & jaw region: Moderate  Fluid Accumulation/Edema: + Ascites   Malnutrition Diagnosis: Severe malnutrition in the context of chronic illness    NUTRITION DIAGNOSIS  Predicted inadequate oral intake related to presentation with fatigue and weakness     INTERVENTIONS  Implementation  Nutrition Education: Unable to complete due to patient sound asleep during my visit    Medical food supplement therapy     Goals  Patient to consume % of nutritionally adequate meal trays TID, or the equivalent with supplements/snacks.     Monitoring/Evaluation  Progress toward goals will be monitored and evaluated per protocol.    Bonnie Daniel RD/KACIE  Pager [phone]

## 2022-05-30 NOTE — PROGRESS NOTES
Admit from ED to 5B at 1030 with anemia, possible GI bleed, herpes zoster infection. Lesions on left chest and left back are mostly crusted over, with a few on back that are half open. Complains of severe pain associated with zoster. Oxycodone, tylenol, ice pack with some relief. Vitally stable. Independent. No BM or urine output yet. . Paracentesis being attempted at bedside. Son phone # given to MDs for update. Mr. Cabrera speaks enough basic english for simple conversations about care. Received iron transfusion. Continue with cares: monitor for bleed.

## 2022-05-30 NOTE — PROCEDURES
[facility]  CAPS PROCEDURE NOTE  Date of Admission:  5/29/2022  Consult Requested by: Medicine  Reason for Consult: diagnostic evaluation of ascites    Indication/HPI: abdominal distention, pain    Pre-Procedure Diagnosis: Ascites    Post-Procedure Diagnosis: Ascites    Risk Assessment: Average risk, Technically straightforward; patient's anticoagulation has been held according to guidelines based on the agent and platelets and coags are within guidelines    [facility]    Paracentesis    Date/Time: 5/30/2022 2:01 PM  Performed by: Santiago Barba DO  Authorized by: Santiago Barba DO     PRE-PROCEDURE DETAILS  Initial or subsequent exam: subsequent  Procedure purpose: diagnostic  Indications: abdominal discomfort secondary to ascites  Indication comments: diagnostic parcentesis for cytology        UNIVERSAL PROTOCOL   Site Marked: Yes  Prior Images Obtained and Reviewed:  Yes  Required items: Required blood products, implants, devices and special equipment available    Patient identity confirmed:  Arm band and provided demographic data  NA - No sedation, light sedation, or local anesthesia  Confirmation Checklist:  Patient's identity using two indicators, procedure was appropriate and matched the consent or emergent situation and correct equipment/implants were available  Time out: Immediately prior to the procedure a time out was called    Universal Protocol: the Joint Commission Universal Protocol was followed    Preparation: Patient was prepped and draped in usual sterile fashion       ANESTHESIA    Anesthesia: Local infiltration  Local Anesthetic:  Lidocaine 1% without epinephrine  Anesthetic Total (mL):  5      SEDATION    Patient Sedated: No    POST PROCEDURE DETAILS  Ultrasound guidance: yes  Fluid removed: 2500(ml)  Fluid appearance: clear and serous  Dressing: adhesive bandage.        PROCEDURE    Patient  Tolerance:  Patient tolerated the procedure well with no immediate complications  Length of time physician/provider present for 1:1 monitoring during sedation: 0        No results found for this or any previous visit from the past 365 days.         DO MURIEL Dahl [facility]  Securely message with the Vocera Web Console (learn more here)  Text page via [facility] Paging/Directory   Please see signed in provider for up to date coverage information

## 2022-05-30 NOTE — CONSULTS
"    HEPATOLOGY CONSULTATION      Date of Admission:  5/29/2022           Reason for Consultation:   We were asked by Hayley Severson of Medicine to evaluate this patient with \"Melena, acute Hgb drop (8.3 --> 6.4 in 2 weeks), HCC, Hep B, h/o small GE varices due for EGD, significant ascites\"         ASSESSMENT AND RECOMMENDATIONS:   Assessment:    61 Y M HBV, HCC (possibly metastatic), recent admission (5/12-/513) for pulmonary hemorrhage secondary to IR biopsy. Now presenting with fatigue, weakness, new shingles, acute on chronic anemia, and question of melena.  #. Decompensated cirrhosis (ascites)  #. Chronic hepatitis B  #. Multilocal HCC s/p Y90 x2  #. Reported melena (GBS 8 pts)  #. Acute on chronic normocytic anemia  #. Hx of small EVs, hx of PHG  Yellow stool in rectal vault at time of admission, hemodynamically stable. Highly unlikely to be variceal hemorrhage. Slow ooze from known PHG possible, otherwise almost certainly multifactorial. BUN of 8/lack of accelerated azotemia argues against UGIB. No intervention at this time    #. VZV cutaneous infection  Left chest/axilla/back. Severe pain, typical dermatomal distribution.    #. Psychosocial determinants  Patient with unstable housing, haphazard follow up, and poor medication adherence. Recommend SW/RNCC assistance in maximizing patient's chance of success.      Recommendations:  -- paracentesis with typical fluid studies + cytology  -- resume diuretics at PTA doses (furosemide/spironolactone)  -- serum AFP  -- resume 300 mg tenofovir   -- HBV DNA level  -- daily MELD-Na labs, CBC, liver chems  -- recommend broad work up for multifactorial anemia (if able, run studies on blood drawn prior to transfusion), ternd hgb per primary team, target hgb of 7 from GI perspective   -- treat cutaneous VZV infection, patient is immunocompromised and must remain in full/airborne precautions  -- ok to stop ceftriaxone, no indication for octreotide, PPI per primary team  -- " "he is overdue for EGD for EV surveillance, if care plan/course agrees could be considered inpatient though not urgent  -- hepatology service will continue to follow, please do not hesitate to reach out with any questions or concerns  Pt care plan discussed with Dr. Lara, GI staff physician.    Aldo Cho MD, PGY4  Gastroenterology Fellow  [facility]  See AMCOM/Stevena for GI on-call information    ----------------------------------------------------------------------------------------------------           History of Present Illness:   61 year old male with a history of diabetes, chronic hepatitis B c/b cirrhosis, hepatocellular carcinoma (possibly metastatic), recent admission (5/12-/513) for pulmonary hemorrhage secondary to IR biopsy that is presenting with fatigued, weakness, acute on chronic anemia, and question of melena for which we are consulted.     History obtained via [employer] telephone .     Briefly, Mr. Cabrera describes fatigue, malaise, and a general feeling of being unwell. He mentions these feelings started about 3-4 days before a very painful burning rash developed on his left chest, armpit, and upper back. He describes this pain as \"sharp needles\".     He also notes \"dark stools\" for two weeks. He isn't sure if he would say black. Denies red, blood, or acholic stools.    Denies nausea, vomiting, hematemesis, dysphagia, diarrhea, constipation, hematochezia, fevers, chills, confusion, pruritis.     He reminds me again at the closure of our conversation to help him with his \"chest burning\" and points to his active VZV cutaneous infection.     Has not been taking a single medication as an outpatient per his report. Declined outpatient pharmacy review.     All questions answered, plan of care understood.       Cirrhosis Checklist:  - Etiology: Hepatitis B  - Date of dx: 2013  - Primary hepatologist: Leventhal  - MELD-Na: 14  MELD-Na score: 14 at 5/13/2022  5:58 AM  MELD score: 14 " at 5/13/2022  5:58 AM  Calculated from:  Serum Creatinine: 0.70 mg/dL (Using min of 1 mg/dL) at 5/13/2022  5:58 AM  Serum Sodium: 141 mmol/L (Using max of 137 mmol/L) at 5/13/2022  5:58 AM  Total Bilirubin: 1.2 mg/dL at 5/13/2022  5:58 AM  INR(ratio): 1.83 at 5/13/2022  5:58 AM  Age: 61 years  - Portal hypertension: yes  - Hx of TIPS: no  - HE: no history of such  - Ascites: yes   - [place]: previously PRN, to be completed this today   - Diuretics: previously on furosemide and spironolactone, though not taking at time of admission    - SBP: no history, not on ppx  - Varices:   - Hemorrhage: no history    - Hx of therapy: none   - Last EGD: 5/29/2021 (small EVs)  - HCC screening: yes, HCC   - Last imaging. MRCP 3/17/22   - Size: 3x lesionsm LIRADS 5 x1 (post treatment decrease in size), 2x LIRADS 3   - Therapy: Y90 x2 (last 12/2021)  - Thrombosis: none known at this time   - U/S: N/A   - MRCP: 3/17/22: patent vessels  - Transplant candidacy: not deemed to be due to follow up hesitancy and issues with adherence per Leventhal's note 2/2022         Data:   Key relevant labs:   Lab Results   Component Value Date    WBC 3.6 (L) 05/29/2022    HGB 6.4 (LL) 05/29/2022    HCT 19.8 (L) 05/29/2022    PLT 86 (L) 05/29/2022     05/29/2022    POTASSIUM 3.6 05/29/2022    CHLORIDE 108 05/29/2022    CO2 26 05/29/2022    BUN 6 (L) 05/29/2022    CR 0.68 05/29/2022     (H) 05/30/2022    TROPONIN <0.015 10/06/2021    AST 27 05/13/2022    ALT 20 05/13/2022    ALKPHOS 106 05/13/2022    BILITOTAL 1.2 05/13/2022    INR 2.04 (H) 05/30/2022       Key relevant imaging:    CT chest: 5/30/22    IMPRESSION:  1. No pneumothorax. These may have represented skin folds or edges  from pleural effusions.  2. Unchanged spiculated left apical nodule, with resolved post  procedural hemorrhage.  3. Likely trace layering effusion on the left. Otherwise unchanged  left fissural and calcified right lower lobe loculated effusions.  4. Waxing and  "waning patchy groundglass opacities, which could  represent organizing pneumonia.  5. Cirrhotic configuration of the liver, decreasing ascites since  3/2022.  6. Small to moderate pericardial effusion.           Previous Endoscopy:   EGD: 5/2019    Findings:        Small (< 5 mm) varices were found in the lower third of the esophagus.        Mild portal hypertensive gastropathy was found in the cardia, in the        gastric fundus and in the gastric body.        There is no endoscopic evidence of varices in the cardia and in the        gastric fundus.        The exam of the stomach was otherwise normal.        The examined duodenum was normal.              Medications:     Current Facility-Administered Medications   Medication    glucose gel 15-30 g    Or    dextrose 50 % injection 25-50 mL    Or    glucagon injection 1 mg    ferrous sulfate (FEROSUL) tablet 325 mg    gabapentin (NEURONTIN) capsule 100 mg    insulin aspart (NovoLOG) injection (RAPID ACTING)    lidocaine (LMX4) cream    lidocaine 1 % 0.1-1 mL    melatonin tablet 1 mg    pantoprazole (PROTONIX) IV push injection 40 mg    polyethylene glycol (MIRALAX) Packet 17 g    senna-docusate (SENOKOT-S/PERICOLACE) 8.6-50 MG per tablet 1 tablet    Or    senna-docusate (SENOKOT-S/PERICOLACE) 8.6-50 MG per tablet 2 tablet    sodium chloride (PF) 0.9% PF flush 3 mL    sodium chloride (PF) 0.9% PF flush 3 mL    valACYclovir (VALTREX) tablet 1,000 mg     Current Outpatient Medications   Medication Sig    gabapentin (NEURONTIN) 300 MG capsule Start with 300mg once daily for days 1 &2, then increase to 300mg twice daily    valACYclovir (VALTREX) 1000 mg tablet Take 1 tablet (1,000 mg) by mouth 3 times daily for 7 days     Facility-Administered Medications Ordered in Other Encounters   Medication    gadobutrol (GADAVIST) injection 7.5 mL             Physical Exam:   /67   Pulse 95   Temp 98.4  F (36.9  C) (Oral)   Resp 22   Ht 1.651 m (5' 5\")   Wt 57.6 kg (127 " lb)   SpO2 95%   BMI 21.13 kg/m    Wt:   Wt Readings from Last 2 Encounters:   05/29/22 57.6 kg (127 lb)   05/13/22 61.3 kg (135 lb 3.2 oz)      Constitutional: NAD, appears fatigued/ill, pleasant  Eyes: Sclera anicteric/injected  CV: No edema  Respiratory: Unlabored breathing  Abd: Mildly distended, +bs, no hepatosplenomegaly, nontender, no peritoneal signs  Skin: warm, perfused, no global jaundice  Neuro: AAO x 3, No asterixis  Psych: Normal affect  MSK: No gross deformities          Past Medical History:   Reviewed and edited as appropriate  Past Medical History:   Diagnosis Date    Cancer (H)     Chronic hepatitis B (H)     Diabetes mellitus (H)             Past Surgical History:   Reviewed and edited as appropriate   Past Surgical History:   Procedure Laterality Date    ESOPHAGOSCOPY, GASTROSCOPY, DUODENOSCOPY (EGD), COMBINED N/A 5/29/2019    Procedure: ESOPHAGOGASTRODUODENOSCOPY (EGD);  Surgeon: Leventhal, Thomas Michael, MD;  Location: [facility]    IR CHEMO EMBOLIZATION  12/2/2021    IR SIRT (SELECTIVE INTERNAL RADIO THERAPY)  9/8/2021    IR VISCERAL ANGIOGRAM  9/8/2021    IR VISCERAL EMBOLIZATION  9/9/2021            Social History:   Alcohol use: denies  Smoking history: deniea  Living situation: unstable         Family History:   Reviewed and edited as appropriate  No family history on file.    No known history of IBD.     S/p right hemicolectomy for TVA.    Known history of cirrhosis, HCC.         Allergies:   Reviewed and edited as appropriate     Allergies   Allergen Reactions    Crabs [Crustaceans]     Pork (Porcine) Protein Itching and Unknown    Seafood Hives and Unknown    Shellfish Allergy Unknown              Review of Systems:     A complete 10 point review of systems was performed and is negative except as noted in the HPI    ATTENDING NOTE HEPATOLOGY    I saw and discussed this patient with the fellow and participated in the decision making. I agree with the fellow's note. Lena Lara  MD

## 2022-05-30 NOTE — ED NOTES
Writer monitor patient for initial 15 minutes of blood transfusion. Patient VSS. On monitor. No current concern for transfusion reaction.

## 2022-05-30 NOTE — ED NOTES
Provider POLLO notified regarding Sepsis banner in Epic. Provider confirm patient not septic. No lactate needed at this time.

## 2022-05-30 NOTE — ED NOTES
Provider H.S paged regarding patient having intermittent tachycardia -130s. /75. RR 26     RN awaiting type and screen results to transfuse blood.     Per provider given 500 ml of LR at 250 ml/hr

## 2022-05-30 NOTE — H&P
[facility]    History and Physical - Medicine Service, [facility] TEAM        Date of Admission:  5/29/2022    Assessment & Plan      Rishabh Cabrera is a 61 year old male who has a history of chronic hepatitis B, HCC with concern for metastases in the lungs, diabetes and is admitted for further evaluation of an acute hemoglobin drop since discharge from recent hospitalization (5/12-5/13) for pulmonary hemorrhage after IR percutaneous biopsy. Hemodynamically stable at this time.     Acute normocytic anemia  Chronic thrombocytopenia  Concern for melena  Small to moderate pericardial effusion  Small left-sided pleural effusion, CARRASCO s/p traumatic biopsy  Hgb 8.3 (on discharge 5/13/2022) to 6.4 today on arrival to the ED. Denies hemoptysis, CT chest without large hemothorax, no abdominal pain, no retroperitoneal pain. In the ED was consented for a unit of blood. Chronic thrombocytopenia. CT chest with small to moderate pericardial effusion which is concerning for hemorrhage. No s/s of tamponade. Additionally, subacute left pleural effusion and GGO (PNA vs malignancy vs hemorrhage). Additionally, reports melena, which is concerning with his HCC diagnosis, h/o small GE varices/portal hypertensive gastropathy on EGD 2019, and likely iron deficiency. Has two large bore IVs.   - Follow-up iron studies  - LR bolus 500 mL in the ED while awaiting PRBCs  - Pantoprazole 40 mg BID IV, will hold on octreotide at this time  - Obtain INR  - TTE in the AM to further characterize pericardial effusion  - Trend CBC  - Consult GI (luminal) to consider scoping inpatient or outpatient --> NPO at midnight  - Hold AC, NSAIDs on admission (sometimes takes NSAIDs at home)  - Consider Pulmonology consult for further evaluation of loculated pleural effusion, GGO on CT, CARRASCO    Herpes zoster, acute infection (left T4 dermatome)  ED started valacylovir despite outside of 72 hour window as some lesions still  not crusted and still significant pain concerning for more acute infection. Received gabapentin and [place] for pain in the ED with improvement.   - Gabapentin PRN for pain  - Continue valacyclovir 1000 mg TID for 7 days  - Standard precautions as not disseminated    HCC  Chronic Hepatitis B infection  Ascites  Chronic severe protein-calorie malnutrition, hypoalbuminemia   Sees Dr. Leventhal for chronic hepatitis B infection. He has not recently refilled his Tenofovir 300 mg prescription and it is unclear if he is taking this medication (states he stopped it due to fatigue). Patient declined Pharmacy consult outpatient after previous discharge (so uncertain if furosemide, spironolactone, enalapril, metoprolol are home medications as well--will hold at this time). Last hepatology appointment cancelled (was planned for 5/6/22).   - CMP, INR in the AM  - Low sodium diet, daily weights, consider restarting some of his prior medications   - Consider consulting Hepatology while inpatient as patient has not had necessary follow-up and needs re-establishment prior to discharge  - Consider Oncology consultation here given has not followed up since discharge or recently   - Follow-up with PCP should be arranged and confirmed with patient prior to discharge (per [facility] notes, PCP appt scheduled for 6/1/2022 though unable to view this, please confirm)    MELD-Na score: 14 at 5/13/2022  5:58 AM  MELD score: 14 at 5/13/2022  5:58 AM  Calculated from:  Serum Creatinine: 0.70 mg/dL (Using min of 1 mg/dL) at 5/13/2022  5:58 AM  Serum Sodium: 141 mmol/L (Using max of 137 mmol/L) at 5/13/2022  5:58 AM  Total Bilirubin: 1.2 mg/dL at 5/13/2022  5:58 AM  INR(ratio): 1.83 at 5/13/2022  5:58 AM  Age: 61 years     T2DM with diabetic polyneuropathy  - Hold metformin and nortriptyline (uncertain if he is taking at home)  - Hypoglycemia protocol  - Low sliding scale insulin    Medication Management  Psychosocial  Rishabh reports taking no medications.  Pharmacy attempted to do a medication reconciliation but Rishabh and his family were unsure the medications he takes and then declined outpatient medication management. Will attempt again here and hold PTA meds at this time. Has psychosocial needs as below in HPI.  - Pharmacy consult  - Social Work consult    Hypocalcemia, mild  - Replete PRN       Diet: NPO per Anesthesia Guidelines for Procedure/Surgery Except for: Meds   DVT Prophylaxis: Pneumatic Compression Devices  Davalos Catheter: Not present  Fluids: LR bolus  Central Lines: None  Cardiac Monitoring: None  Code Status: Full Code Full Code      Disposition Plan   Expected Discharge: 05/31/2022   Anticipated discharge location:  Awaiting care coordination huddle  Delays: Stable anemia       To be formally staffed in the AM.     Hayley Severson, MD  Medicine Service, [facility]  Securely message with the Vocera Web Console (learn more here)  Text page via [facility] Paging/Directory   Please see signed in provider for up to date coverage information    ______________________________________________________________________    Chief Complaint   Shingles, anemia, CARRASCO    History is obtained from the patient    History of Present Illness   Rishabh Cabrera is a 61 year old male who presents with Shingles and was found to incidentally to have acute anemia from discharge 5/13/2022 (8.3 --> 6.4). Patient has not been feeling well since he left the hospital. Has been short of breath since the procedure, not able to do normal activities. Is able to catch his breath when he rests. Feels like everything has gotten worse since the procedure and is very sad/upset about this. Has had continued to have headaches similar to previous. No dizziness, lightheadedness, fevers, chills, chest pain, abdominal pain (except minimal in lower abdomen), nausea, vomiting, hematuria, dysuria, back pain. No other bleeding or bruising noted. However,  "does report black stools over the past couple of weeks, though most recent stool was yellow yesterday (soft, not diarrhea). Does have a history of constipation. No recent leg swelling/muscle aches but in April had itching/swelling of his bilateral lower extremities (now improved). Does feel generalized weakness and fatigue. Weight has been stable (125-135#).     On 5/22 developed a rash on the left side of his chest (dermatomal pattern). Vaseline has not been helpful. Pain has become intolerable (sharp, shooting). Medications in the ED helped. Patient was concerned that this was the result of the biopsy.     Has several social issues. Is living with his son because his wife \"kicked him out\" because of medical bills causing him to have to fall behind on home bills. Very stressed over the past few months with this diagnosis and what is going on. Seems to have minimal support. Wife has not been supportive recently. Also concerned that wife has been allowing \"criminals\" to use their home (uncertain if this was knowingly) so police and federal government have come to their home.     Review of Systems    The 10 point Review of Systems is negative other than noted in the HPI or here.     Past Medical History    I have reviewed this patient's medical history and updated it with pertinent information if needed.   Past Medical History:   Diagnosis Date     Cancer (H)      Chronic hepatitis B (H)      Diabetes mellitus (H)         Past Surgical History   I have reviewed this patient's surgical history and updated it with pertinent information if needed.  Past Surgical History:   Procedure Laterality Date     ESOPHAGOSCOPY, GASTROSCOPY, DUODENOSCOPY (EGD), COMBINED N/A 5/29/2019    Procedure: ESOPHAGOGASTRODUODENOSCOPY (EGD);  Surgeon: Leventhal, Thomas Michael, MD;  Location: [facility]     IR CHEMO EMBOLIZATION  12/2/2021     IR SIRT (SELECTIVE INTERNAL RADIO THERAPY)  9/8/2021     IR VISCERAL ANGIOGRAM  9/8/2021     IR VISCERAL " EMBOLIZATION  9/9/2021        Social History   I have reviewed this patient's social history and updated it with pertinent information if needed. Rishabh Cabrera  reports that he has never smoked. He has never used smokeless tobacco. He reports previous drug use. Drug: Marijuana. He reports that he does not drink alcohol.    Family History   Not discussed.     Prior to Admission Medications   None     Allergies   Allergies   Allergen Reactions     Crabs [Crustaceans]      Pork (Porcine) Protein Itching and Unknown     Seafood Hives and Unknown     Shellfish Allergy Unknown       Physical Exam   Vital Signs: Temp: 98.4  F (36.9  C) Temp src: Oral BP: 118/67 Pulse: 93   Resp: 26 SpO2: 97 % O2 Device: None (Room air)    Weight: 127 lbs 0 oz    General Appearance: Cachectic, tired-appearing, comfortably lying in bed  Eyes: Mild scleral icterus, EOMI, PERRL  HEENT: Temporal wasting, AT, no congestion, poor dentition, dry MM  Respiratory: CTAB, Diminished in the BLL, no wheezing  Cardiovascular: Tachycardic, no murmurs  GI: Distended with likely significant ascites, taut, non-tender to palpation except in the lower quadrants (mild), hepatomegaly noted  Lymph/Hematologic: No bruising noted, no cervical LAD noted  Genitourinary: Deferred  Skin: Vesicular lesions in the left T4 dermatome from mid-clavicular line to spine in the back, mostly crusted but some still acutely open  Musculoskeletal: Moving all extremities, no swollen joints, 5/5  strength  Neurologic: AOx4, no focal deficits, CN grossly intact, follows commands and appropriately answering questions  Psychiatric: Pleasant, appropriate, calm, seems down about recent events    Data   Data reviewed today: I reviewed all medications, new labs and imaging results over the last 24 hours. I personally reviewed no images or EKG's today.    Recent Labs   Lab 05/30/22  0245 05/30/22  0236 05/29/22  2125   WBC  --   --  3.6*   HGB  --   --  6.4*   MCV  --   --  92   PLT  --    --  86*   INR  --  2.04*  --    NA  --   --  137   POTASSIUM  --   --  3.6   CHLORIDE  --   --  108   CO2  --   --  26   BUN  --   --  6*   CR  --   --  0.68   ANIONGAP  --   --  3   MANDY  --   --  7.4*   *  --  106*     Recent Results (from the past 24 hour(s))   Chest XR,  PA & LAT   Result Value    Radiologist flags Bilateral pneumothoraces (Urgent)    Narrative    Exam: XR CHEST 2 VW, 5/29/2022 8:27 PM    Indication: chest pain, recent biopsy with complications, eval for  pneumothorax    Comparison: 5/13/2022    Findings:   Mediastinal silhouette is unchanged. Partially imaged bilateral  loculated effusions. Mildly increased effusion on the left. Small  bilateral pneumothoraces are seen. These were not convincingly  identified on the most recent chest x-ray.. No new consolidations.  Left basilar atelectasis. The characterize spiculated nodule in the  left upper lobe.      Impression    Impression:   1. Small bilateral pneumothoraces.  2. Continued loculated pleural effusions with likely increased left  effusion.    [Urgent Result: Bilateral pneumothoraces]    Finding was identified on 5/29/2022 9:05 PM.     Dr Ca was contacted by Dr. Mosquera at 5/29/2022 9:08 PM and  verbalized understanding of the urgent finding.     I have personally reviewed the examination and initial interpretation  and I agree with the findings.    DANIEL SELLERS MD         SYSTEM ID:  [id number]   Chest CT w/o contrast    Narrative    EXAMINATION: CT CHEST W/O CONTRAST, 5/29/2022 9:52 PM    CLINICAL HISTORY: Pneumothorax.  Per provider, also concenr for  hemorrhage given hemoglobin drop.    COMPARISON: Same day chest x-ray, procedural CT.    TECHNIQUE: CT imaging obtained through the chest without contrast.  Coronal and axial MIP reformatted images obtained.     CONTRAST:  none.    FINDINGS:    Mediastinum: Continued rightward mediastinal shift. Small to moderate  pericardial effusion. Calcified mediastinal lymph nodes. No  suspicious  lymphadenopathy.    Lungs/pleura: The central airways patent. No malignant pneumothorax as  questioned on chest x-ray. Spiculated left apical nodule with a  pleural tail and peripheral groundglass component today measures 2.2 x  1.3 cm, unchanged since 3/17/2022. Resolution of previously seen  postbiopsy hemorrhage on 5/12/2022. Unchanged calcified right lower  lobe loculated effusion. Multiple loculated effusions along the left  fissure. There may be trace increase in layering left simple pleural  effusion.    Waxing and waning patchy multifocal groundglass opacities for example  these appear increased in the anterior right upper lobe and resolved  within the anterior left upper lobe.    Upper abdomen: Partially imaged simple density ascites in the upper  abdomen, which in comparison to 3-2022, appears overall decreased.  Nodular contour of the liver. Lipiodol staining is partially imaged in  the inferior right hepatic lobe.    Bones/soft tissues: No acute or suspicious osseous or soft tissue  abnormalities.      Impression    IMPRESSION:  1. No pneumothorax. These may have represented skin folds or edges  from pleural effusions.  2. Unchanged spiculated left apical nodule, with resolved post  procedural hemorrhage.  3. Likely trace layering effusion on the left. Otherwise unchanged  left fissural and calcified right lower lobe loculated effusions.  4. Waxing and waning patchy groundglass opacities, which could  represent organizing pneumonia.  5. Cirrhotic configuration of the liver, decreasing ascites since  3/2022.  6. Small to moderate pericardial effusion.    I have personally reviewed the examination and initial interpretation  and I agree with the findings.    DANIEL SELLERS MD         SYSTEM ID:  [id number]

## 2022-05-30 NOTE — DISCHARGE INSTRUCTIONS
Thank you for coming to the [facility] Emergency Department.     Start valacyclovir (antiviral for shingles) three times daily for 7 days.   For pain, start gabapentin 300mg at bedtime for 2 days. If pain is still severe, increase to 300mg twice daily. Please get rechecked in your primary care clinic within the next 1-2 weeks.     RESOURCES:    [facility]  [phone]    Meals on Wheels   Neighborhood meal delivery   [phone]    [facility]    [address] [facility] [address]  [phone]    [facility]  [address]  [place], [place] [address] [phone]  Reverse Mortgage counseling, food support application assistance, financial security workshops, energy assistance, legal services, home rehab & maintenance, and tax preparation assistance     [facility]  [address] [place] [address]  [phone]  Couseling services, adult day program, meals on wheels, reverse mortgage counseling     [facility]   Various Locations  [phone]  Caregiver support, food assistance, help in your home, managing finances, legal and advocacy services    [employer]  [address] [place], [place] [id number] [phone]  Nutrition Assistance Program for Seniors, holiday gift program, programs for adults with disabilities     [facility]  [address], [address]  [place] [address]  [phone]  [facility] is a nonprofit provider of senior and caregivers services in [place]. [facility] Services programs include:  Senior Outreach & Caregiver Services, CareNextion Care Coordination Tool, Household and Outside Maintenance for the Elderly (HOME), Medicare Insurance Counseling, Senior Partners Care    Senior LinkAge Line  [phone]  Free statewide information and assistance service for Seniors.  Connects seniors  with: Financial Assistance, Home Care, Housekeeping and Chore services,  Elder Program, Legal Assistance, Long-term Care [place], Meal Delivery and Nutrition, Transitional Consultation, Transportation    [facility]  [address]  [place] [address] [phone]  [facility] Center for adults 50+ to receive services and have social engagement on a regular basis. The Center offers a weekly nurse clinic, a podiatry clinic, flu shots, fitness classes, diabetes prevention education, a danceline, ceramics, tax filing, legal resources and plenty of fun, social activities.     [facility] Locations  [phone]  Provides full range of housing& healthcare options for seniors    [facility] Line: [phone]  [phone]  Advocates for and educate elders and adults with disabilities; caregiver support and education, financial exploitation

## 2022-05-30 NOTE — PROGRESS NOTES
Resident/Fellow Attestation   I, Paradise Peters MD, was present with the medical/OPAL student who participated in the service and in the documentation of the note.  I have verified the history and personally performed the physical exam and medical decision making.  I agree with the assessment and plan of care as documented in the note.       Paradise Peters MD  PGY-2 Internal Medicine  Date of Service (when I saw the patient): 05/30/22    [facility]    Progress Note - Medicine Service, MAROON TEAM 4       Date of Admission:  5/29/2022    Assessment & Plan          Rishabh Cabrera is a 61 year old male with PMH significant for HCC, chronic HBV, and diabetes mellitus s/p lung biopsy ( 05/12) complicated by pulmonary hemorrhage presents with complain of painful rash and was found to have acute normocytic anemia.    #Acute normocytic anemia  #H/o Melena   Patient has been having black stools for 2 weeks which stopped 2 days ago. Hgb 8.3 (on discharge 5/13/2022) to 6.4 on arrival to the ED, and responded appropriately to 8.3 post 1 unit of RBCs, reassuring against large volume acute blood loss. Pt has h/o small GE varices/portal hypertensive gastropathy on EGD 2019 and is due for repeat EGD. Iron studies showed low iron (12), decreased ferritin (26), TIBC (220), and iron sat 5% suggestive of iron deficiency. Denies hemoptysis.   - GI consulted   - No acute indication for EGD given resolved melena and appropriate response to transfusion    -IV Pantoprazole  40 mg qd  -IV Iron sucrose 300 mg x 3 days  -Trend Hgb    #Herpes zoster rash, acute infection (left T4 dermatome)    Pt has painful erythematous vesicles in T4-T5 dermatome distribution that started 10 days ago. ED started valacylovir despite outside of 72 hour window as some lesions still not crusted and still significant pain concerning for more acute infection. Received gabapentin and [place] for pain in the ED with improvement.  Rash is painful and erythematous and involves one dermatome, consistent with Herpes zoster rash.   - Valacyclovir tablet 1000 mg TID   - scheduled Tylenol 650 mg q6h  - scheduled Gabapentin 100 mg TID  - oxycodone 2.5-5 mg q6h PRN for breakthrough pain  - Standard precautions     #chronic hepatitis B   #HCC   #Ascites  #Chronic severe protein-calorie malnutrition, hypoalbuminemia   Sees Dr. Leventhal for chronic hepatitis B infection. He has not recently refilled his Tenofovir 300 mg prescription and it is unclear if he is taking this medication (states he stopped it due to fatigue). Patient declined Pharmacy consult outpatient after previous discharge - previously attempted to optimize with furosemide, spironolactone, enalapril, metoprolol but does not appear they have been filled. Last hepatology appointment cancelled (was planned for 5/6/22).   - GI consulted   - Restart tenofovir, furosemide, spironolactone, enalapril, and metoprolol while inpatient   - CAPS consult for paracentesis, f/u pending paracentesis labs   - AFP     #Initial c/f pericardial effusion  Initial concern for pericardial effusion without tamponade physiology on CT, not seen on TTE. LVEF 60-65% with normal RV function.     #T2DM with diabetic polyneuropathy  - High sliding scale insulin  - Hold PTA metformin   - Hold PTA glargine 5u qhs  - Hypoglycemia protocol      #Hypocalcemia   Likely 2/2 to hypoalbuminemia. Corrected calcium is 9.3 wnl.   - No replacement needed     #Medication Management  #Psychosocial  - Pharmacy consult  - Social Work consult    Diet: Combination Diet Moderate Consistent Carb (60 g CHO per Meal) Diet; 2 gm NA Diet  Fluid restriction 2000 ML FLUID    DVT Prophylaxis: Pneumatic Compression Devices in setting of anemia; high risk for DVT given malignancy - will re-evaluate 5/31  Davalos Catheter: Not present  Fluids: None   Central Lines: None  Cardiac Monitoring: None  Code Status: Full Code      Disposition Plan    Expected Discharge: 05/31/2022     Anticipated [facility]  Delays:           The patient's care was discussed with the Attending Physician, Dr. Malin.    Steve Christiansen  Medical Student  Medicine Service, [address]  Securely message with the Vocera Web Console (learn more here)  Text page via [facility] Paging/Directory   Please see signed in provider for up to date coverage information      Clinically Significant Risk Factors Present on Admission             # Hypoalbuminemia: Albumin = 1.6 g/dL (Ref range: 3.4 - 5.0 g/dL) on admission, will monitor as appropriate   # Coagulation Defect: INR = 2.03 (Ref range: 0.85 - 1.15) and/or PTT = 42 Seconds (Ref range: 22 - 38 Seconds) on admission, will monitor for bleeding  # Thrombocytopenia: Plts = 94 10e3/uL (Ref range: 150 - 450 10e3/uL) on admission, will monitor for bleeding       ______________________________________________________________________    Interval History   AFVSS. Patient improved pain over Zoster rash over night with opioids but now recurring.     ROS: abdominal pain , decreased appetite,  no nausea/vomiting. Denies Chest pain, SOB.       Physical Exam   Vital Signs: Temp: 98  F (36.7  C) Temp src: Oral BP: 122/69 Pulse: 104   Resp: 18 SpO2: 97 % O2 Device: None (Room air)    Weight: 127 lbs 0 oz  General: awake, alert, cooperative, no apparent distress, and appears stated age  Respiratory: No increased work of breathing, good air exchange, clear to auscultation bilaterally, no crackles or wheezing  Cardiovascular: Normal apical impulse, regular rate and rhythm, normal S1 and S2, no S3 or S4, and no murmur noted and   Skin: Vesicular lesions in the left T4 dermatome from mid-clavicular line to spine in the back, mostly crusted but some still acutely open.   GI: No scars, normal bowel sounds, soft, mildly distended, non-tender, no masses palpated,  hepatosplenomegally.   Neurologic: Awake, alert, oriented to name, place and time.

## 2022-05-31 ENCOUNTER — APPOINTMENT (OUTPATIENT)
Dept: INTERPRETER SERVICES | Facility: CLINIC | Age: 62
DRG: 435 | End: 2022-05-31
Payer: MEDICARE

## 2022-05-31 ENCOUNTER — APPOINTMENT (OUTPATIENT)
Dept: ULTRASOUND IMAGING | Facility: CLINIC | Age: 62
DRG: 435 | End: 2022-05-31
Attending: STUDENT IN AN ORGANIZED HEALTH CARE EDUCATION/TRAINING PROGRAM
Payer: MEDICARE

## 2022-05-31 LAB
ALBUMIN SERPL-MCNC: 1.4 G/DL (ref 3.4–5)
ALP SERPL-CCNC: 118 U/L (ref 40–150)
ALT SERPL W P-5'-P-CCNC: 19 U/L (ref 0–70)
ANION GAP SERPL CALCULATED.3IONS-SCNC: 2 MMOL/L (ref 3–14)
AST SERPL W P-5'-P-CCNC: 26 U/L (ref 0–45)
BILIRUB SERPL-MCNC: 1.1 MG/DL (ref 0.2–1.3)
BUN SERPL-MCNC: 9 MG/DL (ref 7–30)
CALCIUM SERPL-MCNC: 7.2 MG/DL (ref 8.5–10.1)
CHLORIDE BLD-SCNC: 111 MMOL/L (ref 94–109)
CO2 SERPL-SCNC: 26 MMOL/L (ref 20–32)
CREAT SERPL-MCNC: 0.74 MG/DL (ref 0.66–1.25)
ERYTHROCYTE [DISTWIDTH] IN BLOOD BY AUTOMATED COUNT: 15.7 % (ref 10–15)
GFR SERPL CREATININE-BSD FRML MDRD: >90 ML/MIN/1.73M2
GLUCOSE BLD-MCNC: 149 MG/DL (ref 70–99)
GLUCOSE BLDC GLUCOMTR-MCNC: 102 MG/DL (ref 70–99)
GLUCOSE BLDC GLUCOMTR-MCNC: 138 MG/DL (ref 70–99)
GLUCOSE BLDC GLUCOMTR-MCNC: 151 MG/DL (ref 70–99)
GLUCOSE BLDC GLUCOMTR-MCNC: 164 MG/DL (ref 70–99)
GLUCOSE BLDC GLUCOMTR-MCNC: 186 MG/DL (ref 70–99)
GLUCOSE BLDC GLUCOMTR-MCNC: 242 MG/DL (ref 70–99)
HCT VFR BLD AUTO: 25.5 % (ref 40–53)
HGB BLD-MCNC: 8 G/DL (ref 13.3–17.7)
INR PPP: 2.36 (ref 0.85–1.15)
MCH RBC QN AUTO: 29.6 PG (ref 26.5–33)
MCHC RBC AUTO-ENTMCNC: 31.4 G/DL (ref 31.5–36.5)
MCV RBC AUTO: 94 FL (ref 78–100)
PLATELET # BLD AUTO: 102 10E3/UL (ref 150–450)
POTASSIUM BLD-SCNC: 4 MMOL/L (ref 3.4–5.3)
PROT SERPL-MCNC: 6.2 G/DL (ref 6.8–8.8)
RBC # BLD AUTO: 2.7 10E6/UL (ref 4.4–5.9)
SODIUM SERPL-SCNC: 139 MMOL/L (ref 133–144)
WBC # BLD AUTO: 4.6 10E3/UL (ref 4–11)

## 2022-05-31 PROCEDURE — 250N000011 HC RX IP 250 OP 636: Performed by: STUDENT IN AN ORGANIZED HEALTH CARE EDUCATION/TRAINING PROGRAM

## 2022-05-31 PROCEDURE — 250N000012 HC RX MED GY IP 250 OP 636 PS 637: Performed by: STUDENT IN AN ORGANIZED HEALTH CARE EDUCATION/TRAINING PROGRAM

## 2022-05-31 PROCEDURE — 250N000013 HC RX MED GY IP 250 OP 250 PS 637: Performed by: STUDENT IN AN ORGANIZED HEALTH CARE EDUCATION/TRAINING PROGRAM

## 2022-05-31 PROCEDURE — 93975 VASCULAR STUDY: CPT

## 2022-05-31 PROCEDURE — C9113 INJ PANTOPRAZOLE SODIUM, VIA: HCPCS | Performed by: STUDENT IN AN ORGANIZED HEALTH CARE EDUCATION/TRAINING PROGRAM

## 2022-05-31 PROCEDURE — 36415 COLL VENOUS BLD VENIPUNCTURE: CPT | Performed by: STUDENT IN AN ORGANIZED HEALTH CARE EDUCATION/TRAINING PROGRAM

## 2022-05-31 PROCEDURE — 99233 SBSQ HOSP IP/OBS HIGH 50: CPT | Mod: GC | Performed by: STUDENT IN AN ORGANIZED HEALTH CARE EDUCATION/TRAINING PROGRAM

## 2022-05-31 PROCEDURE — 120N000002 HC R&B MED SURG/OB UMMC

## 2022-05-31 PROCEDURE — 80053 COMPREHEN METABOLIC PANEL: CPT | Performed by: STUDENT IN AN ORGANIZED HEALTH CARE EDUCATION/TRAINING PROGRAM

## 2022-05-31 PROCEDURE — 85027 COMPLETE CBC AUTOMATED: CPT | Performed by: STUDENT IN AN ORGANIZED HEALTH CARE EDUCATION/TRAINING PROGRAM

## 2022-05-31 PROCEDURE — 93975 VASCULAR STUDY: CPT | Mod: 26 | Performed by: RADIOLOGY

## 2022-05-31 PROCEDURE — 85610 PROTHROMBIN TIME: CPT | Performed by: STUDENT IN AN ORGANIZED HEALTH CARE EDUCATION/TRAINING PROGRAM

## 2022-05-31 PROCEDURE — 87517 HEPATITIS B DNA QUANT: CPT | Performed by: STUDENT IN AN ORGANIZED HEALTH CARE EDUCATION/TRAINING PROGRAM

## 2022-05-31 PROCEDURE — P9041 ALBUMIN (HUMAN),5%, 50ML: HCPCS | Performed by: STUDENT IN AN ORGANIZED HEALTH CARE EDUCATION/TRAINING PROGRAM

## 2022-05-31 RX ORDER — FUROSEMIDE 20 MG
20 TABLET ORAL DAILY
Status: DISCONTINUED | OUTPATIENT
Start: 2022-05-31 | End: 2022-06-01 | Stop reason: HOSPADM

## 2022-05-31 RX ORDER — ALBUMIN, HUMAN INJ 5% 5 %
25 SOLUTION INTRAVENOUS ONCE
Status: COMPLETED | OUTPATIENT
Start: 2022-05-31 | End: 2022-05-31

## 2022-05-31 RX ORDER — PANTOPRAZOLE SODIUM 40 MG/1
40 TABLET, DELAYED RELEASE ORAL
Status: DISCONTINUED | OUTPATIENT
Start: 2022-06-01 | End: 2022-06-01 | Stop reason: HOSPADM

## 2022-05-31 RX ADMIN — FUROSEMIDE 20 MG: 20 TABLET ORAL at 09:32

## 2022-05-31 RX ADMIN — INSULIN ASPART 1 UNITS: 100 INJECTION, SOLUTION INTRAVENOUS; SUBCUTANEOUS at 20:37

## 2022-05-31 RX ADMIN — VALACYCLOVIR HYDROCHLORIDE 1000 MG: 1 TABLET, FILM COATED ORAL at 09:17

## 2022-05-31 RX ADMIN — VALACYCLOVIR HYDROCHLORIDE 1000 MG: 1 TABLET, FILM COATED ORAL at 13:31

## 2022-05-31 RX ADMIN — ACETAMINOPHEN 650 MG: 325 TABLET ORAL at 05:59

## 2022-05-31 RX ADMIN — GABAPENTIN 100 MG: 100 CAPSULE ORAL at 13:31

## 2022-05-31 RX ADMIN — INSULIN ASPART 1 UNITS: 100 INJECTION, SOLUTION INTRAVENOUS; SUBCUTANEOUS at 01:55

## 2022-05-31 RX ADMIN — GABAPENTIN 100 MG: 100 CAPSULE ORAL at 09:17

## 2022-05-31 RX ADMIN — INSULIN ASPART 1 UNITS: 100 INJECTION, SOLUTION INTRAVENOUS; SUBCUTANEOUS at 05:59

## 2022-05-31 RX ADMIN — VALACYCLOVIR HYDROCHLORIDE 1000 MG: 1 TABLET, FILM COATED ORAL at 20:33

## 2022-05-31 RX ADMIN — PANTOPRAZOLE SODIUM 40 MG: 40 INJECTION, POWDER, FOR SOLUTION INTRAVENOUS at 09:17

## 2022-05-31 RX ADMIN — TENOFOVIR DISOPROXIL FUMARATE 300 MG: 300 TABLET ORAL at 09:18

## 2022-05-31 RX ADMIN — SPIRONOLACTONE 50 MG: 50 TABLET ORAL at 09:17

## 2022-05-31 RX ADMIN — ACETAMINOPHEN 650 MG: 325 TABLET ORAL at 13:31

## 2022-05-31 RX ADMIN — GABAPENTIN 100 MG: 100 CAPSULE ORAL at 20:33

## 2022-05-31 RX ADMIN — ACETAMINOPHEN 650 MG: 325 TABLET ORAL at 19:12

## 2022-05-31 RX ADMIN — ALBUMIN HUMAN 25 G: 0.05 INJECTION, SOLUTION INTRAVENOUS at 19:05

## 2022-05-31 RX ADMIN — OXYCODONE HYDROCHLORIDE 5 MG: 5 TABLET ORAL at 09:32

## 2022-05-31 RX ADMIN — INSULIN ASPART 1 UNITS: 100 INJECTION, SOLUTION INTRAVENOUS; SUBCUTANEOUS at 16:16

## 2022-05-31 ASSESSMENT — ACTIVITIES OF DAILY LIVING (ADL)
ADLS_ACUITY_SCORE: 24

## 2022-05-31 NOTE — PROGRESS NOTES
[facility]    Progress Note - Medicine Service, LORETTA TEAM 4       Date of Admission:  5/29/2022    Assessment & Plan          Rishabh Cabrera is a 61 year old male with PMH significant for HCC, chronic HBV, and diabetes mellitus s/p lung biopsy (05/12) complicated by pulmonary hemorrhage presents with complain of painful rash and was found to have acute normocytic anemia.    Changes today:   - Discussed with SW, possible discharge to son's home at discharge  - PT/OT consult  - Discussed with oncology, no additional inpatient work up indicated. Will need close outpatient follow up for possible primary lung malignancy.    # Herpes zoster rash, acute infection (left T4 dermatome):    Pt has painful erythematous vesicles in T4-T5 dermatome distribution that started 10 days ago. ED started valacylovir despite outside of 72 hour window as some lesions still not crusted and still significant pain concerning for more acute infection. Received gabapentin and [place] for pain in the ED with improvement. Rash is painful and erythematous and involves one dermatome, consistent with Herpes zoster rash.   - Valacyclovir tablet 1000 mg TID   - scheduled Tylenol 650 mg q6h  - scheduled Gabapentin 100 mg TID  - oxycodone 2.5-5 mg q6h PRN for breakthrough pain  - Standard precautions     # Acute normocytic anemia (Resolved):   Patient has been having black stools for 2 weeks which stopped 2 days ago. Hgb 8.3 (on discharge 5/13/2022) to 6.4 on arrival to the ED, and responded appropriately to 8.3 post 1 unit of RBCs, reassuring against large volume acute blood loss. Pt has h/o small GE varices/portal hypertensive gastropathy on EGD 2019 and is due for repeat EGD. Iron studies showed low iron (12), decreased ferritin (26), TIBC (220), and iron sat 5% suggestive of iron deficiency. Denies hemoptysis.   - GI consulted   - No acute indication for EGD given resolved melena and appropriate response to  transfusion    -IV --> PO Pantoprazole  40 mg qd  -IV Iron sucrose 300 mg x 3 days  -Hgb is 8 today, stable from 7.8 yesterday    # Chronic hepatitis B   #HCC   #Decompensated cirrhosis with ascites  #Chronic severe protein-calorie malnutrition, hypoalbuminemia   Sees Dr. Leventhal for chronic hepatitis B infection. He has not recently refilled his Tenofovir 300 mg prescription and it is unclear if he is taking this medication (states he stopped it due to fatigue). Patient declined Pharmacy consult outpatient after previous discharge - previously attempted to optimize with furosemide, spironolactone, enalapril, metoprolol but does not appear they have been filled. Last hepatology appointment cancelled (was planned for 5/6/22).   - GI consulted   - Restart tenofovir, furosemide, spironolactone, enalapril, and metoprolol while inpatient   - CAPS consult for paracentesis, labs without evidence of SBP, cytologen pending   - AFP 3.1 from 3.9 in 3/17/22    # Concern for primary lung malignancy  Previous lung biopsy (5/12) negative but concern for inadequate sample. Discussed with oncology, imaging appears more consistent with primary lung malignancy (less likely metastasis). No additional inpatient work up indicated. Patient will need close outpatient follow up with possible consideration of PET/CT, thoracic surgery and rad onc consults, and PFTs.   - Outpatient oncology follow up    #Initial c/f pericardial effusion, resolved  Initial concern for pericardial effusion without tamponade physiology on CT, not seen on TTE. LVEF 60-65% with normal RV function.     #T2DM with diabetic polyneuropathy  - High sliding scale insulin  - Hold PTA metformin   - Hold PTA glargine 5u qhs  - Hypoglycemia protocol      #Hypocalcemia   Likely 2/2 to hypoalbuminemia. Corrected calcium is 9.3 wnl.   - No replacement needed     #Medication Management  #Psychosocial  - Pharmacy consult  - Social Work consult    Diet: Combination Diet Moderate  "Consistent Carb (60 g CHO per Meal) Diet; 2 gm NA Diet  Fluid restriction 2000 ML FLUID  Snacks/Supplements Adult: Glucerna; Between Meals    DVT Prophylaxis: Pneumatic Compression Devices in setting of anemia; high risk for DVT given malignancy - will re-evaluate 6/1 if not discharging  Davalos Catheter: Not present  Fluids: None   Central Lines: None  Cardiac Monitoring: None  Code Status: Full Code      Disposition Plan   Expected Discharge: 6/01/2022     Anticipated discharge location:TCU or Shelter or  home with family;home    Delays:         The patient's care was discussed with the attending Physician, Dr. Reilly.    Betsey Berrios  Medical Student  Medicine Service, [address]  Securely message with the Vocera Web Console (learn more here)  Text page via [employer] Paging/Directory   Please see signed in provider for up to date coverage information      Clinically Significant Risk Factors Present on Admission                  I was present with the medical student who participated in the service and in the documentation of the note. I have verified the history and personally performed the physical exam and medical decision making. I agree with the assessment and plan of care as documented in the note.     Paradise Peters MD  PGY-2 Internal Medicine    ______________________________________________________________________    Interval History   No acute events overnight. Still having pain over Zoster rash but improving. Hopes his health continues to get better. No fevers, chills, or melena.    Physical Exam    Vital Signs:   BP (!) 82/44 (BP Location: Right arm)   Pulse 66   Temp 98.3  F (36.8  C) (Oral)   Resp 18   Ht 1.651 m (5' 5\")   Wt 57.6 kg (127 lb)   SpO2 98%   BMI 21.13 kg/m      Weight: 127 lbs 0 oz  General: awake, alert, cooperative, no apparent distress, and appears stated age  Respiratory: No increased work of breathing, good air " exchange, clear to auscultation bilaterally, no crackles or wheezing  Cardiovascular: Normal apical impulse, regular rate and rhythm, normal S1 and S2, no S3 or S4, and no murmur noted and   Skin: Vesicular lesions in the left T4 dermatome from mid-clavicular line to spine in the back, mostly crusted but some still acutely open.   GI: No scars, normal bowel sounds, soft, mildly distended, non-tender, no masses palpated, hepatosplenomegally.   Neurologic: Awake, alert, oriented to name, place and time.

## 2022-05-31 NOTE — CONSULTS
Care Management Initial Consult    General Information  Assessment completed with: Patient,    Type of CM/SW Visit: Initial Assessment  Primary Care Provider verified and updated as needed: No   Readmission within the last 30 days: no previous admission in last 30 days   Return Category: Progression of disease    Reason for Consult: community resources, discharge planning, emotional/coping/adjustment concerns  Advance Care Planning:          Communication Assessment  Patient's communication style: spoken language (non-English)    Hearing Difficulty or Deaf: no   Wear Glasses or Blind: yes    Cognitive  Cognitive/Neuro/Behavioral: WDL                      Living Environment:   People in home: child(shemar), adult     Current living Arrangements: house      Able to return to prior arrangements: yes     Family/Social Support:  Care provided by: self, child(shemar)  Provides care for: no one  Marital Status:   Support System: Children          Description of Support System: Supportive, Involved    Support Assessment: Minimal outside structure and leisure time activities, Limited social contact and support    Current Resources:   Patient receiving home care services: No  Community Resources: None  Equipment currently used at home: grab bar, toilet, grab bar, tub/shower  Supplies currently used at home: None    Employment/Financial:  Employment Status: disabled     Financial Concerns: No concerns identified      Lifestyle & Psychosocial Needs:  Social Determinants of Health     Tobacco Use: Low Risk      Smoking Tobacco Use: Never Smoker     Smokeless Tobacco Use: Never Used   Alcohol Use: Not on file   Financial Resource Strain: Not on file   Food Insecurity: Not on file   Transportation Needs: Not on file   Physical Activity: Not on file   Stress: Not on file   Social Connections: Not on file   Intimate Partner Violence: Not on file   Depression: Not at risk     PHQ-2 Score: 2   Housing Stability: Not on file      Functional Status:  Prior to admission patient needed assistance: yes; son provided      Mental Health Status:  Mental Health Status: No Current Concerns       Chemical Dependency Status:  Chemical Dependency Status: No Current Concerns           Values/Beliefs:  Spiritual, Cultural Beliefs, Adventist Practices, Values that affect care: yes  Description of Beliefs that Will Affect Care: ong spiritual tradition and ong Mandaen      Additional Information:  Background: Per H&P, Rishabh Cabrera is a 61 year old male who has a history of chronic hepatitis B, HCC with concern for metastases in the lungs, diabetes and is admitted for further evaluation of an acute hemoglobin drop since discharge from recent hospitalization (5/12-5/13) for pulmonary hemorrhage after IR percutaneous biopsy. Hemodynamically stable at this time.     Plan: Social Work IP Consult & Care management/Social Work IP Consult acknowledged.  will continue to follow for discharge planning, psychosocial support, resources, and to advocate on behalf of patient.    Progress: SW called pt's room to complete CMA d/t airborne iso. Pt agreeable to completing CMA via phone. Pt lives in a house with one adult son. He is estranged from his wife and the rest of his children live with her. Pt endorsed feeling distressed about his broken relationship with his wife and wants to reconcile with her. Pt finds comfort in his [facility] spiritual beliefs and is supported by his son. Pt receives public assistance, medicare, and social security disability. Pt requested resources for meal delivery service, food shelves, and other senior resources in his community. SW agreed to place this information in pt's AVS. Pt requested assistance setting up a PCP visit following hospital discharge and to write it down for him so he doesn't forget. DANIEL completed a [facility] referral. Pt reported his son can provide him a ride at  discharge.  ______________________________________________    ZAIN Encarnacion, [facility]  [address]   [email or web address]  Office: [phone]   Pager: [phone]

## 2022-05-31 NOTE — PROGRESS NOTES
[facility]    Hepatology Follow-up  CC: Anemia  Dx: Hep B cirrhosis c/b HCC  =====================================================================  Assessment  61 Y M HBV, HCC (possibly metastatic), recent admission (5/12-/513) for pulmonary hemorrhage secondary to IR biopsy. Now presenting with fatigue, weakness, new shingles, acute on chronic anemia, and question of melena.    #. Decompensated cirrhosis (ascites)  #. Chronic hepatitis B  #. Multilocal HCC s/p Y90 x2  #. Reported melena   #. Acute on chronic normocytic anemia  #. Hx of small EVs, hx of PHG  Primary Hepatologist: Dr. Leventhal    MELD-Na score: 17 at 5/31/2022  6:16 AM  MELD score: 17 at 5/31/2022  6:16 AM  Calculated from:  Serum Creatinine: 0.74 mg/dL (Using min of 1 mg/dL) at 5/31/2022  6:16 AM  Serum Sodium: 139 mmol/L (Using max of 137 mmol/L) at 5/31/2022  6:16 AM  Total Bilirubin: 1.1 mg/dL at 5/31/2022  6:16 AM  INR(ratio): 2.36 at 5/31/2022  6:16 AM  Age: 61 years    Yellow stool in rectal vault at time of admission, hemodynamically stable. Highly unlikely to be variceal hemorrhage. Slow ooze from known PHG possible, otherwise almost certainly multifactorial. Normal BUN/Cr ratio argues against UGIB. No intervention at this time    #. VZV cutaneous infection  Left chest/axilla/back. Severe pain, typical dermatomal distribution.    Interval history: Continues to have no overt GI bleed. S/p paracentesis with 2.5L out, negative for SBP. Pending Hep B DNA PCR and AFP, ascitic fluid cytology.    Recommendations  -- Continue with diuretics at PTA doses (furosemide/spironolactone)  -- Follow up on ascitic fluid cytology, serum AFP and HBV DNA   -- [id number] mg tenofovir   -- Agree with broad work up on anemia. Minimize blood draw.   -- Hgb daily is enough for now from GI bleed perspective  -- VZV infection treatment per primary team.   -- Overdue for EGD for EV surveillance, if care plan/course agrees could be  "considered inpatient though not urgent.    Patient was discussed with attending Dr. Marco Friedman MD  Gastroenterology Fellow  [facility]  Text page [phone]    =====================================================================  24 hour events:   No overt GI bleed. S/p paracentesis.     Subjective:  Having pain in the left chest from zoster. No melena or blood seen in stool.       Medications  Current Facility-Administered Medications   Medication Dose Route Frequency    acetaminophen  650 mg Oral Q6H    enalapril  10 mg Oral Daily    furosemide  20 mg Oral Daily    gabapentin  100 mg Oral TID    insulin aspart  1-4 Units Subcutaneous Q4H    iron sucrose (VENOFER) intermittent infusion  300 mg Intravenous Q72H    metoprolol succinate ER  50 mg Oral Daily    [START ON 6/1/2022] pantoprazole  40 mg Oral QAM AC    polyethylene glycol  17 g Oral Daily    sodium chloride (PF)  3 mL Intracatheter Q8H    spironolactone  50 mg Oral Daily    tenofovir  300 mg Oral Daily    valACYclovir  1,000 mg Oral TID       Review of systems  A 10-point review of systems was negative.    Examination  BP 91/53 (BP Location: Left arm)   Pulse 71   Temp 97.8  F (36.6  C) (Oral)   Resp 17   Ht 1.651 m (5' 5\")   Wt 57.6 kg (127 lb)   SpO2 95%   BMI 21.13 kg/m      Intake/Output Summary (Last 24 hours) at 5/31/2022 1109  Last data filed at 5/30/2022 1900  Gross per 24 hour   Intake 480 ml   Output --   Net 480 ml       Gen- well, NAD, A+Ox3, normal color  CVS- RRR  RS- CTA  Abd- Soft, nontender.   Extr-  Neuro-  Skin- rash on the left chest. Dermatomal distribution.   Psych- normal mood  Lym- no LAD    Laboratory  Lab Results   Component Value Date     05/31/2022     05/10/2021    POTASSIUM 4.0 05/31/2022    POTASSIUM 4.7 05/10/2021    CHLORIDE 111 05/31/2022    CHLORIDE 103 05/10/2021    CO2 26 05/31/2022    CO2 26 05/10/2021    BUN 9 05/31/2022    BUN 12 05/10/2021    CR 0.74 05/31/2022    CR 0.85 " 05/10/2021       Lab Results   Component Value Date    BILITOTAL 1.1 05/31/2022    BILITOTAL 1.3 05/10/2021    ALT 19 05/31/2022     05/10/2021    AST 26 05/31/2022     05/10/2021    ALKPHOS 118 05/31/2022    ALKPHOS 290 05/10/2021       Lab Results   Component Value Date    WBC 4.6 05/31/2022    WBC 3.5 05/10/2021    HGB 8.0 05/31/2022    HGB 13.0 05/10/2021    MCV 94 05/31/2022    MCV 86 05/10/2021     05/31/2022    PLT 52 05/10/2021       Lab Results   Component Value Date    INR 2.36 05/31/2022    INR 1.39 05/10/2021         Radiology  ATTENDING NOTE HEPATOLOGY    I saw and discussed this patient with the fellow and participated in the decision making. I agree with the fellow's note. Lena Lara MD

## 2022-05-31 NOTE — PLAN OF CARE
Assumed cares: [id number]  Vitals: VSS on RA  Pain: Pt reports pain on left chest and abdomen from Shingles rash. Scheduled tylenol provided to pt.   Neuro: A&Ox4; Hmong speaking, understands basic English  Cardiac: WDL  Respiratory: WDL  GI/: WDL  Skin: Rash on left abdomen and chest from Shingles  IV/Drains: 2 R PIV-SL; 1 L PIV- SL  Activity: SBA  Behavior: Pt is calm and cooperative    Plan of Care: Follow patient plan of care. Pt to have ultrasound today so patient is NPO.       Goal Outcome Evaluation:    Plan of Care Reviewed With: patient     Overall Patient Progress: no change

## 2022-05-31 NOTE — PLAN OF CARE
Vital signs stable except for soft blood pressures. Pt asymptomatic. Up self in room. PRN oxycodone and scheduled tylenol given. Shingles to left chest, abdomen, and back. Abdomen ultrasound done. Good appetite. Blood sugars stable. Will continue to monitor.       Goal Outcome Evaluation:    Plan of Care Reviewed With: patient     Overall Patient Progress: no change    Outcome Evaluation: prn pain medication, abdomen ultrasound

## 2022-05-31 NOTE — PROVIDER NOTIFICATION
Provider notified (name/pager): [phone] Paradise Peters MD  Time initial page sent: [phone]  Message sent: Low 5B 30 TL: Continued low BP (82/44), MAP 52, pt endorses dizziness/headache. Do we want to do albumin or bolus? Thanks Jenny CURRY [phone]  Response from provider: Albumin dose ordered

## 2022-05-31 NOTE — PLAN OF CARE
Assumed cares [id number]    Vitals: VSS on RA   Pain: Shingles pain controlled w/ PRN oxy and scheduled tylenol.   Neuro: A&Ox4. Hmong speaking. Can understand and speak some english. Used  during cares/meds.   Cardiac: WDL  Respiratory: WDL  GI/: No urine output or BM. On a consistent carb, low sodium diet. Good appetite.   IV/Drains: 3 PIVs SL.    Activity: Up independently at baseline per pt. No OOB activity this shift.   Skin: Shingle lesions on L chest and back.   Labs: Hgb 7.8.  and 181.    Plan of Care: Monitor for bleeding, provide adequate pain control. Continue POC.     Goal Outcome Evaluation:    Plan of Care Reviewed With: patient     Overall Patient Progress: improving    Outcome Evaluation: Pain controlled. Adequate PO intake.

## 2022-06-01 ENCOUNTER — APPOINTMENT (OUTPATIENT)
Dept: OCCUPATIONAL THERAPY | Facility: CLINIC | Age: 62
DRG: 435 | End: 2022-06-01
Attending: STUDENT IN AN ORGANIZED HEALTH CARE EDUCATION/TRAINING PROGRAM
Payer: MEDICARE

## 2022-06-01 ENCOUNTER — APPOINTMENT (OUTPATIENT)
Dept: INTERPRETER SERVICES | Facility: CLINIC | Age: 62
DRG: 435 | End: 2022-06-01
Payer: MEDICARE

## 2022-06-01 VITALS
SYSTOLIC BLOOD PRESSURE: 105 MMHG | WEIGHT: 125.6 LBS | HEIGHT: 65 IN | BODY MASS INDEX: 20.93 KG/M2 | OXYGEN SATURATION: 99 % | RESPIRATION RATE: 16 BRPM | DIASTOLIC BLOOD PRESSURE: 58 MMHG | TEMPERATURE: 97.6 F | HEART RATE: 65 BPM

## 2022-06-01 LAB
ALBUMIN SERPL-MCNC: 1.9 G/DL (ref 3.4–5)
ALP SERPL-CCNC: 113 U/L (ref 40–150)
ALT SERPL W P-5'-P-CCNC: 21 U/L (ref 0–70)
ANION GAP SERPL CALCULATED.3IONS-SCNC: 4 MMOL/L (ref 3–14)
AST SERPL W P-5'-P-CCNC: 23 U/L (ref 0–45)
BILIRUB SERPL-MCNC: 1.6 MG/DL (ref 0.2–1.3)
BUN SERPL-MCNC: 8 MG/DL (ref 7–30)
CALCIUM SERPL-MCNC: 7.8 MG/DL (ref 8.5–10.1)
CHLORIDE BLD-SCNC: 112 MMOL/L (ref 94–109)
CO2 SERPL-SCNC: 26 MMOL/L (ref 20–32)
CREAT SERPL-MCNC: 0.85 MG/DL (ref 0.66–1.25)
ERYTHROCYTE [DISTWIDTH] IN BLOOD BY AUTOMATED COUNT: 15.8 % (ref 10–15)
GFR SERPL CREATININE-BSD FRML MDRD: >90 ML/MIN/1.73M2
GLUCOSE BLD-MCNC: 128 MG/DL (ref 70–99)
GLUCOSE BLDC GLUCOMTR-MCNC: 115 MG/DL (ref 70–99)
GLUCOSE BLDC GLUCOMTR-MCNC: 150 MG/DL (ref 70–99)
GLUCOSE BLDC GLUCOMTR-MCNC: 174 MG/DL (ref 70–99)
HCT VFR BLD AUTO: 27 % (ref 40–53)
HGB BLD-MCNC: 8.6 G/DL (ref 13.3–17.7)
INR PPP: 2.17 (ref 0.85–1.15)
MCH RBC QN AUTO: 29.7 PG (ref 26.5–33)
MCHC RBC AUTO-ENTMCNC: 31.9 G/DL (ref 31.5–36.5)
MCV RBC AUTO: 93 FL (ref 78–100)
PATH REPORT.COMMENTS IMP SPEC: NORMAL
PATH REPORT.FINAL DX SPEC: NORMAL
PATH REPORT.GROSS SPEC: NORMAL
PATH REPORT.RELEVANT HX SPEC: NORMAL
PLATELET # BLD AUTO: 122 10E3/UL (ref 150–450)
POTASSIUM BLD-SCNC: 3.8 MMOL/L (ref 3.4–5.3)
PROT SERPL-MCNC: 6.8 G/DL (ref 6.8–8.8)
RBC # BLD AUTO: 2.9 10E6/UL (ref 4.4–5.9)
SODIUM SERPL-SCNC: 142 MMOL/L (ref 133–144)
WBC # BLD AUTO: 5.3 10E3/UL (ref 4–11)

## 2022-06-01 PROCEDURE — 82435 ASSAY OF BLOOD CHLORIDE: CPT | Performed by: STUDENT IN AN ORGANIZED HEALTH CARE EDUCATION/TRAINING PROGRAM

## 2022-06-01 PROCEDURE — 88305 TISSUE EXAM BY PATHOLOGIST: CPT | Mod: 26 | Performed by: PATHOLOGY

## 2022-06-01 PROCEDURE — 250N000013 HC RX MED GY IP 250 OP 250 PS 637: Performed by: STUDENT IN AN ORGANIZED HEALTH CARE EDUCATION/TRAINING PROGRAM

## 2022-06-01 PROCEDURE — 85027 COMPLETE CBC AUTOMATED: CPT | Performed by: STUDENT IN AN ORGANIZED HEALTH CARE EDUCATION/TRAINING PROGRAM

## 2022-06-01 PROCEDURE — 97110 THERAPEUTIC EXERCISES: CPT | Mod: GO

## 2022-06-01 PROCEDURE — 999N000147 HC STATISTIC PT IP EVAL DEFER

## 2022-06-01 PROCEDURE — 88112 CYTOPATH CELL ENHANCE TECH: CPT | Mod: 26 | Performed by: PATHOLOGY

## 2022-06-01 PROCEDURE — 97165 OT EVAL LOW COMPLEX 30 MIN: CPT | Mod: GO

## 2022-06-01 PROCEDURE — 85610 PROTHROMBIN TIME: CPT | Performed by: STUDENT IN AN ORGANIZED HEALTH CARE EDUCATION/TRAINING PROGRAM

## 2022-06-01 PROCEDURE — 36415 COLL VENOUS BLD VENIPUNCTURE: CPT | Performed by: STUDENT IN AN ORGANIZED HEALTH CARE EDUCATION/TRAINING PROGRAM

## 2022-06-01 PROCEDURE — 99239 HOSP IP/OBS DSCHRG MGMT >30: CPT | Mod: GC | Performed by: STUDENT IN AN ORGANIZED HEALTH CARE EDUCATION/TRAINING PROGRAM

## 2022-06-01 RX ORDER — TENOFOVIR DISOPROXIL FUMARATE 300 MG/1
300 TABLET, FILM COATED ORAL DAILY
Qty: 90 TABLET | Refills: 0 | Status: ON HOLD | OUTPATIENT
Start: 2022-06-02 | End: 2022-09-28

## 2022-06-01 RX ORDER — ENALAPRIL MALEATE 10 MG/1
10 TABLET ORAL DAILY
Qty: 90 TABLET | Refills: 0 | Status: SHIPPED | OUTPATIENT
Start: 2022-06-02 | End: 2022-09-29

## 2022-06-01 RX ORDER — FUROSEMIDE 20 MG
20 TABLET ORAL DAILY
Qty: 90 TABLET | Refills: 0 | Status: ON HOLD | OUTPATIENT
Start: 2022-06-02 | End: 2022-09-28

## 2022-06-01 RX ORDER — PANTOPRAZOLE SODIUM 40 MG/1
40 TABLET, DELAYED RELEASE ORAL
Qty: 90 TABLET | Refills: 0 | Status: SHIPPED | OUTPATIENT
Start: 2022-06-02 | End: 2022-08-31

## 2022-06-01 RX ORDER — FERROUS SULFATE 325(65) MG
325 TABLET, DELAYED RELEASE (ENTERIC COATED) ORAL DAILY
Qty: 90 TABLET | Refills: 0 | Status: SHIPPED | OUTPATIENT
Start: 2022-06-01 | End: 2022-08-30

## 2022-06-01 RX ORDER — METOPROLOL SUCCINATE 50 MG/1
50 TABLET, EXTENDED RELEASE ORAL DAILY
Qty: 90 TABLET | Refills: 0 | Status: SHIPPED | OUTPATIENT
Start: 2022-06-02 | End: 2022-09-29

## 2022-06-01 RX ORDER — SPIRONOLACTONE 50 MG/1
50 TABLET, FILM COATED ORAL DAILY
Qty: 90 TABLET | Refills: 0 | Status: ON HOLD | OUTPATIENT
Start: 2022-06-02 | End: 2022-09-28

## 2022-06-01 RX ADMIN — TENOFOVIR DISOPROXIL FUMARATE 300 MG: 300 TABLET ORAL at 09:04

## 2022-06-01 RX ADMIN — GABAPENTIN 100 MG: 100 CAPSULE ORAL at 13:24

## 2022-06-01 RX ADMIN — ENALAPRIL MALEATE 10 MG: 10 TABLET ORAL at 09:04

## 2022-06-01 RX ADMIN — PANTOPRAZOLE SODIUM 40 MG: 40 TABLET, DELAYED RELEASE ORAL at 09:04

## 2022-06-01 RX ADMIN — GABAPENTIN 100 MG: 100 CAPSULE ORAL at 09:04

## 2022-06-01 RX ADMIN — FUROSEMIDE 20 MG: 20 TABLET ORAL at 09:04

## 2022-06-01 RX ADMIN — ACETAMINOPHEN 650 MG: 325 TABLET ORAL at 13:23

## 2022-06-01 RX ADMIN — OXYCODONE HYDROCHLORIDE 5 MG: 5 TABLET ORAL at 13:23

## 2022-06-01 RX ADMIN — OXYCODONE HYDROCHLORIDE 5 MG: 5 TABLET ORAL at 05:53

## 2022-06-01 RX ADMIN — VALACYCLOVIR HYDROCHLORIDE 1000 MG: 1 TABLET, FILM COATED ORAL at 09:04

## 2022-06-01 RX ADMIN — SPIRONOLACTONE 50 MG: 50 TABLET ORAL at 09:04

## 2022-06-01 RX ADMIN — METOPROLOL SUCCINATE 50 MG: 50 TABLET, EXTENDED RELEASE ORAL at 09:04

## 2022-06-01 RX ADMIN — VALACYCLOVIR HYDROCHLORIDE 1000 MG: 1 TABLET, FILM COATED ORAL at 13:24

## 2022-06-01 RX ADMIN — INSULIN ASPART 1 UNITS: 100 INJECTION, SOLUTION INTRAVENOUS; SUBCUTANEOUS at 01:15

## 2022-06-01 ASSESSMENT — ACTIVITIES OF DAILY LIVING (ADL)
ADLS_ACUITY_SCORE: 24
ADLS_ACUITY_SCORE: 25
ADLS_ACUITY_SCORE: 24
ADLS_ACUITY_SCORE: 24
ADLS_ACUITY_SCORE: 25
ADLS_ACUITY_SCORE: 24

## 2022-06-01 NOTE — PROGRESS NOTES
"   06/01/22 0900   Quick Adds   Type of Visit Initial Occupational Therapy Evaluation   Living Environment   People in Home child(shemar), adult  (Son)   Current Living Arrangements house   Home Accessibility stairs to enter home   Number of Stairs, Main Entrance 3   Stair Railings, Main Entrance railings safe and in good condition   Transportation Anticipated family or friend will provide   Living Environment Comments Rishabh reports he lives in a house with his son. Is able to stay on main level. Has a tub/shower and standard toilet.   Self-Care   Usual Activity Tolerance good   Current Activity Tolerance moderate   Equipment Currently Used at Home grab bar, toilet;grab bar, tub/shower   Fall history within last six months no   Activity/Exercise/Self-Care Comment Rishabh reports he is independent with ADLs and IADLs at baseline. Does not use an AD.   General Information   Onset of Illness/Injury or Date of Surgery 05/29/22   Referring Physician Paradise Peters MD   Patient/Family Therapy Goal Statement (OT) Get energy back, go home   Additional Occupational Profile Info/Pertinent History of Current Problem Per chart review: \"Rishabh Cabrera is a 61 year old male with PMH significant for HCC, chronic HBV, and diabetes mellitus s/p lung biopsy (05/12) complicated by pulmonary hemorrhage presents with complain of painful rash and was found to have acute normocytic anemia.\"   Existing Precautions/Restrictions fall   General Observations and Info Activity: Up ad sean   Cognitive Status Examination   Orientation Status orientation to person, place and time   Cognitive Status Comments Rishabh pleasant and cooperative throughout session. He appears at cognitive baseline.   Visual Perception   Visual Impairment/Limitations WNL   Impact of Vision Impairment on Function (Vision) Denies visual difficulties.   Sensory   Sensory Quick Adds No deficits were identified   Sensory Comments Denies BUE/BLE numbness and tingling   Pain Assessment   Patient " Currently in Pain Yes, see Vital Sign flowsheet   Integumentary/Edema   Integumentary/Edema no deficits were identifed   Posture   Posture not impaired   Range of Motion Comprehensive   General Range of Motion no range of motion deficits identified   Strength Comprehensive (MMT)   Comment, General Manual Muscle Testing (MMT) Assessment Generalized deconditioning observed however strength is funcitonal.   Muscle Tone Assessment   Muscle Tone Quick Adds No deficits were identified   Coordination   Upper Extremity Coordination No deficits were identified   Bed Mobility   Comment (Bed Mobility) Independent with bed mobility   Transfers   Transfer Comments Sit<>stand independently   Balance   Balance Comments Good static and dynamic balance observed   Activities of Daily Living   BADL Assessment/Intervention   (Anticipate supervision with higher level ADL and IADL tasks based on functional activity.)   Clinical Impression   Criteria for Skilled Therapeutic Interventions Met (OT) Yes, treatment indicated   OT Diagnosis Decreased independence with ADLs and IADLs   Influenced by the following impairments Activity tolerance   OT Problem List-Impairments impacting ADL activity tolerance impaired;problems related to;strength  (endurance)   Assessment of Occupational Performance 1-3 Performance Deficits   Identified Performance Deficits higher level activity tolerance   Planned Therapy Interventions (OT) ADL retraining;IADL retraining;balance training;strengthening;home program guidelines;progressive activity/exercise   Clinical Decision Making Complexity (OT) low complexity   Anticipated Equipment Needs Upon Discharge (OT)   (TBD pending progress - anticipate a shower chair would be beneficial)   Risk & Benefits of therapy have been explained evaluation/treatment results reviewed;care plan/treatment goals reviewed;risks/benefits reviewed;current/potential barriers reviewed;participants voiced agreement with care  plan;participants included;patient   Clinical Impression Comments Rishabh presents with decreased activity tolerance and generalized weakness impacting independence and safety with ADLs and IADLs. He will benefit from skilled occupational therapy services to prevent further deconditioning during prolonged hospitalization.   OT Discharge Planning   OT Discharge Recommendation (DC Rec) home with assist   OT Rationale for DC Rec Anticipate Rishabh will be appropriate to discharge home with his son's assistance when medically appropriate.   OT Brief overview of current status SBA   Total Evaluation Time (Minutes)   Total Evaluation Time (Minutes) 5   OT Goals   Therapy Frequency (OT) 2 times/wk   OT Predicted Duration/Target Date for Goal Attainment 06/22/22   OT Goals Aerobic Activity;OT Goal 1;Upper Body Dressing;Lower Body Dressing;Upper Body Bathing;Lower Body Bathing   OT: Upper Body Dressing Independent   OT: Lower Body Dressing Independent   OT: Upper Body Bathing Independent   OT: Lower Body Bathing Independent   OT: Perform aerobic activity with stable cardiovascular response 15 minutes;continuous activity   OT: Goal 1 Rishabh will demonstrate independence with BUE/BLE HEP.

## 2022-06-01 NOTE — PROGRESS NOTES
Discharged to: home  Transportation: with son  Time: 1520  Prescriptions: picked up by staff and given to patient  Belongings: all belongings listed in summary of care accounted for and sent with patient  PIV/Access: PIVx2 removed  Care Plan and Education discontinued: yes  Paperwork: Pt and son educated on medications, follow up appointments, and further care needed with .

## 2022-06-01 NOTE — PLAN OF CARE
PT 5B: cancel/defer    Physical Therapy: Orders received. Chart reviewed and discussed with care team.? Physical Therapy not indicated due to mobility status. Pt is up SBA to IND in room. OT evaluated for safe discharge planning. Anticipate return to home with assist as needed from family. If continues to endorse weakness may benefit from OP PT.? Defer discharge recommendations to OT, anticipate return to home with assist.? Will complete orders.

## 2022-06-01 NOTE — PLAN OF CARE
RN assumed cares at 2300 to 0700    Vitals: Temp: 97.2  F (36.2  C) Temp src: Axillary BP: 117/56 (MAP 83) Pulse: 63   Resp: 16 SpO2: 99 % O2 Device: None (Room air)     Pain: around 0100 pt reported no pain but a tightness in the head that wasn't a headache. Pt refused medication but accepted a cold pack that they claimed helped. At 0530 pt reported 5 out of 10 pain, prn oxycodone given   Neuro: pt report ringing in ear. A/O    Cardiac: WDL  Respiratory: WDL  GI/: WDL, 2L fluid restriction, 60 carb consistent diet, 2g Na diet  IV/Drains: 2 PIV  Activity: Up independently, ambulated to bathroom x2 over night  Skin: shingles rash from Left mid chest/upper abd. To L mid back    Labs: Bg q4    Plan of Care:  vitals q4h, bg q4h, treat pain as able, treat itchy shingles rash as able. Potential discharge today with out pt oncology

## 2022-06-01 NOTE — DISCHARGE SUMMARY
[facility]  Discharge Summary - Medicine & Pediatrics       Date of Admission:  5/29/2022  Date of Discharge:  6/1/2022  Discharging Provider: Paul Reilly DO  Discharge Service: Medicine Service, [place] TEAM 4    Discharge Diagnoses   1. HCC (hepatocellular carcinoma) (H)    2. Herpes zoster without complication    3. Anemia, unspecified type    4. Contact with and (suspected) exposure to covid-19    5. Other specified postprocedural states    6. Melena    7. Iron deficiency anemia, unspecified iron deficiency anemia type      Follow-ups Needed After Discharge   Follow-up Appointments     Adult [facility] Follow-up and recommended labs and tests      Follow up with primary care provider, Physician No Ref-Primary, within 7   days to evaluate medication change and for hospital follow- up.  The   following labs/tests are recommended: CBC, CMP, INR.    Follow up with your hepatologist (liver doctor) for your hepatocellular   carcinoma (liver cancer) and hepatitis.   Follow up with your medical oncologist (cancer doctor) for your lung   lesions which is highly suspicious for a lung cancer.    Appointments on [place] and/or [facility] (with [facility] or [facility]   provider or service). Call [phone] if you haven't heard regarding   these appointments within 7 days of discharge.             Unresulted Labs Ordered in the Past 30 Days of this Admission     Date and Time Order Name Status Description    5/31/2022  2:41 PM Hep B Virus DNA Quant Real Time PCR In process     5/30/2022 12:36 PM Ascites Fluid Aerobic Bacterial Culture Routine Preliminary       These results will be followed up by PCP, hepatology     Discharge Disposition   Discharged to home  Condition at discharge: Stable    Hospital Course   Rishabh Cabrera is a 61 year old male with HCC, chronic HBV, and diabetes mellitus, lung mass s/p lung biopsy (05/12) complicated by pulmonary hemorrhage who was admitted on  5/29/2022 for herpes zoster and acute normocytic anemia. The following problems were addressed during his hospitalization:    # Herpes zoster rash, acute infection (left T4 dermatome):    Pt presented with painful erythematous vesicles in T4-T5 dermatome distribution that started 10 days ago. ED started valacylovir despite outside of 72 hour window as some lesions still not crusted and still significant pain concerning for more acute infection.   - Valacyclovir tablet 1000 mg TID for total 10 days  - Gabapentin 300 mg once daily for days 1-2, then 300 mg BID   - Consider Shingles vaccine with PCP after infection has resolved     # Acute normocytic anemia, resolved  # Iron deficiency anemia  Patient has been having black stools for 2 weeks which stopped 2 days ago. Hgb 6.4 on arrival to the ED from prior 8.3 (on discharge 5/13/2022). He responded appropriately to 8.3 post 1 unit of RBCs, reassuring against large volume acute blood loss. Pt has h/o small GE varices/portal hypertensive gastropathy on EGD 2019 and is due for repeat EGD. Iron studies showed low iron (12), decreased ferritin (26), TIBC (220), and iron sat 5% suggestive of iron deficiency. Denied hemoptysis.   - PO pantoprazole 40 mg every day  - Received IV iron sucrose 300 mg once; discharged with ferrous sulfate 325 mg every day   - Outpatient follow up for surveillance EGD       # Chronic hepatitis B   #HCC   #Decompensated cirrhosis with ascites  #Chronic severe protein-calorie malnutrition, hypoalbuminemia   Sees Dr. Leventhal for chronic hepatitis B infection. He has not recently refilled his Tenofovir 300 mg prescription. Previously attempted to optimize with furosemide, spironolactone, enalapril, metoprolol but does not appear they have been filled. Last hepatology appointment cancelled (was planned for 5/6/22).  Underwent paracentesis with 2.5 L ascites removed, labs without evidence of SBP; cytology negative for malignancy. AFP 3.1 from 3.9 in  3/17/22.  - Tenofovir 300 mg every day   - Furosemide 20 mg every day  - Spironolactone 50 mg every day  - Enalapril 10 mg every day  - Metoprolol 50 mg every day  - Close follow up with hepatology (Dr. Leventhal) outpatient    # Concern for primary lung malignancy  Previous lung biopsy (5/12) negative but concern for inadequate sample. Discussed with oncology, imaging appears more consistent with primary lung malignancy (less likely metastasis). Patient will need close outpatient follow up with possible consideration of PET/CT, thoracic surgery and rad onc consults, and PFTs.   - Close follow up with medical oncology outpatient    Consultations This Hospital Stay   NURSING TO CONSULT FOR VASCULAR ACCESS CARE IP CONSULT  NUTRITION SERVICES ADULT IP CONSULT  MEDICATION HISTORY IP PHARMACY CONSULT  SOCIAL WORK IP CONSULT  GI LUMINAL ADULT IP CONSULT  INTERNAL MEDICINE PROCEDURE TEAM ADULT IP CONSULT [place] - PARACENTESIS  PHYSICAL THERAPY ADULT IP CONSULT  OCCUPATIONAL THERAPY ADULT IP CONSULT  CARE MANAGEMENT / SOCIAL WORK IP CONSULT    Code Status   Full Code       The patient was discussed with Paul Mariee DO Bickey Chang, MD  [address] [address]  Phone: [phone]  ______________________________________________________________________    Physical Exam   Vital Signs: Temp: 97.6  F (36.4  C) Temp src: Oral BP: 105/58 Pulse: 65   Resp: 16 SpO2: 99 % O2 Device: None (Room air)    Weight: 125 lbs 9.6 oz  General: awake, alert, cooperative, no apparent distress, and appears stated age  Respiratory: No increased work of breathing, good air exchange, clear to auscultation bilaterally, no crackles or wheezing  Cardiovascular: Normal apical impulse, regular rate and rhythm, normal S1 and S2, no S3 or S4, and no murmur noted and   Skin: Vesicular lesions in the left T4 dermatome from mid-clavicular line to spine in the back, mostly crusted  but some still acutely open.   GI: No scars, normal bowel sounds, soft, non-distended, non-tender, no masses palpated, hepatosplenomegally.   Neurologic: Awake, alert, oriented to name, place and time.       Primary Care Physician   Physician No Ref-Primary    Discharge Orders      Adult [facility] Follow-up and recommended labs and tests    Follow up with primary care provider, Physician No Ref-Primary, within 7 days to evaluate medication change and for hospital follow- up.  The following labs/tests are recommended: CBC, CMP, INR.    Follow up with your hepatologist (liver doctor) for your hepatocellular carcinoma (liver cancer) and hepatitis.   Follow up with your medical oncologist (cancer doctor) for your lung lesions which is highly suspicious for a lung cancer.    Appointments on [place] and/or [facility] (with [facility] or [facility] provider or service). Call [phone] if you haven't heard regarding these appointments within 7 days of discharge.     Activity    Your activity upon discharge: activity as tolerated     Reason for your hospital stay    You were hospitalized for concern for dark stools with some initial concern for an active bleed. You were also found to have a rash on your skin due to herpes zoster virus. You were treated with antiviral medication which you will continue for 7 additional days at discharge. Please follow up with your primary care doctor when you feel better to get the shingles vaccine.    It will be important for you to follow up with your liver doctor at discharge regarding your hepatitis and liver cancer. We sent you with your liver medications. It will also be important for you to follow up with your cancer doctor to do additional work up for your lung mass. We are very concerned that this may represent a lung cancer in addition to your liver cancer and further work up and treatment would be needed to help you feel better.     Diet    Follow this diet upon discharge: Orders  Placed This Encounter      Fluid restriction 2000 ML FLUID      Snacks/Supplements Adult: Glucerna; Between Meals      Combination Diet Moderate Consistent Carb (60 g CHO per Meal) Diet; 2 gm NA Diet       Significant Results and Procedures   Results for orders placed or performed during the hospital encounter of 05/29/22   Chest XR,  PA & LAT     Value    Radiologist flags Bilateral pneumothoraces (Urgent)    Narrative    Exam: XR CHEST 2 VW, 5/29/2022 8:27 PM    Indication: chest pain, recent biopsy with complications, eval for  pneumothorax    Comparison: 5/13/2022    Findings:   Mediastinal silhouette is unchanged. Partially imaged bilateral  loculated effusions. Mildly increased effusion on the left. Small  bilateral pneumothoraces are seen. These were not convincingly  identified on the most recent chest x-ray.. No new consolidations.  Left basilar atelectasis. The characterize spiculated nodule in the  left upper lobe.      Impression    Impression:   1. Small bilateral pneumothoraces.  2. Continued loculated pleural effusions with likely increased left  effusion.    [Urgent Result: Bilateral pneumothoraces]    Finding was identified on 5/29/2022 9:05 PM.     Dr Ca was contacted by Dr. Mosquera at 5/29/2022 9:08 PM and  verbalized understanding of the urgent finding.     I have personally reviewed the examination and initial interpretation  and I agree with the findings.    DANIEL SELLERS MD         SYSTEM ID:  [id number]   Chest CT w/o contrast    Narrative    EXAMINATION: CT CHEST W/O CONTRAST, 5/29/2022 9:52 PM    CLINICAL HISTORY: Pneumothorax.  Per provider, also concenr for  hemorrhage given hemoglobin drop.    COMPARISON: Same day chest x-ray, procedural CT.    TECHNIQUE: CT imaging obtained through the chest without contrast.  Coronal and axial MIP reformatted images obtained.     CONTRAST:  none.    FINDINGS:    Mediastinum: Continued rightward mediastinal shift. Small to moderate  pericardial  effusion. Calcified mediastinal lymph nodes. No suspicious  lymphadenopathy.    Lungs/pleura: The central airways patent. No malignant pneumothorax as  questioned on chest x-ray. Spiculated left apical nodule with a  pleural tail and peripheral groundglass component today measures 2.2 x  1.3 cm, unchanged since 3/17/2022. Resolution of previously seen  postbiopsy hemorrhage on 5/12/2022. Unchanged calcified right lower  lobe loculated effusion. Multiple loculated effusions along the left  fissure. There may be trace increase in layering left simple pleural  effusion.    Waxing and waning patchy multifocal groundglass opacities for example  these appear increased in the anterior right upper lobe and resolved  within the anterior left upper lobe.    Upper abdomen: Partially imaged simple density ascites in the upper  abdomen, which in comparison to 3-2022, appears overall decreased.  Nodular contour of the liver. Lipiodol staining is partially imaged in  the inferior right hepatic lobe.    Bones/soft tissues: No acute or suspicious osseous or soft tissue  abnormalities.      Impression    IMPRESSION:  1. No pneumothorax. These may have represented skin folds or edges  from pleural effusions.  2. Unchanged spiculated left apical nodule, with resolved post  procedural hemorrhage.  3. Likely trace layering effusion on the left. Otherwise unchanged  left fissural and calcified right lower lobe loculated effusions.  4. Waxing and waning patchy groundglass opacities, which could  represent organizing pneumonia.  5. Cirrhotic configuration of the liver, decreasing ascites since  3/2022.  6. Small to moderate pericardial effusion.    I have personally reviewed the examination and initial interpretation  and I agree with the findings.    DANIEL SELLERS MD         SYSTEM ID:  [id number]   POC US Abdomen Limited    Impression    Mild to moderate ascites best noted in the LLQ.    Santiago Barba D.O.  Internal Medicine,  Hospitalist  [facility]  Pager: [phone]     US Abdomen Limited w Abd/Pelvis Duplex Complete    Narrative    EXAMINATION: US ABDOMEN LIMITED WITH DOPPLER COMPLETE 5/31/2022 12:17  PM     COMPARISON: Abdominal ultrasound 5/30/2022.    HISTORY: H/o HCC and untreated chronic hep B, req RUQ US + doppler.    TECHNIQUE: The abdomen was scanned in standard fashion with  specialized ultrasound transducer(s) using both gray-scale, color  Doppler, and spectral flow techniques.    Findings:  Liver: Nodular contour with increased parenchymal echogenicity and  coarsened hepatic echotexture measuring 12.4 cm in length. The known  right hepatic lobe lesion is better appreciated on prior abdominal MR  3/17/2022.    Extrahepatic portal vein flow is antegrade at 14 cm/s.  Right portal vein flow is antegrade, measuring 11 cm/s.  Left portal vein flow is antegrade, measuring 8 cm/s.    Flow in the hepatic artery is towards the liver and:  99 cm/s peak systolic  0.85 resistive index.     The splenic vein is not visualized.  The left, middle, and right  hepatic veins are patent with flow towards the IVC. The IVC is patent  with flow towards the heart.   The visualized aorta is not dilated.    Gallbladder: There is no wall thickening, pericholecystic fluid,  positive sonographic Watson's sign or evidence for cholelithiasis    Bile Ducts: Both the intra- and extrahepatic biliary system are of  normal caliber.  The common bile duct measures 3 mm.    Pancreas: Obscured by overlying bowel.     Right kidney measures 10.8 cm in length and demonstrates normal  parenchymal echotexture. No hydronephrosis or calcified stone.      Fluid: Small/moderate volume ascites      Impression    Impression:   1.  Patent hepatic vasculature.  2.  Cirrhotic configuration of the liver with small/moderate volume  ascites. The known right hepatic lobe lesion better  assessed/identified on MRI.    I have personally reviewed the examination and initial  interpretation  and I agree with the findings.    KOJO ALMONTE MD         SYSTEM ID:  [id number]   Echo Complete     Value    LVEF  60-65%    Narrative    [id number]  [id number]  [id number]  [id number]^SEVERSON^HAYLEY     [facility],[place]  Echocardiography Laboratory  [address] [place] [id number]     Name: GILLIAN VILLAFANA  MRN: [id number]  : 1960  Study Date: 2022 07:54 AM  Age: 61 yrs  Gender: Male  Patient Location: [facility]  Reason For Study: Pericardial Effusion  Ordering Physician: SEVERSON, HAYLEY  Performed By: Laura Jeffries     BSA: 1.6 m2  Height: 65 in  Weight: 127 lb  HR: 98  BP: 119/75 mmHg  ______________________________________________________________________________  Procedure  Complete Portable Echo Adult. Echocardiogram with two-dimensional, color and  spectral Doppler performed.  ______________________________________________________________________________  Interpretation Summary  Global and regional left ventricular function is normal with an EF of 60-65%.  Global right ventricular function is normal.  Mild mitral and tricuspid insufficiency present.  The inferior vena cava was normal in size with preserved respiratory  variability.  No pericardial effusion is present.  ______________________________________________________________________________  Left Ventricle  Left ventricular size is normal. Left ventricular wall thickness is normal.  Global and regional left ventricular function is normal with an EF of 60-65%.  Left ventricular diastolic function is normal. No regional wall motion  abnormalities are seen.     Right Ventricle  The right ventricle is normal size. Global right ventricular function is  normal.     Atria  Both atria appear normal.     Mitral Valve  Mild mitral insufficiency is present.     Aortic Valve  Mild aortic valve calcification is present. The valve leaflets are not well  visualized.     Tricuspid Valve  The tricuspid valve is  normal. Trace to mild tricuspid insufficiency is  present. The right ventricular systolic pressure is approximated at 24.0 mmHg  plus the right atrial pressure. Pulmonary artery systolic pressure is normal.     Pulmonic Valve  The pulmonic valve is normal.     Vessels  The aorta root is normal. The inferior vena cava was normal in size with  preserved respiratory variability.     Pericardium  No pericardial effusion is present.     Miscellaneous  Ascites is noted.     Compared to Previous Study  There is no prior study for direct comparison.  ______________________________________________________________________________  MMode/2D Measurements & Calculations  IVSd: 0.97 cm  LVIDd: 4.4 cm  LVIDs: 2.4 cm  LVPWd: 0.97 cm  FS: 44.2 %  LV mass(C)d: 140.8 grams  LV mass(C)dI: 86.3 grams/m2  Ao root diam: 2.9 cm  LA dimension: 3.5 cm  LA/Ao: 1.2  LVOT diam: 1.8 cm  LVOT area: 2.5 cm2  LA Volume (BP): 57.6 ml     LA Volume Index (BP): 35.3 ml/m2  RWT: 0.45     Doppler Measurements & Calculations  MV E max adam: 85.9 cm/sec  MV A max adam: 95.6 cm/sec  MV E/A: 0.90  MV dec slope: 530.0 cm/sec2  Ao V2 max: 149.0 cm/sec  Ao max P.0 mmHg  Ao V2 mean: 105.0 cm/sec  Ao mean P.0 mmHg  Ao V2 VTI: 28.1 cm  QUEENIE(I,D): 1.9 cm2  QUEENIE(V,D): 1.8 cm2  LV V1 max P.2 mmHg  LV V1 max: 103.0 cm/sec  LV V1 VTI: 20.5 cm  SV(LVOT): 52.2 ml  SI(LVOT): 32.0 ml/m2  TR max adam: 245.0 cm/sec  TR max P.0 mmHg  AV Adam Ratio (DI): 0.69  QUEENIE Index (cm2/m2): 1.1  E/E' av.2  Lateral E/e': 6.8  Medial E/e': 9.6     ______________________________________________________________________________  Report approved by: Trell Hawley 2022 11:13 AM               Discharge Medications   Current Discharge Medication List      START taking these medications    Details   enalapril (VASOTEC) 10 MG tablet Take 1 tablet (10 mg) by mouth daily  Qty: 90 tablet, Refills: 0    Associated Diagnoses: HCC (hepatocellular carcinoma) (H)      ferrous  sulfate (FE TABS) 325 (65 Fe) MG EC tablet Take 1 tablet (325 mg) by mouth daily  Qty: 90 tablet, Refills: 0    Associated Diagnoses: Iron deficiency anemia, unspecified iron deficiency anemia type      gabapentin (NEURONTIN) 300 MG capsule Start with 300mg once daily for days 1 &2, then increase to 300mg twice daily  Qty: 30 capsule, Refills: 0      metoprolol succinate ER (TOPROL XL) 50 MG 24 hr tablet Take 1 tablet (50 mg) by mouth daily  Qty: 90 tablet, Refills: 0    Associated Diagnoses: HCC (hepatocellular carcinoma) (H)      pantoprazole (PROTONIX) 40 MG EC tablet Take 1 tablet (40 mg) by mouth every morning (before breakfast)  Qty: 90 tablet, Refills: 0    Associated Diagnoses: Melena      tenofovir (VIREAD) 300 MG tablet Take 1 tablet (300 mg) by mouth daily  Qty: 90 tablet, Refills: 0    Associated Diagnoses: HCC (hepatocellular carcinoma) (H)      valACYclovir (VALTREX) 1000 mg tablet Take 1 tablet (1,000 mg) by mouth 3 times daily for 7 days  Qty: 21 tablet, Refills: 0         CONTINUE these medications which have CHANGED    Details   furosemide (LASIX) 20 MG tablet Take 1 tablet (20 mg) by mouth daily  Qty: 90 tablet, Refills: 0    Associated Diagnoses: HCC (hepatocellular carcinoma) (H)      spironolactone (ALDACTONE) 50 MG tablet Take 1 tablet (50 mg) by mouth daily  Qty: 90 tablet, Refills: 0    Associated Diagnoses: HCC (hepatocellular carcinoma) (H)         CONTINUE these medications which have NOT CHANGED    Details   insulin glargine (LANTUS SOLOSTAR) 100 UNIT/ML pen Inject 5 Units Subcutaneous At Bedtime      loratadine (CLARITIN) 10 MG tablet Take 10 mg by mouth daily      oxyCODONE (ROXICODONE) 5 MG tablet Take 2.5 mg by mouth every 6 hours as needed for severe pain         STOP taking these medications       ibuprofen (ADVIL/MOTRIN) 200 MG tablet Comments:   Reason for Stopping:             Allergies   Allergies   Allergen Reactions     Crabs [Crustaceans]      Pork (Porcine) Protein Itching  and Unknown     Seafood Hives and Unknown     Shellfish Allergy Unknown

## 2022-06-02 ENCOUNTER — PATIENT OUTREACH (OUTPATIENT)
Dept: CARE COORDINATION | Facility: CLINIC | Age: 62
End: 2022-06-02
Payer: MEDICARE

## 2022-06-02 DIAGNOSIS — Z71.89 OTHER SPECIFIED COUNSELING: ICD-10-CM

## 2022-06-02 LAB
HBV DNA SERPL NAA+PROBE-ACNC: 3125 IU/ML
HBV DNA SERPL NAA+PROBE-LOG IU: 3.5 {LOG_IU}/ML

## 2022-06-02 NOTE — PROGRESS NOTES
Clinic Care Coordination Contact  [facility]/Voicemail       Clinical Data: Care Coordinator Outreach  Outreach attempted x 1.  Left message on patient's voicemail with call back information and requested return call.  Plan: Care Coordinator will try to reach patient again in 1-2 business days.          AG Ling  [phone]  [facility]

## 2022-06-03 NOTE — PROGRESS NOTES
Clinic Care Coordination Contact  [facility]/Voicemail       Clinical Data: Care Coordinator Outreach  Outreach attempted x 2.  Left message on patient's voicemail with call back information and requested return call.  Plan: Care Coordinator will try to reach patient again in 1-2 business days.    Razia Pierce, [facility]  [phone]  [facility]

## 2022-06-03 NOTE — PLAN OF CARE
Occupational Therapy Discharge Summary    Reason for therapy discharge:    Discharged to home.    Progress towards therapy goal(s). See goals on Care Plan in [facility] electronic health record for goal details.  Goals partially met.  Barriers to achieving goals:   discharge from facility.    Therapy recommendation(s):    Continue home exercise program.       crush pills for administration

## 2022-06-04 LAB — BACTERIA FLD CULT: NO GROWTH

## 2022-06-15 ENCOUNTER — ANCILLARY PROCEDURE (OUTPATIENT)
Dept: MRI IMAGING | Facility: CLINIC | Age: 62
End: 2022-06-15
Attending: RADIOLOGY
Payer: MEDICARE

## 2022-06-15 DIAGNOSIS — C22.0 HEPATOCELLULAR CARCINOMA (H): ICD-10-CM

## 2022-06-15 PROCEDURE — A9585 GADOBUTROL INJECTION: HCPCS | Performed by: STUDENT IN AN ORGANIZED HEALTH CARE EDUCATION/TRAINING PROGRAM

## 2022-06-15 PROCEDURE — 74183 MRI ABD W/O CNTR FLWD CNTR: CPT | Mod: ME | Performed by: STUDENT IN AN ORGANIZED HEALTH CARE EDUCATION/TRAINING PROGRAM

## 2022-06-15 PROCEDURE — G1004 CDSM NDSC: HCPCS | Mod: GC | Performed by: STUDENT IN AN ORGANIZED HEALTH CARE EDUCATION/TRAINING PROGRAM

## 2022-06-15 RX ORDER — GADOBUTROL 604.72 MG/ML
7.5 INJECTION INTRAVENOUS ONCE
Status: COMPLETED | OUTPATIENT
Start: 2022-06-15 | End: 2022-06-15

## 2022-06-15 RX ADMIN — GADOBUTROL 6 ML: 604.72 INJECTION INTRAVENOUS at 14:47

## 2022-06-15 NOTE — TELEPHONE ENCOUNTER
Called patient and let him know imaging was discussed.     Interventional radiology referral was recommended (as we discussed yesterday). They'll call next week to schedule appointment.     Please also let him know that is is recommended that he have a bone scan and a CT scan of chest to see if there is any cancer outside his liver.     This helps us give him a better idea of prognosis.     Hanny PERALES LPN  
no known mental health issues.

## 2022-06-15 NOTE — DISCHARGE INSTRUCTIONS
MRI Contrast Discharge Instructions    The IV contrast you received today will pass out of your body in your  urine. This will happen in the next 24 hours. You will not feel this process.  Your urine will not change color.    Drink at least 4 extra glasses of water or juice today (unless your doctor  has restricted your fluids). This reduces the stress on your kidneys.  You may take your regular medicines.    If you are on dialysis: It is best to have dialysis today.    If you have a reaction: Most reactions happen right away. If you have  any new symptoms after leaving the hospital (such as hives or swelling),  call your hospital at the correct number below. Or call your family doctor.  If you have breathing distress or wheezing, call 911.    Special instructions: ***    I have read and understand the above information.    Signature:______________________________________ Date:___________    Staff:__________________________________________ Date:___________     Time:__________    [facility] Departments:    ___[place]: [phone]  ___[facility]: [phone]  ___[place]: [phone] ___[facility]: [phone]  ___[facility]: [phone]  ___[facility]: [phone]  ___[phone]  ___[facility]: [phone]  ___[phone]

## 2022-06-20 ENCOUNTER — ONCOLOGY VISIT (OUTPATIENT)
Dept: ONCOLOGY | Facility: CLINIC | Age: 62
End: 2022-06-20
Attending: STUDENT IN AN ORGANIZED HEALTH CARE EDUCATION/TRAINING PROGRAM
Payer: MEDICARE

## 2022-06-20 VITALS
TEMPERATURE: 99.2 F | WEIGHT: 122 LBS | SYSTOLIC BLOOD PRESSURE: 126 MMHG | OXYGEN SATURATION: 99 % | DIASTOLIC BLOOD PRESSURE: 71 MMHG | BODY MASS INDEX: 20.3 KG/M2 | HEART RATE: 97 BPM

## 2022-06-20 DIAGNOSIS — R64 CACHEXIA (H): ICD-10-CM

## 2022-06-20 DIAGNOSIS — C22.0 HCC (HEPATOCELLULAR CARCINOMA) (H): Primary | ICD-10-CM

## 2022-06-20 DIAGNOSIS — R91.8 PULMONARY NODULES: ICD-10-CM

## 2022-06-20 PROCEDURE — 99417 PROLNG OP E/M EACH 15 MIN: CPT | Performed by: STUDENT IN AN ORGANIZED HEALTH CARE EDUCATION/TRAINING PROGRAM

## 2022-06-20 PROCEDURE — G0463 HOSPITAL OUTPT CLINIC VISIT: HCPCS

## 2022-06-20 PROCEDURE — 99215 OFFICE O/P EST HI 40 MIN: CPT | Performed by: STUDENT IN AN ORGANIZED HEALTH CARE EDUCATION/TRAINING PROGRAM

## 2022-06-20 ASSESSMENT — PAIN SCALES - GENERAL: PAINLEVEL: SEVERE PAIN (6)

## 2022-06-20 NOTE — LETTER
6/20/2022         RE: Rishabh Cabrera  [address]  [facility] [id number]        Dear Colleague,    Thank you for referring your patient, Rishabh Cabrera, to the [facility]. Please see a copy of my visit note below.    [facility]  Medical Oncology Return Visit Note    Patient name: Rishabh Cabrera  YOB: 1960    Oncologic History:  Diagnosis/ stage of cancer: Hep B cirrhosis associated HCC, liver limited.    Prior treatment(s):   On Hep B treatment  Y90 to R liver in 9/2021    Care Team  Medical oncologist: Arturo Lund  Radiation oncologist: Dr. Kenneth So  Hepatology: Dr. Thomas Leventhal  Home support: Ajit, son [phone]    Reason for consultation/ patient visit: recent ascites, recent diagnosis of 6 cm HCC    History of presenting illness:  Mr. Rishabh Cabrera is a 62 year old man with known DM2, chronic Hep B infection and related cirrhosis, complicated by small varices and ascites, who was recently found to have a 6 cm HCC by imaging criteria In segments 5/6 and a possible separate  focus in segment 7.  On tenofovir for Hep B, viral load log 8 in 5/2021.  Has had 1 LVP in 6/2021-- cytology neg.  MR abdomen 6/2021  1. Cirrhosis and evidence of portal hypertension including  splenomegaly, trace perihepatic ascites and small gastroesophageal  varices.  2. Interval significant enlargement of previously noted 2.6 cm lesion  in segment 5/6, now measuring 6.2 cm.  2a. LIRADS 3 9 mm lesion in segment 7 is somewhat suspicious for HCC,  based on slight growth since 2019 and appearance similar to arterial  enhancement of larger hepatocellular carcinoma within the dysplastic  nodule in 2018 and 2019. Close attention on follow-up.  2b. Multiple arterially enhancing foci in the liver without associated  abnormalities on other sequences, likely represent vascular shunts.  Attention on follow-up.  3. Based on this exam only, the patient is not within Caio criteria.  Bone scan  with no bone lesions, CT chest with 1.5 cm L apex spiculated lesion, MR abdomen as above.    We met in July 2021 and decided to biopsy lung nodule and proceed with locorgeional therapy    9/9/2021: y90 to R liver lobe  Had some fatigue after    10/4/21: follow up liver MRI  1. Cirrhosis and evidence of portal hypertension including splenomegaly and mild to moderate volume ascites.  2. Post Y-90 therapy involving the hepatic segment 5/6 lesion. Compared to 6/22/2021, overall size and enhancement pattern of the lesion is grossly unchanged; LIRADS 5TR- Viable  3. Stable 8 mm arterial enhancing lesion in hepatic segment 7 without washout or pseudocapsule formation, LIRADS 3. No new suspicious hepatic lesions.    cTACE in 12/2021    ED visit for chest pain on 10/6/21-- ruled out ACS and PE    Missed couple of appts with us over late 2021 and early 2022.    Underwent biopsy of RICH lung lesion on 5/12/2022-- aborted due to hemoptysis, small sample obtained, no malignant cells-- -Minute fragment of benign respiratory mucosa with scant attached alveolated lung tissue-- Hospital stay, small pneumothorax. Resolved with conservative treatment.    Admission in late June 2022 for herpes zoster of L anterior chest wall and anemia, no EGD done inpatient.    MRI abdomen 6/15/2022:  1. Cirrhotic configuration of the liver with evidence of portal hypertension. Large volume ascites .  2. Posttreatment changes of liver segment 6. Decreased conspicuity of previously demonstrated nodular area of enhancement along the inferior medial margin of the treatment zone; slight decrease in size of treatment zone. Increased enhancement along the superior aspect of the treatment zone without distinct nodularity. TR-equivocal.  3. Additional arterial enhancing subcentimeter lesions without suspicious features. LR-3. No new suspicious lesions.  4. Unchanged loculated bilateral pleural effusions.        Interval history:  Visits alone today, visit  accomplished using [employer]  on clinic ipad  Was sad during visit  Feels he does not have good support, hard to eat. Weight is dropping.  Belly remains swollen.  He does not know which meds he is taking  Shares pictures of his older self, looking great in a  shirt and tie  Rash on chest improving  Says he does basic self care        Review of Systems: 14 point ROS negative other than the symptoms noted above in the HPI.    Past medical history:  DM2- A1c 7.9  Chronic Hep B  Cirrhosis  HCC  Lung nodule    Past surgical history:  Colon surgery  Y90 procedure    Social history:   Likes fishing and helping others  Retired     Lives with son now    Family history:  Mother with Hep B    Allergies:   No drug allergies    Outpatient medications:   Tenofovir daily  Lantus 10-12 units daily  Diuretics (furosemide/ spironolactone)       Physical examination:    /71   Pulse 97   Temp 99.2  F (37.3  C) (Oral)   Wt 55.3 kg (122 lb)   SpO2 99%   BMI 20.30 kg/m      ECOG performance status: 2     Vascular access: none    General: thin man, temporal wasting, no acute distress  HEENT: sclera anicteric  Neck: supple, normal ROM  CV: RRR,  Resp: normal respiratory effort  GI: soft, non-tender, very distended  MSK: warm and well-perfused  Skin: angiomas on chest, healing rash on L anterior thorax  Neuro: Alert and interactive,        Lab data:  I have personally reviewed the following lab data:  Albumin 1.9, bili 1.6      Radiology data:  I have personally reviewed the images of the following radiology data:   MR abdomen from 6/2022    Pathology and other data:  I have personally reviewed the following pathology data: cytology of ascitic fluid in June 2021-- negative    Assessment and Plan:  Mr. Rishabh Cabrera is a 62 year old  male with prior history of HepB/ cirrhosis who presented with ascites and hwas found to have segment 5/6 6 cm HCC (by imaging criteria) and possible focus of HCC in segment 7.  Perhaps a separate primary in L lung apex. Otherwise no extrahepatic spread.    In fall 2021, Child Olivera B8-9 perhaps. Bili <2, albumin <2.8, INR 1.7-2.2, some ascites, and no encephalopathy that we know of.   Underwent Y90 in 9/2021 with minimal response, so cTACE in 12/2021. Better response.    Since then, missed couple of appts with me, we have previously chatted about treatment options and need to workup lung nodule-- after a long while, biopsy of RICH lung nodule was attempted but complicated by hemoptysis and pneumothorax, with inadequate sampling.    In 6/2022, he is more CP C10-11 with bili<2, albumin <2.8, INR>2.2, + ascites.    There are many social barriers to health which is very unfortunate. He shared that he does not have the most extensive support from family, and he does not even know exactly what meds he is taking.   I spent >45 minutes chatting to him today with the .  I showed him detailed images of liver per his request.    His liver is quite sick, and imaging shows quite stable HCC. I do not think Dr. So will offer more locoregional treatment but we can wait to see what he says. They will meet tomorrow in clinic.    We discussed that ideally, we would biopsy and possibly resect/ radiate the lung primary if it is indeed limited stage lung cancer. We also discussed that given background liver disease, our biggest danger is from that, not the 2 cancers per se, which per se, are each limited stage (if we assume he has localized HCC and localized lung cancer).  The tumor in liver has been stable for approx 6-7 months after liver-directed treatment.    I do not think anti-cancer treatment is going to radically change anything. With recent hemoptysis, can't safely use VEGFi for HCC treatment. Single agent immunotherapy could have a RR of around 10%-- but even if tumor responds, I am not sure it will change anything.  It will only impose burdens. He has mistrust in the healthcare  system-- for example, he told me today that ''you are just seeing me to make money''-- I understand his frustrations.    I will ask our social work team to reach out to him as to how best we can help him. I am unsure myself.  Given his state of mind re: invasive procedures and ''chemotherapy'' (against), background liver disease (severe), poor social support and health literacy, and the fact that I dont think there is a magic anti-cancer treatment that will help his underlying liver disease or overall status, we did not decide to pursue any diagnostic or therapeutic interventions this visit, other than chatting.    I will communicate with Myron So and Leventhal re: how best to help.      80 minutes spent on the date of the encounter doing chart review, review of test results, interpretation of tests, patient visit, documentation, discussion with other provider(s) and discussion with family           Again, thank you for allowing me to participate in the care of your patient.      Sincerely,    Arturo Lund MD

## 2022-06-20 NOTE — NURSING NOTE
"Oncology Rooming Note    June 20, 2022 1:30 PM   Rishabh Cabrera is a 62 year old male who presents for:    Chief Complaint   Patient presents with     Oncology Clinic Visit     HCC     Initial Vitals: There were no vitals taken for this visit. Estimated body mass index is 20.9 kg/m  as calculated from the following:    Height as of 5/29/22: 1.651 m (5' 5\").    Weight as of 5/31/22: 57 kg (125 lb 9.6 oz). There is no height or weight on file to calculate BSA.  Data Unavailable Comment: Data Unavailable   No LMP for male patient.  Allergies reviewed: Yes  Medications reviewed: Yes    Medications: MEDICATION REFILLS NEEDED TODAY. Provider was notified. Unsure of what needs refill    Pharmacy name entered into [email or web address]:    [facility] - [place], [place] - [address] [id number] - [place] - [address] [id number] - [facility] [address] [address] [facility] [address] [facility] [address].    Clinical concerns: has shingles. Also wants to discuss medications to see what he should be taking and why. Also wants to discuss imaging results from the last two times, he wants to know how the cancer has changed.  Worried about appetite and weight loss, 10/10  Concern.  Says wife kicked him out of the house, doesn't have anyone to help take care of him any more, is quite depressed/sad about this. When asked if he had someone to talk to or wanted someone to talk to he said that it is not worth it right now and he just wants to focus on his diagnosis.     Helen Erwin, [facility]            "

## 2022-06-21 NOTE — PROGRESS NOTES
[facility]  Medical Oncology Return Visit Note    Patient name: Rishabh Cabrera  YOB: 1960    Oncologic History:  Diagnosis/ stage of cancer: Hep B cirrhosis associated HCC, liver limited.    Prior treatment(s):   On Hep B treatment  Y90 to R liver in 9/2021    Care Team  Medical oncologist: Arturo Lund  Radiation oncologist: Dr. Kenneth So  Hepatology: Dr. Thomas Leventhal  Home support: Ajit, son [phone]    Reason for consultation/ patient visit: recent ascites, recent diagnosis of 6 cm HCC    History of presenting illness:  Mr. Rishabh Cabrera is a 62 year old man with known DM2, chronic Hep B infection and related cirrhosis, complicated by small varices and ascites, who was recently found to have a 6 cm HCC by imaging criteria In segments 5/6 and a possible separate  focus in segment 7.  On tenofovir for Hep B, viral load log 8 in 5/2021.  Has had 1 LVP in 6/2021-- cytology neg.  MR abdomen 6/2021  1. Cirrhosis and evidence of portal hypertension including  splenomegaly, trace perihepatic ascites and small gastroesophageal  varices.  2. Interval significant enlargement of previously noted 2.6 cm lesion  in segment 5/6, now measuring 6.2 cm.  2a. LIRADS 3 9 mm lesion in segment 7 is somewhat suspicious for HCC,  based on slight growth since 2019 and appearance similar to arterial  enhancement of larger hepatocellular carcinoma within the dysplastic  nodule in 2018 and 2019. Close attention on follow-up.  2b. Multiple arterially enhancing foci in the liver without associated  abnormalities on other sequences, likely represent vascular shunts.  Attention on follow-up.  3. Based on this exam only, the patient is not within Berwind criteria.  Bone scan with no bone lesions, CT chest with 1.5 cm L apex spiculated lesion, MR abdomen as above.    We met in July 2021 and decided to biopsy lung nodule and proceed with locorgeional therapy    9/9/2021: y90 to R liver lobe  Had some fatigue  after    10/4/21: follow up liver MRI  1. Cirrhosis and evidence of portal hypertension including splenomegaly and mild to moderate volume ascites.  2. Post Y-90 therapy involving the hepatic segment 5/6 lesion. Compared to 6/22/2021, overall size and enhancement pattern of the lesion is grossly unchanged; LIRADS 5TR- Viable  3. Stable 8 mm arterial enhancing lesion in hepatic segment 7 without washout or pseudocapsule formation, LIRADS 3. No new suspicious hepatic lesions.    cTACE in 12/2021    ED visit for chest pain on 10/6/21-- ruled out ACS and PE    Missed couple of appts with us over late 2021 and early 2022.    Underwent biopsy of RICH lung lesion on 5/12/2022-- aborted due to hemoptysis, small sample obtained, no malignant cells-- -Minute fragment of benign respiratory mucosa with scant attached alveolated lung tissue-- Hospital stay, small pneumothorax. Resolved with conservative treatment.    Admission in late June 2022 for herpes zoster of L anterior chest wall and anemia, no EGD done inpatient.    MRI abdomen 6/15/2022:  1. Cirrhotic configuration of the liver with evidence of portal hypertension. Large volume ascites .  2. Posttreatment changes of liver segment 6. Decreased conspicuity of previously demonstrated nodular area of enhancement along the inferior medial margin of the treatment zone; slight decrease in size of treatment zone. Increased enhancement along the superior aspect of the treatment zone without distinct nodularity. TR-equivocal.  3. Additional arterial enhancing subcentimeter lesions without suspicious features. LR-3. No new suspicious lesions.  4. Unchanged loculated bilateral pleural effusions.        Interval history:  Visits alone today, visit accomplished using [employer]  on clinic ipad  Was sad during visit  Feels he does not have good support, hard to eat. Weight is dropping.  Belly remains swollen.  He does not know which meds he is taking  Shares pictures of his older  self, looking great in a  shirt and tie  Rash on chest improving  Says he does basic self care        Review of Systems: 14 point ROS negative other than the symptoms noted above in the HPI.    Past medical history:  DM2- A1c 7.9  Chronic Hep B  Cirrhosis  HCC  Lung nodule    Past surgical history:  Colon surgery  Y90 procedure    Social history:   Likes fishing and helping others  Retired     Lives with son now    Family history:  Mother with Hep B    Allergies:   No drug allergies    Outpatient medications:   Tenofovir daily  Lantus 10-12 units daily  Diuretics (furosemide/ spironolactone)       Physical examination:    /71   Pulse 97   Temp 99.2  F (37.3  C) (Oral)   Wt 55.3 kg (122 lb)   SpO2 99%   BMI 20.30 kg/m      ECOG performance status: 2     Vascular access: none    General: thin man, temporal wasting, no acute distress  HEENT: sclera anicteric  Neck: supple, normal ROM  CV: RRR,  Resp: normal respiratory effort  GI: soft, non-tender, very distended  MSK: warm and well-perfused  Skin: angiomas on chest, healing rash on L anterior thorax  Neuro: Alert and interactive,        Lab data:  I have personally reviewed the following lab data:  Albumin 1.9, bili 1.6      Radiology data:  I have personally reviewed the images of the following radiology data:   MR abdomen from 6/2022    Pathology and other data:  I have personally reviewed the following pathology data: cytology of ascitic fluid in June 2021-- negative    Assessment and Plan:  Mr. Rishabh Cabrera is a 62 year old  male with prior history of HepB/ cirrhosis who presented with ascites and hwas found to have segment 5/6 6 cm HCC (by imaging criteria) and possible focus of HCC in segment 7. Perhaps a separate primary in L lung apex. Otherwise no extrahepatic spread.    In fall 2021, Child Olivera B8-9 perhaps. Bili <2, albumin <2.8, INR 1.7-2.2, some ascites, and no encephalopathy that we know of.   Underwent Y90 in 9/2021 with minimal  response, so cTACE in 12/2021. Better response.    Since then, missed couple of appts with me, we have previously chatted about treatment options and need to workup lung nodule-- after a long while, biopsy of RICH lung nodule was attempted but complicated by hemoptysis and pneumothorax, with inadequate sampling.    In 6/2022, he is more CP C10-11 with bili<2, albumin <2.8, INR>2.2, + ascites.    There are many social barriers to health which is very unfortunate. He shared that he does not have the most extensive support from family, and he does not even know exactly what meds he is taking.   I spent >45 minutes chatting to him today with the .  I showed him detailed images of liver per his request.    His liver is quite sick, and imaging shows quite stable HCC. I do not think Dr. So will offer more locoregional treatment but we can wait to see what he says. They will meet tomorrow in clinic.    We discussed that ideally, we would biopsy and possibly resect/ radiate the lung primary if it is indeed limited stage lung cancer. We also discussed that given background liver disease, our biggest danger is from that, not the 2 cancers per se, which per se, are each limited stage (if we assume he has localized HCC and localized lung cancer).  The tumor in liver has been stable for approx 6-7 months after liver-directed treatment.    I do not think anti-cancer treatment is going to radically change anything. With recent hemoptysis, can't safely use VEGFi for HCC treatment. Single agent immunotherapy could have a RR of around 10%-- but even if tumor responds, I am not sure it will change anything.  It will only impose burdens. He has mistrust in the healthcare system-- for example, he told me today that ''you are just seeing me to make money''-- I understand his frustrations.    I will ask our social work team to reach out to him as to how best we can help him. I am unsure myself.  Given his state of mind re:  invasive procedures and ''chemotherapy'' (against), background liver disease (severe), poor social support and health literacy, and the fact that I dont think there is a magic anti-cancer treatment that will help his underlying liver disease or overall status, we did not decide to pursue any diagnostic or therapeutic interventions this visit, other than chatting.    I will communicate with Myron So and Leventhal re: how best to help.        80 minutes spent on the date of the encounter doing chart review, review of test results, interpretation of tests, patient visit, documentation, discussion with other provider(s) and discussion with family       Arturo Lund M.D.   of Medicine  Division of Hematology, Oncology and Transplantation  [facility]

## 2022-06-23 ENCOUNTER — TELEPHONE (OUTPATIENT)
Dept: GASTROENTEROLOGY | Facility: CLINIC | Age: 62
End: 2022-06-23

## 2022-06-23 ENCOUNTER — TELEPHONE (OUTPATIENT)
Dept: ONCOLOGY | Facility: CLINIC | Age: 62
End: 2022-06-23

## 2022-06-23 ENCOUNTER — TELEPHONE (OUTPATIENT)
Dept: RADIOLOGY | Facility: CLINIC | Age: 62
End: 2022-06-23

## 2022-06-23 DIAGNOSIS — K74.60 CHRONIC HEPATITIS B WITH CIRRHOSIS (H): Primary | ICD-10-CM

## 2022-06-23 DIAGNOSIS — B18.1 CHRONIC HEPATITIS B WITH CIRRHOSIS (H): Primary | ICD-10-CM

## 2022-06-23 NOTE — PROGRESS NOTES
CLINICAL NUTRITION SERVICES- ONCOLOGY DISTRESS SCREENING     Identified Concern and Score From Distress Screening: This patient has scored >= 6 on Nutrition question 1 and/or 2 on the Oncology Distress Screening questionaire. Nutritionist Referral recommended. See Onc Distress Screening Flowsheet     Date of Distress Screenin/20     Findings: Writer had conversation with phone  and pt. Rishabh is mostly eating rice porridge and fruit a few times per day. Reports poor appetite, difficulty making foods, and that nothing tastes good. Rishabh used to drink nutrition supplements but they are too expensive currently.     During our conversation pt reported feeling sad, distrust of the medical system, and financial difficulties. Writer offered to contact social work/care coordinator for pt, pt declined at this time.      Intervention: Discussed current PO intake. Discussed options for high calorie/protein foods, pt thought the easiest option for him would be nutrition supplements. RD to provide nutrition samples and coupons via mail for pt, pt okay with this plan.      Education Provided: as above     Follow-up Required: RYNE Guerra RD, LD  [phone]

## 2022-06-23 NOTE — TELEPHONE ENCOUNTER
Called patient to discuss whether he has been taking his diuretics (furosemide 20 mg and spironolactone 50 mg)  to help with fluid in abdomen.     Patient states he has not taken any of the medications prescribed while in hospital except for the tenofovir, which he was already familiar with.     States he was given discharge instructions on medication but did not understand the terminology so did not take the medication.      Writer let him know the furosemide and spironolactone is to help remove fluid from abdomen.  Patient states he will start taking those medication.  Would like to speak to pharmacist about other medications he is prescribed.  Let him know will put in referral to discuss medications.     Hanny PERALES LPN  Hepatology Clinic

## 2022-06-24 ENCOUNTER — PATIENT OUTREACH (OUTPATIENT)
Dept: CARE COORDINATION | Facility: CLINIC | Age: 62
End: 2022-06-24

## 2022-06-24 NOTE — PROGRESS NOTES
Social Work Telephone Message Note  [facility]    Patient Name:  Rishabh Cabrera  /Age:  1960 (62 year old)    Referral Source: Dr. Lund, Oncology  Reason for Referral:  Check in with Patient and offer resources.     contacted Patient via telephone with professional ong  on 2022. Message was left on Patient's voicemail to be called back.  will await Patient's return phone call and will provide assistance at that time.    Note:  did receive communication from [employer] this week stating that Patient had declined to renew his delivered meals because he cannot eat the food.    Michelle Koroma [facility]  Clinical , Outpatient Specialty Clinics  Direct Phone: [phone]

## 2022-06-27 ENCOUNTER — VIRTUAL VISIT (OUTPATIENT)
Dept: PHARMACY | Facility: CLINIC | Age: 62
End: 2022-06-27
Attending: PHYSICIAN ASSISTANT

## 2022-06-27 DIAGNOSIS — E11.9 TYPE 2 DIABETES MELLITUS WITHOUT COMPLICATION, WITH LONG-TERM CURRENT USE OF INSULIN (H): ICD-10-CM

## 2022-06-27 DIAGNOSIS — K74.60 CHRONIC HEPATITIS B WITH CIRRHOSIS (H): ICD-10-CM

## 2022-06-27 DIAGNOSIS — B02.9 HERPES ZOSTER WITHOUT COMPLICATION: ICD-10-CM

## 2022-06-27 DIAGNOSIS — B18.1 CHRONIC HEPATITIS B WITH CIRRHOSIS (H): ICD-10-CM

## 2022-06-27 DIAGNOSIS — D64.9 ANEMIA, UNSPECIFIED TYPE: Primary | ICD-10-CM

## 2022-06-27 DIAGNOSIS — K21.9 GASTROESOPHAGEAL REFLUX DISEASE, UNSPECIFIED WHETHER ESOPHAGITIS PRESENT: ICD-10-CM

## 2022-06-27 DIAGNOSIS — R52 PAIN: ICD-10-CM

## 2022-06-27 DIAGNOSIS — I10 HYPERTENSION, UNSPECIFIED TYPE: ICD-10-CM

## 2022-06-27 DIAGNOSIS — J30.2 SEASONAL ALLERGIC RHINITIS, UNSPECIFIED TRIGGER: ICD-10-CM

## 2022-06-27 DIAGNOSIS — Z79.4 TYPE 2 DIABETES MELLITUS WITHOUT COMPLICATION, WITH LONG-TERM CURRENT USE OF INSULIN (H): ICD-10-CM

## 2022-06-27 PROCEDURE — 99605 MTMS BY PHARM NP 15 MIN: CPT | Performed by: PHARMACIST

## 2022-06-27 PROCEDURE — 99607 MTMS BY PHARM ADDL 15 MIN: CPT | Performed by: PHARMACIST

## 2022-06-27 RX ORDER — CAPSAICIN 0.025 %
CREAM (GRAM) TOPICAL 3 TIMES DAILY
Status: ON HOLD | COMMUNITY
End: 2024-01-01

## 2022-06-27 RX ORDER — TRIAMCINOLONE ACETONIDE 1 MG/G
CREAM TOPICAL PRN
Status: ON HOLD | COMMUNITY
End: 2024-01-01

## 2022-06-27 NOTE — PATIENT INSTRUCTIONS
"Recommendations from today's MTM visit:                                                       1. Call primary doctor to make an appointment.   2. Take the pantoprazole every day to prevent heart burn.   3. Take the ferrous sulfate everyday to help with anemia and fatigue.   4. Take gabapentin 300 mg twice daily to help with shingles pain. No need to take valacyclovir.   5. Do not take blood pressure medications metoprolol and enalapril until you see your primary doctor.   6. Get your Shingles Vaccine at your local pharmacy.     Follow-up: Return in about 3 days (around 6/30/2022) for Follow up, with me, using a phone visit.    It was great speaking with you today.  I value your experience and would be very thankful for your time in providing feedback in our clinic survey. In the next few days, you may receive an email or text message from [employer] with a link to a survey related to your  clinical pharmacist.\"     To schedule another MTM appointment, please call the clinic directly or you may call the MTM scheduling line at [phone] or toll-free at [phone].     My Clinical Pharmacist's contact information:                                                      Please feel free to contact me with any questions or concerns you have.      Kishor Lawrence, Pharm. D., BCACP  Medication Therapy Management Pharmacist  Direct Voicemail: [phone]     "

## 2022-06-27 NOTE — PROGRESS NOTES
Medication Therapy Management (MTM) Encounter    ASSESSMENT:                            Medication Adherence/Access: See below for considerations    Type 2 Diabetes: Unable to fully assess without A1c and more blood sugar information. It sounds like he has uncontrolled blood sugars after eating.    HCC/Acites: Plan in place with hepatology.     Allergies: Unclear as to whether the loratadine would be helping with his swelling or not.    Hypertension: Blood pressure values that populate seem controlled off of metoprolol and enalapril. Since I do not have more information on his history I would recommend he continue off of these until he can follow-up with his PCP.    Shingles pain: Patient should take gabapentin twice a day regularly to help with the shingles pain. He should also get his Shingrix vaccination.    Anemia: I recommend he resume this medication to help with fatigue as his Hgb is quite low.    GERD: Since he has issues with heartburn he should resume taking the pantoprazole.    Muscle Pain: Stable.     PLAN:                            1. Call primary doctor to make an appointment.   2. Take the pantoprazole every day to prevent heart burn.   3. Take the ferrous sulfate everyday to help with anemia and fatigue.   4. Take gabapentin 300 mg twice daily to help with shingles pain. No need to take valacyclovir.   5. Do not take blood pressure medications metoprolol and enalapril until you see your primary doctor.   6. Get your Shingles Vaccine at your local pharmacy.     Follow-up: Return in about 3 days (around 6/30/2022) for Follow up, with me, using a phone visit.    SUBJECTIVE/OBJECTIVE:                          Rishabh Cabrera is a 62 year old male called for a transitions of care visit. He was [facility] from [facility] on [facility]/22 for Shingles/hepatocellular carcinoma/anemia. [place] [facility] [place]     Reason for visit: Medication Questions/NAOMI.    Allergies/ADRs: Reviewed in chart  Past Medical History:  Reviewed in chart  Tobacco: He reports that he has never smoked. He has never used smokeless tobacco.      Post Hospital Changes:    Started: enalapril 10 mg, ferrous sulfate 325 mg, gabapentin 300 mg twice a day, metoprolol ER 50 mg daily, Protonix 40 mg daily, tenofovir 300 mg daily, valacyclovir 1000 mg three times a day for 7 days    Changed: furosemide 20 mg daily, spironolactone 50 mg daily,     Stopped: ibuprofen      Medication Adherence/Access:  doesn't want to discuss anything other than medications. He hasnt been taking most of his medications since he did not know what they were for.     Type 2 Diabetes:  Currently taking lantus 5 units daily. Patient is not experiencing side effects.  Blood sugar monitoring: a few times a week. Ranges (patient reported): Saturday morning it was 125 mg/dL and then 225 after eating. Reports that diabetes is managed by PCP office [facility] on [address]. Has missed some appointments due to low energy.     Aspirin: No  Statin: No - likely not on due to liver status  ACEi/ARB: Yes: enalapril but he is not taking.   Urine Albumin: No results found for: UMALCR   Lab Results   Component Value Date    A1C  05/30/2022      Comment:      Unable to calculate % HBA1C due to low HGB and/or Low HGBA1C.    Normal <5.7%   Prediabetes 5.7-6.4%    Diabetes 6.5% or higher     Note: Adopted from ADA consensus guidelines.         HCC/Acites: Currently taking tenofovir 300 mg daily, furosemide 20 mg daily, and spironolactone 50 mg daily. He reports he takes these daily unless he will be leaving for the day. He wont take it then because he doesn't want to have the urge to go to the bathroom while he is out and this is only once in awhile.     Potassium   Date Value Ref Range Status   06/01/2022 3.8 3.4 - 5.3 mmol/L Final   05/10/2021 4.7 3.4 - 5.3 mmol/L Final     Allergies: Currently taking loratadine 10 mg daily. Reports he is taking this for swelling and that this is helping.      Hypertension: Current medications include enalapril 10 mg daily (reports he only takes if heart is beating fast), and metoprolol succinate 50 mg daily (not taking).  Patient does not self-monitor blood pressure.  Patient reports no current medication side effects.  BP Readings from Last 3 Encounters:   06/20/22 126/71   06/01/22 105/58   05/13/22 132/78     Shingles pain: Currently not taking gabapentin 300 mg twice a day as he did not know what this was for. Has valacyclovir but did not take. He reports that he also uses an over the counter cream for his feet as well called triamcinolone 0.1% as needed and capsaicin 0.25% as needed. Discussed Shingles vaccine and he reports that he plans to get the shingles vaccine now.    Anemia: Currently not taking ferrous sulfate 325 mg daily as he did not know what it was for. Reports issues with fatigue.    Hemoglobin   Date Value Ref Range Status   06/01/2022 8.6 (L) 13.3 - 17.7 g/dL Final   05/10/2021 13.0 (L) 13.3 - 17.7 g/dL Final   ]    GERD: Currently not taking pantoprazole 40 mg daily because he did not know what it was for. Reports he does have issues with heartburn.     Muscle Pain: Currently taking Voltaren 1% gel and Salonpas gel as needed for muscle pains. No concerns or questions at this time.     Today's Vitals: There were no vitals taken for this visit.  ----------------  Post Discharge Medication Reconciliation Status: discharge medications reconciled and changed, per note/orders.    I spent 58 minutes with this patient today. I offer these suggestions with the understanding that I don't fully understand Rishabh's past medical history and the complexity of his health conditions. Rishabh should make no changes without the approval of his physician. A copy of the visit note was provided to the patient's provider(s).    The patient was mailed a summary of these recommendations.     Kishor Lawrence, Pharm. D., BCACP  Medication Therapy Management Pharmacist  Direct  [facility]: [phone]    Telemedicine Visit Details  Type of service:  Telephone visit  Start Time: 10:00 am  End Time: 10:58 am  Originating Location (patient location): Home  Distant Location (provider location):  [facility]     Medication Therapy Recommendations  Anemia, unspecified type    Current Medication: ferrous sulfate (FE TABS) 325 (65 Fe) MG EC tablet   Rationale: Does not understand instructions - Adherence - Adherence   Recommendation: Provide Education   Status: Patient Agreed - Adherence/Education         Gastroesophageal reflux disease, unspecified whether esophagitis present    Current Medication: pantoprazole (PROTONIX) 40 MG EC tablet   Rationale: Does not understand instructions - Adherence - Adherence   Recommendation: Provide Education   Status: Patient Agreed - Adherence/Education         Herpes zoster without complication    Current Medication: gabapentin (NEURONTIN) 300 MG capsule   Rationale: Does not understand instructions - Adherence - Adherence   Recommendation: Provide Education   Status: Patient Agreed - Adherence/Education          Rationale: Preventive therapy - Needs additional medication therapy - Indication   Recommendation: Order Vaccine   Status: Patient Agreed - Adherence/Education         Hypertension, unspecified type    Current Medication: enalapril (VASOTEC) 10 MG tablet   Rationale: No medical indication at this time - Unnecessary medication therapy - Indication   Recommendation: Discontinue Medication   Status: Patient Agreed - Adherence/Education

## 2022-06-28 NOTE — TELEPHONE ENCOUNTER
"I called and left a voicemail including my call back number. With the aid of [employer]  I spoke with Rishabh.  He has multiple questions regarding liver transplant.  I did relate Dr So's message below with the plan to wait 3 months with new MRI and labs and his 7/12 consult will be rescheduled to September.    \"The patient was supposed to be in my clinic yesterday but we couldn't connect as he had no interpretor. I don't see at this point a treatable liver lesion. If the lesion that is equivocal becomes more obvious and his liver function is stable at that time, I may consider a liver directed therapy. For now, we should just watch.   I won't go for a biopsy. -Kenneth Bone, RN, BSN  Interventional Radiology Nurse Coordinator   Phone:  [phone]        "

## 2022-06-30 ENCOUNTER — VIRTUAL VISIT (OUTPATIENT)
Dept: PHARMACY | Facility: CLINIC | Age: 62
End: 2022-06-30

## 2022-06-30 DIAGNOSIS — B02.9 HERPES ZOSTER WITHOUT COMPLICATION: Primary | ICD-10-CM

## 2022-06-30 PROCEDURE — 99606 MTMS BY PHARM EST 15 MIN: CPT | Performed by: PHARMACIST

## 2022-06-30 NOTE — PROGRESS NOTES
Medication Therapy Management (MTM) Encounter    ASSESSMENT:                            Medication Adherence/Access: Discussed importance of taking medications regularly.    Nerve Pain: Patient would still benefit from Shingles vaccine and may need to call his insurance company to determine if they would cover the vaccine in clinic versus at the pharmacy. I reiterated that taking gabapentin everyday twice daily will be more beneficial than taking sporadically.       PLAN:                            1. Call insurance company to inquire about coverage for Shingles vaccine in clinic  2. Make a follow-up appointment with PCP and PharmD at [place].    Follow-up: as needed- patient has MTM PharmD at [facility]    SUBJECTIVE/OBJECTIVE:                          Rishabh Cabrera is a 62 year old male called for a follow-up visit.  Today's visit is a follow-up MTM visit from 6/27/22 also a NAOMI visit.     Reason for visit: follow-up for medication questions about new medications, however there werent any new medications as he expected.    Allergies/ADRs: Reviewed in chart  Past Medical History: Reviewed in chart  Tobacco: He reports that he has never smoked. He has never used smokeless tobacco.     Medication Adherence/Access:  patient does not like to take medications consistently.     Shingles pain: Currently taking gabapentin 300 mg twice a day. Did not report much change in symptoms yet, but he also does not take consistently. Patient is upset that his insurance did not cover the Shingles vaccine at the pharmacy.     Today's Vitals: There were no vitals taken for this visit.  ----------------  Post Discharge Medication Reconciliation Status: medication reconcilation previously completed during another office visit.    I spent 16 minutes with this patient today. I offer these suggestions with the understanding that I don't fully understand Rishabh's past medical history and the complexity of his health conditions. Rishabh  should make no changes without the approval of his physician. A copy of the visit note was provided to the patient's provider(s).    The patient declined a summary of these recommendations.     Kishor Lawrence, Pharm. D., [facility]  Medication Therapy Management Pharmacist  Direct Voicemail: [phone]    Telemedicine Visit Details  Type of service:  Telephone visit  Start Time: 9:40 am  End Time: 9:56 am  Originating Location (patient location): [place]  Distant Location (provider location):  [facility]     Medication Therapy Recommendations  Herpes zoster without complication    Current Medication: gabapentin (NEURONTIN) 300 MG capsule   Rationale: Does not understand instructions - Adherence - Adherence   Recommendation: Provide Education   Status: Patient Agreed - Adherence/Education

## 2022-08-22 NOTE — PHARMACY-ADMISSION MEDICATION HISTORY
Admission Medication History Completed by Pharmacy    See [facility] Admission Navigator for allergy information, preferred outpatient pharmacy, prior to admission medications and immunization status.     Medication History Sources:     Patient    Pharmacy Fill history    Rogers AnthonyD pt interview from 5/10/2022 in chart review    Changes made to PTA medication list (reason):    Added: All    Deleted: None (list was blank)    Changed: None    Additional Information:    Rishabh said he takes the spironolactone and furosemide as needed when he has ascites    Rishabh uses 5 units of Lantus nightly at bedtime- per chart review he used to be on metformin 1000mg BID but no longer uses    Rishabh used to be on tenofovir 300mg daily, but no longer uses as he didn't feel well per chart review. Stopped this March?    Prior to Admission medications    Medication Sig Last Dose Taking? Auth Provider   furosemide (LASIX) 20 MG tablet Take 40 mg by mouth daily as needed (Ascites)  at prn Yes Unknown, Entered By History      Yes Josy Ca MD   ibuprofen (ADVIL/MOTRIN) 200 MG tablet Take 200-400 mg by mouth every 6 hours as needed  at prn Yes Unknown, Entered By History   insulin glargine (LANTUS SOLOSTAR) 100 UNIT/ML pen Inject 5 Units Subcutaneous At Bedtime Past Week at Unknown time Yes Unknown, Entered By History   loratadine (CLARITIN) 10 MG tablet Take 10 mg by mouth daily Past Month at Unknown time Yes Unknown, Entered By History   oxyCODONE (ROXICODONE) 5 MG tablet Take 2.5 mg by mouth every 6 hours as needed for severe pain  at prn Yes Unknown, Entered By History   spironolactone (ALDACTONE) 50 MG tablet Take 50 mg by mouth daily as needed (ascites)  at prn Yes Unknown, Entered By History      Yes Josy Ca MD       Reported, Patient       Reported, Patient       Reported, Patient       Reported, Patient       Reported, Patient       Reported, Patient       Leventhal, Thomas Michael, MD       Date completed:  05/30/22    Medication history completed by: VIDYA MixD, [facility]    [phone]  Pager [phone]               Yes

## 2022-09-21 ENCOUNTER — LAB (OUTPATIENT)
Dept: LAB | Facility: CLINIC | Age: 62
End: 2022-09-21
Payer: MEDICARE

## 2022-09-21 ENCOUNTER — ANCILLARY PROCEDURE (OUTPATIENT)
Dept: MRI IMAGING | Facility: CLINIC | Age: 62
End: 2022-09-21
Attending: RADIOLOGY
Payer: MEDICARE

## 2022-09-21 DIAGNOSIS — C22.0 HCC (HEPATOCELLULAR CARCINOMA) (H): ICD-10-CM

## 2022-09-21 DIAGNOSIS — C22.0 HEPATOCELLULAR CARCINOMA (H): ICD-10-CM

## 2022-09-21 LAB
AFP SERPL-MCNC: 3.2 NG/ML
ALBUMIN SERPL BCG-MCNC: 2.4 G/DL (ref 3.5–5.2)
ALP SERPL-CCNC: 139 U/L (ref 40–129)
ALT SERPL W P-5'-P-CCNC: 13 U/L (ref 10–50)
ANION GAP SERPL CALCULATED.3IONS-SCNC: 4 MMOL/L (ref 7–15)
AST SERPL W P-5'-P-CCNC: 37 U/L (ref 10–50)
BILIRUB DIRECT SERPL-MCNC: 0.64 MG/DL (ref 0–0.3)
BILIRUB SERPL-MCNC: 1.9 MG/DL
BUN SERPL-MCNC: 9.3 MG/DL (ref 8–23)
CALCIUM SERPL-MCNC: 8.2 MG/DL (ref 8.8–10.2)
CHLORIDE SERPL-SCNC: 106 MMOL/L (ref 98–107)
CREAT SERPL-MCNC: 0.82 MG/DL (ref 0.67–1.17)
DEPRECATED HCO3 PLAS-SCNC: 26 MMOL/L (ref 22–29)
ERYTHROCYTE [DISTWIDTH] IN BLOOD BY AUTOMATED COUNT: 18.1 % (ref 10–15)
GFR SERPL CREATININE-BSD FRML MDRD: >90 ML/MIN/1.73M2
GLUCOSE SERPL-MCNC: 97 MG/DL (ref 70–99)
HCT VFR BLD AUTO: 35.3 % (ref 40–53)
HGB BLD-MCNC: 11.5 G/DL (ref 13.3–17.7)
INR PPP: 1.82 (ref 0.85–1.15)
MCH RBC QN AUTO: 29.7 PG (ref 26.5–33)
MCHC RBC AUTO-ENTMCNC: 32.6 G/DL (ref 31.5–36.5)
MCV RBC AUTO: 91 FL (ref 78–100)
PLATELET # BLD AUTO: 76 10E3/UL (ref 150–450)
POTASSIUM SERPL-SCNC: 4.5 MMOL/L (ref 3.4–5.3)
PROT SERPL-MCNC: 8.1 G/DL (ref 6.4–8.3)
RBC # BLD AUTO: 3.87 10E6/UL (ref 4.4–5.9)
SODIUM SERPL-SCNC: 136 MMOL/L (ref 136–145)
WBC # BLD AUTO: 4.6 10E3/UL (ref 4–11)

## 2022-09-21 PROCEDURE — 82248 BILIRUBIN DIRECT: CPT | Performed by: PATHOLOGY

## 2022-09-21 PROCEDURE — 74183 MRI ABD W/O CNTR FLWD CNTR: CPT | Mod: ME | Performed by: RADIOLOGY

## 2022-09-21 PROCEDURE — A9585 GADOBUTROL INJECTION: HCPCS | Performed by: RADIOLOGY

## 2022-09-21 PROCEDURE — 82105 ALPHA-FETOPROTEIN SERUM: CPT | Performed by: RADIOLOGY

## 2022-09-21 PROCEDURE — 85610 PROTHROMBIN TIME: CPT | Performed by: PATHOLOGY

## 2022-09-21 PROCEDURE — 85027 COMPLETE CBC AUTOMATED: CPT | Performed by: PATHOLOGY

## 2022-09-21 PROCEDURE — 80053 COMPREHEN METABOLIC PANEL: CPT | Performed by: PATHOLOGY

## 2022-09-21 PROCEDURE — G1004 CDSM NDSC: HCPCS | Performed by: RADIOLOGY

## 2022-09-21 PROCEDURE — 36415 COLL VENOUS BLD VENIPUNCTURE: CPT | Performed by: PATHOLOGY

## 2022-09-21 RX ORDER — GADOBUTROL 604.72 MG/ML
7.5 INJECTION INTRAVENOUS ONCE
Status: COMPLETED | OUTPATIENT
Start: 2022-09-21 | End: 2022-09-21

## 2022-09-21 RX ADMIN — GADOBUTROL 6 ML: 604.72 INJECTION INTRAVENOUS at 13:59

## 2022-09-21 NOTE — DISCHARGE INSTRUCTIONS
MRI Contrast Discharge Instructions    The IV contrast you received today will pass out of your body in your  urine. This will happen in the next 24 hours. You will not feel this process.  Your urine will not change color.    Drink at least 4 extra glasses of water or juice today (unless your doctor  has restricted your fluids). This reduces the stress on your kidneys.  You may take your regular medicines.    If you are on dialysis: It is best to have dialysis today.    If you have a reaction: Most reactions happen right away. If you have  any new symptoms after leaving the hospital (such as hives or swelling),  call your hospital at the correct number below. Or call your family doctor.  If you have breathing distress or wheezing, call 911.    Special instructions: ***    I have read and understand the above information.    Signature:______________________________________ Date:___________    Staff:__________________________________________ Date:___________     Time:__________    [place] Departments:    ___[place]: [phone]  ___[facility]: [phone]  ___[place]: [phone] ___[facility]: [phone]  ___[facility]: [phone]  ___[facility]: [phone]  ___[phone]  ___[facility]: [phone]  ___[phone]

## 2022-09-23 ENCOUNTER — HOSPITAL ENCOUNTER (INPATIENT)
Facility: CLINIC | Age: 62
LOS: 5 days | Discharge: HOME | DRG: 432 | End: 2022-09-29
Attending: EMERGENCY MEDICINE | Admitting: PEDIATRICS
Payer: MEDICARE

## 2022-09-23 DIAGNOSIS — B02.9 HERPES ZOSTER WITHOUT COMPLICATION: ICD-10-CM

## 2022-09-23 DIAGNOSIS — C22.0 HEPATOCELLULAR CARCINOMA (H): ICD-10-CM

## 2022-09-23 DIAGNOSIS — K92.2 UPPER GASTROINTESTINAL BLEEDING: ICD-10-CM

## 2022-09-23 DIAGNOSIS — K59.00 CONSTIPATION, UNSPECIFIED CONSTIPATION TYPE: ICD-10-CM

## 2022-09-23 DIAGNOSIS — Z20.822 CONTACT WITH AND (SUSPECTED) EXPOSURE TO COVID-19: ICD-10-CM

## 2022-09-23 DIAGNOSIS — C22.0 HCC (HEPATOCELLULAR CARCINOMA) (H): Primary | ICD-10-CM

## 2022-09-23 DIAGNOSIS — K92.2 UPPER GI BLEED: ICD-10-CM

## 2022-09-23 LAB
ABO/RH(D): NORMAL
ALBUMIN SERPL BCG-MCNC: 2.2 G/DL (ref 3.5–5.2)
ALP SERPL-CCNC: 93 U/L (ref 40–129)
ALT SERPL W P-5'-P-CCNC: 12 U/L (ref 10–50)
ANION GAP SERPL CALCULATED.3IONS-SCNC: 9 MMOL/L (ref 7–15)
ANTIBODY SCREEN: NEGATIVE
AST SERPL W P-5'-P-CCNC: 27 U/L (ref 10–50)
BILIRUB SERPL-MCNC: 1.4 MG/DL
BLD PROD TYP BPU: NORMAL
BLOOD COMPONENT TYPE: NORMAL
BUN SERPL-MCNC: 22.6 MG/DL (ref 8–23)
CALCIUM SERPL-MCNC: 7.5 MG/DL (ref 8.8–10.2)
CHLORIDE SERPL-SCNC: 110 MMOL/L (ref 98–107)
CODING SYSTEM: NORMAL
CREAT SERPL-MCNC: 0.69 MG/DL (ref 0.67–1.17)
CROSSMATCH: NORMAL
CROSSMATCH: NORMAL
DEPRECATED HCO3 PLAS-SCNC: 21 MMOL/L (ref 22–29)
GFR SERPL CREATININE-BSD FRML MDRD: >90 ML/MIN/1.73M2
GLUCOSE SERPL-MCNC: 164 MG/DL (ref 70–99)
INR PPP: 1.82 (ref 0.85–1.15)
ISSUE DATE AND TIME: NORMAL
ISSUE DATE AND TIME: NORMAL
LACTATE SERPL-SCNC: 5.7 MMOL/L (ref 0.7–2)
POTASSIUM SERPL-SCNC: 3.5 MMOL/L (ref 3.4–5.3)
PROT SERPL-MCNC: 6.8 G/DL (ref 6.4–8.3)
SODIUM SERPL-SCNC: 140 MMOL/L (ref 136–145)
SPECIMEN EXPIRATION DATE: NORMAL
UNIT ABO/RH: NORMAL
UNIT NUMBER: NORMAL
UNIT STATUS: NORMAL
UNIT TYPE ISBT: 7300
UNIT TYPE ISBT: 7300
UNIT TYPE ISBT: 8400

## 2022-09-23 PROCEDURE — 250N000011 HC RX IP 250 OP 636: Performed by: EMERGENCY MEDICINE

## 2022-09-23 PROCEDURE — 36415 COLL VENOUS BLD VENIPUNCTURE: CPT | Performed by: EMERGENCY MEDICINE

## 2022-09-23 PROCEDURE — 85610 PROTHROMBIN TIME: CPT | Performed by: EMERGENCY MEDICINE

## 2022-09-23 PROCEDURE — 99285 EMERGENCY DEPT VISIT HI MDM: CPT | Performed by: EMERGENCY MEDICINE

## 2022-09-23 PROCEDURE — 86923 COMPATIBILITY TEST ELECTRIC: CPT | Performed by: EMERGENCY MEDICINE

## 2022-09-23 PROCEDURE — 83605 ASSAY OF LACTIC ACID: CPT | Performed by: EMERGENCY MEDICINE

## 2022-09-23 PROCEDURE — 86923 COMPATIBILITY TEST ELECTRIC: CPT

## 2022-09-23 PROCEDURE — 85025 COMPLETE CBC W/AUTO DIFF WBC: CPT | Performed by: EMERGENCY MEDICINE

## 2022-09-23 PROCEDURE — C9803 HOPD COVID-19 SPEC COLLECT: HCPCS | Performed by: EMERGENCY MEDICINE

## 2022-09-23 PROCEDURE — 80053 COMPREHEN METABOLIC PANEL: CPT | Performed by: EMERGENCY MEDICINE

## 2022-09-23 PROCEDURE — 96361 HYDRATE IV INFUSION ADD-ON: CPT | Performed by: EMERGENCY MEDICINE

## 2022-09-23 PROCEDURE — 86901 BLOOD TYPING SEROLOGIC RH(D): CPT | Performed by: EMERGENCY MEDICINE

## 2022-09-23 PROCEDURE — 99285 EMERGENCY DEPT VISIT HI MDM: CPT | Mod: 25 | Performed by: EMERGENCY MEDICINE

## 2022-09-23 PROCEDURE — 96375 TX/PRO/DX INJ NEW DRUG ADDON: CPT | Performed by: EMERGENCY MEDICINE

## 2022-09-23 PROCEDURE — 258N000003 HC RX IP 258 OP 636: Performed by: EMERGENCY MEDICINE

## 2022-09-23 PROCEDURE — 86850 RBC ANTIBODY SCREEN: CPT | Performed by: EMERGENCY MEDICINE

## 2022-09-23 PROCEDURE — C9113 INJ PANTOPRAZOLE SODIUM, VIA: HCPCS | Performed by: EMERGENCY MEDICINE

## 2022-09-23 PROCEDURE — 96365 THER/PROPH/DIAG IV INF INIT: CPT | Performed by: EMERGENCY MEDICINE

## 2022-09-23 PROCEDURE — 96368 THER/DIAG CONCURRENT INF: CPT | Performed by: EMERGENCY MEDICINE

## 2022-09-23 PROCEDURE — 85018 HEMOGLOBIN: CPT | Performed by: EMERGENCY MEDICINE

## 2022-09-23 RX ORDER — OCTREOTIDE ACETATE 50 UG/ML
50 INJECTION, SOLUTION INTRAVENOUS; SUBCUTANEOUS ONCE
Status: COMPLETED | OUTPATIENT
Start: 2022-09-23 | End: 2022-09-24

## 2022-09-23 RX ORDER — SODIUM CHLORIDE, SODIUM LACTATE, POTASSIUM CHLORIDE, CALCIUM CHLORIDE 600; 310; 30; 20 MG/100ML; MG/100ML; MG/100ML; MG/100ML
INJECTION, SOLUTION INTRAVENOUS CONTINUOUS
Status: DISCONTINUED | OUTPATIENT
Start: 2022-09-23 | End: 2022-09-24

## 2022-09-23 RX ORDER — ONDANSETRON 2 MG/ML
4 INJECTION INTRAMUSCULAR; INTRAVENOUS EVERY 30 MIN PRN
Status: DISCONTINUED | OUTPATIENT
Start: 2022-09-23 | End: 2022-09-24

## 2022-09-23 RX ADMIN — OCTREOTIDE ACETATE 50 MCG/HR: 200 INJECTION, SOLUTION INTRAVENOUS; SUBCUTANEOUS at 23:58

## 2022-09-23 RX ADMIN — ONDANSETRON 4 MG: 2 INJECTION INTRAMUSCULAR; INTRAVENOUS at 23:20

## 2022-09-23 RX ADMIN — SODIUM CHLORIDE 8 MG/HR: 9 INJECTION, SOLUTION INTRAVENOUS at 23:57

## 2022-09-23 RX ADMIN — SODIUM CHLORIDE, POTASSIUM CHLORIDE, SODIUM LACTATE AND CALCIUM CHLORIDE: 600; 310; 30; 20 INJECTION, SOLUTION INTRAVENOUS at 23:23

## 2022-09-23 NOTE — LETTER
Transition Communication Hand-off for Care Transitions to Next Level of Care Provider    Name: Rishabh Cabrera  : 1960  MRN #: [id number]  Primary Care Provider: Physician No Ref-Primary     Primary Clinic: [facility]      Reason for Hospitalization:  Hepatocellular carcinoma (H) [C22.0]  Upper GI bleed [K92.2]  Admit Date/Time: 2022 11:14 PM  Discharge Date: 22  Payor Source: Payor: MEDICARE / Plan: MEDICARE / Product Type: Medicare /     Readmission Assessment Measure (NIKI) Risk Score/category: 29%           Reason for Communication Hand-off Referral: Difficulty understanding plan of care    Discharge Plan:       Concern for non-adherence with plan of care:   Y/N No  Discharge Needs Assessment:  Needs    Flowsheet Row Most Recent Value   Equipment Currently Used at Home grab bar, toilet, grab bar, tub/shower          Already enrolled in Tele-monitoring program and name of program:  N  Follow-up specialty is recommended: Yes- GI    Follow-up plan:  No future appointments.    Any outstanding tests or procedures:        Referrals     Future Labs/Procedures    Adult GI  Referral - Consult Only     Comments:    Please be aware that coverage of these services is subject to the terms and limitations of your health insurance plan.  Call member services at your health plan with any benefit or coverage questions.  [facility] will call you to coordinate your care as prescribed by the provider.  If you don t hear from a representative within 2 business days, please call [phone].    Adult GI  Referral - Procedure Only     Process Instructions:    To order a Colonoscopy for SCREENING or due to a history of polyps, please use the Colonoscopy Screening  Referral.     For DIAGNOSTIC or RESEARCH continue with this order.        Comments:    Please be aware that coverage of these services is subject to the terms and limitations of your health insurance plan.  Call  member services at your health plan with any benefit or coverage questions.  Please call to schedule your appointment              Key Recommendations:      Mirza Mckeon RN    AVS/Discharge Summary is the source of truth; this is a helpful guide for improved communication of patient story

## 2022-09-24 PROBLEM — K92.2 UPPER GI BLEED: Status: ACTIVE | Noted: 2022-09-24

## 2022-09-24 LAB
ANION GAP SERPL CALCULATED.3IONS-SCNC: 9 MMOL/L (ref 7–15)
BASOPHILS # BLD AUTO: 0 10E3/UL (ref 0–0.2)
BASOPHILS # BLD AUTO: 0.1 10E3/UL (ref 0–0.2)
BASOPHILS NFR BLD AUTO: 0 %
BASOPHILS NFR BLD AUTO: 0 %
BLD PROD TYP BPU: NORMAL
BLOOD COMPONENT TYPE: NORMAL
BUN SERPL-MCNC: 22.6 MG/DL (ref 8–23)
CALCIUM SERPL-MCNC: 7.5 MG/DL (ref 8.8–10.2)
CHLORIDE SERPL-SCNC: 110 MMOL/L (ref 98–107)
CODING SYSTEM: NORMAL
CREAT SERPL-MCNC: 0.69 MG/DL (ref 0.67–1.17)
CROSSMATCH: NORMAL
CROSSMATCH: NORMAL
DEPRECATED HCO3 PLAS-SCNC: 21 MMOL/L (ref 22–29)
EOSINOPHIL # BLD AUTO: 0.1 10E3/UL (ref 0–0.7)
EOSINOPHIL # BLD AUTO: 0.1 10E3/UL (ref 0–0.7)
EOSINOPHIL NFR BLD AUTO: 0 %
EOSINOPHIL NFR BLD AUTO: 1 %
ERYTHROCYTE [DISTWIDTH] IN BLOOD BY AUTOMATED COUNT: 18.6 % (ref 10–15)
ERYTHROCYTE [DISTWIDTH] IN BLOOD BY AUTOMATED COUNT: 18.8 % (ref 10–15)
GFR SERPL CREATININE-BSD FRML MDRD: >90 ML/MIN/1.73M2
GLUCOSE BLDC GLUCOMTR-MCNC: 116 MG/DL (ref 70–99)
GLUCOSE BLDC GLUCOMTR-MCNC: 119 MG/DL (ref 70–99)
GLUCOSE SERPL-MCNC: 164 MG/DL (ref 70–99)
HCO3 BLDV-SCNC: 23 MMOL/L (ref 21–28)
HCT VFR BLD AUTO: 23.4 % (ref 40–53)
HCT VFR BLD AUTO: 26.3 % (ref 40–53)
HGB BLD-MCNC: 7.6 G/DL (ref 13.3–17.7)
HGB BLD-MCNC: 8.4 G/DL (ref 13.3–17.7)
HGB BLD-MCNC: 8.6 G/DL (ref 13.3–17.7)
HGB BLD-MCNC: 8.6 G/DL (ref 13.3–17.7)
HGB BLD-MCNC: 8.7 G/DL (ref 13.3–17.7)
HGB BLD-MCNC: 8.8 G/DL (ref 13.3–17.7)
HOLD SPECIMEN: NORMAL
IMM GRANULOCYTES # BLD: 0 10E3/UL
IMM GRANULOCYTES # BLD: 0.1 10E3/UL
IMM GRANULOCYTES NFR BLD: 0 %
IMM GRANULOCYTES NFR BLD: 0 %
ISSUE DATE AND TIME: NORMAL
LACTATE BLD-SCNC: 2.7 MMOL/L
LACTATE SERPL-SCNC: 3 MMOL/L (ref 0.7–2)
LYMPHOCYTES # BLD AUTO: 0.6 10E3/UL (ref 0.8–5.3)
LYMPHOCYTES # BLD AUTO: 2.2 10E3/UL (ref 0.8–5.3)
LYMPHOCYTES NFR BLD AUTO: 13 %
LYMPHOCYTES NFR BLD AUTO: 8 %
MCH RBC QN AUTO: 29.5 PG (ref 26.5–33)
MCH RBC QN AUTO: 30.2 PG (ref 26.5–33)
MCHC RBC AUTO-ENTMCNC: 32.5 G/DL (ref 31.5–36.5)
MCHC RBC AUTO-ENTMCNC: 32.7 G/DL (ref 31.5–36.5)
MCV RBC AUTO: 91 FL (ref 78–100)
MCV RBC AUTO: 92 FL (ref 78–100)
MONOCYTES # BLD AUTO: 0.7 10E3/UL (ref 0–1.3)
MONOCYTES # BLD AUTO: 1.3 10E3/UL (ref 0–1.3)
MONOCYTES NFR BLD AUTO: 10 %
MONOCYTES NFR BLD AUTO: 8 %
NEUTROPHILS # BLD AUTO: 13.6 10E3/UL (ref 1.6–8.3)
NEUTROPHILS # BLD AUTO: 5.5 10E3/UL (ref 1.6–8.3)
NEUTROPHILS NFR BLD AUTO: 79 %
NEUTROPHILS NFR BLD AUTO: 81 %
NRBC # BLD AUTO: 0 10E3/UL
NRBC # BLD AUTO: 0 10E3/UL
NRBC BLD AUTO-RTO: 0 /100
NRBC BLD AUTO-RTO: 0 /100
PCO2 BLDV: 32 MM HG (ref 40–50)
PH BLDV: 7.46 [PH] (ref 7.32–7.43)
PLAT MORPH BLD: NORMAL
PLATELET # BLD AUTO: 148 10E3/UL (ref 150–450)
PLATELET # BLD AUTO: 77 10E3/UL (ref 150–450)
PO2 BLDV: 65 MM HG (ref 25–47)
POTASSIUM SERPL-SCNC: 3.5 MMOL/L (ref 3.4–5.3)
RBC # BLD AUTO: 2.58 10E6/UL (ref 4.4–5.9)
RBC # BLD AUTO: 2.85 10E6/UL (ref 4.4–5.9)
RBC MORPH BLD: NORMAL
SAO2 % BLDV: 94 % (ref 94–100)
SARS-COV-2 RNA RESP QL NAA+PROBE: NEGATIVE
SODIUM SERPL-SCNC: 140 MMOL/L (ref 136–145)
UNIT ABO/RH: NORMAL
UNIT NUMBER: NORMAL
UNIT STATUS: NORMAL
UNIT TYPE ISBT: 7300
UNIT TYPE ISBT: 8400
UPPER GI ENDOSCOPY: NORMAL
WBC # BLD AUTO: 17.3 10E3/UL (ref 4–11)
WBC # BLD AUTO: 6.8 10E3/UL (ref 4–11)

## 2022-09-24 PROCEDURE — 258N000003 HC RX IP 258 OP 636

## 2022-09-24 PROCEDURE — 85018 HEMOGLOBIN: CPT

## 2022-09-24 PROCEDURE — 83605 ASSAY OF LACTIC ACID: CPT | Performed by: EMERGENCY MEDICINE

## 2022-09-24 PROCEDURE — 36415 COLL VENOUS BLD VENIPUNCTURE: CPT

## 2022-09-24 PROCEDURE — 96375 TX/PRO/DX INJ NEW DRUG ADDON: CPT | Performed by: EMERGENCY MEDICINE

## 2022-09-24 PROCEDURE — 43244 EGD VARICES LIGATION: CPT | Performed by: INTERNAL MEDICINE

## 2022-09-24 PROCEDURE — 96366 THER/PROPH/DIAG IV INF ADDON: CPT | Performed by: EMERGENCY MEDICINE

## 2022-09-24 PROCEDURE — 250N000011 HC RX IP 250 OP 636: Performed by: EMERGENCY MEDICINE

## 2022-09-24 PROCEDURE — P9059 PLASMA, FRZ BETWEEN 8-24HOUR: HCPCS | Performed by: EMERGENCY MEDICINE

## 2022-09-24 PROCEDURE — 06L38CZ OCCLUSION OF ESOPHAGEAL VEIN WITH EXTRALUMINAL DEVICE, VIA NATURAL OR ARTIFICIAL OPENING ENDOSCOPIC: ICD-10-PCS | Performed by: INTERNAL MEDICINE

## 2022-09-24 PROCEDURE — 250N000011 HC RX IP 250 OP 636: Performed by: INTERNAL MEDICINE

## 2022-09-24 PROCEDURE — 250N000013 HC RX MED GY IP 250 OP 250 PS 637: Performed by: STUDENT IN AN ORGANIZED HEALTH CARE EDUCATION/TRAINING PROGRAM

## 2022-09-24 PROCEDURE — P9016 RBC LEUKOCYTES REDUCED: HCPCS

## 2022-09-24 PROCEDURE — 99223 1ST HOSP IP/OBS HIGH 75: CPT | Mod: AI | Performed by: PEDIATRICS

## 2022-09-24 PROCEDURE — C9113 INJ PANTOPRAZOLE SODIUM, VIA: HCPCS | Performed by: EMERGENCY MEDICINE

## 2022-09-24 PROCEDURE — P9016 RBC LEUKOCYTES REDUCED: HCPCS | Performed by: EMERGENCY MEDICINE

## 2022-09-24 PROCEDURE — 250N000013 HC RX MED GY IP 250 OP 250 PS 637

## 2022-09-24 PROCEDURE — 250N000011 HC RX IP 250 OP 636: Mod: JA

## 2022-09-24 PROCEDURE — G0500 MOD SEDAT ENDO SERVICE >5YRS: HCPCS | Performed by: INTERNAL MEDICINE

## 2022-09-24 PROCEDURE — 93010 ELECTROCARDIOGRAM REPORT: CPT | Performed by: INTERNAL MEDICINE

## 2022-09-24 PROCEDURE — 36416 COLLJ CAPILLARY BLOOD SPEC: CPT

## 2022-09-24 PROCEDURE — 250N000009 HC RX 250

## 2022-09-24 PROCEDURE — 93005 ELECTROCARDIOGRAM TRACING: CPT

## 2022-09-24 PROCEDURE — P9037 PLATE PHERES LEUKOREDU IRRAD: HCPCS | Performed by: EMERGENCY MEDICINE

## 2022-09-24 PROCEDURE — 82803 BLOOD GASES ANY COMBINATION: CPT

## 2022-09-24 PROCEDURE — 99223 1ST HOSP IP/OBS HIGH 75: CPT | Mod: GC | Performed by: INTERNAL MEDICINE

## 2022-09-24 PROCEDURE — 84484 ASSAY OF TROPONIN QUANT: CPT

## 2022-09-24 PROCEDURE — 258N000003 HC RX IP 258 OP 636: Performed by: EMERGENCY MEDICINE

## 2022-09-24 PROCEDURE — 85004 AUTOMATED DIFF WBC COUNT: CPT | Performed by: EMERGENCY MEDICINE

## 2022-09-24 PROCEDURE — 36430 TRANSFUSION BLD/BLD COMPNT: CPT | Performed by: EMERGENCY MEDICINE

## 2022-09-24 PROCEDURE — 250N000009 HC RX 250: Performed by: INTERNAL MEDICINE

## 2022-09-24 PROCEDURE — 120N000003 HC R&B IMCU UMMC

## 2022-09-24 PROCEDURE — U0003 INFECTIOUS AGENT DETECTION BY NUCLEIC ACID (DNA OR RNA); SEVERE ACUTE RESPIRATORY SYNDROME CORONAVIRUS 2 (SARS-COV-2) (CORONAVIRUS DISEASE [COVID-19]), AMPLIFIED PROBE TECHNIQUE, MAKING USE OF HIGH THROUGHPUT TECHNOLOGIES AS DESCRIBED BY CMS-2020-01-R: HCPCS | Performed by: EMERGENCY MEDICINE

## 2022-09-24 PROCEDURE — 36415 COLL VENOUS BLD VENIPUNCTURE: CPT | Performed by: EMERGENCY MEDICINE

## 2022-09-24 RX ORDER — LIDOCAINE 40 MG/G
CREAM TOPICAL
Status: DISCONTINUED | OUTPATIENT
Start: 2022-09-24 | End: 2022-09-29 | Stop reason: HOSPADM

## 2022-09-24 RX ORDER — TENOFOVIR DISOPROXIL FUMARATE 300 MG/1
300 TABLET, FILM COATED ORAL DAILY
Status: DISCONTINUED | OUTPATIENT
Start: 2022-09-24 | End: 2022-09-24

## 2022-09-24 RX ORDER — ACETAMINOPHEN 325 MG/1
325 TABLET ORAL EVERY 8 HOURS PRN
Status: DISCONTINUED | OUTPATIENT
Start: 2022-09-24 | End: 2022-09-29 | Stop reason: HOSPADM

## 2022-09-24 RX ORDER — NICOTINE POLACRILEX 4 MG
15-30 LOZENGE BUCCAL
Status: DISCONTINUED | OUTPATIENT
Start: 2022-09-24 | End: 2022-09-29 | Stop reason: HOSPADM

## 2022-09-24 RX ORDER — CEFTRIAXONE 1 G/1
1 INJECTION, POWDER, FOR SOLUTION INTRAMUSCULAR; INTRAVENOUS ONCE
Status: COMPLETED | OUTPATIENT
Start: 2022-09-24 | End: 2022-09-24

## 2022-09-24 RX ORDER — POLYETHYLENE GLYCOL 3350 17 G/17G
17 POWDER, FOR SOLUTION ORAL DAILY PRN
Status: DISCONTINUED | OUTPATIENT
Start: 2022-09-24 | End: 2022-09-27

## 2022-09-24 RX ORDER — FENTANYL CITRATE 50 UG/ML
INJECTION, SOLUTION INTRAMUSCULAR; INTRAVENOUS PRN
Status: COMPLETED | OUTPATIENT
Start: 2022-09-24 | End: 2022-09-24

## 2022-09-24 RX ORDER — OXYCODONE HYDROCHLORIDE 5 MG/1
5 TABLET ORAL ONCE
Status: COMPLETED | OUTPATIENT
Start: 2022-09-25 | End: 2022-09-24

## 2022-09-24 RX ORDER — ONDANSETRON 2 MG/ML
4 INJECTION INTRAMUSCULAR; INTRAVENOUS EVERY 6 HOURS PRN
Status: DISCONTINUED | OUTPATIENT
Start: 2022-09-24 | End: 2022-09-29 | Stop reason: HOSPADM

## 2022-09-24 RX ORDER — CEFTRIAXONE 1 G/1
1 INJECTION, POWDER, FOR SOLUTION INTRAMUSCULAR; INTRAVENOUS EVERY 24 HOURS
Status: COMPLETED | OUTPATIENT
Start: 2022-09-25 | End: 2022-09-29

## 2022-09-24 RX ORDER — TENOFOVIR DISOPROXIL FUMARATE 300 MG/1
300 TABLET, FILM COATED ORAL DAILY
Status: DISCONTINUED | OUTPATIENT
Start: 2022-09-25 | End: 2022-09-29 | Stop reason: HOSPADM

## 2022-09-24 RX ORDER — DEXTROSE MONOHYDRATE 25 G/50ML
25-50 INJECTION, SOLUTION INTRAVENOUS
Status: DISCONTINUED | OUTPATIENT
Start: 2022-09-24 | End: 2022-09-29 | Stop reason: HOSPADM

## 2022-09-24 RX ADMIN — ACETAMINOPHEN 325 MG: 325 TABLET, FILM COATED ORAL at 22:18

## 2022-09-24 RX ADMIN — OCTREOTIDE ACETATE 50 MCG: 50 INJECTION, SOLUTION INTRAVENOUS; SUBCUTANEOUS at 00:15

## 2022-09-24 RX ADMIN — MIDAZOLAM 2 MG: 1 INJECTION INTRAMUSCULAR; INTRAVENOUS at 11:41

## 2022-09-24 RX ADMIN — CEFTRIAXONE 1 G: 1 INJECTION, POWDER, FOR SOLUTION INTRAMUSCULAR; INTRAVENOUS at 01:55

## 2022-09-24 RX ADMIN — TOPICAL ANESTHETIC 1 SPRAY: 200 SPRAY DENTAL; PERIODONTAL at 11:40

## 2022-09-24 RX ADMIN — TENOFOVIR DISOPROXIL FUMARATE 300 MG: 300 TABLET, FILM COATED ORAL at 17:55

## 2022-09-24 RX ADMIN — MIDAZOLAM 1 MG: 1 INJECTION INTRAMUSCULAR; INTRAVENOUS at 11:56

## 2022-09-24 RX ADMIN — OXYCODONE HYDROCHLORIDE 5 MG: 5 TABLET ORAL at 23:40

## 2022-09-24 RX ADMIN — OCTREOTIDE ACETATE 50 MCG/HR: 200 INJECTION, SOLUTION INTRAVENOUS; SUBCUTANEOUS at 18:46

## 2022-09-24 RX ADMIN — FENTANYL CITRATE 100 MCG: 50 INJECTION, SOLUTION INTRAMUSCULAR; INTRAVENOUS at 11:41

## 2022-09-24 RX ADMIN — ONDANSETRON 4 MG: 2 INJECTION INTRAMUSCULAR; INTRAVENOUS at 18:30

## 2022-09-24 RX ADMIN — LIDOCAINE HYDROCHLORIDE 30 ML: 20 SOLUTION ORAL; TOPICAL at 23:04

## 2022-09-24 RX ADMIN — SODIUM CHLORIDE, POTASSIUM CHLORIDE, SODIUM LACTATE AND CALCIUM CHLORIDE: 600; 310; 30; 20 INJECTION, SOLUTION INTRAVENOUS at 14:15

## 2022-09-24 RX ADMIN — SODIUM CHLORIDE 8 MG/HR: 9 INJECTION, SOLUTION INTRAVENOUS at 08:30

## 2022-09-24 RX ADMIN — SODIUM CHLORIDE, POTASSIUM CHLORIDE, SODIUM LACTATE AND CALCIUM CHLORIDE: 600; 310; 30; 20 INJECTION, SOLUTION INTRAVENOUS at 06:14

## 2022-09-24 RX ADMIN — FENTANYL CITRATE 50 MCG: 50 INJECTION, SOLUTION INTRAMUSCULAR; INTRAVENOUS at 11:56

## 2022-09-24 ASSESSMENT — ACTIVITIES OF DAILY LIVING (ADL)
DIFFICULTY_EATING/SWALLOWING: NO
ADLS_ACUITY_SCORE: 37
ADLS_ACUITY_SCORE: 35
CONCENTRATING,_REMEMBERING_OR_MAKING_DECISIONS_DIFFICULTY: NO
ADLS_ACUITY_SCORE: 37
WEAR_GLASSES_OR_BLIND: NO
ADLS_ACUITY_SCORE: 37
ADLS_ACUITY_SCORE: 35
ADLS_ACUITY_SCORE: 22
DRESSING/BATHING_DIFFICULTY: NO
ADLS_ACUITY_SCORE: 35
ADLS_ACUITY_SCORE: 35
FALL_HISTORY_WITHIN_LAST_SIX_MONTHS: NO
ADLS_ACUITY_SCORE: 35
TOILETING_ISSUES: NO
CHANGE_IN_FUNCTIONAL_STATUS_SINCE_ONSET_OF_CURRENT_ILLNESS/INJURY: NO
ADLS_ACUITY_SCORE: 22
WALKING_OR_CLIMBING_STAIRS_DIFFICULTY: NO
ADLS_ACUITY_SCORE: 35
ADLS_ACUITY_SCORE: 22
DOING_ERRANDS_INDEPENDENTLY_DIFFICULTY: YES
EQUIPMENT_CURRENTLY_USED_AT_HOME: GRAB BAR, TOILET;GRAB BAR, TUB/SHOWER

## 2022-09-24 ASSESSMENT — ENCOUNTER SYMPTOMS
VOMITING: 1
ACTIVITY CHANGE: 1
RESPIRATORY NEGATIVE: 1
FATIGUE: 1
ABDOMINAL PAIN: 1
BLOOD IN STOOL: 1
CARDIOVASCULAR NEGATIVE: 1

## 2022-09-24 NOTE — ED NOTES
RN assumed care for this patient @ this time. Upon initial assessment, patient AAOx4, trachea midline, chest rise and fall noted, responds to verbal and tactile stimuli, NAD noted @ this time. Patient updated with present health status and planned treatment; verbalized understanding. Comfort measures addressed and patient connected to continuous monitoring. Will continue to observe.

## 2022-09-24 NOTE — PROCEDURES
Gastroenterology Endoscopy Suite Brief Operative Note    Procedure:  Upper endoscopy   Post-operative diagnosis:  variceal bleeding   Staff Physician:  Dr. Elan Rajan   Fellow/Assistant(s):  Chacorta Uriarte    Specimens:  Please see final procedure note for further details.   Findings:  - Large esophageal varices, s/p banded x6; not actively bleeding but with multiple nipple signs suggesting recent bleeding  - Large blood clot in stomach  - No bleeding source on stomach/duodenum   Complications:  None.   Condition:  Stable   Recommendations  Diet:  Clear liquid diet. Advance to low sodium diet tomorrow if no bleeding.  PPI:  Stop PPI  Anti-coagulants/platelets:  N/A  Octreotide:  Continue octreotide drip to complete 72 hours. Then, start carvedilol 3.125 mg daily once octreotide is done.  Discharge Planning:   Return to [facility].

## 2022-09-24 NOTE — OR NURSING
"PROCEDURES - ENDOSCOPY     Procedures - Endoscopy   Procedure: Upper Endoscopy  Indications: GI bleeding hematoemesis  Therapies/Interventions Banding of esophageal varices using [place] scientific equipmen  Specimens: Collected no   Sedation: Fentanyl 150 mcg; Midazolam 3 mg  Sedation Time: 23 \"  Airway management: nasal cannula 2 liters  Response to procedure:  Tolerated well  SBAR Post procedure to:  ED staff RN    Patient transported via litter to and from Emergency room #3 by Patient Transport Services.    Elizabeth Marcos RN, BSN, CGRN  Endoscopy staff #[id number] ascom          "

## 2022-09-24 NOTE — OR NURSING
"PROCEDURES - ENDOSCOPY     Procedures - Endoscopy   Procedure: Upper Endoscopy   Indications: GI bleeding hematoemesis  Therapies/Interventions   Banding of esophageal varices [employer]  Specimens: Collected none  Sedation: Fentanyl 150 mcg; Midazolam 3 mg  Sedation Time: 23 \"  Airway management: nasal cannula 2 liters  Response to procedure: tolerated well  SBAR Post procedure to: Roslyn Ceballos ED RN    Patient transported via litter to and from Patient care Unit  ED by Patient Transport Services.    Elizabeth Marcos RN, BSN, CGRN  Endoscopy staff #[id number] ascom          "

## 2022-09-24 NOTE — ED NOTES
SIGN-OUT:  - Assumed care of this patient from Dr. Miner   - Pending at shift change: Patient with a history of esophageal varices comes in with signs and symptoms of upper GI bleed and a 3 g hemoglobin drop.  Last bloody emesis was upon arrival to the emergency department.  Patient with a lactic acid of 5.  He has been typed and screened but no products of been given.  Patient got Protonix, ceftriaxone, octreotide.  - Tentative plan from original EM Physician: Patient will be transfused and will recheck hemoglobin and lactic acid.  If these are improved he can go to the [facility] [place].    UPDATES / REASSESSMENT:  Results:   -Patient's lactic acid normalized. Hemoglobin did not rise, but he was vitally stable w/o repeat vomiting.   Reassessment:   --- No acute issues or interventions necessary while still in the emergency department.    DISPOSITION:  [facility]          Selwyn Benítez MD  09/25/22 0003

## 2022-09-24 NOTE — PROGRESS NOTES
[facility]  Transfer Triage Note    Date of call: 09/24/22  Time of call: 6:59 AM    [facility]  Current Level of Care: ED    Vitals: Temp: 98.1  F (36.7  C) Temp src: Oral BP: 105/54 Pulse: 88   Resp: 27 SpO2: 100 %   Weight: 55.3 kg (122 lb)  O2 Device: None (Room air) at    Diagnosis: Acute Blood Loss anemia, Hematemesis, Melena  Is COVID-19 a concern? No  Reason for requested transfer: Procedure can be done here and not at referring hospital   Isolation Needs: None    Outside Records: Available  Additional records may be faxed to [phone].    Transfer accepted: Admit is NOT yet accepted, and remains pending.  Stability of Patient: Patient is at risk for instability, however patient requires urgent transfer and does not meet ICU criteria   Level of Care Needed: TBD, IMC vs ICU  Telemetry Needed:  Cardiac Telemetry  Expected Time of Arrival for Transfer: 0-8 hours  Arrival Location:  [facility]    Recommendations for Management and Stabilization: Given    Additional Comments:     Based on the current labs, Hb drop from 11 to 8.6 and lactic acid 5.7--Decision was to HOLD OFF on admission to medicine until repeat Hb and lactic acid were confirmed as stable for an [facility] bed.    GI was consulted.    Santiago Harvey MD    -------     Follow up hemoglobin 7.6 from 8.6 following 2 RBC transfusions, Lactate went from 5.7 to 3.0 LR infusion at 125cc/hr. ED spoke with MICU and I. After discussion patient will be admitted to [facility] to medicine.     Jack Melendez MD

## 2022-09-24 NOTE — ED NOTES
Pt receiving blood and platelets at this time. Pt tolerating infusions well no sign or symptoms of reaction. Will continue to monitor pt for changes.

## 2022-09-24 NOTE — CONSULTS
GASTROENTEROLOGY CONSULTATION    Date of Admission:  9/23/2022          ASSESSMENT AND RECOMMENDATIONS:    62 year old male with a history of hepatitis B cirrhosis, hepatocellular carcinoma who presents with melena, and hematemesis.    # Upper GI bleeding  Presents with melena and hematemesis. EGD 9/24 with large esophageal varices with nipple sign suggesting recent variceal bleeding.  Banded. Will need betablocker on discharge for secondary prophylaxis of bleeding.    # Decompensated cirrhosis  Etiology: HBV  Primary hepatologist: Dr. Leventhal, last seen 2/2022  MELD-Na 14  Ascites: present- on lasix 20 and spironolactone 50 daily PTA, ok to hold off with acute bleeding. No prior SBP. Pending paracentesis.   Transplant candidacy, per Dr. Leventhal's note, not evaluated for transplant due to HCC burden, and hesitancy to care  Hepatic encephalopathy: none    # Acute abdominal pain  Pain for 3 days. No ulcer on EGD. Leukocytosis on admission which has resolved. Could not rule out SBP. Will need paracentesis.    # HCC  # Lung mass - biopsied without cancer  Diagnosed 2020. Treated with Y 90 in 9/2021. TACE 12/2021. Repeat MRI 9/2022 without concerning lesion.    # Chronic HBV infection, on tenofovir    RECOMMENDATIONS  - EGD today - esophageal varices, s/p banded  - Clear liquid diet now, advance diet tomorrow if no further bleeding  - Stop PPI  - Continue octreotide ggt for 72 hours, then stop. Once stop the octreotide, if no hypotensive, please start patient on carvedilol 3.125 mg once daily for secondary bleeding ppx  - Continue to observe bleeding  - No NSAID  - Please obtain paracentesis for cell count, cell diff, protein, albumin, c/s, g/s  - Continue tenofovir, will need to be on for life-long    Gastroenterology follow up recommendations: Follow-up w. Dr. Leventhal, has been no show 6/2022. Will need to arrange the follow up with hepatology.     Thank you for involving us in this patient's care. Please do  not hesitate to contact the GI service with any questions or concerns.     Patient care plan discussed with Dr. Noguera, GI staff physician.    Chacorta Uriarte MD  GI fellow  Page [phone]          Chief Complaint:   We were asked by Dr. Peters of Medicine to evaluate this patient with cirrhosis with hematemesis.  History is obtained from the patient and the medical record.          History of Present Illness:   Rishabh Cabrera is a 62 year old male with a history of hepatitis B cirrhosis, hepatocellular carcinoma who presents with melena, and hematemesis.    Patient has black stool for 7 days, 5 times a day, loose. Vomited blood black and dark red color on the day of admission with large amount of blood.  No prior history of GI bleeding admission, but reports having black stool intermittently with last episode was about a month ago. Also has new diffused abdominal pain for 3-4 days without fever/chills.    Has HBV cirrhosis follows with Dr. Leventhal.   Reports taking ibuprofen 3 tablets a day for the past 3 days with abdominal pain. Usually taking a course of ibuprofen every 1-2 month for pain.            Past Medical History:   Reviewed and edited as appropriate  Past Medical History:   Diagnosis Date     Cancer (H)      Chronic hepatitis B (H)      Diabetes mellitus (H)             Past Surgical History:   Reviewed and edited as appropriate   Past Surgical History:   Procedure Laterality Date     ESOPHAGOSCOPY, GASTROSCOPY, DUODENOSCOPY (EGD), COMBINED N/A 5/29/2019    Procedure: ESOPHAGOGASTRODUODENOSCOPY (EGD);  Surgeon: Leventhal, Thomas Michael, MD;  Location: [facility]     IR CHEMO EMBOLIZATION  12/2/2021     IR SIRT (SELECTIVE INTERNAL RADIO THERAPY)  9/8/2021     IR VISCERAL ANGIOGRAM  9/8/2021     IR VISCERAL EMBOLIZATION  9/9/2021            Previous Endoscopy:   No results found for this or any previous visit.         Social History:   Reviewed and edited as appropriate  Social History     Socioeconomic  History     Marital status:      Spouse name: Not on file     Number of children: Not on file     Years of education: Not on file     Highest education level: Not on file   Occupational History     Not on file   Tobacco Use     Smoking status: Never Smoker     Smokeless tobacco: Never Used   Substance and Sexual Activity     Alcohol use: No     Drug use: Not Currently     Types: Marijuana     Comment: past marijuana use     Sexual activity: Not Currently   Other Topics Concern     Parent/sibling w/ CABG, MI or angioplasty before 65F 55M? Not Asked   Social History Narrative     Not on file     Social Determinants of Health     Financial Resource Strain: Not on file   Food Insecurity: Not on file   Transportation Needs: Not on file   Physical Activity: Not on file   Stress: Not on file   Social Connections: Not on file   Intimate Partner Violence: Not At Risk     Fear of Current or Ex-Partner: No     Emotionally Abused: No     Physically Abused: No     Sexually Abused: No   Housing Stability: Not on file            Family History:   Reviewed and edited as appropriate  No known history of gastrointestinal/liver disease or  gastrointestinal malignancies. No FH of liver cancer.       Allergies:   Reviewed and edited as appropriate     Allergies   Allergen Reactions     Crabs [Crustaceans]      Pork (Porcine) Protein Itching and Unknown     Seafood Hives and Unknown     Shellfish Allergy Unknown            Medications:   (Not in a hospital admission)            Review of Systems:     A complete review of systems was performed and is negative except as noted in the HPI           Physical Exam:   /54   Pulse 88   Temp 98.1  F (36.7  C) (Oral)   Resp 27   Wt 55.3 kg (122 lb)   SpO2 100%   BMI 20.30 kg/m    Wt:   Wt Readings from Last 2 Encounters:   09/23/22 55.3 kg (122 lb)   06/20/22 55.3 kg (122 lb)      Constitutional: cooperative, in acute distress  Eyes: Sclera  anicteric/injected  Ears/nose/mouth/throat: mucus membranes moist, hearing intact  Neck: supple, thyroid normal size  CV: No edema  Respiratory: Unlabored breathing  Abdomen: distended, tender generalize without peritoneal signs  Skin: warm, perfused, no jaundice  Neuro: AAO x 3, No asterixis  Psych: Normal affect  Rectal exam: melena         Data:   Labs and imaging below were independently reviewed and interpreted    BMP  Recent Labs   Lab 09/23/22 2314 09/21/22  1249     140 136   POTASSIUM 3.5  3.5 4.5   CHLORIDE 110*  110* 106   MANDY 7.5*  7.5* 8.2*   CO2 21*  21* 26   BUN 22.6  22.6 9.3   CR 0.69  0.69 0.82   *  164* 97     CBC  Recent Labs   Lab 09/23/22 2314 09/21/22  1249   WBC 17.3* 4.6   RBC 2.85* 3.87*   HGB 8.7*  8.6*  8.6* 11.5*   HCT 26.3* 35.3*   MCV 92 91   MCH 30.2 29.7   MCHC 32.7 32.6   RDW 18.8* 18.1*   * 76*     INR  Recent Labs   Lab 09/23/22 2314 09/21/22  1249   INR 1.82* 1.82*     LFTs  Recent Labs   Lab 09/23/22 2314 09/21/22  1249   ALKPHOS 93 139*   AST 27 37   ALT 12 13   BILITOTAL 1.4* 1.9*   PROTTOTAL 6.8 8.1   ALBUMIN 2.2* 2.4*      PANCNo lab results found in last 7 days.    MELD-Na score: 14 at 9/23/2022 11:14 PM  MELD score: 14 at 9/23/2022 11:14 PM  Calculated from:  Serum Creatinine: 0.69 mg/dL (Using min of 1 mg/dL) at 9/23/2022 11:14 PM  Serum Sodium: 140 mmol/L (Using max of 137 mmol/L) at 9/23/2022 11:14 PM  Total Bilirubin: 1.4 mg/dL at 9/23/2022 11:14 PM  INR(ratio): 1.82 at 9/23/2022 11:14 PM  Age: 62 years    Imaging:  MRI 9/21/22 reviewed.

## 2022-09-24 NOTE — H&P
[facility] History and Physical    Rishabh Cabrera MRN# [id number]   Age: 62 year old YOB: 1960     Date of Admission:  9/23/2022    Assessment and Plan  Rishabh Cabrera is a 62 year old male with a PMH of chronic hepatitis B-related decompensated cirrhosis decompensated ascites and esophageal varices, hepatocelluar carcinoma, T2DM, and herpes zoster(2022) who presents with a 2 day history of dark black stools and bloody emesis currently being treated for upper GI bleed 2/2 to esophageal varices.    # Upper GI bleed 2/2 esophageal varices  # Acute on chronic anemia 2/2 blood loss  Pt has had 2 episodes of dark emesis and has had black stool for the past 2-3 days. No pain associated with the bleeding. Endoscopic evaluation revealed source of bleeding was likely a complication of varices as suspected given history of esophageal varices. Will continue octreotide and ceftriaxone and obtain diagnostic paracentesis while monitoring for continued clinical stability.   -Upper Endoscopy  - - Grade III esophageal varices with stigmata of recent bleeding, banded  - - Clotted blood in gastric body (posterior wall) and gastric body (greater curvature)  - GI recommendations appreciated  - - Diagnostic paracentesis - CAPS consult placed  - - Continue CTX 1g q24 hours 5-7 days (started 9/24)  - - Continue octreotide total of 72 hours to end ~midnight Monday into Tuesday  - - Stopped pantoprazole  - - Clear liquid diet  - Maintain 2 large bore IVs at all times  - trend Hgb, transfuse for Hgb <7 or active bleeding    #Lactic Acidosis, improved  Presented with lactate of 5.7, down-trended to 3.0 after volume resuscitation with crystalloid and blood. Hemoglobin and vital signs have been stable throughout the day post EGD. Suspect elevation was in the setting of volume depletion due to GIB, low concern for sepsis due to infection given clinical presentation and course but will obtain diagnostic paracentesis out of  "caution tomorrow.   - continue to monitor hemoglobin and vital signs    #Decompensated Hepatitis B related Cirrhosis decompensated by ascites and esophageal varices  #HCC  #Chronic Hepatitis B infection  Follows outpatient with Dr. Leventhal. S/p Chemoembolization 12/21 for HCC. In past note was on sodium restriction and furosemide for ascites management but patient reports no home medications for ascites. Unclear if patient taking PTA tenofovir for chronic hep B - will continue per GI recs.  - PTA tenofovir for chronic hep B  - HE: None currently  - EV/GV: Last EGD today, see above, carvedilol 3.125mg daily once octreotide finished  - HRS: None, Cr = 0.69 (Basline = ~0.8)  - HPS: None, breathing comfortably on room air  - Coagulopathy: INR 1.82 (Baseline = ~1.80), Plts 77 (Basline = 80s)  - Anemia: Hgb acutely low due to GIB but (Baseline = ~10-11)  - SBP: Diagnostic tap tomorrow  - HCC: see above  - MELD-Na: MELD-Na score: 14 at 9/23/2022 11:14 PM  MELD score: 14 at 9/23/2022 11:14 PM  Calculated from:  Serum Creatinine: 0.69 mg/dL (Using min of 1 mg/dL) at 9/23/2022 11:14 PM  Serum Sodium: 140 mmol/L (Using max of 137 mmol/L) at 9/23/2022 11:14 PM  Total Bilirubin: 1.4 mg/dL at 9/23/2022 11:14 PM  INR(ratio): 1.82 at 9/23/2022 11:14 PM  Age: 62 years     #T2DM with diabetic polyneuropathy  Patients glucose levels 96 on admit. Pt says diabetes is normally managed with metformin outside of the clinic but unable to verify PTA metformin in Epic. With elevated lactic acid levels will discontinue metformin while inpatient and manage with CDI.  - low dose CDI         Chief Complaint:   \"I have thrown up blood and had black stools over the last couple days\"     History is obtained from the patient and the patient's son.          History of Present Illness:   Rishabh Cabrera is a 62 year old male with a PMH of chronic hepatitis B-related decompensated cirrhosis decompensated ascites and esophageal varices, hepatocelluar " "carcinoma, T2DM, and herpes zoster(2022) who presents with a 2 day history of dark black stools and bloody emesis currently being treated for upper GI bleed 2/2 to esophageal varices.    Mr. Cabrera presents with a 2-3 day history of black stools along with 2 episodes of dark emesis over the same time period. Patient reports his BMs over the past days have been black, but has not had a BM since admission to the hospital. Patient reports he has also had 2 episodes of dark emesis over the last two days. Reports having had black bowel movements in the past month, but has never had bloody emesis before. He says that he has been taking ~2 Advil a few times per week for pain as needed. Reports he does not smoke, drink alcohol, or use recreational drugs. Has had general decrease in energy over this time period as well but denies any chest pain, shortness of breath, fevers, chills, dizziness / lightheadedness, dysuria, or cough.    Reports occasional on and off \"burning\" sensation of face and upper abdomen over last months. Similar burning sensation was present when diagnosed with herpes zoster in May.    ED Course:  Vitals on arrival: Afebrile, P: 102 BPM, 131/69, RR 22, 100% on RA  Labs: notable for lactate 5.7 > 3.0 post resuscitation, hgb 8.6 (bl ~10-11)   Imaging: N/A  Meds / Fluids: 2 units pRBC, 1 unit platelets, 1g ceftriaxone, octreotide, PPI, zofran              Past Medical History:     Past Medical History:   Diagnosis Date     Cancer (H)      Chronic hepatitis B (H)      Diabetes mellitus (H)              Past Surgical History:      Past Surgical History:   Procedure Laterality Date     ESOPHAGOSCOPY, GASTROSCOPY, DUODENOSCOPY (EGD), COMBINED N/A 5/29/2019    Procedure: ESOPHAGOGASTRODUODENOSCOPY (EGD);  Surgeon: Leventhal, Thomas Michael, MD;  Location: [facility]     IR CHEMO EMBOLIZATION  12/2/2021     IR SIRT (SELECTIVE INTERNAL RADIO THERAPY)  9/8/2021     IR VISCERAL ANGIOGRAM  9/8/2021     IR VISCERAL " EMBOLIZATION  9/9/2021             Social History:   Pt lives with his son. Denies any current drug or alcohol use. Last time drinking was over 5 years ago.    Social History     Tobacco Use     Smoking status: Never Smoker     Smokeless tobacco: Never Used   Substance Use Topics     Alcohol use: No             Family History:   No discussed         Allergies:     Allergies   Allergen Reactions     Crabs [Crustaceans]      Pork (Porcine) Protein Itching and Unknown     Seafood Hives and Unknown     Shellfish Allergy Unknown             Medications:     Current Facility-Administered Medications   Medication     acetaminophen (TYLENOL) tablet 325 mg     glucose gel 15-30 g    Or     dextrose 50 % injection 25-50 mL    Or     glucagon injection 1 mg     insulin aspart (NovoLOG) injection (RAPID ACTING)     insulin aspart (NovoLOG) injection (RAPID ACTING)     lactated ringers infusion     lidocaine (LMX4) cream     lidocaine 1 % 0.1-1 mL     melatonin tablet 1 mg     octreotide (sandoSTATIN) 1,250 mcg in D5W 250 mL     ondansetron (ZOFRAN) injection 4 mg     pantoprazole (PROTONIX) IV push injection 80 mg     polyethylene glycol (MIRALAX) Packet 17 g     sodium chloride (PF) 0.9% PF flush 3 mL     sodium chloride (PF) 0.9% PF flush 3 mL     sodium chloride (PF) 0.9% PF flush 3 mL     sodium chloride (PF) 0.9% PF flush 3 mL     sodium chloride (PF) 0.9% PF flush 3 mL     sodium chloride (PF) 0.9% PF flush 3 mL     sodium chloride (PF) 0.9% PF flush 3 mL     sodium chloride (PF) 0.9% PF flush 3 mL     sodium chloride (PF) 0.9% PF flush 3 mL     sodium chloride (PF) 0.9% PF flush 3 mL     sodium chloride (PF) 0.9% PF flush 3 mL     sodium chloride (PF) 0.9% PF flush 3 mL     sodium chloride (PF) 0.9% PF flush 3 mL     sodium chloride (PF) 0.9% PF flush 3 mL     Current Outpatient Medications   Medication Sig     capsaicin (ZOSTRIX) 0.025 % external cream Apply topically 3 times daily     Capsaicin-Menthol (SALONPAS GEL  EX)      diclofenac (VOLTAREN) 1 % topical gel Apply topically as needed for moderate pain     enalapril (VASOTEC) 10 MG tablet Take 1 tablet (10 mg) by mouth daily (Patient not taking: Reported on 6/27/2022)     furosemide (LASIX) 20 MG tablet Take 1 tablet (20 mg) by mouth daily     gabapentin (NEURONTIN) 300 MG capsule Start with 300mg once daily for days 1 &2, then increase to 300mg twice daily     insulin glargine (LANTUS SOLOSTAR) 100 UNIT/ML pen Inject 5 Units Subcutaneous At Bedtime     loratadine (CLARITIN) 10 MG tablet Take 10 mg by mouth daily     metoprolol succinate ER (TOPROL XL) 50 MG 24 hr tablet Take 1 tablet (50 mg) by mouth daily (Patient not taking: No sig reported)     spironolactone (ALDACTONE) 50 MG tablet Take 1 tablet (50 mg) by mouth daily     tenofovir (VIREAD) 300 MG tablet Take 1 tablet (300 mg) by mouth daily     triamcinolone (KENALOG) 0.1 % external cream Apply topically as needed for irritation     Facility-Administered Medications Ordered in Other Encounters   Medication     gadobutrol (GADAVIST) injection 7.5 mL             Review of Systems:   CONSTITUTIONAL: NEGATIVE for fever, chills, change in weight  ENT/MOUTH: NEGATIVE for ear, mouth or throat problems, difficulties swallowing,   RESP: NEGATIVE for significant cough or SOB  CV: POSITIVE for occasional palpations, NEGATIVE for chest pain, or peripheral edema  MUSCULOSKELETAL: NEGATIVE for significant arthralgias or myalgia  NEURO: POSITIVE for weakness over past couple days, NEGATIVE for dizziness or paresthesias  ROS otherwise negative           Physical Exam:   Vitals were reviewed  Constitutional:   awake, alert, cooperative, mild distress, and appears stated age   Eyes:   extra ocular muscles intact, sclera clear, conjunctiva normal   Lungs:   No increased work of breathing, good air exchange, clear to auscultation bilaterally, no crackles or wheezing   Cardiovascular:   Rregular rate and rhythm, normal S1 and S2, no S3 or  S4, and no murmur noted   Abdomen:   Mildly distended abdomen, tenderness to moderate palpation in epigastric region. Scar along midline of abdomen, non-tender, no masses palpated, no hepatosplenomegally   Musculoskeletal:   There is no redness, warmth, or swelling of the joints.  Full range of motion noted.  Motor strength is 5 out of 5 all extremities bilaterally.  Tone is normal.   Neurologic:   Awake, alert, oriented to name, place and time. Sensory is intact.    Neuropsychiatric:   General: normal, calm and normal eye contact             Data:     Lab Results   Component Value Date    WBC 6.8 09/24/2022    HGB 8.8 (L) 09/24/2022    HCT 23.4 (L) 09/24/2022    MCV 91 09/24/2022    PLT 77 (L) 09/24/2022     Lab Results   Component Value Date    CR 0.69 09/23/2022    CR 0.69 09/23/2022     Lab Results   Component Value Date     09/23/2022     09/23/2022    POTASSIUM 3.5 09/23/2022    POTASSIUM 3.5 09/23/2022    CHLORIDE 110 (H) 09/23/2022    CHLORIDE 110 (H) 09/23/2022    CO2 21 (L) 09/23/2022    CO2 21 (L) 09/23/2022     (H) 09/23/2022     (H) 09/23/2022     Lab Results   Component Value Date    HGB 8.8 (L) 09/24/2022     Lab Results   Component Value Date    WBC 6.8 09/24/2022       Attestation:  I have reviewed today's vital signs, notes, medications, labs and imaging.     Esau Damico, MS3  [facility] 3      I, Kehinde Hammer MD, was present with the medical/OPAL student who participated in the service and in the documentation of the note.  I have verified the history and personally performed the physical exam and medical decision making.  I agree with the assessment and plan of care as documented in the note and have made edits as needed.     Kehinde Hammer MD  PGY1  Date of Service (when I saw the patient): 9/24/2022      Case staffed with attending physician, Dr. Borrero.

## 2022-09-25 LAB
ALBUMIN BODY FLUID SOURCE: NORMAL
ALBUMIN FLD-MCNC: 0.2 G/DL
ALBUMIN SERPL BCG-MCNC: 1.9 G/DL (ref 3.5–5.2)
ALP SERPL-CCNC: 74 U/L (ref 40–129)
ALT SERPL W P-5'-P-CCNC: 12 U/L (ref 10–50)
ANION GAP SERPL CALCULATED.3IONS-SCNC: 4 MMOL/L (ref 7–15)
APPEARANCE FLD: CLEAR
AST SERPL W P-5'-P-CCNC: 25 U/L (ref 10–50)
BILIRUB SERPL-MCNC: 1.6 MG/DL
BUN SERPL-MCNC: 17.8 MG/DL (ref 8–23)
CALCIUM SERPL-MCNC: 6.9 MG/DL (ref 8.8–10.2)
CELL COUNT BODY FLUID SOURCE: NORMAL
CHLORIDE SERPL-SCNC: 108 MMOL/L (ref 98–107)
COLOR FLD: YELLOW
CREAT SERPL-MCNC: 0.75 MG/DL (ref 0.67–1.17)
DEPRECATED HCO3 PLAS-SCNC: 23 MMOL/L (ref 22–29)
ERYTHROCYTE [DISTWIDTH] IN BLOOD BY AUTOMATED COUNT: 19.3 % (ref 10–15)
GFR SERPL CREATININE-BSD FRML MDRD: >90 ML/MIN/1.73M2
GLUCOSE BLDC GLUCOMTR-MCNC: 112 MG/DL (ref 70–99)
GLUCOSE BLDC GLUCOMTR-MCNC: 132 MG/DL (ref 70–99)
GLUCOSE BLDC GLUCOMTR-MCNC: 145 MG/DL (ref 70–99)
GLUCOSE BLDC GLUCOMTR-MCNC: 157 MG/DL (ref 70–99)
GLUCOSE SERPL-MCNC: 120 MG/DL (ref 70–99)
HBA1C MFR BLD: 5.6 %
HCT VFR BLD AUTO: 25 % (ref 40–53)
HGB BLD-MCNC: 8.2 G/DL (ref 13.3–17.7)
HGB BLD-MCNC: 8.3 G/DL (ref 13.3–17.7)
HGB BLD-MCNC: 9.2 G/DL (ref 13.3–17.7)
LYMPHOCYTES NFR FLD MANUAL: 22 %
MCH RBC QN AUTO: 30 PG (ref 26.5–33)
MCHC RBC AUTO-ENTMCNC: 32.8 G/DL (ref 31.5–36.5)
MCV RBC AUTO: 92 FL (ref 78–100)
MONOS+MACROS NFR FLD MANUAL: 33 %
NEUTS BAND NFR FLD MANUAL: 46 %
PLATELET # BLD AUTO: 79 10E3/UL (ref 150–450)
POTASSIUM SERPL-SCNC: 3.5 MMOL/L (ref 3.4–5.3)
PROT FLD-MCNC: 0.5 G/DL
PROT SERPL-MCNC: 5.7 G/DL (ref 6.4–8.3)
PROTEIN BODY FLUID SOURCE: NORMAL
RBC # BLD AUTO: 2.73 10E6/UL (ref 4.4–5.9)
SODIUM SERPL-SCNC: 135 MMOL/L (ref 136–145)
TROPONIN T SERPL HS-MCNC: 14 NG/L
WBC # BLD AUTO: 3.4 10E3/UL (ref 4–11)
WBC # FLD AUTO: 123 /UL

## 2022-09-25 PROCEDURE — 49083 ABD PARACENTESIS W/IMAGING: CPT | Performed by: STUDENT IN AN ORGANIZED HEALTH CARE EDUCATION/TRAINING PROGRAM

## 2022-09-25 PROCEDURE — 87205 SMEAR GRAM STAIN: CPT

## 2022-09-25 PROCEDURE — 80053 COMPREHEN METABOLIC PANEL: CPT

## 2022-09-25 PROCEDURE — 99232 SBSQ HOSP IP/OBS MODERATE 35: CPT | Performed by: INTERNAL MEDICINE

## 2022-09-25 PROCEDURE — 83036 HEMOGLOBIN GLYCOSYLATED A1C: CPT

## 2022-09-25 PROCEDURE — 250N000011 HC RX IP 250 OP 636

## 2022-09-25 PROCEDURE — 85027 COMPLETE CBC AUTOMATED: CPT

## 2022-09-25 PROCEDURE — 120N000003 HC R&B IMCU UMMC

## 2022-09-25 PROCEDURE — 99233 SBSQ HOSP IP/OBS HIGH 50: CPT | Mod: GC | Performed by: PEDIATRICS

## 2022-09-25 PROCEDURE — 82042 OTHER SOURCE ALBUMIN QUAN EA: CPT

## 2022-09-25 PROCEDURE — 84157 ASSAY OF PROTEIN OTHER: CPT

## 2022-09-25 PROCEDURE — C9113 INJ PANTOPRAZOLE SODIUM, VIA: HCPCS

## 2022-09-25 PROCEDURE — 250N000012 HC RX MED GY IP 250 OP 636 PS 637

## 2022-09-25 PROCEDURE — 85018 HEMOGLOBIN: CPT

## 2022-09-25 PROCEDURE — 0W9G3ZX DRAINAGE OF PERITONEAL CAVITY, PERCUTANEOUS APPROACH, DIAGNOSTIC: ICD-10-PCS | Performed by: STUDENT IN AN ORGANIZED HEALTH CARE EDUCATION/TRAINING PROGRAM

## 2022-09-25 PROCEDURE — 258N000003 HC RX IP 258 OP 636

## 2022-09-25 PROCEDURE — 89051 BODY FLUID CELL COUNT: CPT

## 2022-09-25 PROCEDURE — 250N000013 HC RX MED GY IP 250 OP 250 PS 637

## 2022-09-25 PROCEDURE — 36415 COLL VENOUS BLD VENIPUNCTURE: CPT

## 2022-09-25 RX ADMIN — PANTOPRAZOLE SODIUM 40 MG: 40 INJECTION, POWDER, FOR SOLUTION INTRAVENOUS at 08:38

## 2022-09-25 RX ADMIN — TENOFOVIR DISOPROXIL FUMARATE 300 MG: 300 TABLET, FILM COATED ORAL at 16:58

## 2022-09-25 RX ADMIN — OCTREOTIDE ACETATE 50 MCG/HR: 200 INJECTION, SOLUTION INTRAVENOUS; SUBCUTANEOUS at 00:27

## 2022-09-25 RX ADMIN — CEFTRIAXONE SODIUM 1 G: 1 INJECTION, POWDER, FOR SOLUTION INTRAMUSCULAR; INTRAVENOUS at 06:10

## 2022-09-25 RX ADMIN — INSULIN ASPART 1 UNITS: 100 INJECTION, SOLUTION INTRAVENOUS; SUBCUTANEOUS at 16:58

## 2022-09-25 ASSESSMENT — ACTIVITIES OF DAILY LIVING (ADL)
ADLS_ACUITY_SCORE: 22
ADLS_ACUITY_SCORE: 30
ADLS_ACUITY_SCORE: 22
ADLS_ACUITY_SCORE: 30
ADLS_ACUITY_SCORE: 30
ADLS_ACUITY_SCORE: 22
ADLS_ACUITY_SCORE: 30

## 2022-09-25 NOTE — PLAN OF CARE
Time: 1468-7966    Shift events: AxOx4. VSS. Pt c/o sore throat and has a hoarse speech. Denies difficulty with swallowing or breathing. Pt complain of nerve pain on the skin from where the shingles lesions were, from his chest spreading to his backside. PRN oxycodone ordered and administered with no improvement to pain. MD notified, no other PRNs were ordered. Incontinent of urine x1. SBA when getting up out of bed. Pt able to turn from side to side in bed, repositioning independently. Notify primary team with any changes.    Please see flowsheets for detailed assessments and vital signs.

## 2022-09-25 NOTE — PROGRESS NOTES
Neuro: Patient alert and oriented x4.   Cardiac: NSR, BP's stable, afebrile.   Respiratory: Sating >92% on RA.  GI/: Adequate urine output. No BM today.   Diet/appetite: Pt tolerating 2 Gm Na+ diet  Activity: 1 assist.   Pain: Denying pain.   Skin: No new deficits noted. Preventative mepilex placed.   LDA's: 1 L PIV, 2 R PIV. Octreotide gtt infusing at 10ml/hr.   Plan: Watching hemoglobins and signs of bleeding. Continue with POC. Notify primary team with changes.

## 2022-09-25 NOTE — PROVIDER NOTIFICATION
Time of notification: 10:42 PM  Provider notified: Art 3 crosscover   Patient status: Patient complaining of chest pain. Vitals stable.   Temp:  [97  F (36.1  C)-98.1  F (36.7  C)] 97.7  F (36.5  C)  Pulse:  [] 87  Resp:  [7-45] 14  BP: ()/(50-86) 135/81  SpO2:  [94 %-100 %] 99 %  Orders received: MD to bedside. EKG, hemoglobin, and troponin ordered.

## 2022-09-25 NOTE — PROCEDURES
[facility]  CAPS PROCEDURE NOTE  Date of Admission:  9/23/2022  Consult Requested by: Medicine  Reason for Consult: diagnostic evaluation of ascites    Indication/HPI: GI bleed, new ascites    Pre-Procedure Diagnosis: Ascites    Post-Procedure Diagnosis: Ascites    Risk Assessment: Higher bleeding risk. Coagulopathy of liver disease, small pocket.     [facility]    Paracentesis    Date/Time: 9/25/2022 11:01 AM  Performed by: Wally Melendez MD  Authorized by: Wally Melendez MD       UNIVERSAL PROTOCOL   Site Marked: Yes  Prior Images Obtained and Reviewed:  Yes  Required items: Required blood products, implants, devices and special equipment available    Patient identity confirmed:  Arm band and provided demographic data  NA - No sedation, light sedation, or local anesthesia  Confirmation Checklist:  Patient's identity using two indicators  Time out: Immediately prior to the procedure a time out was called    Universal Protocol: the Joint Commission Universal Protocol was followed    Preparation: Patient was prepped and draped in usual sterile fashion       ANESTHESIA    Anesthesia: Local infiltration  Local Anesthetic:  Lidocaine 1% without epinephrine      SEDATION    Patient Sedated: No    POST PROCEDURE DETAILS  Needle gauge: 22  Ultrasound guidance: yes  Puncture site: left lower quadrant  Fluid removed: 10(ml)  Fluid appearance: clear        PROCEDURE    Patient Tolerance:  Patient tolerated the procedure well with no immediate complications  Length of time physician/provider present for 1:1 monitoring during sedation: 0        POC US Guide for Paracentesis     Impression  US Indication: decompensated liver disease    Limited abdominal ultrasound was performed and demonstrated an adequate fluid collection on the left side of the abdomen.    A paracentesis at this site was subsequently performed.    Wally Melendez  MD Wally Melendez MD  [facility]  Securely message with the [employer] Web Console (learn more here)  Text page via [facility] Paging/Directory   Please see signed in provider for up to date coverage information

## 2022-09-25 NOTE — PLAN OF CARE
"Neuro: Patient alert and oriented x4.   Cardiac: NSR, BP's stable, afebrile.   Respiratory: Sating >92% on RA.  GI/: Adequate urine output. No BM today.   Diet/appetite: Tolerating clear liquid diet. Pt wants real food, stating \"I'm hungry.\"   Activity: 1 assist.   Pain: Denying pain.   Skin: No new deficits noted. Preventative mepilex placed.   LDA's: 1 L PIV, 2 R PIV. Octreotide gtt infusing at 10ml/hr.   Plan: Watching hemoglobins and signs of bleeding. Continue with POC. Notify primary team with changes.     "

## 2022-09-25 NOTE — PROGRESS NOTES
INTERNAL MEDICINE   DAILY PROGRESS NOTE  [facility]    Rishabh Cabrera ([id number])   Admission Date: 9/23/2022  Date of Service: 09/25/2022      Changes Today     - Advancing diet to 2g sodium restricted diet  - No bloody output post procedure  - CAPS consult for diagnostic paracentesis  - Continuing ceftriaxone / octreotide      Subjective     Nursing note was reviewed. Overnight had episode of chest pain consistent with previous symptoms associated with post HSV neuralgia. EKG and troponin reassuring to rule out ACS. Otherwise continues to be hemodynamically stable with no additional output since EGD.    This morning feels better but sleepy. No acute concerns at this time.    ROS negative except above.    Objective   Most recent vital signs:  /80 (BP Location: Left arm)   Pulse 73   Temp 97.9  F (36.6  C) (Oral)   Resp 18   Wt 53.7 kg (118 lb 6.2 oz)   SpO2 100%   BMI 19.70 kg/m    Temp:  [97.7  F (36.5  C)-98.3  F (36.8  C)] 97.9  F (36.6  C)  Pulse:  [73-87] 73  Resp:  [14-20] 18  BP: (103-147)/(61-86) 137/80  SpO2:  [94 %-100 %] 100 %  Wt Readings from Last 2 Encounters:   09/25/22 53.7 kg (118 lb 6.2 oz)   06/20/22 55.3 kg (122 lb)       Intake/Output Summary (Last 24 hours) at 9/25/2022 1221  Last data filed at 9/25/2022 0550  Gross per 24 hour   Intake 598.33 ml   Output 550 ml   Net 48.33 ml       Physical exam:  General: Alert, oriented, cooperative, no apparent distress, appears nontoxic  Eyes: Eyes are clear, EOMI  HENT: Oropharynx is clear and moist. No evidence of cranial trauma.  Cardiovascular: Regular rate and rhythm, normal S1 and S2, and no murmur noted. No lower extremity edema.  Respiratory: Clear to auscultation bilaterally. No wheezes or rhonchi appreciated  GI: Soft, non-tender, normal bowel sounds, no hepatosplenomegaly.  Musculoskeletal: Normal muscle bulk and tone.  Skin: Warm and dry, no rashes.   Neurologic: Alert and oriented x 3    Labs  CMP  Recent Labs   Lab  09/25/22  0843 09/25/22  0647 09/24/22  2106 09/24/22  1759 09/23/22  2314 09/21/22  1249   NA  --  135*  --   --  140  140 136   POTASSIUM  --  3.5  --   --  3.5  3.5 4.5   CHLORIDE  --  108*  --   --  110*  110* 106   CO2  --  23  --   --  21*  21* 26   ANIONGAP  --  4*  --   --  9  9 4*   * 120* 116* 119* 164*  164* 97   BUN  --  17.8  --   --  22.6  22.6 9.3   CR  --  0.75  --   --  0.69  0.69 0.82   GFRESTIMATED  --  >90  --   --  >90  >90 >90   MANDY  --  6.9*  --   --  7.5*  7.5* 8.2*   PROTTOTAL  --  5.7*  --   --  6.8 8.1   ALBUMIN  --  1.9*  --   --  2.2* 2.4*   BILITOTAL  --  1.6*  --   --  1.4* 1.9*   ALKPHOS  --  74  --   --  93 139*   AST  --  25  --   --  27 37   ALT  --  12  --   --  12 13     CBC  Recent Labs   Lab 09/25/22  0647 09/24/22  2349 09/24/22 2057 09/24/22  1327 09/24/22  0651 09/23/22  2314 09/21/22  1249   WBC 3.4*  --   --   --  6.8 17.3* 4.6   RBC 2.73*  --   --   --  2.58* 2.85* 3.87*   HGB 8.2* 8.3* 8.4* 8.8* 7.6* 8.7*  8.6*  8.6* 11.5*   HCT 25.0*  --   --   --  23.4* 26.3* 35.3*   MCV 92  --   --   --  91 92 91   MCH 30.0  --   --   --  29.5 30.2 29.7   MCHC 32.8  --   --   --  32.5 32.7 32.6   RDW 19.3*  --   --   --  18.6* 18.8* 18.1*   PLT 79*  --   --   --  77* 148* 76*     INR  Recent Labs   Lab 09/23/22  2314 09/21/22  1249   INR 1.82* 1.82*     Arterial Blood Gas  Recent Labs   Lab 09/24/22  0634   PH 7.46*         Imaging        Assessment & Plan     Rishabh Cabrera is a 62 year old male with a PMH of chronic hepatitis B-related decompensated cirrhosis decompensated ascites and esophageal varices, hepatocelluar carcinoma, T2DM, and herpes zoster(2022) who presents with a 2 day history of dark black stools and bloody emesis currently being treated for upper GI bleed 2/2 to esophageal varices.     # Upper GI bleed 2/2 esophageal varices  # Acute on chronic anemia 2/2 blood loss  Pt has had 2 episodes of dark emesis and has had black stool for the past 2-3 days. No  pain associated with the bleeding. Endoscopic evaluation revealed source of bleeding was likely a complication of varices as suspected given history of esophageal varices. Will continue octreotide and ceftriaxone and obtain diagnostic paracentesis while monitoring for continued clinical stability.   -Upper Endoscopy  - - Grade III esophageal varices with stigmata of recent bleeding, banded  - - Clotted blood in gastric body (posterior wall) and gastric body (greater curvature)  - GI recommendations appreciated  - - Diagnostic paracentesis - CAPS performed 9/25 - f/u results  - - Continue CTX 1g q24 hours 5-7 days (started 9/24)  - - Continue octreotide total of 72 hours to end ~midnight Monday into Tuesday  - - Advanced diet as tolerated to sodium restricted diet  - Maintain 2 large bore IVs at all times  - trend Hgb, transfuse for Hgb <7 or active bleeding     #Lactic Acidosis, improved  Presented with lactate of 5.7, down-trended to 3.0 after volume resuscitation with crystalloid and blood. Hemoglobin and vital signs have been stable throughout the day post EGD. Suspect elevation was in the setting of volume depletion due to GIB, low concern for sepsis due to infection given clinical presentation and course but will obtain diagnostic paracentesis out of caution tomorrow.   - continue to monitor hemoglobin and vital signs     #Decompensated Hepatitis B related Cirrhosis decompensated by ascites and esophageal varices  #HCC  #Chronic Hepatitis B infection  Follows outpatient with Dr. Leventhal. S/p Chemoembolization 12/21 for HCC. In past note was on sodium restriction and furosemide for ascites management but patient reports no home medications for ascites. Unclear if patient taking PTA tenofovir for chronic hep B - will continue per GI recs.  - PTA tenofovir for chronic hep B  - HE: None currently  - EV/GV: Last EGD today, see above, carvedilol 3.125mg daily once octreotide finished  - HRS: None, Cr = 0.75  (Basline = ~0.8)  - HPS: None, breathing comfortably on room air  - Coagulopathy: INR 1.82 (Baseline = ~1.80), Plts 77 (Basline = 80s)  - Anemia: Hgb acutely low due to GIB but (Baseline = ~10-11)  - SBP: Diagnostic tap 9/25  - HCC: see above  MELD-Na score: 17 at 9/25/2022  6:47 AM  MELD score: 15 at 9/25/2022  6:47 AM  Calculated from:  Serum Creatinine: 0.75 mg/dL (Using min of 1 mg/dL) at 9/25/2022  6:47 AM  Serum Sodium: 135 mmol/L at 9/25/2022  6:47 AM  Total Bilirubin: 1.6 mg/dL at 9/25/2022  6:47 AM  INR(ratio): 1.82 at 9/23/2022 11:14 PM  Age: 62 years    Chronic problems    #T2DM with diabetic polyneuropathy  Patients glucose levels 96 on admit. Pt says diabetes is normally managed with metformin outside of the clinic but unable to verify PTA metformin in Epic. With elevated lactic acid levels will discontinue metformin while inpatient and manage with CDI.  - low dose CDI      FEN: Sodium restricted diet  DVT Prophylaxis:  N/A  GI Prophylaxis: N/A  Disposition: Pending clinical course  Code Status: Full Code    Seen and discussed with my attending physician, Dr. Adair Hammer MD  Internal Medicine PGY-1

## 2022-09-25 NOTE — PROVIDER NOTIFICATION
"Time of notification: 11:31 PM  Provider notified: Nora Seaman MD  Patient status: \"pt explained chest pain he was feeling earlier was consistent w/ nerve pain he had with shingles.. Was wondering if we could get him a PRN of something to help with that\"  Temp:  [97  F (36.1  C)-98.3  F (36.8  C)] 98.3  F (36.8  C)  Pulse:  [73-95] 73  Resp:  [7-45] 16  BP: ()/(50-86) 145/75  SpO2:  [94 %-100 %] 100 %  Orders received: Oxycodone was ordered one time. Administered with no improvement to pain.     Time of notification: 4:29 AM  Provider notified:  Patient status: pt states the oxycodone was not helpful with the nerve pain in chest, wondering if there is anything other than oxy that can be tried? Like gabapentin?   Temp:  [97.7  F (36.5  C)-98.3  F (36.8  C)] 98.3  F (36.8  C)  Pulse:  [73-95] 73  Resp:  [7-45] 16  BP: ()/(50-86) 145/75  SpO2:  [94 %-100 %] 100 %  Orders received:     "

## 2022-09-25 NOTE — PROGRESS NOTES
[facility]    Hepatology Follow-up    CC: Hematemesis     Dx: Upper GI bleed   Esophageal variceal bleed   HBV cirrhosis   HCC    24 hour events: EGD yesterday; transferred from ER to floor; paracentesis this AM    Subjective:  Doing fine  No bowel movement today  Patient denies abdominal pain, melena, hematemesis or hematochezia  Patient denies fevers, sweats or chills.    Medications  Current Facility-Administered Medications   Medication Dose Route Frequency     cefTRIAXone  1 g Intravenous Q24H     insulin aspart  1-3 Units Subcutaneous TID AC     insulin aspart  1-3 Units Subcutaneous At Bedtime     pantoprazole  40 mg Intravenous Daily with breakfast     sodium chloride (PF)  3 mL Intravenous Q8H     sodium chloride (PF)  3 mL Intravenous Q8H     sodium chloride (PF)  3 mL Intravenous Q8H     tenofovir  300 mg Oral Daily       Review of systems  A 10-point review of systems was negative.    Examination  /80 (BP Location: Left arm)   Pulse 73   Temp 97.9  F (36.6  C) (Oral)   Resp 18   Wt 53.7 kg (118 lb 6.2 oz)   SpO2 100%   BMI 19.70 kg/m      Intake/Output Summary (Last 24 hours) at 9/25/2022 1457  Last data filed at 9/25/2022 0550  Gross per 24 hour   Intake 598.33 ml   Output 550 ml   Net 48.33 ml       Gen- well, NAD, A+Ox3, normal color  CVS- RRR  RS- CTA  Abd- distended, SNT, dull to percuss, BS+  Extr- no LEDY  Neuro- no asterixis  Skin- no rash  Psych- normal mood    Laboratory  Lab Results   Component Value Date     09/25/2022     05/10/2021    POTASSIUM 3.5 09/25/2022    POTASSIUM 3.8 06/01/2022    POTASSIUM 4.7 05/10/2021    CHLORIDE 108 09/25/2022    CHLORIDE 112 06/01/2022    CHLORIDE 103 05/10/2021    CO2 23 09/25/2022    CO2 26 06/01/2022    CO2 26 05/10/2021    BUN 17.8 09/25/2022    BUN 8 06/01/2022    BUN 12 05/10/2021    CR 0.75 09/25/2022    CR 0.85 05/10/2021       Lab Results   Component Value Date    BILITOTAL 1.6 09/25/2022     BILITOTAL 1.3 05/10/2021    ALT 12 09/25/2022     05/10/2021    AST 25 09/25/2022     05/10/2021    ALKPHOS 74 09/25/2022    ALKPHOS 290 05/10/2021       Lab Results   Component Value Date    WBC 3.4 09/25/2022    WBC 3.5 05/10/2021    HGB 8.2 09/25/2022    HGB 13.0 05/10/2021    MCV 92 09/25/2022    MCV 86 05/10/2021    PLT 79 09/25/2022    PLT 52 05/10/2021       Lab Results   Component Value Date    INR 1.82 09/23/2022    INR 1.39 05/10/2021         Radiology  Nil new    Assessment  62 year old male with history of decompensated HBV cirrhosis complicated with ascites and HCC admitted with esophageal variceal bleed.  MELD-Na= 17.  No evidence of recurrent bleeding.  Hemodynamically stable.  Would continue medical therapy and advance diet as tolerated.    Recommendations  1.  Advance diet as tolerated   2.  Low Na diet  3.  Continue octreotide  4.  Continue antibiotics    Elan Noguera MD  Hepatology staff

## 2022-09-26 ENCOUNTER — APPOINTMENT (OUTPATIENT)
Dept: INTERPRETER SERVICES | Facility: CLINIC | Age: 62
DRG: 432 | End: 2022-09-26
Payer: MEDICARE

## 2022-09-26 LAB
ALBUMIN SERPL BCG-MCNC: 2.2 G/DL (ref 3.5–5.2)
ALP SERPL-CCNC: 88 U/L (ref 40–129)
ALT SERPL W P-5'-P-CCNC: 12 U/L (ref 10–50)
ANION GAP SERPL CALCULATED.3IONS-SCNC: 1 MMOL/L (ref 7–15)
AST SERPL W P-5'-P-CCNC: 25 U/L (ref 10–50)
ATRIAL RATE - MUSE: 81 BPM
BILIRUB SERPL-MCNC: 1.8 MG/DL
BUN SERPL-MCNC: 13 MG/DL (ref 8–23)
CALCIUM SERPL-MCNC: 7.2 MG/DL (ref 8.8–10.2)
CHLORIDE SERPL-SCNC: 107 MMOL/L (ref 98–107)
CREAT SERPL-MCNC: 0.77 MG/DL (ref 0.67–1.17)
DEPRECATED HCO3 PLAS-SCNC: 27 MMOL/L (ref 22–29)
DIASTOLIC BLOOD PRESSURE - MUSE: NORMAL MMHG
ERYTHROCYTE [DISTWIDTH] IN BLOOD BY AUTOMATED COUNT: 19.3 % (ref 10–15)
GFR SERPL CREATININE-BSD FRML MDRD: >90 ML/MIN/1.73M2
GLUCOSE BLDC GLUCOMTR-MCNC: 103 MG/DL (ref 70–99)
GLUCOSE BLDC GLUCOMTR-MCNC: 124 MG/DL (ref 70–99)
GLUCOSE BLDC GLUCOMTR-MCNC: 155 MG/DL (ref 70–99)
GLUCOSE BLDC GLUCOMTR-MCNC: 192 MG/DL (ref 70–99)
GLUCOSE SERPL-MCNC: 113 MG/DL (ref 70–99)
HCT VFR BLD AUTO: 29.4 % (ref 40–53)
HGB BLD-MCNC: 9.6 G/DL (ref 13.3–17.7)
INTERPRETATION ECG - MUSE: NORMAL
MCH RBC QN AUTO: 29.7 PG (ref 26.5–33)
MCHC RBC AUTO-ENTMCNC: 32.7 G/DL (ref 31.5–36.5)
MCV RBC AUTO: 91 FL (ref 78–100)
P AXIS - MUSE: 52 DEGREES
PLATELET # BLD AUTO: 96 10E3/UL (ref 150–450)
POTASSIUM SERPL-SCNC: 3.6 MMOL/L (ref 3.4–5.3)
PR INTERVAL - MUSE: 166 MS
PROT SERPL-MCNC: 6.4 G/DL (ref 6.4–8.3)
QRS DURATION - MUSE: 88 MS
QT - MUSE: 414 MS
QTC - MUSE: 480 MS
R AXIS - MUSE: 12 DEGREES
RBC # BLD AUTO: 3.23 10E6/UL (ref 4.4–5.9)
SODIUM SERPL-SCNC: 135 MMOL/L (ref 136–145)
SYSTOLIC BLOOD PRESSURE - MUSE: NORMAL MMHG
T AXIS - MUSE: 29 DEGREES
VENTRICULAR RATE- MUSE: 81 BPM
WBC # BLD AUTO: 5.9 10E3/UL (ref 4–11)

## 2022-09-26 PROCEDURE — 250N000013 HC RX MED GY IP 250 OP 250 PS 637

## 2022-09-26 PROCEDURE — 258N000003 HC RX IP 258 OP 636

## 2022-09-26 PROCEDURE — 120N000002 HC R&B MED SURG/OB UMMC

## 2022-09-26 PROCEDURE — 80053 COMPREHEN METABOLIC PANEL: CPT

## 2022-09-26 PROCEDURE — C9113 INJ PANTOPRAZOLE SODIUM, VIA: HCPCS

## 2022-09-26 PROCEDURE — 36415 COLL VENOUS BLD VENIPUNCTURE: CPT

## 2022-09-26 PROCEDURE — 85027 COMPLETE CBC AUTOMATED: CPT

## 2022-09-26 PROCEDURE — 250N000013 HC RX MED GY IP 250 OP 250 PS 637: Performed by: PEDIATRICS

## 2022-09-26 PROCEDURE — 250N000011 HC RX IP 250 OP 636

## 2022-09-26 PROCEDURE — 99232 SBSQ HOSP IP/OBS MODERATE 35: CPT | Mod: GC | Performed by: PEDIATRICS

## 2022-09-26 RX ORDER — GABAPENTIN 100 MG/1
100 CAPSULE ORAL 3 TIMES DAILY
Status: DISCONTINUED | OUTPATIENT
Start: 2022-09-26 | End: 2022-09-29 | Stop reason: HOSPADM

## 2022-09-26 RX ORDER — POLYETHYLENE GLYCOL 3350 17 G/17G
17 POWDER, FOR SOLUTION ORAL ONCE
Status: COMPLETED | OUTPATIENT
Start: 2022-09-26 | End: 2022-09-26

## 2022-09-26 RX ORDER — CARVEDILOL 6.25 MG/1
6.25 TABLET ORAL DAILY
Status: DISCONTINUED | OUTPATIENT
Start: 2022-09-26 | End: 2022-09-26

## 2022-09-26 RX ORDER — CARVEDILOL 6.25 MG/1
6.25 TABLET ORAL DAILY
Status: DISCONTINUED | OUTPATIENT
Start: 2022-09-27 | End: 2022-09-29 | Stop reason: HOSPADM

## 2022-09-26 RX ADMIN — Medication 1 LOZENGE: at 20:22

## 2022-09-26 RX ADMIN — Medication 1 LOZENGE: at 00:44

## 2022-09-26 RX ADMIN — POLYETHYLENE GLYCOL 3350 17 G: 17 POWDER, FOR SOLUTION ORAL at 14:30

## 2022-09-26 RX ADMIN — ACETAMINOPHEN 325 MG: 325 TABLET, FILM COATED ORAL at 20:25

## 2022-09-26 RX ADMIN — GABAPENTIN 100 MG: 100 CAPSULE ORAL at 20:13

## 2022-09-26 RX ADMIN — Medication 1 LOZENGE: at 18:48

## 2022-09-26 RX ADMIN — GABAPENTIN 100 MG: 100 CAPSULE ORAL at 14:30

## 2022-09-26 RX ADMIN — INSULIN ASPART 1 UNITS: 100 INJECTION, SOLUTION INTRAVENOUS; SUBCUTANEOUS at 17:16

## 2022-09-26 RX ADMIN — TENOFOVIR DISOPROXIL FUMARATE 300 MG: 300 TABLET, FILM COATED ORAL at 08:45

## 2022-09-26 RX ADMIN — PANTOPRAZOLE SODIUM 40 MG: 40 INJECTION, POWDER, FOR SOLUTION INTRAVENOUS at 08:45

## 2022-09-26 RX ADMIN — CEFTRIAXONE SODIUM 1 G: 1 INJECTION, POWDER, FOR SOLUTION INTRAMUSCULAR; INTRAVENOUS at 05:07

## 2022-09-26 RX ADMIN — OCTREOTIDE ACETATE 50 MCG/HR: 200 INJECTION, SOLUTION INTRAVENOUS; SUBCUTANEOUS at 00:45

## 2022-09-26 ASSESSMENT — ACTIVITIES OF DAILY LIVING (ADL)
DEPENDENT_IADLS:: INDEPENDENT
ADLS_ACUITY_SCORE: 26
ADLS_ACUITY_SCORE: 34
ADLS_ACUITY_SCORE: 26
ADLS_ACUITY_SCORE: 26
ADLS_ACUITY_SCORE: 30
ADLS_ACUITY_SCORE: 26
ADLS_ACUITY_SCORE: 30
ADLS_ACUITY_SCORE: 26

## 2022-09-26 NOTE — PLAN OF CARE
Goal Outcome Evaluation:    Neuro: A&Ox4.   Cardiac: SR. VSS.   Respiratory: Sating 98% on RA.   GI/: Adequate urine output, no Bm at this shift.   Diet/appetite: Tolerating regular diet. Eating small amount meals that family brought.   Activity:  Assist of one up to chair and use the bathroom.   Pain: At acceptable level on current regimen.   Skin: No new deficits noted.  LDA's: PIV patent and infusing well.   Plan: no sign or symptom of bleeding known. Continue with POC. Notify primary team with changes.

## 2022-09-26 NOTE — PROVIDER NOTIFICATION
Time of notification: 11:12 PM  Provider notified: Nora Seaman MD  Patient status: Pt c/o sore throat, lots of discomfort. hoarse voice. can i get something for that like lozenge PRN or whatever you suggest.   Temp:  [97.7  F (36.5  C)-97.9  F (36.6  C)] 97.9  F (36.6  C)  Pulse:  [73-75] 73  Resp:  [16-18] 16  BP: (129-137)/(74-80) 129/74  SpO2:  [99 %-100 %] 100 %  Orders received: PRN lozenge ordered and administered

## 2022-09-26 NOTE — CONSULTS
Care Management Initial Consult    General Information  Assessment completed with: Patient   Type of CM/SW Visit: Initial Assessment  Primary Care Provider verified and updated as needed: No   Readmission within the last 30 days: no previous admission in last 30 days   Reason for Consult: decision-making  Advance Care Planning: Pt confirms that he does not have a HCD. Pt has 5 children and the NOK policy was explained, pt declined completing a HCD at this time.        Communication Assessment  Patient's communication style: spoken language (non-English)    Hearing Difficulty or Deaf: no   Wear Glasses or Blind: no    Cognitive  Cognitive/Neuro/Behavioral: WDL     Living Environment:   People in home: adult son, Nhia  Current living Arrangements: house      Able to return to prior arrangements: yes    Family/Social Support:  Care provided by: child(shemar), self  Provides care for: no one  Marital Status:   Support System: Children, Sibling(s)          Description of Support System: Supportive, Involved     Current Resources:   Patient receiving home care services: No  Community Resources: None  Equipment currently used at home: grab bar, toilet, grab bar, tub/shower  Supplies currently used at home: None    Employment/Financial:  Employment Status: disabled       Functional Status:  Prior to admission patient needed assistance: Independent with ADLs at baseline, son is able to assist when needed.      Mental Health Status:  Mental Health Status: No Current Concerns       Chemical Dependency Status:  Chemical Dependency Status: No Current Concerns             Additional Information:  Care management assessment completed at the bedside with a [email or web address]  via ipad. Patient confirms that he lives locally w/his adult son. Patient is  and has 5 children, writer explained the next of kin policy, patient is not interested in completing a health care directive at this time. Patient is independent at  baseline, his son is available to assist as needed. No discharge needs or concerns voiced by the patient at this time. Care coordination will continue to follow for discharge planning.     Rachelle Small, RNCC, BSN    [facility]    [address]  [address] [place] [id number]    [email or web address]    Office: [phone] Pager: [phone]    To contact the weekend RNCC  [place] (0800 - 1630) Saturday and Sunday    Units: 4A, 4C, 4E, 5A and 5B- Pager 1: [phone]    Units: 6A, 6B, 6C, 6D- Pager 2: [phone]    Units: 7A, 7B, 7C, 7D, and 5C-Pager 3: [phone]

## 2022-09-26 NOTE — PROGRESS NOTES
INTERNAL MEDICINE   DAILY PROGRESS NOTE  [facility] Service    Rishabh Cabrera ([id number])   Admission Date: 9/23/2022  Date of Service: 09/26/2022      Changes Today     - Continuing ceftriaxone / octreotide  - Start Gabapentin 100mg TID for neuropathic pain related to previous shingles  - Miralax for constipation    Subjective     Nursing note was reviewed. Since yesterday continues to have episodes of superficial chest pain consistent with previous HSV neuralgia. Also some decrease in taste for foods he enjoys and some sore throat with swallowing. Also reports some eye pain on R side and general feelings of skin sensitivity on the face today - same pain he says he's had for the past 4-5 months. +flatus, no BM since admission.    No acute concerns at this time.    ROS negative except above.    Objective   Most recent vital signs:  /75 (BP Location: Left arm)   Pulse 68   Temp 97.8  F (36.6  C) (Oral)   Resp 16   Wt 53.3 kg (117 lb 9.6 oz)   SpO2 100%   BMI 19.57 kg/m    Temp:  [97.8  F (36.6  C)-98.1  F (36.7  C)] 97.8  F (36.6  C)  Pulse:  [68-79] 68  Resp:  [16-18] 16  BP: (115-129)/(69-75) 125/75  SpO2:  [100 %] 100 %  Wt Readings from Last 2 Encounters:   09/26/22 53.3 kg (117 lb 9.6 oz)   06/20/22 55.3 kg (122 lb)       Intake/Output Summary (Last 24 hours) at 9/25/2022 1221  Last data filed at 9/25/2022 0550  Gross per 24 hour   Intake 598.33 ml   Output 550 ml   Net 48.33 ml     Physical exam:  General: Alert, oriented, cooperative, no apparent distress, appears nontoxic  Eyes: Eyes are clear, EOMI  HENT: Oropharynx is clear and moist. No evidence of cranial trauma.  Cardiovascular: Regular rate and rhythm, normal S1 and S2, and no murmur noted. No lower extremity edema.  Respiratory: Clear to auscultation bilaterally. No wheezes or rhonchi appreciated  GI: Distended, non-tender, normal bowel sounds, no hepatosplenomegaly.  Musculoskeletal: Normal muscle bulk and tone.  Neurologic: Alert and oriented  x 3    Labs  CMP  Recent Labs   Lab 09/26/22  1544 09/26/22  0902 09/26/22  0849 09/26/22  0515 09/25/22  0843 09/25/22  0647 09/24/22  1759 09/23/22  2314 09/21/22  1249   NA  --   --   --  135*  --  135*  --  140  140 136   POTASSIUM  --   --   --  3.6  --  3.5  --  3.5  3.5 4.5   CHLORIDE  --   --   --  107  --  108*  --  110*  110* 106   CO2  --   --   --  27  --  23  --  21*  21* 26   ANIONGAP  --   --   --  1*  --  4*  --  9  9 4*   * 103* 124* 113*   < > 120*   < > 164*  164* 97   BUN  --   --   --  13.0  --  17.8  --  22.6  22.6 9.3   CR  --   --   --  0.77  --  0.75  --  0.69  0.69 0.82   GFRESTIMATED  --   --   --  >90  --  >90  --  >90  >90 >90   MANDY  --   --   --  7.2*  --  6.9*  --  7.5*  7.5* 8.2*   PROTTOTAL  --   --   --  6.4  --  5.7*  --  6.8 8.1   ALBUMIN  --   --   --  2.2*  --  1.9*  --  2.2* 2.4*   BILITOTAL  --   --   --  1.8*  --  1.6*  --  1.4* 1.9*   ALKPHOS  --   --   --  88  --  74  --  93 139*   AST  --   --   --  25  --  25  --  27 37   ALT  --   --   --  12  --  12  --  12 13    < > = values in this interval not displayed.     CBC  Recent Labs   Lab 09/26/22  0515 09/25/22  1757 09/25/22  0647 09/24/22  2349 09/24/22  1327 09/24/22  0651 09/23/22  2314   WBC 5.9  --  3.4*  --   --  6.8 17.3*   RBC 3.23*  --  2.73*  --   --  2.58* 2.85*   HGB 9.6* 9.2* 8.2* 8.3*   < > 7.6* 8.7*  8.6*  8.6*   HCT 29.4*  --  25.0*  --   --  23.4* 26.3*   MCV 91  --  92  --   --  91 92   MCH 29.7  --  30.0  --   --  29.5 30.2   MCHC 32.7  --  32.8  --   --  32.5 32.7   RDW 19.3*  --  19.3*  --   --  18.6* 18.8*   PLT 96*  --  79*  --   --  77* 148*    < > = values in this interval not displayed.     INR  Recent Labs   Lab 09/23/22  2314 09/21/22  1249   INR 1.82* 1.82*     Arterial Blood Gas  Recent Labs   Lab 09/24/22  0634   PH 7.46*       Imaging      Assessment & Plan     Rishabh Cabrera is a 62 year old male with a PMH of chronic hepatitis B-related decompensated cirrhosis decompensated  ascites and esophageal varices, hepatocelluar carcinoma, T2DM, and herpes zoster(2022) who presents with a 2 day history of dark black stools and bloody emesis currently being treated for upper GI bleed 2/2 to esophageal varices.     # Upper GI bleed 2/2 esophageal varices  # Acute on chronic anemia 2/2 blood loss  Pt has had 2 episodes of dark emesis and has had black stool for the past 2-3 days. No pain associated with the bleeding. Endoscopic evaluation revealed source of bleeding was likely a complication of varices as suspected given history of esophageal varices. Will continue octreotide and ceftriaxone while monitoring for continued clinical stability. Paracentesis negative for infectious etiology.  -Upper Endoscopy 9/24  - - Grade III esophageal varices with stigmata of recent bleeding, banded  - - Clotted blood in gastric body (posterior wall) and gastric body (greater curvature)  - GI recommendations appreciated  - - Continue CTX 1g q24 hours 5-7 days (started 9/24)  - - Continue octreotide total of 72 hours to end ~midnight Monday into Tuesday  - Maintain 2 large bore IVs at all times  - trend Hgb, transfuse for Hgb <7 or active bleeding     #Lactic Acidosis, improved  Presented with lactate of 5.7, down-trended to 3.0 after volume resuscitation with crystalloid and blood. Hemoglobin and vital signs have been stable throughout the day post EGD. Suspect elevation was in the setting of volume depletion due to GIB, low concern for sepsis due to infection given clinical presentation and course but will obtain diagnostic paracentesis out of caution tomorrow.   - continue to monitor hemoglobin and vital signs     #Decompensated Hepatitis B related Cirrhosis decompensated by ascites and esophageal varices  #HCC  #Chronic Hepatitis B infection  Follows outpatient with Dr. Leventhal. S/p Chemoembolization 12/21 for HCC. In past note was on sodium restriction and furosemide for ascites management but patient  reports no home medications for ascites. Unclear if patient taking PTA tenofovir for chronic hep B - will continue per GI recs.  - PTA tenofovir for chronic hep B  - HE: None currently  - EV/GV: Last EGD today, see above, carvedilol 3.125mg daily once octreotide finished  - HRS: None, Cr = 0.75 (Basline = ~0.8)  - HPS: None, breathing comfortably on room air  - Coagulopathy: INR 1.82 (Baseline = ~1.80), Plts 77 (Basline = 80s)  - Anemia: Improving since acute GIB to ~10 (Baseline = ~10-11)  - SBP: Diagnostic tap 9/25  - HCC: see above  MELD-Na score: 17 at 9/25/2022  6:47 AM  MELD score: 15 at 9/25/2022  6:47 AM  Calculated from:  Serum Creatinine: 0.75 mg/dL (Using min of 1 mg/dL) at 9/25/2022  6:47 AM  Serum Sodium: 135 mmol/L at 9/25/2022  6:47 AM  Total Bilirubin: 1.6 mg/dL at 9/25/2022  6:47 AM  INR(ratio): 1.82 at 9/23/2022 11:14 PM  Age: 62 years    #Previous Herpes Zoster of L T4 dermatome  Patient with previously treated herpes zoster rash May 2022. Similar distribution of pain to previous incidence of Herpes Zoster.   - Gabapentin 100mg TID    Chronic problems    #T2DM with diabetic polyneuropathy  Patients glucose levels 96 on admit. Pt says diabetes is normally managed with metformin outside of the clinic but unable to verify PTA metformin in Epic. With elevated lactic acid levels will discontinue metformin while inpatient and manage with CDI.  - low dose CDI    FEN: Sodium restricted diet  DVT Prophylaxis:  N/A  GI Prophylaxis: N/A  Disposition: Pending clinical course  Code Status: Full Code    Seen and discussed with my attending physician, Dr. Adair Damico MS-3  [place] 3    I, Kehinde Hammer MD, was present with the medical/OPAL student who participated in the service and in the documentation of the note.  I have verified the history and personally performed the physical exam and medical decision making.  I agree with the assessment and plan of care as documented in the note and have made  edits as needed.     Kehinde Hammer MD  PGY1  Date of Service (when I saw the patient): 9/26/22

## 2022-09-26 NOTE — PROGRESS NOTES
AAOx4, speaks fair English, had  IPad if needed.  VSS, no tele orders.   Adequate O2 sats on RA.  Tolerating PO intake, no BM this shift, voiding.  Up with SBA.  Octreotide to be discontinued at midnight.  PIV x3.      Report called to Michelle, patient transferred via wheelchair with belongings.      Called patient's son to update on transfer to , no answer.  Will have RN on  continue to try to call.

## 2022-09-26 NOTE — PROGRESS NOTES
Brief GI Progress Note       62 year old male with history of decompensated HBV cirrhosis complicated with ascites and HCC admitted with esophageal variceal bleed now s/p banding.     MELD-Na score: 17 at 9/25/2022  6:47 AM  MELD score: 15 at 9/25/2022  6:47 AM  Calculated from:  Serum Creatinine: 0.75 mg/dL (Using min of 1 mg/dL) at 9/25/2022  6:47 AM  Serum Sodium: 135 mmol/L at 9/25/2022  6:47 AM  Total Bilirubin: 1.6 mg/dL at 9/25/2022  6:47 AM  INR(ratio): 1.82 at 9/23/2022 11:14 PM  Age: 62 years      No evidence of recurrent bleeding, stable hgb.  Patient has remained hemodynamically stable.      Start Coreg 6.25mg daily (ordered)  Complete antibiotic course  Low NA diet   Monitor hgb and transfuse as needed

## 2022-09-26 NOTE — PROGRESS NOTES
Admission/Transfer from:  U6B  2 RN skin assessment completed. Yes, Rosanna RN  Significant findings include: Some itching & small dry skin on upper middle back. Mepilex on sacrum, but no discoloration on sacrum.   [facility] Nurse Consult Ordered? No  Was NST/NA able to join during assessment? No  Bed algorithm can be found in PCS flowsheets and forms binder, was this used for this assessment? Yes  Was a pulsate mattress ordered? No    Pt transferred from . Oriented patient to room. VSS on RA. A&Ox4. Pt's son at bedside, gave him updates. Bed alarm on. IV octreotide gtt still running, to be discontinued @ [facility] tonight.

## 2022-09-26 NOTE — PLAN OF CARE
Time: 8009-7850    Shift events: AxOx4. VSS. Pt c/o sore throat and has a hoarse speech. PRN lozenge administered x1, see eMAR, with improvement. Denies difficulty with swallowing or breathing. Denies any other pain. Poor appetite this shift. Pt stated stomach discomfort and condition is preventing him from enjoying his favorite foods/snacks. SBA ambulating to the bathroom. Pt able to turn from side to side in bed independently. Notify primary team with any changes.    Please see flowsheets for detailed assessments and vital signs.

## 2022-09-27 ENCOUNTER — APPOINTMENT (OUTPATIENT)
Dept: GENERAL RADIOLOGY | Facility: CLINIC | Age: 62
DRG: 432 | End: 2022-09-27
Payer: MEDICARE

## 2022-09-27 LAB
ALBUMIN SERPL BCG-MCNC: 2.1 G/DL (ref 3.5–5.2)
ALP SERPL-CCNC: 90 U/L (ref 40–129)
ALT SERPL W P-5'-P-CCNC: 12 U/L (ref 10–50)
ANION GAP SERPL CALCULATED.3IONS-SCNC: 3 MMOL/L (ref 7–15)
AST SERPL W P-5'-P-CCNC: 24 U/L (ref 10–50)
BILIRUB SERPL-MCNC: 1.5 MG/DL
BUN SERPL-MCNC: 10.1 MG/DL (ref 8–23)
CALCIUM SERPL-MCNC: 6.8 MG/DL (ref 8.8–10.2)
CHLORIDE SERPL-SCNC: 106 MMOL/L (ref 98–107)
CREAT SERPL-MCNC: 0.76 MG/DL (ref 0.67–1.17)
DEPRECATED HCO3 PLAS-SCNC: 26 MMOL/L (ref 22–29)
ERYTHROCYTE [DISTWIDTH] IN BLOOD BY AUTOMATED COUNT: 19.3 % (ref 10–15)
GFR SERPL CREATININE-BSD FRML MDRD: >90 ML/MIN/1.73M2
GLUCOSE BLDC GLUCOMTR-MCNC: 119 MG/DL (ref 70–99)
GLUCOSE BLDC GLUCOMTR-MCNC: 121 MG/DL (ref 70–99)
GLUCOSE BLDC GLUCOMTR-MCNC: 146 MG/DL (ref 70–99)
GLUCOSE BLDC GLUCOMTR-MCNC: 158 MG/DL (ref 70–99)
GLUCOSE BLDC GLUCOMTR-MCNC: 164 MG/DL (ref 70–99)
GLUCOSE SERPL-MCNC: 214 MG/DL (ref 70–99)
HCT VFR BLD AUTO: 27.4 % (ref 40–53)
HGB BLD-MCNC: 8.9 G/DL (ref 13.3–17.7)
MCH RBC QN AUTO: 30 PG (ref 26.5–33)
MCHC RBC AUTO-ENTMCNC: 32.5 G/DL (ref 31.5–36.5)
MCV RBC AUTO: 92 FL (ref 78–100)
PLATELET # BLD AUTO: 83 10E3/UL (ref 150–450)
POTASSIUM SERPL-SCNC: 3.7 MMOL/L (ref 3.4–5.3)
PROT SERPL-MCNC: 6.1 G/DL (ref 6.4–8.3)
RBC # BLD AUTO: 2.97 10E6/UL (ref 4.4–5.9)
SODIUM SERPL-SCNC: 135 MMOL/L (ref 136–145)
WBC # BLD AUTO: 4.6 10E3/UL (ref 4–11)

## 2022-09-27 PROCEDURE — 250N000013 HC RX MED GY IP 250 OP 250 PS 637

## 2022-09-27 PROCEDURE — C9113 INJ PANTOPRAZOLE SODIUM, VIA: HCPCS

## 2022-09-27 PROCEDURE — 85027 COMPLETE CBC AUTOMATED: CPT | Performed by: PEDIATRICS

## 2022-09-27 PROCEDURE — 99233 SBSQ HOSP IP/OBS HIGH 50: CPT | Mod: GC | Performed by: STUDENT IN AN ORGANIZED HEALTH CARE EDUCATION/TRAINING PROGRAM

## 2022-09-27 PROCEDURE — 120N000002 HC R&B MED SURG/OB UMMC

## 2022-09-27 PROCEDURE — 36415 COLL VENOUS BLD VENIPUNCTURE: CPT | Performed by: PEDIATRICS

## 2022-09-27 PROCEDURE — 250N000011 HC RX IP 250 OP 636

## 2022-09-27 PROCEDURE — 80053 COMPREHEN METABOLIC PANEL: CPT | Performed by: PEDIATRICS

## 2022-09-27 PROCEDURE — 74018 RADEX ABDOMEN 1 VIEW: CPT

## 2022-09-27 PROCEDURE — 74018 RADEX ABDOMEN 1 VIEW: CPT | Mod: 26 | Performed by: STUDENT IN AN ORGANIZED HEALTH CARE EDUCATION/TRAINING PROGRAM

## 2022-09-27 PROCEDURE — 250N000013 HC RX MED GY IP 250 OP 250 PS 637: Performed by: PEDIATRICS

## 2022-09-27 RX ORDER — POLYETHYLENE GLYCOL 3350 17 G/17G
17 POWDER, FOR SOLUTION ORAL 2 TIMES DAILY
Status: DISCONTINUED | OUTPATIENT
Start: 2022-09-27 | End: 2022-09-29 | Stop reason: HOSPADM

## 2022-09-27 RX ADMIN — CALCIUM CARBONATE 600 MG (1,500 MG)-VITAMIN D3 400 UNIT TABLET 1 TABLET: at 17:43

## 2022-09-27 RX ADMIN — CEFTRIAXONE SODIUM 1 G: 1 INJECTION, POWDER, FOR SOLUTION INTRAMUSCULAR; INTRAVENOUS at 06:16

## 2022-09-27 RX ADMIN — GABAPENTIN 100 MG: 100 CAPSULE ORAL at 09:03

## 2022-09-27 RX ADMIN — ACETAMINOPHEN 325 MG: 325 TABLET, FILM COATED ORAL at 13:30

## 2022-09-27 RX ADMIN — Medication 1 LOZENGE: at 13:30

## 2022-09-27 RX ADMIN — TENOFOVIR DISOPROXIL FUMARATE 300 MG: 300 TABLET, FILM COATED ORAL at 09:03

## 2022-09-27 RX ADMIN — Medication 1 LOZENGE: at 17:43

## 2022-09-27 RX ADMIN — CALCIUM CARBONATE 600 MG (1,500 MG)-VITAMIN D3 400 UNIT TABLET 1 TABLET: at 09:03

## 2022-09-27 RX ADMIN — CARVEDILOL 6.25 MG: 6.25 TABLET, FILM COATED ORAL at 09:04

## 2022-09-27 RX ADMIN — POLYETHYLENE GLYCOL 3350 17 G: 17 POWDER, FOR SOLUTION ORAL at 20:31

## 2022-09-27 RX ADMIN — INSULIN ASPART 1 UNITS: 100 INJECTION, SOLUTION INTRAVENOUS; SUBCUTANEOUS at 13:30

## 2022-09-27 RX ADMIN — GABAPENTIN 100 MG: 100 CAPSULE ORAL at 13:30

## 2022-09-27 RX ADMIN — GABAPENTIN 100 MG: 100 CAPSULE ORAL at 20:31

## 2022-09-27 RX ADMIN — PANTOPRAZOLE SODIUM 40 MG: 40 INJECTION, POWDER, FOR SOLUTION INTRAVENOUS at 09:03

## 2022-09-27 RX ADMIN — POLYETHYLENE GLYCOL 3350 17 G: 17 POWDER, FOR SOLUTION ORAL at 09:03

## 2022-09-27 RX ADMIN — INSULIN ASPART 1 UNITS: 100 INJECTION, SOLUTION INTRAVENOUS; SUBCUTANEOUS at 17:43

## 2022-09-27 RX ADMIN — ACETAMINOPHEN 325 MG: 325 TABLET, FILM COATED ORAL at 02:30

## 2022-09-27 ASSESSMENT — ACTIVITIES OF DAILY LIVING (ADL)
ADLS_ACUITY_SCORE: 26
ADLS_ACUITY_SCORE: 26
ADLS_ACUITY_SCORE: 27
ADLS_ACUITY_SCORE: 26
ADLS_ACUITY_SCORE: 26
ADLS_ACUITY_SCORE: 27
ADLS_ACUITY_SCORE: 26
ADLS_ACUITY_SCORE: 27
ADLS_ACUITY_SCORE: 27
ADLS_ACUITY_SCORE: 26

## 2022-09-27 NOTE — PROGRESS NOTES
INTERNAL MEDICINE   DAILY PROGRESS NOTE  Maroon Service    Rishabh Cabrera ([id number])   Admission Date: 9/23/2022  Date of Service: 09/27/2022      Changes Today     - Discussed with GI - will monitor today into tomorrow morning after coreg started, can complete 7 day antibiotic course with ciprofloxacin, will need follow-up endoscopy and appointment with Dr. Leventhal  - Completed octreotide  - Continue Gabapentin 100mg TID for neuropathic pain related to previous shingles  - Miralax for constipation    Subjective     Nursing note was reviewed. Since yesterday has been able to progressively eat more soft foods without any swallowing/chest discomfort. Reports hard foods still hurt when he swallows. Pt reports taste is also beginning to improve. He still endorses some R facial  pain that has been going on for 4-5 months. No BM since admission. He also has some tingling in his hands and feet, which is not new.     No acute concerns at this time.    ROS negative except above.    Objective   Most recent vital signs:  /68 (BP Location: Left arm)   Pulse 72   Temp 97.9  F (36.6  C) (Oral)   Resp 20   Wt 54.7 kg (120 lb 9.5 oz)   SpO2 99%   BMI 20.07 kg/m    Temp:  [97.4  F (36.3  C)-97.9  F (36.6  C)] 97.9  F (36.6  C)  Pulse:  [68-72] 72  Resp:  [16-24] 20  BP: (117-134)/(59-75) 134/68  SpO2:  [99 %-100 %] 99 %  Wt Readings from Last 2 Encounters:   09/26/22 54.7 kg (120 lb 9.5 oz)   06/20/22 55.3 kg (122 lb)       Intake/Output Summary (Last 24 hours) at 9/25/2022 1221  Last data filed at 9/25/2022 0550  Gross per 24 hour   Intake 598.33 ml   Output 550 ml   Net 48.33 ml     Physical exam:  General: Alert, oriented, cooperative, no apparent distress, appears nontoxic, laying in bed  Eyes: Eyes are clear, EOMI  HENT: Oropharynx is clear and moist. No evidence of cranial trauma.  Cardiovascular: Regular rate and rhythm, normal S1 and S2, and no murmur noted. No lower extremity edema.  Respiratory: Clear to  auscultation bilaterally. No wheezes or rhonchi appreciated  GI: Distended, non-tender, normal bowel sounds, no hepatosplenomegaly.  Musculoskeletal: Normal muscle bulk and tone.  Neurologic: Alert and oriented x 3    Labs  CMP  Recent Labs   Lab 09/27/22  0753 09/27/22  0446 09/27/22  0222 09/26/22  2231 09/26/22  0849 09/26/22  0515 09/25/22  0843 09/25/22  0647 09/24/22  1759 09/23/22  2314   NA  --  135*  --   --   --  135*  --  135*  --  140  140   POTASSIUM  --  3.7  --   --   --  3.6  --  3.5  --  3.5  3.5   CHLORIDE  --  106  --   --   --  107  --  108*  --  110*  110*   CO2  --  26  --   --   --  27  --  23  --  21*  21*   ANIONGAP  --  3*  --   --   --  1*  --  4*  --  9  9   * 214* 119* 192*   < > 113*   < > 120*   < > 164*  164*   BUN  --  10.1  --   --   --  13.0  --  17.8  --  22.6  22.6   CR  --  0.76  --   --   --  0.77  --  0.75  --  0.69  0.69   GFRESTIMATED  --  >90  --   --   --  >90  --  >90  --  >90  >90   MANDY  --  6.8*  --   --   --  7.2*  --  6.9*  --  7.5*  7.5*   PROTTOTAL  --  6.1*  --   --   --  6.4  --  5.7*  --  6.8   ALBUMIN  --  2.1*  --   --   --  2.2*  --  1.9*  --  2.2*   BILITOTAL  --  1.5*  --   --   --  1.8*  --  1.6*  --  1.4*   ALKPHOS  --  90  --   --   --  88  --  74  --  93   AST  --  24  --   --   --  25  --  25  --  27   ALT  --  12  --   --   --  12  --  12  --  12    < > = values in this interval not displayed.     CBC  Recent Labs   Lab 09/27/22  0446 09/26/22  0515 09/25/22  1757 09/25/22  0647 09/24/22  1327 09/24/22  0651   WBC 4.6 5.9  --  3.4*  --  6.8   RBC 2.97* 3.23*  --  2.73*  --  2.58*   HGB 8.9* 9.6* 9.2* 8.2*   < > 7.6*   HCT 27.4* 29.4*  --  25.0*  --  23.4*   MCV 92 91  --  92  --  91   MCH 30.0 29.7  --  30.0  --  29.5   MCHC 32.5 32.7  --  32.8  --  32.5   RDW 19.3* 19.3*  --  19.3*  --  18.6*   PLT 83* 96*  --  79*  --  77*    < > = values in this interval not displayed.     INR  Recent Labs   Lab 09/23/22  2314 09/21/22  1249   INR  1.82* 1.82*     Arterial Blood Gas  Recent Labs   Lab 09/24/22  0634   PH 7.46*       Imaging      Assessment & Plan     Rishabh Cabrera is a 62 year old male with a PMH of chronic hepatitis B-related decompensated cirrhosis decompensated ascites and esophageal varices, hepatocelluar carcinoma, T2DM, and herpes zoster(2022) who presents with a 2 day history of dark black stools and bloody emesis currently being treated for upper GI bleed 2/2 to esophageal varices.     # Upper GI bleed 2/2 esophageal varices  # Acute on chronic anemia 2/2 blood loss  Pt has had 2 episodes of dark emesis and has had black stool for the past 2-3 days. No pain associated with the bleeding. Endoscopic evaluation revealed source of bleeding was likely a complication of varices as suspected given history of esophageal varices. Will continue ceftriaxone while monitoring for continued clinical stability. Paracentesis negative for infectious etiology.  -Upper Endoscopy 9/24  - - Grade III esophageal varices with stigmata of recent bleeding, banded  - - Clotted blood in gastric body (posterior wall) and gastric body (greater curvature)  - GI recommendations appreciated  - - Continue CTX 1g q24 hours, can switch to ciprofloxacin to complete 7 day course  - - Continue to monitor after initiation of coreg  - - Will need follow up endoscopy and follow up with Dr. Leventhal  - Maintain 2 large bore IVs at all times  - trend Hgb, transfuse for Hgb <7 or active bleeding     #Decompensated Hepatitis B related Cirrhosis decompensated by ascites and esophageal varices  #HCC  #Chronic Hepatitis B infection  Follows outpatient with Dr. Leventhal. S/p Chemoembolization 12/21 for HCC. In past note was on sodium restriction and furosemide for ascites management but patient reports no home medications for ascites. Unclear if patient taking PTA tenofovir for chronic hep B - will continue per GI recs.  - PTA tenofovir for chronic hep B  - HE: None currently  - EV/GV:  Last EGD today, see above, carvedilol 3.125mg daily once octreotide finished  - HRS: None, Cr = 0.75 (Basline = ~0.8)  - HPS: None, breathing comfortably on room air  - Coagulopathy: INR 1.82 (Baseline = ~1.80), Plts 77 (Basline = 80s)  - Anemia: Improving since acute GIB to ~10 (Baseline = ~10-11)  - SBP: Diagnostic tap 9/25  - HCC: see above  MELD-Na score: 17 at 9/25/2022  6:47 AM  MELD score: 15 at 9/25/2022  6:47 AM  Calculated from:  Serum Creatinine: 0.75 mg/dL (Using min of 1 mg/dL) at 9/25/2022  6:47 AM  Serum Sodium: 135 mmol/L at 9/25/2022  6:47 AM  Total Bilirubin: 1.6 mg/dL at 9/25/2022  6:47 AM  INR(ratio): 1.82 at 9/23/2022 11:14 PM  Age: 62 years    #Previous Herpes Zoster of L T4 dermatome  Patient with previously treated herpes zoster rash May 2022. Similar distribution of pain to previous incidence of Herpes Zoster.   - Gabapentin 100mg TID, will need follow up with PCP to continue treatment    #Lactic Acidosis, improved  Presented with lactate of 5.7, down-trended to 3.0 after volume resuscitation with crystalloid and blood. Hemoglobin and vital signs have been stable throughout the day post EGD. Suspect elevation was in the setting of volume depletion due to GIB, low concern for sepsis due to infection given clinical presentation and course but will obtain diagnostic paracentesis out of caution tomorrow.   - continue to monitor hemoglobin and vital signs    Chronic problems    #T2DM with diabetic polyneuropathy  Patients glucose levels 96 on admit. Pt says diabetes is normally managed with metformin outside of the clinic but unable to verify PTA metformin in Epic. With elevated lactic acid levels will discontinue metformin while inpatient and manage with CDI.  - low dose CDI    FEN: Sodium restricted diet  DVT Prophylaxis:  N/A  GI Prophylaxis: N/A  Disposition: Pending clinical course  Code Status: Full Code    Seen and discussed with my attending physician, Dr. Kimo Damico  MS-3  Maroon 3    I, Kehinde Hammer MD, was present with the medical/OPAL student who participated in the service and in the documentation of the note.  I have verified the history and personally performed the physical exam and medical decision making.  I agree with the assessment and plan of care as documented in the note and have made edits as needed.     Kehinde Hammer MD  PGY1  Date of Service (when I saw the patient): 9/26/22

## 2022-09-27 NOTE — PLAN OF CARE
Goal Outcome Evaluation:    Plan of Care Reviewed With: patient     Overall Patient Progress: improving    Outcome Evaluation: patient states fingers and toes tingling.  asking if metformin would help that.  reviewed patient taking gabepentin for that nueropathy pain. Up in room with standby assist.  having headache and tylenol given with relief.    A:  patient hmong speaking but does understand some english.  Pain in throat better this morning.  Chloroseptic throats drops with relief.    R:  continue to monitor and treat per plan of care.

## 2022-09-28 LAB
ANION GAP SERPL CALCULATED.3IONS-SCNC: 5 MMOL/L (ref 7–15)
BUN SERPL-MCNC: 10 MG/DL (ref 8–23)
CALCIUM SERPL-MCNC: 7.3 MG/DL (ref 8.8–10.2)
CHLORIDE SERPL-SCNC: 106 MMOL/L (ref 98–107)
CREAT SERPL-MCNC: 0.72 MG/DL (ref 0.67–1.17)
DEPRECATED HCO3 PLAS-SCNC: 24 MMOL/L (ref 22–29)
ERYTHROCYTE [DISTWIDTH] IN BLOOD BY AUTOMATED COUNT: 18.8 % (ref 10–15)
GFR SERPL CREATININE-BSD FRML MDRD: >90 ML/MIN/1.73M2
GLUCOSE BLDC GLUCOMTR-MCNC: 116 MG/DL (ref 70–99)
GLUCOSE BLDC GLUCOMTR-MCNC: 120 MG/DL (ref 70–99)
GLUCOSE BLDC GLUCOMTR-MCNC: 132 MG/DL (ref 70–99)
GLUCOSE BLDC GLUCOMTR-MCNC: 143 MG/DL (ref 70–99)
GLUCOSE BLDC GLUCOMTR-MCNC: 164 MG/DL (ref 70–99)
GLUCOSE SERPL-MCNC: 108 MG/DL (ref 70–99)
HCT VFR BLD AUTO: 27.3 % (ref 40–53)
HGB BLD-MCNC: 9 G/DL (ref 13.3–17.7)
MAGNESIUM SERPL-MCNC: 1.6 MG/DL (ref 1.7–2.3)
MCH RBC QN AUTO: 30.1 PG (ref 26.5–33)
MCHC RBC AUTO-ENTMCNC: 33 G/DL (ref 31.5–36.5)
MCV RBC AUTO: 91 FL (ref 78–100)
PHOSPHATE SERPL-MCNC: 2.5 MG/DL (ref 2.5–4.5)
PLATELET # BLD AUTO: 89 10E3/UL (ref 150–450)
POTASSIUM SERPL-SCNC: 4.1 MMOL/L (ref 3.4–5.3)
RBC # BLD AUTO: 2.99 10E6/UL (ref 4.4–5.9)
SODIUM SERPL-SCNC: 135 MMOL/L (ref 136–145)
WBC # BLD AUTO: 5 10E3/UL (ref 4–11)

## 2022-09-28 PROCEDURE — 250N000013 HC RX MED GY IP 250 OP 250 PS 637

## 2022-09-28 PROCEDURE — 250N000011 HC RX IP 250 OP 636

## 2022-09-28 PROCEDURE — 85027 COMPLETE CBC AUTOMATED: CPT

## 2022-09-28 PROCEDURE — 36415 COLL VENOUS BLD VENIPUNCTURE: CPT

## 2022-09-28 PROCEDURE — 250N000013 HC RX MED GY IP 250 OP 250 PS 637: Performed by: PEDIATRICS

## 2022-09-28 PROCEDURE — 84100 ASSAY OF PHOSPHORUS: CPT

## 2022-09-28 PROCEDURE — C9113 INJ PANTOPRAZOLE SODIUM, VIA: HCPCS

## 2022-09-28 PROCEDURE — 120N000002 HC R&B MED SURG/OB UMMC

## 2022-09-28 PROCEDURE — 80048 BASIC METABOLIC PNL TOTAL CA: CPT

## 2022-09-28 PROCEDURE — 99233 SBSQ HOSP IP/OBS HIGH 50: CPT | Mod: GC | Performed by: STUDENT IN AN ORGANIZED HEALTH CARE EDUCATION/TRAINING PROGRAM

## 2022-09-28 PROCEDURE — 83735 ASSAY OF MAGNESIUM: CPT

## 2022-09-28 RX ORDER — SENNOSIDES 8.6 MG
8.6 TABLET ORAL 2 TIMES DAILY
Status: DISCONTINUED | OUTPATIENT
Start: 2022-09-28 | End: 2022-09-29 | Stop reason: HOSPADM

## 2022-09-28 RX ORDER — BISACODYL 10 MG
10 SUPPOSITORY, RECTAL RECTAL DAILY PRN
Status: DISCONTINUED | OUTPATIENT
Start: 2022-09-28 | End: 2022-09-29 | Stop reason: HOSPADM

## 2022-09-28 RX ADMIN — SENNOSIDES 8.6 MG: 8.6 TABLET, FILM COATED ORAL at 20:37

## 2022-09-28 RX ADMIN — CALCIUM CARBONATE 600 MG (1,500 MG)-VITAMIN D3 400 UNIT TABLET 1 TABLET: at 09:45

## 2022-09-28 RX ADMIN — INSULIN ASPART 1 UNITS: 100 INJECTION, SOLUTION INTRAVENOUS; SUBCUTANEOUS at 13:52

## 2022-09-28 RX ADMIN — TENOFOVIR DISOPROXIL FUMARATE 300 MG: 300 TABLET, FILM COATED ORAL at 09:44

## 2022-09-28 RX ADMIN — POLYETHYLENE GLYCOL 3350 17 G: 17 POWDER, FOR SOLUTION ORAL at 20:38

## 2022-09-28 RX ADMIN — POLYETHYLENE GLYCOL 3350 17 G: 17 POWDER, FOR SOLUTION ORAL at 10:33

## 2022-09-28 RX ADMIN — PANTOPRAZOLE SODIUM 40 MG: 40 INJECTION, POWDER, FOR SOLUTION INTRAVENOUS at 09:45

## 2022-09-28 RX ADMIN — CEFTRIAXONE SODIUM 1 G: 1 INJECTION, POWDER, FOR SOLUTION INTRAMUSCULAR; INTRAVENOUS at 06:02

## 2022-09-28 RX ADMIN — GABAPENTIN 100 MG: 100 CAPSULE ORAL at 20:37

## 2022-09-28 RX ADMIN — GABAPENTIN 100 MG: 100 CAPSULE ORAL at 09:44

## 2022-09-28 RX ADMIN — GABAPENTIN 100 MG: 100 CAPSULE ORAL at 13:52

## 2022-09-28 RX ADMIN — MAGNESIUM 64 MG (MAGNESIUM CHLORIDE) TABLET,DELAYED RELEASE 535 MG: at 10:33

## 2022-09-28 RX ADMIN — CARVEDILOL 6.25 MG: 6.25 TABLET, FILM COATED ORAL at 09:45

## 2022-09-28 RX ADMIN — SENNOSIDES 8.6 MG: 8.6 TABLET, FILM COATED ORAL at 10:33

## 2022-09-28 RX ADMIN — CALCIUM CARBONATE 600 MG (1,500 MG)-VITAMIN D3 400 UNIT TABLET 1 TABLET: at 20:37

## 2022-09-28 ASSESSMENT — ACTIVITIES OF DAILY LIVING (ADL)
ADLS_ACUITY_SCORE: 27
ADLS_ACUITY_SCORE: 28
ADLS_ACUITY_SCORE: 27
ADLS_ACUITY_SCORE: 28
ADLS_ACUITY_SCORE: 27
ADLS_ACUITY_SCORE: 28

## 2022-09-28 NOTE — PROGRESS NOTES
INTERNAL MEDICINE   DAILY PROGRESS NOTE  Maroon Service    Rishabh Cabrera ([id number])   Admission Date: 9/23/2022  Date of Service: 09/28/2022      Changes Today     - completing IV ceftriaxone 9/29  - encouraging ambulation, PO laxatives / enema for ileus    Subjective     Nursing note was reviewed. Has been advancing diet, eating more solid foods without issue. Has not had a bowel movement since being in the hospital, reports passing some gas this morning. Reports improvement in tingling pain he was feeling in hands / feet / face. Otherwise review of systems negative.      Objective   Most recent vital signs:  /57 (BP Location: Left arm)   Pulse 65   Temp 97.5  F (36.4  C) (Axillary)   Resp 18   Wt 54.7 kg (120 lb 9.5 oz)   SpO2 100%   BMI 20.07 kg/m    Temp:  [97.5  F (36.4  C)-98.2  F (36.8  C)] 97.5  F (36.4  C)  Pulse:  [65-68] 65  Resp:  [18] 18  BP: (101-139)/(57-73) 101/57  SpO2:  [97 %-100 %] 100 %  Wt Readings from Last 2 Encounters:   09/26/22 54.7 kg (120 lb 9.5 oz)   06/20/22 55.3 kg (122 lb)       Intake/Output Summary (Last 24 hours) at 9/25/2022 1221  Last data filed at 9/25/2022 0550  Gross per 24 hour   Intake 598.33 ml   Output 550 ml   Net 48.33 ml     Physical exam:  General: Alert, oriented, cooperative, no apparent distress, appears nontoxic, laying in bed  Eyes: Eyes are clear, EOMI  HENT: Oropharynx is clear and moist. No evidence of cranial trauma.  Cardiovascular: Regular rate and rhythm, normal S1 and S2, and no murmur noted. No lower extremity edema.  Respiratory: Clear to auscultation bilaterally. No wheezes or rhonchi appreciated  GI: Distended, non-tender, normal bowel sounds, no hepatosplenomegaly.  Musculoskeletal: Normal muscle bulk and tone.  Neurologic: Alert and oriented x 3    Labs  CMP  Recent Labs   Lab 09/28/22  1338 09/28/22  0818 09/28/22  0617 09/28/22  0155 09/27/22  0753 09/27/22  0446 09/26/22  0849 09/26/22  0515 09/25/22  0843 09/25/22  0647  09/24/22  1759 09/23/22  2314   NA  --   --  135*  --   --  135*  --  135*  --  135*  --  140  140   POTASSIUM  --   --  4.1  --   --  3.7  --  3.6  --  3.5  --  3.5  3.5   CHLORIDE  --   --  106  --   --  106  --  107  --  108*  --  110*  110*   CO2  --   --  24  --   --  26  --  27  --  23  --  21*  21*   ANIONGAP  --   --  5*  --   --  3*  --  1*  --  4*  --  9  9   * 116* 108* 120*   < > 214*   < > 113*   < > 120*   < > 164*  164*   BUN  --   --  10.0  --   --  10.1  --  13.0  --  17.8  --  22.6  22.6   CR  --   --  0.72  --   --  0.76  --  0.77  --  0.75  --  0.69  0.69   GFRESTIMATED  --   --  >90  --   --  >90  --  >90  --  >90  --  >90  >90   MANDY  --   --  7.3*  --   --  6.8*  --  7.2*  --  6.9*  --  7.5*  7.5*   MAG  --   --  1.6*  --   --   --   --   --   --   --   --   --    PHOS  --   --  2.5  --   --   --   --   --   --   --   --   --    PROTTOTAL  --   --   --   --   --  6.1*  --  6.4  --  5.7*  --  6.8   ALBUMIN  --   --   --   --   --  2.1*  --  2.2*  --  1.9*  --  2.2*   BILITOTAL  --   --   --   --   --  1.5*  --  1.8*  --  1.6*  --  1.4*   ALKPHOS  --   --   --   --   --  90  --  88  --  74  --  93   AST  --   --   --   --   --  24  --  25  --  25  --  27   ALT  --   --   --   --   --  12  --  12  --  12  --  12    < > = values in this interval not displayed.     CBC  Recent Labs   Lab 09/28/22  0617 09/27/22  0446 09/26/22  0515 09/25/22  1757 09/25/22  0647   WBC 5.0 4.6 5.9  --  3.4*   RBC 2.99* 2.97* 3.23*  --  2.73*   HGB 9.0* 8.9* 9.6* 9.2* 8.2*   HCT 27.3* 27.4* 29.4*  --  25.0*   MCV 91 92 91  --  92   MCH 30.1 30.0 29.7  --  30.0   MCHC 33.0 32.5 32.7  --  32.8   RDW 18.8* 19.3* 19.3*  --  19.3*   PLT 89* 83* 96*  --  79*     INR  Recent Labs   Lab 09/23/22  2314   INR 1.82*     Arterial Blood Gas  Recent Labs   Lab 09/24/22  0634   PH 7.46*       Imaging      Assessment & Plan     Rishabh Cabrera is a 62 year old male with a PMH of chronic hepatitis B-related decompensated  cirrhosis decompensated ascites and esophageal varices, hepatocelluar carcinoma, T2DM, and herpes zoster(2022) who presents with a 2 day history of dark black stools and bloody emesis currently being treated for upper GI bleed 2/2 to esophageal varices.     # Upper GI bleed 2/2 esophageal varices, stable  # Acute on chronic anemia 2/2 blood loss, stable  Pt has had 2 episodes of dark emesis and has had black stool for the past 2-3 days. No pain associated with the bleeding. Endoscopic evaluation revealed source of bleeding was likely a complication of varices as suspected given history of esophageal varices. Will continue ceftriaxone while monitoring for continued clinical stability. Has been clinically well since starting coreg. Paracentesis negative for infectious etiology.  -Upper Endoscopy 9/24  - - Grade III esophageal varices with stigmata of recent bleeding, banded  - - Clotted blood in gastric body (posterior wall) and gastric body (greater curvature)  - GI recommendations appreciated  - - Continue CTX 1g q24 hours, can switch to ciprofloxacin to complete 7 day course  - - Will need follow up endoscopy and follow up with Dr. Leventhal  - Maintain 2 large bore IVs at all times  - trend Hgb, transfuse for Hgb <7 or active bleeding    #Ileus  Has not had a bowel movement since being in the hospital. Reported +flatus morning of 9/28 and KUB / physical exam reassuring that no evidence of obstruction. Attempting miralax / senna today and tap water enema if no success.  - continue to monitor     #Decompensated Hepatitis B related Cirrhosis decompensated by ascites and esophageal varices  #HCC  #Chronic Hepatitis B infection  Follows outpatient with Dr. Leventhal. S/p Chemoembolization 12/21 for HCC. In past note was on sodium restriction and furosemide for ascites management but patient reports no home medications for ascites. Unclear if patient taking PTA tenofovir for chronic hep B - will continue per GI recs.  -  PTA tenofovir for chronic hep B  - HE: None currently  - EV/GV: Last EGD today, started coreg 6.25 daily 9/27 per Hepatology recs  - HRS: None, Cr = 0.75 (Basline = ~0.8)  - HPS: None, breathing comfortably on room air  - Coagulopathy: INR 1.82 (Baseline = ~1.80), Plts 77 (Basline = 80s)  - Anemia: Improving since acute GIB to ~9-10 (Baseline = ~10-11)  - SBP: Diagnostic tap 9/25  - HCC: see above  MELD-Na score: 17 at 9/25/2022  6:47 AM  MELD score: 15 at 9/25/2022  6:47 AM  Calculated from:  Serum Creatinine: 0.75 mg/dL (Using min of 1 mg/dL) at 9/25/2022  6:47 AM  Serum Sodium: 135 mmol/L at 9/25/2022  6:47 AM  Total Bilirubin: 1.6 mg/dL at 9/25/2022  6:47 AM  INR(ratio): 1.82 at 9/23/2022 11:14 PM  Age: 62 years    #Previous Herpes Zoster of L T4 dermatome  Patient with previously treated herpes zoster rash May 2022. Similar distribution of pain to previous incidence of Herpes Zoster.   - Gabapentin 100mg TID, will need follow up with PCP to continue treatment    #Lactic Acidosis, improved  Presented with lactate of 5.7, down-trended to 3.0 after volume resuscitation with crystalloid and blood. Hemoglobin and vital signs have been stable throughout the day post EGD. Suspect elevation was in the setting of volume depletion due to GIB, low concern for sepsis due to infection given clinical presentation and course but will obtain diagnostic paracentesis out of caution tomorrow.   - continue to monitor hemoglobin and vital signs    Chronic problems    #T2DM with diabetic polyneuropathy  Patients glucose levels 96 on admit. Pt says diabetes is normally managed with metformin outside of the clinic but unable to verify PTA metformin in Epic. With elevated lactic acid levels will discontinue metformin while inpatient and manage with CDI.  - low dose CDI    FEN: Sodium restricted diet  DVT Prophylaxis:  N/A  GI Prophylaxis: N/A  Disposition: Home  Code Status: Full Code    Seen and discussed with my attending physician,  Dr. Kimo Hammer MD  PGY-1 Internal Medicine

## 2022-09-28 NOTE — PROGRESS NOTES
Small stool this morning. Had another bowel movement this at 1700 after tap water enema. VSS. Intermittent facial pain at baseline. Up walking in halls x2. Appetite is good. Plan for discharge home tomorrow. Son will be here in AM.

## 2022-09-28 NOTE — PROVIDER NOTIFICATION
Provider notified (name): Nora Seaman  Reason for notification: Patient is in pain, refusing Tylenol  Recommendation/request given to provider: Advise/order PRN medication  Response from provider: Awaiting

## 2022-09-28 NOTE — PLAN OF CARE
Goal Outcome Evaluation:    Assumed cares from 1900 to 0700.    Patient is A/OX4. Hmong as primary language. Understands English and communicates with staff. No signs of distress, SOB, N/V. Tolerated well all PO meds, no PRN given. Denied pain. Ate 50% of his dinner. Slept well most of the night. Patient is able to reposioning himself in bed. Contiue to monitor and follow plan of care.     Plan of Care Reviewed With: patient     Overall Patient Progress: no change

## 2022-09-29 VITALS
DIASTOLIC BLOOD PRESSURE: 61 MMHG | TEMPERATURE: 97.6 F | SYSTOLIC BLOOD PRESSURE: 114 MMHG | RESPIRATION RATE: 18 BRPM | OXYGEN SATURATION: 99 % | WEIGHT: 119.8 LBS | HEART RATE: 74 BPM | BODY MASS INDEX: 19.94 KG/M2

## 2022-09-29 PROBLEM — K59.00 CONSTIPATION: Status: ACTIVE | Noted: 2022-09-29

## 2022-09-29 LAB
ANION GAP SERPL CALCULATED.3IONS-SCNC: 5 MMOL/L (ref 7–15)
BUN SERPL-MCNC: 11.3 MG/DL (ref 8–23)
CALCIUM SERPL-MCNC: 7.4 MG/DL (ref 8.8–10.2)
CHLORIDE SERPL-SCNC: 103 MMOL/L (ref 98–107)
CREAT SERPL-MCNC: 0.8 MG/DL (ref 0.67–1.17)
DEPRECATED HCO3 PLAS-SCNC: 20 MMOL/L (ref 22–29)
ERYTHROCYTE [DISTWIDTH] IN BLOOD BY AUTOMATED COUNT: 18.4 % (ref 10–15)
GFR SERPL CREATININE-BSD FRML MDRD: >90 ML/MIN/1.73M2
GLUCOSE BLDC GLUCOMTR-MCNC: 120 MG/DL (ref 70–99)
GLUCOSE BLDC GLUCOMTR-MCNC: 129 MG/DL (ref 70–99)
GLUCOSE BLDC GLUCOMTR-MCNC: 135 MG/DL (ref 70–99)
GLUCOSE SERPL-MCNC: 139 MG/DL (ref 70–99)
HCT VFR BLD AUTO: 28.5 % (ref 40–53)
HGB BLD-MCNC: 9.4 G/DL (ref 13.3–17.7)
HOLD SPECIMEN: NORMAL
MAGNESIUM SERPL-MCNC: 1.8 MG/DL (ref 1.7–2.3)
MCH RBC QN AUTO: 30 PG (ref 26.5–33)
MCHC RBC AUTO-ENTMCNC: 33 G/DL (ref 31.5–36.5)
MCV RBC AUTO: 91 FL (ref 78–100)
PLATELET # BLD AUTO: 85 10E3/UL (ref 150–450)
POTASSIUM SERPL-SCNC: 4.3 MMOL/L (ref 3.4–5.3)
RBC # BLD AUTO: 3.13 10E6/UL (ref 4.4–5.9)
SODIUM SERPL-SCNC: 128 MMOL/L (ref 136–145)
SODIUM SERPL-SCNC: 128 MMOL/L (ref 136–145)
SODIUM SERPL-SCNC: 134 MMOL/L (ref 136–145)
WBC # BLD AUTO: 5.4 10E3/UL (ref 4–11)

## 2022-09-29 PROCEDURE — 250N000013 HC RX MED GY IP 250 OP 250 PS 637

## 2022-09-29 PROCEDURE — 85027 COMPLETE CBC AUTOMATED: CPT

## 2022-09-29 PROCEDURE — 84295 ASSAY OF SERUM SODIUM: CPT

## 2022-09-29 PROCEDURE — 84295 ASSAY OF SERUM SODIUM: CPT | Performed by: STUDENT IN AN ORGANIZED HEALTH CARE EDUCATION/TRAINING PROGRAM

## 2022-09-29 PROCEDURE — 36415 COLL VENOUS BLD VENIPUNCTURE: CPT | Performed by: STUDENT IN AN ORGANIZED HEALTH CARE EDUCATION/TRAINING PROGRAM

## 2022-09-29 PROCEDURE — C9113 INJ PANTOPRAZOLE SODIUM, VIA: HCPCS

## 2022-09-29 PROCEDURE — 250N000011 HC RX IP 250 OP 636

## 2022-09-29 PROCEDURE — 99239 HOSP IP/OBS DSCHRG MGMT >30: CPT | Mod: GC | Performed by: STUDENT IN AN ORGANIZED HEALTH CARE EDUCATION/TRAINING PROGRAM

## 2022-09-29 PROCEDURE — 250N000013 HC RX MED GY IP 250 OP 250 PS 637: Performed by: PEDIATRICS

## 2022-09-29 PROCEDURE — 36415 COLL VENOUS BLD VENIPUNCTURE: CPT

## 2022-09-29 PROCEDURE — 83735 ASSAY OF MAGNESIUM: CPT

## 2022-09-29 PROCEDURE — 82310 ASSAY OF CALCIUM: CPT

## 2022-09-29 RX ORDER — FUROSEMIDE 20 MG
20 TABLET ORAL DAILY
Qty: 90 TABLET | Refills: 0 | Status: SHIPPED | OUTPATIENT
Start: 2022-09-29 | End: 2022-11-28

## 2022-09-29 RX ORDER — SPIRONOLACTONE 50 MG/1
50 TABLET, FILM COATED ORAL DAILY
Qty: 90 TABLET | Refills: 0 | Status: SHIPPED | OUTPATIENT
Start: 2022-09-29 | End: 2022-11-28

## 2022-09-29 RX ORDER — SENNOSIDES 8.6 MG
2 TABLET ORAL 2 TIMES DAILY PRN
Qty: 10 TABLET | Refills: 0 | Status: SHIPPED | OUTPATIENT
Start: 2022-09-29 | End: 2022-10-04

## 2022-09-29 RX ORDER — GABAPENTIN 100 MG/1
100 CAPSULE ORAL 3 TIMES DAILY
Qty: 63 CAPSULE | Refills: 0 | Status: ON HOLD | OUTPATIENT
Start: 2022-09-29 | End: 2023-01-26

## 2022-09-29 RX ORDER — CARVEDILOL 6.25 MG/1
6.25 TABLET ORAL DAILY
Qty: 28 TABLET | Refills: 0 | Status: SHIPPED | OUTPATIENT
Start: 2022-09-29 | End: 2022-11-28

## 2022-09-29 RX ORDER — TENOFOVIR DISOPROXIL FUMARATE 300 MG/1
300 TABLET, FILM COATED ORAL DAILY
Qty: 90 TABLET | Refills: 0 | Status: SHIPPED | OUTPATIENT
Start: 2022-09-29 | End: 2022-11-28

## 2022-09-29 RX ORDER — POLYETHYLENE GLYCOL 3350 17 G/17G
17 POWDER, FOR SOLUTION ORAL 2 TIMES DAILY PRN
Qty: 510 G | Refills: 0 | Status: ON HOLD | OUTPATIENT
Start: 2022-09-29 | End: 2023-02-05

## 2022-09-29 RX ADMIN — PANTOPRAZOLE SODIUM 40 MG: 40 INJECTION, POWDER, FOR SOLUTION INTRAVENOUS at 09:38

## 2022-09-29 RX ADMIN — TENOFOVIR DISOPROXIL FUMARATE 300 MG: 300 TABLET, FILM COATED ORAL at 09:39

## 2022-09-29 RX ADMIN — POLYETHYLENE GLYCOL 3350 17 G: 17 POWDER, FOR SOLUTION ORAL at 09:38

## 2022-09-29 RX ADMIN — CALCIUM CARBONATE 600 MG (1,500 MG)-VITAMIN D3 400 UNIT TABLET 1 TABLET: at 09:39

## 2022-09-29 RX ADMIN — GABAPENTIN 100 MG: 100 CAPSULE ORAL at 13:53

## 2022-09-29 RX ADMIN — CEFTRIAXONE SODIUM 1 G: 1 INJECTION, POWDER, FOR SOLUTION INTRAMUSCULAR; INTRAVENOUS at 06:10

## 2022-09-29 RX ADMIN — ACETAMINOPHEN 325 MG: 325 TABLET, FILM COATED ORAL at 00:09

## 2022-09-29 RX ADMIN — CARVEDILOL 6.25 MG: 6.25 TABLET, FILM COATED ORAL at 09:39

## 2022-09-29 RX ADMIN — MAGNESIUM 64 MG (MAGNESIUM CHLORIDE) TABLET,DELAYED RELEASE 535 MG: at 09:39

## 2022-09-29 RX ADMIN — SENNOSIDES 8.6 MG: 8.6 TABLET, FILM COATED ORAL at 09:39

## 2022-09-29 RX ADMIN — GABAPENTIN 100 MG: 100 CAPSULE ORAL at 09:39

## 2022-09-29 ASSESSMENT — ACTIVITIES OF DAILY LIVING (ADL)
ADLS_ACUITY_SCORE: 28

## 2022-09-29 NOTE — PROGRESS NOTES
Care Management Discharge Note    Discharge Date: 09/29/2022  Discharge Disposition:  Home w/ family  Discharge Services:  OP GI Appt  Discharge DME:  N/a  Discharge Transportation: family or friend will provide  Private pay costs discussed: Not applicable  Education Provided on the Discharge Plan: yes   Persons Notified of Discharge Plans: Charge RN; Bedside RN to Family  Patient/Family in Agreement with the Plan:  yes  Handoff Referral Completed: Yes    Additional Information:  Pt to discharge to home with transportation support via family, who has been present with pt through afternoon. Remaining goal of care met this AM, with positive BM. No prior services noted, per CMA assessment; Follow-up appointment(s) with GI, noted per AVS: reviewed with pt and son, via bedside nurse. Hand-off completed to PCP/Clinic, verified as Dr. Fariba George @ [facility] - (fax: [phone]). RNCC to conclude following, as safe discharge confirmed with phone contact with sonRick.      Mirza Mckeon RN BSN  5B RNCC  Phone: [phone]  Pager: [phone]    For Weekend & Holiday on call RN Care Coordinator:  (Tasks: Home care, home infusion, medical equipment, transportation, IMM & DUNCAN forms, etc.)  [place] (0800 - 1630) Saturday and Sunday    Units: 4A, 4C, 4E, 5A and 5B: [phone]    Units: 6A, 6B, 6C, 6D: [phone]    Units: 7A, 7B, 7C, 7D, and 5C: [phone]    [facility] [place] ([id number]) Saturday and Sunday    Units: 5 Ortho, 8A, 10 ICU, & Pediatric Units: [phone]

## 2022-09-29 NOTE — PLAN OF CARE
Discharged to: Home   Transportation: Son   Time: 1500  Prescriptions: Picked up by pt & son from discharge pharmacy   Belongings: Packed & sent w/ pt   PIV/Access: PIVs removed   Care Plan and Education discontinued: Done   Paperwork: AVs reviewed w/ pt & son    RN assumed cares at 0700. Hmong speaking but able to communicate well in english & declining  for assessment & discharge instructions. AOx4. VSS on RA. Lungs clear to ascultation, denies cough or SOB. Tolerating 2g Na diet. Large BM this AM, no blood noted in stool. Voiding spontaneously. AVS reviewed w/ pt & son, all questions answered. Pt wheeled to front entrance by staff at 1530 to discharge.

## 2022-09-29 NOTE — PLAN OF CARE
Goal Outcome Evaluation:    Assumed cares from 1900 to 0700.    Patient is A/OX4. Hmong as primary language. Understands English and calls aproprietally. No signs of distress, SOB, N/V. Soft BP.Tolerated well all PO meds, Tylenol given for facial pain. Orj022% of his dinner. Slept well most of the night. Patient is able to reposioning himself in bed. Contiue to monitor and follow plan of care.        Plan of Care Reviewed With: patient     Overall Patient Progress: improving

## 2022-09-30 ENCOUNTER — PATIENT OUTREACH (OUTPATIENT)
Dept: CARE COORDINATION | Facility: CLINIC | Age: 62
End: 2022-09-30

## 2022-09-30 LAB
BACTERIA FLD CULT: NO GROWTH
GRAM STAIN RESULT: NORMAL
GRAM STAIN RESULT: NORMAL

## 2022-09-30 NOTE — DISCHARGE SUMMARY
[facility]  Discharge Summary - Medicine & Pediatrics       Date of Admission:  9/23/2022  Date of Discharge:  9/29/2022  3:30 PM  Discharging Provider: Dr. Minesh Schwartz  Discharge Service: Medicine Service, [facility] TEAM 3    Discharge Diagnoses   Upper GI bleed 2/2 esophageal varices  Acute on chronic anemia 2/2 blood loss  Ileus  Decompensated Hep B related cirrhosis   HCC  Chronic hep B infection  Previous Herpes zoster of L T4 dermatome  Lactic acidosis  Type 2 diabetes w/ diabetic polyneuropathy    Follow-ups Needed After Discharge   Follow-up Appointments     Adult [facility] Follow-up and recommended labs and tests      Follow up with your liver doctor, Dr. Leventhal to evaluate your   medications and liver health after hospitalization. A request has been   made for his office to contact you but please call the number below if you   do not hear from them within a week.    Follow up upper endoscopy in 4-6 weeks for follow-up from the upper   endoscopy you had done while in the hospital. A request has been made for   you to be contacted about scheduling this procedure but please call the   number below if you do not hear from them within a week.    Follow up with your primary care provider within 7 days to evaluate   treatment change for pain related to shingles.      Appointments on [place] and/or [facility] (with [facility] or [facility]   provider or service). Call [phone] if you haven't heard regarding   these appointments within 7 days of discharge.             Unresulted Labs Ordered in the Past 30 Days of this Admission       Date and Time Order Name Status Description    9/24/2022  3:54 PM Ascites Fluid Aerobic Bacterial Culture Routine with Gram Stain Preliminary     9/24/2022  1:15 AM Prepare red blood cells (unit) Preliminary     9/23/2022 11:21 PM Prepare plasma (unit) Preliminary     9/23/2022 11:18 PM Prepare red blood cells (unit) Preliminary              Discharge Disposition   Discharged to home  Condition at discharge: Stable    Hospital Course   Rishabh Cabrera is a 62 year old male with a PMH of chronic hepatitis B-related decompensated cirrhosis decompensated ascites and esophageal varices, hepatocelluar carcinoma, T2DM, and herpes zoster(2022) who presents with a 2 day history of dark black stools and bloody emesis currently being treated for upper GI bleed 2/2 to esophageal varices. Early on in hospitalization the patient was evaluated by GI and underwent an EGD where they banded multiple esophageal varices. After the procedure we monitored the patient's hemoglobin levels and we determined that he no longer had any bleeding going on. We additionally started him on a bowel regimen in order to ensure he had a BM prior to discharge which was achieved.     # Upper GI bleed 2/2 esophageal varices, stable  # Acute on chronic anemia 2/2 blood loss, stable  Pt has had 2 episodes of dark emesis and has had black stool for the past 2-3 days. No pain associated with the bleeding. Endoscopic evaluation 9/24 revealed grade III esophageal varices w/ stigmata of recent bleeding and this was banded. He was treated with ceftriaxone for a total of 6 days as well as octreotide for 72 hours at which time he was switched to carvedilol. His hemoglobin remained stable after procedure and we were comfortable in thinkging that he had no further bleeding. On discharge:   - Follow up repeat EGD per GI scheduling  - Continue daily carvedilol    #Ileus  Has not had a bowel movement since being in the hospital. Reported +flatus morning of 9/28 and KUB / physical exam reassuring that no evidence of obstruction. Attempted miralax and senna but ultimately required tap water enema and this worked well, patient was having BM and passing gas by day of discharge.   - Discharged home with miralax    #Decompensated Hepatitis B related Cirrhosis decompensated by ascites and esophageal  varices  #HCC  #Chronic Hepatitis B infection  Follows outpatient with Dr. Leventhal. S/p Chemoembolization 12/21 for HCC. In past note was on sodium restriction and furosemide for ascites management but patient reports no home medications for ascites. Unclear if patient taking PTA tenofovir for chronic hep B, but started it per the recommendation of GI. Paracentesis was unconcerning for SBP. MELD-NA 17 on day of discharge. On discharge  - follow up with Dr. Leventhal as outpatient  - continue tenofavir  - continue daily carvedilol  - continue diuretics, spironolactone    #Previous Herpes Zoster of L T4 dermatome  Patient with previously treated herpes zoster rash May 2022. Similar distribution of pain to previous incidence of Herpes Zoster. Started on gabapentin that he should have been taking as outpatient and sent him home on gabapentin 100mg TID    #T2DM with diabetic polyneuropathy  Patients glucose levels 96 on admit. Pt says diabetes is normally managed with metformin outside of the clinic but unable to verify PTA metformin in Epic. With elevated lactic acid levels will discontinue metformin while inpatient and manage with CDI. Additionally saw long acting insulin as outpatient medications but patient denied taking. On discharge patient should not take any further insulins (was requiring 1-2 units at most on a day). Should follow up with his primary care provider about further diabetes cares.     Consultations This Hospital Stay   GI HEPATOLOGY ADULT IP CONSULT  INTERNAL MEDICINE PROCEDURE TEAM ADULT IP CONSULT [place] - PARACENTESIS  CARE MANAGEMENT / SOCIAL WORK IP CONSULT    Code Status   Prior       The patient was discussed with Dr. Kimo Schwartz MD  [address] [address]  Phone: [phone]  ______________________________________________________________________    Physical Exam   Vital Signs: Temp: 97.6  F (36.4  C) Temp  src: Oral BP: 114/61 Pulse: 74   Resp: 18 SpO2: 99 % O2 Device: None (Room air)    Weight: 119 lbs 12.8 oz  General: Alert, oriented, cooperative, no apparent distress, appears nontoxic, laying in bed  Eyes: Eyes are clear, EOMI  HENT: Oropharynx is clear and moist. No evidence of cranial trauma.  Cardiovascular: Regular rate and rhythm, normal S1 and S2, and no murmur noted. No lower extremity edema.  Respiratory: Clear to auscultation bilaterally. No wheezes or rhonchi appreciated  GI: mildly Distended, non-tender, normal bowel sounds, no hepatosplenomegaly.  Musculoskeletal: Normal muscle bulk and tone.  Neurologic: Alert and oriented x 3      Primary Care Physician   Physician No Ref-Primary    Discharge Orders      Adult GI  Referral - Procedure Only      Adult GI  Referral - Consult Only      Reason for your hospital stay    You were hospitalized because you were having dark stools and dark colored vomiting that indicated you were bleeding in your digestive system. While you were here, you were given medications through the IV to help stop this bleeding and prevent an infection from developing. We also gave you blood and platelets to replace the blood you lost. The liver doctors saw you and did a procedure on you called an upper endoscopy. During this procedure, they used a camera to look at your esophagus, stomach, and part of your intestines. They found blood vessels in the esophagus that were likely causing your bleeding that they treated. We kept you in the hospital to give medications to prevent further bleeding and to monitor you as you recovered.     Activity    Your activity upon discharge: activity as tolerated     Adult [facility] Follow-up and recommended labs and tests    Follow up with your liver doctor, Dr. Leventhal to evaluate your medications and liver health after hospitalization. A request has been made for his office to contact you but please call the number below if you do  not hear from them within a week.    Follow up upper endoscopy in 4-6 weeks for follow-up from the upper endoscopy you had done while in the hospital. A request has been made for you to be contacted about scheduling this procedure but please call the number below if you do not hear from them within a week.    Follow up with your primary care provider within 7 days to evaluate treatment change for pain related to shingles.      Appointments on [place] and/or [facility] (with [facility] or [facility] provider or service). Call [phone] if you haven't heard regarding these appointments within 7 days of discharge.     Diet    Follow this diet upon discharge: 2 Gram Sodium Diet       Significant Results and Procedures   Most Recent 3 CBC's:Recent Labs   Lab Test 09/29/22  0624 09/28/22  0617 09/27/22 0446   WBC 5.4 5.0 4.6   HGB 9.4* 9.0* 8.9*   MCV 91 91 92   PLT 85* 89* 83*     Most Recent 3 BMP's:Recent Labs   Lab Test 09/29/22  1352 09/29/22  0941 09/29/22  0847 09/29/22  0624 09/28/22  0818 09/28/22  0617 09/27/22  0753 09/27/22  0446   NA  --  134*  --  128*  128*  --  135*  --  135*   POTASSIUM  --   --   --  4.3  --  4.1  --  3.7   CHLORIDE  --   --   --  103  --  106  --  106   CO2  --   --   --  20*  --  24  --  26   BUN  --   --   --  11.3  --  10.0  --  10.1   CR  --   --   --  0.80  --  0.72  --  0.76   ANIONGAP  --   --   --  5*  --  5*  --  3*   MANDY  --   --   --  7.4*  --  7.3*  --  6.8*   *  --  120* 139*   < > 108*   < > 214*    < > = values in this interval not displayed.     Most Recent 2 LFT's:Recent Labs   Lab Test 09/27/22  0446 09/26/22  0515   AST 24 25   ALT 12 12   ALKPHOS 90 88   BILITOTAL 1.5* 1.8*       Discharge Medications   Discharge Medication List as of 9/29/2022  2:21 PM        START taking these medications    Details   carvedilol (COREG) 6.25 MG tablet Take 1 tablet (6.25 mg) by mouth daily for 28 days, Disp-28 tablet, R-0, E-Prescribe      polyethylene glycol (MIRALAX) 17  GM/Dose powder Take 17 g by mouth 2 times daily as needed for constipation, Disp-510 g, R-0, E-Prescribe      sennosides (SENOKOT) 8.6 MG tablet Take 2 tablets by mouth 2 times daily as needed for constipation, Disp-10 tablet, R-0, E-Prescribe           CONTINUE these medications which have CHANGED    Details   furosemide (LASIX) 20 MG tablet Take 1 tablet (20 mg) by mouth daily, Disp-90 tablet, R-0, E-Prescribe      gabapentin (NEURONTIN) 100 MG capsule Take 1 capsule (100 mg) by mouth 3 times daily for 21 days, Disp-63 capsule, R-0, E-Prescribe      spironolactone (ALDACTONE) 50 MG tablet Take 1 tablet (50 mg) by mouth daily, Disp-90 tablet, R-0, E-Prescribe      tenofovir (VIREAD) 300 MG tablet Take 1 tablet (300 mg) by mouth daily, Disp-90 tablet, R-0, E-Prescribe           CONTINUE these medications which have NOT CHANGED    Details   capsaicin (ZOSTRIX) 0.025 % external cream Apply topically 3 times daily, Historical      Capsaicin-Menthol (SALONPAS GEL EX) Historical      diclofenac (VOLTAREN) 1 % topical gel Apply topically as needed for moderate pain, Historical      loratadine (CLARITIN) 10 MG tablet Take 10 mg by mouth daily, Historical      triamcinolone (KENALOG) 0.1 % external cream Apply topically as needed for irritationHistorical           STOP taking these medications       enalapril (VASOTEC) 10 MG tablet Comments:   Reason for Stopping:         insulin glargine (LANTUS SOLOSTAR) 100 UNIT/ML pen Comments:   Reason for Stopping:         metoprolol succinate ER (TOPROL XL) 50 MG 24 hr tablet Comments:   Reason for Stopping:             Allergies   Allergies   Allergen Reactions    Crabs [Crustaceans]     Pork (Porcine) Protein Itching and Unknown    Seafood Hives and Unknown    Shellfish Allergy Unknown

## 2022-09-30 NOTE — PROGRESS NOTES
"Clinic Care Coordination Contact  [facility]: Post-Discharge Note  SITUATION                                                      Admission:    Admission Date: 09/23/22   Reason for Admission: melanotic stools  Discharge:   Discharge Date: 09/29/22  Discharge Diagnosis: Upper GI bleed 2/2 esophageal varices    BACKGROUND                                                      Per hospital discharge summary and inpatient provider notes:Rishabh Cabrera is a 62 year old male with a PMH of chronic hepatitis B-related decompensated cirrhosis decompensated ascites and esophageal varices, hepatocelluar carcinoma, T2DM, and herpes zoster(2022) who presents with a 2 day history of dark black stools and bloody emesis currently being treated for upper GI bleed 2/2 to esophageal varices. Early on in hospitalization the patient was evaluated by GI and underwent an EGD where they banded multiple esophageal varices. After the procedure we monitored the patient's hemoglobin levels and we determined that he no longer had any bleeding going on. We additionally started him on a bowel regimen in order to ensure he had a BM prior to discharge which was achieved.      ASSESSMENT           Discharge Assessment  How are you doing now that you are home?: \" I am doing ok  How are your symptoms? (Red Flag symptoms escalate to triage hotline per guidelines): Improved  Do you feel your condition is stable enough to be safe at home until your provider visit?: Yes  Does the patient have their discharge instructions? : Yes  Does the patient have questions regarding their discharge instructions? : No  Were you started on any new medications or were there changes to any of your previous medications? : Yes  Does the patient have all of their medications?: Yes  Do you have questions regarding any of your medications? : No  Do you have all of your needed medical supplies or equipment (DME)?  (i.e. oxygen tank, CPAP, cane, etc.): Yes  Discharge follow-up " appointment scheduled within 14 calendar days? : No  Is patient agreeable to assistance with scheduling? : No    Post-op ([facility] Only)  If the patient had a surgery or procedure, do they have any questions for a nurse?: No           PLAN                                                      Outpatient Plan: Follow up with your liver doctor, Dr. Leventhal to evaluate your   medications and liver health after hospitalization. A request has been   made for his office to contact you but please call the number below if you   do not hear from them within a week.     Follow up upper endoscopy in 4-6 weeks for follow-up from the upper   endoscopy you had done while in the hospital. A request has been made for   you to be contacted about scheduling this procedure but please call the   number below if you do not hear from them within a week.     Follow up with your primary care provider within 7 days to evaluate   treatment change for pain related to shingles.       Appointments on [place] and/or [facility] (with [facility] or [facility]   provider or service). Call [phone] if you haven't heard regarding   these appointments within 7 days of discharge.    No future appointments.      For any urgent concerns, please contact our 24 hour nurse triage line: [phone] ([phone])         Rolando Monteiro

## 2022-10-04 ENCOUNTER — TRANSCRIBE ORDERS (OUTPATIENT)
Dept: OTHER | Age: 62
End: 2022-10-04

## 2022-10-04 DIAGNOSIS — C22.0 HEPATOCELLULAR CARCINOMA (H): Primary | ICD-10-CM

## 2022-10-04 DIAGNOSIS — K92.2 UPPER GASTROINTESTINAL BLEEDING: ICD-10-CM

## 2022-10-07 NOTE — PLAN OF CARE
"  Problem: Plan of Care - These are the overarching goals to be used throughout the patient stay.    Goal: Plan of Care Review/Shift Note  Description: The Plan of Care Review/Shift note should be completed every shift.  The Outcome Evaluation is a brief statement about your assessment that the patient is improving, declining, or no change.  This information will be displayed automatically on your shift note.  Outcome: Ongoing, Progressing  Flowsheets (Taken 9/25/2022 1710)  Plan of Care Reviewed With: patient  Overall Patient Progress: improving  Goal: Patient-Specific Goal (Individualized)  Description: You can add care plan individualizations to a care plan. Examples of Individualization might be:  \"Parent requests to be called daily at 9am for status\", \"I have a hard time hearing out of my right ear\", or \"Do not touch me to wake me up as it startles me\".  Outcome: Ongoing, Progressing  Goal: Absence of Hospital-Acquired Illness or Injury  Outcome: Ongoing, Progressing  Intervention: Identify and Manage Fall Risk  Recent Flowsheet Documentation  Taken 9/25/2022 1600 by Jaswinder Brito RN  Safety Promotion/Fall Prevention:   assistive device/personal items within reach   bed alarm on   clutter free environment maintained   patient and family education   toileting scheduled  Taken 9/25/2022 1200 by Jaswinder Brito RN  Safety Promotion/Fall Prevention:   assistive device/personal items within reach   bed alarm on   clutter free environment maintained   patient and family education   toileting scheduled  Taken 9/25/2022 0800 by Jaswinder Brito RN  Safety Promotion/Fall Prevention:   assistive device/personal items within reach   bed alarm on   clutter free environment maintained   patient and family education   toileting scheduled  Intervention: Prevent Skin Injury  Recent Flowsheet Documentation  Taken 9/25/2022 1600 by Jaswinder Brito, RN  Body Position:   position maintained   position changed " independently  Taken 9/25/2022 1200 by Jaswinder Brito RN  Body Position:   position maintained   position changed independently  Taken 9/25/2022 0800 by Jaswinder Brito RN  Body Position:   position maintained   position changed independently  Intervention: Prevent and Manage VTE (Venous Thromboembolism) Risk  Recent Flowsheet Documentation  Taken 9/25/2022 1600 by Jaswinder Brito RN  VTE Prevention/Management: SCDs (sequential compression devices) off  Taken 9/25/2022 1200 by Jaswinder Brito RN  VTE Prevention/Management: SCDs (sequential compression devices) off  Taken 9/25/2022 0800 by Jaswinder Brito RN  VTE Prevention/Management: SCDs (sequential compression devices) off  Goal: Optimal Comfort and Wellbeing  Outcome: Ongoing, Progressing  Goal: Readiness for Transition of Care  Outcome: Ongoing, Progressing     Problem: Diabetes Comorbidity  Goal: Blood Glucose Level Within Targeted Range  Outcome: Ongoing, Progressing     Problem: Adjustment to Illness (Gastrointestinal Bleeding)  Goal: Optimal Coping with Acute Illness  Outcome: Ongoing, Progressing     Problem: Bleeding (Gastrointestinal Bleeding)  Goal: Hemostasis  Outcome: Ongoing, Progressing   Goal Outcome Evaluation:    Plan of Care Reviewed With: patient     Overall Patient Progress: improving            Risk factors:   Modifiable: none  Nonmodifiable: none    Protective factors: no current SIIP/HIIP, no h/o SA/SIB, no h/o psych admissions, no access to weapons, no active substance abuse, good physical health, engaged in school, domiciled, social supports, referral for close follow up    Overall, pt is at low risk of acute harm and does not meet criteria for psychiatric admission.

## 2022-10-27 ENCOUNTER — TELEPHONE (OUTPATIENT)
Dept: SURGERY | Facility: CLINIC | Age: 62
End: 2022-10-27

## 2022-11-28 ENCOUNTER — LAB (OUTPATIENT)
Dept: LAB | Facility: CLINIC | Age: 62
End: 2022-11-28
Payer: MEDICARE

## 2022-11-28 ENCOUNTER — OFFICE VISIT (OUTPATIENT)
Dept: GASTROENTEROLOGY | Facility: CLINIC | Age: 62
End: 2022-11-28
Attending: INTERNAL MEDICINE
Payer: MEDICARE

## 2022-11-28 VITALS
DIASTOLIC BLOOD PRESSURE: 65 MMHG | HEART RATE: 102 BPM | OXYGEN SATURATION: 97 % | BODY MASS INDEX: 21.25 KG/M2 | SYSTOLIC BLOOD PRESSURE: 147 MMHG | WEIGHT: 127.7 LBS

## 2022-11-28 DIAGNOSIS — B18.1 CHRONIC HEPATITIS B WITH CIRRHOSIS (H): ICD-10-CM

## 2022-11-28 DIAGNOSIS — B18.1 CHRONIC HEPATITIS B WITH CIRRHOSIS (H): Primary | ICD-10-CM

## 2022-11-28 DIAGNOSIS — C22.0 HCC (HEPATOCELLULAR CARCINOMA) (H): ICD-10-CM

## 2022-11-28 DIAGNOSIS — K74.60 CHRONIC HEPATITIS B WITH CIRRHOSIS (H): Primary | ICD-10-CM

## 2022-11-28 DIAGNOSIS — K92.2 UPPER GI BLEED: ICD-10-CM

## 2022-11-28 DIAGNOSIS — K74.60 CHRONIC HEPATITIS B WITH CIRRHOSIS (H): ICD-10-CM

## 2022-11-28 DIAGNOSIS — R18.8 OTHER ASCITES: ICD-10-CM

## 2022-11-28 DIAGNOSIS — I85.11 SECONDARY ESOPHAGEAL VARICES WITH BLEEDING (H): ICD-10-CM

## 2022-11-28 LAB
AFP SERPL-MCNC: 2 NG/ML
ALBUMIN SERPL BCG-MCNC: 2.3 G/DL (ref 3.5–5.2)
ALP SERPL-CCNC: 155 U/L (ref 40–129)
ALT SERPL W P-5'-P-CCNC: 13 U/L (ref 10–50)
ANION GAP SERPL CALCULATED.3IONS-SCNC: 2 MMOL/L (ref 7–15)
AST SERPL W P-5'-P-CCNC: 25 U/L (ref 10–50)
BILIRUB DIRECT SERPL-MCNC: 0.34 MG/DL (ref 0–0.3)
BILIRUB SERPL-MCNC: 0.9 MG/DL
BUN SERPL-MCNC: 9.4 MG/DL (ref 8–23)
CALCIUM SERPL-MCNC: 8.1 MG/DL (ref 8.8–10.2)
CHLORIDE SERPL-SCNC: 107 MMOL/L (ref 98–107)
CREAT SERPL-MCNC: 0.83 MG/DL (ref 0.67–1.17)
DEPRECATED HCO3 PLAS-SCNC: 27 MMOL/L (ref 22–29)
ERYTHROCYTE [DISTWIDTH] IN BLOOD BY AUTOMATED COUNT: 19.7 % (ref 10–15)
GFR SERPL CREATININE-BSD FRML MDRD: >90 ML/MIN/1.73M2
GLUCOSE SERPL-MCNC: 202 MG/DL (ref 70–99)
HCT VFR BLD AUTO: 25.7 % (ref 40–53)
HGB BLD-MCNC: 7.8 G/DL (ref 13.3–17.7)
INR PPP: 1.83 (ref 0.85–1.15)
MCH RBC QN AUTO: 25.9 PG (ref 26.5–33)
MCHC RBC AUTO-ENTMCNC: 30.4 G/DL (ref 31.5–36.5)
MCV RBC AUTO: 85 FL (ref 78–100)
PLATELET # BLD AUTO: 90 10E3/UL (ref 150–450)
POTASSIUM SERPL-SCNC: 4.6 MMOL/L (ref 3.4–5.3)
PROT SERPL-MCNC: 7.2 G/DL (ref 6.4–8.3)
RBC # BLD AUTO: 3.01 10E6/UL (ref 4.4–5.9)
SODIUM SERPL-SCNC: 136 MMOL/L (ref 136–145)
WBC # BLD AUTO: 4.3 10E3/UL (ref 4–11)

## 2022-11-28 PROCEDURE — 36415 COLL VENOUS BLD VENIPUNCTURE: CPT | Performed by: PATHOLOGY

## 2022-11-28 PROCEDURE — 87517 HEPATITIS B DNA QUANT: CPT | Performed by: INTERNAL MEDICINE

## 2022-11-28 PROCEDURE — 85027 COMPLETE CBC AUTOMATED: CPT | Performed by: PATHOLOGY

## 2022-11-28 PROCEDURE — 82105 ALPHA-FETOPROTEIN SERUM: CPT | Performed by: INTERNAL MEDICINE

## 2022-11-28 PROCEDURE — 85610 PROTHROMBIN TIME: CPT | Performed by: PATHOLOGY

## 2022-11-28 PROCEDURE — 82248 BILIRUBIN DIRECT: CPT | Performed by: PATHOLOGY

## 2022-11-28 PROCEDURE — 99215 OFFICE O/P EST HI 40 MIN: CPT | Performed by: INTERNAL MEDICINE

## 2022-11-28 PROCEDURE — 80053 COMPREHEN METABOLIC PANEL: CPT | Performed by: PATHOLOGY

## 2022-11-28 RX ORDER — SPIRONOLACTONE 50 MG/1
50 TABLET, FILM COATED ORAL DAILY
Qty: 90 TABLET | Refills: 3 | Status: ON HOLD | OUTPATIENT
Start: 2022-11-28 | End: 2023-02-05

## 2022-11-28 RX ORDER — CARVEDILOL 6.25 MG/1
6.25 TABLET ORAL 2 TIMES DAILY WITH MEALS
Qty: 180 TABLET | Refills: 3 | Status: ON HOLD | OUTPATIENT
Start: 2022-11-28 | End: 2024-01-01

## 2022-11-28 RX ORDER — FUROSEMIDE 20 MG
20 TABLET ORAL DAILY
Qty: 90 TABLET | Refills: 3 | Status: ON HOLD | OUTPATIENT
Start: 2022-11-28 | End: 2023-02-05

## 2022-11-28 RX ORDER — TENOFOVIR DISOPROXIL FUMARATE 300 MG/1
300 TABLET, FILM COATED ORAL DAILY
Qty: 90 TABLET | Refills: 3 | Status: SHIPPED | OUTPATIENT
Start: 2022-11-28 | End: 2023-01-01

## 2022-11-28 ASSESSMENT — PAIN SCALES - GENERAL: PAINLEVEL: MODERATE PAIN (4)

## 2022-11-28 NOTE — PROGRESS NOTES
Date of Service: November 28, 2022     Subjective:            Rishabh Cabrera is a 62 year old male presenting for evaluation of liver disease    Due to language barrier, an  was present during the history-taking and subsequent discussion (and for part of the physical exam) with this patient.      History of Present Illness   Rishabh Cabrera is a 62 year old male with past medical history of diabetes, chronic hepatitis B with resultant cirrhosis complicated by bleeding esophageal varices in 9/2022 and hepatocellular carcinoma who presents in follow-up.    Since last seen he was admitted to the hospital with hematemesis on September 23, 2022.  Upper endoscopy was performed and was found to have large esophageal varices and was status post band ligation x6.  Mild portal hypertensive gastropathy was also demonstrated during this exam.  He was started on carvedilol during hospitalization but was only discharged on a 28-day course, which he reports recently completing.    He continues to follow with interventional radiology and oncology with regards to his hepatocellular carcinoma.  Last imaging was performed in September with an MRI which did not demonstrate any evidence of recurrent disease and he does have a few areas described as LR 3.  Noted in review of the chart that interventional radiology has had a challenge and scheduling follow-up appointments with the patient.    Reports that he has not been called or set up a follow-up endoscopy appointment.    He is unsure as to whether or not he continues to take his tenofovir: At points during the interview saying he takes it daily and other points and he discontinued recently.      History of Liver Disease  The patient was initially diagnosed with hepatocellular carcinoma in 2020, and there were significant difficulties with regard to communicating with the patient, his understanding of the process, and his willingness to move forward with treatment of the cancer.   Ongoing surveillance imaging did demonstrate increasing size of HCC, and fortunately he agreed to move forward with Y 90 therapy in September 2021.  There was concerns for residual disease after this and ultimately underwent chemoembolization in December 2021.      Past Medical History:  Past Medical History:   Diagnosis Date     Cancer (H)      Chronic hepatitis B (H)      Diabetes mellitus (H)        Surgical History:  Past Surgical History:   Procedure Laterality Date     ESOPHAGOSCOPY, GASTROSCOPY, DUODENOSCOPY (EGD), COMBINED N/A 5/29/2019    Procedure: ESOPHAGOGASTRODUODENOSCOPY (EGD);  Surgeon: Leventhal, Thomas Michael, MD;  Location: [facility]     IR CHEMO EMBOLIZATION  12/2/2021     IR SIRT (SELECTIVE INTERNAL RADIO THERAPY)  9/8/2021     IR VISCERAL ANGIOGRAM  9/8/2021     IR VISCERAL EMBOLIZATION  9/9/2021       Social History:  Social History     Tobacco Use     Smoking status: Never     Smokeless tobacco: Never   Substance Use Topics     Alcohol use: No     Drug use: Not Currently     Types: Marijuana     Comment: past marijuana use       Family History:  -No overt family history of liver disease    Medications:  Current Outpatient Medications   Medication     capsaicin (ZOSTRIX) 0.025 % external cream     Capsaicin-Menthol (SALONPAS GEL EX)     carvedilol (COREG) 6.25 MG tablet     diclofenac (VOLTAREN) 1 % topical gel     furosemide (LASIX) 20 MG tablet     loratadine (CLARITIN) 10 MG tablet     polyethylene glycol (MIRALAX) 17 GM/Dose powder     spironolactone (ALDACTONE) 50 MG tablet     tenofovir (VIREAD) 300 MG tablet     triamcinolone (KENALOG) 0.1 % external cream     gabapentin (NEURONTIN) 100 MG capsule     No current facility-administered medications for this visit.     Facility-Administered Medications Ordered in Other Visits   Medication     gadobutrol (GADAVIST) injection 7.5 mL       Review of Systems  A complete 10 point review of systems was asked and answered in the negative unless  specifically commented upon in the HPI    Objective:         Vitals:    11/28/22 1016   BP: (!) 147/65   Pulse: 102   SpO2: 97%   Weight: 57.9 kg (127 lb 11.2 oz)     Body mass index is 21.25 kg/m .     Physical Exam  Constitutional: thin, mild distress  HEENT: anicteric  CV: tachycardic  Lungs: Normal respiratory excursion  Abd: protuberant, soft  MSK: muscle wasting      Labs and Diagnostic tests:      MELD-Na score: 17 at 9/25/2022  6:47 AM  MELD score: 15 at 9/25/2022  6:47 AM  Calculated from:  Serum Creatinine: 0.75 mg/dL (Using min of 1 mg/dL) at 9/25/2022  6:47 AM  Serum Sodium: 135 mmol/L at 9/25/2022  6:47 AM  Total Bilirubin: 1.6 mg/dL at 9/25/2022  6:47 AM  INR(ratio): 1.82 at 9/23/2022 11:14 PM  Age: 62 years    Imaging:  MRI Abd 9/21/22  FINDINGS:     Comparison study: MR abdomen 6/15/2022, 3/17/2022, 12/29/2021     Liver: Cirrhotic configuration liver. Normal appearance of multiple T1  hyperintense regenerative nodules. Unchanged T2 hyperintense  reticulated appearance of the liver indicating fibrosis. Several  scattered T2 nonenhancing cysts.     Observation 1: Evolving posttreatment changes of the liver segment 6.  Heterogeneous appearance of the treatment zone likely consistent with  hemorrhagic/necrotic changes. Increased enhancement along the superior  aspect of the treatment zone likely posttreatment without distinct  nodularity. No convincing enhancement, restricted diffusion,  pseudocapsule or washout that suggests viable tumor LR- TR nonviable.      Observation 2: 0.7 cm segment 7 arterial enhancing lesion (series 15  image 24) no pseudocapsule, restricted diffusion or washout. LR-TR 3     Observation 3: Peripheral enhancing 5 mm nodule in segment 6 (series  15, image 47), no pseudocapsule, restricted diffusion or washout.LR-TR  3     Multiple additional areas of subcentimeter arterial enhancing without  suspicious features.     Gallbladder: The gallbladder is nondistended. Small gallstone  and  gallbladder sludge noted.     Spleen: Splenomegaly measuring 14 cm.     Kidneys: Bilaterally symmetrically enhancing kidneys. Several T2  hyperintense renal cysts. No hydronephrosis. No hydroureter.     Adrenal glands: Unremarkable.     Pancreas: Normal T1 signal intensity of the pancreatic parenchyma. No  suspicious enhancement or main pancreatic duct dilatation.     Bowel: Large and small bowel is normal in caliber with mild wall  thickening, edema and enhancement likely related to portal  hypertension similar to prior.     Lymph nodes: No intra-abdominal lymphadenopathy by size criteria.     Blood vessels: The abdominal aorta and major abdominal arterial  vessels are patent and nonaneurysmal. The main portal vein is patent.  Gastroesophageal varices noted.     Lung bases: Similar appearance of peripherally calcified right basilar  fluid collection. Small basilar loculated fluid collection on the  left.     Bones and soft tissues: No acute osseous lesions.     Mesentery and abdominal wall: Unremarkable     Ascites: Redemonstration of large volume ascites with some internal  septations.    Assessment and Plan:    Cirrhosis:    -Secondary to chronic hepatitis B  -Ascites and bleeding esophageal varices as decompensation  - MELD-Na: 17  - Repeat labs in 3 months    Chronic hepatitis B:  -Discussed in detail the need for ongoing need to take this medication as his risk of acute flare could be lethal  - renewed medication  - checking HBV DNA today    Liver Transplant Candidacy:   - Have had significant challenges with communication and needed medical follow up  - Cost of medications continues to provide significant burden  - Discussed need to make and maintain follow up with  and patient verbalized understanding    Hepatocellular Cancer:   -Diagnosed in early 2020, did not undergo treatment with Y 90 until September 2021.  Had follow-up TACE in December 2021  - MRI 9/2022 with LR-TR lesion in right lobe  and several LR-3 lesions    Lung mass:  -His imaging has been reviewed in thoracic tumor board, and recommendations were that we move forward with biopsy.  Had significant issue with bleeding at time of procedure  - Will repeat CT Chest now for change    Ascites:  -Appears to have ascites on exam.  Not necessarily compliant with a low-sodium diet and not currently taking diuretics  - On lasix and spironolactone daily  - Continue to follow a sodium restricted (<2g sodium diet)     Hepatic Encephalopathy:  -No acute issues    Esophageal Varices:   - Bleeding varices 9/2022  - Ordered EGD surveillance for NOW  - Restarted coreg 6.25mg BID    Kidney Health:   - no acute issues    Immobility/Frailty:   He has become quite frail in the setting of severe protein calorie malnutrition and we did discuss this at great length as far as his need to remain physically active    Nutrition:  As with most patients with chronic liver disease, there is a significant degree of protein malnutrition.  Dicussed need to change dietary habits to improve overall protein balance.  Discussed the importance of eating between 1.2-1.5g/kg/day lean protein like eggs, fish, chicken, nuts, and legumes, in addition to a diet rich in fresh fruits and vegetables.  Continue to follow a sodium restricted (<2g sodium diet) and discussed the need to minimize the intake of carbohydrates and sugars, to avoid obesity.   - Strongly encourage protein supplements 2-3 times daily (Boost, Ensure, [place] Instant Milk, etc.) to meet protein and caloric intake.  - Recommend a bedtime snack with protein and complex carbohydrate to minimize risk of muscle catabolism overnight    Routine Health Care in Patient with Chronic Liver Disease:  - We recommend screening for hepatitis A and B, please vaccinate if not immune  - All patients with liver disease, particularly those with cholestatic liver disease, are at an increased risk for osteoporosis.  We strongly recommend  screening for Vitamin D deficiency at least twice yearly with aggressive supplementation/replacement as indicated.    - We also recommend a screening DEXA scan to evaluate for osteoporosis.  If present, should treat with calcium, Vitamin D supplementation, and recommend consideration of bisphosphonate therapy.  Also recommend follow up DEXA scans to evaluate for improvement of bone density on therapy.  - All patients with liver disease should avoid the use of Non-steroidal Anti-Inflammatory (NSAID) medications as they can cause significant injury to the kidneys in this population    Follow Up:  3 months with KRYSTAL Conrad     Thank you very much for the opportunity to participate in the care of this patient.  If you have any further questions, please don't hesitate to contact our office.    Thomas M. Leventhal, M.D.   of Medicine  Advanced & Transplant Hepatology  [facility]

## 2022-11-28 NOTE — PATIENT INSTRUCTIONS
- Please get labs today    - Please schedule an endoscopy to look for varices    - We have renewed your medications that you NEED to take for your liver:     - carvedilol, furosemide, spironolactone, and tenofovir    - You need to schedule the MRI of the liver and the CT scan of the test for late December/Early January    - return to clinic in 3 months    Cirrhosis Education  Nutrition  - For patients with cirrhosis, it is very important to eat the right types and amounts of foods.  We recommend a diet low in carbohydrates/sugars and high in fresh fruits/vegetables, with the right amount of protein.  We typically recommend 1.5gm/kg/day of protein, or  grams of protein every day.  - In regard to protein intake, you can focus on: All meats, cooked fish and seafood, vegetable-based protein meat products, tofu, eggs, legumes, dairy products (including milk and yogurt and cheese), unsalted nuts.  - You should eat at least three meals a day and three to four snacks between meals  - Bedtime snacks are especially important (preferrably something with some protein)    - Patients with malnutrition and/or loss of muscular mass can improve their nutrition and muscular mass by drinking two cans of dietary supplements daily, particularly at bedtime.  These would include: Ensure, Boost, [place] Instant Milk, Glucerna, (or similar supplements)  - Please avoid eating raw seafood, especially shellfish, because of risk of serious illness  - We recommend all patients with cirrhosis limit their daily sodium intake to less than 2,000mg      General Liver Health  - Continue to avoid the use of all non-steroid anti-inflammatory medicines (ibuprofen, Aleve, naproxen, aspirin, etc.) as this can cause serious injury to your kidneys in the setting of liver disease  - If you see a doctor and they prescribe you a new medication, please contact our clinic to let us know what changes are being made  - If you become acutely ill and present  to an ER or are admitted to a hospital, please let us know as soon as you are able.  - We recommend that all patients with chronic liver disease be vaccinated against hepatitis A and B.  Please discuss with your primary provider the need for these vaccines  - As patients with liver disease are at an increased risk of developing osteoporosis, we recommend that you have a DEXA scan with appropriate follow up and treatment.   - We recommend screening for Vitamin D deficiency (at least yearly) and aggressive replacement/supplementation as warranted.    Sleep Troubles:  - Sleep issues are very common in patients with chronic liver disease  - Recommend strict avoidance of medications such as narcotics, sedatives and sleep aids.    - Low dose benadryl or melatonin can be used for insomnia, if needed.

## 2022-11-28 NOTE — LETTER
11/28/2022         RE: Rishabh Cabrera  [address] [facility] [id number]        Dear Colleague,    Thank you for referring your patient, Rishabh Cabrera, to the [facility] [place]. Please see a copy of my visit note below.    Date of Service: November 28, 2022     Subjective:            Rishabh Cabrera is a 62 year old male presenting for evaluation of liver disease    Due to language barrier, an  was present during the history-taking and subsequent discussion (and for part of the physical exam) with this patient.      History of Present Illness   Rishabh Cabrera is a 62 year old male with past medical history of diabetes, chronic hepatitis B with resultant cirrhosis complicated by bleeding esophageal varices in 9/2022 and hepatocellular carcinoma who presents in follow-up.    Since last seen he was admitted to the hospital with hematemesis on September 23, 2022.  Upper endoscopy was performed and was found to have large esophageal varices and was status post band ligation x6.  Mild portal hypertensive gastropathy was also demonstrated during this exam.  He was started on carvedilol during hospitalization but was only discharged on a 28-day course, which he reports recently completing.    He continues to follow with interventional radiology and oncology with regards to his hepatocellular carcinoma.  Last imaging was performed in September with an MRI which did not demonstrate any evidence of recurrent disease and he does have a few areas described as LR 3.  Noted in review of the chart that interventional radiology has had a challenge and scheduling follow-up appointments with the patient.    Reports that he has not been called or set up a follow-up endoscopy appointment.    He is unsure as to whether or not he continues to take his tenofovir: At points during the interview saying he takes it daily and other points and he discontinued recently.      History of Liver Disease  The  patient was initially diagnosed with hepatocellular carcinoma in 2020, and there were significant difficulties with regard to communicating with the patient, his understanding of the process, and his willingness to move forward with treatment of the cancer.  Ongoing surveillance imaging did demonstrate increasing size of HCC, and fortunately he agreed to move forward with Y 90 therapy in September 2021.  There was concerns for residual disease after this and ultimately underwent chemoembolization in December 2021.      Past Medical History:  Past Medical History:   Diagnosis Date     Cancer (H)      Chronic hepatitis B (H)      Diabetes mellitus (H)        Surgical History:  Past Surgical History:   Procedure Laterality Date     ESOPHAGOSCOPY, GASTROSCOPY, DUODENOSCOPY (EGD), COMBINED N/A 5/29/2019    Procedure: ESOPHAGOGASTRODUODENOSCOPY (EGD);  Surgeon: Leventhal, Thomas Michael, MD;  Location: [facility]     IR CHEMO EMBOLIZATION  12/2/2021     IR SIRT (SELECTIVE INTERNAL RADIO THERAPY)  9/8/2021     IR VISCERAL ANGIOGRAM  9/8/2021     IR VISCERAL EMBOLIZATION  9/9/2021       Social History:  Social History     Tobacco Use     Smoking status: Never     Smokeless tobacco: Never   Substance Use Topics     Alcohol use: No     Drug use: Not Currently     Types: Marijuana     Comment: past marijuana use       Family History:  -No overt family history of liver disease    Medications:  Current Outpatient Medications   Medication     capsaicin (ZOSTRIX) 0.025 % external cream     Capsaicin-Menthol (SALONPAS GEL EX)     carvedilol (COREG) 6.25 MG tablet     diclofenac (VOLTAREN) 1 % topical gel     furosemide (LASIX) 20 MG tablet     loratadine (CLARITIN) 10 MG tablet     polyethylene glycol (MIRALAX) 17 GM/Dose powder     spironolactone (ALDACTONE) 50 MG tablet     tenofovir (VIREAD) 300 MG tablet     triamcinolone (KENALOG) 0.1 % external cream     gabapentin (NEURONTIN) 100 MG capsule     No current facility-administered  medications for this visit.     Facility-Administered Medications Ordered in Other Visits   Medication     gadobutrol (GADAVIST) injection 7.5 mL       Review of Systems  A complete 10 point review of systems was asked and answered in the negative unless specifically commented upon in the HPI    Objective:         Vitals:    11/28/22 1016   BP: (!) 147/65   Pulse: 102   SpO2: 97%   Weight: 57.9 kg (127 lb 11.2 oz)     Body mass index is 21.25 kg/m .     Physical Exam  Constitutional: thin, mild distress  HEENT: anicteric  CV: tachycardic  Lungs: Normal respiratory excursion  Abd: protuberant, soft  MSK: muscle wasting      Labs and Diagnostic tests:      MELD-Na score: 17 at 9/25/2022  6:47 AM  MELD score: 15 at 9/25/2022  6:47 AM  Calculated from:  Serum Creatinine: 0.75 mg/dL (Using min of 1 mg/dL) at 9/25/2022  6:47 AM  Serum Sodium: 135 mmol/L at 9/25/2022  6:47 AM  Total Bilirubin: 1.6 mg/dL at 9/25/2022  6:47 AM  INR(ratio): 1.82 at 9/23/2022 11:14 PM  Age: 62 years    Imaging:  MRI Abd 9/21/22  FINDINGS:     Comparison study: MR abdomen 6/15/2022, 3/17/2022, 12/29/2021     Liver: Cirrhotic configuration liver. Normal appearance of multiple T1  hyperintense regenerative nodules. Unchanged T2 hyperintense  reticulated appearance of the liver indicating fibrosis. Several  scattered T2 nonenhancing cysts.     Observation 1: Evolving posttreatment changes of the liver segment 6.  Heterogeneous appearance of the treatment zone likely consistent with  hemorrhagic/necrotic changes. Increased enhancement along the superior  aspect of the treatment zone likely posttreatment without distinct  nodularity. No convincing enhancement, restricted diffusion,  pseudocapsule or washout that suggests viable tumor LR- TR nonviable.      Observation 2: 0.7 cm segment 7 arterial enhancing lesion (series 15  image 24) no pseudocapsule, restricted diffusion or washout. LR-TR 3     Observation 3: Peripheral enhancing 5 mm nodule in  segment 6 (series  15, image 47), no pseudocapsule, restricted diffusion or washout.LR-TR  3     Multiple additional areas of subcentimeter arterial enhancing without  suspicious features.     Gallbladder: The gallbladder is nondistended. Small gallstone and  gallbladder sludge noted.     Spleen: Splenomegaly measuring 14 cm.     Kidneys: Bilaterally symmetrically enhancing kidneys. Several T2  hyperintense renal cysts. No hydronephrosis. No hydroureter.     Adrenal glands: Unremarkable.     Pancreas: Normal T1 signal intensity of the pancreatic parenchyma. No  suspicious enhancement or main pancreatic duct dilatation.     Bowel: Large and small bowel is normal in caliber with mild wall  thickening, edema and enhancement likely related to portal  hypertension similar to prior.     Lymph nodes: No intra-abdominal lymphadenopathy by size criteria.     Blood vessels: The abdominal aorta and major abdominal arterial  vessels are patent and nonaneurysmal. The main portal vein is patent.  Gastroesophageal varices noted.     Lung bases: Similar appearance of peripherally calcified right basilar  fluid collection. Small basilar loculated fluid collection on the  left.     Bones and soft tissues: No acute osseous lesions.     Mesentery and abdominal wall: Unremarkable     Ascites: Redemonstration of large volume ascites with some internal  septations.    Assessment and Plan:    Cirrhosis:    -Secondary to chronic hepatitis B  -Ascites and bleeding esophageal varices as decompensation  - MELD-Na: 17  - Repeat labs in 3 months    Chronic hepatitis B:  -Discussed in detail the need for ongoing need to take this medication as his risk of acute flare could be lethal  - renewed medication  - checking HBV DNA today    Liver Transplant Candidacy:   - Have had significant challenges with communication and needed medical follow up  - Cost of medications continues to provide significant burden  - Discussed need to make and maintain  follow up with  and patient verbalized understanding    Hepatocellular Cancer:   -Diagnosed in early 2020, did not undergo treatment with Y 90 until September 2021.  Had follow-up TACE in December 2021  - MRI 9/2022 with LR-TR lesion in right lobe and severe LR-3 lesions    Lung mass:  -His imaging has been reviewed in thoracic tumor board, and recommendations were that we move forward with biopsy.  Had significant issue with bleeding at time of procedure  - Will repeat CT Chest now for change    Ascites:  -Appears to have ascites on exam.  Not necessarily compliant with a low-sodium diet and not currently taking diuretics  - On lasix and spironolactone daily  - Continue to follow a sodium restricted (<2g sodium diet)     Hepatic Encephalopathy:  -No acute issues    Esophageal Varices:   - Bleeding varices 9/2022  - Ordered EGD surveillance for NOW  - Restarted coreg 6.25mg BID    Kidney Health:   - no acute issues    Immobility/Frailty:   He has become quite frail in the setting of severe protein calorie malnutrition and we did discuss this at great length as far as his need to remain physically active    Nutrition:  As with most patients with chronic liver disease, there is a significant degree of protein malnutrition.  Dicussed need to change dietary habits to improve overall protein balance.  Discussed the importance of eating between 1.2-1.5g/kg/day lean protein like eggs, fish, chicken, nuts, and legumes, in addition to a diet rich in fresh fruits and vegetables.  Continue to follow a sodium restricted (<2g sodium diet) and discussed the need to minimize the intake of carbohydrates and sugars, to avoid obesity.   - Strongly encourage protein supplements 2-3 times daily (Boost, Ensure, [place] Instant Milk, etc.) to meet protein and caloric intake.  - Recommend a bedtime snack with protein and complex carbohydrate to minimize risk of muscle catabolism overnight    Routine Health Care in Patient  with Chronic Liver Disease:  - We recommend screening for hepatitis A and B, please vaccinate if not immune  - All patients with liver disease, particularly those with cholestatic liver disease, are at an increased risk for osteoporosis.  We strongly recommend screening for Vitamin D deficiency at least twice yearly with aggressive supplementation/replacement as indicated.    - We also recommend a screening DEXA scan to evaluate for osteoporosis.  If present, should treat with calcium, Vitamin D supplementation, and recommend consideration of bisphosphonate therapy.  Also recommend follow up DEXA scans to evaluate for improvement of bone density on therapy.  - All patients with liver disease should avoid the use of Non-steroidal Anti-Inflammatory (NSAID) medications as they can cause significant injury to the kidneys in this population    Follow Up:  3 months with KRYSTAL Conrad     Thank you very much for the opportunity to participate in the care of this patient.  If you have any further questions, please don't hesitate to contact our office.    Thomas M. Leventhal, M.D.   of Medicine  Advanced & Transplant Hepatology  [facility]

## 2022-11-29 LAB — HBV DNA SERPL NAA+PROBE-ACNC: NOT DETECTED IU/ML

## 2022-12-07 ENCOUNTER — HOSPITAL ENCOUNTER (OUTPATIENT)
Facility: CLINIC | Age: 62
End: 2022-12-07
Attending: INTERNAL MEDICINE | Admitting: INTERNAL MEDICINE
Payer: MEDICARE

## 2022-12-07 ENCOUNTER — TELEPHONE (OUTPATIENT)
Dept: GASTROENTEROLOGY | Facility: CLINIC | Age: 62
End: 2022-12-07

## 2022-12-08 NOTE — TELEPHONE ENCOUNTER
Screening Questions  BLUE  KIND OF PREP RED  LOCATION [review exclusion criteria] GREEN  SEDATION TYPE    N - USPS MAIL   Are you active on mychart?   Leventhal, Thomas Michael, MD  Ordering/Referring Provider?    MEDICARE  What type of coverage do you have?  N Have you had a positive covid test in the last 14 days?    21.8  1. BMI  [BMI 40+ - review exclusion criteria]    SELF   2. Are you able to give consent for your medical care? [IF NO,RN REVIEW]        N  3. Are you taking any prescription pain medications on a routine schedule?        N 3a. EXTENDED PREP What kind of prescription?     N 4. Do you have any chemical dependencies such as alcohol, street drugs, or methadone?    N 5. Do you have any history of post-traumatic stress syndrome, severe anxiety or history of psychosis?      **If yes 3- 5 , please schedule with MAC sedation.**          IF YES TO ANY 6 - 10 - HOSPITAL SETTING ONLY.     Y - NEEDS ASSISTANCE  6.   Do you need assistance transferring?     N 7.   Have you had a heart or lung transplant?    N 8.   Are you currently on dialysis?   N 9.   Do you use daily home oxygen?   N 10. Do you take nitroglycerin?   10a. N If yes, how often?     11. [FEMALES]   Are you currently pregnant?     11a.  If yes, how many weeks? [ Greater than 12 weeks, OR NEEDED]    N 12. [review exclusion criteria]  Do you have any implantable devices in your body (pacemaker, defib, LVAD)?    N 13. Do you have Pulmonary Hypertension?             *NEED PAC APPT AT [facility]*     N 14. In the past 6 months, have you had any heart related issues including cardiomyopathy or heart attack?     N 14a. If yes, did it require cardiac stenting if so when?     N 15. Have you had a stroke or Transient ischemic attack (TIA - aka  mini stroke ) within 6 months?      N 16. Do you have mod to severe Obstructive Sleep Apnea?  [Hospital only - Ok at [facility]]    N 17. Do you have SEVERE AND UNCONTROLLED asthma?              *NEED PAC APPT AT  "[facility]*     Y - BLOOD THINNER -- LONG TIME AGO  18. Are you currently taking any blood thinners?     18a. If yes, inform patient to \"follow up w/ ordering provider for bridging instructions.\"    N 19. Do you take the medication Phentermine?    19a. If yes, \"Hold for 7 days before procedure.  Please consult your prescribing provider if you have questions about holding this medication.\"     N  20. Do you have chronic kidney disease?      Y - DIABETIC   21. Do you have a diagnosis of diabetes?     N  22. On a regular basis do you go 3-5 days between bowel movements?     23. Preferred LOCAL Pharmacy for Pre Prescription    [ LIST ONLY ONE PHARMACY]          [facility] [place] [address] [place] [address].        - CLOSING REMINDERS -    Informed patient they will need an adult    Cannot take any type of public or medical transportation alone    Conscious Sedation- Needs  for 6 hours after the procedure       MAC/General-Needs  for 24 hours after procedure    Pre-Procedure Covid test to be completed [[facility] PCR Testing Required]    Confirmed Nurse will call to complete assessment       - SCHEDULING DETAILS -    Additional comments:  JASSON VERA   Surgeon   03/02/2023  Date of Procedure  MAC  Sedation Type     NOT REQUIRED  PAC / Pre-op Required   Location  [facility]        Type of Procedure Scheduled  Upper and Lower Endoscopy [EGD and Colonoscopy]      Which Colonoscopy Prep was Sent?     STANDARD GOLYTELY-If you answer yes to questions #8, #20, #21          "

## 2023-01-01 ENCOUNTER — APPOINTMENT (OUTPATIENT)
Dept: INTERPRETER SERVICES | Facility: CLINIC | Age: 63
DRG: 389 | End: 2023-01-01
Payer: MEDICARE

## 2023-01-01 ENCOUNTER — TELEPHONE (OUTPATIENT)
Dept: GASTROENTEROLOGY | Facility: CLINIC | Age: 63
End: 2023-01-01
Payer: MEDICARE

## 2023-01-01 ENCOUNTER — PRE VISIT (OUTPATIENT)
Dept: SURGERY | Facility: CLINIC | Age: 63
End: 2023-01-01

## 2023-01-01 ENCOUNTER — APPOINTMENT (OUTPATIENT)
Dept: CT IMAGING | Facility: CLINIC | Age: 63
DRG: 389 | End: 2023-01-01
Attending: STUDENT IN AN ORGANIZED HEALTH CARE EDUCATION/TRAINING PROGRAM
Payer: MEDICARE

## 2023-01-01 ENCOUNTER — APPOINTMENT (OUTPATIENT)
Dept: INTERPRETER SERVICES | Facility: CLINIC | Age: 63
End: 2023-01-01
Payer: MEDICARE

## 2023-01-01 ENCOUNTER — ANESTHESIA EVENT (OUTPATIENT)
Dept: GASTROENTEROLOGY | Facility: CLINIC | Age: 63
End: 2023-01-01
Payer: MEDICARE

## 2023-01-01 ENCOUNTER — ANESTHESIA (OUTPATIENT)
Dept: GASTROENTEROLOGY | Facility: CLINIC | Age: 63
End: 2023-01-01
Payer: MEDICARE

## 2023-01-01 ENCOUNTER — APPOINTMENT (OUTPATIENT)
Dept: GENERAL RADIOLOGY | Facility: CLINIC | Age: 63
DRG: 389 | End: 2023-01-01
Attending: STUDENT IN AN ORGANIZED HEALTH CARE EDUCATION/TRAINING PROGRAM
Payer: MEDICARE

## 2023-01-01 ENCOUNTER — APPOINTMENT (OUTPATIENT)
Dept: CT IMAGING | Facility: CLINIC | Age: 63
DRG: 389 | End: 2023-01-01
Attending: NURSE PRACTITIONER
Payer: MEDICARE

## 2023-01-01 ENCOUNTER — HOSPITAL ENCOUNTER (INPATIENT)
Facility: CLINIC | Age: 63
LOS: 3 days | Discharge: HOME | DRG: 389 | End: 2023-09-23
Attending: STUDENT IN AN ORGANIZED HEALTH CARE EDUCATION/TRAINING PROGRAM | Admitting: HOSPITALIST
Payer: MEDICARE

## 2023-01-01 ENCOUNTER — HOSPITAL ENCOUNTER (OUTPATIENT)
Facility: CLINIC | Age: 63
Discharge: HOME | End: 2023-11-16
Attending: INTERNAL MEDICINE | Admitting: INTERNAL MEDICINE
Payer: MEDICARE

## 2023-01-01 ENCOUNTER — APPOINTMENT (OUTPATIENT)
Dept: GENERAL RADIOLOGY | Facility: CLINIC | Age: 63
DRG: 389 | End: 2023-01-01
Attending: PHYSICIAN ASSISTANT
Payer: MEDICARE

## 2023-01-01 ENCOUNTER — APPOINTMENT (OUTPATIENT)
Dept: GENERAL RADIOLOGY | Facility: CLINIC | Age: 63
DRG: 389 | End: 2023-01-01
Payer: MEDICARE

## 2023-01-01 VITALS
RESPIRATION RATE: 16 BRPM | OXYGEN SATURATION: 98 % | SYSTOLIC BLOOD PRESSURE: 105 MMHG | DIASTOLIC BLOOD PRESSURE: 61 MMHG | HEART RATE: 84 BPM

## 2023-01-01 VITALS
RESPIRATION RATE: 17 BRPM | TEMPERATURE: 99.1 F | OXYGEN SATURATION: 97 % | WEIGHT: 125 LBS | DIASTOLIC BLOOD PRESSURE: 45 MMHG | BODY MASS INDEX: 20.83 KG/M2 | HEIGHT: 65 IN | HEART RATE: 72 BPM | SYSTOLIC BLOOD PRESSURE: 97 MMHG

## 2023-01-01 DIAGNOSIS — K92.2 UPPER GI BLEED: ICD-10-CM

## 2023-01-01 DIAGNOSIS — C22.0 HCC (HEPATOCELLULAR CARCINOMA) (H): ICD-10-CM

## 2023-01-01 DIAGNOSIS — K56.51 INTESTINAL ADHESIONS WITH PARTIAL OBSTRUCTION (H): Primary | ICD-10-CM

## 2023-01-01 DIAGNOSIS — R18.8 OTHER ASCITES: ICD-10-CM

## 2023-01-01 LAB
ALBUMIN SERPL BCG-MCNC: 2.2 G/DL (ref 3.5–5.2)
ALBUMIN SERPL BCG-MCNC: 2.3 G/DL (ref 3.5–5.2)
ALBUMIN SERPL BCG-MCNC: 2.3 G/DL (ref 3.5–5.2)
ALBUMIN SERPL BCG-MCNC: 2.9 G/DL (ref 3.5–5.2)
ALP SERPL-CCNC: 110 U/L (ref 40–129)
ALP SERPL-CCNC: 119 U/L (ref 40–129)
ALP SERPL-CCNC: 141 U/L (ref 40–129)
ALP SERPL-CCNC: 98 U/L (ref 40–129)
ALT SERPL W P-5'-P-CCNC: 12 U/L (ref 0–70)
ALT SERPL W P-5'-P-CCNC: 12 U/L (ref 0–70)
ALT SERPL W P-5'-P-CCNC: 14 U/L (ref 0–70)
ALT SERPL W P-5'-P-CCNC: 17 U/L (ref 0–70)
ANION GAP SERPL CALCULATED.3IONS-SCNC: 10 MMOL/L (ref 7–15)
ANION GAP SERPL CALCULATED.3IONS-SCNC: 6 MMOL/L (ref 7–15)
ANION GAP SERPL CALCULATED.3IONS-SCNC: 6 MMOL/L (ref 7–15)
ANION GAP SERPL CALCULATED.3IONS-SCNC: 8 MMOL/L (ref 7–15)
AST SERPL W P-5'-P-CCNC: 34 U/L (ref 0–45)
AST SERPL W P-5'-P-CCNC: 37 U/L (ref 0–45)
AST SERPL W P-5'-P-CCNC: 38 U/L (ref 0–45)
AST SERPL W P-5'-P-CCNC: 52 U/L (ref 0–45)
BACTERIA BLD CULT: NO GROWTH
BACTERIA BLD CULT: NO GROWTH
BASOPHILS # BLD AUTO: 0 10E3/UL (ref 0–0.2)
BASOPHILS NFR BLD AUTO: 0 %
BILIRUB DIRECT SERPL-MCNC: 0.67 MG/DL (ref 0–0.3)
BILIRUB SERPL-MCNC: 0.9 MG/DL
BILIRUB SERPL-MCNC: 1.4 MG/DL
BILIRUB SERPL-MCNC: 2.2 MG/DL
BILIRUB SERPL-MCNC: 2.2 MG/DL
BUN SERPL-MCNC: 11.3 MG/DL (ref 8–23)
BUN SERPL-MCNC: 13.7 MG/DL (ref 8–23)
BUN SERPL-MCNC: 5.4 MG/DL (ref 8–23)
BUN SERPL-MCNC: 8.2 MG/DL (ref 8–23)
CALCIUM SERPL-MCNC: 7.2 MG/DL (ref 8.8–10.2)
CALCIUM SERPL-MCNC: 7.4 MG/DL (ref 8.8–10.2)
CALCIUM SERPL-MCNC: 7.4 MG/DL (ref 8.8–10.2)
CALCIUM SERPL-MCNC: 8.1 MG/DL (ref 8.8–10.2)
CHLORIDE SERPL-SCNC: 103 MMOL/L (ref 98–107)
CHLORIDE SERPL-SCNC: 104 MMOL/L (ref 98–107)
CHLORIDE SERPL-SCNC: 106 MMOL/L (ref 98–107)
CHLORIDE SERPL-SCNC: 107 MMOL/L (ref 98–107)
CREAT SERPL-MCNC: 0.83 MG/DL (ref 0.67–1.17)
CREAT SERPL-MCNC: 0.84 MG/DL (ref 0.67–1.17)
CREAT SERPL-MCNC: 0.92 MG/DL (ref 0.67–1.17)
CREAT SERPL-MCNC: 1.15 MG/DL (ref 0.67–1.17)
DEPRECATED HCO3 PLAS-SCNC: 22 MMOL/L (ref 22–29)
DEPRECATED HCO3 PLAS-SCNC: 23 MMOL/L (ref 22–29)
DEPRECATED HCO3 PLAS-SCNC: 24 MMOL/L (ref 22–29)
DEPRECATED HCO3 PLAS-SCNC: 24 MMOL/L (ref 22–29)
EGFRCR SERPLBLD CKD-EPI 2021: 72 ML/MIN/1.73M2
EGFRCR SERPLBLD CKD-EPI 2021: >90 ML/MIN/1.73M2
EOSINOPHIL # BLD AUTO: 0 10E3/UL (ref 0–0.7)
EOSINOPHIL NFR BLD AUTO: 1 %
ERYTHROCYTE [DISTWIDTH] IN BLOOD BY AUTOMATED COUNT: 19.1 % (ref 10–15)
ERYTHROCYTE [DISTWIDTH] IN BLOOD BY AUTOMATED COUNT: 19.2 % (ref 10–15)
ERYTHROCYTE [DISTWIDTH] IN BLOOD BY AUTOMATED COUNT: 19.4 % (ref 10–15)
ERYTHROCYTE [DISTWIDTH] IN BLOOD BY AUTOMATED COUNT: 19.6 % (ref 10–15)
GLUCOSE BLDC GLUCOMTR-MCNC: 105 MG/DL (ref 70–99)
GLUCOSE BLDC GLUCOMTR-MCNC: 108 MG/DL (ref 70–99)
GLUCOSE BLDC GLUCOMTR-MCNC: 109 MG/DL (ref 70–99)
GLUCOSE BLDC GLUCOMTR-MCNC: 110 MG/DL (ref 70–99)
GLUCOSE BLDC GLUCOMTR-MCNC: 117 MG/DL (ref 70–99)
GLUCOSE BLDC GLUCOMTR-MCNC: 118 MG/DL (ref 70–99)
GLUCOSE BLDC GLUCOMTR-MCNC: 122 MG/DL (ref 70–99)
GLUCOSE BLDC GLUCOMTR-MCNC: 128 MG/DL (ref 70–99)
GLUCOSE BLDC GLUCOMTR-MCNC: 137 MG/DL (ref 70–99)
GLUCOSE BLDC GLUCOMTR-MCNC: 137 MG/DL (ref 70–99)
GLUCOSE BLDC GLUCOMTR-MCNC: 142 MG/DL (ref 70–99)
GLUCOSE BLDC GLUCOMTR-MCNC: 149 MG/DL (ref 70–99)
GLUCOSE BLDC GLUCOMTR-MCNC: 170 MG/DL (ref 70–99)
GLUCOSE BLDC GLUCOMTR-MCNC: 174 MG/DL (ref 70–99)
GLUCOSE BLDC GLUCOMTR-MCNC: 186 MG/DL (ref 70–99)
GLUCOSE BLDC GLUCOMTR-MCNC: 186 MG/DL (ref 70–99)
GLUCOSE BLDC GLUCOMTR-MCNC: 193 MG/DL (ref 70–99)
GLUCOSE BLDC GLUCOMTR-MCNC: 84 MG/DL (ref 70–99)
GLUCOSE SERPL-MCNC: 106 MG/DL (ref 70–99)
GLUCOSE SERPL-MCNC: 125 MG/DL (ref 70–99)
GLUCOSE SERPL-MCNC: 139 MG/DL (ref 70–99)
GLUCOSE SERPL-MCNC: 140 MG/DL (ref 70–99)
HBA1C MFR BLD: 6.9 %
HBV DNA SERPL NAA+PROBE-ACNC: ABNORMAL IU/ML
HBV DNA SERPL NAA+PROBE-LOG IU: 5.8 {LOG_IU}/ML
HBV SURFACE AG SERPL QL IA: REACTIVE
HCT VFR BLD AUTO: 25.6 % (ref 40–53)
HCT VFR BLD AUTO: 26.1 % (ref 40–53)
HCT VFR BLD AUTO: 29.1 % (ref 40–53)
HCT VFR BLD AUTO: 32.2 % (ref 40–53)
HGB BLD-MCNC: 7.7 G/DL (ref 13.3–17.7)
HGB BLD-MCNC: 7.8 G/DL (ref 13.3–17.7)
HGB BLD-MCNC: 8.8 G/DL (ref 13.3–17.7)
HGB BLD-MCNC: 9.5 G/DL (ref 13.3–17.7)
IMM GRANULOCYTES # BLD: 0 10E3/UL
IMM GRANULOCYTES NFR BLD: 0 %
INR PPP: 2 (ref 0.85–1.15)
INR PPP: 2.06 (ref 0.85–1.15)
INR PPP: 2.09 (ref 0.85–1.15)
LIPASE SERPL-CCNC: 29 U/L (ref 13–60)
LYMPHOCYTES # BLD AUTO: 0.4 10E3/UL (ref 0.8–5.3)
LYMPHOCYTES NFR BLD AUTO: 7 %
MAGNESIUM SERPL-MCNC: 1.8 MG/DL (ref 1.7–2.3)
MCH RBC QN AUTO: 22.3 PG (ref 26.5–33)
MCH RBC QN AUTO: 22.4 PG (ref 26.5–33)
MCH RBC QN AUTO: 22.5 PG (ref 26.5–33)
MCH RBC QN AUTO: 22.8 PG (ref 26.5–33)
MCHC RBC AUTO-ENTMCNC: 29.5 G/DL (ref 31.5–36.5)
MCHC RBC AUTO-ENTMCNC: 29.5 G/DL (ref 31.5–36.5)
MCHC RBC AUTO-ENTMCNC: 30.2 G/DL (ref 31.5–36.5)
MCHC RBC AUTO-ENTMCNC: 30.5 G/DL (ref 31.5–36.5)
MCV RBC AUTO: 74 FL (ref 78–100)
MCV RBC AUTO: 74 FL (ref 78–100)
MCV RBC AUTO: 75 FL (ref 78–100)
MCV RBC AUTO: 77 FL (ref 78–100)
MONOCYTES # BLD AUTO: 0.4 10E3/UL (ref 0–1.3)
MONOCYTES NFR BLD AUTO: 7 %
NEUTROPHILS # BLD AUTO: 4.7 10E3/UL (ref 1.6–8.3)
NEUTROPHILS NFR BLD AUTO: 85 %
NRBC # BLD AUTO: 0 10E3/UL
NRBC BLD AUTO-RTO: 0 /100
PHOSPHATE SERPL-MCNC: 2.4 MG/DL (ref 2.5–4.5)
PHOSPHATE SERPL-MCNC: 2.5 MG/DL (ref 2.5–4.5)
PHOSPHATE SERPL-MCNC: 2.8 MG/DL (ref 2.5–4.5)
PLAT MORPH BLD: NORMAL
PLATELET # BLD AUTO: 57 10E3/UL (ref 150–450)
PLATELET # BLD AUTO: 61 10E3/UL (ref 150–450)
PLATELET # BLD AUTO: 65 10E3/UL (ref 150–450)
PLATELET # BLD AUTO: 69 10E3/UL (ref 150–450)
POTASSIUM SERPL-SCNC: 3.7 MMOL/L (ref 3.4–5.3)
POTASSIUM SERPL-SCNC: 3.8 MMOL/L (ref 3.4–5.3)
POTASSIUM SERPL-SCNC: 4.1 MMOL/L (ref 3.4–5.3)
POTASSIUM SERPL-SCNC: 4.4 MMOL/L (ref 3.4–5.3)
PROT SERPL-MCNC: 6 G/DL (ref 6.4–8.3)
PROT SERPL-MCNC: 6.5 G/DL (ref 6.4–8.3)
PROT SERPL-MCNC: 6.6 G/DL (ref 6.4–8.3)
PROT SERPL-MCNC: 8.1 G/DL (ref 6.4–8.3)
RBC # BLD AUTO: 3.46 10E6/UL (ref 4.4–5.9)
RBC # BLD AUTO: 3.47 10E6/UL (ref 4.4–5.9)
RBC # BLD AUTO: 3.93 10E6/UL (ref 4.4–5.9)
RBC # BLD AUTO: 4.16 10E6/UL (ref 4.4–5.9)
RBC MORPH BLD: NORMAL
SODIUM SERPL-SCNC: 135 MMOL/L (ref 136–145)
SODIUM SERPL-SCNC: 135 MMOL/L (ref 136–145)
SODIUM SERPL-SCNC: 136 MMOL/L (ref 136–145)
SODIUM SERPL-SCNC: 137 MMOL/L (ref 136–145)
UPPER GI ENDOSCOPY: NORMAL
WBC # BLD AUTO: 3.3 10E3/UL (ref 4–11)
WBC # BLD AUTO: 3.9 10E3/UL (ref 4–11)
WBC # BLD AUTO: 4.2 10E3/UL (ref 4–11)
WBC # BLD AUTO: 5.5 10E3/UL (ref 4–11)

## 2023-01-01 PROCEDURE — 250N000009 HC RX 250: Performed by: STUDENT IN AN ORGANIZED HEALTH CARE EDUCATION/TRAINING PROGRAM

## 2023-01-01 PROCEDURE — 74018 RADEX ABDOMEN 1 VIEW: CPT | Mod: 26 | Performed by: RADIOLOGY

## 2023-01-01 PROCEDURE — 250N000013 HC RX MED GY IP 250 OP 250 PS 637: Performed by: PHYSICIAN ASSISTANT

## 2023-01-01 PROCEDURE — 250N000012 HC RX MED GY IP 250 OP 636 PS 637: Performed by: PHYSICIAN ASSISTANT

## 2023-01-01 PROCEDURE — 82962 GLUCOSE BLOOD TEST: CPT

## 2023-01-01 PROCEDURE — 999N000065 XR ABDOMEN PORT 1 VIEW

## 2023-01-01 PROCEDURE — 36415 COLL VENOUS BLD VENIPUNCTURE: CPT | Performed by: NURSE PRACTITIONER

## 2023-01-01 PROCEDURE — 85610 PROTHROMBIN TIME: CPT | Performed by: PHYSICIAN ASSISTANT

## 2023-01-01 PROCEDURE — 76705 ECHO EXAM OF ABDOMEN: CPT | Mod: 26 | Performed by: ORTHOPAEDIC SURGERY

## 2023-01-01 PROCEDURE — 370N000017 HC ANESTHESIA TECHNICAL FEE, PER MIN: Performed by: INTERNAL MEDICINE

## 2023-01-01 PROCEDURE — 36415 COLL VENOUS BLD VENIPUNCTURE: CPT | Performed by: PHYSICIAN ASSISTANT

## 2023-01-01 PROCEDURE — 83690 ASSAY OF LIPASE: CPT | Performed by: STUDENT IN AN ORGANIZED HEALTH CARE EDUCATION/TRAINING PROGRAM

## 2023-01-01 PROCEDURE — 99285 EMERGENCY DEPT VISIT HI MDM: CPT | Mod: 25

## 2023-01-01 PROCEDURE — 250N000009 HC RX 250: Performed by: ANESTHESIOLOGY

## 2023-01-01 PROCEDURE — 96360 HYDRATION IV INFUSION INIT: CPT

## 2023-01-01 PROCEDURE — 84100 ASSAY OF PHOSPHORUS: CPT | Performed by: ORTHOPAEDIC SURGERY

## 2023-01-01 PROCEDURE — 250N000011 HC RX IP 250 OP 636: Mod: JZ | Performed by: HOSPITALIST

## 2023-01-01 PROCEDURE — 74018 RADEX ABDOMEN 1 VIEW: CPT

## 2023-01-01 PROCEDURE — 84100 ASSAY OF PHOSPHORUS: CPT | Performed by: PHYSICIAN ASSISTANT

## 2023-01-01 PROCEDURE — 250N000011 HC RX IP 250 OP 636: Performed by: STUDENT IN AN ORGANIZED HEALTH CARE EDUCATION/TRAINING PROGRAM

## 2023-01-01 PROCEDURE — 99239 HOSP IP/OBS DSCHRG MGMT >30: CPT | Performed by: INTERNAL MEDICINE

## 2023-01-01 PROCEDURE — 74177 CT ABD & PELVIS W/CONTRAST: CPT | Mod: MG

## 2023-01-01 PROCEDURE — 120N000002 HC R&B MED SURG/OB UMMC

## 2023-01-01 PROCEDURE — 258N000003 HC RX IP 258 OP 636: Performed by: INTERNAL MEDICINE

## 2023-01-01 PROCEDURE — 87517 HEPATITIS B DNA QUANT: CPT | Performed by: NURSE PRACTITIONER

## 2023-01-01 PROCEDURE — 85027 COMPLETE CBC AUTOMATED: CPT | Performed by: PHYSICIAN ASSISTANT

## 2023-01-01 PROCEDURE — 250N000009 HC RX 250: Performed by: HOSPITALIST

## 2023-01-01 PROCEDURE — 87040 BLOOD CULTURE FOR BACTERIA: CPT | Performed by: PHYSICIAN ASSISTANT

## 2023-01-01 PROCEDURE — G1010 CDSM STANSON: HCPCS | Mod: GC | Performed by: RADIOLOGY

## 2023-01-01 PROCEDURE — 99222 1ST HOSP IP/OBS MODERATE 55: CPT | Mod: GC | Performed by: INTERNAL MEDICINE

## 2023-01-01 PROCEDURE — 99223 1ST HOSP IP/OBS HIGH 75: CPT | Mod: AI | Performed by: PHYSICIAN ASSISTANT

## 2023-01-01 PROCEDURE — 71250 CT THORAX DX C-: CPT | Mod: MF

## 2023-01-01 PROCEDURE — 258N000003 HC RX IP 258 OP 636: Performed by: STUDENT IN AN ORGANIZED HEALTH CARE EDUCATION/TRAINING PROGRAM

## 2023-01-01 PROCEDURE — 74177 CT ABD & PELVIS W/CONTRAST: CPT | Mod: 26 | Performed by: RADIOLOGY

## 2023-01-01 PROCEDURE — 82248 BILIRUBIN DIRECT: CPT | Performed by: STUDENT IN AN ORGANIZED HEALTH CARE EDUCATION/TRAINING PROGRAM

## 2023-01-01 PROCEDURE — 96361 HYDRATE IV INFUSION ADD-ON: CPT

## 2023-01-01 PROCEDURE — 99221 1ST HOSP IP/OBS SF/LOW 40: CPT | Mod: GC | Performed by: SURGERY

## 2023-01-01 PROCEDURE — 85025 COMPLETE CBC W/AUTO DIFF WBC: CPT | Performed by: STUDENT IN AN ORGANIZED HEALTH CARE EDUCATION/TRAINING PROGRAM

## 2023-01-01 PROCEDURE — 71250 CT THORAX DX C-: CPT | Mod: 26 | Performed by: RADIOLOGY

## 2023-01-01 PROCEDURE — 250N000011 HC RX IP 250 OP 636: Mod: JZ | Performed by: ANESTHESIOLOGY

## 2023-01-01 PROCEDURE — 84100 ASSAY OF PHOSPHORUS: CPT | Performed by: HOSPITALIST

## 2023-01-01 PROCEDURE — 80053 COMPREHEN METABOLIC PANEL: CPT | Performed by: PHYSICIAN ASSISTANT

## 2023-01-01 PROCEDURE — 250N000013 HC RX MED GY IP 250 OP 250 PS 637: Performed by: STUDENT IN AN ORGANIZED HEALTH CARE EDUCATION/TRAINING PROGRAM

## 2023-01-01 PROCEDURE — 99285 EMERGENCY DEPT VISIT HI MDM: CPT | Performed by: STUDENT IN AN ORGANIZED HEALTH CARE EDUCATION/TRAINING PROGRAM

## 2023-01-01 PROCEDURE — 43235 EGD DIAGNOSTIC BRUSH WASH: CPT | Performed by: INTERNAL MEDICINE

## 2023-01-01 PROCEDURE — G1010 CDSM STANSON: HCPCS

## 2023-01-01 PROCEDURE — 250N000011 HC RX IP 250 OP 636: Performed by: PHYSICIAN ASSISTANT

## 2023-01-01 PROCEDURE — 258N000003 HC RX IP 258 OP 636: Performed by: HOSPITALIST

## 2023-01-01 PROCEDURE — 83036 HEMOGLOBIN GLYCOSYLATED A1C: CPT | Performed by: PHYSICIAN ASSISTANT

## 2023-01-01 PROCEDURE — 36415 COLL VENOUS BLD VENIPUNCTURE: CPT | Performed by: STUDENT IN AN ORGANIZED HEALTH CARE EDUCATION/TRAINING PROGRAM

## 2023-01-01 PROCEDURE — 99418 PROLNG IP/OBS E/M EA 15 MIN: CPT | Performed by: INTERNAL MEDICINE

## 2023-01-01 PROCEDURE — 99232 SBSQ HOSP IP/OBS MODERATE 35: CPT | Performed by: INTERNAL MEDICINE

## 2023-01-01 PROCEDURE — P9045 ALBUMIN (HUMAN), 5%, 250 ML: HCPCS | Mod: JZ | Performed by: HOSPITALIST

## 2023-01-01 PROCEDURE — 250N000011 HC RX IP 250 OP 636: Performed by: ANESTHESIOLOGY

## 2023-01-01 PROCEDURE — 80053 COMPREHEN METABOLIC PANEL: CPT | Performed by: STUDENT IN AN ORGANIZED HEALTH CARE EDUCATION/TRAINING PROGRAM

## 2023-01-01 PROCEDURE — 87340 HEPATITIS B SURFACE AG IA: CPT | Performed by: NURSE PRACTITIONER

## 2023-01-01 PROCEDURE — 99233 SBSQ HOSP IP/OBS HIGH 50: CPT | Performed by: INTERNAL MEDICINE

## 2023-01-01 PROCEDURE — 258N000003 HC RX IP 258 OP 636: Performed by: ANESTHESIOLOGY

## 2023-01-01 PROCEDURE — 83735 ASSAY OF MAGNESIUM: CPT | Performed by: PHYSICIAN ASSISTANT

## 2023-01-01 PROCEDURE — 99207 PR APP CREDIT; MD BILLING SHARED VISIT: CPT | Performed by: HOSPITALIST

## 2023-01-01 RX ORDER — LIDOCAINE HYDROCHLORIDE 20 MG/ML
JELLY TOPICAL ONCE
Status: COMPLETED | OUTPATIENT
Start: 2023-01-01 | End: 2023-01-01

## 2023-01-01 RX ORDER — ONDANSETRON 4 MG/1
4 TABLET, ORALLY DISINTEGRATING ORAL ONCE
Status: DISCONTINUED | OUTPATIENT
Start: 2023-01-01 | End: 2023-01-01

## 2023-01-01 RX ORDER — OXYCODONE HYDROCHLORIDE 5 MG/1
5 TABLET ORAL ONCE
Status: COMPLETED | OUTPATIENT
Start: 2023-01-01 | End: 2023-01-01

## 2023-01-01 RX ORDER — PROCHLORPERAZINE MALEATE 10 MG
10 TABLET ORAL EVERY 6 HOURS PRN
Status: CANCELLED | OUTPATIENT
Start: 2023-01-01

## 2023-01-01 RX ORDER — NALOXONE HYDROCHLORIDE 0.4 MG/ML
0.4 INJECTION, SOLUTION INTRAMUSCULAR; INTRAVENOUS; SUBCUTANEOUS
Status: CANCELLED | OUTPATIENT
Start: 2023-01-01

## 2023-01-01 RX ORDER — PROPOFOL 10 MG/ML
INJECTION, EMULSION INTRAVENOUS PRN
Status: DISCONTINUED | OUTPATIENT
Start: 2023-01-01 | End: 2023-01-01

## 2023-01-01 RX ORDER — DEXTROSE MONOHYDRATE 25 G/50ML
25-50 INJECTION, SOLUTION INTRAVENOUS
Status: DISCONTINUED | OUTPATIENT
Start: 2023-01-01 | End: 2023-01-01 | Stop reason: HOSPADM

## 2023-01-01 RX ORDER — IOPAMIDOL 755 MG/ML
77 INJECTION, SOLUTION INTRAVASCULAR ONCE
Status: COMPLETED | OUTPATIENT
Start: 2023-01-01 | End: 2023-01-01

## 2023-01-01 RX ORDER — NALOXONE HYDROCHLORIDE 0.4 MG/ML
0.4 INJECTION, SOLUTION INTRAMUSCULAR; INTRAVENOUS; SUBCUTANEOUS
Status: DISCONTINUED | OUTPATIENT
Start: 2023-01-01 | End: 2023-01-01 | Stop reason: HOSPADM

## 2023-01-01 RX ORDER — NALOXONE HYDROCHLORIDE 0.4 MG/ML
0.2 INJECTION, SOLUTION INTRAMUSCULAR; INTRAVENOUS; SUBCUTANEOUS
Status: CANCELLED | OUTPATIENT
Start: 2023-01-01

## 2023-01-01 RX ORDER — CARVEDILOL 6.25 MG/1
6.25 TABLET ORAL 2 TIMES DAILY WITH MEALS
Status: DISCONTINUED | OUTPATIENT
Start: 2023-01-01 | End: 2023-01-01 | Stop reason: HOSPADM

## 2023-01-01 RX ORDER — MORPHINE SULFATE 4 MG/ML
4 INJECTION, SOLUTION INTRAMUSCULAR; INTRAVENOUS
Status: DISCONTINUED | OUTPATIENT
Start: 2023-01-01 | End: 2023-01-01

## 2023-01-01 RX ORDER — GABAPENTIN 300 MG/1
300 CAPSULE ORAL 3 TIMES DAILY
Status: DISCONTINUED | OUTPATIENT
Start: 2023-01-01 | End: 2023-01-01 | Stop reason: HOSPADM

## 2023-01-01 RX ORDER — SPIRONOLACTONE 50 MG/1
100 TABLET, FILM COATED ORAL DAILY
Status: DISCONTINUED | OUTPATIENT
Start: 2023-01-01 | End: 2023-01-01 | Stop reason: HOSPADM

## 2023-01-01 RX ORDER — LIDOCAINE 40 MG/G
CREAM TOPICAL
Status: DISCONTINUED | OUTPATIENT
Start: 2023-01-01 | End: 2023-01-01 | Stop reason: HOSPADM

## 2023-01-01 RX ORDER — FUROSEMIDE 40 MG
40 TABLET ORAL DAILY
Qty: 90 TABLET | Refills: 3 | Status: ON HOLD | OUTPATIENT
Start: 2023-01-01 | End: 2024-01-01

## 2023-01-01 RX ORDER — METOCLOPRAMIDE HYDROCHLORIDE 5 MG/ML
10 INJECTION INTRAMUSCULAR; INTRAVENOUS ONCE
Status: COMPLETED | OUTPATIENT
Start: 2023-01-01 | End: 2023-01-01

## 2023-01-01 RX ORDER — ONDANSETRON 2 MG/ML
4 INJECTION INTRAMUSCULAR; INTRAVENOUS EVERY 6 HOURS PRN
Status: CANCELLED | OUTPATIENT
Start: 2023-01-01

## 2023-01-01 RX ORDER — NALOXONE HYDROCHLORIDE 0.4 MG/ML
0.2 INJECTION, SOLUTION INTRAMUSCULAR; INTRAVENOUS; SUBCUTANEOUS
Status: DISCONTINUED | OUTPATIENT
Start: 2023-01-01 | End: 2023-01-01 | Stop reason: HOSPADM

## 2023-01-01 RX ORDER — NICOTINE POLACRILEX 4 MG
15-30 LOZENGE BUCCAL
Status: DISCONTINUED | OUTPATIENT
Start: 2023-01-01 | End: 2023-01-01 | Stop reason: HOSPADM

## 2023-01-01 RX ORDER — ONDANSETRON 2 MG/ML
4 INJECTION INTRAMUSCULAR; INTRAVENOUS EVERY 6 HOURS PRN
Status: DISCONTINUED | OUTPATIENT
Start: 2023-01-01 | End: 2023-01-01 | Stop reason: HOSPADM

## 2023-01-01 RX ORDER — PROPOFOL 10 MG/ML
INJECTION, EMULSION INTRAVENOUS CONTINUOUS PRN
Status: DISCONTINUED | OUTPATIENT
Start: 2023-01-01 | End: 2023-01-01

## 2023-01-01 RX ORDER — TENOFOVIR DISOPROXIL FUMARATE 300 MG/1
300 TABLET, FILM COATED ORAL DAILY
Qty: 90 TABLET | Refills: 3 | Status: SHIPPED | OUTPATIENT
Start: 2023-01-01

## 2023-01-01 RX ORDER — ONDANSETRON 4 MG/1
4 TABLET, ORALLY DISINTEGRATING ORAL EVERY 30 MIN PRN
Status: CANCELLED | OUTPATIENT
Start: 2023-01-01

## 2023-01-01 RX ORDER — FUROSEMIDE 40 MG
40 TABLET ORAL DAILY
Status: DISCONTINUED | OUTPATIENT
Start: 2023-01-01 | End: 2023-01-01 | Stop reason: HOSPADM

## 2023-01-01 RX ORDER — SODIUM CHLORIDE, SODIUM LACTATE, POTASSIUM CHLORIDE, CALCIUM CHLORIDE 600; 310; 30; 20 MG/100ML; MG/100ML; MG/100ML; MG/100ML
INJECTION, SOLUTION INTRAVENOUS CONTINUOUS PRN
Status: DISCONTINUED | OUTPATIENT
Start: 2023-01-01 | End: 2023-01-01

## 2023-01-01 RX ORDER — SODIUM CHLORIDE, SODIUM LACTATE, POTASSIUM CHLORIDE, CALCIUM CHLORIDE 600; 310; 30; 20 MG/100ML; MG/100ML; MG/100ML; MG/100ML
INJECTION, SOLUTION INTRAVENOUS CONTINUOUS
Status: CANCELLED | OUTPATIENT
Start: 2023-01-01

## 2023-01-01 RX ORDER — ONDANSETRON 2 MG/ML
4 INJECTION INTRAMUSCULAR; INTRAVENOUS
Status: DISCONTINUED | OUTPATIENT
Start: 2023-01-01 | End: 2023-01-01 | Stop reason: HOSPADM

## 2023-01-01 RX ORDER — FLUMAZENIL 0.1 MG/ML
0.2 INJECTION, SOLUTION INTRAVENOUS
Status: CANCELLED | OUTPATIENT
Start: 2023-01-01 | End: 2023-01-01

## 2023-01-01 RX ORDER — ONDANSETRON 4 MG/1
4 TABLET, ORALLY DISINTEGRATING ORAL EVERY 6 HOURS PRN
Qty: 12 TABLET | Refills: 0 | Status: ON HOLD | OUTPATIENT
Start: 2023-01-01 | End: 2024-01-01

## 2023-01-01 RX ORDER — ACETAMINOPHEN 325 MG/1
975 TABLET ORAL EVERY 8 HOURS PRN
Status: DISCONTINUED | OUTPATIENT
Start: 2023-01-01 | End: 2023-01-01 | Stop reason: HOSPADM

## 2023-01-01 RX ORDER — ONDANSETRON 2 MG/ML
4 INJECTION INTRAMUSCULAR; INTRAVENOUS EVERY 30 MIN PRN
Status: DISCONTINUED | OUTPATIENT
Start: 2023-01-01 | End: 2023-01-01

## 2023-01-01 RX ORDER — ONDANSETRON 4 MG/1
4 TABLET, ORALLY DISINTEGRATING ORAL EVERY 6 HOURS PRN
Status: DISCONTINUED | OUTPATIENT
Start: 2023-01-01 | End: 2023-01-01 | Stop reason: HOSPADM

## 2023-01-01 RX ORDER — HYDROMORPHONE HYDROCHLORIDE 1 MG/ML
0.3 INJECTION, SOLUTION INTRAMUSCULAR; INTRAVENOUS; SUBCUTANEOUS EVERY 4 HOURS PRN
Status: DISCONTINUED | OUTPATIENT
Start: 2023-01-01 | End: 2023-01-01 | Stop reason: HOSPADM

## 2023-01-01 RX ORDER — FENTANYL CITRATE 50 UG/ML
50 INJECTION, SOLUTION INTRAMUSCULAR; INTRAVENOUS EVERY 5 MIN PRN
Status: CANCELLED | OUTPATIENT
Start: 2023-01-01

## 2023-01-01 RX ORDER — ONDANSETRON 2 MG/ML
4 INJECTION INTRAMUSCULAR; INTRAVENOUS EVERY 30 MIN PRN
Status: CANCELLED | OUTPATIENT
Start: 2023-01-01

## 2023-01-01 RX ORDER — FENTANYL CITRATE 50 UG/ML
25 INJECTION, SOLUTION INTRAMUSCULAR; INTRAVENOUS EVERY 5 MIN PRN
Status: CANCELLED | OUTPATIENT
Start: 2023-01-01

## 2023-01-01 RX ORDER — LIDOCAINE HYDROCHLORIDE 20 MG/ML
INJECTION, SOLUTION INFILTRATION; PERINEURAL PRN
Status: DISCONTINUED | OUTPATIENT
Start: 2023-01-01 | End: 2023-01-01

## 2023-01-01 RX ORDER — SPIRONOLACTONE 100 MG/1
100 TABLET, FILM COATED ORAL DAILY
Qty: 90 TABLET | Refills: 3 | Status: ON HOLD | OUTPATIENT
Start: 2023-01-01 | End: 2024-01-01

## 2023-01-01 RX ORDER — ONDANSETRON 4 MG/1
4 TABLET, ORALLY DISINTEGRATING ORAL EVERY 6 HOURS PRN
Status: CANCELLED | OUTPATIENT
Start: 2023-01-01

## 2023-01-01 RX ORDER — TENOFOVIR DISOPROXIL FUMARATE 300 MG/1
300 TABLET, FILM COATED ORAL DAILY
Status: DISCONTINUED | OUTPATIENT
Start: 2023-01-01 | End: 2023-01-01 | Stop reason: HOSPADM

## 2023-01-01 RX ADMIN — PROPOFOL 100 MCG/KG/MIN: 10 INJECTION, EMULSION INTRAVENOUS at 08:50

## 2023-01-01 RX ADMIN — FUROSEMIDE 40 MG: 40 TABLET ORAL at 09:06

## 2023-01-01 RX ADMIN — SODIUM PHOSPHATE, MONOBASIC, MONOHYDRATE AND SODIUM PHOSPHATE, DIBASIC, ANHYDROUS 9 MMOL: 142; 276 INJECTION, SOLUTION INTRAVENOUS at 09:17

## 2023-01-01 RX ADMIN — GABAPENTIN 300 MG: 300 CAPSULE ORAL at 09:16

## 2023-01-01 RX ADMIN — GABAPENTIN 300 MG: 300 CAPSULE ORAL at 09:06

## 2023-01-01 RX ADMIN — FAMOTIDINE 20 MG: 10 INJECTION, SOLUTION INTRAVENOUS at 08:39

## 2023-01-01 RX ADMIN — LIDOCAINE HYDROCHLORIDE: 20 JELLY TOPICAL at 22:04

## 2023-01-01 RX ADMIN — INSULIN ASPART 1 UNITS: 100 INJECTION, SOLUTION INTRAVENOUS; SUBCUTANEOUS at 21:24

## 2023-01-01 RX ADMIN — SODIUM CHLORIDE 1000 ML: 9 INJECTION, SOLUTION INTRAVENOUS at 14:57

## 2023-01-01 RX ADMIN — LIDOCAINE HYDROCHLORIDE 100 MG: 20 INJECTION, SOLUTION INFILTRATION; PERINEURAL at 08:47

## 2023-01-01 RX ADMIN — PROPOFOL 30 MG: 10 INJECTION, EMULSION INTRAVENOUS at 08:50

## 2023-01-01 RX ADMIN — TENOFOVIR DISOPROXIL FUMARATE 300 MG: 300 TABLET, FILM COATED ORAL at 09:06

## 2023-01-01 RX ADMIN — HYDROMORPHONE HYDROCHLORIDE 0.3 MG: 1 INJECTION, SOLUTION INTRAMUSCULAR; INTRAVENOUS; SUBCUTANEOUS at 20:03

## 2023-01-01 RX ADMIN — INSULIN ASPART 1 UNITS: 100 INJECTION, SOLUTION INTRAVENOUS; SUBCUTANEOUS at 01:42

## 2023-01-01 RX ADMIN — CARVEDILOL 6.25 MG: 6.25 TABLET, FILM COATED ORAL at 09:16

## 2023-01-01 RX ADMIN — OXYCODONE HYDROCHLORIDE 5 MG: 5 TABLET ORAL at 18:59

## 2023-01-01 RX ADMIN — FUROSEMIDE 40 MG: 40 TABLET ORAL at 09:16

## 2023-01-01 RX ADMIN — INSULIN ASPART 1 UNITS: 100 INJECTION, SOLUTION INTRAVENOUS; SUBCUTANEOUS at 15:31

## 2023-01-01 RX ADMIN — GABAPENTIN 300 MG: 300 CAPSULE ORAL at 14:57

## 2023-01-01 RX ADMIN — SPIRONOLACTONE 100 MG: 100 TABLET ORAL at 08:49

## 2023-01-01 RX ADMIN — GABAPENTIN 300 MG: 300 CAPSULE ORAL at 15:29

## 2023-01-01 RX ADMIN — CARVEDILOL 6.25 MG: 6.25 TABLET, FILM COATED ORAL at 18:18

## 2023-01-01 RX ADMIN — IOPAMIDOL 77 ML: 755 INJECTION, SOLUTION INTRAVENOUS at 17:30

## 2023-01-01 RX ADMIN — CARVEDILOL 6.25 MG: 6.25 TABLET, FILM COATED ORAL at 08:49

## 2023-01-01 RX ADMIN — TENOFOVIR DISOPROXIL FUMARATE 300 MG: 300 TABLET, FILM COATED ORAL at 09:15

## 2023-01-01 RX ADMIN — FUROSEMIDE 40 MG: 40 TABLET ORAL at 08:48

## 2023-01-01 RX ADMIN — ALBUMIN HUMAN 12.5 G: 0.05 INJECTION, SOLUTION INTRAVENOUS at 02:00

## 2023-01-01 RX ADMIN — GABAPENTIN 300 MG: 300 CAPSULE ORAL at 20:03

## 2023-01-01 RX ADMIN — GABAPENTIN 300 MG: 300 CAPSULE ORAL at 08:48

## 2023-01-01 RX ADMIN — INSULIN ASPART 1 UNITS: 100 INJECTION, SOLUTION INTRAVENOUS; SUBCUTANEOUS at 22:31

## 2023-01-01 RX ADMIN — TENOFOVIR DISOPROXIL FUMARATE 300 MG: 300 TABLET, FILM COATED ORAL at 08:49

## 2023-01-01 RX ADMIN — SPIRONOLACTONE 100 MG: 100 TABLET ORAL at 09:16

## 2023-01-01 RX ADMIN — SODIUM CHLORIDE, POTASSIUM CHLORIDE, SODIUM LACTATE AND CALCIUM CHLORIDE: 600; 310; 30; 20 INJECTION, SOLUTION INTRAVENOUS at 08:45

## 2023-01-01 RX ADMIN — SODIUM CHLORIDE, POTASSIUM CHLORIDE, SODIUM LACTATE AND CALCIUM CHLORIDE 1000 ML: 600; 310; 30; 20 INJECTION, SOLUTION INTRAVENOUS at 13:11

## 2023-01-01 RX ADMIN — DIATRIZOATE MEGLUMINE AND DIATRIZOATE SODIUM 120 ML: 660; 100 SOLUTION ORAL; RECTAL at 09:16

## 2023-01-01 RX ADMIN — SPIRONOLACTONE 100 MG: 100 TABLET ORAL at 09:06

## 2023-01-01 ASSESSMENT — ACTIVITIES OF DAILY LIVING (ADL)
ADLS_ACUITY_SCORE: 24
ADLS_ACUITY_SCORE: 35
CHANGE_IN_FUNCTIONAL_STATUS_SINCE_ONSET_OF_CURRENT_ILLNESS/INJURY: YES
ADLS_ACUITY_SCORE: 35
ADLS_ACUITY_SCORE: 33
ADLS_ACUITY_SCORE: 35
ADLS_ACUITY_SCORE: 35
ADLS_ACUITY_SCORE: 39
ADLS_ACUITY_SCORE: 23
ADLS_ACUITY_SCORE: 39
ADLS_ACUITY_SCORE: 24
WEAR_GLASSES_OR_BLIND: NO
ADLS_ACUITY_SCORE: 39
EATING: 0-->INDEPENDENT
ADLS_ACUITY_SCORE: 35
ADLS_ACUITY_SCORE: 35
ADLS_ACUITY_SCORE: 23
CONCENTRATING,_REMEMBERING_OR_MAKING_DECISIONS_DIFFICULTY: NO
ADLS_ACUITY_SCORE: 39
ADLS_ACUITY_SCORE: 35
ADLS_ACUITY_SCORE: 25
ADLS_ACUITY_SCORE: 35
ADLS_ACUITY_SCORE: 35
FALL_HISTORY_WITHIN_LAST_SIX_MONTHS: NO
ADLS_ACUITY_SCORE: 25
ADLS_ACUITY_SCORE: 25
ADLS_ACUITY_SCORE: 35
DOING_ERRANDS_INDEPENDENTLY_DIFFICULTY: NO
ADLS_ACUITY_SCORE: 35
SWALLOWING: 0-->SWALLOWS FOODS/LIQUIDS WITHOUT DIFFICULTY
ADLS_ACUITY_SCORE: 35
EATING: 0-->INDEPENDENT
ADLS_ACUITY_SCORE: 35
ADLS_ACUITY_SCORE: 24
ADLS_ACUITY_SCORE: 39
WALKING_OR_CLIMBING_STAIRS_DIFFICULTY: NO
DRESSING/BATHING_DIFFICULTY: NO
TOILETING_ISSUES: NO
ADLS_ACUITY_SCORE: 35
ADLS_ACUITY_SCORE: 35
ADLS_ACUITY_SCORE: 39
ADLS_ACUITY_SCORE: 25
ADLS_ACUITY_SCORE: 39
ADLS_ACUITY_SCORE: 39
SWALLOWING: 0-->SWALLOWS FOODS/LIQUIDS WITHOUT DIFFICULTY (DEVELOPMENTALLY APPROPRIATE)
ADLS_ACUITY_SCORE: 24
DIFFICULTY_EATING/SWALLOWING: YES
EQUIPMENT_CURRENTLY_USED_AT_HOME: CANE, STRAIGHT
EATING/SWALLOWING: EATING
ADLS_ACUITY_SCORE: 35
ADLS_ACUITY_SCORE: 39

## 2023-01-10 ENCOUNTER — ANCILLARY PROCEDURE (OUTPATIENT)
Dept: CT IMAGING | Facility: CLINIC | Age: 63
End: 2023-01-10
Attending: INTERNAL MEDICINE
Payer: MEDICARE

## 2023-01-10 ENCOUNTER — ANCILLARY PROCEDURE (OUTPATIENT)
Dept: MRI IMAGING | Facility: CLINIC | Age: 63
End: 2023-01-10
Attending: INTERNAL MEDICINE
Payer: MEDICARE

## 2023-01-10 DIAGNOSIS — C22.0 HCC (HEPATOCELLULAR CARCINOMA) (H): ICD-10-CM

## 2023-01-10 DIAGNOSIS — K74.60 CHRONIC HEPATITIS B WITH CIRRHOSIS (H): ICD-10-CM

## 2023-01-10 DIAGNOSIS — B18.1 CHRONIC HEPATITIS B WITH CIRRHOSIS (H): ICD-10-CM

## 2023-01-10 PROCEDURE — G1010 CDSM STANSON: HCPCS | Mod: GC | Performed by: RADIOLOGY

## 2023-01-10 PROCEDURE — 71250 CT THORAX DX C-: CPT | Mod: MG | Performed by: RADIOLOGY

## 2023-01-10 PROCEDURE — A9585 GADOBUTROL INJECTION: HCPCS | Performed by: RADIOLOGY

## 2023-01-10 PROCEDURE — G1010 CDSM STANSON: HCPCS | Performed by: RADIOLOGY

## 2023-01-10 PROCEDURE — 74183 MRI ABD W/O CNTR FLWD CNTR: CPT | Mod: MG | Performed by: RADIOLOGY

## 2023-01-10 RX ORDER — GADOBUTROL 604.72 MG/ML
7.5 INJECTION INTRAVENOUS ONCE
Status: COMPLETED | OUTPATIENT
Start: 2023-01-10 | End: 2023-01-10

## 2023-01-10 RX ADMIN — GADOBUTROL 6 ML: 604.72 INJECTION INTRAVENOUS at 15:15

## 2023-01-24 ENCOUNTER — APPOINTMENT (OUTPATIENT)
Dept: CT IMAGING | Facility: CLINIC | Age: 63
DRG: 433 | End: 2023-01-24
Attending: EMERGENCY MEDICINE
Payer: MEDICARE

## 2023-01-24 ENCOUNTER — APPOINTMENT (OUTPATIENT)
Dept: ULTRASOUND IMAGING | Facility: CLINIC | Age: 63
DRG: 433 | End: 2023-01-24
Payer: MEDICARE

## 2023-01-24 ENCOUNTER — APPOINTMENT (OUTPATIENT)
Dept: INTERVENTIONAL RADIOLOGY/VASCULAR | Facility: CLINIC | Age: 63
DRG: 433 | End: 2023-01-24
Attending: NURSE PRACTITIONER
Payer: MEDICARE

## 2023-01-24 ENCOUNTER — HOSPITAL ENCOUNTER (INPATIENT)
Facility: CLINIC | Age: 63
LOS: 2 days | Discharge: HOME | DRG: 433 | End: 2023-01-26
Attending: EMERGENCY MEDICINE | Admitting: INTERNAL MEDICINE
Payer: MEDICARE

## 2023-01-24 DIAGNOSIS — C22.0 HCC (HEPATOCELLULAR CARCINOMA) (H): ICD-10-CM

## 2023-01-24 DIAGNOSIS — B02.9 HERPES ZOSTER WITHOUT COMPLICATION: ICD-10-CM

## 2023-01-24 DIAGNOSIS — R93.3 ABNORMAL FINDINGS ON DIAGNOSTIC IMAGING OF OTHER PARTS OF DIGESTIVE TRACT: ICD-10-CM

## 2023-01-24 DIAGNOSIS — C22.0 HEPATOCELLULAR CARCINOMA (H): ICD-10-CM

## 2023-01-24 DIAGNOSIS — R10.84 ABDOMINAL PAIN, GENERALIZED: ICD-10-CM

## 2023-01-24 LAB
ABSOLUTE NEUTROPHILS, BODY FLUID: 2.3 /UL
ALBUMIN BODY FLUID SOURCE: NORMAL
ALBUMIN FLD-MCNC: 0.2 G/DL
ALBUMIN SERPL BCG-MCNC: 2.3 G/DL (ref 3.5–5.2)
ALBUMIN UR-MCNC: 10 MG/DL
ALP SERPL-CCNC: 142 U/L (ref 40–129)
ALT SERPL W P-5'-P-CCNC: 16 U/L (ref 10–50)
ANION GAP SERPL CALCULATED.3IONS-SCNC: 5 MMOL/L (ref 7–15)
APPEARANCE FLD: ABNORMAL
APPEARANCE UR: ABNORMAL
AST SERPL W P-5'-P-CCNC: 31 U/L (ref 10–50)
BACTERIA #/AREA URNS HPF: ABNORMAL /HPF
BASOPHILS # BLD AUTO: 0 10E3/UL (ref 0–0.2)
BASOPHILS NFR BLD AUTO: 0 %
BILIRUB SERPL-MCNC: 1.1 MG/DL
BILIRUB UR QL STRIP: NEGATIVE
BUN SERPL-MCNC: 11.5 MG/DL (ref 8–23)
CALCIUM SERPL-MCNC: 7.9 MG/DL (ref 8.8–10.2)
CELL COUNT BODY FLUID SOURCE: ABNORMAL
CHLORIDE SERPL-SCNC: 105 MMOL/L (ref 98–107)
COLOR FLD: COLORLESS
COLOR UR AUTO: YELLOW
CREAT SERPL-MCNC: 0.94 MG/DL (ref 0.67–1.17)
DEPRECATED HCO3 PLAS-SCNC: 26 MMOL/L (ref 22–29)
EOSINOPHIL # BLD AUTO: 0 10E3/UL (ref 0–0.7)
EOSINOPHIL NFR BLD AUTO: 0 %
ERYTHROCYTE [DISTWIDTH] IN BLOOD BY AUTOMATED COUNT: 20.7 % (ref 10–15)
GFR SERPL CREATININE-BSD FRML MDRD: >90 ML/MIN/1.73M2
GLUCOSE BLDC GLUCOMTR-MCNC: 139 MG/DL (ref 70–99)
GLUCOSE BLDC GLUCOMTR-MCNC: 145 MG/DL (ref 70–99)
GLUCOSE BODY FLUID SOURCE: NORMAL
GLUCOSE FLD-MCNC: 203 MG/DL
GLUCOSE SERPL-MCNC: 216 MG/DL (ref 70–99)
GLUCOSE UR STRIP-MCNC: 50 MG/DL
HCT VFR BLD AUTO: 28 % (ref 40–53)
HGB BLD-MCNC: 8.4 G/DL (ref 13.3–17.7)
HGB UR QL STRIP: ABNORMAL
HOLD SPECIMEN: NORMAL
HYALINE CASTS: 20 /LPF
IMM GRANULOCYTES # BLD: 0.1 10E3/UL
IMM GRANULOCYTES NFR BLD: 1 %
INR PPP: 1.98 (ref 0.85–1.15)
KETONES UR STRIP-MCNC: NEGATIVE MG/DL
LD BODY BODY FLUID SOURCE: NORMAL
LDH FLD L TO P-CCNC: 30 U/L
LEUKOCYTE ESTERASE UR QL STRIP: ABNORMAL
LIPASE SERPL-CCNC: 44 U/L (ref 13–60)
LYMPHOCYTES # BLD AUTO: 0.7 10E3/UL (ref 0.8–5.3)
LYMPHOCYTES NFR BLD AUTO: 5 %
LYMPHOCYTES NFR FLD MANUAL: 25 %
MCH RBC QN AUTO: 24.1 PG (ref 26.5–33)
MCHC RBC AUTO-ENTMCNC: 30 G/DL (ref 31.5–36.5)
MCV RBC AUTO: 81 FL (ref 78–100)
MONOCYTES # BLD AUTO: 1.1 10E3/UL (ref 0–1.3)
MONOCYTES NFR BLD AUTO: 7 %
MONOS+MACROS NFR FLD MANUAL: NORMAL %
MUCOUS THREADS #/AREA URNS LPF: PRESENT /LPF
NEUTROPHILS # BLD AUTO: 13.3 10E3/UL (ref 1.6–8.3)
NEUTROPHILS NFR BLD AUTO: 87 %
NEUTS BAND NFR FLD MANUAL: 6 %
NITRATE UR QL: NEGATIVE
NRBC # BLD AUTO: 0 10E3/UL
NRBC BLD AUTO-RTO: 0 /100
OTHER CELLS FLD MANUAL: 70 %
PH UR STRIP: 5.5 [PH] (ref 5–7)
PLATELET # BLD AUTO: 79 10E3/UL (ref 150–450)
POTASSIUM SERPL-SCNC: 4.1 MMOL/L (ref 3.4–5.3)
PROT FLD-MCNC: 0.5 G/DL
PROT SERPL-MCNC: 6.9 G/DL (ref 6.4–8.3)
PROTEIN BODY FLUID SOURCE: NORMAL
RBC # BLD AUTO: 3.48 10E6/UL (ref 4.4–5.9)
RBC URINE: 1 /HPF
SODIUM SERPL-SCNC: 136 MMOL/L (ref 136–145)
SP GR UR STRIP: 1.02 (ref 1–1.03)
UROBILINOGEN UR STRIP-MCNC: NORMAL MG/DL
WBC # BLD AUTO: 15.3 10E3/UL (ref 4–11)
WBC # FLD AUTO: 39 /UL
WBC CLUMPS #/AREA URNS HPF: PRESENT /HPF
WBC URINE: 111 /HPF

## 2023-01-24 PROCEDURE — 250N000009 HC RX 250: Performed by: RADIOLOGY

## 2023-01-24 PROCEDURE — 85610 PROTHROMBIN TIME: CPT | Performed by: INTERNAL MEDICINE

## 2023-01-24 PROCEDURE — 96375 TX/PRO/DX INJ NEW DRUG ADDON: CPT | Performed by: EMERGENCY MEDICINE

## 2023-01-24 PROCEDURE — 93975 VASCULAR STUDY: CPT

## 2023-01-24 PROCEDURE — 36415 COLL VENOUS BLD VENIPUNCTURE: CPT | Performed by: EMERGENCY MEDICINE

## 2023-01-24 PROCEDURE — 87075 CULTR BACTERIA EXCEPT BLOOD: CPT | Performed by: EMERGENCY MEDICINE

## 2023-01-24 PROCEDURE — 258N000003 HC RX IP 258 OP 636: Performed by: STUDENT IN AN ORGANIZED HEALTH CARE EDUCATION/TRAINING PROGRAM

## 2023-01-24 PROCEDURE — 83690 ASSAY OF LIPASE: CPT | Performed by: EMERGENCY MEDICINE

## 2023-01-24 PROCEDURE — 250N000012 HC RX MED GY IP 250 OP 636 PS 637: Performed by: STUDENT IN AN ORGANIZED HEALTH CARE EDUCATION/TRAINING PROGRAM

## 2023-01-24 PROCEDURE — 74177 CT ABD & PELVIS W/CONTRAST: CPT | Mod: MG

## 2023-01-24 PROCEDURE — 250N000011 HC RX IP 250 OP 636: Performed by: EMERGENCY MEDICINE

## 2023-01-24 PROCEDURE — 49083 ABD PARACENTESIS W/IMAGING: CPT

## 2023-01-24 PROCEDURE — 99285 EMERGENCY DEPT VISIT HI MDM: CPT | Mod: 25 | Performed by: EMERGENCY MEDICINE

## 2023-01-24 PROCEDURE — 74177 CT ABD & PELVIS W/CONTRAST: CPT | Mod: 26 | Performed by: STUDENT IN AN ORGANIZED HEALTH CARE EDUCATION/TRAINING PROGRAM

## 2023-01-24 PROCEDURE — 0W9G3ZZ DRAINAGE OF PERITONEAL CAVITY, PERCUTANEOUS APPROACH: ICD-10-PCS | Performed by: RADIOLOGY

## 2023-01-24 PROCEDURE — 83615 LACTATE (LD) (LDH) ENZYME: CPT | Performed by: EMERGENCY MEDICINE

## 2023-01-24 PROCEDURE — 82962 GLUCOSE BLOOD TEST: CPT

## 2023-01-24 PROCEDURE — 96376 TX/PRO/DX INJ SAME DRUG ADON: CPT | Performed by: EMERGENCY MEDICINE

## 2023-01-24 PROCEDURE — 81001 URINALYSIS AUTO W/SCOPE: CPT | Performed by: EMERGENCY MEDICINE

## 2023-01-24 PROCEDURE — 84157 ASSAY OF PROTEIN OTHER: CPT | Performed by: EMERGENCY MEDICINE

## 2023-01-24 PROCEDURE — 250N000013 HC RX MED GY IP 250 OP 250 PS 637: Performed by: STUDENT IN AN ORGANIZED HEALTH CARE EDUCATION/TRAINING PROGRAM

## 2023-01-24 PROCEDURE — 99221 1ST HOSP IP/OBS SF/LOW 40: CPT | Mod: GC | Performed by: SURGERY

## 2023-01-24 PROCEDURE — 87040 BLOOD CULTURE FOR BACTERIA: CPT | Performed by: EMERGENCY MEDICINE

## 2023-01-24 PROCEDURE — 80053 COMPREHEN METABOLIC PANEL: CPT | Performed by: EMERGENCY MEDICINE

## 2023-01-24 PROCEDURE — 36415 COLL VENOUS BLD VENIPUNCTURE: CPT | Performed by: INTERNAL MEDICINE

## 2023-01-24 PROCEDURE — 85025 COMPLETE CBC W/AUTO DIFF WBC: CPT | Performed by: EMERGENCY MEDICINE

## 2023-01-24 PROCEDURE — 87070 CULTURE OTHR SPECIMN AEROBIC: CPT | Performed by: EMERGENCY MEDICINE

## 2023-01-24 PROCEDURE — 120N000002 HC R&B MED SURG/OB UMMC

## 2023-01-24 PROCEDURE — 96374 THER/PROPH/DIAG INJ IV PUSH: CPT | Mod: 59 | Performed by: EMERGENCY MEDICINE

## 2023-01-24 PROCEDURE — 49083 ABD PARACENTESIS W/IMAGING: CPT | Mod: GC | Performed by: RADIOLOGY

## 2023-01-24 PROCEDURE — G1010 CDSM STANSON: HCPCS | Mod: GC | Performed by: STUDENT IN AN ORGANIZED HEALTH CARE EDUCATION/TRAINING PROGRAM

## 2023-01-24 PROCEDURE — 99222 1ST HOSP IP/OBS MODERATE 55: CPT | Mod: AI | Performed by: INTERNAL MEDICINE

## 2023-01-24 PROCEDURE — 99223 1ST HOSP IP/OBS HIGH 75: CPT | Performed by: NURSE PRACTITIONER

## 2023-01-24 PROCEDURE — 250N000011 HC RX IP 250 OP 636: Performed by: STUDENT IN AN ORGANIZED HEALTH CARE EDUCATION/TRAINING PROGRAM

## 2023-01-24 PROCEDURE — 89051 BODY FLUID CELL COUNT: CPT | Performed by: EMERGENCY MEDICINE

## 2023-01-24 PROCEDURE — 82042 OTHER SOURCE ALBUMIN QUAN EA: CPT | Performed by: EMERGENCY MEDICINE

## 2023-01-24 PROCEDURE — 87086 URINE CULTURE/COLONY COUNT: CPT | Performed by: EMERGENCY MEDICINE

## 2023-01-24 PROCEDURE — 99285 EMERGENCY DEPT VISIT HI MDM: CPT | Performed by: EMERGENCY MEDICINE

## 2023-01-24 PROCEDURE — G1010 CDSM STANSON: HCPCS

## 2023-01-24 PROCEDURE — C9113 INJ PANTOPRAZOLE SODIUM, VIA: HCPCS | Performed by: STUDENT IN AN ORGANIZED HEALTH CARE EDUCATION/TRAINING PROGRAM

## 2023-01-24 PROCEDURE — 82945 GLUCOSE OTHER FLUID: CPT | Performed by: EMERGENCY MEDICINE

## 2023-01-24 PROCEDURE — 93975 VASCULAR STUDY: CPT | Mod: 26 | Performed by: RADIOLOGY

## 2023-01-24 RX ORDER — CEFTRIAXONE 1 G/1
1 INJECTION, POWDER, FOR SOLUTION INTRAMUSCULAR; INTRAVENOUS EVERY 24 HOURS
Status: DISCONTINUED | OUTPATIENT
Start: 2023-01-25 | End: 2023-01-26 | Stop reason: HOSPADM

## 2023-01-24 RX ORDER — ONDANSETRON 2 MG/ML
4 INJECTION INTRAMUSCULAR; INTRAVENOUS EVERY 6 HOURS PRN
Status: DISCONTINUED | OUTPATIENT
Start: 2023-01-24 | End: 2023-01-26 | Stop reason: HOSPADM

## 2023-01-24 RX ORDER — LIDOCAINE 40 MG/G
CREAM TOPICAL
Status: DISCONTINUED | OUTPATIENT
Start: 2023-01-24 | End: 2023-01-26 | Stop reason: HOSPADM

## 2023-01-24 RX ORDER — HYDROMORPHONE HYDROCHLORIDE 1 MG/ML
0.5 INJECTION, SOLUTION INTRAMUSCULAR; INTRAVENOUS; SUBCUTANEOUS
Status: COMPLETED | OUTPATIENT
Start: 2023-01-24 | End: 2023-01-24

## 2023-01-24 RX ORDER — TENOFOVIR DISOPROXIL FUMARATE 300 MG/1
300 TABLET, FILM COATED ORAL DAILY
Status: DISCONTINUED | OUTPATIENT
Start: 2023-01-24 | End: 2023-01-26 | Stop reason: HOSPADM

## 2023-01-24 RX ORDER — CARVEDILOL 6.25 MG/1
6.25 TABLET ORAL 2 TIMES DAILY WITH MEALS
Status: DISCONTINUED | OUTPATIENT
Start: 2023-01-24 | End: 2023-01-26 | Stop reason: HOSPADM

## 2023-01-24 RX ORDER — NICOTINE POLACRILEX 4 MG
15-30 LOZENGE BUCCAL
Status: DISCONTINUED | OUTPATIENT
Start: 2023-01-24 | End: 2023-01-26 | Stop reason: HOSPADM

## 2023-01-24 RX ORDER — ACETAMINOPHEN 500 MG
500 TABLET ORAL EVERY 6 HOURS PRN
Status: DISCONTINUED | OUTPATIENT
Start: 2023-01-24 | End: 2023-01-26 | Stop reason: HOSPADM

## 2023-01-24 RX ORDER — ONDANSETRON 2 MG/ML
4 INJECTION INTRAMUSCULAR; INTRAVENOUS EVERY 30 MIN PRN
Status: DISCONTINUED | OUTPATIENT
Start: 2023-01-24 | End: 2023-01-26 | Stop reason: HOSPADM

## 2023-01-24 RX ORDER — LIDOCAINE HYDROCHLORIDE 10 MG/ML
1-30 INJECTION, SOLUTION EPIDURAL; INFILTRATION; INTRACAUDAL; PERINEURAL
Status: COMPLETED | OUTPATIENT
Start: 2023-01-24 | End: 2023-01-24

## 2023-01-24 RX ORDER — ONDANSETRON 4 MG/1
4 TABLET, ORALLY DISINTEGRATING ORAL EVERY 6 HOURS PRN
Status: DISCONTINUED | OUTPATIENT
Start: 2023-01-24 | End: 2023-01-26 | Stop reason: HOSPADM

## 2023-01-24 RX ORDER — DEXTROSE MONOHYDRATE 25 G/50ML
25-50 INJECTION, SOLUTION INTRAVENOUS
Status: DISCONTINUED | OUTPATIENT
Start: 2023-01-24 | End: 2023-01-26 | Stop reason: HOSPADM

## 2023-01-24 RX ORDER — IOPAMIDOL 755 MG/ML
80 INJECTION, SOLUTION INTRAVASCULAR ONCE
Status: COMPLETED | OUTPATIENT
Start: 2023-01-24 | End: 2023-01-24

## 2023-01-24 RX ORDER — CEFTRIAXONE 1 G/1
1 INJECTION, POWDER, FOR SOLUTION INTRAMUSCULAR; INTRAVENOUS ONCE
Status: COMPLETED | OUTPATIENT
Start: 2023-01-24 | End: 2023-01-24

## 2023-01-24 RX ORDER — SODIUM CHLORIDE, SODIUM LACTATE, POTASSIUM CHLORIDE, CALCIUM CHLORIDE 600; 310; 30; 20 MG/100ML; MG/100ML; MG/100ML; MG/100ML
INJECTION, SOLUTION INTRAVENOUS CONTINUOUS
Status: DISCONTINUED | OUTPATIENT
Start: 2023-01-24 | End: 2023-01-25

## 2023-01-24 RX ORDER — GABAPENTIN 100 MG/1
100 CAPSULE ORAL 3 TIMES DAILY
Status: DISCONTINUED | OUTPATIENT
Start: 2023-01-24 | End: 2023-01-25

## 2023-01-24 RX ADMIN — GABAPENTIN 100 MG: 100 CAPSULE ORAL at 20:56

## 2023-01-24 RX ADMIN — ACETAMINOPHEN 500 MG: 500 TABLET ORAL at 21:50

## 2023-01-24 RX ADMIN — CARVEDILOL 6.25 MG: 6.25 TABLET, FILM COATED ORAL at 19:05

## 2023-01-24 RX ADMIN — CEFTRIAXONE SODIUM 1 G: 1 INJECTION, POWDER, FOR SOLUTION INTRAMUSCULAR; INTRAVENOUS at 11:05

## 2023-01-24 RX ADMIN — ONDANSETRON 4 MG: 2 INJECTION INTRAMUSCULAR; INTRAVENOUS at 08:02

## 2023-01-24 RX ADMIN — HYDROMORPHONE HYDROCHLORIDE 0.5 MG: 1 INJECTION, SOLUTION INTRAMUSCULAR; INTRAVENOUS; SUBCUTANEOUS at 06:14

## 2023-01-24 RX ADMIN — LIDOCAINE HYDROCHLORIDE 9 ML: 10 INJECTION, SOLUTION EPIDURAL; INFILTRATION; INTRACAUDAL; PERINEURAL at 16:36

## 2023-01-24 RX ADMIN — IOPAMIDOL 80 ML: 755 INJECTION, SOLUTION INTRAVENOUS at 07:42

## 2023-01-24 RX ADMIN — HYDROMORPHONE HYDROCHLORIDE 0.5 MG: 1 INJECTION, SOLUTION INTRAMUSCULAR; INTRAVENOUS; SUBCUTANEOUS at 08:04

## 2023-01-24 RX ADMIN — PANTOPRAZOLE SODIUM 40 MG: 40 INJECTION, POWDER, FOR SOLUTION INTRAVENOUS at 17:30

## 2023-01-24 RX ADMIN — ONDANSETRON 4 MG: 2 INJECTION INTRAMUSCULAR; INTRAVENOUS at 06:14

## 2023-01-24 RX ADMIN — SODIUM CHLORIDE, POTASSIUM CHLORIDE, SODIUM LACTATE AND CALCIUM CHLORIDE: 600; 310; 30; 20 INJECTION, SOLUTION INTRAVENOUS at 17:29

## 2023-01-24 RX ADMIN — HYDROMORPHONE HYDROCHLORIDE 0.5 MG: 1 INJECTION, SOLUTION INTRAMUSCULAR; INTRAVENOUS; SUBCUTANEOUS at 15:16

## 2023-01-24 RX ADMIN — TENOFOVIR DISOPROXIL FUMARATE 300 MG: 300 TABLET, FILM COATED ORAL at 20:55

## 2023-01-24 RX ADMIN — INSULIN ASPART 1 UNITS: 100 INJECTION, SOLUTION INTRAVENOUS; SUBCUTANEOUS at 22:32

## 2023-01-24 ASSESSMENT — ACTIVITIES OF DAILY LIVING (ADL)
ADLS_ACUITY_SCORE: 35

## 2023-01-24 NOTE — H&P
[facility]    History and Physical - Medicine Service, MAROON TEAM 2       Date of Admission:  1/24/2023    Assessment & Plan    Patient is a 63 yo male with PMH notable for HBV-associated cirrhosis, HCC, ascites, EV s/p banding (Septmeber '22), T2DM, and herpes zoster admitted with acute abdominal pain and distension. Etiology for presenting symptoms possibly multifactorial, including ascites, SBP, and SBO. He is hemodynamically stable on admission.    #Abdominal pain  #Decompensated cirrhosis 2/2 chronic HBV infection c/b hepatocellular carcinoma  #Ascites  #Esophageal varices s/p banding (9/2022)  #Possible SBO  Patient presents with 2 days of abdominal pain and distension. CT shows large volume ascites, which could explain his symptoms. Given this, pain, and leukocytosis, there is c/f SBP. He will have diagnostic/therapeutic tap and has been covered with ceftriaxone. CT also demonstrates dilated small bowel loops with fecalization, c/f possible SBO. Risk factors for SBO include prior surgery, also HCC. Surgery is consulted, awaiting reccs. He has history of EV s/p banding; no e/o bleeding here, would be cautious with NG unless indicated for gastric decompression  - Hepatology consult  - General surgery consult  - IR consult for paracentesis, labs ordered   - IR requested given risk in this patient, e/o collaterals on CT - image guided approach is preferred  - IV ceftriaxone given in ED, continue  - Awaiting surgical input re: NG for decompression  - NPO  - Holding PTA lasix and spironolactone given c/f mild dehydration, anticipate resuming prior to discharge  - Gentle rehydration overnight, LR mIVF at 75 ml/hr  - Continue PTA tenofovir  - Continue PTA carvedilol  - Liver US w/doppler  - Daily Meld labs  MELD-Na score: 16 at 1/24/2023 11:20 AM  MELD score: 15 at 1/24/2023 11:20 AM  Calculated from:  Serum Creatinine: 0.94 mg/dL (Using min of 1 mg/dL) at 1/24/2023   4:25 AM  Serum Sodium: 136 mmol/L at 1/24/2023  4:25 AM  Total Bilirubin: 1.1 mg/dL at 1/24/2023  4:25 AM  INR(ratio): 1.98 at 1/24/2023 11:20 AM  Age: 62 years    #Leukocytosis  In setting of possible infection (SBP, UTI are possibilties).  - Abx  - Trend CBC    #Coagulopathy  #Thrombocytopenia  In setting of chronic liver disease. No signs of bleeding.  - Monitor CBC    #Previous herpes zoster of L T4 dermatome  Documented infection, treated May '22. Here complaining of pain with similar distribution, exam shows prior healed lesions, no active lesions.  - Continue PTA gabapentin TID  - Additionally has PRN tylenol (max 2g/day), IV dilaudid available for breakthrough pain    #T2DM  Most recent A1c 5.6 in 9/2022.  - recheck A1c  - holding PTA metformin  - sliding scale insulin, hypoglycemia protocol       Diet: NPO for Medical/Clinical Reasons Except for: Meds  DVT Prophylaxis: Pneumatic Compression Devices  Davalos Catheter: Not present  Fluids: mIVF with LR at 75 ml/hr  Lines: None     Cardiac Monitoring: None  Code Status: Full Code, confirmed with patient on admission    Clinically Significant Risk Factors Present on Admission              # Hypoalbuminemia: Lowest albumin = 2.3 g/dL at 1/24/2023  4:25 AM, will monitor as appropriate   # Thrombocytopenia: Lowest platelets = 79 in last 2 days, will monitor for bleeding                Disposition Plan Admit to inpatient     Expected Discharge Date: 01/26/2023                The patient's care was discussed with the attending, Dr. Benny Sanchez, MS3    Resident/Fellow Attestation   I, Bola Aleman MD, was present with the medical/OPAL student who participated in the service and in the documentation of the note.  I have verified the history and personally performed the physical exam and medical decision making.  I agree with the assessment and plan of care as documented in the note.      Bola Aleman MD  Medicine-Pediatrics, PGY4  [employer]  [facility]  ______________________________________________________________________    Chief Complaint   Abdominal pain    History is obtained from the patient via [employer] .    History of Present Illness   Rishabh Cabrera is a 62 year old male with a PMH of chronic hepatitis B-related decompensated cirrhosis decompensated ascites and esophageal varices, hepatocelluar carcinoma, T2DM, and herpes zoster(2022) who presents with a 2 day history of worsening abdominal pain. He describes the pain as diffuse throughout his abdomen. The pain started 2 days ago and has gotten worse, and kept him from sleeping last night. Nothing seems to make the pain better/worse. He tried advil but it did not help. Notes that he has had similar pain in the past with cirrhosis. He additionally complains of pain in chest associated with prior shingles infection. He also notes swelling in the lower half of his body, and in particular notes some scrotal swelling. Denies any fever, shortness of breath, nausea or vomiting. Does note some constipation yesterday. No black or bloody stools. No other concerns today per patient.    Past Medical History    Past Medical History:   Diagnosis Date     Cancer (H)      Chronic hepatitis B (H)      Diabetes mellitus (H)        Past Surgical History   Past Surgical History:   Procedure Laterality Date     ESOPHAGOSCOPY, GASTROSCOPY, DUODENOSCOPY (EGD), COMBINED N/A 5/29/2019    Procedure: ESOPHAGOGASTRODUODENOSCOPY (EGD);  Surgeon: Leventhal, Thomas Michael, MD;  Location: [facility]     IR CHEMO EMBOLIZATION  12/2/2021     IR SIRT (SELECTIVE INTERNAL RADIO THERAPY)  9/8/2021     IR VISCERAL ANGIOGRAM  9/8/2021     IR VISCERAL EMBOLIZATION  9/9/2021       Prior to Admission Medications   Prior to Admission Medications   Prescriptions Last Dose Informant Patient Reported? Taking?   Capsaicin-Menthol (SALONPAS GEL EX)   Yes No   capsaicin (ZOSTRIX) 0.025 % external cream   Yes No   Sig: Apply topically 3 times  daily   carvedilol (COREG) 6.25 MG tablet   No No   Sig: Take 1 tablet (6.25 mg) by mouth 2 times daily (with meals)   diclofenac (VOLTAREN) 1 % topical gel   Yes No   Sig: Apply topically as needed for moderate pain   furosemide (LASIX) 20 MG tablet   No No   Sig: Take 1 tablet (20 mg) by mouth daily   gabapentin (NEURONTIN) 100 MG capsule   No No   Sig: Take 1 capsule (100 mg) by mouth 3 times daily for 21 days   loratadine (CLARITIN) 10 MG tablet   Yes No   Sig: Take 10 mg by mouth daily   polyethylene glycol (MIRALAX) 17 GM/Dose powder   No No   Sig: Take 17 g by mouth 2 times daily as needed for constipation   spironolactone (ALDACTONE) 50 MG tablet   No No   Sig: Take 1 tablet (50 mg) by mouth daily   tenofovir (VIREAD) 300 MG tablet   No No   Sig: Take 1 tablet (300 mg) by mouth daily   triamcinolone (KENALOG) 0.1 % external cream   Yes No   Sig: Apply topically as needed for irritation      Facility-Administered Medications: None      Physical Exam   Vital Signs: Temp: 97.7  F (36.5  C) Temp src: Oral BP: 112/70 Pulse: 72   Resp: 16 SpO2: 99 % O2 Device: None (Room air)    Weight: 130 lbs 0 oz  General: Alert, oriented, cooperative, no apparent distress, appears nontoxic, laying in bed  HEENT: NC/AT, EOMI, mildly icteric, MMM  Cardiovascular: Regular rate and rhythm, normal S1 and S2, and no murmur noted. No lower extremity edema.  Respiratory: Clear to auscultation bilaterally. No wheezes or rhonchi appreciated  GI:  Distended but soft, mild tenderness to palpation diffusely, no rebound or guarding, normal bowel sounds  Ext: WWP, trace peripheral edema in bilateral LE  Neurologic: Alert and oriented, non-focal exam    Data   ------------------------- PAST 24 HR DATA REVIEWED -----------------------------------------------    I have personally reviewed the following data over the past 24 hrs:    15.3 (H)  \   8.4 (L)   / 79 (L)     136 105 11.5 /  216 (H)   4.1 26 0.94 \       ALT: 16 AST: 31 AP: 142 (H)  TBILI: 1.1   ALB: 2.3 (L) TOT PROTEIN: 6.9 LIPASE: 44       INR:  1.98 (H) PTT:  N/A   D-dimer:  N/A Fibrinogen:  N/A       Imaging results reviewed over the past 24 hrs:   Recent Results (from the past 24 hour(s))   CT Abdomen Pelvis w Contrast    Narrative    EXAMINATION: CT ABDOMEN PELVIS W CONTRAST, 1/24/2023 8:03 AM    INDICATION: abd pain    COMPARISON STUDY: MR abdomen 1/10/2023, CT abdomen 12/2/2021    TECHNIQUE: CT scan of the abdomen and pelvis was performed on  multidetector CT scanner using volumetric acquisition technique and  images were reconstructed in multiple planes with variable thickness  and reviewed on dedicated workstations.     CONTRAST: iopamidol (ISOVUE-370) solution 80 mL injected IV with oral  contrast    CT scan radiation dose is optimized to minimum requisite dose using  automated dose modulation techniques.    FINDINGS:    Support devices: None.    Lower thorax: Peripherally calcified collection in the right posterior  lateral pleural space appears unchanged. Bibasilar subsegmental  atelectasis. Hazy, groundglass opacity in the right middle lobe  anteriorly. Loculated fluid along the left major fissure. Unchanged  small nodule in the right middle lobe laterally (series 3 image 3).    Liver: In this patient with known hepatocellular carcinoma, there  appears to multiple lesions throughout the liver including  posttreatment changes in hepatic segment 6, please see MR 1/10/2023  for detailed findings in the liver.    Gallbladder and bile ducts: Gallstones are present in the gallbladder  fossa. There is no gallbladder wall thickening or pericholecystic  fluid. No extrahepatic biliary ductal dilation.    Spleen: Splenomegaly.    Pancreas: Normal CT appearance.     Adrenals: No nodules.     Kidneys and ureters: Simple renal cysts bilaterally. The proximal left  ureter and renal pelvis are mildly dilated. No stone identified. No  ureteral or perinephritic fat stranding present. No solid  lesions. No  calculi. No hydroureteronephrosis.    Bladder: Unremarkable.    Reproductive: Unremarkable.    Bowel: Postsurgical changes of partial right hemicolectomy. Long  segment distention of small bowel loops with fecalization and possible  transition near the ileocolic anastomosis Diffuse small and large  bowel wall thickening, edema likely related to portal hypertension.  Diffuse prominence of the bowel loops.    Mesentery/Peritoneum: Large volume ascites.    Lymph nodes: Scattered small abdominal nodes, none meeting CT criteria  for pathologic enlargement.    Vasculature: Gastroesophageal varices. Scattered atherosclerotic  calcifications of the lower abdominal aorta and iliac vessels.  Normal, without aneurysm or significant atherosclerotic disease.    Bone windows: No aggressive osseous abnormalities. Mild degenerative  changes throughout the spine. Hyperdense lesion in the L4 vertebral  body, could represent hemangioma.    Soft tissues: Unremarkable.      Impression    IMPRESSION: In this patient with known hepatocellular carcinoma,  status posttreatment changes of hepatic segment 6:    1. Mild dilated small bowel loops with fecalization, with change in  caliber near the ileocolonic anastomosis in the right upper quadrant,  concerning for possible small bowel obstruction and/or slowed transit.  No pneumatosis or pneumoperitoneum.  2. Prominent pelvic ascites with mass effect on the bladder   underlying peritoneal inflammation cannot be excluded; multiple  peripheral abdominal wall collaterals are demonstrated and image  guided paracentesis is recommended to avoid hemorrhage if clinically  desired.  3. Numerous hepatic lesions, better seen on prior MR dated 1/10/2023  4. Additional findings as described above include splenomegaly,  chronic right pleural collection, small left pleural effusion    Finding discussed with medicine team by Otilia at 2:45 PM 1/24/2023

## 2023-01-24 NOTE — ED PROVIDER NOTES
ED Provider Note  [facility]      History     Chief Complaint   Patient presents with     Abdominal Pain     HPI  Rishabh Cabrera is a 62 year old male who presents to us with a chief complaint of abdominal pain.  He also notes some nausea.  He does have a history of hepatocellular carcinoma.  Symptoms been present for the last day.  No fevers.  No vomiting only nausea.  No chest pain shortness of breath or coughing.  He does have a history of previous GI bleeds.  No evidence of GI bleeding or hematemesis today.    Past Medical History  Past Medical History:   Diagnosis Date     Cancer (H)      Chronic hepatitis B (H)      Diabetes mellitus (H)      Past Surgical History:   Procedure Laterality Date     ESOPHAGOSCOPY, GASTROSCOPY, DUODENOSCOPY (EGD), COMBINED N/A 5/29/2019    Procedure: ESOPHAGOGASTRODUODENOSCOPY (EGD);  Surgeon: Leventhal, Thomas Michael, MD;  Location: [facility]     IR CHEMO EMBOLIZATION  12/2/2021     IR SIRT (SELECTIVE INTERNAL RADIO THERAPY)  9/8/2021     IR VISCERAL ANGIOGRAM  9/8/2021     IR VISCERAL EMBOLIZATION  9/9/2021     capsaicin (ZOSTRIX) 0.025 % external cream  Capsaicin-Menthol (SALONPAS GEL EX)  carvedilol (COREG) 6.25 MG tablet  diclofenac (VOLTAREN) 1 % topical gel  furosemide (LASIX) 20 MG tablet  gabapentin (NEURONTIN) 100 MG capsule  loratadine (CLARITIN) 10 MG tablet  polyethylene glycol (MIRALAX) 17 GM/Dose powder  spironolactone (ALDACTONE) 50 MG tablet  tenofovir (VIREAD) 300 MG tablet  triamcinolone (KENALOG) 0.1 % external cream      Allergies   Allergen Reactions     Crabs [Crustaceans]      Pork (Porcine) Protein Itching and Unknown     Seafood Hives and Unknown     Shellfish Allergy Unknown     Family History  No family history on file.  Social History   Social History     Tobacco Use     Smoking status: Never     Smokeless tobacco: Never   Substance Use Topics     Alcohol use: No     Drug use: Not Currently     Types: Marijuana     Comment: past  "marijuana use         A medically appropriate review of systems was performed with pertinent positives and negatives noted in the HPI, and all other systems negative.    Physical Exam   BP: (!) 142/68  Pulse: 79  Temp: 97.7  F (36.5  C)  Resp: 16  Height: 165.1 cm (5' 5\")  Weight: 59 kg (130 lb)  SpO2: 100 %  Physical Exam  Vitals reviewed.   Constitutional:       General: He is not in acute distress.     Appearance: He is not diaphoretic.   HENT:      Head: Normocephalic and atraumatic.      Right Ear: External ear normal.      Left Ear: External ear normal.      Nose: Nose normal. No rhinorrhea.      Mouth/Throat:      Mouth: Mucous membranes are moist.   Eyes:      General: No scleral icterus.     Extraocular Movements: Extraocular movements intact.      Conjunctiva/sclera: Conjunctivae normal.   Cardiovascular:      Rate and Rhythm: Normal rate and regular rhythm.      Heart sounds: Normal heart sounds.   Pulmonary:      Effort: No respiratory distress.      Breath sounds: Normal breath sounds.   Abdominal:      General: Bowel sounds are normal.      Palpations: Abdomen is soft.      Tenderness: There is generalized abdominal tenderness.   Musculoskeletal:         General: No deformity. Normal range of motion.      Cervical back: Normal range of motion and neck supple.   Skin:     General: Skin is warm.      Findings: No rash.   Neurological:      Mental Status: Mental status is at baseline.   Psychiatric:         Mood and Affect: Mood normal.         Behavior: Behavior normal.         ED Course, Procedures, & Data      Procedures                Results for orders placed or performed during the hospital encounter of 01/24/23   [place] Draw     Status: None (In process)    Narrative    The following orders were created for panel order [place] [facility].  Procedure                               Abnormality         Status                     ---------                               -----------         ------              "        Extra Green Top (Lithium...[[id number]]                      In process                 Extra Purple Top Tube[[id number]]                            In process                   Please view results for these tests on the individual orders.   CBC with platelets and differential     Status: Abnormal   Result Value Ref Range    WBC Count 15.3 (H) 4.0 - 11.0 10e3/uL    RBC Count 3.48 (L) 4.40 - 5.90 10e6/uL    Hemoglobin 8.4 (L) 13.3 - 17.7 g/dL    Hematocrit 28.0 (L) 40.0 - 53.0 %    MCV 81 78 - 100 fL    MCH 24.1 (L) 26.5 - 33.0 pg    MCHC 30.0 (L) 31.5 - 36.5 g/dL    RDW 20.7 (H) 10.0 - 15.0 %    Platelet Count 79 (L) 150 - 450 10e3/uL    % Neutrophils 87 %    % Lymphocytes 5 %    % Monocytes 7 %    % Eosinophils 0 %    % Basophils 0 %    % Immature Granulocytes 1 %    NRBCs per 100 WBC 0 <1 /100    Absolute Neutrophils 13.3 (H) 1.6 - 8.3 10e3/uL    Absolute Lymphocytes 0.7 (L) 0.8 - 5.3 10e3/uL    Absolute Monocytes 1.1 0.0 - 1.3 10e3/uL    Absolute Eosinophils 0.0 0.0 - 0.7 10e3/uL    Absolute Basophils 0.0 0.0 - 0.2 10e3/uL    Absolute Immature Granulocytes 0.1 <=0.4 10e3/uL    Absolute NRBCs 0.0 10e3/uL   CBC with platelets differential     Status: Abnormal    Narrative    The following orders were created for panel order CBC with platelets differential.  Procedure                               Abnormality         Status                     ---------                               -----------         ------                     CBC with platelets and d...[[id number]]  Abnormal            Final result                 Please view results for these tests on the individual orders.     Medications   ondansetron (ZOFRAN) injection 4 mg (has no administration in time range)   HYDROmorphone (PF) (DILAUDID) injection 0.5 mg (has no administration in time range)     Labs Ordered and Resulted from Time of ED Arrival to Time of ED Departure   CBC WITH PLATELETS AND DIFFERENTIAL - Abnormal       Result Value    WBC Count  15.3 (*)     RBC Count 3.48 (*)     Hemoglobin 8.4 (*)     Hematocrit 28.0 (*)     MCV 81      MCH 24.1 (*)     MCHC 30.0 (*)     RDW 20.7 (*)     Platelet Count 79 (*)     % Neutrophils 87      % Lymphocytes 5      % Monocytes 7      % Eosinophils 0      % Basophils 0      % Immature Granulocytes 1      NRBCs per 100 WBC 0      Absolute Neutrophils 13.3 (*)     Absolute Lymphocytes 0.7 (*)     Absolute Monocytes 1.1      Absolute Eosinophils 0.0      Absolute Basophils 0.0      Absolute Immature Granulocytes 0.1      Absolute NRBCs 0.0     ROUTINE UA WITH MICROSCOPIC REFLEX TO CULTURE   COMPREHENSIVE METABOLIC PANEL   LIPASE     CT Abdomen Pelvis w Contrast    (Results Pending)          Medical Decision Making  The patient's presentation is strongly suggestive of a chronic illness mild to moderate exacerbation, progression, or side effect of treatment.    The patient's evaluation involved:  review of external note(s) from 3+ sources (Most recent ER visit as well as H&P and discharge summary)  ordering and/or review of 3+ test(s) in this encounter (CBC, CMP, CT scan)  review of 3+ test result(s) ordered prior to this encounter (Previous CBC, CMP, CT scan)  Discussion with another healthcare provider  The patient's management involved a parenteral controlled substance and a decision regarding hospitalization.      Assessment & Plan    62-year-old male presents to us with a chief complaint abdominal pain.  Differential includes but not limited to pancreatitis, gastritis, gastroenteritis, infectious process.  Vital signs today show mild hypertension but are otherwise unremarkable.  No fevers.  Patient was ordered nausea and pain control with IV Dilaudid and Zofran.  CT scan as well as labs ordered.  CT scan was significant for evidence of small bowel obstruction.  Labs otherwise unremarkable.  Patient will be admitted to the hospital.  I have reviewed the nursing notes. I have reviewed the findings, diagnosis, plan and  need for follow up with the patient.    New Prescriptions    No medications on file       Final diagnoses:   Abdominal pain, generalized   Hepatocellular carcinoma (H)   Small bowel obstruction    Ravinder Luna  [facility] EMERGENCY DEPARTMENT  1/24/2023     Ravinder Luna DO  01/25/23 [id number]

## 2023-01-24 NOTE — PROCEDURES
[facility]    Procedure: Paracentesis    Date/Time: 1/24/2023 4:46 PM  Performed by: Rodolfo Rendon MD  Authorized by: Baldomero Cameron MD   IR Fellow Physician:  Radiology Resident Physician: Jos Cameron        UNIVERSAL PROTOCOL   Site Marked: NA  Prior Images Obtained and Reviewed:  Yes  Required items: Required blood products, implants, devices and special equipment available    Patient identity confirmed:  Verbally with patient, arm band, provided demographic data and hospital-assigned identification number  Patient was reevaluated immediately before administering moderate or deep sedation or anesthesia  Confirmation Checklist:  Patient's identity using two indicators, relevant allergies, procedure was appropriate and matched the consent or emergent situation and correct equipment/implants were available  Time out: Immediately prior to the procedure a time out was called    Universal Protocol: the Joint Commission Universal Protocol was followed    Preparation: Patient was prepped and draped in usual sterile fashion       ANESTHESIA    Anesthesia: Local infiltration  Local Anesthetic:  Lidocaine 1% without epinephrine      SEDATION    Patient Sedated: No    See dictated procedure note for full details.  Findings: Paracentesis    Specimens: none    Complications: None    Condition: Stable      PROCEDURE    Patient Tolerance:  Patient tolerated the procedure well with no immediate complications  Length of time physician/provider present for 1:1 monitoring during sedation: 0

## 2023-01-24 NOTE — CONSULTS
Hepatology Consultation    Rishabh Cabrera   MRN# [id number]     Age: 62 year old YOB: 1960       Referring provider: Marjan Zapata  Attending Hepatologist: Dr. Rodriguez  Consult requested for: ascites/cirrhosis       Assessment and Recommendation:   Assessment:   Mr. 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 He had previously had been stable with his ascites.       Recommendations:   # Ascites  - previously on lasix and spironolactone.   - para today for diagnostic sample    #EV s/p bleeding and banding  - due for follow up EGD  - no current evidence of bleeding  - remains on carvedilol    # HBV cirrhosis  # HCC  - MELD 16.  - most recent MRI with nonviable tumor noted.   - continue tenofovir for HBV suppression  - no evidence of HE.     # Shingles  # possible SBO  - has been on pain medication for prior shingles, may be contributing to concern for SBO. Ileocolonic anastomosis from 2015 colectomy. Has not had followup colonoscopy- planned with EGD as OP. No enlarged lymph nodes on MRI.     # Lung lesion  - plan for biopsy of lung mass as OP.     Plan of care discussed with Dr. Rodriguez    Thank you for the opportunity to be involved in Rishabh Cabrera care. Please call with any questions or concerns.     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 He has worsening ascites, has not had a para for several months while on diuretics with lasix  and spironolactone. He denies any change in mentation, melena, hematochezia, or hematemesis. He reports compliance with taking his medications.      He recently had shingles episode and no longer has active lesions. He reports continued abdominal and chest discomfort and had received oxycodone for this. He has had an increase in lower extremity edema.     Most recent MRI was 1/10 with his prior tumor now with LR-TR and multiple LR3 lesions than have increased in size.              Past Medical History:     Past Medical History:   Diagnosis Date     Cancer (H)      Chronic hepatitis B (H)      Diabetes mellitus (H)               Past Surgical History:     Past Surgical History:   Procedure Laterality Date     ESOPHAGOSCOPY, GASTROSCOPY, DUODENOSCOPY (EGD), COMBINED N/A 5/29/2019    Procedure: ESOPHAGOGASTRODUODENOSCOPY (EGD);  Surgeon: Leventhal, Thomas Michael, MD;  Location: [facility]     IR CHEMO EMBOLIZATION  12/2/2021     IR SIRT (SELECTIVE INTERNAL RADIO THERAPY)  9/8/2021     IR VISCERAL ANGIOGRAM  9/8/2021     IR VISCERAL EMBOLIZATION  9/9/2021              Social History:     Social History     Tobacco Use     Smoking status: Never     Smokeless tobacco: Never   Substance Use Topics     Alcohol use: No             Family History:     No family history of liver disease             Immunizations:     COVID 5/17/21  Shingrix 8/9/22           Allergies:     Allergies   Allergen Reactions     Crabs [Crustaceans]      Pork (Porcine) Protein Itching and Unknown     Seafood Hives and Unknown     Shellfish Allergy Unknown             Medications:     Current Facility-Administered Medications   Medication     acetaminophen (TYLENOL) tablet 500 mg    Or     acetaminophen (TYLENOL) Suppository 500 mg     [START ON 1/25/2023] cefTRIAXone (ROCEPHIN) 1 g vial to attach to  mL bag for ADULTS or NS 50 mL bag for PEDS     lactated ringers infusion     lidocaine (LMX4) cream     lidocaine 1 % 0.1-1 mL     melatonin tablet 1  "mg     ondansetron (ZOFRAN ODT) ODT tab 4 mg    Or     ondansetron (ZOFRAN) injection 4 mg     ondansetron (ZOFRAN) injection 4 mg     pantoprazole (PROTONIX) IV push injection 40 mg     sodium chloride (PF) 0.9% PF flush 3 mL     sodium chloride (PF) 0.9% PF flush 3 mL     Current Outpatient Medications   Medication     capsaicin (ZOSTRIX) 0.025 % external cream     Capsaicin-Menthol (SALONPAS GEL EX)     carvedilol (COREG) 6.25 MG tablet     diclofenac (VOLTAREN) 1 % topical gel     furosemide (LASIX) 20 MG tablet     gabapentin (NEURONTIN) 100 MG capsule     loratadine (CLARITIN) 10 MG tablet     polyethylene glycol (MIRALAX) 17 GM/Dose powder     spironolactone (ALDACTONE) 50 MG tablet     tenofovir (VIREAD) 300 MG tablet     triamcinolone (KENALOG) 0.1 % external cream     Facility-Administered Medications Ordered in Other Encounters   Medication     gadobutrol (GADAVIST) injection 7.5 mL                 Physical Exam:   Blood pressure 105/59, pulse 70, temperature 97.7  F (36.5  C), temperature source Oral, resp. rate 16, height 1.651 m (5' 5\"), weight 59 kg (130 lb), SpO2 100 %. Body mass index is 21.63 kg/m .  Date 01/24/23 0700 - 01/25/23 0659   Shift 8221-6687 8915-0917 8163-6639 24 Hour Total   INTAKE   Shift Total(mL/kg)       OUTPUT   Drains  1900  1900   Shift Total(mL/kg)  1900(32.22)  1900(32.22)   Weight (kg) 58.97 58.97 58.97 58.97     General: In no acute distress, mild facial muscle wasting  Neuro: AOx3, No asterixis  HEENT: PERRL trace scleral icterus  CV:  Skin warm and dry  Lungs:  Respirations even and nonlabored on room air  Abd: Mildly distended, tender with palpation.   Extrem: +1 lower peripehral edema  Skin: mild jaundice  Psych: pleasant         Data:     Lab Results   Component Value Date    WBC 15.3 01/24/2023    WBC 3.5 05/10/2021     Lab Results   Component Value Date    RBC 3.48 01/24/2023    RBC 4.55 05/10/2021     Lab Results   Component Value Date    HGB 8.4 01/24/2023    HGB " 13.0 05/10/2021     Lab Results   Component Value Date    HCT 28.0 01/24/2023    HCT 39.1 05/10/2021     No components found for: MCT  Lab Results   Component Value Date    MCV 81 01/24/2023    MCV 86 05/10/2021     Lab Results   Component Value Date    MCH 24.1 01/24/2023    MCH 28.6 05/10/2021     Lab Results   Component Value Date    MCHC 30.0 01/24/2023    MCHC 33.2 05/10/2021     Lab Results   Component Value Date    RDW 20.7 01/24/2023    RDW 14.9 05/10/2021     Lab Results   Component Value Date    PLT 79 01/24/2023    PLT 52 05/10/2021       Last Basic Metabolic Panel:  Lab Results   Component Value Date     01/24/2023     05/10/2021      Lab Results   Component Value Date    POTASSIUM 4.1 01/24/2023    POTASSIUM 3.8 06/01/2022    POTASSIUM 4.7 05/10/2021     Lab Results   Component Value Date    CHLORIDE 105 01/24/2023    CHLORIDE 112 06/01/2022    CHLORIDE 103 05/10/2021     Lab Results   Component Value Date    MANDY 7.9 01/24/2023    MANDY 8.0 05/10/2021     Lab Results   Component Value Date    CO2 26 01/24/2023    CO2 26 06/01/2022    CO2 26 05/10/2021     Lab Results   Component Value Date    BUN 11.5 01/24/2023    BUN 8 06/01/2022    BUN 12 05/10/2021     Lab Results   Component Value Date    CR 0.94 01/24/2023    CR 0.85 05/10/2021     Lab Results   Component Value Date     01/24/2023     09/29/2022     06/01/2022     05/10/2021       Liver Function Studies -   Recent Labs   Lab Test 01/24/23  0425   PROTTOTAL 6.9   ALBUMIN 2.3*   BILITOTAL 1.1   ALKPHOS 142*   AST 31   ALT 16       Lab Results   Component Value Date    INR 1.98 01/24/2023       MELD-Na score: 16 at 1/24/2023 11:20 AM  MELD score: 15 at 1/24/2023 11:20 AM  Calculated from:  Serum Creatinine: 0.94 mg/dL (Using min of 1 mg/dL) at 1/24/2023  4:25 AM  Serum Sodium: 136 mmol/L at 1/24/2023  4:25 AM  Total Bilirubin: 1.1 mg/dL at 1/24/2023  4:25 AM  INR(ratio): 1.98 at 1/24/2023 11:20 AM  Age: 62  years           Previous Endoscopy:     Results for orders placed or performed during the hospital encounter of 09/23/22   UPPER GI ENDOSCOPY   Result Value Ref Range    Upper GI Endoscopy       [address] [place] [address] [phone]     Endoscopy Department  _______________________________________________________________________________  Patient Name: Rishabh Cabrera                 Procedure Date: 9/24/2022 10:35 AM  MRN: [id number]                       Account Number: [id number]  YOB: 1960               Admit Type: Inpatient  Age: 62                               Room: [place] [address]  Gender: Male                          Note Status: Finalized  Attending MD: MONCHO SILVA MD  Total Sedation Time:   _______________________________________________________________________________     Procedure:             Upper GI endoscopy  Indications:           Hematemesis  Providers:             MONCHO SILVA MD, CORRY WALTERS MD,                          Elizabeth Marcos RN, Bonnie Quach  Referring MD:          MARY HINDS MD  Medicines:             Midazolam 3 mg IV, Fentanyl 150 micrograms IV   Complications:         No immediate complications.  _______________________________________________________________________________  Procedure:             Pre-Anesthesia Assessment:                         - Prior to the procedure, a History and Physical was                          performed, and patient medications and allergies were                          reviewed. The patient is competent. The risks and                          benefits of the procedure and the sedation options and                          risks were discussed with the patient. All questions                          were answered and informed consent was obtained.                          Patient identification and proposed procedure were                           verified by the physician and the nurse in the                          pre-procedure area in the procedure room. Mental                          Status Examination: alert and oriented. Airway                          Examination: normal oropharyngeal  airway and neck                          mobility. Respiratory Examination: clear to                          auscultation. CV Examination: normal. Prophylactic                          Antibiotics: The patient does not require prophylactic                          antibiotics. Prior Anticoagulants: The patient has                          taken no anticoagulant or antiplatelet agents. ASA                          Grade Assessment: II - A patient with mild systemic                          disease. After reviewing the risks and benefits, the                          patient was deemed in satisfactory condition to                          undergo the procedure. The anesthesia plan was to use                          moderate sedation / analgesia (conscious sedation).                          Immediately prior to administration of medications,                          the patient was re-assessed for adequacy to receive                          sedatives. The heart rate, respiratory rate , oxygen                          saturations, blood pressure, adequacy of pulmonary                          ventilation, and response to care were monitored                          throughout the procedure. The physical status of the                          patient was re-assessed after the procedure.                         After obtaining informed consent, the endoscope was                          passed under direct vision. Throughout the procedure,                          the patient's blood pressure, pulse, and oxygen                          saturations were monitored continuously. The Endoscope                          was introduced through the mouth, and advanced to the                           second part of duodenum. The upper GI endoscopy was                          accomplished without difficulty. The patient tolerated                          the procedure well.                                                                                   Findings:       Grade  III varices with stigmata of recent bleeding were found in the        entire esophagus. They were large in size. No red kathy signs were        present. Six bands were successfully placed with incomplete eradication        of varices. There was no bleeding during and at the end of the procedure.       Clotted blood was found on the greater curvature of the gastric body and        on the posterior wall of the gastric body.       Mild portal hypertensive gastropathy was found in the entire examined        stomach.       There is no endoscopic evidence of varices in the entire examined        stomach.       The examined duodenum was normal.                                                                                   Moderate Sedation:       Moderate (conscious) sedation was administered by the endoscopy nurse        and supervised by the endoscopist. The following parameters were        monitored: oxygen saturation, heart rate, blood pressure, and response        to care. Total physic abbi intraservice time was 23 minutes.  Impression:            - Grade III esophageal varices with stigmata of recent                          bleeding. Incompletely eradicated. Banded.                         - Clotted blood in the gastric body (posterior wall)                          and in the gastric body (greater curvature).                         - Portal hypertensive gastropathy.                         - Normal examined duodenum.                         - No specimens collected.  Recommendation:        - Return patient to hospital [facility] for ongoing care.                         - Clear liquid diet today.                          - cont octreotide                         - d/c PPI infusion                         - cont abx for 5-7d                         - diagnostic paracentesis                                                                                     _________________________  MONCHO SILVA MD  9/24/2022 12:20:05 PM  I was physically present for the entire  viewing portion of the exam.  __________________________  Signature of teaching physician  [id number] SILVA MD    ______________________  CORRY WALTERS MD  Number of Addenda: 0    Note Initiated On: 9/24/2022 10:35 AM  Scope In:  Scope Out:           IMAGING:      XR Chest Port 1 View    Narrative  EXAM: XR CHEST PORT 1 VIEW  5/13/2022 1:08 PM    HISTORY: FU parenchymal hematoma s/p biopsy    COMPARISON: CT 5/12/2022, 3/17/2022    FINDINGS: Single view of the chest. Bilateral loculated pleural  effusions. Question right and left pleural line suggestive of  bilateral pneumothoraces. Redemonstration of left mid lung pseudomass  measuring 4.4 x 4.3 cm. Bilateral left greater than right interstitial  and airspace opacities. Visualized upper abdomen and bones are  unremarkable.    Impression  IMPRESSION:  1. Question small biapical pneumothoraces. Alternatively skin folds.  2. Bilateral loculated pleural effusions.  3. Bilateral left greater than right interstitial and airspace  opacities.      [Urgent Result: Question small biapical pneumothoraces. 90 degree  upright radiograph recommended.]    Finding was identified on 5/13/2022 3:14 PM.    Dr. Jack Loving was contacted by Dr. Valdivia at 5/13/2022 3:37 PM and  verbalized understanding of the urgent finding.    I have personally reviewed the examination and initial interpretation  and I agree with the findings.    RASHEED HENNESSY MD      SYSTEM ID:  [id number]      CT abd w 1/24/23  IMPRESSION: In this patient with known hepatocellular carcinoma,  status posttreatment changes of hepatic segment 6:     1.  Mild dilated small bowel loops with fecalization, with change in  caliber near the ileocolonic anastomosis in the right upper quadrant,  concerning for possible small bowel obstruction and/or slowed transit.  No pneumatosis or pneumoperitoneum.  2. Prominent pelvic ascites with mass effect on the bladder   underlying peritoneal inflammation cannot be excluded; multiple  peripheral abdominal wall collaterals are demonstrated and image  guided paracentesis is recommended to avoid hemorrhage if clinically  desired.  3. Numerous hepatic lesions, better seen on prior MR dated 1/10/2023  4. Additional findings as described above include splenomegaly,  chronic right pleural collection, small left pleural effusion    MRI abd 1/10/23  IMPRESSION:  1. Cirrhosis with findings of portal hypertension. Persistent large  volume ascites.  2. Posttreatment changes of liver segment 6 without evidence of viable  tumor; LR-TR nonviable.   3. Slightly increased size of several LR3 observations within the  right hepatic lobe (ancillary feature of subthreshold growth could be  used to make these LR4); recommend attention on follow up.  4. Unchanged loculated bilateral pleural effusions.    CT chest 1/10/23  IMPRESSION:  1. No significant change in the indeterminant left upper lobe  spiculated nodule.  2. Improved patchy ground glass opacities in the right upper lobe with  new faint groundglass opacities in the lingula, suspicious for  new/ongoing infectious or inflammatory etiology.  3. Stable multifocal pulmonary scarring likely sequela of prior  indolent infection (incl mycobacteria).  4. Decreased partially loculated left basilar pleural effusion with  similar chronic peripherally calcified loculated right pleural  effusion (probably sequela of prior empyema).  5. Cirrhosis with sequela of portal hypertension including  splenomegaly and moderate volume ascites.

## 2023-01-24 NOTE — ED TRIAGE NOTES
Patient reports abdominal pain that started yesterday. States it has been increasing since. Denies nausea, vomiting, or diarrhea. Last BM yesterday. Denies abnormalities with urination.      Triage Assessment     Row Name 01/24/23 0418       Triage Assessment (Adult)    Airway WDL WDL       Respiratory WDL    Respiratory WDL WDL       Skin Circulation/Temperature WDL    Skin Circulation/Temperature WDL WDL       Cardiac WDL    Cardiac WDL WDL       Peripheral/Neurovascular WDL    Peripheral Neurovascular WDL WDL       Cognitive/Neuro/Behavioral WDL    Cognitive/Neuro/Behavioral WDL WDL

## 2023-01-24 NOTE — IR NOTE
Patient Name: Rishabh Cabrera  Medical Record Number: [id number]  Today's Date: 1/24/2023    Procedure: Paracentesis  Proceduralist: Dr. Rendon, Dr. Fernandez, Dr. Cameron  Pathology present: NA    Procedure Start: 1614  Procedure end: 1652  Sedation medications administered: NA     Report given to: ED charge  : Bishop on ipad    Other Notes: Pt arrived to IR room 7 from ED. Consent reviewed. Pt denies any questions or concerns regarding procedure. Pt positioned supine and monitored per protocol. Pt tolerated procedure without any noted complications. Pt transferred back to ED.    1,900 ml of ascites removed.

## 2023-01-24 NOTE — CONSULTS
"    [facility]    Consult Note - Emergency General Surgery Service  Date of Admission:  1/24/2023  Consult Requested by: Medicine/Art  Reason for Consult: \"Possible SBO on CT\"    Assessment & Plan: Surgery   Rishabh Cabrera is a 62 year old male with history of hepatocellular carcinoma, chronic hepatitis B- related cirrhosis c/b esophageal varices, DM2, herpes zoster (2022), surgical history significant for right hemicolectomy for colon cancer (2015) who presented to the ED 1/24/23 with history of abdominal pain for 2 days. CT A/P in ED showed mildly dilated loops of small bowel, ascites, raising concern for possible small bowel obstruction. No nausea/vomiting, has been tolerating PO without issue. Last BM yesterday. Went to IR for paracentesis with significant improvement in abdominal discomfort. Patient is not clinically obstructed.   - No indication for surgical intervention  - No benefit from NGT placement at this time as patient has no nausea or vomiting, is not distended on exam  - Okay to advance diet as tolerated from surgery perspective  - Please call with questions       Drains: None     Code Status: Full Code      Clinically Significant Risk Factors Present on Admission              # Hypoalbuminemia: Lowest albumin = 2.3 g/dL at 1/24/2023  4:25 AM, will monitor as appropriate  # Coagulation Defect: INR = 1.98 (Ref range: 0.85 - 1.15) and/or PTT = N/A, will monitor for bleeding  # Thrombocytopenia: Lowest platelets = 79 in last 2 days, will monitor for bleeding              Discussed with chief resident and attending.     Kimberly Cruz MD  [facility]  Text page via [employer] Paging/Directory     ______________________________________________________________________    Chief Complaint    Concern for SBO    History is obtained from the patient and chart review    History of Present Illness   Rishabh Cabrera is a 62 year old " male with history of hepatocellular carcinoma, chronic hepatitis B- related cirrhosis c/b esophageal varices, DM2, herpes zoster (2022), surgical history significant for right hemicolectomy for colon cancer (2015) who presented to the ED 1/24/23 with history of abdominal pain for 2 days. He describes pain earlier pain as diffuse discomfort, not localized to any one are. He underwent paracentesis in IR while in the ED and said overall abdominal pain has primarily resolved after this. Does point to his chest and says he is having pain there related to his shingles. No recent fever/chills. Had a BM yesterday and this morning, more hard than usual, no melena or BRBPR. No dysuria. He has been eating and drinking with normal appetite, no nausea/vomiting or increase in abdominal pain with eating. He is currently hungry and asking for something to eat.       Past Medical History    Past Medical History:   Diagnosis Date     Cancer (H)      Chronic hepatitis B (H)      Diabetes mellitus (H)        Past Surgical History   Past Surgical History:   Procedure Laterality Date     ESOPHAGOSCOPY, GASTROSCOPY, DUODENOSCOPY (EGD), COMBINED N/A 5/29/2019    Procedure: ESOPHAGOGASTRODUODENOSCOPY (EGD);  Surgeon: Leventhal, Thomas Michael, MD;  Location: [facility]     IR CHEMO EMBOLIZATION  12/2/2021     IR SIRT (SELECTIVE INTERNAL RADIO THERAPY)  9/8/2021     IR VISCERAL ANGIOGRAM  9/8/2021     IR VISCERAL EMBOLIZATION  9/9/2021       Prior to Admission Medications   I have reviewed this patient's current medications  Current Facility-Administered Medications   Medication     acetaminophen (TYLENOL) tablet 500 mg    Or     acetaminophen (TYLENOL) Suppository 500 mg     carvedilol (COREG) tablet 6.25 mg     [START ON 1/25/2023] cefTRIAXone (ROCEPHIN) 1 g vial to attach to  mL bag for ADULTS or NS 50 mL bag for PEDS     glucose gel 15-30 g    Or     dextrose 50 % injection 25-50 mL    Or     glucagon injection 1 mg     gabapentin  (NEURONTIN) capsule 100 mg     insulin aspart (NovoLOG) injection (RAPID ACTING)     lactated ringers infusion     lidocaine (LMX4) cream     lidocaine 1 % 0.1-1 mL     melatonin tablet 1 mg     ondansetron (ZOFRAN ODT) ODT tab 4 mg    Or     ondansetron (ZOFRAN) injection 4 mg     ondansetron (ZOFRAN) injection 4 mg     pantoprazole (PROTONIX) IV push injection 40 mg     sodium chloride (PF) 0.9% PF flush 3 mL     sodium chloride (PF) 0.9% PF flush 3 mL     tenofovir (VIREAD) tablet 300 mg     Current Outpatient Medications   Medication Sig     capsaicin (ZOSTRIX) 0.025 % external cream Apply topically 3 times daily     Capsaicin-Menthol (SALONPAS GEL EX)      carvedilol (COREG) 6.25 MG tablet Take 1 tablet (6.25 mg) by mouth 2 times daily (with meals)     diclofenac (VOLTAREN) 1 % topical gel Apply topically as needed for moderate pain     furosemide (LASIX) 20 MG tablet Take 1 tablet (20 mg) by mouth daily     gabapentin (NEURONTIN) 100 MG capsule Take 1 capsule (100 mg) by mouth 3 times daily for 21 days     loratadine (CLARITIN) 10 MG tablet Take 10 mg by mouth daily     polyethylene glycol (MIRALAX) 17 GM/Dose powder Take 17 g by mouth 2 times daily as needed for constipation     spironolactone (ALDACTONE) 50 MG tablet Take 1 tablet (50 mg) by mouth daily     tenofovir (VIREAD) 300 MG tablet Take 1 tablet (300 mg) by mouth daily     triamcinolone (KENALOG) 0.1 % external cream Apply topically as needed for irritation     Facility-Administered Medications Ordered in Other Encounters   Medication     gadobutrol (GADAVIST) injection 7.5 mL          Social History   I have reviewed this patient's social history and updated it with pertinent information if needed.  Social History     Tobacco Use     Smoking status: Never     Smokeless tobacco: Never   Substance Use Topics     Alcohol use: No     Drug use: Not Currently     Types: Marijuana     Comment: past marijuana use       Allergies   Allergies   Allergen  Reactions     Crabs [Crustaceans]      Pork (Porcine) Protein Itching and Unknown     Seafood Hives and Unknown     Shellfish Allergy Unknown        Physical Exam   Vital Signs: Temp: 97.7  F (36.5  C) Temp src: Oral BP: 103/52 Pulse: 84   Resp: 16 SpO2: 99 % O2 Device: None (Room air)    Weight: 130 lbs 0 ozNo intake or output data in the 24 hours ending 01/24/23 1447  Gen: NAD, alert and interactive  Resp: breathing non-labored on room air  CV: RRR  Abd: soft, nondistended, nontender, no signs of peritonitis. Well healed midline surgical scar.   Ext: wwp  Neuro: AOx4          Data     I have personally reviewed the following data over the past 24 hrs:    15.3 (H)  \   8.4 (L)   / 79 (L)     136 105 11.5 /  216 (H)   4.1 26 0.94 \       ALT: 16 AST: 31 AP: 142 (H) TBILI: 1.1   ALB: 2.3 (L) TOT PROTEIN: 6.9 LIPASE: 44       INR:  1.98 (H) PTT:  N/A   D-dimer:  N/A Fibrinogen:  N/A       Imaging results reviewed over the past 24 hrs:   Recent Results (from the past 24 hour(s))   CT Abdomen Pelvis w Contrast    Narrative    EXAMINATION: CT ABDOMEN PELVIS W CONTRAST, 1/24/2023 8:03 AM    INDICATION: abd pain    COMPARISON STUDY: MR abdomen 1/10/2023, CT abdomen 12/2/2021    TECHNIQUE: CT scan of the abdomen and pelvis was performed on  multidetector CT scanner using volumetric acquisition technique and  images were reconstructed in multiple planes with variable thickness  and reviewed on dedicated workstations.     CONTRAST: iopamidol (ISOVUE-370) solution 80 mL injected IV with oral  contrast    CT scan radiation dose is optimized to minimum requisite dose using  automated dose modulation techniques.    FINDINGS:    Support devices: None.    Lower thorax: Peripherally calcified collection in the right posterior  lateral pleural space appears unchanged. Bibasilar subsegmental  atelectasis. Hazy, groundglass opacity in the right middle lobe  anteriorly. Loculated fluid along the left major fissure. Unchanged  small  nodule in the right middle lobe laterally (series 3 image 3).    Liver: In this patient with known hepatocellular carcinoma, there  appears to multiple lesions throughout the liver including  posttreatment changes in hepatic segment 6, please see MR 1/10/2023  for detailed findings in the liver.    Gallbladder and bile ducts: Gallstones are present in the gallbladder  fossa. There is no gallbladder wall thickening or pericholecystic  fluid. No extrahepatic biliary ductal dilation.    Spleen: Splenomegaly.    Pancreas: Normal CT appearance.     Adrenals: No nodules.     Kidneys and ureters: Simple renal cysts bilaterally. The proximal left  ureter and renal pelvis are mildly dilated. No stone identified. No  ureteral or perinephritic fat stranding present. No solid lesions. No  calculi. No hydroureteronephrosis.    Bladder: Unremarkable.    Reproductive: Unremarkable.    Bowel: Postsurgical changes of partial right hemicolectomy. Long  segment distention of small bowel loops with fecalization and possible  transition near the ileocolic anastomosis Diffuse small and large  bowel wall thickening, edema likely related to portal hypertension.  Diffuse prominence of the bowel loops.    Mesentery/Peritoneum: Large volume ascites.    Lymph nodes: Scattered small abdominal nodes, none meeting CT criteria  for pathologic enlargement.    Vasculature: Gastroesophageal varices. Scattered atherosclerotic  calcifications of the lower abdominal aorta and iliac vessels.  Normal, without aneurysm or significant atherosclerotic disease.    Bone windows: No aggressive osseous abnormalities. Mild degenerative  changes throughout the spine. Hyperdense lesion in the L4 vertebral  body, could represent hemangioma.    Soft tissues: Unremarkable.      Impression    IMPRESSION: In this patient with known hepatocellular carcinoma,  status posttreatment changes of hepatic segment 6:    1. Mild dilated small bowel loops with fecalization, with  change in  caliber near the ileocolonic anastomosis in the right upper quadrant,  concerning for possible small bowel obstruction and/or slowed transit.  No pneumatosis or pneumoperitoneum.  2. Prominent pelvic ascites with mass effect on the bladder   underlying peritoneal inflammation cannot be excluded; multiple  peripheral abdominal wall collaterals are demonstrated and image  guided paracentesis is recommended to avoid hemorrhage if clinically  desired.  3. Numerous hepatic lesions, better seen on prior MR dated 1/10/2023  4. Additional findings as described above include splenomegaly,  chronic right pleural collection, small left pleural effusion    Finding discussed with medicine team by Otilia at 2:45 PM 1/24/2023

## 2023-01-25 ENCOUNTER — APPOINTMENT (OUTPATIENT)
Dept: INTERPRETER SERVICES | Facility: CLINIC | Age: 63
DRG: 433 | End: 2023-01-25
Payer: MEDICARE

## 2023-01-25 LAB
AFP SERPL-MCNC: 1.8 NG/ML
ALBUMIN SERPL BCG-MCNC: 1.8 G/DL (ref 3.5–5.2)
ALP SERPL-CCNC: 94 U/L (ref 40–129)
ALT SERPL W P-5'-P-CCNC: 18 U/L (ref 10–50)
ANION GAP SERPL CALCULATED.3IONS-SCNC: 6 MMOL/L (ref 7–15)
AST SERPL W P-5'-P-CCNC: 24 U/L (ref 10–50)
BACTERIA UR CULT: NORMAL
BILIRUB SERPL-MCNC: 0.7 MG/DL
BUN SERPL-MCNC: 17.9 MG/DL (ref 8–23)
CALCIUM SERPL-MCNC: 6.9 MG/DL (ref 8.8–10.2)
CHLORIDE SERPL-SCNC: 106 MMOL/L (ref 98–107)
CREAT SERPL-MCNC: 0.87 MG/DL (ref 0.67–1.17)
DEPRECATED HCO3 PLAS-SCNC: 25 MMOL/L (ref 22–29)
ERYTHROCYTE [DISTWIDTH] IN BLOOD BY AUTOMATED COUNT: 20.8 % (ref 10–15)
GFR SERPL CREATININE-BSD FRML MDRD: >90 ML/MIN/1.73M2
GLUCOSE BLDC GLUCOMTR-MCNC: 206 MG/DL (ref 70–99)
GLUCOSE BLDC GLUCOMTR-MCNC: 211 MG/DL (ref 70–99)
GLUCOSE BLDC GLUCOMTR-MCNC: 227 MG/DL (ref 70–99)
GLUCOSE BLDC GLUCOMTR-MCNC: 237 MG/DL (ref 70–99)
GLUCOSE BLDC GLUCOMTR-MCNC: 286 MG/DL (ref 70–99)
GLUCOSE BLDC GLUCOMTR-MCNC: 345 MG/DL (ref 70–99)
GLUCOSE SERPL-MCNC: 208 MG/DL (ref 70–99)
HBA1C MFR BLD: 6.8 %
HCT VFR BLD AUTO: 25.8 % (ref 40–53)
HGB BLD-MCNC: 7.4 G/DL (ref 13.3–17.7)
INR PPP: 2.02 (ref 0.85–1.15)
MCH RBC QN AUTO: 23.6 PG (ref 26.5–33)
MCHC RBC AUTO-ENTMCNC: 28.7 G/DL (ref 31.5–36.5)
MCV RBC AUTO: 82 FL (ref 78–100)
PLATELET # BLD AUTO: 53 10E3/UL (ref 150–450)
POTASSIUM SERPL-SCNC: 4 MMOL/L (ref 3.4–5.3)
PROT SERPL-MCNC: 5.5 G/DL (ref 6.4–8.3)
RBC # BLD AUTO: 3.14 10E6/UL (ref 4.4–5.9)
SARS-COV-2 RNA RESP QL NAA+PROBE: NEGATIVE
SODIUM SERPL-SCNC: 137 MMOL/L (ref 136–145)
WBC # BLD AUTO: 5.5 10E3/UL (ref 4–11)

## 2023-01-25 PROCEDURE — 85610 PROTHROMBIN TIME: CPT | Performed by: STUDENT IN AN ORGANIZED HEALTH CARE EDUCATION/TRAINING PROGRAM

## 2023-01-25 PROCEDURE — 250N000011 HC RX IP 250 OP 636: Performed by: STUDENT IN AN ORGANIZED HEALTH CARE EDUCATION/TRAINING PROGRAM

## 2023-01-25 PROCEDURE — 99232 SBSQ HOSP IP/OBS MODERATE 35: CPT | Mod: GC | Performed by: INTERNAL MEDICINE

## 2023-01-25 PROCEDURE — 83036 HEMOGLOBIN GLYCOSYLATED A1C: CPT | Performed by: STUDENT IN AN ORGANIZED HEALTH CARE EDUCATION/TRAINING PROGRAM

## 2023-01-25 PROCEDURE — 80053 COMPREHEN METABOLIC PANEL: CPT | Performed by: STUDENT IN AN ORGANIZED HEALTH CARE EDUCATION/TRAINING PROGRAM

## 2023-01-25 PROCEDURE — 99233 SBSQ HOSP IP/OBS HIGH 50: CPT | Performed by: NURSE PRACTITIONER

## 2023-01-25 PROCEDURE — 85027 COMPLETE CBC AUTOMATED: CPT | Performed by: STUDENT IN AN ORGANIZED HEALTH CARE EDUCATION/TRAINING PROGRAM

## 2023-01-25 PROCEDURE — 120N000002 HC R&B MED SURG/OB UMMC

## 2023-01-25 PROCEDURE — 250N000013 HC RX MED GY IP 250 OP 250 PS 637: Performed by: STUDENT IN AN ORGANIZED HEALTH CARE EDUCATION/TRAINING PROGRAM

## 2023-01-25 PROCEDURE — 36415 COLL VENOUS BLD VENIPUNCTURE: CPT | Performed by: STUDENT IN AN ORGANIZED HEALTH CARE EDUCATION/TRAINING PROGRAM

## 2023-01-25 PROCEDURE — 82105 ALPHA-FETOPROTEIN SERUM: CPT | Performed by: NURSE PRACTITIONER

## 2023-01-25 PROCEDURE — C9113 INJ PANTOPRAZOLE SODIUM, VIA: HCPCS | Performed by: STUDENT IN AN ORGANIZED HEALTH CARE EDUCATION/TRAINING PROGRAM

## 2023-01-25 PROCEDURE — 82962 GLUCOSE BLOOD TEST: CPT

## 2023-01-25 PROCEDURE — U0003 INFECTIOUS AGENT DETECTION BY NUCLEIC ACID (DNA OR RNA); SEVERE ACUTE RESPIRATORY SYNDROME CORONAVIRUS 2 (SARS-COV-2) (CORONAVIRUS DISEASE [COVID-19]), AMPLIFIED PROBE TECHNIQUE, MAKING USE OF HIGH THROUGHPUT TECHNOLOGIES AS DESCRIBED BY CMS-2020-01-R: HCPCS

## 2023-01-25 RX ORDER — LIDOCAINE 4 G/G
1 PATCH TOPICAL
Status: DISCONTINUED | OUTPATIENT
Start: 2023-01-25 | End: 2023-01-26 | Stop reason: HOSPADM

## 2023-01-25 RX ORDER — GABAPENTIN 100 MG/1
200 CAPSULE ORAL 3 TIMES DAILY
Status: DISCONTINUED | OUTPATIENT
Start: 2023-01-25 | End: 2023-01-26

## 2023-01-25 RX ADMIN — PANTOPRAZOLE SODIUM 40 MG: 40 INJECTION, POWDER, FOR SOLUTION INTRAVENOUS at 08:39

## 2023-01-25 RX ADMIN — INSULIN ASPART 1 UNITS: 100 INJECTION, SOLUTION INTRAVENOUS; SUBCUTANEOUS at 02:04

## 2023-01-25 RX ADMIN — INSULIN ASPART 1 UNITS: 100 INJECTION, SOLUTION INTRAVENOUS; SUBCUTANEOUS at 22:30

## 2023-01-25 RX ADMIN — CEFTRIAXONE SODIUM 1 G: 1 INJECTION, POWDER, FOR SOLUTION INTRAMUSCULAR; INTRAVENOUS at 12:23

## 2023-01-25 RX ADMIN — INSULIN ASPART 1 UNITS: 100 INJECTION, SOLUTION INTRAVENOUS; SUBCUTANEOUS at 09:34

## 2023-01-25 RX ADMIN — GABAPENTIN 100 MG: 100 CAPSULE ORAL at 08:37

## 2023-01-25 RX ADMIN — LIDOCAINE PATCH 4% 1 PATCH: 40 PATCH TOPICAL at 21:21

## 2023-01-25 RX ADMIN — GABAPENTIN 100 MG: 100 CAPSULE ORAL at 13:24

## 2023-01-25 RX ADMIN — CARVEDILOL 6.25 MG: 6.25 TABLET, FILM COATED ORAL at 08:38

## 2023-01-25 RX ADMIN — INSULIN ASPART 2 UNITS: 100 INJECTION, SOLUTION INTRAVENOUS; SUBCUTANEOUS at 19:00

## 2023-01-25 RX ADMIN — CARVEDILOL 6.25 MG: 6.25 TABLET, FILM COATED ORAL at 17:46

## 2023-01-25 RX ADMIN — GABAPENTIN 200 MG: 100 CAPSULE ORAL at 21:20

## 2023-01-25 RX ADMIN — INSULIN ASPART 3 UNITS: 100 INJECTION, SOLUTION INTRAVENOUS; SUBCUTANEOUS at 14:29

## 2023-01-25 RX ADMIN — INSULIN ASPART 1 UNITS: 100 INJECTION, SOLUTION INTRAVENOUS; SUBCUTANEOUS at 06:14

## 2023-01-25 RX ADMIN — TENOFOVIR DISOPROXIL FUMARATE 300 MG: 300 TABLET, FILM COATED ORAL at 21:19

## 2023-01-25 ASSESSMENT — ACTIVITIES OF DAILY LIVING (ADL)
TOILETING_ISSUES: NO
FALL_HISTORY_WITHIN_LAST_SIX_MONTHS: NO
CONCENTRATING,_REMEMBERING_OR_MAKING_DECISIONS_DIFFICULTY: NO
ADLS_ACUITY_SCORE: 35
ADLS_ACUITY_SCORE: 35
DIFFICULTY_EATING/SWALLOWING: NO
ADLS_ACUITY_SCORE: 35
DOING_ERRANDS_INDEPENDENTLY_DIFFICULTY: NO
ADLS_ACUITY_SCORE: 35
WEAR_GLASSES_OR_BLIND: NO
ADLS_ACUITY_SCORE: 35
DRESSING/BATHING_DIFFICULTY: NO
ADLS_ACUITY_SCORE: 35
ADLS_ACUITY_SCORE: 35
ADLS_ACUITY_SCORE: 18
WALKING_OR_CLIMBING_STAIRS_DIFFICULTY: NO
CHANGE_IN_FUNCTIONAL_STATUS_SINCE_ONSET_OF_CURRENT_ILLNESS/INJURY: YES
ADLS_ACUITY_SCORE: 35

## 2023-01-25 NOTE — CONSULTS
"    Interventional Radiology Consult Service Note    Patient is on IR schedule 1/24 for a dx and therapeutic paracentesis.   Labs WNL for procedure.    No NPO required.  Consent will be done prior to procedure.     Please [place] the [place] RN [place] [phone] for estimated time of procedure.     Case discussed with Dr. Aleman and Dr. Marin from IR. 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 He had previously had been stable with his ascites. Reported \"tense\" ascites on exam and CT scan shows moderate volume ascites. IR is consulted for paracentesis given reported concerns of abdominal varices that could complicated the procedure.    Dx labs per Dr. Aleman.    Expected date of discharge: TBD    Vitals:   BP 95/50   Pulse 62   Temp 97.7  F (36.5  C) (Oral)   Resp 16   Ht 1.651 m (5' 5\")   Wt 59 kg (130 lb)   SpO2 97%   BMI 21.63 kg/m      Pertinent Labs:     Lab Results   Component Value Date    WBC 15.3 (H) 01/24/2023    WBC 4.3 11/28/2022    WBC 5.4 09/29/2022    WBC 3.5 (L) 05/10/2021    WBC 6.8 01/21/2020    WBC 5.6 07/17/2019       Lab Results   Component Value Date    HGB 8.4 01/24/2023    HGB 7.8 11/28/2022    HGB 9.4 09/29/2022    HGB 13.0 05/10/2021    HGB 16.3 01/21/2020    HGB 14.8 07/17/2019       Lab Results   Component Value Date    PLT 79 01/24/2023    PLT 90 11/28/2022    PLT 85 09/29/2022    PLT 52 05/10/2021    PLT 74 01/21/2020    PLT 86 07/17/2019       Lab Results   Component Value Date    INR 1.98 (H) 01/24/2023    INR 1.39 (H) 05/10/2021    PTT 42 (H) 05/30/2022       Lab Results   Component Value Date    POTASSIUM 4.1 01/24/2023    POTASSIUM 3.8 06/01/2022    POTASSIUM 4.7 05/10/2021        JOSIE Zuniga CNP  Interventional Radiology  Pager: " [phone]

## 2023-01-25 NOTE — PROGRESS NOTES
[facility]    Progress Note - Medicine Service, MAROON TEAM 2       Date of Admission:  1/24/2023    Assessment & Plan   Patient is a 61 yo male with PMH notable for HBV-associated cirrhosis, HCC, ascites, EV s/p banding (Septmeber '22), T2DM, and herpes zoster admitted with acute abdominal pain and distension. Etiology for presenting symptoms possibly multifactorial, including ascites, SBP, and SBO. He is hemodynamically stable on admission.     Changes today:  - continue IV ceftriaxone through 1/26 for 3 total doses for UTI  - lidocaine patch for shingles pain and increase gabapentin to 200mg TID    Abdominal pain, improving  Decompensated cirrhosis 2/2 chronic HBV infection c/b hepatocellular carcinoma  Ascites  Esophageal varices s/p banding (9/2022)  Patient presents with 2 days of abdominal pain and distension. CT shows large volume ascites, which could explain his symptoms. Given this, pain, and leukocytosis, there is c/f SBP. He had diagnostic/therapeutic tap and was also covered with ceftriaxone. CT also demonstrates dilated small bowel loops with fecalization, c/f possible SBO. General surgery was consulted, and felt that he is not clinically obstructed and saw no indication for surgical intervention at this time, additionally they did not think there would be any benefit from NGT placement. Paracentesis completed 1/24 with 1900 ml of ascites removed. Ascites fluid analysis does not demonstrate signs of SBP.   - Hepatology consult    > due for follow up EGD    > no current evidence of bleeding or HE    > most recent MRI with nonviable tumor noted     > plan for bx of lung mass as OP  - will touch base to confirm outpatient follow up of HCC  - IR consult for paracentesis 1/24, 1900 ml ascites removed  - IV ceftriaxone given in ED, continue  - Holding PTA lasix and spironolactone given c/f mild dehydration, anticipate resuming prior to discharge  - Encourage PO  fluids  - Continue PTA tenofovir  - Continue PTA carvedilol  - Daily Meld labs     MELD-Na score: 14 at 1/25/2023  9:43 AM  MELD score: 14 at 1/25/2023  9:43 AM  Calculated from:  Serum Creatinine: 0.87 mg/dL (Using min of 1 mg/dL) at 1/25/2023  9:43 AM  Serum Sodium: 137 mmol/L at 1/25/2023  9:43 AM  Total Bilirubin: 0.7 mg/dL (Using min of 1 mg/dL) at 1/25/2023  9:43 AM  INR(ratio): 2.02 at 1/25/2023  9:43 AM  Age: 62 years     Leukocytosis, Resolved  UTI  White count elevated to 15.3 on admission, in setting of potential UTI with moderate LE and WBCs but no symptoms. Received 1 g IV ceftriaxone in ED and plan for 2 more doses 1/25-1/26 to adequately treat any underlying infection.  - 3 doses of IV ceftriaxone 1/24 - 1/26  - Trend CBC     Coagulopathy  Thrombocytopenia  In setting of chronic liver disease. No signs of bleeding.  - Monitor CBC     Previous herpes zoster of L T4 dermatome  Documented infection, treated May '22. Here complaining of pain with similar distribution, exam shows prior healed lesions, no active lesions.  - Continue PTA gabapentin TID, increased to 200mg TID  - Additionally has PRN tylenol (max 2g/day), IV dilaudid available for breakthrough pain  -start lidocaine patch     T2DM  Most recent A1c 5.6 in 9/2022.  - recheck A1c, pending  - holding PTA metformin  - sliding scale insulin, hypoglycemia protocol     Diet: Regular Diet Adult    DVT Prophylaxis: Pneumatic Compression Devices  Davalos Catheter: Not present  Fluids: encourage PO hydration  Lines: None     Cardiac Monitoring: None  Code Status: Full Code      Clinically Significant Risk Factors              # Hypoalbuminemia: Lowest albumin = 1.8 g/dL at 1/25/2023  9:43 AM, will monitor as appropriate   # Thrombocytopenia: Lowest platelets = 53 in last 2 days, will monitor for bleeding                 Disposition Plan Anticipate discharge to home once tolerating diet, confirmed follow-up plans with hepatology.     The patient's care was  discussed with the Attending Physician, Dr. Zapata.    Inez Sanchez, MS3  Medical Student  Medicine Service, [facility] TEAM 2  [facility]  Securely message with [employer] (more info)  Text page via [facility] Paging/Directory   See signed in provider for up to date coverage information     Resident/Fellow Attestation   I, Bola Aleman MD, was present with the medical/OPAL student who participated in the service and in the documentation of the note.  I have verified the history and personally performed the physical exam and medical decision making.  I agree with the assessment and plan of care as documented in the note.      Bola Aleman MD  Medicine-Pediatrics, PGY4  [facility]  ______________________________________________________________________    Interval History   Nursing notes reviewed. No acute events overnight. Patient states his abdominal pain is much better after fluid was removed yesterday. Notes that he has been eating and drinking. Had one small BM this morning. No black or bloody stools. No dysuria or other urinary symptoms. No fevers or chills. Main complaint is chronic shingles pain. Patient believes that his liver cancer is gone after chemo and radiation.     Physical Exam   Vital Signs: Temp: 97.8  F (36.6  C) Temp src: Oral BP: 98/60 Pulse: 63   Resp: 18 SpO2: 100 % O2 Device: None (Room air)    Weight: 130 lbs 0 oz    General: Alert, oriented, cooperative, no apparent distress, appears nontoxic, laying in bed  HEENT: NC/AT, EOMI, mildly icteric, MMM  Cardiovascular: Regular rate and rhythm, normal S1 and S2, and no murmur noted. No lower extremity edema.  Respiratory: Clear to auscultation bilaterally. No wheezes or rhonchi appreciated  GI:  Less distended than yesterday, soft, mild tenderness to palpation diffusely, no rebound or guarding, normal bowel sounds  Ext: WWP, trace peripheral edema in bilateral LE  Neurologic: Alert and  oriented, non-focal exam    Medical Decision Making       Please see A&P for additional details of medical decision making.      Data   ------------------------- PAST 24 HR DATA REVIEWED -----------------------------------------------    I have personally reviewed the following data over the past 24 hrs:    5.5  \   7.4 (L)   / 53 (L)     137 106 17.9 /  345 (H)   4.0 25 0.87 \       ALT: 18 AST: 24 AP: 94 TBILI: 0.7   ALB: 1.8 (L) TOT PROTEIN: 5.5 (L) LIPASE: N/A       INR:  2.02 (H) PTT:  N/A   D-dimer:  N/A Fibrinogen:  N/A       Imaging results reviewed over the past 24 hrs:   Recent Results (from the past 24 hour(s))   IR Paracentesis    Narrative    Procedure: 1/24/2023:  1. Ultrasound guided paracentesis.    History: Patient with cirrhosis and HCC with ascites.    Comparisons: CT abdomen 1/24/2022     Staff Radiologist: Dr. Rendon    Resident: Jos Cameron MD    Monitoring: Patient was placed on continuous monitoring supervised by  the IR nursing staff and IR attending. Patient remained stable  throughout the procedure.    Medications: No intravenous sedation was administered. The patient  remained stable throughout the procedure.    Procedure: The patient understood the limitations, alternatives, and  risks of the procedure and requested the procedure be performed. Both  written and oral consent were obtained.    Left lower quadrant was prepped and draped in the usual sterile  fashion. 1% lidocaine was used for local anesthesia. Under ultrasound  guidance, a 5 [employer] catheter was advanced into the peritoneal  space. The catheter with advanced, and the needle was removed.  Catheter to vacuum drainage. 1,900 cc ascites aspirated. Catheter  removed. Sterile dressing applied. The patient tolerated the procedure  well. No immediate complication.      Impression    IMPRESSION: Ultrasound guided paracentesis. 1900 cc ascites aspirated.   US Abdomen Limited w Doppler Complete    Narrative    EXAMINATION: US  ABDOMEN LIMITED WITH DOPPLER COMPLETE, 1/24/2023 6:55  PM     INDICATION: Patient at high risk for HCC. 63 yo with HBV cirrhosis and  HCC, here with acute decompensation including large vol ascites    COMPARISON: Abdominal ultrasound 5/31/2022, abdominal MR 1/10/2023 and  9/21/2022    TECHNIQUE: The abdomen was scanned in standard fashion with  specialized ultrasound transducer(s) using both grey scale, color  Doppler, and spectral flow techniques.    FINDINGS:    Liver: Nodular contour with coarse in echotexture. Diffusely  hyperechoic hepatic echogenicity. Liver measures 13.2 cm in length.  Right liver lesion measuring 4.0 x 3.4 x 3.8 cm.    Extrahepatic portal vein flow is antegrade, measuring 30 cm/sec.  Right portal vein flow is antegrade, measuring 23 cm/sec.  Left portal vein flow is antegrade, measuring 20 cm/sec.    Hepatic artery flow is antethgthrthathdtheth:th th7th7th cm/sec peak systolic  0.82 resistive index.     The splenic vein is patent with flow towards the liver.  The left,  middle, and right hepatic veins are patent with flow towards the IVC.  The IVC is patent with flow towards the heart.   The aorta is of  normal caliber.    Gallbladder: Nondistended gallbladder shadowing stone. Gallbladder  wall measures 0.4 cm.    Bile Ducts: No intra or extrahepatic biliary tree. The common bile  duct measures 6 mm in diameter.    Pancreas: Partially visualized pancreas is unremarkable.    Kidneys: The right kidney is unremarkable. Mild proximal right  ureterectasis. The craniocaudal dimensions are: right- 10.6 cm    Fluid: Small volume ascites.      Impression    IMPRESSION:   1. Patent hepatic vasculature.  2. Cirrhotic configuration of liver. Small volume ascites.  3. Redemonstration of right-sided hepatic lesion previously described  on abdomen MR as posttreatment change.  4. Cholelithiasis without cholecystitis.    *Recommendations above based on LI-RADS?  v2017:  https://www.acr.org/Clinical-Resources/Reporting-and-Data-Systems/LI-R  DS/LI-RADS-Ultrasound-[email or web address]    I have personally reviewed the examination and initial interpretation  and I agree with the findings.    KOJO ALMONTE MD         SYSTEM ID:  [id number]

## 2023-01-25 NOTE — PROGRESS NOTES
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 He underwent a CT with concern for narrowing at site of ileocolonic anastomosis.     RECOMMENDATIONS:  # Ascites  - previously on lasix and spironolactone, may continue  - para 1/24 without evidence of SBP.      #EV s/p bleeding and banding  - due for follow up EGD  - no current evidence of bleeding  - remains on carvedilol     # HBV cirrhosis  # HCC  - MELD 14.  - most recent MRI with nonviable tumor noted and LR3 lesions that have slightly increased. AFP wnl, no history of AFP   - continue tenofovir for HBV suppression. Last HBV level undetectable.   - no evidence of HE.     #UTI  - UA with large WBC. Culture pending.      # Shingles  # possible SBO  - has been on pain medication for prior shingles, may be contributing to concern for SBO. May also consider gabapentin for herpetic pain.   - Ileocolonic anastomosis from 2015 colectomy. Has not had followup colonoscopy- planned with EGD as OP. No enlarged lymph nodes on MRI.      # Lung lesion  - plan for biopsy of lung mass as OP.   - last biopsy (5/2022) without granulomatous disease or malignancy. Concern for non diagnostic sample. He worries about repeating due to bleeding complications.     Patient was discussed with attending physician, Dr. Rodriguez.      Next follow up appointment: tbd    Thank you for the opportunity to be involved with  Rishabh Cabrera care. Please call with any questions or concerns.    JOSIE Arroyo, CNP  Inpatient Hepatology [facility]AL  Text Link        SUBJECTIVE:  Continues to complain of \"shingles pain\" in upper chest, face. He states abdominal pain improved. No dysuria. No change in " "mentation, nausea, vomiting, fevers.     ROS:  A 10-point review of systems was negative.    OBJECTIVE:  VS: BP 98/60 (BP Location: Right arm)   Pulse 63   Temp 97.8  F (36.6  C) (Oral)   Resp 18   Ht 1.651 m (5' 5\")   Wt 59 kg (130 lb)   SpO2 100%   BMI 21.63 kg/m     Date 01/25/23 0700 - 01/26/23 0659   Shift 5951-7785 6480-6711 6074-3614 24 Hour Total   INTAKE   P.O. 200   200   Shift Total(mL/kg) 200(3.39)   200(3.39)   OUTPUT   Shift Total(mL/kg)       Weight (kg) 58.97 58.97 58.97 58.97      General: In no acute distress, no facial muscle wasting  Neuro: AOx3, No asterixis  HEENT:  Noscleral icterus,  CV:. Skin warm and dry  Lungs:  Respirations even and nonlabored on room air  Abd:  Nondistended, nontender   Extrem: trace peripheral edema   Skin: Nojaundice  Psych: pleasant    MEDICATIONS:  Current Facility-Administered Medications   Medication     acetaminophen (TYLENOL) tablet 500 mg    Or     acetaminophen (TYLENOL) Suppository 500 mg     carvedilol (COREG) tablet 6.25 mg     cefTRIAXone (ROCEPHIN) 1 g vial to attach to  mL bag for ADULTS or NS 50 mL bag for PEDS     glucose gel 15-30 g    Or     dextrose 50 % injection 25-50 mL    Or     glucagon injection 1 mg     gabapentin (NEURONTIN) capsule 100 mg     insulin aspart (NovoLOG) injection (RAPID ACTING)     Lidocaine (LIDOCARE) 4 % Patch 1 patch    And     lidocaine patch in PLACE     lidocaine (LMX4) cream     lidocaine 1 % 0.1-1 mL     melatonin tablet 1 mg     ondansetron (ZOFRAN ODT) ODT tab 4 mg    Or     ondansetron (ZOFRAN) injection 4 mg     ondansetron (ZOFRAN) injection 4 mg     pantoprazole (PROTONIX) IV push injection 40 mg     sodium chloride (PF) 0.9% PF flush 3 mL     sodium chloride (PF) 0.9% PF flush 3 mL     tenofovir (VIREAD) tablet 300 mg     Current Outpatient Medications   Medication     capsaicin (ZOSTRIX) 0.025 % external cream     Capsaicin-Menthol (SALONPAS GEL EX)     carvedilol (COREG) 6.25 MG tablet     " diclofenac (VOLTAREN) 1 % topical gel     furosemide (LASIX) 20 MG tablet     gabapentin (NEURONTIN) 100 MG capsule     loratadine (CLARITIN) 10 MG tablet     polyethylene glycol (MIRALAX) 17 GM/Dose powder     spironolactone (ALDACTONE) 50 MG tablet     tenofovir (VIREAD) 300 MG tablet     triamcinolone (KENALOG) 0.1 % external cream     Facility-Administered Medications Ordered in Other Encounters   Medication     gadobutrol (GADAVIST) injection 7.5 mL       REVIEW OF LABORATORY, PATHOLOGY AND IMAGING RESULTS:  BMP  Recent Labs   Lab 01/25/23  1343 01/25/23  0943 01/25/23  0922 01/25/23  0612 01/24/23  1901 01/24/23  0425   NA  --  137  --   --   --  136   POTASSIUM  --  4.0  --   --   --  4.1   CHLORIDE  --  106  --   --   --  105   MANDY  --  6.9*  --   --   --  7.9*   CO2  --  25  --   --   --  26   BUN  --  17.9  --   --   --  11.5   CR  --  0.87  --   --   --  0.94   * 208* 227* 206*   < > 216*    < > = values in this interval not displayed.     CBC  Recent Labs   Lab 01/25/23  0943 01/24/23  0425   WBC 5.5 15.3*   RBC 3.14* 3.48*   HGB 7.4* 8.4*   HCT 25.8* 28.0*   MCV 82 81   MCH 23.6* 24.1*   MCHC 28.7* 30.0*   RDW 20.8* 20.7*   PLT 53* 79*     INR  Recent Labs   Lab 01/25/23  0943 01/24/23  1120   INR 2.02* 1.98*     LFTs  Recent Labs   Lab 01/25/23  0943 01/24/23  0425   ALKPHOS 94 142*   AST 24 31   ALT 18 16   BILITOTAL 0.7 1.1   PROTTOTAL 5.5* 6.9   ALBUMIN 1.8* 2.3*        MELD-Na score: 14 at 1/25/2023  9:43 AM  MELD score: 14 at 1/25/2023  9:43 AM  Calculated from:  Serum Creatinine: 0.87 mg/dL (Using min of 1 mg/dL) at 1/25/2023  9:43 AM  Serum Sodium: 137 mmol/L at 1/25/2023  9:43 AM  Total Bilirubin: 0.7 mg/dL (Using min of 1 mg/dL) at 1/25/2023  9:43 AM  INR(ratio): 2.02 at 1/25/2023  9:43 AM  Age: 62 years      Imaging Results:    US abd w doppler 1/24/23  IMPRESSION:   1. Patent hepatic vasculature.  2. Cirrhotic configuration of liver. Small volume ascites.  3. Redemonstration of  right-sided hepatic lesion previously described  on abdomen MR as posttreatment change.  4. Cholelithiasis without cholecystitis.

## 2023-01-25 NOTE — PLAN OF CARE
Goal Outcome Evaluation: In Progress  Shift:   VS: Temp: 97.8  F (36.6  C) Temp src: Oral BP: 98/60 Pulse: 63   Resp: 18 SpO2: 100 % O2 Device: None (Room air)    Pain: Denies abdominal pain  Neuro: A&Ox4, calls appropriately  Cardiac:   WNL  Respiratory: RA  GI/Diet/Appetite: Tolerating regular diet, no nausea/emesis. BM x1  :  Adequate UO  LDA's: PIV TKO  Skin: No new deficit noted  Activity: Up ad sean  Tests/Procedures:   Pertinent Labs/Lab Collection:      Plan: Possible discharge tomorrow. Continue with cares.

## 2023-01-26 ENCOUNTER — APPOINTMENT (OUTPATIENT)
Dept: INTERPRETER SERVICES | Facility: CLINIC | Age: 63
DRG: 433 | End: 2023-01-26
Payer: MEDICARE

## 2023-01-26 VITALS
BODY MASS INDEX: 22.02 KG/M2 | DIASTOLIC BLOOD PRESSURE: 62 MMHG | HEART RATE: 67 BPM | WEIGHT: 132.2 LBS | RESPIRATION RATE: 18 BRPM | SYSTOLIC BLOOD PRESSURE: 118 MMHG | TEMPERATURE: 97.8 F | OXYGEN SATURATION: 100 % | HEIGHT: 65 IN

## 2023-01-26 LAB
BASOPHILS # BLD AUTO: 0 10E3/UL (ref 0–0.2)
BASOPHILS NFR BLD AUTO: 0 %
EOSINOPHIL # BLD AUTO: 0 10E3/UL (ref 0–0.7)
EOSINOPHIL NFR BLD AUTO: 0 %
ERYTHROCYTE [DISTWIDTH] IN BLOOD BY AUTOMATED COUNT: 20.8 % (ref 10–15)
GLUCOSE BLDC GLUCOMTR-MCNC: 170 MG/DL (ref 70–99)
GLUCOSE BLDC GLUCOMTR-MCNC: 183 MG/DL (ref 70–99)
HCT VFR BLD AUTO: 25.1 % (ref 40–53)
HGB BLD-MCNC: 7.5 G/DL (ref 13.3–17.7)
IMM GRANULOCYTES # BLD: 0 10E3/UL
IMM GRANULOCYTES NFR BLD: 0 %
LYMPHOCYTES # BLD AUTO: 0.6 10E3/UL (ref 0.8–5.3)
LYMPHOCYTES NFR BLD AUTO: 13 %
MCH RBC QN AUTO: 23.8 PG (ref 26.5–33)
MCHC RBC AUTO-ENTMCNC: 29.9 G/DL (ref 31.5–36.5)
MCV RBC AUTO: 80 FL (ref 78–100)
MONOCYTES # BLD AUTO: 0.7 10E3/UL (ref 0–1.3)
MONOCYTES NFR BLD AUTO: 15 %
NEUTROPHILS # BLD AUTO: 3.2 10E3/UL (ref 1.6–8.3)
NEUTROPHILS NFR BLD AUTO: 72 %
NRBC # BLD AUTO: 0 10E3/UL
NRBC BLD AUTO-RTO: 0 /100
PLATELET # BLD AUTO: 58 10E3/UL (ref 150–450)
RBC # BLD AUTO: 3.15 10E6/UL (ref 4.4–5.9)
WBC # BLD AUTO: 4.5 10E3/UL (ref 4–11)

## 2023-01-26 PROCEDURE — 250N000013 HC RX MED GY IP 250 OP 250 PS 637: Performed by: STUDENT IN AN ORGANIZED HEALTH CARE EDUCATION/TRAINING PROGRAM

## 2023-01-26 PROCEDURE — 36415 COLL VENOUS BLD VENIPUNCTURE: CPT | Performed by: STUDENT IN AN ORGANIZED HEALTH CARE EDUCATION/TRAINING PROGRAM

## 2023-01-26 PROCEDURE — 250N000013 HC RX MED GY IP 250 OP 250 PS 637

## 2023-01-26 PROCEDURE — 99238 HOSP IP/OBS DSCHRG MGMT 30/<: CPT | Performed by: INTERNAL MEDICINE

## 2023-01-26 PROCEDURE — 85014 HEMATOCRIT: CPT | Performed by: STUDENT IN AN ORGANIZED HEALTH CARE EDUCATION/TRAINING PROGRAM

## 2023-01-26 PROCEDURE — 250N000011 HC RX IP 250 OP 636: Performed by: STUDENT IN AN ORGANIZED HEALTH CARE EDUCATION/TRAINING PROGRAM

## 2023-01-26 PROCEDURE — C9113 INJ PANTOPRAZOLE SODIUM, VIA: HCPCS | Performed by: STUDENT IN AN ORGANIZED HEALTH CARE EDUCATION/TRAINING PROGRAM

## 2023-01-26 RX ORDER — NALOXONE HYDROCHLORIDE 0.4 MG/ML
0.4 INJECTION, SOLUTION INTRAMUSCULAR; INTRAVENOUS; SUBCUTANEOUS
Status: DISCONTINUED | OUTPATIENT
Start: 2023-01-26 | End: 2023-01-26 | Stop reason: HOSPADM

## 2023-01-26 RX ORDER — GABAPENTIN 300 MG/1
300 CAPSULE ORAL 3 TIMES DAILY
Status: DISCONTINUED | OUTPATIENT
Start: 2023-01-26 | End: 2023-01-26 | Stop reason: HOSPADM

## 2023-01-26 RX ORDER — NALOXONE HYDROCHLORIDE 0.4 MG/ML
0.2 INJECTION, SOLUTION INTRAMUSCULAR; INTRAVENOUS; SUBCUTANEOUS
Status: DISCONTINUED | OUTPATIENT
Start: 2023-01-26 | End: 2023-01-26 | Stop reason: HOSPADM

## 2023-01-26 RX ORDER — OXYCODONE HYDROCHLORIDE 5 MG/1
5 TABLET ORAL ONCE
Status: COMPLETED | OUTPATIENT
Start: 2023-01-26 | End: 2023-01-26

## 2023-01-26 RX ORDER — LIDOCAINE 4 G/G
1 PATCH TOPICAL EVERY 24 HOURS
Qty: 4 PATCH | Refills: 0 | Status: ON HOLD | OUTPATIENT
Start: 2023-01-26 | End: 2023-02-05

## 2023-01-26 RX ORDER — OXYCODONE HYDROCHLORIDE 5 MG/1
5 TABLET ORAL DAILY
Qty: 4 TABLET | Refills: 0 | Status: ON HOLD | OUTPATIENT
Start: 2023-01-26 | End: 2023-02-05

## 2023-01-26 RX ORDER — FUROSEMIDE 20 MG
20 TABLET ORAL DAILY
Status: DISCONTINUED | OUTPATIENT
Start: 2023-01-26 | End: 2023-01-26 | Stop reason: HOSPADM

## 2023-01-26 RX ORDER — GABAPENTIN 100 MG/1
300 CAPSULE ORAL 3 TIMES DAILY
Qty: 63 CAPSULE | Refills: 0 | Status: ON HOLD | OUTPATIENT
Start: 2023-01-26 | End: 2024-01-01

## 2023-01-26 RX ORDER — SPIRONOLACTONE 25 MG/1
50 TABLET ORAL DAILY
Status: DISCONTINUED | OUTPATIENT
Start: 2023-01-26 | End: 2023-01-26 | Stop reason: HOSPADM

## 2023-01-26 RX ADMIN — INSULIN ASPART 1 UNITS: 100 INJECTION, SOLUTION INTRAVENOUS; SUBCUTANEOUS at 01:55

## 2023-01-26 RX ADMIN — LIDOCAINE PATCH 4% 1 PATCH: 40 PATCH TOPICAL at 10:05

## 2023-01-26 RX ADMIN — ACETAMINOPHEN 500 MG: 500 TABLET ORAL at 11:36

## 2023-01-26 RX ADMIN — SPIRONOLACTONE 50 MG: 25 TABLET, FILM COATED ORAL at 11:33

## 2023-01-26 RX ADMIN — GABAPENTIN 200 MG: 100 CAPSULE ORAL at 08:07

## 2023-01-26 RX ADMIN — CEFTRIAXONE SODIUM 1 G: 1 INJECTION, POWDER, FOR SOLUTION INTRAMUSCULAR; INTRAVENOUS at 09:53

## 2023-01-26 RX ADMIN — ONDANSETRON 4 MG: 2 INJECTION INTRAMUSCULAR; INTRAVENOUS at 09:54

## 2023-01-26 RX ADMIN — PANTOPRAZOLE SODIUM 40 MG: 40 INJECTION, POWDER, FOR SOLUTION INTRAVENOUS at 08:07

## 2023-01-26 RX ADMIN — FUROSEMIDE 20 MG: 20 TABLET ORAL at 11:33

## 2023-01-26 RX ADMIN — OXYCODONE HYDROCHLORIDE 5 MG: 5 TABLET ORAL at 12:22

## 2023-01-26 RX ADMIN — INSULIN ASPART 1 UNITS: 100 INJECTION, SOLUTION INTRAVENOUS; SUBCUTANEOUS at 06:29

## 2023-01-26 ASSESSMENT — ACTIVITIES OF DAILY LIVING (ADL)
ADLS_ACUITY_SCORE: 18

## 2023-01-26 NOTE — PLAN OF CARE
"[id number]    /62 (BP Location: Right arm)   Pulse 67   Temp 97.8  F (36.6  C) (Oral)   Resp 18   Ht 1.651 m (5' 5\")   Wt 60 kg (132 lb 3.2 oz)   SpO2 100%   BMI 22.00 kg/m      Reason for admission: Abdominal pain/distension.  C/f SBP vs SBO vs ascites  Activity: UAL  Pain: c/o moderate pain from past shingles.  Increased gabapentin, counseled for OP mgmnt of pain.  Neuro: WNL  Cardiac: WNL  Respiratory: WNL  GI/: WNL, BM this AM  Diet: Regular, tolerating well  Lines: PIV  Wounds: n/a  Labs/imaging: WNL      New changes this shift: Pt meeting goals for discharge     Plan: Pt discharged home.  Discharge paperwork overviewed, pt verbalized understanding.  Son picked pt up, went to pharmacy and discharged home.      Continue to monitor and follow POC    "

## 2023-01-26 NOTE — PLAN OF CARE
Admitted/transferred from: ED  2 RN skin assessment completed by Tay Martínez RN and ERIN Gipson RN  Skin assessment finding: RLE scabbed puma, and UE bruises.   Interventions/actions: Monitor  Will continue to monitor.

## 2023-01-26 NOTE — DISCHARGE SUMMARY
Medical Student Note with Attending Attestation  I saw the patient with medical student and discussed plan of care and agree with documentation in note below. Date of Service 1/26/23.    [facility]  Discharge Summary - Medicine & Pediatrics       Date of Admission:  1/24/2023  Date of Discharge:  1/26/2023  1:48 PM  Discharging Provider: Dr. Zapata  Discharge Service: Medicine Service, [facility] TEAM 2    Discharge Diagnoses   Abdominal pain, improving  Decompensated cirrhosis 2/2 chronic HBV infection c/b hepatocellular carcinoma  Ascites  Esophageal varices s/p banding (9/2022)  Leukocytosis, Resolved  UTI  Coagulopathy  Thrombocytopenia  Previous herpes zoster of L T4 dermatome  T2DM  NEEDS FOLLOW UP WITH IR/Oncology for lung mass biopsy and HCC    Follow-ups Needed After Discharge   Follow-up Appointments     Adult [facility] Follow-up and recommended labs and tests      Follow up with primary care provider in the next week to discuss your   pain related to your shingles as well as your general health related to   your liver. Needs repeat CBC at time of follow up visit    Follow up with the liver team to discuss your liver health with an   appointment with Dorian Levy PA-C on 2/27/2023. Follow up with   the hepatology team for your scheduled colonoscopy on 3/2/2023 with Dr. Noguera.    A referral has been placed with the interventional radiology team and the   oncology team to make appointments with you regarding your liver cancer.    Appointments on [place] and/or [facility] (with [facility] or [facility]   provider or service). Call [phone] if you haven't heard regarding   these appointments within 7 days of discharge.             Unresulted Labs Ordered in the Past 30 Days of this Admission     Date and Time Order Name Status Description    1/24/2023  9:47 AM Blood Culture Hand, Left Preliminary     1/24/2023  9:47 AM Blood Culture Hand, Right Preliminary      1/24/2023  9:35 AM Anaerobic bacterial culture Preliminary     1/24/2023  9:35 AM Ascites Fluid Aerobic Bacterial Culture Routine Preliminary       These results will be followed up by PCP.    Discharge Disposition   Discharged to home  Condition at discharge: Stable    Hospital Course   Rishabh Cabrera was admitted on 1/24/2023 for acute abdominal pain and distension.  The following problems were addressed during his hospitalization:    Abdominal pain, improving (likely etiology is multifactorial 2/2 tense ascites, relieved after paracentesis; as well as neuropathic pain 2/2 prior h/o shingles)  Decompensated cirrhosis 2/2 chronic HBV infection c/b hepatocellular carcinoma  Ascites  Esophageal varices s/p banding (9/2022)  Patient presents with 2 days of abdominal pain and distension. CT shows large volume ascites, which could explain his symptoms. Given this, pain, and leukocytosis, there was c/f SBP. He had diagnostic/therapeutic tap and was also covered with ceftriaxone. CT also demonstrates dilated small bowel loops with fecalization, c/f possible SBO. General surgery was consulted, and felt that he is not clinically obstructed and saw no indication for surgical intervention at this time, additionally they did not think there would be any benefit from NGT placement. Paracentesis completed 1/24 with 1900 ml of ascites removed. Ascites fluid analysis does not demonstrate signs of SBP.   - Hepatology consult    > due for follow up EGD    > no current evidence of bleeding or HE    > most recent MRI with nonviable tumor noted     > plan for bx of lung mass as OP  - IR consult for paracentesis 1/24, 1900 ml ascites removed  - IV ceftriaxone given in ED, continued for 3 total doses (1/24 - 1/26)  - Held PTA lasix and spironolactone given c/f mild dehydration, resumed prior to discharge  - Continue PTA tenofovir  - Continue PTA carvedilol  - Daily Meld labs     MELD-Na score: 14 at 1/25/2023  9:43 AM  MELD score: 14 at  1/25/2023  9:43 AM  Calculated from:  Serum Creatinine: 0.87 mg/dL (Using min of 1 mg/dL) at 1/25/2023  9:43 AM  Serum Sodium: 137 mmol/L at 1/25/2023  9:43 AM  Total Bilirubin: 0.7 mg/dL (Using min of 1 mg/dL) at 1/25/2023  9:43 AM  INR(ratio): 2.02 at 1/25/2023  9:43 AM  Age: 62 years     Leukocytosis, Resolved  UTI  White count elevated to 15.3 on admission, in setting of potential UTI with moderate LE and WBCs but no symptoms. Received 1 g IV ceftriaxone in ED and 2 more doses 1/25-1/26 to adequately treat any underlying infection.  - 3 doses of IV ceftriaxone 1/24 - 1/26     Coagulopathy  Thrombocytopenia  In setting of chronic liver disease. No signs of bleeding.  - Monitor CBC     Previous herpes zoster of L T4 dermatome  Documented infection, treated May '22. Here complaining of pain with similar distribution, exam shows prior healed lesions, no active lesions.  - Continue PTA gabapentin TID, increased to 300mg TID  - PRN tylenol (max 2g/day)  - start lidocaine patch  - patient is interested in triamcinolone injections; we recommended he talk with his PCP for potential pain clinic referral     T2DM  Most recent A1c 5.6 in 9/2022.  - recheck A1c is 6.8.  - held PTA metformin while inpatient and switched to sliding scale insulin, hypoglycemia protocol  - recommend outpatient follow up with PCP    Consultations This Hospital Stay   INTERNAL MEDICINE PROCEDURE TEAM ADULT IP CONSULT [place] - PARACENTESIS  GI HEPATOLOGY ADULT IP CONSULT  SURGERY GENERAL ADULT IP CONSULT  INTERVENTIONAL RADIOLOGY ADULT/PEDS IP CONSULT    Code Status   Full Code       The patient was discussed with Dr. Zapata.    Inez Sanchez, MS3  Medical Student  Art [address] [place] [address] [address] [address]  Phone: [phone]  ______________________________________________________________________    Physical Exam   Vital Signs: Temp: 97.8  F (36.6  C) Temp src: Oral BP: 118/62 Pulse: 67   Resp:  18 SpO2: 100 % O2 Device: None (Room air)    Weight: 132 lbs 3.2 oz  General: Alert, oriented, cooperative, no apparent distress, appears nontoxic, laying in bed  HEENT: NC/AT, EOMI, mildly icteric, MMM  Cardiovascular: Regular rate and rhythm, normal S1 and S2, and no murmur noted. No lower extremity edema.  Respiratory: Clear to auscultation bilaterally. No wheezes or rhonchi appreciated  GI:  Mildly distended, soft, mild tenderness to palpation diffusely, no rebound or guarding, normal bowel sounds  Ext: WWP, trace peripheral edema in bilateral LE  Neurologic: Alert and oriented, non-focal exam      Primary Care Physician   Physician No Ref-Primary    Discharge Orders      IR Referral     Adult Oncology/Hematology  Referral      Reason for your hospital stay    You were hospitalized because you were experiencing abdominal pain due to buildup of fluid in your abdomen and concerns regarding your bowels. Our imaging of your bowels did not show a blockage and you were able to pass bowel movements. We were able to drain fluid from your abdomen which improved your pain. This fluid did not appear infected.     Activity    Your activity upon discharge: activity as tolerated     Adult [facility] Follow-up and recommended labs and tests    Follow up with primary care provider in the next week to discuss your pain related to your shingles as well as your general health related to your liver.    Follow up with the liver team to discuss your liver health with an appointment with Dorian Levy PA-C on 2/27/2023. Follow up with the hepatology team for your scheduled colonoscopy on 3/2/2023 with Dr. Noguera.    A referral has been placed with the interventional radiology team and the oncology team to make appointments with you regarding your liver cancer.    Appointments on [place] and/or [facility] (with [facility] or [facility] provider or service). Call [phone] if you haven't heard regarding these appointments  within 7 days of discharge.     Diet    Follow this diet upon discharge: Regular       Significant Results and Procedures   Results for orders placed or performed during the hospital encounter of 01/24/23   CT Abdomen Pelvis w Contrast    Narrative    EXAMINATION: CT ABDOMEN PELVIS W CONTRAST, 1/24/2023 8:03 AM    INDICATION: abd pain    COMPARISON STUDY: MR abdomen 1/10/2023, CT abdomen 12/2/2021    TECHNIQUE: CT scan of the abdomen and pelvis was performed on  multidetector CT scanner using volumetric acquisition technique and  images were reconstructed in multiple planes with variable thickness  and reviewed on dedicated workstations.     CONTRAST: iopamidol (ISOVUE-370) solution 80 mL injected IV with oral  contrast    CT scan radiation dose is optimized to minimum requisite dose using  automated dose modulation techniques.    FINDINGS:    Support devices: None.    Lower thorax: Peripherally calcified collection in the right posterior  lateral pleural space appears unchanged. Bibasilar subsegmental  atelectasis. Hazy, groundglass opacity in the right middle lobe  anteriorly. Loculated fluid along the left major fissure. Unchanged  small nodule in the right middle lobe laterally (series 3 image 3).    Liver: In this patient with known hepatocellular carcinoma, there  appears to scattered hypodensities in both lobes of the liver with  posttreatment changes in inferior right lobe, better characterized on  recent MRI.    Gallbladder and bile ducts: Gallstones are present in the gallbladder  fossa. Decompressed gallbladder. No extrahepatic biliary ductal  dilation.    Spleen: Splenomegaly.    Pancreas: Normal CT appearance.     Adrenals: No nodules.     Kidneys and ureters: Simple renal cysts bilaterally. The proximal left  ureter and renal pelvis are mildly dilated. No stone identified. No  ureteral or perinephritic fat stranding present. No solid lesions. No  calculi. No hydroureteronephrosis.    Bladder:  Decompressed, mass effect from the pelvic ascites.    Reproductive: Mild prostatomegaly    Bowel: Postsurgical changes of partial right hemicolectomy. Long  segment distention of small bowel loops with fecalization and possible  transition near the ileocolic anastomosis Diffuse small and large  bowel wall thickening, edema likely related to portal hypertension.  Diffuse prominence of the bowel loops.    Mesentery/Peritoneum: Large volume ascites. Mass effect of the ascites  on urinary bladder with possible septation (series 3, image 178) and  mild possible peritoneal thickening (series 3, image 173).    Lymph nodes: Scattered small abdominal nodes    Vasculature: Gastroesophageal varices. Scattered atherosclerotic  calcifications of the lower abdominal aorta and iliac vessels.  Normal, without aneurysm or significant atherosclerotic disease.  Extensive collaterals along the anterior abdominal wall.    Bone windows: No new aggressive osseous abnormalities. Mild  degenerative changes throughout the spine. Unchanged sclerosis in the  presumed L4 vertebral body.    Soft tissues: Mild fluid along the inguinal canal, mild anasarca.      Impression    IMPRESSION: In this patient with known hepatocellular carcinoma,  status posttreatment changes of hepatic segment 6:    1. Mild dilated small bowel loops with fecalization, with change in  caliber near the ileocolonic anastomosis in the right upper quadrant,  concerning for possible small bowel obstruction and/or slowed transit.  No pneumatosis or pneumoperitoneum.  2. Prominent pelvic ascites with mass effect on the bladder   underlying peritoneal inflammation cannot be excluded; multiple  peripheral abdominal wall collaterals are demonstrated and image  guided paracentesis is recommended to avoid hemorrhage if clinically  desired.  3. Numerous hepatic hypodensities and posttreatment changes, better  characterized on prior MR dated 1/10/2023  4. Additional findings as  described above include cirrhotic liver,  splenomegaly, chronic right pleural collection, small left pleural  effusion, thickening/prominence of small bowel loops, possibly  congestive/reactive.    Finding discussed with medicine team by Otilia at 2:45 PM 1/24/2023     I have personally reviewed the examination and initial interpretation  and I agree with the findings.    MCKAY JENKINS MD         SYSTEM ID:  [id number]   IR Paracentesis    Narrative    Procedure: 1/24/2023:  1. Ultrasound guided paracentesis.    History: Patient with cirrhosis and HCC with ascites.    Comparisons: CT abdomen 1/24/2022     Staff Radiologist: Dr. Rendon    Resident: Jos Cameron MD    Monitoring: Patient was placed on continuous monitoring supervised by  the IR nursing staff and IR attending. Patient remained stable  throughout the procedure.    Medications: No intravenous sedation was administered. The patient  remained stable throughout the procedure.    Procedure: The patient understood the limitations, alternatives, and  risks of the procedure and requested the procedure be performed. Both  written and oral consent were obtained.    Left lower quadrant was prepped and draped in the usual sterile  fashion. 1% lidocaine was used for local anesthesia. Under ultrasound  guidance, a 5 Telugu Yueh catheter was advanced into the peritoneal  space. The catheter with advanced, and the needle was removed.  Catheter to vacuum drainage. 1,900 cc ascites aspirated. Catheter  removed. Sterile dressing applied. The patient tolerated the procedure  well. No immediate complication.      Impression    IMPRESSION: Ultrasound guided paracentesis. 1900 cc ascites aspirated.   US Abdomen Limited w Doppler Complete    Narrative    EXAMINATION: US ABDOMEN LIMITED WITH DOPPLER COMPLETE, 1/24/2023 6:55  PM     INDICATION: Patient at high risk for HCC. 61 yo with HBV cirrhosis and  HCC, here with acute decompensation including large vol  ascites    COMPARISON: Abdominal ultrasound 5/31/2022, abdominal MR 1/10/2023 and  9/21/2022    TECHNIQUE: The abdomen was scanned in standard fashion with  specialized ultrasound transducer(s) using both grey scale, color  Doppler, and spectral flow techniques.    FINDINGS:    Liver: Nodular contour with coarse in echotexture. Diffusely  hyperechoic hepatic echogenicity. Liver measures 13.2 cm in length.  Right liver lesion measuring 4.0 x 3.4 x 3.8 cm.    Extrahepatic portal vein flow is antegrade, measuring 30 cm/sec.  Right portal vein flow is antegrade, measuring 23 cm/sec.  Left portal vein flow is antegrade, measuring 20 cm/sec.    Hepatic artery flow is antethgthrthathdtheth:th th7th7th cm/sec peak systolic  0.82 resistive index.     The splenic vein is patent with flow towards the liver.  The left,  middle, and right hepatic veins are patent with flow towards the IVC.  The IVC is patent with flow towards the heart.   The aorta is of  normal caliber.    Gallbladder: Nondistended gallbladder shadowing stone. Gallbladder  wall measures 0.4 cm.    Bile Ducts: No intra or extrahepatic biliary tree. The common bile  duct measures 6 mm in diameter.    Pancreas: Partially visualized pancreas is unremarkable.    Kidneys: The right kidney is unremarkable. Mild proximal right  ureterectasis. The craniocaudal dimensions are: right- 10.6 cm    Fluid: Small volume ascites.      Impression    IMPRESSION:   1. Patent hepatic vasculature.  2. Cirrhotic configuration of liver. Small volume ascites.  3. Redemonstration of right-sided hepatic lesion previously described  on abdomen MR as posttreatment change.  4. Cholelithiasis without cholecystitis.    *Recommendations above based on LI-RADS? v2017:  https://www.acr.org/Clinical-Resources/Reporting-and-Data-Systems/LI-R  DS/LI-RADS-Ultrasound-v2017    I have personally reviewed the examination and initial interpretation  and I agree with the findings.    KOJO ALMONTE MD         SYSTEM ID:   [id number]       Discharge Medications   Discharge Medication List as of 1/26/2023 12:54 PM      START taking these medications    Details   Lidocaine (LIDOCARE) 4 % Patch Place 1 patch onto the skin every 24 hours for 4 days To prevent lidocaine toxicity, patient should be patch free for 12 hrs daily.Disp-4 patch, [id number]      oxyCODONE (ROXICODONE) 5 MG tablet Take 1 tablet (5 mg) by mouth daily for 4 days, Disp-4 tablet, R-0, E-Prescribe         CONTINUE these medications which have CHANGED    Details   gabapentin (NEURONTIN) 100 MG capsule Take 3 capsules (300 mg) by mouth 3 times daily, Disp-63 capsule, R-0, E-Prescribe         CONTINUE these medications which have NOT CHANGED    Details   capsaicin (ZOSTRIX) 0.025 % external cream Apply topically 3 times daily, Historical      Capsaicin-Menthol (SALONPAS GEL EX) Historical      carvedilol (COREG) 6.25 MG tablet Take 1 tablet (6.25 mg) by mouth 2 times daily (with meals), Disp-180 tablet, R-3, E-Prescribe      diclofenac (VOLTAREN) 1 % topical gel Apply topically as needed for moderate pain, Historical      furosemide (LASIX) 20 MG tablet Take 1 tablet (20 mg) by mouth daily, Disp-90 tablet, R-3, E-Prescribe      loratadine (CLARITIN) 10 MG tablet Take 10 mg by mouth daily, Historical      polyethylene glycol (MIRALAX) 17 GM/Dose powder Take 17 g by mouth 2 times daily as needed for constipation, Disp-510 g, R-0, E-Prescribe      spironolactone (ALDACTONE) 50 MG tablet Take 1 tablet (50 mg) by mouth daily, Disp-90 tablet, R-3, E-Prescribe      tenofovir (VIREAD) 300 MG tablet Take 1 tablet (300 mg) by mouth daily, Disp-90 tablet, R-3, E-Prescribe      triamcinolone (KENALOG) 0.1 % external cream Apply topically as needed for irritationHistorical           Allergies   Allergies   Allergen Reactions     Crabs [Crustaceans]      Pork (Porcine) Protein Itching and Unknown     Seafood Hives and Unknown     Shellfish Allergy Unknown

## 2023-01-26 NOTE — PROVIDER NOTIFICATION
Dr Benny martinez re pt wanting US to discharge, also asking for oxy d/t shingles pain.  Awaiting response

## 2023-01-26 NOTE — PLAN OF CARE
"BP (!) 121/34 (BP Location: Right arm)   Pulse 68   Temp 97.6  F (36.4  C) (Oral)   Resp 20   Ht 1.651 m (5' 5\")   Wt 60.1 kg (132 lb 6.4 oz)   SpO2 99%   BMI 22.03 kg/m       Time: 2126-1127   Reason for admission: Hepatocellular carcinoma.   Activity: independent.   Pain: denied pain or discomfort.   Neuro: alert and oriented.   Cardiac: WDL  Resp: denied SOB, lung sounds clear bilaterally.   GI/: regular diet, voids without difficulties, patient had BM today, bowel sounds present x four quadrants.   Lines: PIV saline locked.   Skin: WDL X UE bruises and RLE scabbed areas.   Labs/Imaging: BG at 2200 237, received insulin per sliding scale.   New changes to shift; No change.   Plan: Continue with current plan of cares.                         "

## 2023-01-26 NOTE — PLAN OF CARE
"Goal Outcome Evaluation:    /55 (BP Location: Left arm)   Pulse 67   Temp 97.9  F (36.6  C) (Oral)   Resp 18   Ht 1.651 m (5' 5\")   Wt 60.1 kg (132 lb 6.4 oz)   SpO2 97%   BMI 22.03 kg/m      Pt has been vitally stable. Denied N/V and SOB. Complained of abdominal pain at 3-4 but declined tylenol for pain. BG was 183 and 170. Has been sleeping overnight, no BM or U.O. during the night. See if Rocephin can be given early for early discharge. Continue with plan of care.     "

## 2023-01-27 ENCOUNTER — TELEPHONE (OUTPATIENT)
Dept: ONCOLOGY | Facility: CLINIC | Age: 63
End: 2023-01-27
Payer: MEDICARE

## 2023-01-27 NOTE — TELEPHONE ENCOUNTER
Reviewed new referral to med onc.  Mr. Cabrera is known to me.  Has liver-limited HCC treated with local therapies with reasonably good tumor control.  We have met a couple of times.   Has not needed and has not wanted systemic therapy.    We will not plan to see him in med onc clinic for now.  He can be followed by GI and IR and other teams as needed.    Please let me know anytime med onc can help.   But for now, unnecessary visits with med onc only cause patient burden and confusion, and anxiety. He has made it clear he would not want ''chemotherapy'' or systemic treatments, but I am happy to discuss with him again if/ when clinically warranted.    Arturo Lund M.D.   of Medicine  Division of Hematology, Oncology and Transplantation  [facility]

## 2023-01-29 ENCOUNTER — PATIENT OUTREACH (OUTPATIENT)
Dept: CARE COORDINATION | Facility: CLINIC | Age: 63
End: 2023-01-29
Payer: MEDICARE

## 2023-01-29 LAB
BACTERIA BLD CULT: NO GROWTH
BACTERIA BLD CULT: NO GROWTH
BACTERIA FLD CULT: NO GROWTH

## 2023-01-29 NOTE — PROGRESS NOTES
Connected Care Resource Center Contact  [facility]/Voicemail     Clinical Data: Transitional Care Management Outreach     Outreach attempted x 2.  Called patient through [employer], ID# [id number], no answer and unable to leave a message as voicemail was full.          Plan:  [facility] will do no further outreaches at this time.       Rachelle Sequeira RN  [facility] Resource [place], [facility]    *Connected Care Resource Team does NOT follow patient ongoing. Referrals are identified based on internal discharge reports and the outreach is to ensure patient has an understanding of their discharge instructions.

## 2023-01-30 ENCOUNTER — APPOINTMENT (OUTPATIENT)
Dept: GENERAL RADIOLOGY | Facility: CLINIC | Age: 63
DRG: 389 | End: 2023-01-30
Payer: MEDICARE

## 2023-01-30 ENCOUNTER — APPOINTMENT (OUTPATIENT)
Dept: GENERAL RADIOLOGY | Facility: CLINIC | Age: 63
DRG: 389 | End: 2023-01-30
Attending: EMERGENCY MEDICINE
Payer: MEDICARE

## 2023-01-30 ENCOUNTER — HOSPITAL ENCOUNTER (INPATIENT)
Facility: CLINIC | Age: 63
LOS: 6 days | Discharge: HOME | DRG: 389 | End: 2023-02-05
Attending: EMERGENCY MEDICINE | Admitting: STUDENT IN AN ORGANIZED HEALTH CARE EDUCATION/TRAINING PROGRAM
Payer: MEDICARE

## 2023-01-30 ENCOUNTER — APPOINTMENT (OUTPATIENT)
Dept: INTERPRETER SERVICES | Facility: CLINIC | Age: 63
DRG: 389 | End: 2023-01-30
Payer: MEDICARE

## 2023-01-30 ENCOUNTER — APPOINTMENT (OUTPATIENT)
Dept: CT IMAGING | Facility: CLINIC | Age: 63
DRG: 389 | End: 2023-01-30
Attending: EMERGENCY MEDICINE
Payer: MEDICARE

## 2023-01-30 DIAGNOSIS — R18.8 OTHER ASCITES: ICD-10-CM

## 2023-01-30 DIAGNOSIS — C22.0 HCC (HEPATOCELLULAR CARCINOMA) (H): ICD-10-CM

## 2023-01-30 DIAGNOSIS — R10.84 GENERALIZED ABDOMINAL PAIN: ICD-10-CM

## 2023-01-30 DIAGNOSIS — I85.11 SECONDARY ESOPHAGEAL VARICES WITH BLEEDING (H): ICD-10-CM

## 2023-01-30 DIAGNOSIS — K59.00 CONSTIPATION, UNSPECIFIED CONSTIPATION TYPE: ICD-10-CM

## 2023-01-30 DIAGNOSIS — C22.0 HCC (HEPATOCELLULAR CARCINOMA) (H): Primary | ICD-10-CM

## 2023-01-30 DIAGNOSIS — R18.8 OTHER ASCITES: Primary | ICD-10-CM

## 2023-01-30 DIAGNOSIS — K56.609 SBO (SMALL BOWEL OBSTRUCTION) (H): ICD-10-CM

## 2023-01-30 LAB
ABSOLUTE NEUTROPHILS, BODY FLUID: 5.3 /UL
ALBUMIN BODY FLUID SOURCE: NORMAL
ALBUMIN FLD-MCNC: 0.3 G/DL
ALBUMIN SERPL BCG-MCNC: 2.5 G/DL (ref 3.5–5.2)
ALBUMIN UR-MCNC: 10 MG/DL
ALP SERPL-CCNC: 130 U/L (ref 40–129)
ALT SERPL W P-5'-P-CCNC: 22 U/L (ref 10–50)
ANION GAP SERPL CALCULATED.3IONS-SCNC: 6 MMOL/L (ref 7–15)
APPEARANCE FLD: ABNORMAL
APPEARANCE UR: CLEAR
AST SERPL W P-5'-P-CCNC: 39 U/L (ref 10–50)
BASOPHILS # BLD AUTO: 0 10E3/UL (ref 0–0.2)
BASOPHILS NFR BLD AUTO: 0 %
BASOPHILS NFR FLD MANUAL: 1 %
BILIRUB SERPL-MCNC: 2 MG/DL
BILIRUB UR QL STRIP: NEGATIVE
BUN SERPL-MCNC: 11.8 MG/DL (ref 8–23)
CALCIUM SERPL-MCNC: 7.7 MG/DL (ref 8.8–10.2)
CELL COUNT BODY FLUID SOURCE: ABNORMAL
CHLORIDE SERPL-SCNC: 100 MMOL/L (ref 98–107)
COLOR FLD: ABNORMAL
COLOR UR AUTO: YELLOW
CREAT BLD-MCNC: 1 MG/DL (ref 0.7–1.3)
CREAT SERPL-MCNC: 0.95 MG/DL (ref 0.67–1.17)
DEPRECATED HCO3 PLAS-SCNC: 26 MMOL/L (ref 22–29)
EOSINOPHIL # BLD AUTO: 0.2 10E3/UL (ref 0–0.7)
EOSINOPHIL NFR BLD AUTO: 2 %
EOSINOPHIL NFR FLD MANUAL: 2 %
ERYTHROCYTE [DISTWIDTH] IN BLOOD BY AUTOMATED COUNT: 20.1 % (ref 10–15)
GFR SERPL CREATININE-BSD FRML MDRD: >60 ML/MIN/1.73M2
GFR SERPL CREATININE-BSD FRML MDRD: >90 ML/MIN/1.73M2
GLUCOSE BLDC GLUCOMTR-MCNC: 107 MG/DL (ref 70–99)
GLUCOSE SERPL-MCNC: 194 MG/DL (ref 70–99)
GLUCOSE UR STRIP-MCNC: NEGATIVE MG/DL
GRAM STAIN RESULT: NORMAL
GRAM STAIN RESULT: NORMAL
HCT VFR BLD AUTO: 30 % (ref 40–53)
HGB BLD-MCNC: 8.7 G/DL (ref 13.3–17.7)
HGB UR QL STRIP: ABNORMAL
HOLD SPECIMEN: NORMAL
IMM GRANULOCYTES # BLD: 0.1 10E3/UL
IMM GRANULOCYTES NFR BLD: 1 %
INR PPP: 1.8 (ref 0.85–1.15)
KETONES UR STRIP-MCNC: NEGATIVE MG/DL
LACTATE SERPL-SCNC: 1.5 MMOL/L (ref 0.7–2)
LACTATE SERPL-SCNC: 2.1 MMOL/L (ref 0.7–2)
LACTATE SERPL-SCNC: 2.3 MMOL/L (ref 0.7–2)
LACTATE SERPL-SCNC: 2.5 MMOL/L (ref 0.7–2)
LEUKOCYTE ESTERASE UR QL STRIP: NEGATIVE
LIPASE SERPL-CCNC: 42 U/L (ref 13–60)
LYMPHOCYTES # BLD AUTO: 0.8 10E3/UL (ref 0.8–5.3)
LYMPHOCYTES NFR BLD AUTO: 8 %
LYMPHOCYTES NFR FLD MANUAL: 19 %
MCH RBC QN AUTO: 23.1 PG (ref 26.5–33)
MCHC RBC AUTO-ENTMCNC: 29 G/DL (ref 31.5–36.5)
MCV RBC AUTO: 80 FL (ref 78–100)
MONOCYTES # BLD AUTO: 1.1 10E3/UL (ref 0–1.3)
MONOCYTES NFR BLD AUTO: 11 %
MONOS+MACROS NFR FLD MANUAL: NORMAL %
NEUTROPHILS # BLD AUTO: 7.7 10E3/UL (ref 1.6–8.3)
NEUTROPHILS NFR BLD AUTO: 78 %
NEUTS BAND NFR FLD MANUAL: 17 %
NITRATE UR QL: NEGATIVE
NRBC # BLD AUTO: 0 10E3/UL
NRBC BLD AUTO-RTO: 0 /100
OTHER CELLS FLD MANUAL: 63 %
PH UR STRIP: 7.5 [PH] (ref 5–7)
PLATELET # BLD AUTO: 77 10E3/UL (ref 150–450)
POTASSIUM SERPL-SCNC: 4 MMOL/L (ref 3.4–5.3)
PROT FLD-MCNC: 0.7 G/DL
PROT SERPL-MCNC: 7 G/DL (ref 6.4–8.3)
PROTEIN BODY FLUID SOURCE: NORMAL
RBC # BLD AUTO: 3.76 10E6/UL (ref 4.4–5.9)
RBC URINE: 14 /HPF
SODIUM SERPL-SCNC: 132 MMOL/L (ref 136–145)
SP GR UR STRIP: 1 (ref 1–1.03)
UROBILINOGEN UR STRIP-MCNC: NORMAL MG/DL
WBC # BLD AUTO: 9.9 10E3/UL (ref 4–11)
WBC # FLD AUTO: 32 /UL
WBC URINE: 3 /HPF

## 2023-01-30 PROCEDURE — 96374 THER/PROPH/DIAG INJ IV PUSH: CPT | Mod: 59 | Performed by: EMERGENCY MEDICINE

## 2023-01-30 PROCEDURE — 96361 HYDRATE IV INFUSION ADD-ON: CPT | Performed by: EMERGENCY MEDICINE

## 2023-01-30 PROCEDURE — 83690 ASSAY OF LIPASE: CPT | Performed by: EMERGENCY MEDICINE

## 2023-01-30 PROCEDURE — 0W9G3ZZ DRAINAGE OF PERITONEAL CAVITY, PERCUTANEOUS APPROACH: ICD-10-PCS | Performed by: INTERNAL MEDICINE

## 2023-01-30 PROCEDURE — 84157 ASSAY OF PROTEIN OTHER: CPT

## 2023-01-30 PROCEDURE — 999N000065 XR ABDOMEN PORT 1 VIEW

## 2023-01-30 PROCEDURE — 82565 ASSAY OF CREATININE: CPT

## 2023-01-30 PROCEDURE — 88112 CYTOPATH CELL ENHANCE TECH: CPT | Mod: TC

## 2023-01-30 PROCEDURE — 96375 TX/PRO/DX INJ NEW DRUG ADDON: CPT | Performed by: EMERGENCY MEDICINE

## 2023-01-30 PROCEDURE — 49083 ABD PARACENTESIS W/IMAGING: CPT | Performed by: INTERNAL MEDICINE

## 2023-01-30 PROCEDURE — 81001 URINALYSIS AUTO W/SCOPE: CPT | Performed by: EMERGENCY MEDICINE

## 2023-01-30 PROCEDURE — 87070 CULTURE OTHR SPECIMN AEROBIC: CPT

## 2023-01-30 PROCEDURE — 83605 ASSAY OF LACTIC ACID: CPT | Performed by: EMERGENCY MEDICINE

## 2023-01-30 PROCEDURE — 250N000011 HC RX IP 250 OP 636: Performed by: STUDENT IN AN ORGANIZED HEALTH CARE EDUCATION/TRAINING PROGRAM

## 2023-01-30 PROCEDURE — 258N000003 HC RX IP 258 OP 636

## 2023-01-30 PROCEDURE — 74018 RADEX ABDOMEN 1 VIEW: CPT | Mod: 26 | Performed by: RADIOLOGY

## 2023-01-30 PROCEDURE — 96376 TX/PRO/DX INJ SAME DRUG ADON: CPT | Performed by: EMERGENCY MEDICINE

## 2023-01-30 PROCEDURE — 85610 PROTHROMBIN TIME: CPT | Performed by: INTERNAL MEDICINE

## 2023-01-30 PROCEDURE — 85041 AUTOMATED RBC COUNT: CPT | Performed by: EMERGENCY MEDICINE

## 2023-01-30 PROCEDURE — 36415 COLL VENOUS BLD VENIPUNCTURE: CPT | Performed by: EMERGENCY MEDICINE

## 2023-01-30 PROCEDURE — 250N000013 HC RX MED GY IP 250 OP 250 PS 637

## 2023-01-30 PROCEDURE — 120N000002 HC R&B MED SURG/OB UMMC

## 2023-01-30 PROCEDURE — 82042 OTHER SOURCE ALBUMIN QUAN EA: CPT

## 2023-01-30 PROCEDURE — 36415 COLL VENOUS BLD VENIPUNCTURE: CPT

## 2023-01-30 PROCEDURE — 99221 1ST HOSP IP/OBS SF/LOW 40: CPT | Mod: GC | Performed by: SURGERY

## 2023-01-30 PROCEDURE — 258N000003 HC RX IP 258 OP 636: Performed by: STUDENT IN AN ORGANIZED HEALTH CARE EDUCATION/TRAINING PROGRAM

## 2023-01-30 PROCEDURE — 99223 1ST HOSP IP/OBS HIGH 75: CPT | Mod: GC | Performed by: INTERNAL MEDICINE

## 2023-01-30 PROCEDURE — 83605 ASSAY OF LACTIC ACID: CPT

## 2023-01-30 PROCEDURE — 250N000011 HC RX IP 250 OP 636

## 2023-01-30 PROCEDURE — 99222 1ST HOSP IP/OBS MODERATE 55: CPT | Mod: AI | Performed by: INTERNAL MEDICINE

## 2023-01-30 PROCEDURE — 250N000011 HC RX IP 250 OP 636: Performed by: EMERGENCY MEDICINE

## 2023-01-30 PROCEDURE — 87205 SMEAR GRAM STAIN: CPT

## 2023-01-30 PROCEDURE — 74177 CT ABD & PELVIS W/CONTRAST: CPT | Mod: 26 | Performed by: RADIOLOGY

## 2023-01-30 PROCEDURE — 258N000003 HC RX IP 258 OP 636: Performed by: EMERGENCY MEDICINE

## 2023-01-30 PROCEDURE — 74177 CT ABD & PELVIS W/CONTRAST: CPT | Mod: MG

## 2023-01-30 PROCEDURE — 85014 HEMATOCRIT: CPT | Performed by: EMERGENCY MEDICINE

## 2023-01-30 PROCEDURE — 80053 COMPREHEN METABOLIC PANEL: CPT | Performed by: EMERGENCY MEDICINE

## 2023-01-30 PROCEDURE — 88112 CYTOPATH CELL ENHANCE TECH: CPT | Mod: 26 | Performed by: PATHOLOGY

## 2023-01-30 PROCEDURE — 250N000009 HC RX 250: Performed by: EMERGENCY MEDICINE

## 2023-01-30 PROCEDURE — 87075 CULTR BACTERIA EXCEPT BLOOD: CPT

## 2023-01-30 PROCEDURE — 89050 BODY FLUID CELL COUNT: CPT

## 2023-01-30 PROCEDURE — 99285 EMERGENCY DEPT VISIT HI MDM: CPT | Performed by: EMERGENCY MEDICINE

## 2023-01-30 PROCEDURE — 88305 TISSUE EXAM BY PATHOLOGIST: CPT | Mod: 26 | Performed by: PATHOLOGY

## 2023-01-30 PROCEDURE — 99285 EMERGENCY DEPT VISIT HI MDM: CPT | Mod: 25 | Performed by: EMERGENCY MEDICINE

## 2023-01-30 PROCEDURE — 88305 TISSUE EXAM BY PATHOLOGIST: CPT | Mod: TC

## 2023-01-30 RX ORDER — SODIUM CHLORIDE, SODIUM LACTATE, POTASSIUM CHLORIDE, CALCIUM CHLORIDE 600; 310; 30; 20 MG/100ML; MG/100ML; MG/100ML; MG/100ML
INJECTION, SOLUTION INTRAVENOUS CONTINUOUS
Status: ACTIVE | OUTPATIENT
Start: 2023-01-30 | End: 2023-01-31

## 2023-01-30 RX ORDER — FUROSEMIDE 20 MG
20 TABLET ORAL DAILY
Status: DISCONTINUED | OUTPATIENT
Start: 2023-01-30 | End: 2023-02-02

## 2023-01-30 RX ORDER — GABAPENTIN 300 MG/1
300 CAPSULE ORAL 3 TIMES DAILY
Status: DISCONTINUED | OUTPATIENT
Start: 2023-01-30 | End: 2023-02-05 | Stop reason: HOSPADM

## 2023-01-30 RX ORDER — SPIRONOLACTONE 25 MG/1
50 TABLET ORAL DAILY
Status: DISCONTINUED | OUTPATIENT
Start: 2023-01-30 | End: 2023-02-02

## 2023-01-30 RX ORDER — CARVEDILOL 6.25 MG/1
6.25 TABLET ORAL 2 TIMES DAILY WITH MEALS
Status: DISCONTINUED | OUTPATIENT
Start: 2023-01-30 | End: 2023-02-05 | Stop reason: HOSPADM

## 2023-01-30 RX ORDER — ONDANSETRON 2 MG/ML
4 INJECTION INTRAMUSCULAR; INTRAVENOUS EVERY 30 MIN PRN
Status: DISCONTINUED | OUTPATIENT
Start: 2023-01-30 | End: 2023-02-05 | Stop reason: HOSPADM

## 2023-01-30 RX ORDER — ONDANSETRON 2 MG/ML
4 INJECTION INTRAMUSCULAR; INTRAVENOUS ONCE
Status: COMPLETED | OUTPATIENT
Start: 2023-01-30 | End: 2023-01-30

## 2023-01-30 RX ORDER — CEFTRIAXONE 1 G/1
1 INJECTION, POWDER, FOR SOLUTION INTRAMUSCULAR; INTRAVENOUS EVERY 24 HOURS
Status: DISCONTINUED | OUTPATIENT
Start: 2023-01-30 | End: 2023-01-31

## 2023-01-30 RX ORDER — SODIUM CHLORIDE, SODIUM LACTATE, POTASSIUM CHLORIDE, CALCIUM CHLORIDE 600; 310; 30; 20 MG/100ML; MG/100ML; MG/100ML; MG/100ML
INJECTION, SOLUTION INTRAVENOUS CONTINUOUS
Status: DISCONTINUED | OUTPATIENT
Start: 2023-01-30 | End: 2023-01-30

## 2023-01-30 RX ORDER — IOPAMIDOL 755 MG/ML
69 INJECTION, SOLUTION INTRAVASCULAR ONCE
Status: COMPLETED | OUTPATIENT
Start: 2023-01-30 | End: 2023-01-30

## 2023-01-30 RX ORDER — TENOFOVIR DISOPROXIL FUMARATE 300 MG/1
300 TABLET, FILM COATED ORAL DAILY
Status: DISCONTINUED | OUTPATIENT
Start: 2023-01-30 | End: 2023-02-05 | Stop reason: HOSPADM

## 2023-01-30 RX ORDER — HYDROMORPHONE HYDROCHLORIDE 1 MG/ML
0.5 INJECTION, SOLUTION INTRAMUSCULAR; INTRAVENOUS; SUBCUTANEOUS
Status: DISCONTINUED | OUTPATIENT
Start: 2023-01-30 | End: 2023-01-30

## 2023-01-30 RX ORDER — LIDOCAINE 40 MG/G
CREAM TOPICAL
Status: DISCONTINUED | OUTPATIENT
Start: 2023-01-30 | End: 2023-02-05 | Stop reason: HOSPADM

## 2023-01-30 RX ADMIN — IOPAMIDOL 69 ML: 755 INJECTION, SOLUTION INTRAVENOUS at 03:55

## 2023-01-30 RX ADMIN — SODIUM CHLORIDE 1000 ML: 9 INJECTION, SOLUTION INTRAVENOUS at 02:22

## 2023-01-30 RX ADMIN — CARVEDILOL 6.25 MG: 6.25 TABLET, FILM COATED ORAL at 08:30

## 2023-01-30 RX ADMIN — HYDROMORPHONE HYDROCHLORIDE 0.5 MG: 1 INJECTION, SOLUTION INTRAMUSCULAR; INTRAVENOUS; SUBCUTANEOUS at 03:36

## 2023-01-30 RX ADMIN — SODIUM CHLORIDE, POTASSIUM CHLORIDE, SODIUM LACTATE AND CALCIUM CHLORIDE: 600; 310; 30; 20 INJECTION, SOLUTION INTRAVENOUS at 05:34

## 2023-01-30 RX ADMIN — CEFTRIAXONE SODIUM 1 G: 1 INJECTION, POWDER, FOR SOLUTION INTRAMUSCULAR; INTRAVENOUS at 13:42

## 2023-01-30 RX ADMIN — HYDROMORPHONE HYDROCHLORIDE 1 MG: 1 INJECTION, SOLUTION INTRAMUSCULAR; INTRAVENOUS; SUBCUTANEOUS at 02:09

## 2023-01-30 RX ADMIN — HYDROMORPHONE HYDROCHLORIDE 1 MG: 1 INJECTION, SOLUTION INTRAMUSCULAR; INTRAVENOUS; SUBCUTANEOUS at 08:55

## 2023-01-30 RX ADMIN — SODIUM CHLORIDE, POTASSIUM CHLORIDE, SODIUM LACTATE AND CALCIUM CHLORIDE: 600; 310; 30; 20 INJECTION, SOLUTION INTRAVENOUS at 19:25

## 2023-01-30 RX ADMIN — HYDROMORPHONE HYDROCHLORIDE 1 MG: 1 INJECTION, SOLUTION INTRAMUSCULAR; INTRAVENOUS; SUBCUTANEOUS at 21:47

## 2023-01-30 RX ADMIN — HYDROMORPHONE HYDROCHLORIDE 1 MG: 1 INJECTION, SOLUTION INTRAMUSCULAR; INTRAVENOUS; SUBCUTANEOUS at 11:59

## 2023-01-30 RX ADMIN — ONDANSETRON 4 MG: 2 INJECTION INTRAMUSCULAR; INTRAVENOUS at 02:23

## 2023-01-30 RX ADMIN — SODIUM CHLORIDE, PRESERVATIVE FREE 67 ML: 5 INJECTION INTRAVENOUS at 03:57

## 2023-01-30 RX ADMIN — CARVEDILOL 6.25 MG: 6.25 TABLET, FILM COATED ORAL at 19:23

## 2023-01-30 RX ADMIN — GABAPENTIN 300 MG: 300 CAPSULE ORAL at 13:43

## 2023-01-30 RX ADMIN — TENOFOVIR DISOPROXIL FUMARATE 300 MG: 300 TABLET, FILM COATED ORAL at 08:30

## 2023-01-30 RX ADMIN — SODIUM CHLORIDE, POTASSIUM CHLORIDE, SODIUM LACTATE AND CALCIUM CHLORIDE: 600; 310; 30; 20 INJECTION, SOLUTION INTRAVENOUS at 17:35

## 2023-01-30 RX ADMIN — GABAPENTIN 300 MG: 300 CAPSULE ORAL at 19:23

## 2023-01-30 RX ADMIN — GABAPENTIN 300 MG: 300 CAPSULE ORAL at 08:30

## 2023-01-30 ASSESSMENT — ACTIVITIES OF DAILY LIVING (ADL)
ADLS_ACUITY_SCORE: 24
DRESSING/BATHING_DIFFICULTY: NO
ADLS_ACUITY_SCORE: 24
ADLS_ACUITY_SCORE: 39
FALL_HISTORY_WITHIN_LAST_SIX_MONTHS: NO
DIFFICULTY_EATING/SWALLOWING: NO
CHANGE_IN_FUNCTIONAL_STATUS_SINCE_ONSET_OF_CURRENT_ILLNESS/INJURY: YES
DOING_ERRANDS_INDEPENDENTLY_DIFFICULTY: NO
ADLS_ACUITY_SCORE: 35
ADLS_ACUITY_SCORE: 24
CONCENTRATING,_REMEMBERING_OR_MAKING_DECISIONS_DIFFICULTY: NO
ADLS_ACUITY_SCORE: 35
ADLS_ACUITY_SCORE: 35
WALKING_OR_CLIMBING_STAIRS_DIFFICULTY: NO
ADLS_ACUITY_SCORE: 24
WEAR_GLASSES_OR_BLIND: NO
ADLS_ACUITY_SCORE: 24
TOILETING_ISSUES: NO

## 2023-01-30 NOTE — CONSULTS
Oncology  Consult Note   Date of Service: 01/30/2023    Patient: Rishabh Cabrera  MRN: [id number]  Admission Date: 1/30/2023  Hospital Day # 0  Cancer Diagnosis: HCC, liver limited   Primary Outpatient Oncologist:   Current Treatment Plan: symptomatic management    Reason for Consult: Recurrent abdominal pain ascites and small bowel obstruction related to HCC      History of Present Illness:    Rishabh Cabrera is a 62 year old year old male with liver limited hepatocellular carcinoma who had received local therapies and had refused any chemo or immunotherapy is admitted with abdominal pain, ascites and small bowel obstruction.    Currently his abdomen was distended and CT scan was concerning for SBO and large volume ascites.  He underwent paracenteses and 1900 cc was removed.  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  He is not a candidate for any surgical intervention.    Oncologic History:  -Hepatitis B initially treated with tenofovir on 5/2021 June 2021 MRI consistent with suspicion for hepatocellular carcinoma  Bone scan with no bone lesions CT chest had a left apical spiculated lesion which was biopsied proven and negative for malignancy.    -9/9/2021 liver limited hepatocellular carcinoma-received Y 92 right liver lobe  -12/2021 had TACE  -Several missed appointments  -Previously discussed about lung biopsy and possibly resect/radiated lung primary if it was a limited stage lung cancer.    -Systemic therapy was discussed with single agent immunotherapy but patient had refused any cancer directed treatment.    Review of Systems:  A comprehensive ROS was performed and found to be negative or non-contributory with the exception of that noted in the HPI above.    Past Medical History:  Past Medical History:   Diagnosis Date     Cancer (H)      Chronic hepatitis B (H)      Diabetes mellitus (H)        Past Surgical  History:  Past Surgical History:   Procedure Laterality Date     COLON SURGERY Right 08/05/2015    Laparoscopic hand assisted right Hemicolectomy at [facility]     ESOPHAGOSCOPY, GASTROSCOPY, DUODENOSCOPY (EGD), COMBINED N/A 05/29/2019    Procedure: ESOPHAGOGASTRODUODENOSCOPY (EGD);  Surgeon: Leventhal, Thomas Michael, MD;  Location: [facility]     IR CHEMO EMBOLIZATION  12/02/2021     IR PARACENTESIS  01/24/2023     IR SIRT (SELECTIVE INTERNAL RADIO THERAPY)  09/08/2021     IR VISCERAL ANGIOGRAM  09/08/2021     IR VISCERAL EMBOLIZATION  09/09/2021       Social History:  Social History     Socioeconomic History     Marital status:    Tobacco Use     Smoking status: Never     Smokeless tobacco: Never   Substance and Sexual Activity     Alcohol use: No     Drug use: Not Currently     Types: Marijuana     Comment: past marijuana use     Sexual activity: Not Currently     Social Determinants of Health     Intimate Partner Violence: Not At Risk     Fear of Current or Ex-Partner: No     Emotionally Abused: No     Physically Abused: No     Sexually Abused: No        Family History  No family history on file.    Outpatient Medications:  Reviewed     Physical Exam:    Blood pressure 129/73, pulse 91, temperature 97.9  F (36.6  C), temperature source Oral, resp. rate 18, SpO2 92 %.  General: alert and cooperative, lying in bed, no acute distress  HEENT: sclera anicteric, EOMI, MMM  Neck: supple, normal ROM  CV: RRR, no murmurs  Resp: CTAB, normal respiratory effort on ambient air  GI:distended  MSK: warm and well-perfused, normal tone  Skin: no rashes on limited exam, no jaundice  Neuro: Alert and interactive, moves all extremities equally, no focal deficits    Labs & Studies: I personally reviewed the following studies:  ROUTINE LABS (Last four results):  CMP  Recent Labs   Lab 01/30/23  0211 01/26/23  0625 01/26/23  0153 01/25/23  2221 01/25/23  1343 01/25/23  0943 01/24/23  1901 01/24/23  0425   *  --   --    --   --  137  --  136   POTASSIUM 4.0  --   --   --   --  4.0  --  4.1   CHLORIDE 100  --   --   --   --  106  --  105   CO2 26  --   --   --   --  25  --  26   ANIONGAP 6*  --   --   --   --  6*  --  5*   * 170* 183* 237*   < > 208*   < > 216*   BUN 11.8  --   --   --   --  17.9  --  11.5   CR 0.95  1.0  --   --   --   --  0.87  --  0.94   GFRESTIMATED >60  >90  --   --   --   --  >90  --  >90   MANDY 7.7*  --   --   --   --  6.9*  --  7.9*   PROTTOTAL 7.0  --   --   --   --  5.5*  --  6.9   ALBUMIN 2.5*  --   --   --   --  1.8*  --  2.3*   BILITOTAL 2.0*  --   --   --   --  0.7  --  1.1   ALKPHOS 130*  --   --   --   --  94  --  142*   AST 39  --   --   --   --  24  --  31   ALT 22  --   --   --   --  18  --  16    < > = values in this interval not displayed.     CBC  Recent Labs   Lab 01/30/23 0211 01/26/23  0430 01/25/23  0943 01/24/23  0425   WBC 9.9 4.5 5.5 15.3*   RBC 3.76* 3.15* 3.14* 3.48*   HGB 8.7* 7.5* 7.4* 8.4*   HCT 30.0* 25.1* 25.8* 28.0*   MCV 80 80 82 81   MCH 23.1* 23.8* 23.6* 24.1*   MCHC 29.0* 29.9* 28.7* 30.0*   RDW 20.1* 20.8* 20.8* 20.7*   PLT 77* 58* 53* 79*     INR  Recent Labs   Lab 01/30/23 0211 01/25/23  0943 01/24/23  1120   INR 1.80* 2.02* 1.98*       Assessment & Plan:   Rishabh P Rick is a 62 year old male with hepatitis B associated hepatocellular carcinoma with liver limited disease is admitted with acute abdominal pain and distention.  He was found to have ascites, SBP and small bowel obstruction.     Oncology is consulted regarding the recurrent abdominal pain, ascites  Whether it was related to underlying malignancy.    # Hepatocellular carcinoma , liver limited  # H/o spiculated Rt apical lung nodule   He had locoregional therapies in the liver and has been stable for approximately 6 to 7 months with liver directed therapies. Due to history of bleeding and hemoptysis VEGF inhibitor was not an option, single agent immunotherapy was discussed but patient had refused any cancer  directed chemotherapy or immunotherapy.    Currently his HCC in the liver is stable and his AFP is 1.8 , he does not have any worsening of his hepatocellular carcinoma.  Also he is not interested in any chemotherapy or immunotherapy and with child Olivera C cirrhosis unfortunately we have limited options.    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    Unfortunately his symptoms of abdominal pain and ascites is related to his underlying cirrhosis and liver failure.    # SBO  # H/o hemicolectomy for large polyp  Had a history of previous right hemicolectomy for a large polyp and CT abdomen pelvis is concerning for an SBO CT showed a transition point near the previous ileocolic anastomosis.  Surgery was consulted and since he is Deepak C cirrhosis recommended conservative management without any surgical intervention.    Recommendations:   -Abdominal pain and ascites and recurrent symptoms most related to his underlying cirrhosis which is worsening.  - He does not have any progression of Hepatocellular carcinoma and AFP is within normal range.   -He is currently Child Olivera class C which makes pretty much difficult to tolerate any chemo or immunotherapy  -Single agent immunotherapy was offered the patient earlier and patient had refused any cancer directed chemotherapy or immunotherapy.  -Recommend continuing supportive cares and symptom management.      Patient was seen and plan of care was discussed with attending physician Dr. Chapman.    Daquan Chang MD  Hematology/Oncology/Transplant Fellow   Pgr :: [phone]

## 2023-01-30 NOTE — PLAN OF CARE
Admit to 7D: 1300  Admitted/transferred from: ED  2 RN skin assessment completed by Jalyn Reddy, RN and Ruddy RN.  Skin assessment finding: diffuse bruising on BLE, abrasions on bilat shins and elbows   Interventions/actions: educated on frequent repositioning     Bedside Emergency Equipment Present:  Suction Regulator: YES  Suction Canister: YES  Tubing between Regulator and Canister: YES  O2 Regulator with Tree: YES  Ambu Bag: YES       /73 (BP Location: Left arm)   Pulse 91   Temp 97.9  F (36.6  C) (Oral)   Resp 18   SpO2 92%      Reason for admission: SBO r/t HBV cirrhosis and ascites  Neuro: AOx4, makes needs known   Pain: c/o moderate pain in abdomen  CMS: intact  Cardiac: WDL  Resp: O2>90 on RA  GI/: AUOP, intermittently incontinent of urine, LBM 1/29/2022, -flatus this admission, NG LIS green/brown output  Skin/Wound: diffuse bruising and abrasion   Access: PIV infusing LR  Labs: ascites fluid sent, scheduled lactic 1.5  (high this AM), needs UA  Activity: Up ax1  Nutrition: NPO  Changes this shift: admit to floor, 2.3 L off via paracentesis at bedside  Plan:  Monitor for signs of infection, await ROBF, treat pain

## 2023-01-30 NOTE — PROGRESS NOTES
EGS Brief Update Note    See consult note for rest of plans.    If patient receives paracentesis today, we will wait to see how he responds with bowel function after paracentesis, as that has worked in the past for relief of obstruction. If he doesn't get relief we will perform GGC.    If patient doesn't receive paracentesis today, we will do a GGC overnight.    Call with any questions.    Dandre Mark, PGY-1  General Surgery Resident   Pager: [phone]

## 2023-01-30 NOTE — H&P
[facility]    History and Physical - Medicine Service, [place] TEAM        Date of Admission:  1/30/2023    Assessment & Plan      Patient is a 63 yo male with PMH notable for HBV-associated cirrhosis, HCC, ascites, EV s/p banding (Septmeber '22), T2DM, and herpes zoster admitted with acute abdominal pain and distension. Etiology for presenting symptoms possibly multifactorial, including ascites, SBP, and SBO. He is hemodynamically stable on admission.     Abdominal pain  Decompensated cirrhosis 2/2 chronic HBV infection c/b hepatocellular carcinoma  Esophageal varices s/p banding (9/2022)  Patient presents with abdominal pain in the last 24 hours. Recent admission (01/24-01/26) for same concerns and imaging notable for ascites and concerning for SBO. Symptoms improved with paracentesis and there was no indication for surgical intervention of NG placement. CT A/P on presentation (01/30) notable for abdominal ascites and worsening small bowel distention concerning for small bowel obstruction. Low concern for SBP at this time given unremarkable labs.  -PTA Tenofovir  -General Surgery Consult, appreciate recs  -CAPs Consult for Diagnostic/Therapeutic Paracentesis  -NPO  -Holding PTA Lasix and Spironolactone  -IV Dilaudid 1mg q2h prn    MELD-Na score: 14 at 1/25/2023  9:43 AM  MELD score: 14 at 1/25/2023  9:43 AM  Calculated from:  Serum Creatinine: 0.87 mg/dL (Using min of 1 mg/dL) at 1/25/2023  9:43 AM  Serum Sodium: 137 mmol/L at 1/25/2023  9:43 AM  Total Bilirubin: 0.7 mg/dL (Using min of 1 mg/dL) at 1/25/2023  9:43 AM  INR(ratio): 2.02 at 1/25/2023  9:43 AM  Age: 62 years      Hyponatremia   In setting of poor oral intake   -CTM     Previous herpes zoster of L T4 dermatome  Documented infection, treated May '22. Here complaining of pain with similar distribution, exam shows prior healed lesions, no active lesions.  - Continue PTA gabapentin TID       T2DM  Hgb A1c  6.8 (01/25/23).  -MDSSI  -Holding PTA Metformin  -Hypoglycemia protocol        Diet:  NPO  DVT Prophylaxis: SCD pending platelet count (Lab pending)  Davalos Catheter: Not present  Fluids: mIVF LR @100cc/hr  Lines: None     Cardiac Monitoring: None  Code Status: Full Code      Clinically Significant Risk Factors Present on Admission              # Hypoalbuminemia: Lowest albumin = 2.5 g/dL at 1/30/2023  2:11 AM, will monitor as appropriate         # DMII: A1C = 6.8 % (Ref range: <5.7 %) within past 3 months            Disposition Plan      Expected Discharge Date: 02/01/2023                Patient to be staffed in the am      Cuca Hernandez MD  Medicine Service, [facility]  Securely message with [employer] (more info)  Text page via [facility] Paging/Directory   See signed in provider for up to date coverage information  ______________________________________________________________________    Chief Complaint   Abdominal Pain    History is obtained from the patient    History of Present Illness   Rishabh Cabrera is a 62 year old male with a PMH of chronic hepatitis B-related decompensated cirrhosis decompensated ascites and esophageal varices, hepatocelluar carcinoma, T2DM, and herpes zoster(2022) who presents with  abdominal pain.     Recent hospitalization for abdominal pain (01/24-01/26). CT A/P during that hospitalization notable for ascites and concerning for SBO. Symptoms improved with paracentesis and there was no indication for surgical intervention or NG placement. Patient states that the abdominal pain did not significantly improve after discharge and worsened in the last 24 hours. States he took  Some pain medications at home however name of pain medication unclear to writer. Endorses nausea and vomiting, non-bloody emesis and poor oral intake. Last bowel movement on 01/29. Denies any fever, chills or SOB.     ED COURSE  -VS: TEMP 97.6, HR 98, RR 18, /101,  100% RA  -Labs: Lactate 2.3,   -Imaging: CT A/P with worsening SBO and ascites  -Intervention:1L NS, Dialudid 1mg x2, zofran      Past Medical History    Past Medical History:   Diagnosis Date     Cancer (H)      Chronic hepatitis B (H)      Diabetes mellitus (H)        Past Surgical History   Past Surgical History:   Procedure Laterality Date     ESOPHAGOSCOPY, GASTROSCOPY, DUODENOSCOPY (EGD), COMBINED N/A 5/29/2019    Procedure: ESOPHAGOGASTRODUODENOSCOPY (EGD);  Surgeon: Leventhal, Thomas Michael, MD;  Location: [facility]     IR CHEMO EMBOLIZATION  12/2/2021     IR PARACENTESIS  1/24/2023     IR SIRT (SELECTIVE INTERNAL RADIO THERAPY)  9/8/2021     IR VISCERAL ANGIOGRAM  9/8/2021     IR VISCERAL EMBOLIZATION  9/9/2021       Prior to Admission Medications   Prior to Admission Medications   Prescriptions Last Dose Informant Patient Reported? Taking?   Capsaicin-Menthol (SALONPAS GEL EX)   Yes No   Lidocaine (LIDOCARE) 4 % Patch   No No   Sig: Place 1 patch onto the skin every 24 hours for 4 days To prevent lidocaine toxicity, patient should be patch free for 12 hrs daily.   capsaicin (ZOSTRIX) 0.025 % external cream   Yes No   Sig: Apply topically 3 times daily   carvedilol (COREG) 6.25 MG tablet   No No   Sig: Take 1 tablet (6.25 mg) by mouth 2 times daily (with meals)   diclofenac (VOLTAREN) 1 % topical gel   Yes No   Sig: Apply topically as needed for moderate pain   furosemide (LASIX) 20 MG tablet   No No   Sig: Take 1 tablet (20 mg) by mouth daily   gabapentin (NEURONTIN) 100 MG capsule   No No   Sig: Take 3 capsules (300 mg) by mouth 3 times daily   loratadine (CLARITIN) 10 MG tablet   Yes No   Sig: Take 10 mg by mouth daily   oxyCODONE (ROXICODONE) 5 MG tablet   No No   Sig: Take 1 tablet (5 mg) by mouth daily for 4 days   polyethylene glycol (MIRALAX) 17 GM/Dose powder   No No   Sig: Take 17 g by mouth 2 times daily as needed for constipation   spironolactone (ALDACTONE) 50 MG tablet   No No   Sig: Take 1  tablet (50 mg) by mouth daily   tenofovir (VIREAD) 300 MG tablet   No No   Sig: Take 1 tablet (300 mg) by mouth daily   triamcinolone (KENALOG) 0.1 % external cream   Yes No   Sig: Apply topically as needed for irritation      Facility-Administered Medications: None        Review of Systems    The 10 point Review of Systems is negative other than noted in the HPI or here.      Physical Exam   Vital Signs: Temp: 97.6  F (36.4  C) Temp src: Oral BP: (!) 153/99 Pulse: 89   Resp: 18 SpO2: 98 % O2 Device: None (Room air)    Weight: 0 lbs 0 oz    General Appearance: In mild distress, laying in bed  Respiratory: CTAB  Cardiovascular: RRR. No m/r/g  GI: Soft, distended, diffuse tenderness  Skin: Dry, no rash  Neuro: Answers questions appropriately    Medical Decision Making             Data

## 2023-01-30 NOTE — PROVIDER NOTIFICATION
Purpose of Notification: order clarification  Notified Person: MD on call  Notification Time: 1402  Notification Interaction: page  [id number] LUIS A Caberra  Assuming patient is to be NPO, but has no diet order. Please order diet in epic as soon as you have the chance  Jennifer CURRY [id number]    MD ordered NPO x ice and meds

## 2023-01-30 NOTE — ED PROVIDER NOTES
ED Provider Note  [facility]      History     Chief Complaint   Patient presents with     Abdominal Pain     HPI  Rishabh Cabrera is a 62 year old male history of HCC, tense ascites, upper GI bleed, anemia, diabetes among other medical problems who presents for sudden onset of severe abdominal pain since yesterday evening.  Associated with nausea no vomiting no diarrhea.  Had a normal BM at approximately 9 PM    Pain feels similar to pain he had come in to recently.  Patient was discharged from [facility] on January 26 following a work-up of abdominal pain.  CT during that visit had showed some tense ascites.  Pain improved following paracentesis.  No SBP at that time.  Question of small bowel obstruction initially although surgery believed less likely and patient did improve with conservative management.    No chest pain or shortness of breath no fever  No falls or trauma    Past Medical History  Past Medical History:   Diagnosis Date     Cancer (H)      Chronic hepatitis B (H)      Diabetes mellitus (H)      Past Surgical History:   Procedure Laterality Date     ESOPHAGOSCOPY, GASTROSCOPY, DUODENOSCOPY (EGD), COMBINED N/A 5/29/2019    Procedure: ESOPHAGOGASTRODUODENOSCOPY (EGD);  Surgeon: Leventhal, Thomas Michael, MD;  Location: [facility]     IR CHEMO EMBOLIZATION  12/2/2021     IR PARACENTESIS  1/24/2023     IR SIRT (SELECTIVE INTERNAL RADIO THERAPY)  9/8/2021     IR VISCERAL ANGIOGRAM  9/8/2021     IR VISCERAL EMBOLIZATION  9/9/2021     capsaicin (ZOSTRIX) 0.025 % external cream  Capsaicin-Menthol (SALONPAS GEL EX)  carvedilol (COREG) 6.25 MG tablet  diclofenac (VOLTAREN) 1 % topical gel  furosemide (LASIX) 20 MG tablet  gabapentin (NEURONTIN) 100 MG capsule  Lidocaine (LIDOCARE) 4 % Patch  loratadine (CLARITIN) 10 MG tablet  oxyCODONE (ROXICODONE) 5 MG tablet  polyethylene glycol (MIRALAX) 17 GM/Dose powder  spironolactone (ALDACTONE) 50 MG tablet  tenofovir (VIREAD) 300 MG tablet  triamcinolone  (KENALOG) 0.1 % external cream      Allergies   Allergen Reactions     Crabs [Crustaceans]      Pork (Porcine) Protein Itching and Unknown     Seafood Hives and Unknown     Shellfish Allergy Unknown     Family History  No family history on file.  Social History   Social History     Tobacco Use     Smoking status: Never     Smokeless tobacco: Never   Substance Use Topics     Alcohol use: No     Drug use: Not Currently     Types: Marijuana     Comment: past marijuana use      Past medical history, past surgical history, medications, allergies, family history, and social history were reviewed with the patient. No additional pertinent items.      A medically appropriate review of systems was performed with pertinent positives and negatives noted in the HPI, and all other systems negative.    Physical Exam   BP: (!) 176/101  Pulse: 98  Temp: 97.6  F (36.4  C)  Resp: 18  SpO2: 100 %  Physical Exam  GEN: Very uncomfortable appearing in pain, malnourished, non toxic, cooperative and conversant.   HEENT: The head is normocephalic and atraumatic. Pupils are equal round and reactive to light. Extraocular motions are intact. There is no facial swelling. The neck is nontender and supple.   CV: Regular rate and rhythm . 2+ radial pulses bilaterally.  PULM: Clear to auscultation bilaterally.  ABD: Soft, diffusely tender, distended.   EXT: Full range of motion.  No edema.  NEURO: Cranial nerves II through XII are intact and symmetric. Bilateral upper and lower extremities grossly show full range of motion without any focal deficits.   PSYCH: Calm and cooperative, interactive.       ED Course, Procedures, & Data      Procedures                   Results for orders placed or performed during the hospital encounter of 01/30/23   [place] Draw     Status: None    Narrative    The following orders were created for panel order [place] Draw.  Procedure                               Abnormality         Status                     ---------                                -----------         ------                     Extra Blue Top Tube[[id number]]                              Final result               Extra Green Top (Lithium...[[id number]]                                                 Extra Purple Top Tube[[id number]]                                                         Please view results for these tests on the individual orders.   Comprehensive metabolic panel     Status: Abnormal   Result Value Ref Range    Sodium 132 (L) 136 - 145 mmol/L    Potassium 4.0 3.4 - 5.3 mmol/L    Chloride 100 98 - 107 mmol/L    Carbon Dioxide (CO2) 26 22 - 29 mmol/L    Anion Gap 6 (L) 7 - 15 mmol/L    Urea Nitrogen 11.8 8.0 - 23.0 mg/dL    Creatinine 0.95 0.67 - 1.17 mg/dL    Calcium 7.7 (L) 8.8 - 10.2 mg/dL    Glucose 194 (H) 70 - 99 mg/dL    Alkaline Phosphatase 130 (H) 40 - 129 U/L    AST 39 10 - 50 U/L    ALT 22 10 - 50 U/L    Protein Total 7.0 6.4 - 8.3 g/dL    Albumin 2.5 (L) 3.5 - 5.2 g/dL    Bilirubin Total 2.0 (H) <=1.2 mg/dL    GFR Estimate >90 >60 mL/min/1.73m2   Lipase     Status: Normal   Result Value Ref Range    Lipase 42 13 - 60 U/L   Lactic acid whole blood     Status: Abnormal   Result Value Ref Range    Lactic Acid 2.3 (H) 0.7 - 2.0 mmol/L   Extra Blue Top Tube     Status: None   Result Value Ref Range    Hold Specimen [facility]    CBC with platelets and differential     Status: Abnormal   Result Value Ref Range    WBC Count 9.9 4.0 - 11.0 10e3/uL    RBC Count 3.76 (L) 4.40 - 5.90 10e6/uL    Hemoglobin 8.7 (L) 13.3 - 17.7 g/dL    Hematocrit 30.0 (L) 40.0 - 53.0 %    MCV 80 78 - 100 fL    MCH 23.1 (L) 26.5 - 33.0 pg    MCHC 29.0 (L) 31.5 - 36.5 g/dL    RDW 20.1 (H) 10.0 - 15.0 %    Platelet Count      % Neutrophils 78 %    % Lymphocytes 8 %    % Monocytes 11 %    % Eosinophils 2 %    % Basophils 0 %    % Immature Granulocytes 1 %    NRBCs per 100 WBC 0 <1 /100    Absolute Neutrophils 7.7 1.6 - 8.3 10e3/uL    Absolute Lymphocytes 0.8 0.8 - 5.3 10e3/uL     Absolute Monocytes 1.1 0.0 - 1.3 10e3/uL    Absolute Eosinophils 0.2 0.0 - 0.7 10e3/uL    Absolute Basophils 0.0 0.0 - 0.2 10e3/uL    Absolute Immature Granulocytes 0.1 <=0.4 10e3/uL    Absolute NRBCs 0.0 10e3/uL   Creatinine POCT     Status: Normal   Result Value Ref Range    Creatinine POCT 1.0 0.7 - 1.3 mg/dL    GFR, ESTIMATED POCT >60 >60 mL/min/1.73m2   CBC with platelets differential     Status: Abnormal    Narrative    The following orders were created for panel order CBC with platelets differential.  Procedure                               Abnormality         Status                     ---------                               -----------         ------                     CBC with platelets and d...[[id number]]  Abnormal            Final result                 Please view results for these tests on the individual orders.     Medications   ondansetron (ZOFRAN) injection 4 mg (has no administration in time range)   HYDROmorphone (PF) (DILAUDID) injection 0.5 mg (0.5 mg Intravenous Given 1/30/23 0336)   HYDROmorphone (DILAUDID) injection 1 mg (1 mg Intravenous Given 1/30/23 0209)   ondansetron (ZOFRAN) injection 4 mg (4 mg Intravenous Given 1/30/23 0223)   0.9% sodium chloride BOLUS (0 mLs Intravenous Stopped 1/30/23 0330)   iopamidol (ISOVUE-370) solution 69 mL (69 mLs Intravenous Given 1/30/23 0355)   sodium chloride 0.9 % bag 500mL for CT scan flush use (67 mLs Intravenous Given 1/30/23 0357)     Labs Ordered and Resulted from Time of ED Arrival to Time of ED Departure   COMPREHENSIVE METABOLIC PANEL - Abnormal       Result Value    Sodium 132 (*)     Potassium 4.0      Chloride 100      Carbon Dioxide (CO2) 26      Anion Gap 6 (*)     Urea Nitrogen 11.8      Creatinine 0.95      Calcium 7.7 (*)     Glucose 194 (*)     Alkaline Phosphatase 130 (*)     AST 39      ALT 22      Protein Total 7.0      Albumin 2.5 (*)     Bilirubin Total 2.0 (*)     GFR Estimate >90     LACTIC ACID WHOLE BLOOD - Abnormal    Lactic  Acid 2.3 (*)    CBC WITH PLATELETS AND DIFFERENTIAL - Abnormal    WBC Count 9.9      RBC Count 3.76 (*)     Hemoglobin 8.7 (*)     Hematocrit 30.0 (*)     MCV 80      MCH 23.1 (*)     MCHC 29.0 (*)     RDW 20.1 (*)     Platelet Count        % Neutrophils 78      % Lymphocytes 8      % Monocytes 11      % Eosinophils 2      % Basophils 0      % Immature Granulocytes 1      NRBCs per 100 WBC 0      Absolute Neutrophils 7.7      Absolute Lymphocytes 0.8      Absolute Monocytes 1.1      Absolute Eosinophils 0.2      Absolute Basophils 0.0      Absolute Immature Granulocytes 0.1      Absolute NRBCs 0.0     LIPASE - Normal    Lipase 42     ISTAT CREATININE POCT - Normal    Creatinine POCT 1.0      GFR, ESTIMATED POCT >60     ROUTINE UA WITH MICROSCOPIC REFLEX TO CULTURE   LACTIC ACID WHOLE BLOOD   ISTAT CREATININE POCT     CT Abdomen Pelvis w Contrast    (Results Pending)          Medical Decision Making  The patient's presentation is strongly suggestive of an acute health issue posing potential threat to life or bodily function.    The patient's evaluation involved:  an assessment requiring an independent historian (hx gathered with Son)  review of external note(s) from 1 sources (prior D/c summary )  ordering and/or review of 3+ test(s) in this encounter (see separate area of note for details)  review of 3+ test result(s) ordered prior to this encounter (see separate area of note for details)  independent interpretation of testing performed by another health professional (see separate area of note for details)  discussion of management or test interpretation with another health professional (see separate area of note for details)    The patient's management involved a decision regarding hospitalization.      Assessment & Plan    62 -year-old male with history as noted presenting with diffuse severe abdominal pain distention and tension.    DDx is broad including bowel obstruction, tense ascites, SBP, intra-abdominal  infection such as colitis, constipation, hernia, GI bleed, among others    Clinically very uncomfortable in pain, hypertensive  IV placed, fluids given, IV antiemetics and analgesics ordered.  Patient improved with regimen    Labs notable for lactate 2.3, cr .95, hgb 8.7,   CT abdomen pelvis ordered showing SBO with notable gastic distention  NGT placed with output of gastric contents, resulting in significant pain improvement     Pt discussed with GS  Discussed with hospitalist and admitted to medicine.        I have reviewed the nursing notes. I have reviewed the findings, diagnosis, plan and need for follow up with the patient.    New Prescriptions    No medications on file       Final diagnoses:   Generalized abdominal pain       Mk Null mD  [facility] EMERGENCY DEPARTMENT  1/30/2023     Mk Null MD  01/30/23 0541

## 2023-01-30 NOTE — PROGRESS NOTES
/75 (BP Location: Left arm)   Pulse 86   Temp 98  F (36.7  C) (Oral)   Resp 20   SpO2 94%         Transfer  Transferred to:7D  Via:bed  Reason for transfer: Pt inappropriate for ED.  Family: Aware of transfer, pt spoke to family.  Belongings: Sent with pt  Chart: Sent with pt  Medications: Meds from bin sent with pt  Report called to: Aracelis LEDESMA RN.

## 2023-01-30 NOTE — CONSULTS
[facility]    Consult Note - General Surgery Service  Date of Admission:  1/30/2023  Consult Requested by: ED/Medicine  Reason for Consult: SBO    Assessment & Plan: Surgery   Rishabh Cabrera is a 62 year old male w/ PMH including HTN, DM2, HCC c/b cirrhosis (Deepak C) and significant ascites, as well as previous right hemicolectomy for a large polyp, who is presenting with acute on chronic abdominal pain w/ CT A/P concerning for SBO and large volume ascites.     Abdomen is quite taught/distended and TTP. Large gastric bubble seen on CT with transition point noted near his previous ileocolic anastomosis. Given his operative risk in the setting of Deepak C Cirrhosis, would continue with conservative mgmt including NG tube, IVF, and NPO and avoid surgical intervention. Surgery to follow.    - No Urgent surgical indications  - NGT to LIS, IVF, NPO  - Rest of Cares per Primary  - General Surgery to follow.        Drains: None     Code Status: Full Code      Clinically Significant Risk Factors Present on Admission              # Hypoalbuminemia: Lowest albumin = 2.5 g/dL at 1/30/2023  2:11 AM, will monitor as appropriate         # DMII: A1C = 6.8 % (Ref range: <5.7 %) within past 3 months          The patient's care was discussed with the Chief Resident/Fellow.    Minesh Oleary MD  [facility]  Non-urgent messages: Securely message with [employer] (more info)  Text page via [employer] Paging/Directory     ______________________________________________________________________    Chief Complaint   Abdominal Pain    History is obtained from the patient, electronic health record and emergency department physician    History of Present Illness   Rishabh Cabrera is a 62 year old male w/ PMH including HTN, DM2, chronic and intractable abdominal pain associated w/ HCC 2/2 Hep B c/b cirrhosis (Deepak C) and significant ascites, as well as previous right  hemicolectomy for large polyp who is presenting with acute on chronic abdominal pain. This is the patient's 2nd visit to the ED in the last few weeks with similar complaints. Having significant nausea, no vomiting. No F/C/S, CP, but having some SOB and notes its difficult to take a full breath.     ED evaluation notable for hyponatremia, hydronephrosis, slight lactic acidosis (2.3) in setting if cirrhosis, and chronic anemia w/ hgb at 8.7. CT A/P shows worsening SBO near the patients ileocolic anastomosis. General Surgery consulted for evaluation of possible surgical management.       Past Medical History    Past Medical History:   Diagnosis Date     Cancer (H)      Chronic hepatitis B (H)      Diabetes mellitus (H)        Past Surgical History   Past Surgical History:   Procedure Laterality Date     ESOPHAGOSCOPY, GASTROSCOPY, DUODENOSCOPY (EGD), COMBINED N/A 5/29/2019    Procedure: ESOPHAGOGASTRODUODENOSCOPY (EGD);  Surgeon: Leventhal, Thomas Michael, MD;  Location: [facility]     IR CHEMO EMBOLIZATION  12/2/2021     IR PARACENTESIS  1/24/2023     IR SIRT (SELECTIVE INTERNAL RADIO THERAPY)  9/8/2021     IR VISCERAL ANGIOGRAM  9/8/2021     IR VISCERAL EMBOLIZATION  9/9/2021       Prior to Admission Medications   I have reviewed this patient's current medications       Review of Systems    The 10 point Review of Systems is negative other than noted in the HPI or here.     Social History   I have reviewed this patient's social history and updated it with pertinent information if needed.  Social History     Tobacco Use     Smoking status: Never     Smokeless tobacco: Never   Substance Use Topics     Alcohol use: No     Drug use: Not Currently     Types: Marijuana     Comment: past marijuana use       Family History   No significant family history    Allergies   Allergies   Allergen Reactions     Crabs [Crustaceans]      Pork (Porcine) Protein Itching and Unknown     Seafood Hives and Unknown     Shellfish Allergy Unknown         Physical Exam   Vital Signs: Temp: 97.6  F (36.4  C) Temp src: Oral BP: (!) 150/87 Pulse: 118   Resp: 18 SpO2: 97 % O2 Device: None (Room air)    Weight: 0 lbs 0 ozNo intake or output data in the 24 hours ending 01/30/23 0434    General: Alert, resting comfortably in NAD/NTA. Cooperative  HEENT: NC/AT. Oral mucosa moist  Neck: Trachea midline  Pulm: NLB on RA, no tachypnea/dyspnea, though reports it  CV: RRR by RP, non-cyanotic, no LE edema.  ABD: Non-tender, but significantly distended and taught. No guarding or rebound tenderness. Midline incision scar, well healed consistent w/ past surgery.   Extrem: MA4E, no obvious deformities  Neuro:  A/Ox3, NFD  Skin: Warm and well perfused      Data     I have personally reviewed the following data over the past 24 hrs:    9.9  \   8.7 (L)   / N/A     132 (L) 100 11.8 /  194 (H)   4.0 26 0.95; 1.0 \       ALT: 22 AST: 39 AP: 130 (H) TBILI: 2.0 (H)   ALB: 2.5 (L) TOT PROTEIN: 7.0 LIPASE: 42       Procal: N/A CRP: N/A Lactic Acid: 2.1 (H)         Imaging results reviewed over the past 24 hrs:   Recent Results (from the past 24 hour(s))   CT Abdomen Pelvis w Contrast    Narrative    EXAM: CT ABDOMEN PELVIS W CONTRAST  LOCATION: [facility]  DATE/TIME: 1/30/2023 4:08 AM    INDICATION: sever abd px and distention hepatocellular carcinoma  COMPARISON: Prior study dated 1/24/2023  TECHNIQUE: CT scan of the abdomen and pelvis was performed following injection of IV contrast. Multiplanar reformats were obtained. Dose reduction techniques were used.  CONTRAST: 69 ml isovue 370     FINDINGS:   LOWER CHEST: Right calcified pleural fluid collection is again identified, similar to prior exam. A loculated fluid collection is again seen extending along the left major fissure inferiorly, unchanged.    HEPATOBILIARY: Scattered hypodensities are again seen in both lobes liver with a larger calcified mass within the right lobe of liver  consistent with a treated large hepatic tumor, better demonstrated on prior liver MRI from 1/10/2023, overall is no   significant change from prior study dated 1/24/2023.  Gallstones are again seen within the gallbladder.    PANCREAS: No significant mass, duct dilatation, or inflammatory change.    SPLEEN: The spleen is enlarged, similar to prior study.    ADRENAL GLANDS: No significant nodules.    KIDNEYS/BLADDER: No significant mass or stones. There is mild bilateral hydroureteronephrosis which has developed in the interval since prior exam. No renal ureteral or bladder calculi are identified. The distal ureters appear compressed secondary to the   increasing no distention. There are simple or benign cysts. No follow up is needed.    BOWEL: Surgical changes from right hemicolectomy are again noted there is increasing distention of the small bowel with equalization of loops of small bowel contents which has worsened since the prior exam with a transition point at the ileocolic   anastomoses (image 108, series 3). Small bowel enhances normally with no evidence of pneumatosis. There is mural thickening seen in the sigmoid colon as well as within a few loops of small bowel which is favored reflect portal colopathy/enteropathy, this   is unchanged from the prior exam.    Moderate abdominal pelvic ascites is again seen, mildly worsened from prior exam.     LYMPH NODES: Scattered small retroperitoneal and abdominal lymph nodes are noted.    VASCULATURE: Gastroesophageal as well as splenic and mesenteric varices are again noted, similar to prior exam.    PELVIC ORGANS: No pelvic masses.    MUSCULOSKELETAL: Unremarkable.      Impression    IMPRESSION:   1.  Worsening small bowel distention with an apparent transition point at the ileocolic anastomosis, concerning for a worsening small bowel obstruction. At this time there is no evidence of pneumatosis or abnormal bowel wall enhancement indicate bowel   ischemia.  2.   Interval development of bilateral mild hydroureteronephrosis, without evidence of obstructing calculi or mass, findings are felt to be due to compression of the distal ureters secondary to the distended intraperitoneal cavity from the increasingly   dilated small bowel and ascites.   3.  Small and large bowel wall edema, unchanged from prior exam, favored to Reflect portal colopathy/enterography.   4.  Redemonstration of multiple hepatic hypodensities and calcified hepatic mass consistent with patient's known history of hepatocellular carcinoma  5.  Unchanged appearance of the calcified right pleural fluid collection and the l loculated fluid collection in the left major fissure   XR Abdomen Port 1 View    Narrative    EXAM: XR ABDOMEN PORT 1 VIEW  LOCATION: [facility]  DATE/TIME: 1/30/2023 5:10 AM    INDICATION: NG tube placement  COMPARISON: CT abdomen and pelvis 01/30/2023      Impression    IMPRESSION: Limited exam for tube placement. Gastric drainage tube tip at the level of the gastric body. Sidehole beyond the GE junction.   XR Abdomen Port 1 View    Narrative    EXAM: ABDOMEN SINGLE VIEW PORTABLE  LOCATION: [facility]  DATE/TIME: 1/30/2023 5:16 AM    INDICATION: Enteric drainage tube advanced.  COMPARISON: 1/30/2023 at 0508 hours.      Impression    IMPRESSION: Interval advancement of an enteric drainage tube with distal tip now in the gastric body.

## 2023-01-30 NOTE — PROGRESS NOTES
[facility]    Progress Note - Hospitalist Service, [place] TEAM        Date of Admission:  1/30/2023    Assessment & Plan   Patient is a 61 yo male with PMH notable for HBV-associated cirrhosis, HCC, ascites, EV s/p banding (Septmeber '22), T2DM, and herpes zoster admitted with acute on chronic abdominal pain and distension. Etiology for presenting symptoms likely multifactorial, including ascites, SBO, and potentially SBP (paracentesis labs pending). He is hemodynamically stable on admission.    Changes today:  - Paracentesis with CAPS  - NG tube in place, no surgical intervention indicated at this time  - Oncology consult  - Started on ceftriaxone for SBP coverage     Abdominal pain  Decompensated cirrhosis 2/2 chronic HBV infection c/b hepatocellular carcinoma  Esophageal varices s/p banding (9/2022)  Recent admission (01/24-01/26) for similar concerns and imaging notable for ascites and concerning for SBO. Symptoms improved with paracentesis and there was no indication for surgical intervention of NG placement during that visit. CT A/P on presentation (01/30) notable for abdominal ascites and worsening small bowel distention concerning for small bowel obstruction. He did have BM yesterday.  - PTA Tenofovir  - General Surgery Consult, appreciate recs    > no urgent surgical intervention at this time, non-operative management with NGT, IVF, NPO  - CAPs Consult for Diagnostic/Therapeutic Paracentesis  - Cytology of ascitic fluid  - Oncology consult given history of HCC, unclear what the status of this is and whether further treatment is indicated  - NPO  - Holding PTA Lasix and Spironolactone  - IV Dilaudid 1mg q2h prn  - Ceftriaxone 1g IV 1/30 - *  MELD-Na score: 20 at 1/30/2023  2:11 AM  MELD score: 16 at 1/30/2023  2:11 AM  Calculated from:  Serum Creatinine: 0.95 mg/dL (Using min of 1 mg/dL) at 1/30/2023  2:11 AM  Serum Sodium: 132 mmol/L at 1/30/2023  2:11 AM  Total  Bilirubin: 2.0 mg/dL at 1/30/2023  2:11 AM  INR(ratio): 1.80 at 1/30/2023  2:11 AM  Age: 62 years     Hyponatremia   In setting of poor oral intake and decompensated liver disease  -CTM     Previous herpes zoster of L T4 dermatome  Documented infection, treated May '22. Here complaining of pain with similar distribution, exam shows prior healed lesions, no active lesions.  - Continue PTA gabapentin TID     T2DM  Hgb A1c 6.8 (01/25/23).  -MDSSI  -Holding PTA Metformin  -Hypoglycemia protocol     Diet: NPO for Medical/Clinical Reasons Except for: Meds, Ice Chips   DVT Prophylaxis: Pneumatic Compression Devices  Davalos Catheter: Not present  Fluids:  ml/hr  Lines: None     Cardiac Monitoring: None  Code Status: Full Code      Clinically Significant Risk Factors Present on Admission              # Hypoalbuminemia: Lowest albumin = 2.5 g/dL at 1/30/2023  2:11 AM, will monitor as appropriate  # Coagulation Defect: INR = 1.80 (Ref range: 0.85 - 1.15) and/or PTT = N/A, will monitor for bleeding  # Thrombocytopenia: Lowest platelets = 77 in last 2 days, will monitor for bleeding       # DMII: A1C = 6.8 % (Ref range: <5.7 %) within past 3 months            Disposition Plan      Expected Discharge Date: 02/01/2023                The patient's care was discussed with the Attending Physician, Dr. Zapata.    Inez Sanchez, MS3  Medical Student  Hospitalist Service, [place] [id number] TEAM     Resident/Fellow Attestation   I, Bola Aleman MD, was present with the medical/OPAL student who participated in the service and in the documentation of the note.  I have verified the history and personally performed the physical exam and medical decision making.  I agree with the assessment and plan of care as documented in the note.      Bola Aleman MD  Medicine-Pediatrics, PGY4  [facility]  ______________________________________________________________________    Interval History   Notes and vitals reviewed. ALEXANDRE  Patient arrived in the AM for abdominal pain similar to his last presentation. Denies any vomiting. Last BM was last night, soft and fair amount. No bloody stools. No dysuria, though patient notes he has been urinating more. Patient notes that he feels lonely at home and lacks support (he is  from his wife). Not interested in help from psychiatrist at this time. He feels frustrated that he keeps having to come to the hospital for repeated episodes of abdominal pain.     Physical Exam   Vital Signs: Temp: 97.9  F (36.6  C) Temp src: Oral BP: 129/73 Pulse: 91   Resp: 18 SpO2: 92 % O2 Device: None (Room air)    Weight: 0 lbs 0 oz    General Appearance:  Appears uncomfortable, lying in bed, awake, alert, NAD  HEENT: NC/AT, EOMI, MMM  Respiratory: CTAB  Cardiovascular: RRR. Normal S1/S2, no murmurs noted.  GI: Soft, distended, diffuse mild tenderness to palpation, no rebound or guarding, normal bowel sounds  Skin: Warm, dry, healed lesions from prior shingles   Neuro: Answers questions appropriately, moves all extremities equally       Medical Decision Making       Please see A&P for additional details of medical decision making.      Data   ------------------------- PAST 24 HR DATA REVIEWED -----------------------------------------------    I have personally reviewed the following data over the past 24 hrs:    9.9  \   8.7 (L)   / 77 (L)     132 (L) 100 11.8 /  194 (H)   4.0 26 0.95; 1.0 \       ALT: 22 AST: 39 AP: 130 (H) TBILI: 2.0 (H)   ALB: 2.5 (L) TOT PROTEIN: 7.0 LIPASE: 42       Procal: N/A CRP: N/A Lactic Acid: 1.5       INR:  1.80 (H) PTT:  N/A   D-dimer:  N/A Fibrinogen:  N/A       Imaging results reviewed over the past 24 hrs:   Recent Results (from the past 24 hour(s))   CT Abdomen Pelvis w Contrast    Narrative    EXAM: CT ABDOMEN PELVIS W CONTRAST  LOCATION: [facility]  DATE/TIME: 1/30/2023 4:08 AM    INDICATION: sever abd px and distention  hepatocellular carcinoma  COMPARISON: Prior study dated 1/24/2023  TECHNIQUE: CT scan of the abdomen and pelvis was performed following injection of IV contrast. Multiplanar reformats were obtained. Dose reduction techniques were used.  CONTRAST: 69 ml isovue 370     FINDINGS:   LOWER CHEST: Right calcified pleural fluid collection is again identified, similar to prior exam. A loculated fluid collection is again seen extending along the left major fissure inferiorly, unchanged.    HEPATOBILIARY: Scattered hypodensities are again seen in both lobes liver with a larger calcified mass within the right lobe of liver consistent with a treated large hepatic tumor, better demonstrated on prior liver MRI from 1/10/2023, overall is no   significant change from prior study dated 1/24/2023.  Gallstones are again seen within the gallbladder.    PANCREAS: No significant mass, duct dilatation, or inflammatory change.    SPLEEN: The spleen is enlarged, similar to prior study.    ADRENAL GLANDS: No significant nodules.    KIDNEYS/BLADDER: No significant mass or stones. There is mild bilateral hydroureteronephrosis which has developed in the interval since prior exam. No renal ureteral or bladder calculi are identified. The distal ureters appear compressed secondary to the   increasing no distention. There are simple or benign cysts. No follow up is needed.    BOWEL: Surgical changes from right hemicolectomy are again noted there is increasing distention of the small bowel with equalization of loops of small bowel contents which has worsened since the prior exam with a transition point at the ileocolic   anastomoses (image 108, series 3). Small bowel enhances normally with no evidence of pneumatosis. There is mural thickening seen in the sigmoid colon as well as within a few loops of small bowel which is favored reflect portal colopathy/enteropathy, this   is unchanged from the prior exam.    Moderate abdominal pelvic ascites is  again seen, mildly worsened from prior exam.     LYMPH NODES: Scattered small retroperitoneal and abdominal lymph nodes are noted.    VASCULATURE: Gastroesophageal as well as splenic and mesenteric varices are again noted, similar to prior exam.    PELVIC ORGANS: No pelvic masses.    MUSCULOSKELETAL: Unremarkable.      Impression    IMPRESSION:   1.  Worsening small bowel distention with an apparent transition point at the ileocolic anastomosis, concerning for a worsening small bowel obstruction. At this time there is no evidence of pneumatosis or abnormal bowel wall enhancement indicate bowel   ischemia.  2.  Interval development of bilateral mild hydroureteronephrosis, without evidence of obstructing calculi or mass, findings are felt to be due to compression of the distal ureters secondary to the distended intraperitoneal cavity from the increasingly   dilated small bowel and ascites.   3.  Small and large bowel wall edema, unchanged from prior exam, favored to Reflect portal colopathy/enterography.   4.  Redemonstration of multiple hepatic hypodensities and calcified hepatic mass consistent with patient's known history of hepatocellular carcinoma  5.  Unchanged appearance of the calcified right pleural fluid collection and the l loculated fluid collection in the left major fissure   XR Abdomen Port 1 View    Narrative    EXAM: XR ABDOMEN PORT 1 VIEW  LOCATION: [facility]  DATE/TIME: 1/30/2023 5:10 AM    INDICATION: NG tube placement  COMPARISON: CT abdomen and pelvis 01/30/2023      Impression    IMPRESSION: Limited exam for tube placement. Gastric drainage tube tip at the level of the gastric body. Sidehole beyond the GE junction.   XR Abdomen Port 1 View    Narrative    EXAM: ABDOMEN SINGLE VIEW PORTABLE  LOCATION: [facility]  DATE/TIME: 1/30/2023 5:16 AM    INDICATION: Enteric drainage tube advanced.  COMPARISON:  1/30/2023 at 0508 hours.      Impression    IMPRESSION: Interval advancement of an enteric drainage tube with distal tip now in the gastric body.

## 2023-01-30 NOTE — ED TRIAGE NOTES
Patient ambulatory to triage for abdominal pain. Patient has been dealing with this pain since last week but states the pain has increased.

## 2023-01-31 ENCOUNTER — APPOINTMENT (OUTPATIENT)
Dept: INTERPRETER SERVICES | Facility: CLINIC | Age: 63
DRG: 389 | End: 2023-01-31
Payer: MEDICARE

## 2023-01-31 LAB
ALBUMIN SERPL BCG-MCNC: 2 G/DL (ref 3.5–5.2)
ALP SERPL-CCNC: 95 U/L (ref 40–129)
ALT SERPL W P-5'-P-CCNC: 19 U/L (ref 10–50)
ANION GAP SERPL CALCULATED.3IONS-SCNC: 6 MMOL/L (ref 7–15)
AST SERPL W P-5'-P-CCNC: 33 U/L (ref 10–50)
BACTERIA FLD CULT: NORMAL
BILIRUB SERPL-MCNC: 2.3 MG/DL
BUN SERPL-MCNC: 12.7 MG/DL (ref 8–23)
CALCIUM SERPL-MCNC: 7.3 MG/DL (ref 8.8–10.2)
CHLORIDE SERPL-SCNC: 104 MMOL/L (ref 98–107)
CREAT SERPL-MCNC: 0.84 MG/DL (ref 0.67–1.17)
DEPRECATED HCO3 PLAS-SCNC: 23 MMOL/L (ref 22–29)
ERYTHROCYTE [DISTWIDTH] IN BLOOD BY AUTOMATED COUNT: 20.6 % (ref 10–15)
GFR SERPL CREATININE-BSD FRML MDRD: >90 ML/MIN/1.73M2
GLUCOSE BLDC GLUCOMTR-MCNC: 90 MG/DL (ref 70–99)
GLUCOSE BLDC GLUCOMTR-MCNC: 94 MG/DL (ref 70–99)
GLUCOSE BLDC GLUCOMTR-MCNC: 97 MG/DL (ref 70–99)
GLUCOSE BLDC GLUCOMTR-MCNC: 97 MG/DL (ref 70–99)
GLUCOSE SERPL-MCNC: 84 MG/DL (ref 70–99)
HCT VFR BLD AUTO: 28.3 % (ref 40–53)
HGB BLD-MCNC: 8.2 G/DL (ref 13.3–17.7)
MCH RBC QN AUTO: 23.2 PG (ref 26.5–33)
MCHC RBC AUTO-ENTMCNC: 29 G/DL (ref 31.5–36.5)
MCV RBC AUTO: 80 FL (ref 78–100)
PLATELET # BLD AUTO: 75 10E3/UL (ref 150–450)
POTASSIUM SERPL-SCNC: 4.3 MMOL/L (ref 3.4–5.3)
PROT SERPL-MCNC: 5.7 G/DL (ref 6.4–8.3)
RBC # BLD AUTO: 3.53 10E6/UL (ref 4.4–5.9)
SODIUM SERPL-SCNC: 133 MMOL/L (ref 136–145)
WBC # BLD AUTO: 8.7 10E3/UL (ref 4–11)

## 2023-01-31 PROCEDURE — 250N000013 HC RX MED GY IP 250 OP 250 PS 637

## 2023-01-31 PROCEDURE — P9047 ALBUMIN (HUMAN), 25%, 50ML: HCPCS | Performed by: STUDENT IN AN ORGANIZED HEALTH CARE EDUCATION/TRAINING PROGRAM

## 2023-01-31 PROCEDURE — 80053 COMPREHEN METABOLIC PANEL: CPT | Performed by: INTERNAL MEDICINE

## 2023-01-31 PROCEDURE — 36415 COLL VENOUS BLD VENIPUNCTURE: CPT | Performed by: INTERNAL MEDICINE

## 2023-01-31 PROCEDURE — 120N000002 HC R&B MED SURG/OB UMMC

## 2023-01-31 PROCEDURE — 258N000003 HC RX IP 258 OP 636: Performed by: STUDENT IN AN ORGANIZED HEALTH CARE EDUCATION/TRAINING PROGRAM

## 2023-01-31 PROCEDURE — 99232 SBSQ HOSP IP/OBS MODERATE 35: CPT | Performed by: STUDENT IN AN ORGANIZED HEALTH CARE EDUCATION/TRAINING PROGRAM

## 2023-01-31 PROCEDURE — 85014 HEMATOCRIT: CPT | Performed by: INTERNAL MEDICINE

## 2023-01-31 PROCEDURE — 250N000011 HC RX IP 250 OP 636: Performed by: STUDENT IN AN ORGANIZED HEALTH CARE EDUCATION/TRAINING PROGRAM

## 2023-01-31 RX ORDER — SALIVA STIMULANT COMB. NO.3
1 SPRAY, NON-AEROSOL (ML) MUCOUS MEMBRANE 4 TIMES DAILY
Status: DISCONTINUED | OUTPATIENT
Start: 2023-01-31 | End: 2023-02-05 | Stop reason: HOSPADM

## 2023-01-31 RX ORDER — ALBUMIN (HUMAN) 12.5 G/50ML
25 SOLUTION INTRAVENOUS ONCE
Status: COMPLETED | OUTPATIENT
Start: 2023-01-31 | End: 2023-01-31

## 2023-01-31 RX ADMIN — TENOFOVIR DISOPROXIL FUMARATE 300 MG: 300 TABLET, FILM COATED ORAL at 08:43

## 2023-01-31 RX ADMIN — GABAPENTIN 300 MG: 300 CAPSULE ORAL at 08:43

## 2023-01-31 RX ADMIN — GABAPENTIN 300 MG: 300 CAPSULE ORAL at 20:18

## 2023-01-31 RX ADMIN — Medication 1 SPRAY: at 16:06

## 2023-01-31 RX ADMIN — DIATRIZOATE MEGLUMINE AND DIATRIZOATE SODIUM 75 ML: 660; 100 SOLUTION ORAL; RECTAL at 17:13

## 2023-01-31 RX ADMIN — ALBUMIN HUMAN 25 G: 0.25 SOLUTION INTRAVENOUS at 10:47

## 2023-01-31 RX ADMIN — GABAPENTIN 300 MG: 300 CAPSULE ORAL at 13:42

## 2023-01-31 RX ADMIN — Medication 1 SPRAY: at 13:54

## 2023-01-31 RX ADMIN — SODIUM CHLORIDE, POTASSIUM CHLORIDE, SODIUM LACTATE AND CALCIUM CHLORIDE: 600; 310; 30; 20 INJECTION, SOLUTION INTRAVENOUS at 05:33

## 2023-01-31 RX ADMIN — CARVEDILOL 6.25 MG: 6.25 TABLET, FILM COATED ORAL at 18:21

## 2023-01-31 RX ADMIN — Medication 1 SPRAY: at 20:17

## 2023-01-31 RX ADMIN — CARVEDILOL 6.25 MG: 6.25 TABLET, FILM COATED ORAL at 08:43

## 2023-01-31 ASSESSMENT — ACTIVITIES OF DAILY LIVING (ADL)
ADLS_ACUITY_SCORE: 21
DEPENDENT_IADLS:: INDEPENDENT
ADLS_ACUITY_SCORE: 21
ADLS_ACUITY_SCORE: 24

## 2023-01-31 NOTE — CONSULTS
Care Management Initial Consult    General Information  Assessment completed with: VM-chart review     Primary Care Provider verified and updated as needed:     Readmission within the last 30 days: previous discharge plan unsuccessful      Communication Assessment  Patient's communication style: spoken language (English or Bilingual)    Hearing Difficulty or Deaf: no   Wear Glasses or Blind: no    Cognitive  Cognitive/Neuro/Behavioral: WDL                    Living Environment:   People in home: child(shemar), adult     Current living Arrangements: house      Able to return to prior arrangements: yes     Family/Social Support:  Care provided by: child(shemar)  Provides care for: no one  Marital Status:   Children            Current Resources:   Patient receiving home care services: No  Community Resources: None  Equipment currently used at home: none  Supplies currently used at home: None    Employment/Financial:  Employment Status: disabled     Financial Concerns: No concerns identified      Values/Beliefs:  Spiritual, Cultural Beliefs, Methodist Practices, Values that affect care: yes, Hmong spiritual tradition and [employer]ism          Additional Information:  Rishabh Cabrera is a 62 -year-old male admitted for severe abdominal pain, distention, and tension.     Care management consult placed for elevated risk score.    Writer completed initial assessment via chart review.    Patient lives in one level of a duplex and his adult son lives in the other level.    Patient is not currently open to any in home services. Patient has been independent with ADL's and ambulation, however during his stay here, he has been up with assist of one.    [facility] team will continue to follow patient for any discharge needs.    Chantel Arias RN, BSN  Care Coordinator 7D  Office: [phone]  Pager: [phone]    To contact the weekend [facility] (0800 - 1630) Saturday and Sunday    Units: 4A, 4C, 4E, 5A and 5B- Pager 1:  [phone]    Units: 6A, 6B, 6C, 6D- Pager 2: [phone]    Units: 7A, 7B, 7C, 7D, and 5C-Pager 3: [phone]

## 2023-01-31 NOTE — PLAN OF CARE
Goal Outcome Evaluation: Progressing       Plan of Care Reviewed With: patient    Blood pressure 125/58, pulse 88, temperature 98.2  F (36.8  C), temperature source Oral, resp. rate 18, SpO2 92 %.    Vitals: WDL on RA  Neuros: A/O x 4  IV: PIV LR 75 ml/hr.   Labs/Electrolytes: Reviewed   Resp/trach: Denied SOB  Diet: NPO, denies nausea/vomiting  Bowel status: +BS, No Bm during the shift, NG LIS output 400 ml  : Voiding spontaneously   Skin: No new skin deficit noted.  Pain: Rated pain 4, refused intervention.  Activity: Assist of 1  Plan: Continue to monitor per POC.

## 2023-01-31 NOTE — PLAN OF CARE
BG 97 at 1330; q 4 hr BG checks. /38 lying down, 110/59 sittingn (HR 78) and 126/60 standing (HR 86). C/o abdominal/ facial pain, declined intervention. Pt c/o excessive tiredness; team paged. NG to LIS; 800 out this shift. NG clamped for 45 min after meds given. NPO, PIV SL

## 2023-01-31 NOTE — PLAN OF CARE
Goal Outcome Evaluation:    A&Ox4. VSS. Denies N/V and SOB. Pt resting comfortable most of the shift. Resident rounded to flush NG tube and found pt requesting to go to the bathroom. When RN came to bedside to help, NG tube was unclamped to nose anchor and had been dislodged. Unsure of original landmark so had to be removed and reinserted. X-ray results recommend advancing tube. Advanced tube to 60 cm and put to low intermittent suction per pt care order. C/o pain after NG tube insertion and received 1 mg IV dilaudid. LR infusing @ 75mL/hr. Continue with POC

## 2023-01-31 NOTE — PROGRESS NOTES
Physician Attestation   I, Trenton Newton, was present with the medical/OPAL student and medical resident who participated in the service and in the documentation of the note.  I have verified the history and personally performed the physical exam and medical decision making.  I agree with the assessment and plan of care as documented in the note.      Key findings:   See edits in blue  SBO, hypoactive bowel sounds, no BMs. Continues to have NG to LIS with ~1L out since midnight. No vomiting.   Surgery to go gastrografin study tonight.   Monitor I/Os - may need to replace output given NPO and losing fluid through the NG. Giving albumin today. Orthostatic VS negative.     Please see A&P for additional details of medical decision making.    Trenton Moore (Sawyer Newton MD  Internal Medicine/Pediatrics  Hospitalist  Date of Service (when I saw the patient): 01/31/23      [facility]    Progress Note - Hospitalist Service, [facility] TEAM        Date of Admission:  1/30/2023    Assessment & Plan   Patient is a 61 yo male with PMH notable for HBV-associated cirrhosis, HCC, ascites, EV s/p banding (Septmeber '22), T2DM, and herpes zoster admitted with acute on chronic abdominal pain and distension. Etiology for presenting symptoms likely multifactorial, including ascites, SBO, SBP ruled out by paracentesis on 1/30.He is hemodynamically stable on admission.    Changes today:  - stopped ceftriaxone  - 25g IV albumin  - working on coordination for weekly outpatient paracenteses  - surgery planning for GGC today     Abdominal pain  Decompensated cirrhosis 2/2 chronic HBV infection c/b hepatocellular carcinoma  Esophageal varices s/p banding (9/2022)  Hypoalbuminemia  Recent admission (01/24-01/26) for similar concerns and imaging notable for ascites and concerning for SBO. Symptoms improved with paracentesis and there was no indication for surgical intervention of NG placement during that  visit. CT A/P on presentation (01/30) notable for abdominal ascites and worsening small bowel distention concerning for small bowel obstruction. Last BM 1/29. Paracentesis on 1/30 removed 2.3L fluid with some improvement in symptoms. No evidence of SBP. Patient received one dose of IV ceftriaxone on 1/30, this was discontinued given no evidence of infection. Per hepatology, patient is not a candidate for TIPS given his history of HCC.  - PTA Tenofovir  - General Surgery Consult, appreciate recs    > no urgent surgical intervention at this time, non-operative management with NGT, IVF, NPO. Plan for gastrografin study 1/31. Advance NG tube 5 cm as sidehole is at GE junction on XR.  - Cytology of ascitic fluid pending  - Oncology consult given history of HCC    > stable/treated HCC, unlikely that current symptoms related to HCC or possible lung primary  - NPO  - Holding PTA Lasix and Spironolactone  - IV Dilaudid 1mg q2h prn  - albumin replaced 1/31    MELD-Na score: 19 at 1/31/2023  6:07 AM  MELD score: 16 at 1/31/2023  6:07 AM  Calculated from:  Serum Creatinine: 0.84 mg/dL (Using min of 1 mg/dL) at 1/31/2023  6:07 AM  Serum Sodium: 133 mmol/L at 1/31/2023  6:07 AM  Total Bilirubin: 2.3 mg/dL at 1/31/2023  6:07 AM  INR(ratio): 1.80 at 1/30/2023  2:11 AM  Age: 62 years     Hyponatremia   In setting of poor oral intake and decompensated liver disease  -CTM     Previous herpes zoster of L T4 dermatome  Documented infection, treated May '22. Here complaining of pain with similar distribution, exam shows prior healed lesions, no active lesions.  - Continue PTA gabapentin TID     T2DM  Hgb A1c 6.8 (01/25/23).  -MDSSI  -Holding PTA Metformin  -Hypoglycemia protocol     Diet: NPO for Medical/Clinical Reasons Except for: Meds, Ice Chips   DVT Prophylaxis: Pneumatic Compression Devices  Davalos Catheter: Not present  Fluids: albumin 25g  Lines: None     Cardiac Monitoring: None  Code Status: Full Code      Clinically Significant  "Risk Factors              # Hypoalbuminemia: Lowest albumin = 2 g/dL at 1/31/2023  6:07 AM, will monitor as appropriate   # Thrombocytopenia: Lowest platelets = 75 in last 2 days, will monitor for bleeding         # DMII: A1C = 6.8 % (Ref range: <5.7 %) within past 3 months, PRESENT ON ADMISSION          Disposition Plan      Expected Discharge Date: 02/03/2023      Destination: home          The patient's care was discussed with the Attending Physician, Dr. Newton.    Inez Sanchez, MS3  Medical Student  Hospitalist Service, [place] [id number] TEAM   ________________________________________________    Interval History   Notes and vitals reviewed. TABITHA. Patient notes that his abdominal pain is much improved after his paracentesis yesterday. This morning, he noticed that when the nurse was helping him get up for a weight, he felt \"heavy\" and like he would fall down. Says that he \"saw stars\". Feels okay laying in bed. No BMs since hospitalization. Has been voiding without issues. Feels hungry and thirsty, notes his mouth is dry. He has also noticed that his hands are swollen.      Physical Exam   Vital Signs: Temp: 97.4  F (36.3  C) Temp src: Oral BP: (!) 105/38 Pulse: 87   Resp: 18 SpO2: 97 % O2 Device: None (Room air)    Weight: 130 lbs 1.6 oz    General Appearance:  Appears uncomfortable, lying in bed, awake, alert, NAD  HEENT: NC/AT, EOMI, MMM  Respiratory: CTAB  Cardiovascular: RRR. Normal S1/S2, no murmurs noted.  GI: Soft, mildly distended, diffuse mild tenderness to palpation, no rebound or guarding, decreased bowel sounds  Skin: Warm, dry, healed lesions from prior shingles. Edema in bilateral hands. No lower extremity edema.   Neuro: Answers questions appropriately, moves all extremities equally       Medical Decision Making       Please see A&P for additional details of medical decision making.      Data   ------------------------- PAST 24 HR DATA REVIEWED -----------------------------------------------    I have " personally reviewed the following data over the past 24 hrs:    8.7  \   8.2 (L)   / 75 (L)     133 (L) 104 12.7 /  90   4.3 23 0.84 \       ALT: 19 AST: 33 AP: 95 TBILI: 2.3 (H)   ALB: 2.0 (L) TOT PROTEIN: 5.7 (L) LIPASE: N/A       Procal: N/A CRP: N/A Lactic Acid: 1.5         Imaging results reviewed over the past 24 hrs:   Recent Results (from the past 24 hour(s))   XR Abdomen Port 1 View    Narrative    EXAM: XR ABDOMEN PORT 1 VIEW  1/30/2023 10:45 PM     HISTORY:  new NGT placement       TECHNIQUE: Single frontal radiograph of the abdomen    COMPARISON:  CT abdomen and pelvis 1/30/2023.    FINDINGS:   Portable AP radiograph of the abdomen at 50 degrees. Gastric tube tip  projects over the expected location of the stomach. Demonstration of  several dilated loops of small bowel with no evidence for pneumatosis  or portal venous gas. The sidehole projects near the gastroesophageal  junction. Partially visualized pleural-based calcifications. Calcified  right hepatic lobe lesion..      Impression    IMPRESSION: Gastric tube tip projects over the expected location of  the stomach. The sidehole projects over the gastroesophageal junction.  Consider advancement. Continued gaseous dilatation of small bowel.    I have personally reviewed the examination and initial interpretation  and I agree with the findings.    BABITA HOSKINS MD         SYSTEM ID:  [id number]

## 2023-01-31 NOTE — PROCEDURES
[facility]  CAPS PROCEDURE NOTE  Date of Admission:  1/30/2023  Consult Requested by: Medicine  Reason for Consult: management of symptomatic ascites    Indication/HPI: ascites, uncomfortable, ? infection    Pre-Procedure Diagnosis: Ascites    Post-Procedure Diagnosis: Ascites    Risk Assessment: Average risk, Technically straightforward; patient's anticoagulation has been held according to guidelines based on the agent and platelets and coags are within guidelines    Procedure Outcome:  Therapeutic paracentesis performed with 2.3 liters of ascites removed. Orange fluid  See additional procedure details below.    The primary covering service should follow up and address any lab results as appropriate.    Yaquelin Hess MD  [facility]  Securely message with Vocera (more info)  Text page via [employer] Paging/Directory   See signed in provider for up to date coverage information      [facility]    Paracentesis    Date/Time: 1/30/2023 6:28 PM  Performed by: Yaquelin Hess MD  Authorized by: Yaquelin Hess MD     PRE-PROCEDURE DETAILS  Initial or subsequent exam: initial  Procedure purpose: therapeutic  Indications: new onset ascites and suspected peritonitis        UNIVERSAL PROTOCOL   Site Marked: Yes  Prior Images Obtained and Reviewed:  Yes  Required items: Required blood products, implants, devices and special equipment available    Patient identity confirmed:  Verbally with patient  NA - No sedation, light sedation, or local anesthesia  Confirmation Checklist:  Patient's identity using two indicators  Time out: Immediately prior to the procedure a time out was called    Universal Protocol: the Joint Commission Universal Protocol was followed    Preparation: Patient was prepped and draped in usual sterile fashion       ANESTHESIA    Anesthesia: Local infiltration  Local  Anesthetic:  Lidocaine 1% without epinephrine  Anesthetic Total (mL):  4      SEDATION    Patient Sedated: No    POST PROCEDURE DETAILS  Needle gauge: 22  Ultrasound guidance: yes  Puncture site: left lower quadrant  Fluid removed: 2300(ml)  Fluid characteristics: orange.  Dressing: bandaid.        PROCEDURE  Describe Procedure: Sample was sent to the lab  Patient Tolerance:  Patient tolerated the procedure well with no immediate complications  Length of time physician/provider present for 1:1 monitoring during sedation: 0        POC US Guide for Paracentesis     Impression  US Indication: decompensated liver disease    Limited abdominal ultrasound was performed and demonstrated an adequate fluid collection on the left side of the abdomen.    Doppler of the skin demonstrated an area at this site without significant vasculature.  A paracentesis at this site was subsequently performed.    Yaquelin Hess MD

## 2023-02-01 ENCOUNTER — APPOINTMENT (OUTPATIENT)
Dept: GENERAL RADIOLOGY | Facility: CLINIC | Age: 63
DRG: 389 | End: 2023-02-01
Payer: MEDICARE

## 2023-02-01 LAB
ALBUMIN SERPL BCG-MCNC: 2.1 G/DL (ref 3.5–5.2)
ALP SERPL-CCNC: 88 U/L (ref 40–129)
ALT SERPL W P-5'-P-CCNC: 15 U/L (ref 10–50)
ANION GAP SERPL CALCULATED.3IONS-SCNC: 7 MMOL/L (ref 7–15)
AST SERPL W P-5'-P-CCNC: 31 U/L (ref 10–50)
BILIRUB SERPL-MCNC: 2.4 MG/DL
BUN SERPL-MCNC: 17.7 MG/DL (ref 8–23)
CALCIUM SERPL-MCNC: 7.5 MG/DL (ref 8.8–10.2)
CHLORIDE SERPL-SCNC: 105 MMOL/L (ref 98–107)
CREAT SERPL-MCNC: 0.84 MG/DL (ref 0.67–1.17)
DEPRECATED HCO3 PLAS-SCNC: 24 MMOL/L (ref 22–29)
ERYTHROCYTE [DISTWIDTH] IN BLOOD BY AUTOMATED COUNT: 20.6 % (ref 10–15)
GFR SERPL CREATININE-BSD FRML MDRD: >90 ML/MIN/1.73M2
GLUCOSE BLDC GLUCOMTR-MCNC: 102 MG/DL (ref 70–99)
GLUCOSE BLDC GLUCOMTR-MCNC: 283 MG/DL (ref 70–99)
GLUCOSE BLDC GLUCOMTR-MCNC: 84 MG/DL (ref 70–99)
GLUCOSE BLDC GLUCOMTR-MCNC: 91 MG/DL (ref 70–99)
GLUCOSE BLDC GLUCOMTR-MCNC: 92 MG/DL (ref 70–99)
GLUCOSE SERPL-MCNC: 87 MG/DL (ref 70–99)
HCT VFR BLD AUTO: 25.1 % (ref 40–53)
HGB BLD-MCNC: 7.5 G/DL (ref 13.3–17.7)
MCH RBC QN AUTO: 23.7 PG (ref 26.5–33)
MCHC RBC AUTO-ENTMCNC: 29.9 G/DL (ref 31.5–36.5)
MCV RBC AUTO: 79 FL (ref 78–100)
PATH REPORT.COMMENTS IMP SPEC: NORMAL
PATH REPORT.FINAL DX SPEC: NORMAL
PATH REPORT.GROSS SPEC: NORMAL
PATH REPORT.MICROSCOPIC SPEC OTHER STN: NORMAL
PATH REPORT.RELEVANT HX SPEC: NORMAL
PLATELET # BLD AUTO: 72 10E3/UL (ref 150–450)
POTASSIUM SERPL-SCNC: 4.1 MMOL/L (ref 3.4–5.3)
PROT SERPL-MCNC: 5.5 G/DL (ref 6.4–8.3)
RBC # BLD AUTO: 3.17 10E6/UL (ref 4.4–5.9)
SODIUM SERPL-SCNC: 136 MMOL/L (ref 136–145)
WBC # BLD AUTO: 5.9 10E3/UL (ref 4–11)

## 2023-02-01 PROCEDURE — 74018 RADEX ABDOMEN 1 VIEW: CPT | Mod: 26 | Performed by: RADIOLOGY

## 2023-02-01 PROCEDURE — 120N000002 HC R&B MED SURG/OB UMMC

## 2023-02-01 PROCEDURE — 80053 COMPREHEN METABOLIC PANEL: CPT | Performed by: STUDENT IN AN ORGANIZED HEALTH CARE EDUCATION/TRAINING PROGRAM

## 2023-02-01 PROCEDURE — 258N000003 HC RX IP 258 OP 636: Performed by: STUDENT IN AN ORGANIZED HEALTH CARE EDUCATION/TRAINING PROGRAM

## 2023-02-01 PROCEDURE — 250N000013 HC RX MED GY IP 250 OP 250 PS 637

## 2023-02-01 PROCEDURE — 74018 RADEX ABDOMEN 1 VIEW: CPT

## 2023-02-01 PROCEDURE — 99232 SBSQ HOSP IP/OBS MODERATE 35: CPT | Performed by: STUDENT IN AN ORGANIZED HEALTH CARE EDUCATION/TRAINING PROGRAM

## 2023-02-01 PROCEDURE — 85014 HEMATOCRIT: CPT | Performed by: STUDENT IN AN ORGANIZED HEALTH CARE EDUCATION/TRAINING PROGRAM

## 2023-02-01 PROCEDURE — 36415 COLL VENOUS BLD VENIPUNCTURE: CPT | Performed by: STUDENT IN AN ORGANIZED HEALTH CARE EDUCATION/TRAINING PROGRAM

## 2023-02-01 PROCEDURE — 250N000013 HC RX MED GY IP 250 OP 250 PS 637: Performed by: STUDENT IN AN ORGANIZED HEALTH CARE EDUCATION/TRAINING PROGRAM

## 2023-02-01 PROCEDURE — 999N000128 HC STATISTIC PERIPHERAL IV START W/O US GUIDANCE

## 2023-02-01 RX ORDER — BISACODYL 10 MG
10 SUPPOSITORY, RECTAL RECTAL DAILY PRN
Status: DISCONTINUED | OUTPATIENT
Start: 2023-02-01 | End: 2023-02-05 | Stop reason: HOSPADM

## 2023-02-01 RX ORDER — BISACODYL 10 MG
10 SUPPOSITORY, RECTAL RECTAL ONCE
Status: COMPLETED | OUTPATIENT
Start: 2023-02-01 | End: 2023-02-01

## 2023-02-01 RX ORDER — SODIUM CHLORIDE, SODIUM LACTATE, POTASSIUM CHLORIDE, CALCIUM CHLORIDE 600; 310; 30; 20 MG/100ML; MG/100ML; MG/100ML; MG/100ML
INJECTION, SOLUTION INTRAVENOUS CONTINUOUS
Status: DISCONTINUED | OUTPATIENT
Start: 2023-02-01 | End: 2023-02-02

## 2023-02-01 RX ADMIN — Medication 1 SPRAY: at 12:30

## 2023-02-01 RX ADMIN — Medication 1 SPRAY: at 09:18

## 2023-02-01 RX ADMIN — TENOFOVIR DISOPROXIL FUMARATE 300 MG: 300 TABLET, FILM COATED ORAL at 09:17

## 2023-02-01 RX ADMIN — Medication 1 SPRAY: at 21:16

## 2023-02-01 RX ADMIN — CARVEDILOL 6.25 MG: 6.25 TABLET, FILM COATED ORAL at 09:17

## 2023-02-01 RX ADMIN — GABAPENTIN 300 MG: 300 CAPSULE ORAL at 09:17

## 2023-02-01 RX ADMIN — SODIUM CHLORIDE, POTASSIUM CHLORIDE, SODIUM LACTATE AND CALCIUM CHLORIDE: 600; 310; 30; 20 INJECTION, SOLUTION INTRAVENOUS at 22:05

## 2023-02-01 RX ADMIN — GABAPENTIN 300 MG: 300 CAPSULE ORAL at 21:16

## 2023-02-01 RX ADMIN — SODIUM CHLORIDE, POTASSIUM CHLORIDE, SODIUM LACTATE AND CALCIUM CHLORIDE: 600; 310; 30; 20 INJECTION, SOLUTION INTRAVENOUS at 09:47

## 2023-02-01 RX ADMIN — BISACODYL 10 MG: 10 SUPPOSITORY RECTAL at 09:50

## 2023-02-01 ASSESSMENT — ACTIVITIES OF DAILY LIVING (ADL)
ADLS_ACUITY_SCORE: 24
ADLS_ACUITY_SCORE: 24
ADLS_ACUITY_SCORE: 21
ADLS_ACUITY_SCORE: 21
ADLS_ACUITY_SCORE: 24
ADLS_ACUITY_SCORE: 21
ADLS_ACUITY_SCORE: 21
ADLS_ACUITY_SCORE: 24
ADLS_ACUITY_SCORE: 24
ADLS_ACUITY_SCORE: 21

## 2023-02-01 NOTE — PROGRESS NOTES
[facility]    Progress Note - EGS Service       Date of Admission:  1/30/2023    Assessment & Plan: Surgery   Rishabhligia Cabrera is a 62 year old male w/ PMH including HTN, DM2, HCC c/b cirrhosis (Deepak C) and significant ascites, as well as previous right hemicolectomy for a large polyp, who is presenting with acute on chronic abdominal pain w/ CT A/P concerning for SBO and large volume ascites.      Abdomen is quite taught/distended and TTP. Large gastric bubble seen on CT with transition point noted near his previous ileocolic anastomosis. Given his operative risk in the setting of Deepak C Cirrhosis, would continue with conservative mgmt including NG tube, IVF, and NPO and avoid surgical intervention. Paracentesis s/p 1/30. Surgery to follow.      - Passed GGC as it got to the colon but no BM or passing gas yet; keep NGT  - Suppository x1  - If <400 over next two shifts, will pull NGT; strict I/Os  - No Urgent surgical indications  - NGT to LIS, IVF, NPO  - Rest of Cares per Primary  - General Surgery to follow.         Diet: NPO for Medical/Clinical Reasons Except for: Meds, Ice Chips    DVT Prophylaxis: Pneumatic Compression Devices  Davalos Catheter: Not present  Lines: None     Drains: None     Cardiac Monitoring: None  Code Status: Full Code      Clinically Significant Risk Factors              # Hypoalbuminemia: Lowest albumin = 2 g/dL at 1/31/2023  6:07 AM, will monitor as appropriate   # Thrombocytopenia: Lowest platelets = 72 in last 2 days, will monitor for bleeding        # DMII: A1C = 6.8 % (Ref range: <5.7 %) within past 3 months, PRESENT ON ADMISSION          Disposition Plan     Expected Discharge Date: 02/03/2023      Destination: home           The patient's care was discussed with Dr. Willis.    Dandre Mark MD  [facility]  Non-urgent messages: Securely message with [employer] (more info)  Text page via [employer]  Paging/Directory     ______________________________________________________________________    Interval History   NAOE. Still not passing gas. Remains hungry with mild abdominal pain.     Physical Exam   Vital Signs: Temp: 97.2  F (36.2  C) Temp src: Axillary BP: 114/58 Pulse: 83   Resp: 20 SpO2: 97 % O2 Device: None (Room air)    Weight: 126 lbs 8 oz    Intake/Output Summary (Last 24 hours) at 2/1/2023 0842  Last data filed at 1/31/2023 2000  Gross per 24 hour   Intake 150 ml   Output 2450 ml   Net -2300 ml     General: Alert, NAD, NGT in place  Pulm: NLB on RA  CV: RRR by RP, non-cyanotic, no LE edema.  ABD: minimally-tender, but less distended and taught than yesterday. No guarding or rebound tenderness. Midline incision scar, well healed consistent w/ past surgery.   Skin: Warm and well perfused          Data     I have personally reviewed the following data over the past 24 hrs:    5.9  \   7.5 (L)   / 72 (L)     136 105 17.7 /  84   4.1 24 0.84 \       ALT: 15 AST: 31 AP: 88 TBILI: 2.4 (H)   ALB: 2.1 (L) TOT PROTEIN: 5.5 (L) LIPASE: N/A       Imaging results reviewed over the past 24 hrs:   Recent Results (from the past 24 hour(s))   XR Gastrografin Challenge    Narrative    Exam: XR GASTROGRAFIN CHALLENGE, 2/1/2023 1:53 AM    Indication: Small bowel obstruction.    Comparison: Abdominal radiograph 1/30/2023. CT abdomen and pelvis  1/30/2023.    Findings:   Portable AP supine view of the abdomen 8 hours after receiving  contrast. Gaseous distention of small bowel loops in the upper  abdomen. Contrast visualized throughout the colon with sparing of the  rectum. No pneumatosis. Scattered hyperdense foci projecting  throughout the abdomen, which were not visualized on 1/30/2023  radiograph, possibly representing pooled contrast, something ingested  or something extrinsic to the patient. Partially visualized calcified  right hepatic lobe lesion.      Impression    Impression:   1. No evidence for complete bowel  obstruction. Contrast visualized  throughout the proximal colon to the rectosigmoid region.  2. Similar appearance of dilated small bowel loops in the upper  abdomen.   3. Multiple hyperdensities in the middle of the abdomen, pooled  contrast, something ingested or something extrinsic to the patient.  Attention on follow-up.    I have personally reviewed the examination and initial interpretation  and I agree with the findings.    BABITA HOSKINS MD         SYSTEM ID:  [id number]

## 2023-02-01 NOTE — PLAN OF CARE
Goal Outcome Evaluation:             [id number]: BG 94 at 2151; q 4 hr BG checks. Pt had low BP in previous shift but it has been stable on this shift, /56 at 2156 and 127/66 at 1807. (HR 78). C/o abdominal pain, excessive fatigue and hunger.  NG was at LIS ; NG advanced from 60cm to 65cm per physician's order at 1612. 350ml out this shift. Gastrografin given at 1713 via NG and NG clamped currently. 8 hr post gastrografin x-ray scheduled for 0120. NPO except for oral meds and ice chips. PIV SL. AO X4, gait belt needed and assist x 1.  recommended if necessary.

## 2023-02-01 NOTE — PROGRESS NOTES
CLINICAL NUTRITION SERVICES - ASSESSMENT NOTE     Nutrition Prescription    RECOMMENDATIONS FOR MDs/PROVIDERS TO ORDER:  - Total fluids/adjustments per Providerr  - Recommend checking Mg++ and PO4 with next BMP draw d/t persistent low po intakes over the past week.      Malnutrition Status:    - Unable to determine at this time    Recommendations already ordered by Registered Dietitian (RD):  - Order Ensure Clear for HS snack    Future/Additional Recommendations:  - Recommend ordering calorie counts once pt's diet adv beyond CL's to assess adequacy of intakes  - Monitor wt trends and lytes      REASON FOR ASSESSMENT  Rishabh Cabrera is a/an 62 year old male assessed by the dietitian for positive Admission Nutrition Risk Screen for unsure wt loss PTA, but has been eating poorly because of a decreased appetite.    Per H&P, pt with history of HCC, tense ascites, upper GI bleed, anemia, diabetes among other medical problems who presents for sudden onset of severe abdominal pain since yesterday evening (1/29), associated with nausea, but no vomiting or diarrhea.  - Noted pt previously admitted on 1/24 for 2 day history of worsening abdominal pain. Reported pain improved following paracentesis. Pt discharged on 1/26.   - Reported that pt's abd pain did not significantly improved after discharging home on 1/26 and had worsened in the last 24 hrs (prior to this admission). Also with report of N/V, non-bloody emesis and poor oral intake    NUTRITION HISTORY  Unable to obtain from pt at this time.    CURRENT NUTRITION ORDERS  Diet: adv from NPO (1/30) to Clear Liquid @ 1252 today  - Per RN, informed that pt's NGT was removed this am.    Intake/Tolerance: No meals ordered yet this admission, but RN reported pt   had apple juice and popsicle since diet adv to CLs this afternoon     LABS  Ca++ 7.5 (low) - no replacement ordered at this time. Question if associated with low albumin level (2.1) today  K+ WNL, no Mg++ or PO4 ordered yet  "this admission    MEDICATIONS  Biotene QID  MIVF's @ 75 ml/hr    ANTHROPOMETRICS  Height: 0 cm (Data Unavailable). Height of 165.1 cm (5'5\") per chart review  Most Recent Weight: 57.4 kg (126 lb 8 oz) - today (lowest wt this admission)  IBW: 61.8 kg (93% of IBW)  BMI: Normal BMI  Weight History: Per review of EMR, noted wt loss of 6 lbs (4.5% loss) x past week. Per chart review, noted pt had a paracentesis performed on 1/30, hence difficult to assess true wt loss secondary to decreased appetite PTA and po diet limitations at present.  Wt Readings from Last 10 Encounters:   02/01/23 57.4 kg (126 lb 8 oz)   01/26/23 60 kg (132 lb 3.2 oz)   11/28/22 57.9 kg (127 lb 11.2 oz)   09/28/22 54.3 kg (119 lb 12.8 oz)   06/20/22 55.3 kg (122 lb)   05/31/22 57 kg (125 lb 9.6 oz)   05/13/22 61.3 kg (135 lb 3.2 oz)   02/22/22 56.7 kg (125 lb)   02/08/22 59.9 kg (132 lb 1.6 oz)   12/02/21 61.2 kg (135 lb)       Dosing Weight: 57 kg (based on lowest wt of 57.4 kg on 2/1)    ASSESSED NUTRITION NEEDS  Estimated Energy Needs:1700+ kcals/day (30+ kcals/kg )  Justification: Increased needs  Estimated Protein Needs: 74-86 grams protein/day (1.3 - 1.5 grams of pro/kg)  Justification: Repletion  Estimated Fluid Needs: Per provider pending fluid status    PHYSICAL FINDINGS  See malnutrition section below.  Ascites - s/p paracentesis on 1/30 with 2.3 liters removed    MALNUTRITION  % Intake: suspect at least < 75% for > 7 days (moderate) based on poor po intakes with bouts of N/V and abd pain over past week/  % Weight Loss: Unable to assess  Subcutaneous Fat Loss: Unable to assess  Muscle Loss: Unable to assess  Fluid Accumulation/Edema: Does not meet criteria per chart review  Malnutrition Diagnosis: Unable to determine due to determine d/t unable to complete all parameters of nutritional assessment at this time.    NUTRITION DIAGNOSIS  Inadequate oral intake related to poor appetite, abd pain and bouts of N/V PTA as evidenced by report of " poor oral intake since pt discharging on 1/26 per chart review.    INTERVENTIONS  Implementation  Collaboration with other providers - bedside RN  Medical food supplement therapy - as outlined above    Goals  1. Diet adv beyond CLs within 24 to 48 hrs.  2. Patient to consume % of nutritionally adequate meal trays TID, or the equivalent with supplements/snacks over next 5-7 days.     Monitoring/Evaluation  Progress toward goals will be monitored and evaluated per protocol.    Jessica Guadarrama RD,LD  7D pager [phone]

## 2023-02-01 NOTE — PLAN OF CARE
Problem: Oral Intake Inadequate  Goal: Improved Oral Intake  Outcome: Progressing   Goal Outcome Evaluation:  Diet adv from NPO to CLs today. Encourage po intakes as tolerated.

## 2023-02-01 NOTE — PROGRESS NOTES
[facility]    Progress Note - EGS Service       Date of Admission:  1/30/2023    Assessment & Plan: Surgery    Rishabhligia Cabrera is a 62 year old male w/ PMH including HTN, DM2, HCC c/b cirrhosis (Deepak C) and significant ascites, as well as previous right hemicolectomy for a large polyp, who is presenting with acute on chronic abdominal pain w/ CT A/P concerning for SBO and large volume ascites.      Abdomen is quite taught/distended and TTP. Large gastric bubble seen on CT with transition point noted near his previous ileocolic anastomosis. Given his operative risk in the setting of Deepak C Cirrhosis, would continue with conservative mgmt including NG tube, IVF, and NPO and avoid surgical intervention. Paracentesis s/p 1/30. Surgery to follow.     - GGC overnight with xray in the AM  - No Urgent surgical indications  - NGT to LIS, IVF, NPO  - Rest of Cares per Primary  - General Surgery to follow.        Diet: NPO for Medical/Clinical Reasons Except for: Meds, Ice Chips    DVT Prophylaxis: Pneumatic Compression Devices  Davalos Catheter: Not present  Lines: None     Drains: None     Cardiac Monitoring: None  Code Status: Full Code      Clinically Significant Risk Factors              # Hypoalbuminemia: Lowest albumin = 2 g/dL at 1/31/2023  6:07 AM, will monitor as appropriate   # Thrombocytopenia: Lowest platelets = 75 in last 2 days, will monitor for bleeding        # DMII: A1C = 6.8 % (Ref range: <5.7 %) within past 3 months, PRESENT ON ADMISSION          Disposition Plan      Expected Discharge Date: 02/03/2023      Destination: home           The patient's care was discussed with Dr. Chauhan.    Dandre Mark MD  [facility]  Non-urgent messages: Securely message with [employer] (more info)  Text page via [employer] Paging/Directory     ______________________________________________________________________    Interval History    Patient feeling hungry today. Abdomen feels less distended but still having some pain.     Physical Exam   Vital Signs: Temp: 97.5  F (36.4  C) Temp src: Oral BP: 127/66 Pulse: 81   Resp: 16 SpO2: 97 % O2 Device: None (Room air)    Weight: 130 lbs 1.6 oz    Intake/Output Summary (Last 24 hours) at 1/31/2023 1830  Last data filed at 1/31/2023 1457  Gross per 24 hour   Intake --   Output 2500 ml   Net -2500 ml     General: Alert, resting comfortably in NAD/NTA. Cooperative  Pulm: NLB on RA, no tachypnea/dyspnea, though reports it  CV: RRR by RP, non-cyanotic, no LE edema.  ABD: minimally-tender, but less distended and taught than yesterday. No guarding or rebound tenderness. Midline incision scar, well healed consistent w/ past surgery.   Skin: Warm and well perfused          Data     I have personally reviewed the following data over the past 24 hrs:    8.7  \   8.2 (L)   / 75 (L)     133 (L) 104 12.7 /  94   4.3 23 0.84 \       ALT: 19 AST: 33 AP: 95 TBILI: 2.3 (H)   ALB: 2.0 (L) TOT PROTEIN: 5.7 (L) LIPASE: N/A       Imaging results reviewed over the past 24 hrs:   Recent Results (from the past 24 hour(s))   XR Abdomen Port 1 View    Narrative    EXAM: XR ABDOMEN PORT 1 VIEW  1/30/2023 10:45 PM     HISTORY:  new NGT placement       TECHNIQUE: Single frontal radiograph of the abdomen    COMPARISON:  CT abdomen and pelvis 1/30/2023.    FINDINGS:   Portable AP radiograph of the abdomen at 50 degrees. Gastric tube tip  projects over the expected location of the stomach. Demonstration of  several dilated loops of small bowel with no evidence for pneumatosis  or portal venous gas. The sidehole projects near the gastroesophageal  junction. Partially visualized pleural-based calcifications. Calcified  right hepatic lobe lesion..      Impression    IMPRESSION: Gastric tube tip projects over the expected location of  the stomach. The sidehole projects over the gastroesophageal junction.  Consider advancement. Continued  gaseous dilatation of small bowel.    I have personally reviewed the examination and initial interpretation  and I agree with the findings.    BABITA HOSKINS MD         SYSTEM ID:  [id number]

## 2023-02-01 NOTE — PLAN OF CARE
Goal Outcome Evaluation: progressing/ongoing       Plan of Care Reviewed With: patient     Blood pressure 114/58, pulse 83, temperature 97.2  F (36.2  C), temperature source Axillary, resp. rate 20, weight 59 kg (130 lb 1.6 oz), SpO2 97 %.    Vitals: WDL on RA  Neuros: A/O x 4  IV: PIV SL  Labs/Electrolytes: Reviewed. BS at 2:00 am 102 and 6:00 am 91  Resp/trach: Denied SOB.  Diet: NPO, denies nausea/vomiting  Bowel status: +BS, No Bm during the shift, NG clamped. Tolerating well.   : Voiding spontaneously.   Skin: No new skin deficit noted.  Pain: Denied pain  Activity: Assist of 1  Plan: Continue to monitor per POC. X-ray performed after the gastrografin challenge. MD updated, NG tube continue to be clamped.

## 2023-02-01 NOTE — PLAN OF CARE
AVSS. Denies pain. Suppository given then pt had two BM. LR @75ml/hr. Now on clear liquid diet; had apple juice and popsicle so far order is to advance as tolerated. Pt c/o lethargy.NG removed. Assist of 1 with gait belt

## 2023-02-01 NOTE — PROGRESS NOTES
EGS Brief Note    Saw patient bedside this afternoon. Patient's NGT removed and tolerating CLD. BM with enema. Abdomen remains soft, minimally tender to mid abdomen, and minimally distended, which has been consistent since his paracentesis.     Continue to ADAT.     Call with questions or concerns.    Dandre Mark, PGY-1  General Surgery Resident   Pager: [phone]

## 2023-02-01 NOTE — PROGRESS NOTES
Physician Attestation   I, Trenton Newton, was present with the medical/OPAL student who participated in the service and in the documentation of the note.  I have verified the history and personally performed the physical exam and medical decision making.  I agree with the assessment and plan of care as documented in the note.      Key findings:   Passed gastrografin challenge, had BM with suppository, NG removed, tolerating CLD. If abd pain tolerable and doing well with advancing diet, could poss discharge home 2/2 or 2/3.     Please see A&P for additional details of medical decision making.     Trenton Moore (Sawyer Newton MD  Internal Medicine/Pediatrics  Hospitalist  Date of Service (when I saw the patient): 02/01/23    [facility]    Progress Note - Hospitalist Service, [facility] TEAM        Date of Admission:  1/30/2023    Assessment & Plan   Patient is a 63 yo male with PMH notable for HBV-associated cirrhosis, HCC, ascites, EV s/p banding (Septmeber '22), T2DM, and herpes zoster admitted with acute on chronic abdominal pain and distension. Etiology for presenting symptoms likely multifactorial, including ascites vs SBO. SBP ruled out by paracentesis on 1/30. He is hemodynamically stable on admission.    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 Symptoms improved with paracentesis and there was no indication for surgical intervention or NG placement during that visit. CT A/P on presentation (01/30) notable for abdominal ascites  and worsening small bowel distention concerning for small bowel obstruction. Last BM 1/29. Paracentesis on 1/30 removed 2.3L fluid with some improvement in symptoms. No evidence of SBP. Patient received one dose of IV ceftriaxone on 1/30, this was discontinued given no evidence of infection. Per hepatology, patient is not a candidate for TIPS given his history of HCC.  - PTA Tenofovir  - General Surgery Consult, appreciate recs    > no urgent surgical intervention at this time, non-operative management with NGT, IVF, NPO.    > Gastrografin study 1/31 without evidence of complete obstruction    > Bisacodyl suppository 2/1 with resultant BMs    > NGT removed 2/1, advance diet as tolerated  - Cytology of ascitic fluid negative for malignancy  - Oncology consult given history of HCC    > stable/treated HCC, unlikely that current symptoms related to HCC or possible lung primary  - Holding PTA Lasix and Spironolactone  - IV Dilaudid 1mg q2h prn  - albumin replaced 1/31, LR ~1L MIVF on 2/1    MELD-Na score: 17 at 2/1/2023  7:05 AM  MELD score: 16 at 2/1/2023  7:05 AM  Calculated from:  Serum Creatinine: 0.84 mg/dL (Using min of 1 mg/dL) at 2/1/2023  7:05 AM  Serum Sodium: 136 mmol/L at 2/1/2023  7:05 AM  Total Bilirubin: 2.4 mg/dL at 2/1/2023  7:05 AM  INR(ratio): 1.80 at 1/30/2023  2:11 AM  Age: 62 years     Hyponatremia, improved   In setting of poor oral intake and decompensated liver disease  -CTM     Previous herpes zoster of L T4 dermatome  Documented infection, treated May '22. Here complaining of pain with similar distribution, exam shows prior healed lesions, no active lesions.  - Continue PTA gabapentin TID     T2DM  Hgb A1c 6.8 (01/25/23).  -MDSSI  -Holding PTA Metformin  -Hypoglycemia protocol     Diet: CLD, ADAT  DVT Prophylaxis: Pneumatic Compression Devices  Davalos Catheter: Not present  Fluids:  LR at 75 mL/hr  Lines: None     Cardiac Monitoring: None  Code Status: Full Code      Clinically Significant Risk  Factors              # Hypoalbuminemia: Lowest albumin = 2 g/dL at 1/31/2023  6:07 AM, will monitor as appropriate   # Thrombocytopenia: Lowest platelets = 72 in last 2 days, will monitor for bleeding         # DMII: A1C = 6.8 % (Ref range: <5.7 %) within past 3 months, PRESENT ON ADMISSION          Disposition Plan     Expected Discharge Date: 02/02/2023      Destination: home          The patient's care was discussed with the Attending Physician, Dr. Newton.    Inez Sanchez, MS3  Medical Student  Hospitalist Service, [place] [id number] TEAM   ________________________________________________    Interval History   Notes and vitals reviewed. TABITHA. Patient denies any bowel movements since the day prior to admission. Has not been passing gas. Abdominal pain is improved, although patient notes a deeper pain that he attributes to hunger. Patient has not eaten for a few days and feels very hungry. Feels otherwise well, no new concerns.    Physical Exam   Vital Signs: Temp: 98.3  F (36.8  C) Temp src: Oral BP: 110/57 Pulse: 98   Resp: 25 SpO2: 97 % O2 Device: None (Room air)    Weight: 126 lbs 8 oz    General Appearance:  Appears uncomfortable, lying in bed, awake, alert, NAD  HEENT: NC/AT, EOMI, MMM  Respiratory: CTAB  Cardiovascular: RRR. Normal S1/S2, no murmurs noted.  GI: Soft, mildly distended, diffuse mild tenderness to palpation, no rebound or guarding, decreased bowel sounds  Skin: Warm, dry, healed lesions from prior shingles. Improving edema in bilateral hands. No lower extremity edema.   Neuro: Answers questions appropriately, moves all extremities equally       Medical Decision Making       Please see A&P for additional details of medical decision making.      Data   ------------------------- PAST 24 HR DATA REVIEWED -----------------------------------------------    I have personally reviewed the following data over the past 24 hrs:    5.9  \   7.5 (L)   / 72 (L)     136 105 17.7 /  92   4.1 24 0.84 \       ALT: 15  AST: 31 AP: 88 TBILI: 2.4 (H)   ALB: 2.1 (L) TOT PROTEIN: 5.5 (L) LIPASE: N/A       Imaging results reviewed over the past 24 hrs:   Recent Results (from the past 24 hour(s))   XR Gastrografin Challenge    Narrative    Exam: XR GASTROGRAFIN CHALLENGE, 2/1/2023 1:53 AM    Indication: Small bowel obstruction.    Comparison: Abdominal radiograph 1/30/2023. CT abdomen and pelvis  1/30/2023.    Findings:   Portable AP supine view of the abdomen 8 hours after receiving  contrast. Gaseous distention of small bowel loops in the upper  abdomen. Contrast visualized throughout the colon with sparing of the  rectum. No pneumatosis. Scattered hyperdense foci projecting  throughout the abdomen, which were not visualized on 1/30/2023  radiograph, possibly representing pooled contrast, something ingested  or something extrinsic to the patient. Partially visualized calcified  right hepatic lobe lesion.      Impression    Impression:   1. No evidence for complete bowel obstruction. Contrast visualized  throughout the proximal colon to the rectosigmoid region.  2. Similar appearance of dilated small bowel loops in the upper  abdomen.   3. Multiple hyperdensities in the middle of the abdomen, pooled  contrast, something ingested or something extrinsic to the patient.  Attention on follow-up.    I have personally reviewed the examination and initial interpretation  and I agree with the findings.    BABITA HOSKINS MD         SYSTEM ID:  [id number]

## 2023-02-02 ENCOUNTER — ANCILLARY PROCEDURE (OUTPATIENT)
Dept: ULTRASOUND IMAGING | Facility: CLINIC | Age: 63
End: 2023-02-02
Attending: STUDENT IN AN ORGANIZED HEALTH CARE EDUCATION/TRAINING PROGRAM
Payer: MEDICARE

## 2023-02-02 DIAGNOSIS — R18.8 OTHER ASCITES: Primary | ICD-10-CM

## 2023-02-02 LAB
ALBUMIN SERPL BCG-MCNC: 1.8 G/DL (ref 3.5–5.2)
ALP SERPL-CCNC: 87 U/L (ref 40–129)
ALT SERPL W P-5'-P-CCNC: 13 U/L (ref 10–50)
ANION GAP SERPL CALCULATED.3IONS-SCNC: 5 MMOL/L (ref 7–15)
APTT PPP: 41 SECONDS (ref 22–38)
AST SERPL W P-5'-P-CCNC: 28 U/L (ref 10–50)
BILIRUB SERPL-MCNC: 1.6 MG/DL
BUN SERPL-MCNC: 14.9 MG/DL (ref 8–23)
CA-I BLD-MCNC: 4.3 MG/DL (ref 4.4–5.2)
CALCIUM SERPL-MCNC: 7.1 MG/DL (ref 8.8–10.2)
CHLORIDE SERPL-SCNC: 105 MMOL/L (ref 98–107)
CREAT SERPL-MCNC: 0.87 MG/DL (ref 0.67–1.17)
DEPRECATED HCO3 PLAS-SCNC: 24 MMOL/L (ref 22–29)
ERYTHROCYTE [DISTWIDTH] IN BLOOD BY AUTOMATED COUNT: 21 % (ref 10–15)
FIBRINOGEN PPP-MCNC: 141 MG/DL (ref 170–490)
FIBRINOGEN PPP-MCNC: 151 MG/DL (ref 170–490)
GFR SERPL CREATININE-BSD FRML MDRD: >90 ML/MIN/1.73M2
GLUCOSE BLDC GLUCOMTR-MCNC: 176 MG/DL (ref 70–99)
GLUCOSE BLDC GLUCOMTR-MCNC: 192 MG/DL (ref 70–99)
GLUCOSE BLDC GLUCOMTR-MCNC: 207 MG/DL (ref 70–99)
GLUCOSE BLDC GLUCOMTR-MCNC: 247 MG/DL (ref 70–99)
GLUCOSE SERPL-MCNC: 206 MG/DL (ref 70–99)
HCT VFR BLD AUTO: 25.2 % (ref 40–53)
HGB BLD-MCNC: 7.3 G/DL (ref 13.3–17.7)
INR PPP: 2.3 (ref 0.85–1.15)
INR PPP: 2.36 (ref 0.85–1.15)
MAGNESIUM SERPL-MCNC: 1.6 MG/DL (ref 1.7–2.3)
MCH RBC QN AUTO: 23.1 PG (ref 26.5–33)
MCHC RBC AUTO-ENTMCNC: 29 G/DL (ref 31.5–36.5)
MCV RBC AUTO: 80 FL (ref 78–100)
PHOSPHATE SERPL-MCNC: 2.1 MG/DL (ref 2.5–4.5)
PLATELET # BLD AUTO: 87 10E3/UL (ref 150–450)
POTASSIUM SERPL-SCNC: 3.7 MMOL/L (ref 3.4–5.3)
PROT SERPL-MCNC: 5.1 G/DL (ref 6.4–8.3)
RBC # BLD AUTO: 3.16 10E6/UL (ref 4.4–5.9)
SODIUM SERPL-SCNC: 134 MMOL/L (ref 136–145)
WBC # BLD AUTO: 6 10E3/UL (ref 4–11)

## 2023-02-02 PROCEDURE — 120N000002 HC R&B MED SURG/OB UMMC

## 2023-02-02 PROCEDURE — 99233 SBSQ HOSP IP/OBS HIGH 50: CPT | Performed by: NURSE PRACTITIONER

## 2023-02-02 PROCEDURE — 36415 COLL VENOUS BLD VENIPUNCTURE: CPT | Performed by: STUDENT IN AN ORGANIZED HEALTH CARE EDUCATION/TRAINING PROGRAM

## 2023-02-02 PROCEDURE — 0W9G3ZZ DRAINAGE OF PERITONEAL CAVITY, PERCUTANEOUS APPROACH: ICD-10-PCS | Performed by: STUDENT IN AN ORGANIZED HEALTH CARE EDUCATION/TRAINING PROGRAM

## 2023-02-02 PROCEDURE — 85027 COMPLETE CBC AUTOMATED: CPT

## 2023-02-02 PROCEDURE — 85610 PROTHROMBIN TIME: CPT | Performed by: STUDENT IN AN ORGANIZED HEALTH CARE EDUCATION/TRAINING PROGRAM

## 2023-02-02 PROCEDURE — 36415 COLL VENOUS BLD VENIPUNCTURE: CPT

## 2023-02-02 PROCEDURE — 250N000011 HC RX IP 250 OP 636

## 2023-02-02 PROCEDURE — 82330 ASSAY OF CALCIUM: CPT

## 2023-02-02 PROCEDURE — 85730 THROMBOPLASTIN TIME PARTIAL: CPT | Performed by: STUDENT IN AN ORGANIZED HEALTH CARE EDUCATION/TRAINING PROGRAM

## 2023-02-02 PROCEDURE — 250N000009 HC RX 250: Performed by: STUDENT IN AN ORGANIZED HEALTH CARE EDUCATION/TRAINING PROGRAM

## 2023-02-02 PROCEDURE — 80053 COMPREHEN METABOLIC PANEL: CPT

## 2023-02-02 PROCEDURE — 49083 ABD PARACENTESIS W/IMAGING: CPT | Performed by: STUDENT IN AN ORGANIZED HEALTH CARE EDUCATION/TRAINING PROGRAM

## 2023-02-02 PROCEDURE — 250N000012 HC RX MED GY IP 250 OP 636 PS 637: Performed by: STUDENT IN AN ORGANIZED HEALTH CARE EDUCATION/TRAINING PROGRAM

## 2023-02-02 PROCEDURE — 99232 SBSQ HOSP IP/OBS MODERATE 35: CPT | Mod: 25 | Performed by: STUDENT IN AN ORGANIZED HEALTH CARE EDUCATION/TRAINING PROGRAM

## 2023-02-02 PROCEDURE — 250N000013 HC RX MED GY IP 250 OP 250 PS 637

## 2023-02-02 PROCEDURE — 84100 ASSAY OF PHOSPHORUS: CPT

## 2023-02-02 PROCEDURE — 258N000003 HC RX IP 258 OP 636: Performed by: STUDENT IN AN ORGANIZED HEALTH CARE EDUCATION/TRAINING PROGRAM

## 2023-02-02 PROCEDURE — 83735 ASSAY OF MAGNESIUM: CPT

## 2023-02-02 PROCEDURE — 85384 FIBRINOGEN ACTIVITY: CPT | Performed by: STUDENT IN AN ORGANIZED HEALTH CARE EDUCATION/TRAINING PROGRAM

## 2023-02-02 PROCEDURE — 250N000013 HC RX MED GY IP 250 OP 250 PS 637: Performed by: STUDENT IN AN ORGANIZED HEALTH CARE EDUCATION/TRAINING PROGRAM

## 2023-02-02 RX ORDER — DEXTROSE MONOHYDRATE 25 G/50ML
25-50 INJECTION, SOLUTION INTRAVENOUS
Status: DISCONTINUED | OUTPATIENT
Start: 2023-02-02 | End: 2023-02-05 | Stop reason: HOSPADM

## 2023-02-02 RX ORDER — MAGNESIUM SULFATE HEPTAHYDRATE 40 MG/ML
2 INJECTION, SOLUTION INTRAVENOUS ONCE
Status: COMPLETED | OUTPATIENT
Start: 2023-02-02 | End: 2023-02-02

## 2023-02-02 RX ORDER — NICOTINE POLACRILEX 4 MG
15-30 LOZENGE BUCCAL
Status: DISCONTINUED | OUTPATIENT
Start: 2023-02-02 | End: 2023-02-05 | Stop reason: HOSPADM

## 2023-02-02 RX ORDER — FUROSEMIDE 20 MG
20 TABLET ORAL DAILY
Status: DISCONTINUED | OUTPATIENT
Start: 2023-02-02 | End: 2023-02-03

## 2023-02-02 RX ORDER — SPIRONOLACTONE 25 MG/1
50 TABLET ORAL DAILY
Status: DISCONTINUED | OUTPATIENT
Start: 2023-02-02 | End: 2023-02-03

## 2023-02-02 RX ADMIN — Medication 1 SPRAY: at 19:48

## 2023-02-02 RX ADMIN — INSULIN ASPART 1 UNITS: 100 INJECTION, SOLUTION INTRAVENOUS; SUBCUTANEOUS at 18:05

## 2023-02-02 RX ADMIN — Medication 1 SPRAY: at 16:44

## 2023-02-02 RX ADMIN — INSULIN ASPART 2 UNITS: 100 INJECTION, SOLUTION INTRAVENOUS; SUBCUTANEOUS at 14:24

## 2023-02-02 RX ADMIN — POTASSIUM PHOSPHATE, MONOBASIC POTASSIUM PHOSPHATE, DIBASIC 9 MMOL: 224; 236 INJECTION, SOLUTION, CONCENTRATE INTRAVENOUS at 10:53

## 2023-02-02 RX ADMIN — SPIRONOLACTONE 50 MG: 25 TABLET, FILM COATED ORAL at 10:55

## 2023-02-02 RX ADMIN — MAGNESIUM SULFATE IN WATER 2 G: 40 INJECTION, SOLUTION INTRAVENOUS at 18:59

## 2023-02-02 RX ADMIN — GABAPENTIN 300 MG: 300 CAPSULE ORAL at 19:48

## 2023-02-02 RX ADMIN — TENOFOVIR DISOPROXIL FUMARATE 300 MG: 300 TABLET, FILM COATED ORAL at 09:20

## 2023-02-02 RX ADMIN — Medication 1 SPRAY: at 09:20

## 2023-02-02 RX ADMIN — Medication 1 G: at 22:05

## 2023-02-02 RX ADMIN — GABAPENTIN 300 MG: 300 CAPSULE ORAL at 09:20

## 2023-02-02 RX ADMIN — Medication 1 SPRAY: at 12:50

## 2023-02-02 RX ADMIN — GABAPENTIN 300 MG: 300 CAPSULE ORAL at 15:07

## 2023-02-02 RX ADMIN — FUROSEMIDE 20 MG: 20 TABLET ORAL at 10:55

## 2023-02-02 ASSESSMENT — ACTIVITIES OF DAILY LIVING (ADL)
ADLS_ACUITY_SCORE: 26

## 2023-02-02 NOTE — PLAN OF CARE
Blood pressure 108/56, pulse 74, temperature 98.4  F (36.9  C), temperature source Oral, resp. rate 20, weight 57.4 kg (126 lb 8 oz), SpO2 98 %.    Alert and oriented by 4. Able to make needs known. Slept majority of last four hours. No complaints of pain. Continent of bowel and bladder. SBA with all transfers. Clear liquid diet, tolerating well.     No medical changes this shift     Care from [id number]

## 2023-02-02 NOTE — PROGRESS NOTES
Physician Attestation   I, Trenton Newton, was present with the medical/OPAL student who participated in the service and in the documentation of the note.  I have verified the history and personally performed the physical exam and medical decision making.  I agree with the assessment and plan of care as documented in the note.      Key findings:   See edits in blue.   C/o epigastric and RUQ abd pain. Pain worse with food intake. No n/v. Feels distended.  Plan for therapeutic para. Will ask GI to consider EGD. Poss discharge in 1-2 days if pain controlled and workup completed.       Please see A&P for additional details of medical decision making.    Trenton Moore (Sawyer Newton MD  Internal Medicine/Pediatrics  Hospitalist    Date of Service (when I saw the patient): 02/02/23    [employer]    Progress Note - Hospitalist Service, [facility] TEAM        Date of Admission:  1/30/2023    Assessment & Plan   Patient is a 61 yo male with PMH notable for HBV-associated cirrhosis, HCC, ascites, EV s/p banding (Septmeber '22), T2DM, and herpes zoster admitted with acute on chronic abdominal pain and distension. Etiology for presenting symptoms likely multifactorial, including ascites vs SBO. SBP ruled out by paracentesis on 1/30. He is hemodynamically stable on admission.    Changes today:  - CAPs consult for therapeutic paracentesis  - GI consult for consideration of EGD due to continued abdominal pain  - NPO at midnight for EGD tomorrow  - working on coordination for weekly outpatient paracenteses     SBO  Abdominal pain  Decompensated cirrhosis 2/2 chronic HBV infection c/b hepatocellular carcinoma  Esophageal varices s/p banding (9/2022)  Hypoalbuminemia  Recent admission (01/24-01/26) for similar concerns and imaging notable for ascites and concerning for SBO. Symptoms improved with paracentesis and there was no indication for surgical intervention or NG placement during that visit.  CT A/P on presentation (01/30) notable for abdominal ascites and worsening small bowel distention concerning for small bowel obstruction. Last BM 1/29. Paracentesis on 1/30 removed 2.3L fluid with some improvement in symptoms. No evidence of SBP. Cytology of ascitic fluid negative for malignancy. Repeat therapeutic para on 2/2 with 1.5L out. Patient received one dose of IV ceftriaxone on 1/30, this was discontinued given no evidence of infection. Per hepatology, patient is not a candidate for TIPS given his history of HCC.  - PTA Tenofovir  - General Surgery Consult, appreciate recs    > no urgent surgical intervention at this time, non-operative management with NGT, IVF, NPO.    > Gastrografin study 1/31 without evidence of complete obstruction    > Bisacodyl suppository 2/1 with resultant BMs    > NGT removed 2/1, advance diet as tolerated  - Oncology consult given history of HCC    > stable/treated HCC, unlikely that current symptoms related to HCC or possible lung primary  - Holding PTA Lasix and Spironolactone  - IV Dilaudid 1mg q2h prn  - albumin replaced 1/31, LR ~1L MIVF on 2/1  - Hepatology consulted for possible EGD given continued abdominal pain    MELD-Na score: 20 at 2/2/2023 12:26 PM  MELD score: 18 at 2/2/2023 12:26 PM  Calculated from:  Serum Creatinine: 0.87 mg/dL (Using min of 1 mg/dL) at 2/2/2023  5:48 AM  Serum Sodium: 134 mmol/L at 2/2/2023  5:48 AM  Total Bilirubin: 1.6 mg/dL at 2/2/2023  5:48 AM  INR(ratio): 2.30 at 2/2/2023 12:26 PM  Age: 62 years     Hyponatremia, improved   In setting of poor oral intake and decompensated liver disease  -CTM     Previous herpes zoster of L T4 dermatome  Documented infection, treated May '22. Here complaining of pain with similar distribution, exam shows prior healed lesions, no active lesions.  - Continue PTA gabapentin TID. Denying pain now.     T2DM  Hgb A1c 6.8 (01/25/23).  -MDSSI  -Holding PTA Metformin  -Hypoglycemia protocol     Diet:  ADAT  DVT  "Prophylaxis: Pneumatic Compression Devices  Davalos Catheter: Not present  Fluids: Encourage PO intake  Lines: None     Cardiac Monitoring: None  Code Status: Full Code      Clinically Significant Risk Factors          # Hypocalcemia: Lowest iCa = 4.3 mg/dL in last 2 days, will monitor and replace as appropriate   # Hypomagnesemia: Lowest Mg = 1.6 mg/dL in last 2 days, will replace as needed   # Hypoalbuminemia: Lowest albumin = 1.8 g/dL at 2/2/2023  5:48 AM, will monitor as appropriate   # Thrombocytopenia: Lowest platelets = 72 in last 2 days, will monitor for bleeding         # DMII: A1C = 6.8 % (Ref range: <5.7 %) within past 3 months, PRESENT ON ADMISSION          Disposition Plan     Expected Discharge Date: 02/02/2023      Destination: home          The patient's care was discussed with the Attending Physician, Dr. Newtno.    Inez Sanchez, MS3  Medical Student  Hospitalist Service, [place] [id number] TEAM   ________________________________________________    Interval History   Notes and vitals reviewed. NAEON. Patient had two BMs yesterday after suppository but no BMs or gas since. NGT removed yesterday. No issues with urination. Swelling has improved. Has been drinking juice and water and ate some jello. Notes that when he eats, he has some abdominal pain in the mid and right upper quadrants. Is concerned to go home today given continued pain with eating. Patient feels that he \"needs a camera\" to look into his stomach, as he feels that is where most of his pain is coming from.    Physical Exam   Vital Signs: Temp: 97.5  F (36.4  C) Temp src: Oral BP: 108/57 Pulse: 75   Resp: 18 SpO2: 96 % O2 Device: None (Room air)    Weight: 120 lbs 3.2 oz    General Appearance:  Lying in bed, awake, alert, NAD  HEENT: NC/AT, EOMI, MMM  Respiratory: CTAB  Cardiovascular: RRR. Normal S1/S2, no murmurs noted.  GI: Soft, mildly distended, tenderness to palpation in mid and RUQ, no rebound or guarding, decreased bowel sounds  Skin: Warm, " dry, healed lesions from prior shingles. No lower extremity edema.   Neuro: Answers questions appropriately, moves all extremities equally       Medical Decision Making       Please see A&P for additional details of medical decision making.      Data   ------------------------- PAST 24 HR DATA REVIEWED -----------------------------------------------    I have personally reviewed the following data over the past 24 hrs:    6.0  \   7.3 (L)   / 87 (L)     134 (L) 105 14.9 /  207 (H)   3.7 24 0.87 \       ALT: 13 AST: 28 AP: 87 TBILI: 1.6 (H)   ALB: 1.8 (L) TOT PROTEIN: 5.1 (L) LIPASE: N/A       INR:  2.30 (H) PTT:  41 (H)   D-dimer:  N/A Fibrinogen:  151 (L)       Imaging results reviewed over the past 24 hrs:   No results found for this or any previous visit (from the past 24 hour(s)).

## 2023-02-02 NOTE — PLAN OF CARE
[id number]    A&Ox4, Soft Bps, OVSS. Advanced diet to low fiber, tolerated liquids well. C/o 3/10 abdominal pain, declined pain meds. Bedtime . New PIV placed, infusing LR @ 75mL hr. Last BM 2/1, voiding into bedside urinal with little output overnight. Slept between cares. No acute events.  Continue w/POC.

## 2023-02-02 NOTE — PROCEDURES
[facility]  CAPS PROCEDURE NOTE  Date of Admission:  1/30/2023  Consult Requested by: Medicine  Reason for Consult: management of symptomatic ascites    Indication/HPI: abdominal swelling     Pre-Procedure Diagnosis: Ascites    Post-Procedure Diagnosis: Ascites    Risk Assessment: Average risk, Technically straightforward; patient's anticoagulation has been held according to guidelines based on the agent and platelets and coags are within guidelines    Procedure Outcome:  Therapeutic paracentesis performed with 1500 liters of ascites removed.   See additional procedure details below.    The primary covering service should follow up and address any lab results as appropriate.    Larry Broderick MD  [facility]  Securely message with Vocera (more info)  Text page via [employer] Paging/Directory   See signed in provider for up to date coverage information      [facility]    Paracentesis    Date/Time: 2/2/2023 4:28 PM  Performed by: Larry Broderick MD  Authorized by: Larry Broderick MD     PRE-PROCEDURE DETAILS  Procedure purpose: diagnostic and therapeutic  Indications: abdominal discomfort secondary to ascites        UNIVERSAL PROTOCOL   Site Marked: Yes  Prior Images Obtained and Reviewed:  Yes  Required items: Required blood products, implants, devices and special equipment available    Patient identity confirmed:  Verbally with patient and arm band  NA - No sedation, light sedation, or local anesthesia  Confirmation Checklist:  Patient's identity using two indicators  Time out: Immediately prior to the procedure a time out was called    Universal Protocol: the Joint Commission Universal Protocol was followed    Preparation: Patient was prepped and draped in usual sterile fashion       ANESTHESIA    Local Anesthetic: Lidocaine 1% without epinephrine  Anesthetic Total (mL):   5      SEDATION    Patient Sedated: No    POST PROCEDURE DETAILS  Needle gauge: 25.  Ultrasound guidance: yes  Puncture site: left lower quadrant  Fluid removed: 1500(ml)  Fluid appearance: serous        PROCEDURE    Patient Tolerance:  Patient tolerated the procedure well with no immediate complications  Length of time physician/provider present for 1:1 monitoring during sedation: 0        No results found for this or any previous visit from the past 1 day.

## 2023-02-02 NOTE — PROGRESS NOTES
[facility]  HEPATOLOGY PROGRESS NOTE  Rishabh Cabrera [id number]       IMPRESSION:  Rishabh Cabrera is a 62 year old male with a history of HBV cirrhosis complicated by HCC s/p Y90 (9/2021) and TACE (12/2021), ascites, EV s/p bleeding (9/2022), indeterminate lung mass, DM II, recent shingles infection who was admitted with abdominal distention and abdominal pain.     RECOMMENDATIONS:  # EV s/p bleeding and banding (9/2022)  - will plan for EGD on 2/3. NPO after midnight.   - no current evidence of bleeding  - remains on carvedilol    # Ascites  - remains on lasix 20 and spironolactone 50mg daily.   - para 1/30 without evidence of SBP    # HBV cirrhosis  # HCC  - MELD 18.  - most recent MRI with nonviable tumor noted and LR3 lesions that have slightly increased. AFP wnl, no history of AFP   - continue tenofovir for HBV suppression. Last HBV level undetectable.   - no evidence of HE.     #Lung lesion  - plan for biopsy of lung mass as OP with IR.   - he had significant bleeding with prior lung tissue biopsy. Will review risks with IR.   - last biopsy (5/2022) without granulomatous disease or malignancy. Concern for non diagnostic sample.     Patient was discussed with and seen by attending physician, Dr. Noguera    Next follow up appointment: tbd    Thank you for the opportunity to be involved with  Rishabh Cabrera care. Please call with any questions or concerns.    JOSIE Arroyo, CNP  Inpatient Hepatology OPAL  Text Link        SUBJECTIVE:  Complains of abdominal distention and epigastric pain. Denies fevers, melena or hematochezia.     ROS:  A 10-point review of systems was negative.    OBJECTIVE:  VS: /57 (BP Location: Left arm)   Pulse 75   Temp 97.5  F (36.4  C) (Oral)   Resp 18   Wt 54.5 kg (120 lb 3.2 oz)   SpO2 96%   BMI 20.00 kg/m        General: In no acute distress, mild facial muscle wasting  Neuro: AOx3, No asterixis  HEENT:  Noscleral icterus, poor dentition  CV:  Skin warm and dry  Lungs:   Respirations even and nonlabored on room air  Abd:  Mildly distended, mildly tender   Extrem: trace lower peripheral edema   Skin: Nojaundice  Psych: pleasant    MEDICATIONS:  Current Facility-Administered Medications   Medication     artificial saliva (BIOTENE MT) solution 1 spray     bisacodyl (DULCOLAX) suppository 10 mg     carvedilol (COREG) tablet 6.25 mg     glucose gel 15-30 g    Or     dextrose 50 % injection 25-50 mL    Or     glucagon injection 1 mg     furosemide (LASIX) tablet 20 mg     gabapentin (NEURONTIN) capsule 300 mg     HYDROmorphone (DILAUDID) injection 1 mg     insulin aspart (NovoLOG) injection (RAPID ACTING)     insulin aspart (NovoLOG) injection (RAPID ACTING)     lidocaine (LMX4) cream     lidocaine 1 % 0.1-1 mL     melatonin tablet 1 mg     ondansetron (ZOFRAN) injection 4 mg     sodium chloride (PF) 0.9% PF flush 3 mL     sodium chloride (PF) 0.9% PF flush 3 mL     spironolactone (ALDACTONE) tablet 50 mg     tenofovir (VIREAD) tablet 300 mg     Facility-Administered Medications Ordered in Other Encounters   Medication     gadobutrol (GADAVIST) injection 7.5 mL       REVIEW OF LABORATORY, PATHOLOGY AND IMAGING RESULTS:  [facility]  Recent Labs   Lab 02/02/23  1510 02/02/23  0913 02/02/23  0548 02/01/23  2230 02/01/23  0709 02/01/23  0705 01/31/23  0706 01/31/23  0607 01/30/23  2130 01/30/23  0211   NA  --   --  134*  --   --  136  --  133*  --  132*   POTASSIUM  --   --  3.7  --   --  4.1  --  4.3  --  4.0   CHLORIDE  --   --  105  --   --  105  --  104  --  100   MANDY  --   --  7.1*  --   --  7.5*  --  7.3*  --  7.7*   CO2  --   --  24  --   --  24  --  23  --  26   BUN  --   --  14.9  --   --  17.7  --  12.7  --  11.8   CR  --   --  0.87  --   --  0.84  --  0.84  --  0.95  1.0   * 207* 206* 283*   < > 87   < > 84   < > 194*    < > = values in this interval not displayed.     CBC  Recent Labs   Lab 02/02/23  0548 02/01/23  0705 01/31/23  0607 01/30/23  0211   WBC 6.0 5.9 8.7 9.9   RBC  3.16* 3.17* 3.53* 3.76*   HGB 7.3* 7.5* 8.2* 8.7*   HCT 25.2* 25.1* 28.3* 30.0*   MCV 80 79 80 80   MCH 23.1* 23.7* 23.2* 23.1*   MCHC 29.0* 29.9* 29.0* 29.0*   RDW 21.0* 20.6* 20.6* 20.1*   PLT 87* 72* 75* 77*     INR  Recent Labs   Lab 02/02/23  1226 02/02/23  1151 01/30/23  0211   INR 2.30* 2.36* 1.80*     LFTs  Recent Labs   Lab 02/02/23  0548 02/01/23  0705 01/31/23  0607 01/30/23  0211   ALKPHOS 87 88 95 130*   AST 28 31 33 39   ALT 13 15 19 22   BILITOTAL 1.6* 2.4* 2.3* 2.0*   PROTTOTAL 5.1* 5.5* 5.7* 7.0   ALBUMIN 1.8* 2.1* 2.0* 2.5*        MELD-Na score: 20 at 2/2/2023 12:26 PM  MELD score: 18 at 2/2/2023 12:26 PM  Calculated from:  Serum Creatinine: 0.87 mg/dL (Using min of 1 mg/dL) at 2/2/2023  5:48 AM  Serum Sodium: 134 mmol/L at 2/2/2023  5:48 AM  Total Bilirubin: 1.6 mg/dL at 2/2/2023  5:48 AM  INR(ratio): 2.30 at 2/2/2023 12:26 PM  Age: 62 years      Imaging Results:    None current

## 2023-02-03 LAB
ALBUMIN SERPL BCG-MCNC: 1.9 G/DL (ref 3.5–5.2)
ALP SERPL-CCNC: 106 U/L (ref 40–129)
ALT SERPL W P-5'-P-CCNC: 21 U/L (ref 10–50)
ANION GAP SERPL CALCULATED.3IONS-SCNC: 3 MMOL/L (ref 7–15)
AST SERPL W P-5'-P-CCNC: 60 U/L (ref 10–50)
BILIRUB SERPL-MCNC: 1.2 MG/DL
BUN SERPL-MCNC: 9.2 MG/DL (ref 8–23)
CALCIUM SERPL-MCNC: 7.2 MG/DL (ref 8.8–10.2)
CHLORIDE SERPL-SCNC: 104 MMOL/L (ref 98–107)
CREAT SERPL-MCNC: 0.97 MG/DL (ref 0.67–1.17)
DEPRECATED HCO3 PLAS-SCNC: 27 MMOL/L (ref 22–29)
ERYTHROCYTE [DISTWIDTH] IN BLOOD BY AUTOMATED COUNT: 21.2 % (ref 10–15)
GFR SERPL CREATININE-BSD FRML MDRD: 89 ML/MIN/1.73M2
GLUCOSE BLDC GLUCOMTR-MCNC: 143 MG/DL (ref 70–99)
GLUCOSE BLDC GLUCOMTR-MCNC: 187 MG/DL (ref 70–99)
GLUCOSE BLDC GLUCOMTR-MCNC: 200 MG/DL (ref 70–99)
GLUCOSE BLDC GLUCOMTR-MCNC: 220 MG/DL (ref 70–99)
GLUCOSE SERPL-MCNC: 169 MG/DL (ref 70–99)
HCT VFR BLD AUTO: 25.5 % (ref 40–53)
HGB BLD-MCNC: 7.4 G/DL (ref 13.3–17.7)
HOLD SPECIMEN: NORMAL
MAGNESIUM SERPL-MCNC: 1.6 MG/DL (ref 1.7–2.3)
MCH RBC QN AUTO: 23.1 PG (ref 26.5–33)
MCHC RBC AUTO-ENTMCNC: 29 G/DL (ref 31.5–36.5)
MCV RBC AUTO: 80 FL (ref 78–100)
PHOSPHATE SERPL-MCNC: 1.8 MG/DL (ref 2.5–4.5)
PHOSPHATE SERPL-MCNC: 2.5 MG/DL (ref 2.5–4.5)
PLATELET # BLD AUTO: 89 10E3/UL (ref 150–450)
POTASSIUM SERPL-SCNC: 4 MMOL/L (ref 3.4–5.3)
POTASSIUM SERPL-SCNC: 4.1 MMOL/L (ref 3.4–5.3)
POTASSIUM SERPL-SCNC: 4.1 MMOL/L (ref 3.4–5.3)
PROT SERPL-MCNC: 5.4 G/DL (ref 6.4–8.3)
RBC # BLD AUTO: 3.2 10E6/UL (ref 4.4–5.9)
SODIUM SERPL-SCNC: 134 MMOL/L (ref 136–145)
UPPER GI ENDOSCOPY: NORMAL
WBC # BLD AUTO: 6.6 10E3/UL (ref 4–11)

## 2023-02-03 PROCEDURE — 84132 ASSAY OF SERUM POTASSIUM: CPT

## 2023-02-03 PROCEDURE — 84132 ASSAY OF SERUM POTASSIUM: CPT | Performed by: STUDENT IN AN ORGANIZED HEALTH CARE EDUCATION/TRAINING PROGRAM

## 2023-02-03 PROCEDURE — 120N000002 HC R&B MED SURG/OB UMMC

## 2023-02-03 PROCEDURE — 06L38CZ OCCLUSION OF ESOPHAGEAL VEIN WITH EXTRALUMINAL DEVICE, VIA NATURAL OR ARTIFICIAL OPENING ENDOSCOPIC: ICD-10-PCS | Performed by: INTERNAL MEDICINE

## 2023-02-03 PROCEDURE — 84100 ASSAY OF PHOSPHORUS: CPT | Performed by: STUDENT IN AN ORGANIZED HEALTH CARE EDUCATION/TRAINING PROGRAM

## 2023-02-03 PROCEDURE — 83735 ASSAY OF MAGNESIUM: CPT | Performed by: STUDENT IN AN ORGANIZED HEALTH CARE EDUCATION/TRAINING PROGRAM

## 2023-02-03 PROCEDURE — 88305 TISSUE EXAM BY PATHOLOGIST: CPT | Mod: 26 | Performed by: PATHOLOGY

## 2023-02-03 PROCEDURE — 250N000011 HC RX IP 250 OP 636: Performed by: STUDENT IN AN ORGANIZED HEALTH CARE EDUCATION/TRAINING PROGRAM

## 2023-02-03 PROCEDURE — 36415 COLL VENOUS BLD VENIPUNCTURE: CPT | Performed by: STUDENT IN AN ORGANIZED HEALTH CARE EDUCATION/TRAINING PROGRAM

## 2023-02-03 PROCEDURE — 258N000003 HC RX IP 258 OP 636: Performed by: STUDENT IN AN ORGANIZED HEALTH CARE EDUCATION/TRAINING PROGRAM

## 2023-02-03 PROCEDURE — 43244 EGD VARICES LIGATION: CPT | Performed by: INTERNAL MEDICINE

## 2023-02-03 PROCEDURE — 85027 COMPLETE CBC AUTOMATED: CPT | Performed by: STUDENT IN AN ORGANIZED HEALTH CARE EDUCATION/TRAINING PROGRAM

## 2023-02-03 PROCEDURE — 250N000013 HC RX MED GY IP 250 OP 250 PS 637: Performed by: STUDENT IN AN ORGANIZED HEALTH CARE EDUCATION/TRAINING PROGRAM

## 2023-02-03 PROCEDURE — 0DB98ZX EXCISION OF DUODENUM, VIA NATURAL OR ARTIFICIAL OPENING ENDOSCOPIC, DIAGNOSTIC: ICD-10-PCS | Performed by: INTERNAL MEDICINE

## 2023-02-03 PROCEDURE — 250N000009 HC RX 250: Performed by: INTERNAL MEDICINE

## 2023-02-03 PROCEDURE — 88305 TISSUE EXAM BY PATHOLOGIST: CPT | Mod: TC | Performed by: INTERNAL MEDICINE

## 2023-02-03 PROCEDURE — 250N000013 HC RX MED GY IP 250 OP 250 PS 637

## 2023-02-03 PROCEDURE — 43239 EGD BIOPSY SINGLE/MULTIPLE: CPT | Performed by: INTERNAL MEDICINE

## 2023-02-03 PROCEDURE — 36415 COLL VENOUS BLD VENIPUNCTURE: CPT

## 2023-02-03 PROCEDURE — 250N000009 HC RX 250: Performed by: STUDENT IN AN ORGANIZED HEALTH CARE EDUCATION/TRAINING PROGRAM

## 2023-02-03 PROCEDURE — 250N000011 HC RX IP 250 OP 636: Performed by: INTERNAL MEDICINE

## 2023-02-03 PROCEDURE — G0500 MOD SEDAT ENDO SERVICE >5YRS: HCPCS | Performed by: INTERNAL MEDICINE

## 2023-02-03 PROCEDURE — 99232 SBSQ HOSP IP/OBS MODERATE 35: CPT | Performed by: STUDENT IN AN ORGANIZED HEALTH CARE EDUCATION/TRAINING PROGRAM

## 2023-02-03 RX ORDER — FUROSEMIDE 40 MG
40 TABLET ORAL DAILY
Status: DISCONTINUED | OUTPATIENT
Start: 2023-02-04 | End: 2023-02-05 | Stop reason: HOSPADM

## 2023-02-03 RX ORDER — POLYETHYLENE GLYCOL 3350 17 G/17G
17 POWDER, FOR SOLUTION ORAL DAILY
Status: DISCONTINUED | OUTPATIENT
Start: 2023-02-03 | End: 2023-02-04

## 2023-02-03 RX ORDER — FENTANYL CITRATE 50 UG/ML
INJECTION, SOLUTION INTRAMUSCULAR; INTRAVENOUS PRN
Status: DISCONTINUED | OUTPATIENT
Start: 2023-02-03 | End: 2023-02-04

## 2023-02-03 RX ORDER — CYCLOBENZAPRINE HCL 5 MG
5 TABLET ORAL
Status: COMPLETED | OUTPATIENT
Start: 2023-02-03 | End: 2023-02-04

## 2023-02-03 RX ORDER — SENNOSIDES 8.6 MG
8.6 TABLET ORAL 2 TIMES DAILY
Status: DISCONTINUED | OUTPATIENT
Start: 2023-02-03 | End: 2023-02-05 | Stop reason: HOSPADM

## 2023-02-03 RX ORDER — MAGNESIUM SULFATE HEPTAHYDRATE 40 MG/ML
4 INJECTION, SOLUTION INTRAVENOUS ONCE
Status: COMPLETED | OUTPATIENT
Start: 2023-02-03 | End: 2023-02-03

## 2023-02-03 RX ORDER — MAGNESIUM SULFATE HEPTAHYDRATE 40 MG/ML
4 INJECTION, SOLUTION INTRAVENOUS ONCE
Status: DISCONTINUED | OUTPATIENT
Start: 2023-02-03 | End: 2023-02-03

## 2023-02-03 RX ORDER — SPIRONOLACTONE 100 MG/1
100 TABLET, FILM COATED ORAL DAILY
Status: DISCONTINUED | OUTPATIENT
Start: 2023-02-04 | End: 2023-02-05 | Stop reason: HOSPADM

## 2023-02-03 RX ADMIN — SENNOSIDES 8.6 MG: 8.6 TABLET, COATED ORAL at 19:57

## 2023-02-03 RX ADMIN — CARVEDILOL 6.25 MG: 6.25 TABLET, FILM COATED ORAL at 17:21

## 2023-02-03 RX ADMIN — POTASSIUM PHOSPHATE, MONOBASIC POTASSIUM PHOSPHATE, DIBASIC 15 MMOL: 224; 236 INJECTION, SOLUTION, CONCENTRATE INTRAVENOUS at 09:03

## 2023-02-03 RX ADMIN — INSULIN ASPART 1 UNITS: 100 INJECTION, SOLUTION INTRAVENOUS; SUBCUTANEOUS at 18:28

## 2023-02-03 RX ADMIN — Medication 1 SPRAY: at 19:57

## 2023-02-03 RX ADMIN — Medication 1 SPRAY: at 17:22

## 2023-02-03 RX ADMIN — GABAPENTIN 300 MG: 300 CAPSULE ORAL at 11:11

## 2023-02-03 RX ADMIN — FUROSEMIDE 20 MG: 20 TABLET ORAL at 11:11

## 2023-02-03 RX ADMIN — GABAPENTIN 300 MG: 300 CAPSULE ORAL at 17:21

## 2023-02-03 RX ADMIN — MAGNESIUM SULFATE IN WATER 4 G: 40 INJECTION, SOLUTION INTRAVENOUS at 13:32

## 2023-02-03 RX ADMIN — SPIRONOLACTONE 50 MG: 25 TABLET, FILM COATED ORAL at 11:11

## 2023-02-03 RX ADMIN — INSULIN ASPART 2 UNITS: 100 INJECTION, SOLUTION INTRAVENOUS; SUBCUTANEOUS at 13:45

## 2023-02-03 RX ADMIN — CARVEDILOL 6.25 MG: 6.25 TABLET, FILM COATED ORAL at 11:11

## 2023-02-03 RX ADMIN — POLYETHYLENE GLYCOL 3350 17 G: 17 POWDER, FOR SOLUTION ORAL at 17:21

## 2023-02-03 RX ADMIN — Medication 1 SPRAY: at 13:44

## 2023-02-03 RX ADMIN — TENOFOVIR DISOPROXIL FUMARATE 300 MG: 300 TABLET, FILM COATED ORAL at 11:11

## 2023-02-03 RX ADMIN — GABAPENTIN 300 MG: 300 CAPSULE ORAL at 19:57

## 2023-02-03 ASSESSMENT — ACTIVITIES OF DAILY LIVING (ADL)
ADLS_ACUITY_SCORE: 30
ADLS_ACUITY_SCORE: 30
ADLS_ACUITY_SCORE: 26
ADLS_ACUITY_SCORE: 30
ADLS_ACUITY_SCORE: 26

## 2023-02-03 NOTE — PROCEDURES
Brief endoscopy note:    EGD 2/3/23  Fentanyl 100, midazolam 2 given    No cause of abdominal pain identified on this exam.    Esophageal: esophageal varices grade 3 with red kathy signs (high risk for bleeding), banded x1  Stomach: hiatal hernia, small. Mild portal hypertensive gastropathy of the entire stomach.   Duodenum: lost of villi, edematous mucosa of 1st and 2nd part duodenum. Biopsy ruling out celiac.     Plan  - full liquid diet today, advance to low sodium diet tomorrow  - repeat EGD in 4 weeks    Chacorta Uriarte MD  Gastroenterology/Hepatology Fellow

## 2023-02-03 NOTE — PROGRESS NOTES
Physician Attestation   I, Trenton Newton, was present with the medical/OPAL student and the medical resident who participated in the service and in the documentation of the note.  I have verified the history and personally performed the physical exam and medical decision making.  I agree with the assessment and plan of care as documented in the note.      Key findings:   Cont to have abd pain, located centrally today. Not significantly better after para yesterday. No BM since suppository per pt. Tolerating diet. Unclear etiology of persistent abd pain. EGD today with no clear cause - did band a varix and take biopsies of duodenum due to mucosal changes to r/o celiac disease but overall does not explain his pain. Likely multifactorial (constipation, ascites, HCC/cirrhosis?). Will start wisam bowel regimen, keep on FLD today, adv to low fiber 2g Na diet tomorrow. Replacing mag/phos. OP silvia to be wisam by transplant coordinator.     Dispo: likely 1-2 days to home.     Please see A&P for additional details of medical decision making.    Trenton Moore (Sawyer Newton MD  Internal Medicine/Pediatrics  Hospitalist  Date of Service (when I saw the patient): 02/03/23    [facility]    Progress Note - Hospitalist Service, [facility] TEAM        Date of Admission:  1/30/2023    Assessment & Plan   Patient is a 61 yo male with PMH notable for HBV-associated cirrhosis, HCC, ascites, EV s/p banding (Septmeber '22), T2DM, and herpes zoster admitted with acute on chronic abdominal pain and distension. Etiology for presenting symptoms likely multifactorial, including ascites vs SBO. SBP ruled out by paracentesis on 1/30. He is hemodynamically stable on admission.    Changes today:  - EGD this AM without evident cause of abdominal pain, esophageal varices banded x1, biopsies taken from duodenum   - full liquid diet today, advance to low sodium diet tomorrow  - repeat EGD in 4 weeks  - scheduled  bowel regimen  - increase lasix to 40 mg, starting tomorrow 2/4  - transplant coordinator for setting up weekly outpatient paracenteses    SBO  Abdominal pain  Decompensated cirrhosis 2/2 chronic HBV infection c/b hepatocellular carcinoma  Esophageal varices s/p banding (9/2022)  Hypoalbuminemia  Recent admission (01/24-01/26) for similar concerns and imaging notable for ascites and concerning for SBO. Symptoms improved with paracentesis and there was no indication for surgical intervention or NG placement during that visit. CT A/P on presentation (01/30) notable for abdominal ascites and worsening small bowel distention concerning for small bowel obstruction. Paracentesis on 1/30 removed 2.3L fluid with some improvement in symptoms. No evidence of SBP. Cytology of ascitic fluid negative for malignancy. Repeat therapeutic para on 2/2 with 1.5L out. Patient received one dose of IV ceftriaxone on 1/30, this was discontinued given no evidence of infection. Per hepatology, patient is not a candidate for TIPS given his history of HCC.  - PTA Tenofovir  - General Surgery Consult, appreciate recs    > no urgent surgical intervention at this time, non-operative management with NGT, IVF, NPO.    > Gastrografin study 1/31 without evidence of complete obstruction    > Bisacodyl suppository 2/1 with resultant BMs    > NGT removed 2/1, advance diet as tolerated    > scheduled bowel regimen  - Oncology consult given history of HCC    > stable/treated HCC, unlikely that current symptoms related to HCC or possible lung primary  Increase PTA Lasix from 20 to 40 mg daily per GI recs, continue PTA spironolactone 50 mg  - IV Dilaudid 1mg q2h prn  - albumin replaced 1/31, LR ~1L MIVF on 2/1  - Hepatology consulted for EGD given continued abdominal pain    > EGD on 2/3; esophageal varix banded, small hiatal hernia, mild portal hypertensive gastropathy, edematous mucosa with loss of villi of 1st and 2nd part of duodenum, biopsies taken to  r/o Celiac. No cause of abdominal pain identified. Repeat EGD in 4 weeks    MELD-Na score: 18 at 2/3/2023  5:59 AM  MELD score: 16 at 2/3/2023  5:59 AM  Calculated from:  Serum Creatinine: 0.97 mg/dL (Using min of 1 mg/dL) at 2/3/2023  5:59 AM  Serum Sodium: 134 mmol/L at 2/3/2023  5:59 AM  Total Bilirubin: 1.2 mg/dL at 2/3/2023  5:59 AM  INR(ratio): 2.30 at 2/2/2023 12:26 PM  Age: 62 years     Hyponatremia, improved   In setting of poor oral intake and decompensated liver disease  -CTM     Previous herpes zoster of L T4 dermatome  Documented infection, treated May '22. Here complaining of pain with similar distribution, exam shows prior healed lesions, no active lesions.  - Continue PTA gabapentin TID. Denying pain now.     T2DM  Hgb A1c 6.8 (01/25/23).  -MDSSI  -Holding PTA Metformin  -Hypoglycemia protocol     Diet:  FLD  DVT Prophylaxis: Pneumatic Compression Devices  Davalos Catheter: Not present  Fluids: Encourage PO intake  Lines: None     Cardiac Monitoring: None  Code Status: Full Code      Clinically Significant Risk Factors          # Hypocalcemia: Lowest iCa = 4.3 mg/dL in last 2 days, will monitor and replace as appropriate   # Hypomagnesemia: Lowest Mg = 1.6 mg/dL in last 2 days, will replace as needed   # Hypoalbuminemia: Lowest albumin = 1.8 g/dL at 2/2/2023  5:48 AM, will monitor as appropriate   # Thrombocytopenia: Lowest platelets = 87 in last 2 days, will monitor for bleeding         # DMII: A1C = 6.8 % (Ref range: <5.7 %) within past 3 months           Disposition Plan     Expected Discharge Date: 02/02/2023      Destination: home          The patient's care was discussed with the Attending Physician, Dr. Newton.    Inez Sanchez, MS3  Medical Student  Hospitalist Service, [facility] [place] TEAM   ________________________________________________    Interval History   Notes and vitals reviewed. TABITHA. Patient feels very sleepy this morning after EGD procedure. No BMs since suppository two days ago. Has been  drinking juice and water. Has continued abdominal pain located around his belly button. Does not think the pain is significantly improved after paracentesis yesterday. No nausea or vomiting.     Called son Ajit Cabrera on 2/3 with updates.    Physical Exam   Vital Signs: Temp: 97.7  F (36.5  C) Temp src: Oral BP: 101/59 Pulse: 82   Resp: 16 SpO2: 96 % O2 Device: None (Room air)    Weight: 120 lbs 3.2 oz    General Appearance:  Lying in bed, awake, alert, NAD  HEENT: NC/AT, EOMI, MMM  Respiratory: CTAB  Cardiovascular: RRR. Normal S1/S2, no murmurs noted.  GI: Soft, mildly distended, tenderness to palpation in mid and RUQ, no rebound or guarding, decreased bowel sounds  Skin: Warm, dry, healed lesions from prior shingles. No lower extremity edema.   Neuro: Answers questions appropriately, moves all extremities equally       Medical Decision Making       Please see A&P for additional details of medical decision making.      Data   ------------------------- PAST 24 HR DATA REVIEWED -----------------------------------------------    I have personally reviewed the following data over the past 24 hrs:    6.6  \   7.4 (L)   / 89 (L)     134 (L) 104 9.2 /  220 (H)   4.1 27 0.97 \       ALT: 21 AST: 60 (H) AP: 106 TBILI: 1.2   ALB: 1.9 (L) TOT PROTEIN: 5.4 (L) LIPASE: N/A       Imaging results reviewed over the past 24 hrs:   No results found for this or any previous visit (from the past 24 hour(s)).

## 2023-02-03 NOTE — PROGRESS NOTES
[id number]  VSS. Alert and oriented. Reporting bilateral LE muscle cramping. Provider paged. Ordered K lab and 1x PRN flexeril. No acute events, continue with POC.   Height: 60Inches   Weight: 154lb   Body Mass Index (BMI): 30.1kg/m2   Ideal Body Weight Range: 110lb (+/-10%)   Percent Ideal Body Weight ~154%

## 2023-02-03 NOTE — PLAN OF CARE
Goal Outcome Evaluation:    [id number]: A&O x4, VSS on RA. Pain in abdomen 4/10, declined interventions and stated this was tolerable. Denies N/V, dizziness, or SOB. Phos replaced IV with recheck in AM. Patient reported leg cramps this evening, 2g Mg infusion ordered and PRN icy hot used. Diet advanced to regular, was able to eat 100% of lunch and dinner and tolerated it well. Paracentesis done at bedside today, 1.5L off. 2 bowel movements today, Ax1 to bathroom and bed alarm on for safety. NPO at midnight for endoscopy tomorrow. BG checks now ACHS with insulin.

## 2023-02-03 NOTE — PROVIDER NOTIFICATION
Paged Dr. Hammer #[phone]:    FYI Patient's BP was 109/56 so I held evening coreg dose. Also patient is reporting muscle cramps in legs and is asking for a medication. Thanks, Aspen CURRY [phone]    Magnesium infusion ordered along with PRN icy hot.

## 2023-02-03 NOTE — PROVIDER NOTIFICATION
"\"Pt complaining of leg cramps. Has already gotten icy hot. Is there anything we can try?\"  Paged #[phone]     "

## 2023-02-04 LAB
ALBUMIN SERPL BCG-MCNC: 1.9 G/DL (ref 3.5–5.2)
ALP SERPL-CCNC: 106 U/L (ref 40–129)
ALT SERPL W P-5'-P-CCNC: 37 U/L (ref 10–50)
ANION GAP SERPL CALCULATED.3IONS-SCNC: 3 MMOL/L (ref 7–15)
AST SERPL W P-5'-P-CCNC: 71 U/L (ref 10–50)
BACTERIA FLD CULT: NO GROWTH
BILIRUB SERPL-MCNC: 1.6 MG/DL
BUN SERPL-MCNC: 8 MG/DL (ref 8–23)
CALCIUM SERPL-MCNC: 7.4 MG/DL (ref 8.8–10.2)
CHLORIDE SERPL-SCNC: 104 MMOL/L (ref 98–107)
CREAT SERPL-MCNC: 0.89 MG/DL (ref 0.67–1.17)
DEPRECATED HCO3 PLAS-SCNC: 26 MMOL/L (ref 22–29)
GFR SERPL CREATININE-BSD FRML MDRD: >90 ML/MIN/1.73M2
GLUCOSE BLDC GLUCOMTR-MCNC: 122 MG/DL (ref 70–99)
GLUCOSE BLDC GLUCOMTR-MCNC: 135 MG/DL (ref 70–99)
GLUCOSE BLDC GLUCOMTR-MCNC: 161 MG/DL (ref 70–99)
GLUCOSE BLDC GLUCOMTR-MCNC: 177 MG/DL (ref 70–99)
GLUCOSE BLDC GLUCOMTR-MCNC: 231 MG/DL (ref 70–99)
GLUCOSE SERPL-MCNC: 134 MG/DL (ref 70–99)
HOLD SPECIMEN: NORMAL
MAGNESIUM SERPL-MCNC: 1.5 MG/DL (ref 1.7–2.3)
MAGNESIUM SERPL-MCNC: 2.2 MG/DL (ref 1.7–2.3)
PHOSPHATE SERPL-MCNC: 2.5 MG/DL (ref 2.5–4.5)
POTASSIUM SERPL-SCNC: 4.4 MMOL/L (ref 3.4–5.3)
PROT SERPL-MCNC: 5.3 G/DL (ref 6.4–8.3)
SODIUM SERPL-SCNC: 133 MMOL/L (ref 136–145)

## 2023-02-04 PROCEDURE — 36415 COLL VENOUS BLD VENIPUNCTURE: CPT | Performed by: STUDENT IN AN ORGANIZED HEALTH CARE EDUCATION/TRAINING PROGRAM

## 2023-02-04 PROCEDURE — 250N000013 HC RX MED GY IP 250 OP 250 PS 637: Performed by: STUDENT IN AN ORGANIZED HEALTH CARE EDUCATION/TRAINING PROGRAM

## 2023-02-04 PROCEDURE — 84100 ASSAY OF PHOSPHORUS: CPT | Performed by: STUDENT IN AN ORGANIZED HEALTH CARE EDUCATION/TRAINING PROGRAM

## 2023-02-04 PROCEDURE — 250N000013 HC RX MED GY IP 250 OP 250 PS 637

## 2023-02-04 PROCEDURE — 83735 ASSAY OF MAGNESIUM: CPT | Performed by: INTERNAL MEDICINE

## 2023-02-04 PROCEDURE — 250N000011 HC RX IP 250 OP 636: Performed by: INTERNAL MEDICINE

## 2023-02-04 PROCEDURE — 99233 SBSQ HOSP IP/OBS HIGH 50: CPT | Mod: GC | Performed by: STUDENT IN AN ORGANIZED HEALTH CARE EDUCATION/TRAINING PROGRAM

## 2023-02-04 PROCEDURE — 120N000002 HC R&B MED SURG/OB UMMC

## 2023-02-04 PROCEDURE — 83735 ASSAY OF MAGNESIUM: CPT | Performed by: STUDENT IN AN ORGANIZED HEALTH CARE EDUCATION/TRAINING PROGRAM

## 2023-02-04 PROCEDURE — 36415 COLL VENOUS BLD VENIPUNCTURE: CPT | Performed by: INTERNAL MEDICINE

## 2023-02-04 PROCEDURE — 80053 COMPREHEN METABOLIC PANEL: CPT | Performed by: STUDENT IN AN ORGANIZED HEALTH CARE EDUCATION/TRAINING PROGRAM

## 2023-02-04 RX ORDER — NALOXONE HYDROCHLORIDE 0.4 MG/ML
0.4 INJECTION, SOLUTION INTRAMUSCULAR; INTRAVENOUS; SUBCUTANEOUS
Status: DISCONTINUED | OUTPATIENT
Start: 2023-02-04 | End: 2023-02-05 | Stop reason: HOSPADM

## 2023-02-04 RX ORDER — NALOXONE HYDROCHLORIDE 0.4 MG/ML
0.2 INJECTION, SOLUTION INTRAMUSCULAR; INTRAVENOUS; SUBCUTANEOUS
Status: DISCONTINUED | OUTPATIENT
Start: 2023-02-04 | End: 2023-02-05 | Stop reason: HOSPADM

## 2023-02-04 RX ORDER — POLYETHYLENE GLYCOL 3350 17 G/17G
17 POWDER, FOR SOLUTION ORAL 2 TIMES DAILY
Status: DISCONTINUED | OUTPATIENT
Start: 2023-02-04 | End: 2023-02-05 | Stop reason: HOSPADM

## 2023-02-04 RX ORDER — OXYCODONE HYDROCHLORIDE 5 MG/1
5 TABLET ORAL EVERY 4 HOURS PRN
Status: DISCONTINUED | OUTPATIENT
Start: 2023-02-04 | End: 2023-02-05 | Stop reason: HOSPADM

## 2023-02-04 RX ORDER — MAGNESIUM SULFATE HEPTAHYDRATE 40 MG/ML
4 INJECTION, SOLUTION INTRAVENOUS ONCE
Status: COMPLETED | OUTPATIENT
Start: 2023-02-04 | End: 2023-02-04

## 2023-02-04 RX ADMIN — POLYETHYLENE GLYCOL 3350, SODIUM SULFATE ANHYDROUS, SODIUM BICARBONATE, SODIUM CHLORIDE, POTASSIUM CHLORIDE 2000 ML: 236; 22.74; 6.74; 5.86; 2.97 POWDER, FOR SOLUTION ORAL at 13:41

## 2023-02-04 RX ADMIN — SENNOSIDES 8.6 MG: 8.6 TABLET, COATED ORAL at 20:11

## 2023-02-04 RX ADMIN — CARVEDILOL 6.25 MG: 6.25 TABLET, FILM COATED ORAL at 17:31

## 2023-02-04 RX ADMIN — Medication 1 SPRAY: at 17:31

## 2023-02-04 RX ADMIN — GABAPENTIN 300 MG: 300 CAPSULE ORAL at 08:35

## 2023-02-04 RX ADMIN — INSULIN ASPART 2 UNITS: 100 INJECTION, SOLUTION INTRAVENOUS; SUBCUTANEOUS at 13:41

## 2023-02-04 RX ADMIN — SPIRONOLACTONE 100 MG: 100 TABLET ORAL at 08:36

## 2023-02-04 RX ADMIN — TENOFOVIR DISOPROXIL FUMARATE 300 MG: 300 TABLET, FILM COATED ORAL at 08:36

## 2023-02-04 RX ADMIN — SENNOSIDES 8.6 MG: 8.6 TABLET, COATED ORAL at 08:35

## 2023-02-04 RX ADMIN — GABAPENTIN 300 MG: 300 CAPSULE ORAL at 13:41

## 2023-02-04 RX ADMIN — CYCLOBENZAPRINE HYDROCHLORIDE 5 MG: 5 TABLET, FILM COATED ORAL at 04:00

## 2023-02-04 RX ADMIN — OXYCODONE HYDROCHLORIDE 5 MG: 5 TABLET ORAL at 11:35

## 2023-02-04 RX ADMIN — POLYETHYLENE GLYCOL 3350 17 G: 17 POWDER, FOR SOLUTION ORAL at 08:36

## 2023-02-04 RX ADMIN — GABAPENTIN 300 MG: 300 CAPSULE ORAL at 20:11

## 2023-02-04 RX ADMIN — INSULIN ASPART 1 UNITS: 100 INJECTION, SOLUTION INTRAVENOUS; SUBCUTANEOUS at 17:31

## 2023-02-04 RX ADMIN — FUROSEMIDE 40 MG: 40 TABLET ORAL at 08:36

## 2023-02-04 RX ADMIN — Medication 1 SPRAY: at 20:11

## 2023-02-04 RX ADMIN — CARVEDILOL 6.25 MG: 6.25 TABLET, FILM COATED ORAL at 08:35

## 2023-02-04 RX ADMIN — MAGNESIUM SULFATE IN WATER 4 G: 40 INJECTION, SOLUTION INTRAVENOUS at 09:35

## 2023-02-04 ASSESSMENT — ACTIVITIES OF DAILY LIVING (ADL)
ADLS_ACUITY_SCORE: 30

## 2023-02-04 NOTE — PROGRESS NOTES
[facility]    Progress Note - Hospitalist Service, [place] TEAM        Date of Admission:  1/30/2023    Assessment & Plan   Patient is a 63 yo male with PMH notable for HBV-associated cirrhosis, HCC, ascites, EV s/p banding (Septmeber '22), T2DM, and herpes zoster admitted with acute on chronic abdominal pain and distension. Etiology for presenting symptoms likely multifactorial, including ascites vs SBO vs constipation. SBP ruled out by paracentesis on 1/30. He is hemodynamically stable on admission.    Changes today:  - go-lytely 2L for bowel cleanout, can do more if needed  - stop IV dilaudid, start oral oxy    SBO  Abdominal pain  Decompensated cirrhosis 2/2 chronic HBV infection c/b hepatocellular carcinoma  Esophageal varices s/p banding (9/2022)  Hypoalbuminemia  Constipation  Recent admission (01/24-01/26) for similar concerns and imaging notable for ascites and concerning for SBO. Symptoms improved with paracentesis and there was no indication for surgical intervention or NG placement during that visit. CT A/P on presentation (01/30) notable for abdominal ascites and worsening small bowel distention concerning for small bowel obstruction. Paracentesis on 1/30 removed 2.3L fluid with some improvement in symptoms. No evidence of SBP. Cytology of ascitic fluid negative for malignancy. Repeat therapeutic para on 2/2 with 1.5L out. Patient received one dose of IV ceftriaxone on 1/30, this was discontinued given no evidence of infection. Per hepatology, patient is not a candidate for TIPS given his history of HCC.  - PTA Tenofovir  - General Surgery Consult, appreciate recs    > no urgent surgical intervention at this time, non-operative management with NGT, IVF, NPO.    > Gastrografin study 1/31 without evidence of complete obstruction    > Bisacodyl suppository 2/1 with resultant BMs    > NGT removed 2/1, advance diet as tolerated    > scheduled bowel regimen  -  Oncology consult given history of HCC    > stable/treated HCC, unlikely that current symptoms related to HCC or possible lung primary  Increase PTA Lasix from 20 to 40 mg daily per GI recs, and spironolactone to 100 mg  - IV Dilaudid 1mg q2h prn discontinued, replace with PO oxycodone  - albumin replaced 1/31, LR ~1L MIVF on 2/1  - Hepatology consulted for EGD given continued abdominal pain    > EGD on 2/3; esophageal varix banded, small hiatal hernia, mild portal hypertensive gastropathy, edematous mucosa with loss of villi of 1st and 2nd part of duodenum, biopsies taken to r/o Celiac. No cause of abdominal pain identified. Repeat EGD in 4 weeks  - bowel cleanout today; Go-lytely 2L    MELD-Na score: 18 at 2/3/2023  5:59 AM  MELD score: 16 at 2/3/2023  5:59 AM  Calculated from:  Serum Creatinine: 0.97 mg/dL (Using min of 1 mg/dL) at 2/3/2023  5:59 AM  Serum Sodium: 134 mmol/L at 2/3/2023  5:59 AM  Total Bilirubin: 1.2 mg/dL at 2/3/2023  5:59 AM  INR(ratio): 2.30 at 2/2/2023 12:26 PM  Age: 62 years     Hyponatremia, improved   In setting of poor oral intake and decompensated liver disease  -CTM     Previous herpes zoster of L T4 dermatome  Documented infection, treated May '22. Here complaining of pain with similar distribution, exam shows prior healed lesions, no active lesions.  - Continue PTA gabapentin TID. Denying pain now.     T2DM  Hgb A1c 6.8 (01/25/23).  -MDSSI  -Holding PTA Metformin  -Hypoglycemia protocol     Diet:  FLD  DVT Prophylaxis: Pneumatic Compression Devices  Davalos Catheter: Not present  Fluids: Encourage PO intake  Lines: None     Cardiac Monitoring: None  Code Status: Full Code      Clinically Significant Risk Factors          # Hypocalcemia: Lowest iCa = 4.3 mg/dL in last 2 days, will monitor and replace as appropriate   # Hypomagnesemia: Lowest Mg = 1.6 mg/dL in last 2 days, will replace as needed   # Hypoalbuminemia: Lowest albumin = 1.8 g/dL at 2/2/2023  5:48 AM, will monitor as  appropriate   # Thrombocytopenia: Lowest platelets = 89 in last 2 days, will monitor for bleeding         # DMII: A1C = 6.8 % (Ref range: <5.7 %) within past 3 months           Disposition Plan Dispo pending improvement in abdominal pain, likely 24-48 hours inpatient     The patient's care was discussed with the Attending Physician, Dr. Newton, patient, his family, and bedside nurse    Bola Aleman MD  Medicine-Pediatrics, PGY4  [facility]    ________________________________________________    Interval History    Notes and vitals reviewed. NAEON. Ongoing abdominal pain, somewhat improved though still present. Para did not result in much improvement in symptoms. Had one BM, is wondering if still very constipated and agrees with bowel cleanout. Having nausea but no emesis. No chest pain or dyspnea. Tolerating FLD.    Physical Exam   Vital Signs: Temp: 99  F (37.2  C) Temp src: Oral BP: 97/50 Pulse: 85   Resp: 16 SpO2: 92 % O2 Device: None (Room air)    Weight: 122 lbs 4.8 oz    General Appearance:  Lying in bed, awake, alert, NAD, family at bedside  HEENT: NC/AT, EOMI, MMM  Respiratory: CTAB  Cardiovascular: RRR. Normal S1/S2, no murmurs noted.  GI: Soft, distended, tenderness to palpation in mid epigastrum though is less so than prior, no rebound or guarding, decreased bowel sounds  Skin: Warm, dry, healed lesions from prior shingles. No lower extremity edema.   Neuro: Answers questions appropriately, moves all extremities equally     Medical Decision Making       Please see A&P for additional details of medical decision making.      Data   ------------------------- PAST 24 HR DATA REVIEWED -----------------------------------------------    I have personally reviewed the following data over the past 24 hrs:    N/A  \   N/A   / N/A     N/A N/A N/A /  177 (H)   4.1 N/A N/A \       Imaging results reviewed over the past 24 hrs:   No results found for this or any previous visit (from the past 24  hour(s)).

## 2023-02-04 NOTE — PLAN OF CARE
Goal Outcome Evaluation: In Progress  Shift:   VS: Temp: 97.8  F (36.6  C) Temp src: Oral BP: 106/50 Pulse: 73   Resp: 17 SpO2: 95 % O2 Device: None (Room air)    Pain: Given oxycodone x1 for abdominal pain  Neuro: A&OX4, calls appropriately  Cardiac:   WNL, denies chest pain/palpitations  Respiratory: RA, denies SOB  GI/Diet/Appetite: Tolerating full liquid diet, denies N/V  :  Adequate UO, Go-lytely 2L for bowel cleanouts. No BM yet.   LDA's: PIV SL  Skin: No new deficit noted  Activity: Up with SBA  Tests/Procedures:   Pertinent Labs/Lab Collection: Magnesium replaced recheck at 1600.     Plan: Continue with cares and update team with any changes.

## 2023-02-04 NOTE — PLAN OF CARE
Goal Outcome Evaluation:  Activity: Been in bed mostly all day  Neuros:  A&O x4.   Cardiac:  WDL.   Respiratory:  WDL on RA. Denies SOB.  GI/:  Voiding spontaneously. +BS, passing flatus. Has not had a BM  Diet: tolerating full liquid diet  Skin: is intact  Lines: one PIV  Incisions/Drains: None  Labs: Low Mag and Phos were replaced this AM. Scheduled for redraw next day.  Pain/nausea:  Denies.  New changes this shift: GI studies did not reveal anything abnormal. He has been upgraded to full liquid. Plan is to encourage BM to be able to discharge.   Plan:  Continue with current plan of care.

## 2023-02-04 NOTE — PROGRESS NOTES
Shift 1900 PM- 2300 PM  Patient AOX4, breathing even and unlabored. Patient denied pain this shift. Patient denied any concerns or issues. Patient cooperative with care. Will continue to monitor patient.  this shift, Insulin given.

## 2023-02-04 NOTE — PLAN OF CARE
Goal Outcome Evaluation:  [id number]  Aox4. Patient had admitted having pain when asked. Patient admitted wanting medication to help with pain. BS were slight down from previous. Patient had a  light raise in temp but quickly decreased when room door was open and covers were removed. Patient denied having chills or feeling cold. Patient's day shift nurse is to follow up with patient's provider about patient receiving some eyedrops overnight.

## 2023-02-05 ENCOUNTER — APPOINTMENT (OUTPATIENT)
Dept: GENERAL RADIOLOGY | Facility: CLINIC | Age: 63
DRG: 389 | End: 2023-02-05
Payer: MEDICARE

## 2023-02-05 VITALS
DIASTOLIC BLOOD PRESSURE: 48 MMHG | BODY MASS INDEX: 20.25 KG/M2 | SYSTOLIC BLOOD PRESSURE: 90 MMHG | TEMPERATURE: 98.5 F | HEART RATE: 83 BPM | WEIGHT: 121.7 LBS | OXYGEN SATURATION: 98 % | RESPIRATION RATE: 20 BRPM

## 2023-02-05 LAB
ALBUMIN SERPL BCG-MCNC: 1.9 G/DL (ref 3.5–5.2)
ALP SERPL-CCNC: 104 U/L (ref 40–129)
ALT SERPL W P-5'-P-CCNC: 30 U/L (ref 10–50)
ANION GAP SERPL CALCULATED.3IONS-SCNC: 5 MMOL/L (ref 7–15)
AST SERPL W P-5'-P-CCNC: 52 U/L (ref 10–50)
BASOPHILS # BLD AUTO: 0 10E3/UL (ref 0–0.2)
BASOPHILS NFR BLD AUTO: 0 %
BILIRUB SERPL-MCNC: 1.7 MG/DL
BUN SERPL-MCNC: 10.2 MG/DL (ref 8–23)
CALCIUM SERPL-MCNC: 7.3 MG/DL (ref 8.8–10.2)
CHLORIDE SERPL-SCNC: 104 MMOL/L (ref 98–107)
CREAT SERPL-MCNC: 0.96 MG/DL (ref 0.67–1.17)
DEPRECATED HCO3 PLAS-SCNC: 26 MMOL/L (ref 22–29)
ELLIPTOCYTES BLD QL SMEAR: SLIGHT
EOSINOPHIL # BLD AUTO: 0.1 10E3/UL (ref 0–0.7)
EOSINOPHIL NFR BLD AUTO: 3 %
ERYTHROCYTE [DISTWIDTH] IN BLOOD BY AUTOMATED COUNT: 21.7 % (ref 10–15)
GFR SERPL CREATININE-BSD FRML MDRD: 90 ML/MIN/1.73M2
GLUCOSE BLDC GLUCOMTR-MCNC: 126 MG/DL (ref 70–99)
GLUCOSE BLDC GLUCOMTR-MCNC: 139 MG/DL (ref 70–99)
GLUCOSE BLDC GLUCOMTR-MCNC: 164 MG/DL (ref 70–99)
GLUCOSE BLDC GLUCOMTR-MCNC: 203 MG/DL (ref 70–99)
GLUCOSE BLDC GLUCOMTR-MCNC: 289 MG/DL (ref 70–99)
GLUCOSE SERPL-MCNC: 181 MG/DL (ref 70–99)
HCT VFR BLD AUTO: 25.6 % (ref 40–53)
HGB BLD-MCNC: 7.4 G/DL (ref 13.3–17.7)
IMM GRANULOCYTES # BLD: 0 10E3/UL
IMM GRANULOCYTES NFR BLD: 0 %
LYMPHOCYTES # BLD AUTO: 0.6 10E3/UL (ref 0.8–5.3)
LYMPHOCYTES NFR BLD AUTO: 17 %
MAGNESIUM SERPL-MCNC: 1.6 MG/DL (ref 1.7–2.3)
MCH RBC QN AUTO: 23.4 PG (ref 26.5–33)
MCHC RBC AUTO-ENTMCNC: 28.9 G/DL (ref 31.5–36.5)
MCV RBC AUTO: 81 FL (ref 78–100)
MONOCYTES # BLD AUTO: 0.5 10E3/UL (ref 0–1.3)
MONOCYTES NFR BLD AUTO: 16 %
NEUTROPHILS # BLD AUTO: 2.1 10E3/UL (ref 1.6–8.3)
NEUTROPHILS NFR BLD AUTO: 64 %
PHOSPHATE SERPL-MCNC: 2.4 MG/DL (ref 2.5–4.5)
PLAT MORPH BLD: ABNORMAL
PLATELET # BLD AUTO: 69 10E3/UL (ref 150–450)
POTASSIUM SERPL-SCNC: 4.2 MMOL/L (ref 3.4–5.3)
PROT SERPL-MCNC: 5.6 G/DL (ref 6.4–8.3)
RBC # BLD AUTO: 3.16 10E6/UL (ref 4.4–5.9)
RBC MORPH BLD: ABNORMAL
SODIUM SERPL-SCNC: 135 MMOL/L (ref 136–145)
TARGETS BLD QL SMEAR: SLIGHT
WBC # BLD AUTO: 3.2 10E3/UL (ref 4–11)
WBC # BLD AUTO: ABNORMAL 10*3/UL

## 2023-02-05 PROCEDURE — 36415 COLL VENOUS BLD VENIPUNCTURE: CPT | Performed by: STUDENT IN AN ORGANIZED HEALTH CARE EDUCATION/TRAINING PROGRAM

## 2023-02-05 PROCEDURE — 250N000013 HC RX MED GY IP 250 OP 250 PS 637: Performed by: STUDENT IN AN ORGANIZED HEALTH CARE EDUCATION/TRAINING PROGRAM

## 2023-02-05 PROCEDURE — 250N000013 HC RX MED GY IP 250 OP 250 PS 637

## 2023-02-05 PROCEDURE — 74018 RADEX ABDOMEN 1 VIEW: CPT | Mod: 26 | Performed by: RADIOLOGY

## 2023-02-05 PROCEDURE — 84100 ASSAY OF PHOSPHORUS: CPT | Performed by: STUDENT IN AN ORGANIZED HEALTH CARE EDUCATION/TRAINING PROGRAM

## 2023-02-05 PROCEDURE — 80053 COMPREHEN METABOLIC PANEL: CPT | Performed by: STUDENT IN AN ORGANIZED HEALTH CARE EDUCATION/TRAINING PROGRAM

## 2023-02-05 PROCEDURE — 74018 RADEX ABDOMEN 1 VIEW: CPT

## 2023-02-05 PROCEDURE — 99233 SBSQ HOSP IP/OBS HIGH 50: CPT | Mod: GC | Performed by: INTERNAL MEDICINE

## 2023-02-05 PROCEDURE — 258N000003 HC RX IP 258 OP 636: Performed by: STUDENT IN AN ORGANIZED HEALTH CARE EDUCATION/TRAINING PROGRAM

## 2023-02-05 PROCEDURE — 85014 HEMATOCRIT: CPT | Performed by: STUDENT IN AN ORGANIZED HEALTH CARE EDUCATION/TRAINING PROGRAM

## 2023-02-05 PROCEDURE — 83735 ASSAY OF MAGNESIUM: CPT | Performed by: STUDENT IN AN ORGANIZED HEALTH CARE EDUCATION/TRAINING PROGRAM

## 2023-02-05 PROCEDURE — 99239 HOSP IP/OBS DSCHRG MGMT >30: CPT | Mod: GC | Performed by: STUDENT IN AN ORGANIZED HEALTH CARE EDUCATION/TRAINING PROGRAM

## 2023-02-05 RX ORDER — SENNOSIDES 8.6 MG
1 TABLET ORAL DAILY
Qty: 30 TABLET | Refills: 0 | Status: SHIPPED | OUTPATIENT
Start: 2023-02-05 | End: 2023-01-01

## 2023-02-05 RX ORDER — SPIRONOLACTONE 100 MG/1
100 TABLET, FILM COATED ORAL DAILY
Qty: 30 TABLET | Refills: 0 | Status: SHIPPED | OUTPATIENT
Start: 2023-02-06 | End: 2023-02-05

## 2023-02-05 RX ORDER — POLYETHYLENE GLYCOL 3350 17 G/17G
17 POWDER, FOR SOLUTION ORAL DAILY
Qty: 510 G | Refills: 0 | Status: SHIPPED | OUTPATIENT
Start: 2023-02-05 | End: 2023-02-05

## 2023-02-05 RX ORDER — POLYETHYLENE GLYCOL 3350 17 G/17G
17 POWDER, FOR SOLUTION ORAL DAILY
Qty: 510 G | Refills: 0 | Status: SHIPPED | OUTPATIENT
Start: 2023-02-05 | End: 2023-01-01

## 2023-02-05 RX ORDER — FUROSEMIDE 40 MG
40 TABLET ORAL DAILY
Qty: 30 TABLET | Refills: 0 | Status: SHIPPED | OUTPATIENT
Start: 2023-02-06 | End: 2023-01-01

## 2023-02-05 RX ORDER — SENNOSIDES 8.6 MG
1 TABLET ORAL DAILY
Qty: 30 TABLET | Refills: 0 | Status: SHIPPED | OUTPATIENT
Start: 2023-02-05 | End: 2023-02-05

## 2023-02-05 RX ORDER — SPIRONOLACTONE 100 MG/1
100 TABLET, FILM COATED ORAL DAILY
Qty: 30 TABLET | Refills: 0 | Status: SHIPPED | OUTPATIENT
Start: 2023-02-06 | End: 2023-01-01

## 2023-02-05 RX ORDER — FUROSEMIDE 40 MG
40 TABLET ORAL DAILY
Qty: 30 TABLET | Refills: 0 | Status: SHIPPED | OUTPATIENT
Start: 2023-02-06 | End: 2023-02-05

## 2023-02-05 RX ADMIN — CARVEDILOL 6.25 MG: 6.25 TABLET, FILM COATED ORAL at 09:06

## 2023-02-05 RX ADMIN — GABAPENTIN 300 MG: 300 CAPSULE ORAL at 09:03

## 2023-02-05 RX ADMIN — GABAPENTIN 300 MG: 300 CAPSULE ORAL at 14:56

## 2023-02-05 RX ADMIN — TENOFOVIR DISOPROXIL FUMARATE 300 MG: 300 TABLET, FILM COATED ORAL at 09:04

## 2023-02-05 RX ADMIN — SODIUM CHLORIDE, POTASSIUM CHLORIDE, SODIUM LACTATE AND CALCIUM CHLORIDE 500 ML: 600; 310; 30; 20 INJECTION, SOLUTION INTRAVENOUS at 11:20

## 2023-02-05 RX ADMIN — FUROSEMIDE 40 MG: 40 TABLET ORAL at 09:03

## 2023-02-05 RX ADMIN — Medication 1 SPRAY: at 15:02

## 2023-02-05 RX ADMIN — Medication 1 SPRAY: at 09:45

## 2023-02-05 RX ADMIN — SENNOSIDES 8.6 MG: 8.6 TABLET, COATED ORAL at 09:04

## 2023-02-05 RX ADMIN — INSULIN ASPART 3 UNITS: 100 INJECTION, SOLUTION INTRAVENOUS; SUBCUTANEOUS at 09:42

## 2023-02-05 RX ADMIN — INSULIN ASPART 2 UNITS: 100 INJECTION, SOLUTION INTRAVENOUS; SUBCUTANEOUS at 18:18

## 2023-02-05 RX ADMIN — SPIRONOLACTONE 100 MG: 100 TABLET ORAL at 09:07

## 2023-02-05 ASSESSMENT — ACTIVITIES OF DAILY LIVING (ADL)
ADLS_ACUITY_SCORE: 30

## 2023-02-05 NOTE — PROGRESS NOTES
[facility]    Hepatology Follow-up    CC: Abdominal pain    Dx: Partial SBO   Ascites   Decompensated HBV cirrhosis   HCC    24 hour events: nil    Subjective:  Doing ok, lying in bed, ambulating to bathroom only  Tolerating full liquid diet  Still reports abdominal pain in central abdomen  Bowel movementx1 today- no melena or hematochezia  Requesting pain medications  Patient denies fevers, sweats or chills.    Medications  Current Facility-Administered Medications   Medication Dose Route Frequency     artificial saliva  1 spray Swish & Spit 4x Daily     carvedilol  6.25 mg Oral BID w/meals     furosemide  40 mg Oral Daily     gabapentin  300 mg Oral TID     insulin aspart  1-7 Units Subcutaneous TID AC     insulin aspart  1-5 Units Subcutaneous At Bedtime     [Held by provider] polyethylene glycol  17 g Oral BID     sennosides  8.6 mg Oral BID     sodium chloride (PF)  3 mL Intracatheter Q8H     spironolactone  100 mg Oral Daily     tenofovir  300 mg Oral Daily       Review of systems  A 10-point review of systems was negative.    Examination  /58 (BP Location: Left arm)   Pulse 78   Temp 98.5  F (36.9  C) (Oral)   Resp 18   Wt 55.2 kg (121 lb 11.2 oz)   SpO2 98%   BMI 20.25 kg/m    No intake or output data in the 24 hours ending 02/05/23 1614    Gen- well, NAD, A+Ox3, normal color  CVS- RRR  RS- CTA  Abd- laparotomy scar, distended, soft, mild tenderness in R paraumbilical area on deep palpation, no rebound or guarding, tympanic to percuss, BS+  Extr- no LEDY  Neuro- no asterixis  Skin- no rash  Psych- normal mood    Laboratory  Lab Results   Component Value Date     02/05/2023     05/10/2021    POTASSIUM 4.2 02/05/2023    POTASSIUM 3.8 06/01/2022    POTASSIUM 4.7 05/10/2021    CHLORIDE 104 02/05/2023    CHLORIDE 112 06/01/2022    CHLORIDE 103 05/10/2021    CO2 26 02/05/2023    CO2 26 06/01/2022    CO2 26 05/10/2021    BUN 10.2 02/05/2023    BUN 8 06/01/2022     BUN 12 05/10/2021    CR 0.96 02/05/2023    CR 0.85 05/10/2021       Lab Results   Component Value Date    BILITOTAL 1.7 02/05/2023    BILITOTAL 1.3 05/10/2021    ALT 30 02/05/2023     05/10/2021    AST 52 02/05/2023     05/10/2021    ALKPHOS 104 02/05/2023    ALKPHOS 290 05/10/2021       Lab Results   Component Value Date    WBC 3.2 02/05/2023    WBC 3.5 05/10/2021    HGB 7.4 02/05/2023    HGB 13.0 05/10/2021    MCV 81 02/05/2023    MCV 86 05/10/2021    PLT 69 02/05/2023    PLT 52 05/10/2021       Lab Results   Component Value Date    INR 2.30 02/02/2023    INR 1.39 05/10/2021         Radiology  AXR 2/5/2023 personally reviewed    Assessment  62 year old male with decompensated HBV cirrhosis complicated with ascites, variceal bleed and HCC admitted with abdominal pain secondary to partial SBO and ascites.  MELD-Na ~21.  Ascites is well-controlled on current diuretics.  Partial SBO resolved based on clinical exam and imaging- can try glycerin enema to further stimulate bowels but would avoid narcotics which may to lead to recurrent SBO.      Recommendations  1.  Advance diet as tolerated  2.  Ambulate  3.  Glycerin enema x1  4.  Low Na diet  5.  Continue diuretics at current doses  6.  Continue tenofovir  7.  Continue carvedilol    Elan Noguera MD  Hepatology staff

## 2023-02-05 NOTE — PLAN OF CARE
Goal Outcome Evaluation:    BP 90/52 (BP Location: Right arm)   Pulse 74   Temp 98.2  F (36.8  C) (Oral)   Resp 18   Wt 55.5 kg (122 lb 4.8 oz)   SpO2 94%   BMI 20.35 kg/m      Pt has been vitally stable during shift. Denied N/V, SOB or Pain. No BM during shift, voiding adequately during shift. Continue with plan of care.

## 2023-02-05 NOTE — DISCHARGE SUMMARY
[facility]  Discharge Summary - Medicine & Pediatrics       Date of Admission:  1/30/2023  Date of Discharge:  2/5/2023  Discharging Provider: Dr. 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 c/b hepatocellular carcinoma  Esophageal varices s/p banding (9/2022 and 2/2022)  Constipation  Previous herpes zoster of L T4 dermatome  T2DM  Lung nodule    Follow-ups Needed After Discharge   Follow-up Appointments     Adult [facility] Follow-up and recommended labs and tests      - Interventional Radiology 2/7/23  - Hepatology 2/27/23  - GI (Colonoscopy) 3/2/23  - Please make an appointment with your primary doctor within 1-2 weeks    Appointments on [place] and/or [facility] (with [facility] or [facility]   provider or service). Call [phone] if you haven't heard regarding   these appointments within 7 days of discharge.           Unresulted Labs Ordered in the Past 30 Days of this Admission       Date and Time Order Name Status Description    2/3/2023  8:15 AM Surgical Pathology Exam In process     1/30/2023  6:31 AM Anaerobic bacterial culture Preliminary         These results will be followed up by GI    Discharge Disposition   Discharged to home  Condition at discharge: Stable    Hospital Course   Rishabh Cabrera was admitted on 1/30/2023. He is a 61 yo male with PMH notable for HBV-associated cirrhosis, HCC, ascites, EV s/p banding (September '22 and again during this admission), T2DM, and herpes zoster c/b post herpetic neuralgia admitted with acute on chronic abdominal pain. Etiology for presenting symptoms likely multifactorial, including ascites vs partial SBO and constipation. Ultimately his pain improved with bowel clean out overnight 2/4-5, suggesting stool burden was major contributor to his symptoms. He was able to  tolerate regular diet prior to discharge. He will discharge home with close follow-up, including PCP (needs to schedule this), IR (scheduled), hepatology (scheduled), and GI luminal (colonoscopy and should also have repeat EGD). Return precautions discussed prior to discharge. 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 Symptoms improved with paracentesis and there was no indication for surgical intervention or NG placement during that visit. CT A/P on presentation this admission (01/30) notable for abdominal ascites and worsening small bowel distention concerning for small bowel obstruction. Paracentesis on 1/30 (2.3L fluid) and 2/2 (1.5L fluid) though with minimal improvement in symptoms following these. No evidence of SBP. Cytology of ascitic fluid negative for malignancy. Per hepatology, patient is not a candidate for TIPS given his history of HCC. His SBO was managed medically (NPO, IVF, NG tube) per general surgery, with gastrografin study 1/31 indicative of incomplete SBO. As pain persisted despite paracentesis and multiple bowel movements, EGD was completed 2/3, which showed esophageal varix (banded), small hiatal hernia, mild portal hypertensive gastropathy, edematous mucosa with loss of villi (biopsy taken for celiac). No clear reason for his abdominal pain on this study. Eventually pursued bowel clean out with Go-lytely 2/4-5, with significant stool output and overall improvement in his symptoms, but not complete resolution. Encouraged patient to continue bowel regimen at home to avoid constipation, and would avoid opiates going forward for pain management.  - Continue PTA tenofovir  -  Increased PTA lasix and spironolactone (40mg daily and 100mg daily) here, script sent at discharge  - GI follow-up 2/27/23 (clinic) and 3/2/23 (colonoscopy)  - Repeat EGD recommended for 4 weeks per GI (should happen when he has his already scheduled colonoscopy)  - Hepatology to coordinate regular outpatient paracenteses  - Celiac biopsies pending  - Regular diet, low sodium  - Daily miralax and senna (scripts sent at discharge)    Hyponatremia, improved   In setting of poor oral intake and decompensated liver disease. Should be monitored at outpatient follow-up per PCP or hepatology.     Previous herpes zoster of L T4 dermatome  Documented infection, treated May '22. Has continued to have pain with similar distribution, exam shows prior healed lesions, no active lesions.  - Continue PTA gabapentin TID     T2DM  Hgb A1c 6.8 (01/25/23).  - Managed with sliding scale insulin here. Is not on outpatient medication. PCP to follow-up, suggest resumption of metformin (not on his current home med list)    Lung nodule  - Has IR appointment for biopsy 2/7/23    Consultations This Hospital Stay   SURGERY GENERAL ADULT IP CONSULT  INTERNAL MEDICINE PROCEDURE TEAM ADULT IP CONSULT [facility] - [facility]AC[facility]ESIS  ONCOLOGY ADULT IP CONSULT  GI HEPATOLOGY ADULT IP CONSULT  CARE MANAGEMENT / SOCIAL WORK IP CONSULT  NURSING TO CONSULT FOR VASCULAR ACCESS CARE IP CONSULT  INTERNAL MEDICINE PROCEDURE TEAM ADULT IP CONSULT [place] - PARACENTESIS  ONCOLOGY ADULT IP CONSULT  NURSING TO CONSULT FOR VASCULAR ACCESS CARE IP CONSULT    Code Status   Full Code     The patient was discussed with Dr. Aman Aleman MD  Medicine-Pediatrics, PGY4  [facility]    ______________________________________________________________________    Physical Exam   Vital Signs: Temp: 98.5  F (36.9  C) Temp src: Oral BP: 101/58 Pulse: 78   Resp: 18 SpO2: 98 % O2 Device: None (Room air)    Weight: 121 lbs 11.2 oz  General Appearance:  Sitting up  in bed, awake, alert, appears more comfortable today, NAD  HEENT: NC/AT, EOMI, anicteric sclerae, MMM  Respiratory: CTAB, no crackles or wheezes, normal work of breathing in room air  Cardiovascular: RRR, no murmurs  GI: Soft, less distended than prior, mild tenderness to palpation in mid epigastrum that is improved to prior, no rebound or guarding, normal bowel sounds  Skin: Warm, dry, healed lesions from prior shingles. No lower extremity edema.   Neuro: Answers questions appropriately, moves all extremities equally      Primary Care Physician   Physician No Ref-Primary    Discharge Orders      Reason for your hospital stay    You were hospitalized here for abdominal pain. Your pain is though to be from multiple sources, including ascites (drained here, no evidence of infection in your ascites fluid) and bowel obstruction vs. constipation. You had a scope of your upper intestines here which did not reveal other specific cause for your pain. Ultimately your pain improved after bowel cleanout, suggesting constipation was a major component of your pain.     Follow-up appointments:  - please make an appointment with your primary care doctor within 1-2 weeks for hospital follow-up  - appointment 2/7/23 with interventional radiology (Dr. So)  - appointment 2/27/23 with hepatology (Cam)  - appointment 3/2/23 with GI for colonoscopy (Dr. Nogeura)    If at home your pain worsens, you cannot tolerate food/liquids, you have worsening nausea or if you have emesis, bloody or dark stools, or fevers, please seek medical attention immediately.     Activity    Your activity upon discharge: activity as tolerated     Adult [facility] Follow-up and recommended labs and tests    - Interventional Radiology 2/7/23  - Hepatology 2/27/23  - GI (Colonoscopy) 3/2/23  - Please make an appointment with your primary doctor within 1-2 weeks    Appointments on [place] and/or [facility] (with [facility] or [facility] provider or service). Call  [phone] if you haven't heard regarding these appointments within 7 days of discharge.     Diet    Follow this diet upon discharge: Orders Placed This Encounter      Snacks/Supplements Adult: Ensure Clear; Between Meals      Combination Diet Regular Diet; Thin Liquids (level 0); Low Fiber Diet; 2 gm NA Diet       Significant Results and Procedures   Results for orders placed or performed during the hospital encounter of 01/30/23   CT Abdomen Pelvis w Contrast    Narrative    EXAM: CT ABDOMEN PELVIS W CONTRAST  LOCATION: [facility]  DATE/TIME: 1/30/2023 4:08 AM    INDICATION: sever abd px and distention hepatocellular carcinoma  COMPARISON: Prior study dated 1/24/2023  TECHNIQUE: CT scan of the abdomen and pelvis was performed following injection of IV contrast. Multiplanar reformats were obtained. Dose reduction techniques were used.  CONTRAST: 69 ml isovue 370     FINDINGS:   LOWER CHEST: Right calcified pleural fluid collection is again identified, similar to prior exam. A loculated fluid collection is again seen extending along the left major fissure inferiorly, unchanged.    HEPATOBILIARY: Scattered hypodensities are again seen in both lobes liver with a larger calcified mass within the right lobe of liver consistent with a treated large hepatic tumor, better demonstrated on prior liver MRI from 1/10/2023, overall is no   significant change from prior study dated 1/24/2023.  Gallstones are again seen within the gallbladder.    PANCREAS: No significant mass, duct dilatation, or inflammatory change.    SPLEEN: The spleen is enlarged, similar to prior study.    ADRENAL GLANDS: No significant nodules.    KIDNEYS/BLADDER: No significant mass or stones. There is mild bilateral hydroureteronephrosis which has developed in the interval since prior exam. No renal ureteral or bladder calculi are identified. The distal ureters appear compressed secondary to the   increasing  no distention. There are simple or benign cysts. No follow up is needed.    BOWEL: Surgical changes from right hemicolectomy are again noted there is increasing distention of the small bowel with equalization of loops of small bowel contents which has worsened since the prior exam with a transition point at the ileocolic   anastomoses (image 108, series 3). Small bowel enhances normally with no evidence of pneumatosis. There is mural thickening seen in the sigmoid colon as well as within a few loops of small bowel which is favored reflect portal colopathy/enteropathy, this   is unchanged from the prior exam.    Moderate abdominal pelvic ascites is again seen, mildly worsened from prior exam.     LYMPH NODES: Scattered small retroperitoneal and abdominal lymph nodes are noted.    VASCULATURE: Gastroesophageal as well as splenic and mesenteric varices are again noted, similar to prior exam.    PELVIC ORGANS: No pelvic masses.    MUSCULOSKELETAL: Unremarkable.      Impression    IMPRESSION:   1.  Worsening small bowel distention with an apparent transition point at the ileocolic anastomosis, concerning for a worsening small bowel obstruction. At this time there is no evidence of pneumatosis or abnormal bowel wall enhancement indicate bowel   ischemia.  2.  Interval development of bilateral mild hydroureteronephrosis, without evidence of obstructing calculi or mass, findings are felt to be due to compression of the distal ureters secondary to the distended intraperitoneal cavity from the increasingly   dilated small bowel and ascites.   3.  Small and large bowel wall edema, unchanged from prior exam, favored to Reflect portal colopathy/enterography.   4.  Redemonstration of multiple hepatic hypodensities and calcified hepatic mass consistent with patient's known history of hepatocellular carcinoma  5.  Unchanged appearance of the calcified right pleural fluid collection and the l loculated fluid collection in the left  major fissure   XR Abdomen Port 1 View    Narrative    EXAM: XR ABDOMEN PORT 1 VIEW  LOCATION: [facility]  DATE/TIME: 1/30/2023 5:10 AM    INDICATION: NG tube placement  COMPARISON: CT abdomen and pelvis 01/30/2023      Impression    IMPRESSION: Limited exam for tube placement. Gastric drainage tube tip at the level of the gastric body. Sidehole beyond the GE junction.   XR Abdomen Port 1 View    Narrative    EXAM: ABDOMEN SINGLE VIEW PORTABLE  LOCATION: [facility]  DATE/TIME: 1/30/2023 5:16 AM    INDICATION: Enteric drainage tube advanced.  COMPARISON: 1/30/2023 at 0508 hours.      Impression    IMPRESSION: Interval advancement of an enteric drainage tube with distal tip now in the gastric body.              POC US Guide for Paracentesis    Impression    US Indication: decompensated liver disease    Limited abdominal ultrasound was performed and demonstrated an adequate fluid collection on the left side of the abdomen.     Doppler of the skin demonstrated an area at this site without significant vasculature.  A paracentesis at this site was subsequently performed.    Yaquelin Hess MD     XR Abdomen Port 1 View    Narrative    EXAM: XR ABDOMEN PORT 1 VIEW  1/30/2023 10:45 PM     HISTORY:  new NGT placement       TECHNIQUE: Single frontal radiograph of the abdomen    COMPARISON:  CT abdomen and pelvis 1/30/2023.    FINDINGS:   Portable AP radiograph of the abdomen at 50 degrees. Gastric tube tip  projects over the expected location of the stomach. Demonstration of  several dilated loops of small bowel with no evidence for pneumatosis  or portal venous gas. The sidehole projects near the gastroesophageal  junction. Partially visualized pleural-based calcifications. Calcified  right hepatic lobe lesion..      Impression    IMPRESSION: Gastric tube tip projects over the expected location of  the stomach. The sidehole  projects over the gastroesophageal junction.  Consider advancement. Continued gaseous dilatation of small bowel.    I have personally reviewed the examination and initial interpretation  and I agree with the findings.    BABITA HOSKINS MD         SYSTEM ID:  [id number]   XR Gastrografin Challenge    Narrative    Exam: XR GASTROGRAFIN CHALLENGE, 2/1/2023 1:53 AM    Indication: Small bowel obstruction.    Comparison: Abdominal radiograph 1/30/2023. CT abdomen and pelvis  1/30/2023.    Findings:   Portable AP supine view of the abdomen 8 hours after receiving  contrast. Gaseous distention of small bowel loops in the upper  abdomen. Contrast visualized throughout the colon with sparing of the  rectum. No pneumatosis. Scattered hyperdense foci projecting  throughout the abdomen, which were not visualized on 1/30/2023  radiograph, possibly representing pooled contrast, something ingested  or something extrinsic to the patient. Partially visualized calcified  right hepatic lobe lesion.      Impression    Impression:   1. No evidence for complete bowel obstruction. Contrast visualized  throughout the proximal colon to the rectosigmoid region.  2. Similar appearance of dilated small bowel loops in the upper  abdomen.   3. Multiple hyperdensities in the middle of the abdomen, pooled  contrast, something ingested or something extrinsic to the patient.  Attention on follow-up.    I have personally reviewed the examination and initial interpretation  and I agree with the findings.    BABITA HOSKINS MD         SYSTEM ID:  [id number]   POC US GUIDE FOR PARACENTESIS    Impression    Limited Bedside Abdominal Ultrasound, performed and interpreted by me.     Indication: Abdominal swelling. Evaluate for Free fluid.     With the patient in Trendelenburg, the RUQ, LUQ, (including the paracolic gutters) views were examined for free fluid. With the patient in reverse Trendelenburg, the super pubic view was examined for free fluid.      Findings: Free fluid present in all quadrants     IMPRESSION: Free fluid present as noted above.Needle insertion documented as above    XR Abdomen Port 1 View    Narrative    Exam: XR ABDOMEN PORT 1 VIEW, 2/5/2023 9:03 AM    Indication: acute on chronic pain, constipation and recent c/f sbo    Comparison: Abdomen 1/30/2023. Gastrografin challenge 2/1/2023. CT  abdomen pelvis 1/30/2023    Findings:   Supine abdominal radiograph. Anastomotic suture lines in the right mid  abdomen present. Gastrografin contrast material present on prior study  has passed. Gas-filled loops of small bowel in the left abdomen  measure up to 3.5 cm. Scattered gas within the colon. No acute osseous  abnormalities. Right pleural calcifications demonstrated. Left basal  atelectasis.      Impression    Impression: Gaseous distention of small bowel loops measuring up to  3.5 cm with scattered gas in the colon remain present. Previously  loops were largely fluid-filled on CT. Increased volume of gas within  the colon may indicate improving small bowel obstruction. Correlation  clinically.    KOJO ALMONTE MD         SYSTEM ID:  [id number]       Discharge Medications   Current Discharge Medication List        START taking these medications    Details   sennosides (SENOKOT) 8.6 MG tablet Take 1 tablet by mouth daily  Qty: 30 tablet, Refills: 0    Associated Diagnoses: Constipation, unspecified constipation type           CONTINUE these medications which have CHANGED    Details   furosemide (LASIX) 40 MG tablet Take 1 tablet (40 mg) by mouth daily  Qty: 30 tablet, Refills: 0    Associated Diagnoses: Other ascites; HCC (hepatocellular carcinoma) (H)      polyethylene glycol (MIRALAX) 17 GM/Dose powder Take 17 g by mouth daily  Qty: 510 g, Refills: 0    Associated Diagnoses: Constipation, unspecified constipation type      spironolactone (ALDACTONE) 100 MG tablet Take 1 tablet (100 mg) by mouth daily  Qty: 30 tablet, Refills: 0    Associated  Diagnoses: Other ascites; HCC (hepatocellular carcinoma) (H)           CONTINUE these medications which have NOT CHANGED    Details   capsaicin (ZOSTRIX) 0.025 % external cream Apply topically 3 times daily      Capsaicin-Menthol (SALONPAS GEL EX)       carvedilol (COREG) 6.25 MG tablet Take 1 tablet (6.25 mg) by mouth 2 times daily (with meals)  Qty: 180 tablet, Refills: 3    Associated Diagnoses: Upper GI bleed      diclofenac (VOLTAREN) 1 % topical gel Apply topically as needed for moderate pain      gabapentin (NEURONTIN) 100 MG capsule Take 3 capsules (300 mg) by mouth 3 times daily  Qty: 63 capsule, Refills: 0    Associated Diagnoses: Herpes zoster without complication      loratadine (CLARITIN) 10 MG tablet Take 10 mg by mouth daily      tenofovir (VIREAD) 300 MG tablet Take 1 tablet (300 mg) by mouth daily  Qty: 90 tablet, Refills: 3    Associated Diagnoses: HCC (hepatocellular carcinoma) (H)      triamcinolone (KENALOG) 0.1 % external cream Apply topically as needed for irritation           STOP taking these medications       Lidocaine (LIDOCARE) 4 % Patch Comments:   Reason for Stopping:         oxyCODONE (ROXICODONE) 5 MG tablet Comments:   Reason for Stopping:             Allergies   Allergies   Allergen Reactions    Crabs [Crustaceans]     Pork (Porcine) Protein Itching and Unknown    Seafood Hives and Unknown    Shellfish Allergy Unknown

## 2023-02-05 NOTE — PLAN OF CARE
Goal Outcome Evaluation: Adequate for discharge   Alert and oriented x4, on room air. hmong speaking. Able to make needs known. 4/10 abdominal pain. C/o of feeling lightheaded when up, 500 LR bolus given. BG checks before meals had insulin per sliding scale this morning     Discharge AVS reviewed with Pt and son. All questions answered.

## 2023-02-05 NOTE — PLAN OF CARE
Goal Outcome Evaluation: [id number]      Plan of Care Reviewed With: patient    Overall Patient Progress: no change    Outcome Evaluation: A&Ox4. Denies pain. Pt reports feeling tired, sleeping most of shift. BP 86/49, recheck 90/56, provider notified, for now continue to monitor for MAP to stay above 65, may need albumin if MAP below 60. All other VSS on RA. BG stable. Pt continuing to drink Golytely.

## 2023-02-05 NOTE — PLAN OF CARE
1500 - 1900     Status: admitted with acute on chronic abdominal pain and distension, ascites vs SBO vs constipation. SBP ruled out by paracentesis on 1/30  Vitals: VSS on RA  Neuros: Intact. Hmong speaking   IV: PIV SL  Resp/trach: Denies SOB, RA  Diet: Full liquid diet, tolerating well   Bowel status: Go-lytely 2L for bowel cleanouts. No BM this shift   : voiding with intermittent incontinence  Skin: Intact  Pain: Denied this shift  Activity: Up SBA  Social: No visitors this shift   Plan: Continue with POC                           Last Appointment   8/28/2019  Next Appointment  Visit date not found

## 2023-02-06 LAB
BACTERIA FLD CULT: NORMAL
PATH REPORT.COMMENTS IMP SPEC: NORMAL
PATH REPORT.COMMENTS IMP SPEC: NORMAL
PATH REPORT.FINAL DX SPEC: NORMAL
PATH REPORT.GROSS SPEC: NORMAL
PATH REPORT.MICROSCOPIC SPEC OTHER STN: NORMAL
PATH REPORT.RELEVANT HX SPEC: NORMAL
PHOTO IMAGE: NORMAL

## 2023-02-07 ENCOUNTER — PATIENT OUTREACH (OUTPATIENT)
Dept: CARE COORDINATION | Facility: CLINIC | Age: 63
End: 2023-02-07
Payer: MEDICARE

## 2023-02-07 NOTE — PROGRESS NOTES
Clinic Care Coordination Contact  [facility] [[email or web address]]/[email or web address]       Clinical Data: Care Coordinator Outreach  Outreach attempted x 2.  Left message on patient's voicemail with call back information and requested return call.  Plan: Care Coordinator will make no further outreaches at this time.    SIMEON Lezama   Social Work Clinic Care Coordinator   [facility]  PH: [phone]  [email or web address], [email or web address]

## 2023-02-10 DIAGNOSIS — C22.0 HEPATOCELLULAR CARCINOMA (H): ICD-10-CM

## 2023-02-10 NOTE — CONSULTS
Outpatient IR Biopsy Referral    Patient is a 63 y/o male with a PMH of asthma, HBV cirrhosis, HCC s/p chemo embolization 12/2/21, SIRT therapy 9/8/21, Visceral embolization 9/9/21, ascites, esophageal varices s/p EV banding 9/2022, DM 2, work up for liver transplant needing lung lesion biopsy 5/12/21 c/b significant bleed with non diagnostic sample. IR has been asked to biopsy the RICH nodule as ongoing work up for liver transplant.         CT 1/10/23 FINDINGS:     Chest: Unremarkable thyroid. No cardiomegaly or significant  pericardial effusion. Moderate coronary artery calcifications. Anemic  blood pool. Unremarkable esophagus. Normal caliber main pulmonary  artery and thoracic aorta. Atherosclerosis of the thoracic aorta and  proximal great vessels. No new suspicious lymphadenopathy. Calcified  mediastinal and right hilar lymph nodes. No pneumothorax. Decreased  partially loculated left basilar pleural effusion. Similar  peripherally calcified loculated right lateral pleural effusion.  Stable approximately 1.5 by 1.5 x 2.5 cm spiculated nodule in the left  upper lobe with similar faint surrounding groundglass opacities and  pleural tethering. Stable right upper lobe scarring, for example  measuring 2.0 x 2.1 cm (series 4 image 62). Multifocal atelectasis.  Improved patchy groundglass opacities in the right upper lobe. New  faint patchy groundglass opacities in the lingula.    IMPRESSION:  1. No significant change in the indeterminant left upper lobe  spiculated nodule.  2. Improved patchy ground glass opacities in the right upper lobe with  new faint groundglass opacities in the lingula, suspicious for  new/ongoing infectious or inflammatory etiology.  3. Stable multifocal pulmonary scarring likely sequela of prior  indolent infection (incl mycobacteria).  4. Decreased partially loculated left basilar pleural effusion with  similar chronic peripherally calcified loculated right pleural  effusion (probably  sequela of prior empyema).  5. Cirrhosis with sequela of portal hypertension including  splenomegaly and moderate volume ascites.        5/29/22 Lungs/pleura: The central airways patent. No malignant pneumothorax as  questioned on chest x-ray. Spiculated left apical nodule with a  pleural tail and peripheral groundglass component today measures 2.2 x  1.3 cm, unchanged since 3/17/2022. Resolution of previously seen  postbiopsy hemorrhage on 5/12/2022. Unchanged calcified right lower  lobe loculated effusion. Multiple loculated effusions along the left  fissure. There may be trace increase in layering left simple pleural  effusion.        5/12/22 RICH lung biopsy Total of 1 single 20 gauge  core tissue specimens obtained. Immediately after obtaining consent,  the patient began to cough forcefully and a small amount of blood was  expelled. There was expanding perilesional hematoma in the pulmonary  parenchyma. The patient's oxygen saturation dropped to 89%. Due to the  significant respiratory symptoms and pulmonary hemorrhage, the  procedure was aborted. Biosentry was applied. Patient placed left  lateral decubitus.      Rapid response was called and the patient was transferred to the  emergency room. Before transfer, postprocedural scan demonstrated the  known left upper lobe perilesional parenchymal hemorrhage, which  appeared to be contained to the left upper lobe. There was no debris  within the mainstem bronchi or trachea.     Impression: Left upper lobe spiculated nodule biopsy aborted after  obtaining a single sample as the patient developed significant  parenchymal hemorrhage in the left upper lobe with associated  productive/bloody cough and O2 desaturation to 89%. The patient was  transferred to the emergency department in stable condition and signed  out to Dr. Morgan.     3/17/22 CT        CT 3/17/22 Lungs/pleura: The central airways patent. Mildly increased size of the  spiculated nodule in the anterior  left upper lobe on series 4 image 54  measuring 18 x 13 mm, previously 16 x 10 mm.         10/6/21 LUNGS AND PLEURA: Stable chronic right-sided pleural fluid collection with peripheral calcification. Consolidative opacities involving the posterior aspect of the right upper lung remain unchanged. Spiculated opacity involving the left upper lobe anteriorly unchanged at 13 x 15 mm up      2/10/23 Case and imaging CT 1/10/23 and CT 5/12/22 was reviewed with Dr. Mario and Dr. Marin from IR who recommend patient be reviewed at Pulmonary Nodule conference due to the patient's prior RICH lung nodule biopsy with significant bleeding, stable lung lesion.  IR has concerns of patient safety for a repeat bleed and utility of a stable lung lesional biopsy.  IR would recommend PET CT, possible wedge dissection if needed for transplant vs repeat percutaneous biopsy. Therefore, IR recommends pulmonary nodule conference to discuss IR concerns and best patient care 2/17/23.     Primary team Dr. Leventhal made aware of IR recommendations via epic messaging.    JOSIE Abraham CNP  Interventional Radiology   IR on-call pager: [phone]

## 2023-02-17 ENCOUNTER — TELEPHONE (OUTPATIENT)
Dept: PULMONOLOGY | Facility: CLINIC | Age: 63
End: 2023-02-17

## 2023-02-20 ENCOUNTER — TELEPHONE (OUTPATIENT)
Dept: GASTROENTEROLOGY | Facility: CLINIC | Age: 63
End: 2023-02-20

## 2023-02-20 DIAGNOSIS — I85.11 SECONDARY ESOPHAGEAL VARICES WITH BLEEDING (H): Primary | ICD-10-CM

## 2023-02-20 DIAGNOSIS — Z86.0100 HISTORY OF COLONIC POLYPS: Primary | ICD-10-CM

## 2023-02-20 NOTE — LETTER
February 22, 2023      Rishabh Cabrera  [address]  [facility] [address]              Dear Rishabh,          Colonoscopy/Upper endoscopy (EGD)     Procedure date: 3/2/23    Anticipated arrival time: 9:30 AM   (Procedure Times are Subject to Change)    Facility location: [facility]; [address], [place] [address] - Check in location: Main entrance at registration desk. Parking information: Paid parking available in the Patient & Visitor Ramp on the corner of  [address].  options are available 24 hours for patients with mobility limitations. Self-park and shuttle service from the parking ramp available. The phone number for shuttle requests is [phone].    Prep Instructions: Instructions on how to prepare for your upcoming procedure are found below. Deviation from instructions may result in less than desired outcomes and procedure may need to be rescheduled.      Policy: Please arrive with a responsible adult who can drive you home after the exam and stay with you for the next 24 hours, unless otherwise specified. You will not be able to drive if you are having any type of sedation. You cannot take a taxi, Uber, train or bus by yourself. Procedure will be cancelled due to not having a .     If you are taking blood thinning medication: Medications such as Coumadin, Plavix, Rivaroxaban (Xarelto)..etc, may need to be temporarily stopped for multiple days before your scheduled procedure. Talk to your doctor. Your prescribing doctor will give you directions to see if these medications should be changed or stopped prior to your exam. Procedure may be canceled if medications are not accurately held.     If you have Diabetes: Ask to have your exam early in the morning if possible. Call your doctor if you have questions regarding your diet modifications and/or diabetic medications during prep.       If you take Phentermine (Adipex-P, Lomaira):  This MUST be held 7 days prior to your scheduled procedure. Your procedure will be canceled if this medication has not been held.     To reschedule or cancel your procedure: Please call our scheduling team at:   [facility]: [phone], option 2  Monday through Friday, 8 a.m. to 4:30 p.m.    ---------------------------------------------------------------------------------------------------------------------  Golytely Extended Colonoscopy Prep      Bowel prep has been sent to    [facility] [address] [place] [address].      Please read these instructions carefully at least 7 days prior to your colonoscopy procedure. Be sure to follow all directions completely. The inside of your colon must be clean to allow for a complete examination for the presence of any growths, polyps, and/or abnormalities, as well as their biopsy or removal. A number of tips are included in order to make this part of the procedure as comfortable as possible.    Immediately:    A nurse will call you within a week of your exam to prescribe your bowel prep, review the prep instructions, and answer any other questions you have.     You must arrange for an adult to drive you home after your exam. Your colonoscopy cannot be done unless you have a ride. If you need to use public transportation, someone must ride with you and stay with you for a minimum of 6-24 hours.    Check with your insurance company to be sure they will cover this exam.Arrange for a responsible adult to drive you home on the day of the exam. This cannot be a taxi or a bus as you will need someone with you after the procedure.    7 days prior:    Talk to your prescribing doctor: If you take blood thinners (such as Coumadin, Plavix, Xarelto, Eliquis, Lovenox, or others), these medications may need to be stopped temporarily before your procedure. Your prescribing doctor will tell you what to do.     If you have diabetes, you should  request an early morning appointment.    Stop taking fiber supplements and multivitamins containing iron, or any other medications containing iron.    Fill your prescription for 2 containers of Golytely and 4 Dulcolax tablets at the pharmacy.    It is very important that you stay well hydrated during the colonoscopy prep. The Golytely bowel prep is designed to clean out your colon, but it will not provide hydration. While you are taking the prescribed prep, you should also drink 64 oz. of Gatorade or similar sports drink product to drink for staying hydrated. (Avoid red and purple colors)    Stop eating corn, popcorn, nuts and foods with seeds.     7 days prior:    Begin a restricted or low fiber diet (see list below).    2 days prior:    Stop taking NSAID pain relievers, such as Advil, Aleve, Ibuprofen, Naproxen, and Motrin.    Drink fluids to be well hydrated, this is important. Drink at least 4 large glasses of water, Gatorade, or other similar sports drinks.    It is a good idea to use Vaseline on the skin around your anus after each bowel movement to prevent irritation. Wet wipes also help to reduce irritation.     You can have a light, low-fiber breakfast. You may also have a light, low-fiber lunch.  No solid foods after 1pm. Begin a clear liquid diet. (see list below).    At 4pm, take 2 Dulcolax (bisacodyl) tablets.    At 5pm, mix and drink half of a jug of Golytely bowel prep. Drink an 8 oz. Glass of Golytely every 15 minutes until half of the jug is gone. Place the remainder of the Golytely in the refrigerator. Stay close to the bathroom.     1 day prior:    Continue clear liquid diet only (see examples below). Do not eat solid food this day.    Do not drink any red or purple colored drinks.    At 4 pm, take 2 Dulcolax (bisacodyl) tablets.    At 5 pm, drink the 2nd half of a jug of Golytely bowel prep. Drink an 8 oz. glass of Golytely every 10-15 minutes until the 1st jug of Golytely is gone.     The  Golytely bowel prep will not keep you hydrated. You should drink 8-10 glasses of clear liquids throughout the day.    Before you go to bed, mix the 2nd container of Golytely and place in the refrigerator for the morning.      Procedure day:    6 hours before your check-in time, drink an 8 oz. Glass of Golytely every 15 minutes until half of the 2nd jug of Golytely is gone. You WILL NOT drink the entire 2nd jug of Golytely.     You may take your necessary morning medications with sips of water.    Do not smoke, chew tobacco, or swallow anything, including water or gum for at least 2 hours before your arrival time. This is a safety issue. Your procedure could be cancelled if you do not follow directions.    Do not take diabetes medicine by mouth until after your exam.    Please arrive with a responsible adult who can take you home after the test and stay with you for a minimum of 24 hours: The medicine used will make you sleepy and forgetful. If you do not have someone to take you home, we will cancel your procedure. If using public transportation you must have someone to ride with you.    Please perform your nebulizer treatments and airway clearance therapy in the morning prior to the procedure (if applicable).    If you have asthma, bring your inhalers.    CLEAR LIQUIDS   You may have:    Water, tea, coffee (no milk or cream)    Soda pop, Gatorade (not red or purple)    Jell-O, Popsicles (no milk or fruit pieces - not red or purple)    Fat-free soup broth or bouillon    Plain hard candy, such as clear life savers (not red or purple)    Clear juices and fruit-flavored drinks, such as apple juice, white grape juice, Hi-C, and Ian-Aid (not red or purple)   Do not have:    Milk or milk products such as ice cream, malts or shakes, or coffee creamer    Red or purple drinks of any kind such as cranberry juice or grape juice. Avoid red or purple Jell-O, Popsicles, Ian-Aid, sorbet, sherbet and candy    Juices with pulp  such as orange, grapefruit, pineapple or tomato juice    Cream soups of any kind    Alcohol and beer    Protein drinks or protein powder     LOW FIBER DIET   You may have:      Starches: White bread, rolls, biscuits, croissants, Martha toast, white flour tortillas, waffles, pancakes, Monegasque toast; white rice, noodles, pasta, macaroni; cooked and peeled potatoes; plain crackers, saltines; cooked farina or cream of rice; puffed rice, corn flakes, Rice Krispies, Special K     Vegetables: tender cooked and canned, vegetable broths    Fruits and fruit juices: Strained fruit juice, canned fruit without seeds or skin (not pineapple), applesauce, pear sauce, ripe bananas, melons (not watermelon)     Milk products: Milk (plain or flavored), cheese, cottage cheese, yogurt (no berries), custard, ice cream      Proteins: Tender, well-cooked ground beef, lamb, veal, ham, pork, chicken, turkey, fish or organ meats; eggs; creamy peanut butter     Fats and condiments:  Margarine, butter, oils, mayonnaise, sour cream, salad dressing, plain gravy; spices, cooked herbs; sugar, clear jelly, honey, syrup     Snacks, sweets and drinks: Pretzels, hard candy; plain cakes and cookies (no nuts or seeds); gelatin, plain pudding, sherbet, Popsicles; coffee, tea, carbonated ( fizzy ) drinks Do not have:      Starches: Breads or rolls that contain nuts, seeds or fruit; whole wheat or whole grain breads that contain more than 1 gram of fiber per slice; cornbread; corn or whole wheat tortillas; potatoes with skin; brown rice, wild rice, kasha (buckwheat), and oatmeal     Vegetables: Any raw or steamed vegetables; vegetables with seeds; corn in any form     Fruits and fruit juices: Prunes, prune juice, raisins and other dried fruits, berries and other fruits with seeds, canned pineapple juices with pulp such as orange, grapefruit, pineapple or tomato juice    Milk products: Any yogurt with nuts, seeds or berries     Proteins: Tough, fibrous meats  with gristle; cooked dried beans, peas or lentils; crunchy peanut butter    Fats and condiments: Pickles, olives, relish, horseradish; jam, marmalade, preserves     Snacks, sweets and drinks: Popcorn, nuts, seeds, granola, coconut, candies made with nuts or seeds; all desserts that contain nuts, seeds, raisins and other dried fruits, coconut, whole grains or bran.      FAQ:      How do you know if your colon is cleaned out?   o After completing the bowel prep, your bowel movements should be all liquid and yellow. Your bowel movements will look similar to urine in the toilet. If there are pieces of stool (poop) in the toilet, or if you can't see to the bottom of the toilet, please call our office for advice. Call [phone] and ask to speak with a nurse.     Why do you need a responsible  to take you home and stay with you?  o We require a responsible adult to take you home for your safety. The sedation medicines used to relax you during the procedure can impair your judgement and reaction time, make you forgetful and possible a little unsteady. Do not drive, make any important decisions, or sign any legal documents for 24 hours after your procedure.     It is normal to feel bloated and gassy after your procedure. Walking will help move the air through your colon. You can take non-aspirin pain relievers that contain acetaminophen (Tylenol).     When can you eat after your procedure?  o You may resume your normal diet when you feel ready, unless advised otherwise by the doctor performing your procedure. Do not drink alcohol for 24 hours after your procedure.     You many resume normal activities (work, exercise, etc.) after 24 hours.     When will you get test results?  o You should have your procedure results and any lab results (if applicable) by letter, MyChart message, or phone call within 2 weeks. If you have any questions, please call the doctor that referred you for the procedure.       Thank you for  choosing [facility], for your procedure. If you are sent a survey regarding your care, please take the time to complete the questionnaire. We value your feedback!

## 2023-02-20 NOTE — TELEPHONE ENCOUNTER
Attempted to contact patient regarding upcoming Colonoscopy/Upper endoscopy (EGD) procedure on 3/2/23 for pre assessment questions. No answer.     Left message to return call to [phone] #4 with assistance of [employer]  id [phone]    Discuss Covid policy and designated  policy.    Pre op exam scheduled: N/A    Arrival time: 0930. Procedure time: 1100    Facility location: [facility]; [address] [place] [address], [address], [place] [address]    Sedation type: MAC    Anticoagulants: No    Electronic implanted devices? No    Diabetic? No    Indication for procedure: hx of colon polyps or follow up EGD 4 weeks    Bowel prep recommendation: verify bowel habits.     FYI - consent to communicate on file for daughter Riana and son Ajit.     Vira Leggett RN  Endoscopy Procedure Pre Assessment RN

## 2023-02-22 RX ORDER — BISACODYL 5 MG/1
TABLET, DELAYED RELEASE ORAL
Qty: 4 TABLET | Refills: 0 | Status: SHIPPED | OUTPATIENT
Start: 2023-02-22 | End: 2023-01-01

## 2023-02-22 NOTE — TELEPHONE ENCOUNTER
Second attempt for pre-assessment prior to upcoming colonoscopy/upper endoscopy     No answer.  Left message to return call [phone] #4 with assistance of [employer]  id [phone].     Patient is not mychart active.     With review of chart miralax and senokot listed and constipation on hospital admit 1/30/23.     Bowel prep recommendation - Golytely extended prep. [facility] [address] [place] [address].    Updated prep instruction sent via letter.        Vira Leggett RN  Endoscopy Procedure Pre Assessment RN

## 2023-02-27 ENCOUNTER — OFFICE VISIT (OUTPATIENT)
Dept: GASTROENTEROLOGY | Facility: CLINIC | Age: 63
End: 2023-02-27
Attending: PHYSICIAN ASSISTANT
Payer: MEDICARE

## 2023-02-27 VITALS
WEIGHT: 125 LBS | DIASTOLIC BLOOD PRESSURE: 72 MMHG | OXYGEN SATURATION: 99 % | HEART RATE: 109 BPM | BODY MASS INDEX: 20.8 KG/M2 | SYSTOLIC BLOOD PRESSURE: 146 MMHG

## 2023-02-27 DIAGNOSIS — K74.60 CHRONIC HEPATITIS B WITH CIRRHOSIS (H): ICD-10-CM

## 2023-02-27 DIAGNOSIS — J34.89 NASAL DRAINAGE: ICD-10-CM

## 2023-02-27 DIAGNOSIS — C22.0 HCC (HEPATOCELLULAR CARCINOMA) (H): Primary | ICD-10-CM

## 2023-02-27 DIAGNOSIS — E11.9 TYPE 2 DIABETES MELLITUS WITHOUT COMPLICATION, WITHOUT LONG-TERM CURRENT USE OF INSULIN (H): ICD-10-CM

## 2023-02-27 DIAGNOSIS — E43 SEVERE PROTEIN-CALORIE MALNUTRITION (H): ICD-10-CM

## 2023-02-27 DIAGNOSIS — B18.1 CHRONIC HEPATITIS B WITH CIRRHOSIS (H): ICD-10-CM

## 2023-02-27 DIAGNOSIS — L30.9 ECZEMA, UNSPECIFIED TYPE: ICD-10-CM

## 2023-02-27 PROCEDURE — 99215 OFFICE O/P EST HI 40 MIN: CPT | Performed by: PHYSICIAN ASSISTANT

## 2023-02-27 PROCEDURE — G0463 HOSPITAL OUTPT CLINIC VISIT: HCPCS | Performed by: PHYSICIAN ASSISTANT

## 2023-02-27 RX ORDER — CERAMIDE 1,3,6-II/SALICYLIC/B3
1 CLEANSER (ML) TOPICAL 2 TIMES DAILY PRN
Qty: 453 G | Refills: 2 | Status: SHIPPED | OUTPATIENT
Start: 2023-02-27

## 2023-02-27 RX ORDER — ELASTIC BANDAGE 1"X2.2YD
BANDAGE TOPICAL
Qty: 1 KIT | Refills: 0 | Status: SHIPPED | OUTPATIENT
Start: 2023-02-27 | End: 2023-01-01

## 2023-02-27 NOTE — PROGRESS NOTES
Hepatology Follow-up Clinic note  Rishabh Cabrera   Date of Birth 1960  Date of Service 2/27/2023    Reason for follow-up: Cirrhosis          Assessment/plan:   Rishabh Cabrera is a 62 year old male with history of hepatitis B cirrhosis complicated by history of ascites controlled with diuretics and as needed paracentesis and history of variceal bleed.  He also has a history of HCC s/p Y90 in September 2021 and chemoembolization for residual disease in December 2021.Most recent MRI on 1/10/2023 did not show nonviable tumor and multiple LI-RADS 3 lesions.  He continues to lose weight and we discussed importance of optimizing his nutrition in order to maintain strength. Liver function remaining relatively stable, MELD-Na 21,     #History of lung lesion:   - Continues to have spiculated lung lesion that was previously biopsied complicated with hematoma.  Due to concerns of primary lung disease, have discussed recommendations for repeat biopsy and he will be meeting with pulmonology next week to discuss risk/benefits.     # BMP, Hepatic panel, CBC, INR and HBV DNA in the near future     # Ascites  -Place as needed paracentesis orders  -Continue 40 mg furosemide   -Continue 100 mg spironolactone    # Esophageal varices  grade 3 varices s/p banding x 1.   - 4-week Follow up EGD scheduled    # HCC surveillance:   - Repeat MRI due April 2023    # Severe protein calorie malnutrition:   - Small frequent meals   - Increase protein in diet     # Follow-up in clinic with Dr. Leventhal in 3 months     Dorian Levy PA-C   [facility] Hepatology clinic    Total time for E/M services performed on the date of the encounter 60 minutes.  This included review of previous: clinic visits, hospital records, lab results, imaging studies, and procedural documentation. Time also includes patient visit, documentation and discussion with other providers.  The findings from this review are summarized in the above note.    -----------------------------------------------------       HPI:   Rishabh Cabrera is a 62 year old male presenting for follow-up.  [place] youngest [place] [facility] [place] B Cirrhosis  Complicated by:  --Ascites  -History of bleeding varices requiring banding  - No history of HE  EGD: 1/30/2023, EV grade 3, PHG s/p banding x 1   HCC: See below    HCC:  Treatment:  S/p Y90 in September 2021  S/p chemoembolization in December 2021    Patient was last seen by Dr. Leventhal on 11/28/2022. He was recently hospitalized for acute on chronic abdominal pain, partial small bowel obstruction.     Itching and on forearms, upper with bilateral flank which he calls shingles. Cream doesn't work but claritin does help.     Appetite is poor. Very dry mouth. Also complains of a lot of congestion in nose, very thick in nose. This has been chronic for years. Eats foods like [employer] rice soup, vegetables. Protein drinks. Yogurt, banana and oranges.     Taking Furosemide and Unsure if taking spironolactone.     Denies confusion. Having regular bowel movements.     Patient denies jaundice, lower extremity. Patient also denies melena, hematochezia or hematemesis.    Patient denies fevers, sweats or chills. No coughing and does feel some shortness of breath.     Has exercise band that he uses and exercise machine, hasn't doen for a few weeks due to need for ascites / weak and tired. Lives with older son,Ajit since August 2021.          Medications:     Current Outpatient Medications   Medication     bisacodyl (DULCOLAX) 5 MG EC tablet     capsaicin (ZOSTRIX) 0.025 % external cream     Capsaicin-Menthol (SALONPAS GEL EX)     carvedilol (COREG) 6.25 MG tablet     diclofenac (VOLTAREN) 1 % topical gel     furosemide (LASIX) 40 MG tablet     gabapentin (NEURONTIN) 100 MG capsule     loratadine (CLARITIN) 10 MG tablet     polyethylene glycol (GOLYTELY) 236 g suspension     polyethylene glycol (MIRALAX) 17 GM/Dose  powder     sennosides (SENOKOT) 8.6 MG tablet     spironolactone (ALDACTONE) 100 MG tablet     tenofovir (VIREAD) 300 MG tablet     triamcinolone (KENALOG) 0.1 % external cream     No current facility-administered medications for this visit.     Facility-Administered Medications Ordered in Other Visits   Medication     gadobutrol (GADAVIST) injection 7.5 mL            Review of Systems:   10 points ROS was obtained and highlighted in the HPI, otherwise negative.          Physical Exam:   BP (!) 146/72 (BP Location: Right arm, Patient Position: Sitting, Cuff Size: Adult Small)   Pulse 109   Wt 56.7 kg (125 lb)   SpO2 99%   BMI 20.80 kg/m      Gen: A&Ox3, NAD, peripheral muscle wasting, temporal muscle wasting  HEENT: non-icteric  CV: RRR, no overt murmurs  Lung: CTA Bilatererally, no wheezing or crackles.   Lym- no palpable lymphadenopathy  Abd: soft, NT, ND, small ascites  Ext: no edema, intact pulses.   Skin: no palmar erythema, telangiectasias or jaundice. Patches Erythema on upper left arm, right arm flexor, abdomen   Neuro: grossly intact, no asterixis   Psych: appropriate mood and affects         Data:   Reviewed in person and significant for:    Lab Results   Component Value Date     02/05/2023     05/10/2021      Lab Results   Component Value Date    POTASSIUM 4.2 02/05/2023    POTASSIUM 3.8 06/01/2022    POTASSIUM 4.7 05/10/2021     Lab Results   Component Value Date    CHLORIDE 104 02/05/2023    CHLORIDE 112 06/01/2022    CHLORIDE 103 05/10/2021     Lab Results   Component Value Date    CO2 26 02/05/2023    CO2 26 06/01/2022    CO2 26 05/10/2021     Lab Results   Component Value Date    BUN 10.2 02/05/2023    BUN 8 06/01/2022    BUN 12 05/10/2021     Lab Results   Component Value Date    CR 0.96 02/05/2023    CR 0.85 05/10/2021       Lab Results   Component Value Date    WBC 3.2 02/05/2023    WBC 3.5 05/10/2021     Lab Results   Component Value Date    HGB 7.4 02/05/2023    HGB 13.0  05/10/2021     Lab Results   Component Value Date    HCT 25.6 02/05/2023    HCT 39.1 05/10/2021     Lab Results   Component Value Date    MCV 81 02/05/2023    MCV 86 05/10/2021     Lab Results   Component Value Date    PLT 69 02/05/2023    PLT 52 05/10/2021       Lab Results   Component Value Date    AST 52 02/05/2023     05/10/2021     Lab Results   Component Value Date    ALT 30 02/05/2023     05/10/2021     No results found for: BILICONJ   Lab Results   Component Value Date    BILITOTAL 1.7 02/05/2023    BILITOTAL 1.3 05/10/2021       Lab Results   Component Value Date    ALBUMIN 1.9 02/05/2023    ALBUMIN 1.9 06/01/2022    ALBUMIN 2.7 05/10/2021     Lab Results   Component Value Date    PROTTOTAL 5.6 02/05/2023    PROTTOTAL 6.7 05/10/2021      Lab Results   Component Value Date    ALKPHOS 104 02/05/2023    ALKPHOS 290 05/10/2021       Lab Results   Component Value Date    INR 2.30 02/02/2023    INR 1.39 05/10/2021     MRI ABDOMEN  1/10/23      CLINICAL HISTORY: HCC (hepatocellular carcinoma); Chronic hepatitis B  with cirrhosis; surveillance     TECHNIQUE: Images were acquired with and without intravenous contrast  through the abdomen. The following MR images were acquired: TrueFISP,  multiplanar T2 weighted, axial T1 in/out of phase, diffusion-weighted.  Multiplanar T1-weighted images with fat saturation were before  contrast administration and at multiple time points following the  administration of intravenous contrast. Contrast dose: 6 mL Gadavist     Comparison study: MR abdomen 9/21/2022, 6/15/2022, 3/17/2022,  12/29/2021     FINDINGS:     Liver: Cirrhotic configuration of the liver. Multiple T1 hyperintense  regenerative nodules. Unchanged T2 hyperintense reticulated appearance  of the liver indicating fibrosis. Several scattered T2 nonenhancing  cysts.     Observation 1: Evolving posttreatment changes of the liver segment 6.   Progressive enhancement along the superior aspect of the  treatment  zone likely posttreatment without distinct nodularity. No convincing  enhancement, restricted diffusion or washout that suggests viable  tumor, LR-TR nonviable.      Observation 2: 8 mm arterially enhancing observation in segment IV  (15/33), previously 6 mm on MRI 9/21/2022. No associated washout,  pseudocapsule or suspicious T2 signal. No diffusion restriction. There  is increased precontrast T1 signal. LR3     Observation 3: Arterially enhancing 9 mm observation in segment 6,  previously 7 mm (15/49) on MRI 9/21/2022. No convincing washout,  pseudocapsule or suspicious T2 signal. No diffusion restriction. There  is increased precontrast T1 signal. LR3     Observation 4: 6 mm arterially enhancing observation in segment 5,  previously 5 mm (15/42) on MRI 9/21/2022. No associated washout,  pseudocapsule or suspicious T2 signal. Minimal increased diffusion  signal (101/20). There is increased precontrast T1 signal. LR3     Observation 5: 8 mm arterially enhancing observation in segment 6,  previously 6 mm (15/40) on MRI 9/21/2022. No associated washout,  pseudocapsule or suspicious T2 signal. No diffusion restriction. There  is increased precontrast T1 signal. LR3     Observation 6: New 10 mm arterially enhancing observation in  peripheral segment 5, (15/40). No associated washout, pseudocapsule or  suspicious T2 signal. No diffusion restriction. LR3.     Previously described arterial enhancing lesion segment 7 (on series 15  image 24) not visualized currently, continued attention on follow up  recommended. Multiple additional subcentimeter arterially enhancing  foci without suspicious features LR3.     Gallbladder: The gallbladder is nondistended. Small gallstones and  gallbladder sludge noted.     Spleen: Splenomegaly measuring 14 cm.     Kidneys: Bilaterally symmetrically enhancing kidneys. Several T2  hyperintense subcentimeter renal cysts. No hydronephrosis. No  hydroureter.     Adrenal glands:  Unremarkable.     Pancreas: Normal T1 signal intensity of the pancreatic parenchyma. No  suspicious enhancement or main pancreatic duct dilatation.     Bowel: Large and small bowel is normal in caliber with mild wall  thickening, edema and enhancement likely related to portal  hypertension similar to prior.     Lymph nodes: No intra-abdominal lymphadenopathy by size criteria.     Blood vessels: The abdominal aorta and major abdominal arterial  vessels are patent and nonaneurysmal. The main portal vein is patent.  Gastroesophageal varices noted.     Lung bases: Similar appearance of peripherally calcified right basilar  fluid collection and partially visualized loculated fluid collection  on the left.     Bones and soft tissues: No acute osseous lesions.     Mesentery and abdominal wall: Unremarkable     Ascites: Redemonstration of large volume ascites with some internal  septations.                                                                         IMPRESSION:  1. Cirrhosis with findings of portal hypertension. Persistent large  volume ascites.  2. Posttreatment changes of liver segment 6 without evidence of viable  tumor; LR-TR nonviable.   3. Slightly increased size of several LR3 observations within the  right hepatic lobe (ancillary feature of subthreshold growth could be  used to make these LR4); recommend attention on follow up.  4. Unchanged loculated bilateral pleural effusions.

## 2023-02-27 NOTE — NURSING NOTE
Chief Complaint   Patient presents with     RECHECK     Hep B with cirrhosis     Blood pressure (!) 146/72, pulse 109, weight 56.7 kg (125 lb), SpO2 99 %.    Sahil Kwan on 2/27/2023 at 11:16 AM

## 2023-02-27 NOTE — LETTER
Date:March 6, 2023      Patient was self referred, no letter generated. Do not send.        [facility] Health Information

## 2023-02-27 NOTE — LETTER
2/27/2023         RE: Rishabh Cabrera  [address]  [place] [facility] [place] [address]        Dear Colleague,    Thank you for referring your patient, Rishabh Cabrera, to the [facility] HEPATOLOG[facility] [place]. Please see a copy of my visit note below.    Hepatology Follow-up Clinic note  Rishabh Cabrera   Date of Birth 1960  Date of Service 2/27/2023    Reason for follow-up: Cirrhosis          Assessment/plan:   Rishabh Cabrera is a 62 year old male with history of hepatitis B cirrhosis complicated by history of ascites controlled with diuretics and as needed paracentesis and history of variceal bleed.  He also has a history of HCC s/p Y90 in September 2021 and chemoembolization for residual disease in December 2021.Most recent MRI on 1/10/2023 did not show nonviable tumor and multiple LI-RADS 3 lesions.  He continues to lose weight and we discussed importance of optimizing his nutrition in order to maintain strength. Liver function remaining relatively stable, MELD-Na 21,     #History of lung lesion:   - Continues to have spiculated lung lesion that was previously biopsied complicated with hematoma.  Due to concerns of primary lung disease, have discussed recommendations for repeat biopsy and he will be meeting with pulmonology next week to discuss risk/benefits.     # BMP, Hepatic panel, CBC, INR and HBV DNA in the near future     # Ascites  -Place as needed paracentesis orders  -Continue 40 mg furosemide   -Continue 100 mg spironolactone    # Esophageal varices  grade 3 varices s/p banding x 1.   - 4-week Follow up EGD scheduled    # HCC surveillance:   - Repeat MRI due April 2023    # Severe protein calorie malnutrition:   - Small frequent meals   - Increase protein in diet     # Follow-up in clinic with Dr. Leventhal in 3 months     Dorian Levy PA-C   [facility] Hepatology clinic    Total time for E/M services performed on the date of the encounter 60 minutes.  This included review of previous: clinic  visits, hospital records, lab results, imaging studies, and procedural documentation. Time also includes patient visit, documentation and discussion with other providers.  The findings from this review are summarized in the above note.   -----------------------------------------------------       HPI:   Rishabh Cabrera is a 62 year old male presenting for follow-up.  He is accompanied by his youngest son today and [facility] [place]     Hep B Cirrhosis  Complicated by:  --Ascites  -History of bleeding varices requiring banding  - No history of HE  EGD: 1/30/2023, EV grade 3, PHG s/p banding x 1   HCC: See below    HCC:  Treatment:  S/p Y90 in September 2021  S/p chemoembolization in December 2021    Patient was last seen by Dr. Leventhal on 11/28/2022. He was recently hospitalized for acute on chronic abdominal pain, partial small bowel obstruction.     Itching and on forearms, upper with bilateral flank which he calls shingles. Cream doesn't work but claritin does help.     Appetite is poor. Very dry mouth. Also complains of a lot of congestion in nose, very thick in nose. This has been chronic for years. Eats foods like "[employer] rice soup, vegetables. Protein drinks. Yogurt, banana and oranges.     Taking Furosemide and Unsure if taking spironolactone.     Denies confusion. Having regular bowel movements.     Patient denies jaundice, lower extremity. Patient also denies melena, hematochezia or hematemesis.    Patient denies fevers, sweats or chills. No coughing and does feel some shortness of breath.     Has exercise band that he uses and exercise machine, hasn't doen for a few weeks due to need for ascites / weak and tired. Lives with older son,Ajit since August 2021.          Medications:     Current Outpatient Medications   Medication     bisacodyl (DULCOLAX) 5 MG EC tablet     capsaicin (ZOSTRIX) 0.025 % external cream     Capsaicin-Menthol (SALONPAS GEL EX)     carvedilol (COREG) 6.25 MG tablet     diclofenac (VOLTAREN)  1 % topical gel     furosemide (LASIX) 40 MG tablet     gabapentin (NEURONTIN) 100 MG capsule     loratadine (CLARITIN) 10 MG tablet     polyethylene glycol (GOLYTELY) 236 g suspension     polyethylene glycol (MIRALAX) 17 GM/Dose powder     sennosides (SENOKOT) 8.6 MG tablet     spironolactone (ALDACTONE) 100 MG tablet     tenofovir (VIREAD) 300 MG tablet     triamcinolone (KENALOG) 0.1 % external cream     No current facility-administered medications for this visit.     Facility-Administered Medications Ordered in Other Visits   Medication     gadobutrol (GADAVIST) injection 7.5 mL            Review of Systems:   10 points ROS was obtained and highlighted in the HPI, otherwise negative.          Physical Exam:   BP (!) 146/72 (BP Location: Right arm, Patient Position: Sitting, Cuff Size: Adult Small)   Pulse 109   Wt 56.7 kg (125 lb)   SpO2 99%   BMI 20.80 kg/m      Gen: A&Ox3, NAD, peripheral muscle wasting, temporal muscle wasting  HEENT: non-icteric  CV: RRR, no overt murmurs  Lung: CTA Bilatererally, no wheezing or crackles.   Lym- no palpable lymphadenopathy  Abd: soft, NT, ND, small ascites  Ext: no edema, intact pulses.   Skin: no palmar erythema, telangiectasias or jaundice. Patches Erythema on upper left arm, right arm flexor, abdomen   Neuro: grossly intact, no asterixis   Psych: appropriate mood and affects         Data:   Reviewed in person and significant for:    Lab Results   Component Value Date     02/05/2023     05/10/2021      Lab Results   Component Value Date    POTASSIUM 4.2 02/05/2023    POTASSIUM 3.8 06/01/2022    POTASSIUM 4.7 05/10/2021     Lab Results   Component Value Date    CHLORIDE 104 02/05/2023    CHLORIDE 112 06/01/2022    CHLORIDE 103 05/10/2021     Lab Results   Component Value Date    CO2 26 02/05/2023    CO2 26 06/01/2022    CO2 26 05/10/2021     Lab Results   Component Value Date    BUN 10.2 02/05/2023    BUN 8 06/01/2022    BUN 12 05/10/2021     Lab Results    Component Value Date    CR 0.96 02/05/2023    CR 0.85 05/10/2021       Lab Results   Component Value Date    WBC 3.2 02/05/2023    WBC 3.5 05/10/2021     Lab Results   Component Value Date    HGB 7.4 02/05/2023    HGB 13.0 05/10/2021     Lab Results   Component Value Date    HCT 25.6 02/05/2023    HCT 39.1 05/10/2021     Lab Results   Component Value Date    MCV 81 02/05/2023    MCV 86 05/10/2021     Lab Results   Component Value Date    PLT 69 02/05/2023    PLT 52 05/10/2021       Lab Results   Component Value Date    AST 52 02/05/2023     05/10/2021     Lab Results   Component Value Date    ALT 30 02/05/2023     05/10/2021     No results found for: BILICONJ   Lab Results   Component Value Date    BILITOTAL 1.7 02/05/2023    BILITOTAL 1.3 05/10/2021       Lab Results   Component Value Date    ALBUMIN 1.9 02/05/2023    ALBUMIN 1.9 06/01/2022    ALBUMIN 2.7 05/10/2021     Lab Results   Component Value Date    PROTTOTAL 5.6 02/05/2023    PROTTOTAL 6.7 05/10/2021      Lab Results   Component Value Date    ALKPHOS 104 02/05/2023    ALKPHOS 290 05/10/2021       Lab Results   Component Value Date    INR 2.30 02/02/2023    INR 1.39 05/10/2021     MRI ABDOMEN  1/10/23      CLINICAL HISTORY: HCC (hepatocellular carcinoma); Chronic hepatitis B  with cirrhosis; surveillance     TECHNIQUE: Images were acquired with and without intravenous contrast  through the abdomen. The following MR images were acquired: TrueFISP,  multiplanar T2 weighted, axial T1 in/out of phase, diffusion-weighted.  Multiplanar T1-weighted images with fat saturation were before  contrast administration and at multiple time points following the  administration of intravenous contrast. Contrast dose: 6 mL Gadavist     Comparison study: MR abdomen 9/21/2022, 6/15/2022, 3/17/2022,  12/29/2021     FINDINGS:     Liver: Cirrhotic configuration of the liver. Multiple T1 hyperintense  regenerative nodules. Unchanged T2 hyperintense reticulated  appearance  of the liver indicating fibrosis. Several scattered T2 nonenhancing  cysts.     Observation 1: Evolving posttreatment changes of the liver segment 6.   Progressive enhancement along the superior aspect of the treatment  zone likely posttreatment without distinct nodularity. No convincing  enhancement, restricted diffusion or washout that suggests viable  tumor, LR-TR nonviable.      Observation 2: 8 mm arterially enhancing observation in segment IV  (15/33), previously 6 mm on MRI 9/21/2022. No associated washout,  pseudocapsule or suspicious T2 signal. No diffusion restriction. There  is increased precontrast T1 signal. LR3     Observation 3: Arterially enhancing 9 mm observation in segment 6,  previously 7 mm (15/49) on MRI 9/21/2022. No convincing washout,  pseudocapsule or suspicious T2 signal. No diffusion restriction. There  is increased precontrast T1 signal. LR3     Observation 4: 6 mm arterially enhancing observation in segment 5,  previously 5 mm (15/42) on MRI 9/21/2022. No associated washout,  pseudocapsule or suspicious T2 signal. Minimal increased diffusion  signal (101/20). There is increased precontrast T1 signal. LR3     Observation 5: 8 mm arterially enhancing observation in segment 6,  previously 6 mm (15/40) on MRI 9/21/2022. No associated washout,  pseudocapsule or suspicious T2 signal. No diffusion restriction. There  is increased precontrast T1 signal. LR3     Observation 6: New 10 mm arterially enhancing observation in  peripheral segment 5, (15/40). No associated washout, pseudocapsule or  suspicious T2 signal. No diffusion restriction. LR3.     Previously described arterial enhancing lesion segment 7 (on series 15  image 24) not visualized currently, continued attention on follow up  recommended. Multiple additional subcentimeter arterially enhancing  foci without suspicious features LR3.     Gallbladder: The gallbladder is nondistended. Small gallstones and  gallbladder sludge  noted.     Spleen: Splenomegaly measuring 14 cm.     Kidneys: Bilaterally symmetrically enhancing kidneys. Several T2  hyperintense subcentimeter renal cysts. No hydronephrosis. No  hydroureter.     Adrenal glands: Unremarkable.     Pancreas: Normal T1 signal intensity of the pancreatic parenchyma. No  suspicious enhancement or main pancreatic duct dilatation.     Bowel: Large and small bowel is normal in caliber with mild wall  thickening, edema and enhancement likely related to portal  hypertension similar to prior.     Lymph nodes: No intra-abdominal lymphadenopathy by size criteria.     Blood vessels: The abdominal aorta and major abdominal arterial  vessels are patent and nonaneurysmal. The main portal vein is patent.  Gastroesophageal varices noted.     Lung bases: Similar appearance of peripherally calcified right basilar  fluid collection and partially visualized loculated fluid collection  on the left.     Bones and soft tissues: No acute osseous lesions.     Mesentery and abdominal wall: Unremarkable     Ascites: Redemonstration of large volume ascites with some internal  septations.                                                                         IMPRESSION:  1. Cirrhosis with findings of portal hypertension. Persistent large  volume ascites.  2. Posttreatment changes of liver segment 6 without evidence of viable  tumor; LR-TR nonviable.   3. Slightly increased size of several LR3 observations within the  right hepatic lobe (ancillary feature of subthreshold growth could be  used to make these LR4); recommend attention on follow up.  4. Unchanged loculated bilateral pleural effusions.      Again, thank you for allowing me to participate in the care of your patient.        Sincerely,        Dorian Levy PA-C

## 2023-02-28 NOTE — TELEPHONE ENCOUNTER
Third attempt for pre-assessment prior to upcoming colonoscopy/upper endoscopy     No answer.  Left message to return call [phone] #4    Analilia Toth RN  Endoscopy Procedure Pre Assessment RN

## 2023-03-01 RX ORDER — LIDOCAINE 40 MG/G
CREAM TOPICAL
Status: CANCELLED | OUTPATIENT
Start: 2023-03-01

## 2023-03-01 RX ORDER — ONDANSETRON 2 MG/ML
4 INJECTION INTRAMUSCULAR; INTRAVENOUS
Status: CANCELLED | OUTPATIENT
Start: 2023-03-01

## 2023-03-06 ENCOUNTER — TELEPHONE (OUTPATIENT)
Dept: GASTROENTEROLOGY | Facility: CLINIC | Age: 63
End: 2023-03-06
Payer: MEDICARE

## 2023-03-07 ENCOUNTER — OFFICE VISIT (OUTPATIENT)
Dept: PULMONOLOGY | Facility: CLINIC | Age: 63
End: 2023-03-07
Payer: MEDICARE

## 2023-03-07 VITALS
TEMPERATURE: 97 F | BODY MASS INDEX: 20.8 KG/M2 | SYSTOLIC BLOOD PRESSURE: 114 MMHG | WEIGHT: 125 LBS | OXYGEN SATURATION: 100 % | DIASTOLIC BLOOD PRESSURE: 66 MMHG

## 2023-03-07 DIAGNOSIS — C22.0 HCC (HEPATOCELLULAR CARCINOMA) (H): ICD-10-CM

## 2023-03-07 DIAGNOSIS — I85.11 SECONDARY ESOPHAGEAL VARICES WITH BLEEDING (H): ICD-10-CM

## 2023-03-07 DIAGNOSIS — R18.8 OTHER ASCITES: ICD-10-CM

## 2023-03-07 DIAGNOSIS — R91.1 LUNG NODULE: Primary | ICD-10-CM

## 2023-03-07 LAB
ALBUMIN SERPL BCG-MCNC: 2.2 G/DL (ref 3.5–5.2)
ALP SERPL-CCNC: 182 U/L (ref 40–129)
ALT SERPL W P-5'-P-CCNC: 11 U/L (ref 10–50)
ANION GAP SERPL CALCULATED.3IONS-SCNC: 5 MMOL/L (ref 7–15)
AST SERPL W P-5'-P-CCNC: 28 U/L (ref 10–50)
BILIRUB DIRECT SERPL-MCNC: 0.38 MG/DL (ref 0–0.3)
BILIRUB SERPL-MCNC: 1 MG/DL
BUN SERPL-MCNC: 7.4 MG/DL (ref 8–23)
CALCIUM SERPL-MCNC: 7.7 MG/DL (ref 8.8–10.2)
CHLORIDE SERPL-SCNC: 108 MMOL/L (ref 98–107)
CREAT SERPL-MCNC: 0.91 MG/DL (ref 0.67–1.17)
DEPRECATED HCO3 PLAS-SCNC: 25 MMOL/L (ref 22–29)
ERYTHROCYTE [DISTWIDTH] IN BLOOD BY AUTOMATED COUNT: 19.6 % (ref 10–15)
GFR SERPL CREATININE-BSD FRML MDRD: >90 ML/MIN/1.73M2
GLUCOSE SERPL-MCNC: 203 MG/DL (ref 70–99)
HCT VFR BLD AUTO: 28.6 % (ref 40–53)
HGB BLD-MCNC: 8.4 G/DL (ref 13.3–17.7)
INR PPP: 2.01 (ref 0.85–1.15)
MCH RBC QN AUTO: 23.1 PG (ref 26.5–33)
MCHC RBC AUTO-ENTMCNC: 29.4 G/DL (ref 31.5–36.5)
MCV RBC AUTO: 79 FL (ref 78–100)
PLATELET # BLD AUTO: 87 10E3/UL (ref 150–450)
POTASSIUM SERPL-SCNC: 3.9 MMOL/L (ref 3.4–5.3)
PROT SERPL-MCNC: 7.7 G/DL (ref 6.4–8.3)
RBC # BLD AUTO: 3.64 10E6/UL (ref 4.4–5.9)
SODIUM SERPL-SCNC: 138 MMOL/L (ref 136–145)
WBC # BLD AUTO: 4.1 10E3/UL (ref 4–11)

## 2023-03-07 PROCEDURE — 85027 COMPLETE CBC AUTOMATED: CPT | Performed by: INTERNAL MEDICINE

## 2023-03-07 PROCEDURE — 36415 COLL VENOUS BLD VENIPUNCTURE: CPT | Performed by: INTERNAL MEDICINE

## 2023-03-07 PROCEDURE — 99215 OFFICE O/P EST HI 40 MIN: CPT | Performed by: INTERNAL MEDICINE

## 2023-03-07 PROCEDURE — 85610 PROTHROMBIN TIME: CPT | Performed by: INTERNAL MEDICINE

## 2023-03-07 PROCEDURE — 82248 BILIRUBIN DIRECT: CPT | Performed by: INTERNAL MEDICINE

## 2023-03-07 PROCEDURE — 80053 COMPREHEN METABOLIC PANEL: CPT | Performed by: INTERNAL MEDICINE

## 2023-03-07 NOTE — PROGRESS NOTES
LUNG NODULE & INTERVENTIONAL PULMONARY CLINIC  [facility], [facility]     Rishabh Cabrera MRN# [id number]   Age: 62 year old YOB: 1960       Requesting Physician: No referring provider defined for this encounter.       Assessment and Plan:    1. Established multiple pulmonary lung nodule(s). Given the characteristics on current/previous imaging and risk factors; I would classify this to be Intermediate (6-65%) risk for cancer.  Concerning in appearance but at this point stable since May of last year.  He is at very high risk for complications from biopsy so we will hold off unless there is any changes.  I think that 6-month interval will be fine.    2.  Shortness of breath.  No discernible pulmonary issues on CT.  His hemoglobin is in the low 7 range and likely accounts for some degree of dyspnea in addition to deconditioning.  No syndrome that would suggest treatment with inhalers will be helpful.    More than 40 minutes spent on this visit today. This includes prep time, face to face time with the patient, chart review through extensive imaging and results and documentation         History:     Rishabh Cabrera is a 62 year old male with sig h/o for cirrhosis, HCC, lung nodule who is here for evaluation/followup of lung nodule(s). He had a biopsy last year but due to bleeding this was stopped.  Tissue was non diagnostic.  He has some exertional shortness of breath. No bronchitis.    He has some questions regarding his hep B treatment.    - My interpretation of the images relevant for this visit includes: stable RICH nodule              Past Medical History:      Past Medical History:   Diagnosis Date     Cancer (H)      Chronic hepatitis B (H)      Diabetes mellitus (H)            Past Surgical History:      Past Surgical History:   Procedure Laterality Date     COLON SURGERY Right 08/05/2015    Laparoscopic hand assisted right Hemicolectomy at [facility]      ESOPHAGOSCOPY, GASTROSCOPY, DUODENOSCOPY (EGD), COMBINED N/A 05/29/2019    Procedure: ESOPHAGOGASTRODUODENOSCOPY (EGD);  Surgeon: Leventhal, Thomas Michael, MD;  Location: [facility]     ESOPHAGOSCOPY, GASTROSCOPY, DUODENOSCOPY (EGD), COMBINED N/A 2/3/2023    Procedure: ESOPHAGOGASTRODUODENOSCOPY, WITH BIOPSY;  Surgeon: Elan Morris MD;  Location: [facility]     IR CHEMO EMBOLIZATION  12/02/2021     IR PARACENTESIS  01/24/2023     IR SIRT (SELECTIVE INTERNAL RADIO THERAPY)  09/08/2021     IR VISCERAL ANGIOGRAM  09/08/2021     IR VISCERAL EMBOLIZATION  09/09/2021          Social History:     Social History     Tobacco Use     Smoking status: Never     Smokeless tobacco: Never   Substance Use Topics     Alcohol use: No          Family History:   No family history on file.        Allergies:      Allergies   Allergen Reactions     Crabs [Crustaceans]      Pork (Porcine) Protein Itching and Unknown     Seafood Hives and Unknown     Shellfish Allergy Unknown          Medications:     Current Outpatient Medications   Medication Sig     bisacodyl (DULCOLAX) 5 MG EC tablet Two days prior to exam take two (2) tablets at 4pm. One day prior to exam take two (2) tablets at 4pm     capsaicin (ZOSTRIX) 0.025 % external cream Apply topically 3 times daily     Capsaicin-Menthol (SALONPAS GEL EX)      carvedilol (COREG) 6.25 MG tablet Take 1 tablet (6.25 mg) by mouth 2 times daily (with meals)     diclofenac (VOLTAREN) 1 % topical gel Apply topically as needed for moderate pain     diphenhydrAMINE (BENADRYL) 2 % external cream Apply topically 3 times daily as needed for itching     Emollient (CERAVE MOISTURIZING) CREA Externally apply 1 Dose topically 2 times daily as needed (for eczema)     furosemide (LASIX) 40 MG tablet Take 1 tablet (40 mg) by mouth daily     gabapentin (NEURONTIN) 100 MG capsule Take 3 capsules (300 mg) by mouth 3 times daily     loratadine (CLARITIN) 10 MG tablet Take 10 mg by mouth daily     polyethylene  glycol (GOLYTELY) 236 g suspension Take as directed. Two days before your exam fill the first container with water. Cover and shake until mixed well. At 5:00pm drink one 8oz glass every 10-15 minutes until half (1/2) of the first container is empty. Store the remainder in the refrigerator. One day before your exam at 5:00pm drink the second half of the first container until it is gone. Before you go to bed mix the second container with water and put in refrigerator. Six hours before your check in time drink one 8oz glass every 10-15 minutes until half of container is empty. Discard the remainder of solution.     polyethylene glycol (MIRALAX) 17 GM/Dose powder Take 17 g by mouth daily     sennosides (SENOKOT) 8.6 MG tablet Take 1 tablet by mouth daily     sodium chloride-sodium bicarb (CLASSIC NETI POT SINUS WASH) 2300-700 MG KIT Use distilled water and sodium-bicarb packet in Neti pot 1-2 times daily for congestion     spironolactone (ALDACTONE) 100 MG tablet Take 1 tablet (100 mg) by mouth daily     tenofovir (VIREAD) 300 MG tablet Take 1 tablet (300 mg) by mouth daily     triamcinolone (KENALOG) 0.1 % external cream Apply topically as needed for irritation     No current facility-administered medications for this visit.     Facility-Administered Medications Ordered in Other Visits   Medication     gadobutrol (GADAVIST) injection 7.5 mL          Review of Systems:     See HPI         Physical Exam:   /66 (BP Location: Right arm)   Temp 97  F (36.1  C)   Wt 56.7 kg (125 lb)   SpO2 100%   BMI 20.80 kg/m      Constitutional - very slim  Eyes - no redness or discharge  Respiratory -breathing appears comfortable. No wheeze or rhonchi.   Cardiac -- Normal rate, rhythm.   Skin - No appreciable discoloration or lesions (very limited exam)  Neurological - No apparent tremors. Speech fluent and articlate  Psychiatric - no signs of delirium or anxiety          Current Laboratory Data:   All laboratory and imaging  data reviewed.    No results found for this or any previous visit (from the past 24 hour(s)).

## 2023-03-08 ENCOUNTER — TELEPHONE (OUTPATIENT)
Dept: GASTROENTEROLOGY | Facility: CLINIC | Age: 63
End: 2023-03-08
Payer: MEDICARE

## 2023-03-30 ENCOUNTER — APPOINTMENT (OUTPATIENT)
Dept: INTERPRETER SERVICES | Facility: CLINIC | Age: 63
End: 2023-03-30
Payer: MEDICARE

## 2023-09-20 PROBLEM — K56.600 PARTIAL BOWEL OBSTRUCTION (H): Status: ACTIVE | Noted: 2023-01-01

## 2023-09-20 NOTE — ED TRIAGE NOTES
"Pt comes in with two days of generalized abdominal pain, nausea and \"unable to eat anything\".     Triage Assessment       Row Name 09/20/23 1150       Triage Assessment (Adult)    Airway WDL WDL       Respiratory WDL    Respiratory WDL WDL       Skin Circulation/Temperature WDL    Skin Circulation/Temperature WDL WDL       Cardiac WDL    Cardiac WDL WDL       Peripheral/Neurovascular WDL    Peripheral Neurovascular WDL WDL       Cognitive/Neuro/Behavioral WDL    Cognitive/Neuro/Behavioral WDL WDL                    "
not examined

## 2023-09-20 NOTE — ED PROVIDER NOTES
[facility] ([facility])    9/20/23       ED PROVIDER NOTE  VTA      History     Chief Complaint   Patient presents with     Abdominal Pain     HPI     Rishabh Cabrera is a 63 year old male with a past medical history significant for hepatitis B cirrhosis complicated by history of ascites, HCC, DM2 and GERD who presents to the Emergency Department for evaluation of abdominal pain.     Patient states he has been having a sharp pain in his stomach for the last 2 days. He states this pain has kept him up at night and he has been unable to sleep. He reports most of his pain is localized on his left side. He feels as if this pain has been on and off for a long time. He has a bowel movement last night and this morning that was hard and small. He endorses abdominal pain during these bowel movements. He also states he vomited twice yesterday. He has been unable to eat anything hard and has been trying to eat soup. He denies any fever and states he feels cold generally. He states he has been feeling weak and tired recently.       Past Medical History  Past Medical History:   Diagnosis Date     Cancer (H)      Chronic hepatitis B (H)      Diabetes mellitus (H)      Past Surgical History:   Procedure Laterality Date     COLON SURGERY Right 08/05/2015    Laparoscopic hand assisted right Hemicolectomy at [facility]     ESOPHAGOSCOPY, GASTROSCOPY, DUODENOSCOPY (EGD), COMBINED N/A 05/29/2019    Procedure: ESOPHAGOGASTRODUODENOSCOPY (EGD);  Surgeon: Leventhal, Thomas Michael, MD;  Location: [facility]     ESOPHAGOSCOPY, GASTROSCOPY, DUODENOSCOPY (EGD), COMBINED N/A 2/3/2023    Procedure: ESOPHAGOGASTRODUODENOSCOPY, WITH BIOPSY;  Surgeon: Elan Morris MD;  Location:  GI     IR CHEMO EMBOLIZATION  12/02/2021     IR PARACENTESIS  01/24/2023     IR SIRT (SELECTIVE INTERNAL RADIO THERAPY)  09/08/2021     IR VISCERAL ANGIOGRAM  09/08/2021     IR VISCERAL EMBOLIZATION  09/09/2021     bisacodyl  "(DULCOLAX) 5 MG EC tablet  capsaicin (ZOSTRIX) 0.025 % external cream  Capsaicin-Menthol (SALONPAS GEL EX)  carvedilol (COREG) 6.25 MG tablet  diclofenac (VOLTAREN) 1 % topical gel  diphenhydrAMINE (BENADRYL) 2 % external cream  Emollient (CERAVE MOISTURIZING) CREA  furosemide (LASIX) 40 MG tablet  gabapentin (NEURONTIN) 100 MG capsule  loratadine (CLARITIN) 10 MG tablet  polyethylene glycol (GOLYTELY) 236 g suspension  polyethylene glycol (MIRALAX) 17 GM/Dose powder  sennosides (SENOKOT) 8.6 MG tablet  sodium chloride-sodium bicarb (CLASSIC NETI POT SINUS WASH) 2300-700 MG KIT  spironolactone (ALDACTONE) 100 MG tablet  tenofovir (VIREAD) 300 MG tablet  triamcinolone (KENALOG) 0.1 % external cream      Allergies   Allergen Reactions     Crabs [Crustaceans]      Pork (Porcine) Protein Itching and Unknown     Seafood Hives and Unknown     Shellfish Allergy Unknown     Family History  History reviewed. No pertinent family history.  Social History   Social History     Tobacco Use     Smoking status: Never     Smokeless tobacco: Never   Substance Use Topics     Alcohol use: No     Drug use: Yes     Types: Marijuana      Past medical history, past surgical history, medications, allergies, family history, and social history were reviewed with the patient. No additional pertinent items.      A complete review of systems was performed with pertinent positives and negatives noted in the HPI, and all other systems negative.    Physical Exam   BP: 138/73  Pulse: 86  Temp: 98  F (36.7  C)  Resp: 16  Height: 165.1 cm (5' 5\")  Weight: 56.7 kg (125 lb)  SpO2: 100 %  Physical Exam  Vital signs reviewed.  GENERAL: Pulse regular. Alert. Conversant.  EYES: PERRL. Conjunctiva normal. Lids symmetric.  HEAD: Nose and ears atraumatic. Mucous membranes moist.  NECK: Symmetric. Atraumatic.  CARDIAC: Normal heart sounds. Normal peripheral pulse. No lower extremity edema.  RESPIRATORY: Normal respiratory effort. Clear to auscultation " bilaterally.  ABDOMINAL: Abdomen distended, tympanic and tender in left and right upper quadrant  BACK: Tenderness to superficial muscles of back. No pain with palpation.   LEGS: No swelling in legs bilaterally.   RECTUM: No stool, blood per rectum, no fissures or lesions.   SKIN: Warm, Dry.No visualized rashes or lesions.  NEURO: CNIII-XII grossly intact. Sensation grossly intact bilaterally.   PSYCH: Oriented to person, place, and time. Mood and affect appropriate and congruent.          ED Course, Procedures, & Data     ED Course as of 09/25/23 2127   Wed Sep 20, 2023   1823 Preliminary read of the patient's CT scan is concerning for at least partial small bowel obstruction.  Paged general surgery for further consultation and recommendations regarding management.   1834 Discussed with general surgery, who will come to evaluate.     Procedures                      No results found for any visits on 09/20/23.  Medications - No data to display  Labs Ordered and Resulted from Time of ED Arrival to Time of ED Departure - No data to display  No orders to display          Critical care was not performed.     Medical Decision Making  The patient's presentation was of high complexity (an acute health issue posing potential threat to life or bodily function).    The patient's evaluation involved:  ordering and/or review of 3+ test(s) in this encounter (see separate area of note for details)    The patient's management necessitated high risk (a decision regarding hospitalization).      Assessment & Plan    Presentation was highly concerning for bowel obstruction, which was confirmed by CT. Exam was less typical for pancreatitis, hepatitis, gastritis, or perforated visous/peritonitis. Surgery was consulted and recommended NG tube and admission to medicine.    I have reviewed the nursing notes. I have reviewed the findings, diagnosis, plan and need for follow up with the patient.    New Prescriptions    No medications on file        Final diagnoses:   None   I, BRYCE JACKSON, am serving as a trained medical scribe to document services personally performed by Esau Verma MD, based on the provider's statements to me.      IEsau MD, was physically present and have reviewed and verified the accuracy of this note documented by BRYCE JACKSON.     Esau Verma MD  [facility] EMERGENCY DEPARTMENT  9/20/2023     Esau Verma MD  09/25/23 2125

## 2023-09-21 NOTE — CONSULTS
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     #Decompensated cirrhosis  #Chronic HBV infection  #HCC s/p embolization  #Ascites  #History of variceal bleeding  #History of 2.5 cm RICH spiculated lung nodule    Primary Hepatologist: Dr. Leventhal  Etiology: HBV, most recent DNA undetectable 11/2022  Transplant status: Significant challenges in the past with communication, needed medical follow-up, significant burden with cost of medications  MELD 3.0: 19 at 9/21/2023  5:47 AM  MELD-Na: 19 at 9/21/2023  5:47 AM  Calculated from:  Serum Creatinine: 0.83 mg/dL (Using min of 1 mg/dL) at 9/21/2023  5:47 AM  Serum Sodium: 136 mmol/L at 9/21/2023  5:47 AM  Total Bilirubin: 2.2 mg/dL at 9/21/2023  5:47 AM  Serum Albumin: 2.2 g/dL at 9/21/2023  5:47 AM  INR(ratio): 2.09 at 9/21/2023  5:47 AM  Age at listing (hypothetical): 63 years  Sex: Male at 9/21/2023  5:47 AM    Ascites: At home on Lasix 40, Spironolactone 100. No prior SBP. As needed paracentesis. Prior studies with 0.3 albumin, 0.7  TP. Cytology 06/2021 negative.   Hepatic encephalopathy: None  EV: EGD 02/2023 with grade 2 varices s/p banding. Pending repeat EGD. On Carvedilol 6.25 mg BID.     Patient with history of HCC s/p Y90 and chemoembolization in 2021. Last seen by Dr. Leventhal on 11/2022, then by Dorian Levy on 02/2023. Pending outpatient follow-up along with repeat EGD and MRI for HCC surveillance.     Now hospitalized again with SBO. Surgery consulted, conservative management with NG tube decompression at this time.     CT with overall stable appearance of the liver, however with a new area of wedge-shaped hypoattenuation superior to  prior lesion of treated tumor, which is indeterminate. Most recent MRI was on 01/2023, showing posttreatment changes without evidence of viable tumor (LR-TR nonviable), and several LR3 observations in the right hepatic lobe.     Also has a history of lung nodule, had biopsy last year but that was complicated by hematoma. Was seen by Pulm on 03/2023, classified as indeterminate for risk for cancer, decided to hold off repeating biopsy due to very high risk for complications.     RECOMMENDATIONS    - Unable to tap ascites due to small volume.   - Consider resuming oral diet soon (patient complains of being hungry, passing stool so no complete bowel obstruction, also no significant distention noted).   - Continue Coreg 6.25 mg BID  - Continue home Lasix 40 mg, Spironolactone 100 mg  - Continue home Tenofovir. Please ensure to send refill prior to discharge for at least 30 days.   - Recommend limiting tylenol use to < 2g per 24 hours. Recommend against using NSAIDs PRN pain.   - Pending repeat outpatient EGD for variceal surveillance and possible repeat banding. Will hold off while inpatient.   - Sodium-restricted diet (< 2g)  - Would hold off MRI at this time, as CT imaging should be adequate and liver lesions look stable. Can obtain MRI in 6 months.   - Recommend checking HbsAg, HBV DNA (As patient has been off therapy for ~ 1 month due to running out of his Tenofovir). Ordered.  - Recommend chest CT for evaluation of lung nodule (ordered).     Gastroenterology follow up recommendations: 10/09/2023 with Dr. Leventhal    Thank you for involving us in this patient's care. Please do not hesitate to contact the GI service with any questions or concerns.     Patient care plan discussed with Dr. Rodriguez, GI staff physician.    Jarad Castro MD  Gastroenterology Fellow  Pager             Chief Complaint:   We were asked by Medicine service to evaluate this patient with cirrhosis, concern for SBP    History is  obtained from the patient and the medical record.          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     Patient presented yesterday with history of abdominal pain for around 3 days, diffuse, associated with nausea and few episodes of non-bloody emesis. Presented with similar symptoms in January, found to have SBO. Now underwent CT with evidence again of SBO. Surgery was consutled, NGT was placed. Blood cultures were drawn.    History obtained with assistance of Hmong speaking . Patient feels well, continues to complain of abdominal pain. Lives with his son, but most of the time is alone. Inconsistently takes his medications, for example when asked about Coreg he says 'I take it when my heart is beating fast', and for diuretics he says 'as needed'. Reports having chills yesterday, denies fever. Alert to name, place and time.             Past Medical History:   Reviewed and edited as appropriate  Past Medical History:   Diagnosis Date    Cancer (H)     Chronic hepatitis B (H)     Diabetes mellitus (H)             Past Surgical History:   Reviewed and edited as appropriate   Past Surgical History:   Procedure Laterality Date    COLON SURGERY Right 08/05/2015    Laparoscopic hand assisted right Hemicolectomy at [facility]    ESOPHAGOSCOPY, GASTROSCOPY, DUODENOSCOPY (EGD), COMBINED N/A 05/29/2019    Procedure: ESOPHAGOGASTRODUODENOSCOPY (EGD);  Surgeon: Leventhal, Thomas Michael, MD;  Location: [facility]    ESOPHAGOSCOPY, GASTROSCOPY, DUODENOSCOPY (EGD), COMBINED N/A 2/3/2023    Procedure: ESOPHAGOGASTRODUODENOSCOPY, WITH BIOPSY;  Surgeon: Elan Morris MD;  Location:  GI    IR CHEMO EMBOLIZATION  12/02/2021    IR PARACENTESIS  01/24/2023     IR SIRT (SELECTIVE INTERNAL RADIO THERAPY)  09/08/2021    IR VISCERAL ANGIOGRAM  09/08/2021    IR VISCERAL EMBOLIZATION  09/09/2021            Previous Endoscopy:   No results found for this or any previous visit.         Social History:   Reviewed and edited as appropriate  Social History     Socioeconomic History    Marital status:      Spouse name: Not on file    Number of children: Not on file    Years of education: Not on file    Highest education level: Not on file   Occupational History    Not on file   Tobacco Use    Smoking status: Never    Smokeless tobacco: Never   Substance and Sexual Activity    Alcohol use: No    Drug use: Yes     Types: Marijuana    Sexual activity: Not Currently   Other Topics Concern    Parent/sibling w/ CABG, MI or angioplasty before 65F 55M? Not Asked   Social History Narrative    Not on file     Social Determinants of Health     Financial Resource Strain: Not on file   Food Insecurity: Not on file   Transportation Needs: Not on file   Physical Activity: Not on file   Stress: Not on file   Social Connections: Not on file   Interpersonal Safety: Not At Risk (6/20/2022)    Humiliation, Afraid, Rape, and Kick questionnaire     Fear of Current or Ex-Partner: No     Emotionally Abused: No     Physically Abused: No     Sexually Abused: No   Housing Stability: Not on file            Family History:   Reviewed and edited as appropriate  No known history of gastrointestinal/liver disease or  gastrointestinal malignancies       Allergies:   Reviewed and edited as appropriate     Allergies   Allergen Reactions    Crabs [Crustaceans]     Pork (Porcine) Protein Itching and Unknown    Seafood Hives and Unknown    Shellfish Allergy Unknown            Medications:     Current Facility-Administered Medications   Medication    acetaminophen (TYLENOL) tablet 975 mg    carvedilol (COREG) tablet 6.25 mg    glucose gel 15-30 g    Or    dextrose 50 % injection 25-50 mL    Or    glucagon  "injection 1 mg    furosemide (LASIX) tablet 40 mg    gabapentin (NEURONTIN) capsule 300 mg    HYDROmorphone (PF) (DILAUDID) injection 0.3 mg    insulin aspart (NovoLOG) injection (RAPID ACTING)    ondansetron (ZOFRAN ODT) ODT tab 4 mg    Or    ondansetron (ZOFRAN) injection 4 mg    sodium phosphate 9 mmol in sodium chloride 0.9 % 250 mL intermittent infusion    spironolactone (ALDACTONE) tablet 100 mg    tenofovir (VIREAD) tablet 300 mg     Current Outpatient Medications   Medication Sig    capsaicin (ZOSTRIX) 0.025 % external cream Apply topically 3 times daily    carvedilol (COREG) 6.25 MG tablet Take 1 tablet (6.25 mg) by mouth 2 times daily (with meals)    diclofenac (VOLTAREN) 1 % topical gel Apply topically as needed for moderate pain    diphenhydrAMINE (BENADRYL) 2 % external cream Apply topically 3 times daily as needed for itching    Emollient (CERAVE MOISTURIZING) CREA Externally apply 1 Dose topically 2 times daily as needed (for eczema)    furosemide (LASIX) 40 MG tablet Take 1 tablet (40 mg) by mouth daily    gabapentin (NEURONTIN) 100 MG capsule Take 3 capsules (300 mg) by mouth 3 times daily    loratadine (CLARITIN) 10 MG tablet Take 10 mg by mouth daily    spironolactone (ALDACTONE) 100 MG tablet Take 1 tablet (100 mg) by mouth daily    tenofovir (VIREAD) 300 MG tablet Take 1 tablet (300 mg) by mouth daily    triamcinolone (KENALOG) 0.1 % external cream Apply topically as needed for irritation     Facility-Administered Medications Ordered in Other Encounters   Medication    gadobutrol (GADAVIST) injection 7.5 mL             Review of Systems:     A complete review of systems was performed and is negative except as noted in the HPI           Physical Exam:   /68   Pulse 91   Temp 97.6  F (36.4  C)   Resp 16   Ht 1.651 m (5' 5\")   Wt 56.7 kg (125 lb)   SpO2 99%   BMI 20.80 kg/m    Wt:   Wt Readings from Last 2 Encounters:   09/20/23 56.7 kg (125 lb)   03/07/23 56.7 kg (125 lb)    "   Constitutional: cooperative, pleasant, not dyspneic/diaphoretic, no acute distress  Eyes: Sclera anicteric/injected  Ears/nose/mouth/throat: Normal oropharynx without ulcers or exudate, mucus membranes moist  Neck: supple  CV: RRR, No edema  Respiratory: Unlabored breathing  Abdomen: No scars, + distended, +bs, no hepatosplenomegaly, + tender, no peritoneal signs  Skin: warm, perfused, no jaundice  Neuro: AAO x 3, No asterixis  Psych: Normal affect  MSK: Normal gait         Data:   Labs and imaging below were independently reviewed and interpreted    BMP  Recent Labs   Lab 09/21/23  0404 09/21/23  0019 09/20/23  2137 09/20/23  1257   NA  --   --   --  135*   POTASSIUM  --   --   --  4.4   CHLORIDE  --   --   --  103   MANDY  --   --   --  8.1*   CO2  --   --   --  22   BUN  --   --   --  5.4*   CR  --   --   --  0.92   * 110* 117* 140*     CBC  Recent Labs   Lab 09/21/23  0547 09/20/23  1257   WBC 3.9* 5.5   RBC 3.46* 4.16*   HGB 7.7* 9.5*   HCT 26.1* 32.2*   MCV 75* 77*   MCH 22.3* 22.8*   MCHC 29.5* 29.5*   RDW 19.1* 19.2*   PLT 57* 69*     INR  Recent Labs   Lab 09/21/23  0547   INR 2.09*     LFTs  Recent Labs   Lab 09/20/23  1257   ALKPHOS 141*   AST 52*   ALT 17   BILITOTAL 2.2*   PROTTOTAL 8.1   ALBUMIN 2.9*      PANC  Recent Labs   Lab 09/20/23  1257   LIPASE 29       Imaging:    CT abdomen/pelvis w contrast 09/20/2023    IMPRESSION:   1. Mildly enlarged loops of small bowel with air-fluid levels,  appearance is nonspecific and at least partial obstruction is  possible.  2. Postsurgical changes of right hemicolectomy with chronic  preanastomotic dilation.  3. Cirrhotic liver with a treated tumor in the hepatic segment 7.   4. There is a new area of hypoattenuation superior to the treated  lesion which is indeterminate. Consider nonemergent liver MRI for  further evaluation.

## 2023-09-21 NOTE — PROGRESS NOTES
Surgery Progress Note  9/21/2023     Subjective:  NAEON Pain is controlled. Mild nausea. Denies N/VCP/SOB    Objective:  Temp:  [97.6  F (36.4  C)-98  F (36.7  C)] 97.6  F (36.4  C)  Pulse:  [86-96] 91  Resp:  [16] 16  BP: (121-138)/(60-73) 126/60  SpO2:  [98 %-100 %] 99 %  I/O last 3 completed shifts:  In: -   Out: 300 [Emesis/NG output:300]    Gen: Awake, alert, NAD  Resp: NLB on RA  Abd: Soft, mild distended, appropriately tender  Ext: move spontaneously    BMP  Recent Labs   Lab 09/21/23  1024 09/21/23  0547 09/21/23  0404 09/21/23  0019 09/20/23  2137 09/20/23  1928 09/20/23  1257   NA  --  136  --   --   --   --  135*   POTASSIUM  --  4.1  --   --   --   --  4.4   CHLORIDE  --  104  --   --   --   --  103   MANDY  --  7.2*  --   --   --   --  8.1*   CO2  --  24  --   --   --   --  22   BUN  --  8.2  --   --   --   --  5.4*   CR  --  0.83  --   --   --   --  0.92   * 139* 137* 110*   < >  --  140*   MAG  --   --   --   --   --  1.8  --    PHOS  --   --   --   --   --  2.4*  --     < > = values in this interval not displayed.     CBC  Recent Labs   Lab 09/21/23  0547 09/20/23  1257   WBC 3.9* 5.5   RBC 3.46* 4.16*   HGB 7.7* 9.5*   HCT 26.1* 32.2*   MCV 75* 77*   MCH 22.3* 22.8*   MCHC 29.5* 29.5*   RDW 19.1* 19.2*   PLT 57* 69*     INR  Recent Labs   Lab 09/21/23  0547   INR 2.09*      AST/ALT & Alk Phos  Recent Labs   Lab 09/21/23  0547 09/20/23  1257   AST 38 52*   ALT 14 17   ALKPHOS 119 141*     Bili  Recent Labs   Lab Test 09/21/23  0547 09/20/23  1928 09/20/23  1257 03/07/23  1152 02/05/23  0616 01/24/23  0425 11/28/22  1121 09/23/22  2314 09/21/22  1249   BILITOTAL 2.2*  --  2.2* 1.0 1.7*   < > 0.9   < > 1.9*   DBIL  --  0.67*  --  0.38*  --   --  0.34*  --  0.64*    < > = values in this interval not displayed.     Lipase/Amlyase  Recent Labs   Lab 09/20/23  1257   LIPASE 29       A/P: Rishabh Cabrera is a 63 year old male w/ PMH including HTN, DM2, Hep B, HCC c/b cirrhosis (Deepak C) and moderate  ascites, as well as previous right hemicolectomy for a large polyp, who is presenting with acute onset abdominal pain for past 4 days w/ CT A/P concerning for partial SBO. Abdomen distended and TTP.    -- Clamp NGT  -- Gastrogaffin test through NGT      Discussed with doug resident, Dr Odom who Discussed with with staff Dr. Baez.    Michel Gibbs  General Surgery Resident PGY1  Pager [phone]

## 2023-09-21 NOTE — H&P
[facility]    History and Physical - Hospitalist Service, GOLD TEAM        Date of Admission:  9/20/2023    Assessment & Plan      Rishabh Cabrera is a 63 year old male admitted on 9/20/2023. He has a past medical history significant for HCC s/p Y90 and chemoradiation, HBV- associated cirrhosis c/b ascites, EV s/p banding, TVA s/p right hemicolectomy, DM2, and herpes zoster c/b post herpetic neuralgia. Presented with abdominal pain and imaging concerning for small bowel obstruction.     Probable small bowel obstruction   Hx of TVA s/p right hemicolectomy with chronic preanastomotic dilation  Presented with abdominal pain in left lower abdomen for 3 days leading up to admission with associated nausea. No fevers but does endorse some possible rigors. Similar pain during his admission in 1/2023 which resolved with NGT/bowel regimen. CT abd/pelvis with enlarged loops of small bowel with air-fluid levels concerning for at least partial obstruction. Surgery consulted and planning for conservative management for now.   - Surgery following  - NG to LIS  - NPO  - Hold on further IVF in setting of decompensated cirrhosis (received 1L IVF bolus in ED)  - Zofran PRN  - Tylenol PRN, IV dilaudid for severe pain - try to limit use   - Trend lytes and replace PRN   - Blood cultures x2     Decompensated cirrhosis secondary to HBV  HCC s/p Y90 and chemoembolization   New area of hypotattenuation  Has new area of hypoattentuation superior to prior treated lesion which is indeterminate. Noted to have moderate ascites. Hx of esophageal varices s/p banding. No prior hx of SBP and presentation/exam more consistent with bowel obstruction but will ensure no infection given his report of possible rigors.   - GI consulted for AM   - CAPS consult for paracentesis to r/o SBP   - Continue PTA lasix 40 mg daily, spironolactone 100 mg daily, carveilol 6.25 mg BID  - Continue PTA tenofovir   - Will need  Medical Necessity Information: It is in your best interest to select a reason for this procedure from the list below. All of these items fulfill various CMS LCD requirements except the new and changing color options. nonemergent MRI liver in the future     DM2  Prior A1c 6.8 in 1/2023. No recent available. Not on PTA medications - note hyperglycemia on labs in setting partial SBO.   - Pending repeat A1c  - Low intensity sliding scale insuin, BG checks q4h  - Hypoglycemia protocol     Hypophosphatemia  - RN replacement protocol     Thrombocytopenia  Plts 69 on admission. Baseline appears 60-80. Likely secondary to underlying liver disease.  - Trend while inpatient    Chronic microcytic anemia  Hgb 9.5 on admission. Baseline 7-8. In setting of underlying liver disease.  - Trend while inpatient     Multiple pulmonary nodules  Bilateral loculated pleural effusions   Has been following with Pulmonology for lung nodule monitoring - classified as intermediate risk of cancer with plan for 6 month follow-up. Fluid collection reported on CT as similar to prior.   - Follow-up with Pulmonology for interval monitoring     Hx of herpes zoster c/b post herpetic neuralgia  - Continue gabapentin 300 mg TID        Diet: NPO for Medical/Clinical Reasons Except for: Meds  DVT Prophylaxis: Pneumatic Compression Devices  Davalos Catheter: Not present  Lines: None     Cardiac Monitoring: None  Code Status: Full Code    Clinically Significant Risk Factors Present on Admission              # Hypoalbuminemia: Lowest albumin = 2.9 g/dL at 9/20/2023 12:57 PM, will monitor as appropriate     # Thrombocytopenia: Lowest platelets = 69 in last 2 days, will monitor for bleeding       # DMII: A1C = 6.9 % (Ref range: <5.7 %) within past 6 months               Disposition Plan      Expected Discharge Date: 09/22/2023                The patient's care was discussed with the Attending Physician, Dr. Blanc, Bedside Nurse, and Patient.    Helena Trevino PA-C  Hospitalist Service, [facility]  Securely message with [employer] (more info)  Text page via [facility] Paging/Directory   See signed in provider for up to  "date coverage information    ______________________________________________________________________    Chief Complaint   Abdominal pain     History is obtained from the patient    History of Present Illness   Rishabh Cabrera is a 63 year old male who presented to the ED with 3 days of abdominal pain starting under his chest down to his belly button. Now remains on his left side and radiates across to the right. Endorses associated nausea but denies vomiting. Has had similar pain in the past when he was admitted earlier this year which improved with treatment with bowel regimen and NGT. Pain better with tylenol #3 but does not resolve. Trying to avoid anything that would make it worse. Not eating much - only eating rice porridge. Had small bowel movement earlier today. Not really \"gassy\" Denies any melena or hematochezia. Does report some chills and shaking early. No known fevers. He denies any history of SBP. Has had paracentesis in the past - last was during his prior admission.     He is also worried about some urinary hesitancy and incontinence at times. Would like us to monitor for this while he is here.     Past Medical History    Past Medical History:   Diagnosis Date    Cancer (H)     Chronic hepatitis B (H)     Diabetes mellitus (H)        Past Surgical History   Past Surgical History:   Procedure Laterality Date    COLON SURGERY Right 08/05/2015    Laparoscopic hand assisted right Hemicolectomy at [facility]    ESOPHAGOSCOPY, GASTROSCOPY, DUODENOSCOPY (EGD), COMBINED N/A 05/29/2019    Procedure: ESOPHAGOGASTRODUODENOSCOPY (EGD);  Surgeon: Leventhal, Thomas Michael, MD;  Location:  GI    ESOPHAGOSCOPY, GASTROSCOPY, DUODENOSCOPY (EGD), COMBINED N/A 2/3/2023    Procedure: ESOPHAGOGASTRODUODENOSCOPY, WITH BIOPSY;  Surgeon: Elan Morris MD;  Location:  GI    IR CHEMO EMBOLIZATION  12/02/2021    IR PARACENTESIS  01/24/2023    IR SIRT (SELECTIVE INTERNAL RADIO THERAPY)  09/08/2021    IR VISCERAL " Medical Necessity Clause: This procedure was medically necessary because the lesion that was treated was: ANGIOGRAM  09/08/2021    IR VISCERAL EMBOLIZATION  09/09/2021       Prior to Admission Medications   Prior to Admission Medications   Prescriptions Last Dose Informant Patient Reported? Taking?   Capsaicin-Menthol (SALONPAS GEL EX)   Yes No   Emollient (CERAVE MOISTURIZING) CREA   No No   Sig: Externally apply 1 Dose topically 2 times daily as needed (for eczema)   bisacodyl (DULCOLAX) 5 MG EC tablet   No No   Sig: Two days prior to exam take two (2) tablets at 4pm. One day prior to exam take two (2) tablets at 4pm   capsaicin (ZOSTRIX) 0.025 % external cream   Yes No   Sig: Apply topically 3 times daily   carvedilol (COREG) 6.25 MG tablet   No No   Sig: Take 1 tablet (6.25 mg) by mouth 2 times daily (with meals)   diclofenac (VOLTAREN) 1 % topical gel   Yes No   Sig: Apply topically as needed for moderate pain   diphenhydrAMINE (BENADRYL) 2 % external cream   No No   Sig: Apply topically 3 times daily as needed for itching   furosemide (LASIX) 40 MG tablet   No No   Sig: Take 1 tablet (40 mg) by mouth daily   gabapentin (NEURONTIN) 100 MG capsule   No No   Sig: Take 3 capsules (300 mg) by mouth 3 times daily   loratadine (CLARITIN) 10 MG tablet   Yes No   Sig: Take 10 mg by mouth daily   polyethylene glycol (GOLYTELY) 236 g suspension   No No   Sig: Take as directed. Two days before your exam fill the first container with water. Cover and shake until mixed well. At 5:00pm drink one 8oz glass every 10-15 minutes until half (1/2) of the first container is empty. Store the remainder in the refrigerator. One day before your exam at 5:00pm drink the second half of the first container until it is gone. Before you go to bed mix the second container with water and put in refrigerator. Six hours before your check in time drink one 8oz glass every 10-15 minutes until half of container is empty. Discard the remainder of solution.   polyethylene glycol (MIRALAX) 17 GM/Dose powder   No No   Sig: Take 17 g by mouth daily  Lab: 540   sennosides (SENOKOT) 8.6 MG tablet   No No   Sig: Take 1 tablet by mouth daily   sodium chloride-sodium bicarb (CLASSIC NETI POT SINUS WASH) 2300-700 MG KIT   No No   Sig: Use distilled water and sodium-bicarb packet in Neti pot 1-2 times daily for congestion   spironolactone (ALDACTONE) 100 MG tablet   No No   Sig: Take 1 tablet (100 mg) by mouth daily   tenofovir (VIREAD) 300 MG tablet   No No   Sig: Take 1 tablet (300 mg) by mouth daily   triamcinolone (KENALOG) 0.1 % external cream   Yes No   Sig: Apply topically as needed for irritation      Facility-Administered Medications: None        Review of Systems    The 10 point Review of Systems is negative other than noted in the HPI or here.      Physical Exam   Vital Signs: Temp: 98  F (36.7  C) Temp src: Oral BP: 138/73 Pulse: 86   Resp: 16 SpO2: 100 % O2 Device: None (Room air)    Weight: 125 lbs 0 oz    General Appearance: Awake. Alert and oriented x4. Laying in bed. No acute distress.   Eyes: Normal lids. Anicteric sclera. Pupils equal and round.   HEENT: Normocephalic. Atraumatic. Poor dentition.   Respiratory: Normal work of breathing on room air. Lungs CTAB. No wheezes.   Cardiovascular: RRR. S1, S2. No murmurs. No lower extremity edema.   GI: Abdomen mildly distended. Hypoactive bowel sounds. Tenderness to palpation primarily in LLQ into mid lower quadrant. No guarding or rebounding.   Lymph/Hematologic: No abnormal or excessive bruising on exposed skin.   Skin: Warm, dry. No rashes on exposed skin.   Musculoskeletal: Moves all extremities equally.   Neurologic: No focal deficits.     Medical Decision Making       80 MINUTES SPENT BY ME on the date of service doing chart review, history, exam, documentation & further activities per the note.      Data     I have personally reviewed the following data over the past 24 hrs:    5.5  \   9.5 (L)   / 69 (L)     135 (L) 103 5.4 (L) /  117 (H)   4.4 22 0.92 \     ALT: 17 AST: 52 (H) AP: 141 (H) TBILI: 2.2 (H)  Lab Facility: [id number]   ALB: 2.9 (L) TOT PROTEIN: 8.1 LIPASE: 29     TSH: N/A T4: N/A A1C: 6.9 (H)     Imaging results reviewed over the past 24 hrs:   Recent Results (from the past 24 hour(s))   CT Abdomen Pelvis w Contrast    Narrative    EXAMINATION: CT ABDOMEN PELVIS W CONTRAST, 9/20/2023 5:45 PM    INDICATION: Hx HCC Hep B and constipation, with recurrent RUQ pain.    COMPARISON STUDY: 1/20/2023    TECHNIQUE: CT scan of the abdomen and pelvis was performed on  multidetector CT scanner using volumetric acquisition technique and  images were reconstructed in multiple planes with variable thickness  and reviewed on dedicated workstations.     CONTRAST: iopamidol (ISOVUE-370) solution 77 mL injected IV without  oral contrast    CT scan radiation dose is optimized to minimum requisite dose using  automated dose modulation techniques.    FINDINGS:    Lower thorax: Partially visualized area of loculated fluid with  surrounding pleural calcification in the right hemithorax, similar to  prior. Otherwise, unremarkable.    Liver: Partially calcified mass on the hepatic segment 7 with similar  appearance from prior, consistent with a treated tumor. Assessment of  viable tumor tissue cannot be evaluated in this single phase-contrast  study, although stable appearance is reassuring. There is however a  new area of somewhat wedge-shaped hypoattenuation superior to this  lesion which is indeterminate, although possibly representing  transient differences in attenuation (series 3 image 16). Multiple  tiny hypoattenuating foci of the liver are too small to be fully  characterized and similar to prior. Cirrhotic appearance of liver. No  intrahepatic biliary ductal dilation.    Biliary System: Normal gallbladder. No extrahepatic biliary ductal  dilation.    Pancreas: No mass or pancreatic ductal dilation.    Adrenal glands: No mass or nodules    Spleen: Enlarged.    Kidneys: No suspicious mass, obstructing calculus or  Detail Level: Detailed Was A Bandage Applied: Yes hydronephrosis.    Gastrointestinal tract: Postsurgical changes of right hemicolectomy  with mild perianastomotic dilation. Prominent loops of small bowel.  This are slightly decreased from prior 1/30/2023. There are multiple  possible transition point such as along the left mid abdomen (series 6  image 43).    Mesentery/peritoneum/retroperitoneum: Moderate ascites. Somewhat  loculated pocket of fluid in the pouch of Alvin, decreased in size  from prior and without substantial enhancement or thickened walls to  suggest abscess. No free air.    Lymph nodes: No significant lymphadenopathy.    Vasculature: Patent major abdominal vasculature.    Pelvis: Urinary bladder is normal.  Prostate is enlarged.    Osseous structures: No aggressive or acute osseous lesion.  Stable  appearance of lucent focus in the right femoral head neck. Likely  enchondroma of the iliac bone, stable from prior.    Soft tissues: Within normal limits.      Impression    IMPRESSION:   1. Mildly enlarged loops of small bowel with air-fluid levels,  appearance is nonspecific and at least partial obstruction is  possible.  2. Postsurgical changes of right hemicolectomy with chronic  preanastomotic dilation.  3. Cirrhotic liver with a treated tumor in the hepatic segment 7.   4. There is a new area of hypoattenuation superior to the treated  lesion which is indeterminate. Consider nonemergent liver MRI for  further evaluation.    I have personally reviewed the examination and initial interpretation  and I agree with the findings.    DELVIS LOWE MD         SYSTEM ID:  [id number]   XR Abdomen Port 1 View    Narrative    EXAM: XR ABDOMEN PORT 1 VIEW  LOCATION: [facility]  DATE: 9/20/2023    INDICATION: Confirm NG placement.  COMPARISON: Abdominal x-ray on 01/30/2023.      Impression    IMPRESSION: AP view of the abdomen was obtained. Enteric tube tip projects over stomach and sidehole just distal to  Size Of Lesion In Cm (Required): 0.4 the GE junction, recommend slight advancement. Contrast within the right renal collecting system which appears dilated. Right basilar   pleural calcification. Basilar pulmonary opacities, likely atelectasis.        X Size Of Lesion In Cm (Optional): 0 Depth Of Shave: dermis Biopsy Method: Dermablade Anesthesia Type: 1% lidocaine with epinephrine Hemostasis: Drysol Wound Care: Petrolatum Render Path Notes In Note?: No Consent was obtained from the patient. The risks and benefits to therapy were discussed in detail. Specifically, the risks of infection, scarring, bleeding, prolonged wound healing, incomplete removal, allergy to anesthesia, nerve injury and recurrence were addressed. Prior to the procedure, the treatment site was clearly identified and confirmed by the patient. All components of Universal Protocol/PAUSE Rule completed. Post-Care Instructions: I reviewed with the patient in detail post-care instructions. Patient is to keep the biopsy site dry overnight, and then apply bacitracin twice daily until healed. Patient may apply hydrogen peroxide soaks to remove any crusting. Notification Instructions: Patient will be notified of pathology results. However, patient instructed to call the office if not contacted within 2 weeks. Billing Type: Third-Party Bill Lab: [id number] Lab Facility: [id number] Billing Type: Third-Party Bill Size Of Lesion In Cm (Required): 0.3

## 2023-09-21 NOTE — PROGRESS NOTES
[facility]    Medicine Progress Note - Hospitalist Service, GOLD TEAM 10    Date of Admission:  9/20/2023    Assessment & Plan      Rishabh Cabrera is a 63 year old male admitted on 9/20/2023. He has a past medical history significant for HCC s/p Y90 and chemoradiation, HBV- associated cirrhosis c/b ascites, EV s/p banding, TVA s/p right hemicolectomy, DM2, and herpes zoster c/b post herpetic neuralgia. Presented with abdominal pain and imaging concerning for small bowel obstruction.     Small bowel obstruction   Hx of TVA s/p right hemicolectomy with chronic preanastomotic dilation  - Surgery following  - NG clamped for gastrograffin, will go back to LIS when study done  - NPO  - Bolus fluid as needed  - Zofran PRN  - Tylenol PRN, IV dilaudid for severe pain - try to limit use   - Trend lytes and replace PRN   - Blood cultures x2     Decompensated cirrhosis secondary to HBV  HCC s/p Y90 and chemoembolization   New area of hypotattenuation  Has new area of hypoattentuation superior to prior treated lesion which is indeterminate. Noted to have moderate ascites. Hx of esophageal varices s/p banding. No prior hx of SBP and presentation/exam more consistent with bowel obstruction but will ensure no infection given his report of possible rigors.   - Hepatology consult, appreciate recs  - CAPS consult for paracentesis to r/o SBP    - no pocket identified  - Continue PTA lasix 40 mg daily, spironolactone 100 mg daily, carveilol 6.25 mg BID  - Continue PTA tenofovir   - Will need nonemergent MRI liver in the future     DM2, diet controlled  Prior A1c 6.8 in 1/2023. No recent available. Not on PTA medications - note hyperglycemia on labs in setting partial SBO.   - Pending repeat A1c  - Low intensity sliding scale insuin, BG checks q4h  - Hypoglycemia protocol     Hypophosphatemia  - RN replacement protocol     Thrombocytopenia due to liver disease  Coagulation defect due to liver  disease  - Trend while inpatient    Chronic microcytic anemia  Hgb 9.5 on admission. Baseline 7-8. In setting of underlying liver disease.  - Trend while inpatient     Multiple pulmonary nodules  Bilateral loculated pleural effusions   Has been following with Pulmonology for lung nodule monitoring - classified as intermediate risk of cancer with plan for 6 month follow-up. Fluid collection reported on CT as similar to prior.   - Follow-up with Pulmonology for interval monitoring     Hx of herpes zoster c/b post herpetic neuralgia  - Continue gabapentin 300 mg TID        Diet: NPO for Medical/Clinical Reasons Except for: Meds    DVT Prophylaxis: Pneumatic Compression Devices  Davalos Catheter: Not present  Lines: None     Cardiac Monitoring: None  Code Status: Full Code      Clinically Significant Risk Factors Present on Admission              # Hypoalbuminemia: Lowest albumin = 2.2 g/dL at 9/21/2023  5:47 AM, will monitor as appropriate  # Coagulation Defect: INR = 2.09 (Ref range: 0.85 - 1.15) and/or PTT = N/A, will monitor for bleeding  # Thrombocytopenia: Lowest platelets = 57 in last 2 days, will monitor for bleeding       # DMII: A1C = 6.9 % (Ref range: <5.7 %) within past 6 months               Disposition Plan     Expected Discharge Date: 09/22/2023                  Jack Melendez MD  Hospitalist Service, GOLD TEAM 10  [facility]  Securely message with [employer] (more info)  Text page via [facility] Paging/Directory   See signed in provider for up to date coverage information  ______________________________________________________________________    Interval History   Overnight admitted for SBO    Physical Exam   Vital Signs: Temp: 97.6  F (36.4  C)   BP: 126/60 Pulse: 91     SpO2: 99 % O2 Device: None (Room air)    Weight: 125 lbs 0 oz    Gen: NAD, sitting comfortably in bed  Eyes: EOMI, conjuctiva clear  CV: RRR, no murmurs, 2+ radial pulses  RESP: CTA bilaterally, no  w/r/c  Abd: soft, nontender, slight distention  Ext: no edema bilaterally    Medical Decision Making       45 MINUTES SPENT BY ME on the date of service doing chart review, history, exam, documentation & further activities per the note.      Data     I have personally reviewed the following data over the past 24 hrs:    3.9 (L)  \   7.7 (L)   / 57 (L)     136 104 8.2 /  122 (H)   4.1 24 0.83 \     ALT: 14 AST: 38 AP: 119 TBILI: 2.2 (H)   ALB: 2.2 (L) TOT PROTEIN: 6.5 LIPASE: N/A     TSH: N/A T4: N/A A1C: N/A     INR:  2.09 (H) PTT:  N/A   D-dimer:  N/A Fibrinogen:  N/A       Imaging results reviewed over the past 24 hrs:   Recent Results (from the past 24 hour(s))   CT Abdomen Pelvis w Contrast    Narrative    EXAMINATION: CT ABDOMEN PELVIS W CONTRAST, 9/20/2023 5:45 PM    INDICATION: Hx HCC Hep B and constipation, with recurrent RUQ pain.    COMPARISON STUDY: 1/20/2023    TECHNIQUE: CT scan of the abdomen and pelvis was performed on  multidetector CT scanner using volumetric acquisition technique and  images were reconstructed in multiple planes with variable thickness  and reviewed on dedicated workstations.     CONTRAST: iopamidol (ISOVUE-370) solution 77 mL injected IV without  oral contrast    CT scan radiation dose is optimized to minimum requisite dose using  automated dose modulation techniques.    FINDINGS:    Lower thorax: Partially visualized area of loculated fluid with  surrounding pleural calcification in the right hemithorax, similar to  prior. Otherwise, unremarkable.    Liver: Partially calcified mass on the hepatic segment 7 with similar  appearance from prior, consistent with a treated tumor. Assessment of  viable tumor tissue cannot be evaluated in this single phase-contrast  study, although stable appearance is reassuring. There is however a  new area of somewhat wedge-shaped hypoattenuation superior to this  lesion which is indeterminate, although possibly representing  transient differences in  attenuation (series 3 image 16). Multiple  tiny hypoattenuating foci of the liver are too small to be fully  characterized and similar to prior. Cirrhotic appearance of liver. No  intrahepatic biliary ductal dilation.    Biliary System: Normal gallbladder. No extrahepatic biliary ductal  dilation.    Pancreas: No mass or pancreatic ductal dilation.    Adrenal glands: No mass or nodules    Spleen: Enlarged.    Kidneys: No suspicious mass, obstructing calculus or hydronephrosis.    Gastrointestinal tract: Postsurgical changes of right hemicolectomy  with mild perianastomotic dilation. Prominent loops of small bowel.  This are slightly decreased from prior 1/30/2023. There are multiple  possible transition point such as along the left mid abdomen (series 6  image 43).    Mesentery/peritoneum/retroperitoneum: Moderate ascites. Somewhat  loculated pocket of fluid in the pouch of Alvin, decreased in size  from prior and without substantial enhancement or thickened walls to  suggest abscess. No free air.    Lymph nodes: No significant lymphadenopathy.    Vasculature: Patent major abdominal vasculature.    Pelvis: Urinary bladder is normal.  Prostate is enlarged.    Osseous structures: No aggressive or acute osseous lesion.  Stable  appearance of lucent focus in the right femoral head neck. Likely  enchondroma of the iliac bone, stable from prior.    Soft tissues: Within normal limits.      Impression    IMPRESSION:   1. Mildly enlarged loops of small bowel with air-fluid levels,  appearance is nonspecific and at least partial obstruction is  possible.  2. Postsurgical changes of right hemicolectomy with chronic  preanastomotic dilation.  3. Cirrhotic liver with a treated tumor in the hepatic segment 7.   4. There is a new area of hypoattenuation superior to the treated  lesion which is indeterminate. Consider nonemergent liver MRI for  further evaluation.    I have personally reviewed the examination and initial  interpretation  and I agree with the findings.    DELVIS LOWE MD         SYSTEM ID:  [id number]   XR Abdomen Port 1 View    Narrative    EXAM: XR ABDOMEN PORT 1 VIEW  LOCATION: [facility]  DATE: 9/20/2023    INDICATION: Confirm NG placement.  COMPARISON: Abdominal x-ray on 01/30/2023.      Impression    IMPRESSION: AP view of the abdomen was obtained. Enteric tube tip projects over stomach and sidehole just distal to the GE junction, recommend slight advancement. Contrast within the right renal collecting system which appears dilated. Right basilar   pleural calcification. Basilar pulmonary opacities, likely atelectasis.   XR Abdomen Port 1 View    Narrative    EXAM: XR ABDOMEN PORT 1 VIEW  LOCATION: [facility]  DATE: 9/20/2023    INDICATION: advanced NG  COMPARISON: 09/20/2023 at 2214 hours      Impression    IMPRESSION: Nasogastric tube terminates over the stomach. Gaseous distention of small and large bowel loops is unchanged. Retained contrast present within a dilated right renal collecting system again noted. Chronic right-sided pleural calcification   again noted.   XR Abdomen Port 1 View    Narrative    EXAM: XR ABDOMEN PORT 1 VIEW  LOCATION: [facility]  DATE: 9/21/2023    INDICATION: re advanced NG after patient pulled out  COMPARISON: 09/20/2023.      Impression    IMPRESSION: Nasogastric tube terminates in the mid body of the stomach. No other significant change.   POC US GUIDE FOR PARACENTESIS    Impression    US Indication: abdominal pain and hepatitis B related cirrhosis with HCC    Limited abdominal ultrasound was performed to evaluate for ascites and need for paracentesis.     Views/Acquisition: hepatorenal/RUQ, right lower quadrant, parasplenic/LUQ, left lower quadrant, and suprapubic    Findings/Interpretation: There is a very small sliver of simple appear  ascites in the far left lower quadrant  that had dynamic bowl moving through the fluid, was less than 1.5 cm in size, and not safely accessible via paracentesis, even with live guidance. Dilated appearance of bowl segments noted on remainder of exam. There was also a perihepatic hypoechoid fluid pocket that was correlated with CT scan of uncertain etiology. Not typical in appearance for ascites.     Timur Hammer MD

## 2023-09-21 NOTE — CONSULTS
[facility]  CAPS NOTE  Date of Admission:  9/20/2023  Consult Requested by: Medicine  Reason for Consult: diagnostic evaluation of ascites    POC US GUIDE FOR PARACENTESIS     Impression  US Indication: abdominal pain and hepatitis B related cirrhosis with HCC    Limited abdominal ultrasound was performed to evaluate for ascites and need for paracentesis.    Views/Acquisition: hepatorenal/RUQ, right lower quadrant, parasplenic/LUQ, left lower quadrant, and suprapubic    Findings/Interpretation: There is a very small sliver of simple appear ascites in the far left lower quadrant  that had dynamic bowl moving through the fluid, was less than 1.5 cm in size, and not safely accessible via paracentesis, even with live guidance. Dilated appearance of bowl segments noted on remainder of exam. There was also a perihepatic hypoechoid fluid pocket that was correlated with CT scan of uncertain etiology. Not typical in appearance for ascites.    Timur Hammer MD    Assessment and Plan:  Requested procedure was not performed.  Insufficient fluid for safe bedside paracentesis. Please re-consult the CAPs team if re-assessment of the fluid pocket is needed. As the bowl obstruction improves, the pocket may change in size and appearance.     Timur Hammer MD  [facility]  Securely message with [employer] (more info)  Text page via [employer] Paging/Directory   See signed in provider for up to date coverage information

## 2023-09-21 NOTE — MEDICATION SCRIBE - ADMISSION MEDICATION HISTORY
Medication Scribe Admission Medication History    Admission medication history is complete. The information provided in this note is only as accurate as the sources available at the time of the update.    Medication reconciliation/reorder completed by provider prior to medication history? No    Information Source(s): Patient via in-person    Pertinent Information: Spoke with patient in person and completed medication hx. Patient states that he is currently not taking Dulcolax 5MG EC Tab, Capsaicin Menthol,  Golytely 236g Suspension, Miralax, Senna (SENOKOT) and Sodium Chloride (Sinus wash).     Changes made to PTA medication list:  Added: None  Deleted:  Dulcolax 5MG EC Tab            Capsaicin Menthol            Golytely 236g Suspension            Miralax 17g             Senna (SENOKOT)             Sodium Chloride (Sinus wash)  Changed: None    Medication Affordability:  Not including over the counter (OTC) medications, was there a time in the past 3 months when you did not take your medications as prescribed because of cost?: No    Allergies reviewed with patient and updates made in EHR: no    Medication History Completed By: Magali Napier 9/21/2023 9:37 AM    Prior to Admission medications    Medication Sig Last Dose Taking? Auth Provider Long Term End Date   capsaicin (ZOSTRIX) 0.025 % external cream Apply topically 3 times daily Unknown Yes Reported, Patient     carvedilol (COREG) 6.25 MG tablet Take 1 tablet (6.25 mg) by mouth 2 times daily (with meals) Past Week Yes Leventhal, Thomas Michael, MD Yes    diclofenac (VOLTAREN) 1 % topical gel Apply topically as needed for moderate pain Unknown Yes Reported, Patient     diphenhydrAMINE (BENADRYL) 2 % external cream Apply topically 3 times daily as needed for itching Unknown Yes Dorian Levy PA-C     Emollient (CERAVE MOISTURIZING) CREA Externally apply 1 Dose topically 2 times daily as needed (for eczema) Unknown Yes Dorian Levy PA-C      furosemide (LASIX) 40 MG tablet Take 1 tablet (40 mg) by mouth daily Past Week Yes Trenton Newton Ting Yes    gabapentin (NEURONTIN) 100 MG capsule Take 3 capsules (300 mg) by mouth 3 times daily Past Week Yes Marjan Zapata MD Yes    loratadine (CLARITIN) 10 MG tablet Take 10 mg by mouth daily Past Week Yes Unknown, Entered By History     spironolactone (ALDACTONE) 100 MG tablet Take 1 tablet (100 mg) by mouth daily Past Week Yes Trenton Newton Yes    tenofovir (VIREAD) 300 MG tablet Take 1 tablet (300 mg) by mouth daily Past Week Yes Leventhal, Thomas Michael, MD Yes    triamcinolone (KENALOG) 0.1 % external cream Apply topically as needed for irritation Past Week Yes Reported, Patient     clonazePAM (KLONOPIN) 0.5 MG tablet clonazepam 0.5 mg tablet   Reported, Patient Yes 5/13/22   enalapril (VASOTEC) 10 MG tablet Take 1 tablet (10 mg) by mouth daily  Patient not taking: Reported on 6/27/2022   Paradise Peters MD Yes 9/29/22   insulin glargine (LANTUS SOLOSTAR) 100 UNIT/ML pen Inject 5 Units Subcutaneous At Bedtime   Unknown, Entered By History Yes 9/29/22   meloxicam (MOBIC) 15 MG tablet every 24 hours   Reported, Patient Yes 5/13/22   metFORMIN (GLUCOPHAGE) 1000 MG tablet Take 1 tablet by mouth 2 times daily (with meals)   Reported, Patient Yes 5/13/22   metoprolol succinate ER (TOPROL XL) 50 MG 24 hr tablet Take 1 tablet (50 mg) by mouth daily  Patient not taking: No sig reported   Paradise Peters MD Yes 9/29/22   nortriptyline (PAMELOR) 25 MG capsule nortriptyline 25 mg capsule   Reported, Patient Yes 5/13/22

## 2023-09-21 NOTE — CONSULTS
Emergency General Surgery History and Physical  September 20, 2023    Assessment and Plan:  Rishabh Cabrera is a 63 year old male w/ PMH including HTN, DM2, Hep B, HCC c/b cirrhosis (Deepak C) and moderate ascites, as well as previous right hemicolectomy for a large polyp, who is presenting with acute onset abdominal pain for past 4 days w/ CT A/P concerning for partial SBO. Abdomen distended and TTP.    He has been successfully managed with conservative therapy in the past and is considered a high surgical risk candidate given past surgery and cirrhosis (Child's C).    - Recommend conservative management of SBO:      - NPO     - NG tube to LIS for decompression. Likely gastrograffin in the morning     - IVFs     - Replete electrolytes (goal K>4, Mg>2, Phos>3),      - Encourage ambulation     - Minimize narcotics and anti-cholinergics    Discussed with chief resident and staff, Dr. Baez, who agrees with the above plan.    Rachelle Herron MD, PharmD  General Surgery, PGY2  Pager [phone]    CC: abdominal pain    HPI:  Rishabh Cabrera is a 62 year old male w/ PMH including HTN, DM2, HCC 2/2 Hep B c/b cirrhosis (Deepak C) and significant ascites, as well as previous right hemicolectomy for large polyp who is presenting with diffuse abdominal pain.  Patient had similar presentation in January of this year. Having significant nausea, no with 2 episodes of non-bloody emesis. No fever, CP, SOB. Last BM this morning. Patient unsure if he's been passing gas.     Medical History:  Past Medical History:   Diagnosis Date    Cancer (H)     Chronic hepatitis B (H)     Diabetes mellitus (H)      Surgical History:  Past Surgical History:   Procedure Laterality Date    COLON SURGERY Right 08/05/2015    Laparoscopic hand assisted right Hemicolectomy at [facility]    ESOPHAGOSCOPY, GASTROSCOPY, DUODENOSCOPY (EGD), COMBINED N/A 05/29/2019    Procedure: ESOPHAGOGASTRODUODENOSCOPY (EGD);  Surgeon: Leventhal, Thomas Michael, MD;  Location:  GI     ESOPHAGOSCOPY, GASTROSCOPY, DUODENOSCOPY (EGD), COMBINED N/A 2/3/2023    Procedure: ESOPHAGOGASTRODUODENOSCOPY, WITH BIOPSY;  Surgeon: Elan Morris MD;  Location: [facility]    IR CHEMO EMBOLIZATION  12/02/2021    IR PARACENTESIS  01/24/2023    IR SIRT (SELECTIVE INTERNAL RADIO THERAPY)  09/08/2021    IR VISCERAL ANGIOGRAM  09/08/2021    IR VISCERAL EMBOLIZATION  09/09/2021     Social History:  Social History     Tobacco Use    Smoking status: Never    Smokeless tobacco: Never   Substance Use Topics    Alcohol use: No    Drug use: Yes     Types: Marijuana     Family History:  History reviewed. No pertinent family history.    Medications:  Current Facility-Administered Medications   Medication    morphine (PF) injection 4 mg    ondansetron (ZOFRAN) injection 4 mg     Current Outpatient Medications   Medication    bisacodyl (DULCOLAX) 5 MG EC tablet    capsaicin (ZOSTRIX) 0.025 % external cream    Capsaicin-Menthol (SALONPAS GEL EX)    carvedilol (COREG) 6.25 MG tablet    diclofenac (VOLTAREN) 1 % topical gel    diphenhydrAMINE (BENADRYL) 2 % external cream    Emollient (CERAVE MOISTURIZING) CREA    furosemide (LASIX) 40 MG tablet    gabapentin (NEURONTIN) 100 MG capsule    loratadine (CLARITIN) 10 MG tablet    polyethylene glycol (GOLYTELY) 236 g suspension    polyethylene glycol (MIRALAX) 17 GM/Dose powder    sennosides (SENOKOT) 8.6 MG tablet    sodium chloride-sodium bicarb (CLASSIC NETI POT SINUS WASH) 2300-700 MG KIT    spironolactone (ALDACTONE) 100 MG tablet    tenofovir (VIREAD) 300 MG tablet    triamcinolone (KENALOG) 0.1 % external cream     Facility-Administered Medications Ordered in Other Encounters   Medication    gadobutrol (GADAVIST) injection 7.5 mL       Allergies:  Allergies   Allergen Reactions    Crabs [Crustaceans]     Pork (Porcine) Protein Itching and Unknown    Seafood Hives and Unknown    Shellfish Allergy Unknown       Exam:  B/P: 138/73, T: 98, P: 86, R: 16    Gen: Non-toxic  appearing, no acute distress  HEENT: Atraumatic, normocephalic. Poor dentition   Neuro: Alert and oriented. No gross neurologic deficits   Pulm: nonlabored breathing, comfortable on room air, no cough  CV: RRR by radial pulse, noncyanotic   ABD: Soft, distended, mildly TTP, (+) guarding, no rebound. No peritonitis  MSK:  Normal active range of motion, no edema  Skin: Warm, dry, no rashes or abrasions on exposed skin  Psych: Cooperative, appropriate mood and affect    Laboratory Results:  Lab Results   Component Value Date    WBC 5.5 09/20/2023    WBC 3.5 05/10/2021     Lab Results   Component Value Date    HGB 9.5 09/20/2023    HGB 13.0 05/10/2021     Lab Results   Component Value Date    HCT 32.2 09/20/2023    HCT 39.1 05/10/2021     Lab Results   Component Value Date    PLT 69 09/20/2023    PLT 52 05/10/2021       Last Basic Metabolic Panel:  Lab Results   Component Value Date     09/20/2023     05/10/2021      Lab Results   Component Value Date    POTASSIUM 4.4 09/20/2023    POTASSIUM 3.8 06/01/2022    POTASSIUM 4.7 05/10/2021     Lab Results   Component Value Date    CHLORIDE 103 09/20/2023    CHLORIDE 112 06/01/2022    CHLORIDE 103 05/10/2021     Lab Results   Component Value Date    MANDY 8.1 09/20/2023    MANDY 8.0 05/10/2021     Lab Results   Component Value Date    CO2 22 09/20/2023    CO2 26 06/01/2022    CO2 26 05/10/2021     Lab Results   Component Value Date    BUN 5.4 09/20/2023    BUN 8 06/01/2022    BUN 12 05/10/2021     Lab Results   Component Value Date    CR 0.92 09/20/2023    CR 0.85 05/10/2021     Lab Results   Component Value Date     09/20/2023     02/05/2023     06/01/2022     05/10/2021       Imaging:  CT abdomen pelvis  IMPRESSION:   1. Mildly enlarged loops of small bowel with air-fluid levels, appearance is nonspecific and at least partial obstruction is  possible.  2. Postsurgical changes of right hemicolectomy with chronic pre-anastomotic  dilation.  3. Cirrhotic liver with a treated tumor in the hepatic segment 7.   4. There is a new area of hypoattenuation superior to the treated lesion which is indeterminate. Consider nonemergent liver MRI for further evaluation.

## 2023-09-22 NOTE — PROGRESS NOTES
Surgery Progress Note  9/21/2023     Subjective:  NAEON Pain is controlled. Mild nausea. Denies N/VCP/SOB    Objective:  Temp:  [96.9  F (36.1  C)] 96.9  F (36.1  C)  Pulse:  [62-96] 62  Resp:  [16] 16  BP: (117-142)/(60-79) 133/73  SpO2:  [93 %-100 %] 100 %  I/O last 3 completed shifts:  In: -   Out: 750 [Emesis/NG output:750]    Gen: Awake, alert, NAD  Resp: NLB on RA  Abd: Soft, non distended, non tender  Ext: move spontaneously    BMP  Recent Labs   Lab 09/22/23  0538 09/22/23  0142 09/21/23  2230 09/21/23  1024 09/21/23  0547 09/20/23  2137 09/20/23  1928 09/20/23  1257   *  --   --   --  136  --   --  135*   POTASSIUM 3.8  --   --   --  4.1  --   --  4.4   CHLORIDE 106  --   --   --  104  --   --  103   MANDY 7.4*  --   --   --  7.2*  --   --  8.1*   CO2 23  --   --   --  24  --   --  22   BUN 11.3  --   --   --  8.2  --   --  5.4*   CR 0.84  --   --   --  0.83  --   --  0.92   *  125* 186* 186*   < > 139*   < >  --  140*   MAG  --   --   --   --   --   --  1.8  --    PHOS 2.5  --   --   --   --   --  2.4*  --     < > = values in this interval not displayed.       CBC  Recent Labs   Lab 09/22/23  0538 09/21/23  0547 09/20/23  1257   WBC 4.2 3.9* 5.5   RBC 3.93* 3.46* 4.16*   HGB 8.8* 7.7* 9.5*   HCT 29.1* 26.1* 32.2*   MCV 74* 75* 77*   MCH 22.4* 22.3* 22.8*   MCHC 30.2* 29.5* 29.5*   RDW 19.6* 19.1* 19.2*   PLT 65* 57* 69*       INR  Recent Labs   Lab 09/22/23  0538 09/21/23  0547   INR 2.06* 2.09*        AST/ALT & Alk Phos  Recent Labs   Lab 09/22/23  0538 09/21/23  0547 09/20/23  1257   AST 37 38 52*   ALT 12 14 17   ALKPHOS 110 119 141*       Bili  Recent Labs   Lab Test 09/21/23  0547 09/20/23  1928 09/20/23  1257 03/07/23  1152 02/05/23  0616 01/24/23  0425 11/28/22  1121 09/23/22  2314 09/21/22  1249   BILITOTAL 2.2*  --  2.2* 1.0 1.7*   < > 0.9   < > 1.9*   DBIL  --  0.67*  --  0.38*  --   --  0.34*  --  0.64*    < > = values in this interval not displayed.     Lipase/Amlyase  Recent Labs    Lab 09/20/23  1257   LIPASE 29         A/P: Rishabh Cabrera is a 63 year old male w/ PMH including HTN, DM2, Hep B, HCC c/b cirrhosis (Deepak C) and moderate ascites, as well as previous right hemicolectomy for a large polyp, who is presenting with acute onset abdominal pain for 4 days w/ CT A/P concerning for partial SBO. Abdomen distended and TTP. GGC revealed passage of contrast with one bowel movement overnight.    - GS will sign off for now.   - feel free to advance diet as tolerated.   - this is the 3rd SBO this patient reports in the past year, will schedule follow up appointment in clinic to discuss surgical options in the outpatient setting.       Discussed with chief who Discussed with with staff Dr. Baez.    Angel Aranda MD  General Surgery Resident  Pager [phone]

## 2023-09-22 NOTE — PROGRESS NOTES
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      #Decompensated cirrhosis  #Chronic HBV infection  #HCC s/p embolization  #Ascites  #History of variceal bleeding  #History of 2.5 cm RICH spiculated lung nodule    MELD 3.0: 18 at 9/22/2023  5:38 AM  MELD-Na: 18 at 9/22/2023  5:38 AM  Calculated from:  Serum Creatinine: 0.84 mg/dL (Using min of 1 mg/dL) at 9/22/2023  5:38 AM  Serum Sodium: 135 mmol/L at 9/22/2023  5:38 AM  Total Bilirubin: 1.4 mg/dL at 9/22/2023  5:38 AM  Serum Albumin: 2.3 g/dL at 9/22/2023  5:38 AM  INR(ratio): 2.06 at 9/22/2023  5:38 AM  Age at listing (hypothetical): 63 years  Sex: Male at 9/22/2023  5:38 AM    Primary Hepatologist: Dr. Leventhal  Etiology: HBV, most recent DNA undetectable 11/2022  Transplant status: Significant challenges in the past with communication, needed medical follow-up, significant burden with cost of medications  Ascites: At home on Lasix 40, Spironolactone 100. No prior SBP. As needed paracentesis.   Hepatic encephalopathy: None  EV: EGD 02/2023 with grade 2 varices s/p banding. Pending repeat EGD. On Carvedilol 6.25 mg BID    Patient with history of HCC s/p Y90 and chemoembolization in 2021. Last seen by Dr. Leventhal on 11/2022, then by Dorian Levy on 02/2023. Pending outpatient follow-up along with repeat EGD and MRI for HCC surveillance.      Now hospitalized again with SBO.      CT with overall stable appearance of the liver.     Also has a history of lung nodule, had biopsy last year but that was complicated by hematoma.     Patient underwent chest CT that reveals stable size of lung nodule at 2.5 cm.  Underwent Gastrografin study that was negative, no obstruction noted.  Patient currently  on regular diet.         Recommendations:     - Advance diet as tolerated.  - Continue Coreg 6.25 mg BID  - Continue home Lasix 40 mg, Spironolactone 100 mg.  - Continue home Tenofovir. Please ensure to send refill prior to discharge for at least 30 days.  - Outpatient EGD for variceal screening to confirm eradication after prior banding.   - Repeat outpatient MRI liver in 6 months.  - Pending HbsAg, HBV DNA.  - Follow-up with Dr. Leventhal 10/09/2023    It has been a pleasure to participate in the care of this patient.  Patient discussed with GI staff, Dr. Rodriguez.  Please do not hesitate to contact the GI service with any questions or concerns.     Jarad Castro MD  Gastroenterology Fellow  Pager [phone]         Subjective/Objective:   - No acute events overnight  Feels well today, tolerated clear liquids well this morning. Denies any symptoms.        Medications:     Current Facility-Administered Medications   Medication    acetaminophen (TYLENOL) tablet 975 mg    carvedilol (COREG) tablet 6.25 mg    glucose gel 15-30 g    Or    dextrose 50 % injection 25-50 mL    Or    glucagon injection 1 mg    furosemide (LASIX) tablet 40 mg    gabapentin (NEURONTIN) capsule 300 mg    HYDROmorphone (PF) (DILAUDID) injection 0.3 mg    insulin aspart (NovoLOG) injection (RAPID ACTING)    naloxone (NARCAN) injection 0.2 mg    Or    naloxone (NARCAN) injection 0.4 mg    Or    naloxone (NARCAN) injection 0.2 mg    Or    naloxone (NARCAN) injection 0.4 mg    ondansetron (ZOFRAN ODT) ODT tab 4 mg    Or    ondansetron (ZOFRAN) injection 4 mg    spironolactone (ALDACTONE) tablet 100 mg    tenofovir (VIREAD) tablet 300 mg     Current Outpatient Medications   Medication Sig    capsaicin (ZOSTRIX) 0.025 % external cream Apply topically 3 times daily    carvedilol (COREG) 6.25 MG tablet Take 1 tablet (6.25 mg) by mouth 2 times daily (with meals)    diclofenac (VOLTAREN) 1 % topical gel Apply topically as needed for moderate pain     "diphenhydrAMINE (BENADRYL) 2 % external cream Apply topically 3 times daily as needed for itching    Emollient (CERAVE MOISTURIZING) CREA Externally apply 1 Dose topically 2 times daily as needed (for eczema)    furosemide (LASIX) 40 MG tablet Take 1 tablet (40 mg) by mouth daily    gabapentin (NEURONTIN) 100 MG capsule Take 3 capsules (300 mg) by mouth 3 times daily    loratadine (CLARITIN) 10 MG tablet Take 10 mg by mouth daily    ondansetron (ZOFRAN ODT) 4 MG ODT tab Take 1 tablet (4 mg) by mouth every 6 hours as needed for nausea or vomiting    spironolactone (ALDACTONE) 100 MG tablet Take 1 tablet (100 mg) by mouth daily    tenofovir (VIREAD) 300 MG tablet Take 1 tablet (300 mg) by mouth daily    triamcinolone (KENALOG) 0.1 % external cream Apply topically as needed for irritation     Facility-Administered Medications Ordered in Other Encounters   Medication    gadobutrol (GADAVIST) injection 7.5 mL            Physical Exam:   VS:  /73 (BP Location: Right arm)   Pulse 62   Temp 96.9  F (36.1  C) (Axillary)   Resp 16   Ht 1.651 m (5' 5\")   Wt 56.7 kg (125 lb)   SpO2 100%   BMI 20.80 kg/m      Wt:   Wt Readings from Last 2 Encounters:   09/20/23 56.7 kg (125 lb)   03/07/23 56.7 kg (125 lb)      Constitutional: cooperative, pleasant, no acute distress  Eyes: Conjunctiva anicteric  HEENT: Normal oropharynx without ulcers or exudate, mucus membranes moist  CV: RRR, no m/r/g  Respiratory: CTAB  Abd:  Nondistended, +bs, no hepatosplenomegaly, nontender, no rebound or guarding   Skin: warm, perfused, no jaundice  Neuro: AAO x 3, No asterixis         Laboratory:   BMP  Recent Labs   Lab 09/22/23  0538 09/22/23  0142 09/21/23  2230 09/21/23  1024 09/21/23  0547 09/20/23  2137 09/20/23  1257   *  --   --   --  136  --  135*   POTASSIUM 3.8  --   --   --  4.1  --  4.4   CHLORIDE 106  --   --   --  104  --  103   MANDY 7.4*  --   --   --  7.2*  --  8.1*   CO2 23  --   --   --  24  --  22   BUN 11.3  --   -- "   --  8.2  --  5.4*   CR 0.84  --   --   --  0.83  --  0.92   *  125* 186* 186*   < > 139*   < > 140*    < > = values in this interval not displayed.     CBC  Recent Labs   Lab 09/22/23 0538 09/21/23  05 09/20/23  1257   WBC 4.2 3.9* 5.5   RBC 3.93* 3.46* 4.16*   HGB 8.8* 7.7* 9.5*   HCT 29.1* 26.1* 32.2*   MCV 74* 75* 77*   MCH 22.4* 22.3* 22.8*   MCHC 30.2* 29.5* 29.5*   RDW 19.6* 19.1* 19.2*   PLT 65* 57* 69*     INR  Recent Labs   Lab 09/22/23 05 09/21/23  0547   INR 2.06* 2.09*     LFTs  Recent Labs   Lab 09/22/23 05 09/21/23  05 09/20/23  1257   ALKPHOS 110 119 141*   AST 37 38 52*   ALT 12 14 17   BILITOTAL 1.4* 2.2* 2.2*   PROTTOTAL 6.6 6.5 8.1   ALBUMIN 2.3* 2.2* 2.9*      PANC  Recent Labs   Lab 09/20/23  1257   LIPASE 29

## 2023-09-22 NOTE — PROGRESS NOTES
Admitted/transferred from: ED  2 RN full   skin assessment completed by Molly Swann, RN and Guido GUNN RN.  Skin assessment finding: issues found dry skin    Interventions/actions: skin interventions lotion applied      Bedside Emergency Equipment Present:  Suction Regulator: Yes  Suction Canister: Yes  Tubing between Regulator and Canister: Yes  O2 Regulator with Tree: Yes  Ambu Bag: Yes

## 2023-09-22 NOTE — PROGRESS NOTES
[facility]    Medicine Progress Note - Hospitalist Service, GOLD TEAM 10    Date of Admission:  9/20/2023    Assessment & Plan   Rishabh Cabrera is a 63 year old male admitted on 9/20/2023. He has a past medical history significant for HCC s/p Y90 and chemoradiation, HBV- associated cirrhosis c/b ascites, EV s/p banding, TVA s/p right hemicolectomy, DM2, and herpes zoster c/b post herpetic neuralgia. Presented with abdominal pain and imaging concerning for small bowel obstruction. Patient able to pass gas, but continues to have pain with food. Doesn't feel ready for discharge today, plan for tomorrow    Small bowel obstruction   Hx of TVA s/p right hemicolectomy with chronic preanastomotic dilation  - Surgery consulted, signed off   - follow up in clinic to discuss surgery when patient has had time to recover  - ADAT  - Bolus fluid as needed  - Zofran PRN  - Tylenol PRN, IV dilaudid for severe pain - try to limit use   - Trend lytes and replace PRN   - Blood cultures x2     Decompensated cirrhosis secondary to HBV  HCC s/p Y90 and chemoembolization   New area of hypotattenuation  Has new area of hypoattentuation superior to prior treated lesion which is indeterminate. Noted to have moderate ascites. Hx of esophageal varices s/p banding. No prior hx of SBP and presentation/exam more consistent with bowel obstruction but will ensure no infection given his report of possible rigors.   - Hepatology consult, appreciate recs  - CAPS consult for paracentesis to r/o SBP    - no pocket identified  - Continue PTA lasix 40 mg daily, spironolactone 100 mg daily, carveilol 6.25 mg BID  - Continue PTA tenofovir   - Outpatient follow up    DM2, diet controlled  Prior A1c 6.8 in 1/2023. No recent available. Not on PTA medications - note hyperglycemia on labs in setting partial SBO.   - Pending repeat A1c  - Low intensity sliding scale insuin, BG checks q4h  - Hypoglycemia protocol      Hypophosphatemia  - RN replacement protocol     Thrombocytopenia due to liver disease  Coagulation defect due to liver disease  - Trend while inpatient    Chronic microcytic anemia  Hgb 9.5 on admission. Baseline 7-8. In setting of underlying liver disease.  - Trend while inpatient     Multiple pulmonary nodules  Bilateral loculated pleural effusions   Has been following with Pulmonology for lung nodule monitoring - classified as intermediate risk of cancer with plan for 6 month follow-up. Fluid collection reported on CT as similar to prior.   - Follow-up with Pulmonology for interval monitoring, outpatient    Hx of herpes zoster c/b post herpetic neuralgia  - Continue gabapentin 300 mg TID        Diet: Advance Diet as Tolerated: Regular Diet Adult  Diet    DVT Prophylaxis: Pneumatic Compression Devices  Davalos Catheter: Not present  Lines: None     Cardiac Monitoring: None  Code Status: Full Code      Clinically Significant Risk Factors              # Hypoalbuminemia: Lowest albumin = 2.2 g/dL at 9/21/2023  5:47 AM, will monitor as appropriate  # Coagulation Defect: INR = 2.06 (Ref range: 0.85 - 1.15) and/or PTT = N/A, will monitor for bleeding  # Thrombocytopenia: Lowest platelets = 57 in last 2 days, will monitor for bleeding         # DMII: A1C = 6.9 % (Ref range: <5.7 %) within past 6 months, PRESENT ON ADMISSION             Disposition Plan     Expected Discharge Date: 09/22/2023                  Jack Melendez MD  Hospitalist Service, GOLD TEAM [address] [place] [address]  Securely message with [employer] (more info)  Text page via [facility] Paging/Directory   See signed in provider for up to date coverage information  ______________________________________________________________________    Interval History   Patient able to tolerate liquids without pain today. Is having pain with food. No nausea no vomiting. Patient is having trouble understanding why no procedures will happen  while patient is admitted. Surgery spent 1 hour with patient and I spent 45 minutes at bedside trying to explain, both with . Patient recorded most of what I said.     Physical Exam   Vital Signs: Temp: 96.9  F (36.1  C) Temp src: Axillary BP: 133/73 Pulse: 62   Resp: 16 SpO2: 100 % O2 Device: None (Room air)    Weight: 125 lbs 0 oz    Gen: NAD, sitting comfortably in bed  Eyes: EOMI, conjuctiva clear  CV: RRR, no murmurs, 2+ radial pulses  RESP: CTA bilaterally, no w/r/c  Abd: soft, nontender, nondistended  Ext: no edema bilaterally    Medical Decision Making       75 MINUTES SPENT BY ME on the date of service doing chart review, history, exam, documentation & further activities per the note.    GI notes reviewed  Surgery notes reviewed  Discussed with general surgery team  Spent time at bedside answering questions      Data     I have personally reviewed the following data over the past 24 hrs:    4.2  \   8.8 (L)   / 65 (L)     135 (L) 106 11.3 /  193 (H)   3.8 23 0.84 \     ALT: 12 AST: 37 AP: 110 TBILI: 1.4 (H)   ALB: 2.3 (L) TOT PROTEIN: 6.6 LIPASE: N/A     INR:  2.06 (H) PTT:  N/A   D-dimer:  N/A Fibrinogen:  N/A       Imaging results reviewed over the past 24 hrs:   Recent Results (from the past 24 hour(s))   XR Gastrografin Challenge    Narrative    Examination: Gastrografin challenge 9/21/2023 6:32 PM.    History: Small bowel obstruction    Comparison: X-ray 9/21/2023 at 0434 hours    Technique: 120 mL Gastrografin was administered through the NG tube.    Findings:   Portable supine AP views of the abdomen. Gastric tube tip and sidehole  project over stomach. Scattered contrast visualized throughout the  colon. No abnormally air-filled dilated loops of bowel. No appreciable  pneumatosis or portal venous gas. Refer to same-day CT for chest  findings.       Impression    Impression: Gastrografin visualized throughout the colon, up to the  sigmoid segment.    I have personally reviewed the  examination and initial interpretation  and I agree with the findings.    EAGLE LINARES MD         SYSTEM ID:  [id number]

## 2023-09-22 NOTE — PROGRESS NOTES
Pt tolerating regular diet. VSS. Up independently to commode/toilet. Medium amount of BM seen in commode.

## 2023-09-23 NOTE — PLAN OF CARE
"Vital signs:  Temp: 98.6  F (37  C) Temp src: Oral BP: 97/44 Pulse: 73   Resp: 18 SpO2: 95 % O2 Device: None (Room air)   Height: 165.1 cm (5' 5\") Weight: 56.7 kg (125 lb)    0444-9255:  Activity: Up to bathroom with SBA.   Neuros: A & O x4. Neuro intact.   Cardiac: BPs soft 90/48 at midnight, patient c/o \"seeing stars when getting up.\" Notified provider who ordered one time albumin 12.5g, rechecked BP 97/44 and stated no symptoms.   Respiratory: LS diminished in bases. O2 sats high 90s on RA. Denies SOB. Unlabored.   GI/: Abdomen rounded, passing flatus, patient reported having multiple small BMs. Voiding, not saving.  Diet: Regular diet, ate 50%, patient stated had increased abdominal pain with eating, patient stated will be NPO at midnight for wanting possible imaging today, talked with overnight provider.   Skin: No new deficits.  Lines: Right PIV saline locked.  Incisions/Drains: None.  Labs: Reviewed.  at 2200, administered one unit sliding scale insulin per orders.  at 0300,  at 0600.  Pain/nausea: PRN IV Dilaudid 0.3mg x1 effective for abdominal pain, patient slept in between cares. Denies nausea.   New changes this shift: Patient NPO status at midnight for wanting further imaging today.   Plan: Continue POC.     "

## 2023-09-23 NOTE — PROVIDER NOTIFICATION
"Notified provider: BP 90/48, states \"seeing stars\" when getting up. Patient states is not going to drink anymore tonight to see if he can get a procedure today. Do you want to add IV fluids?    "

## 2023-09-23 NOTE — PLAN OF CARE
Patient and family verbalized understanding of After Visit Summary. Patient tolerated full meal, PIV IV removed, and personal belongings packed. Patient escorted to lobby.

## 2023-09-23 NOTE — PROGRESS NOTES
CLINICAL NUTRITION SERVICES  Reason for Assessment:  Nutrition education regarding low fiber diet education  Diet History:  Patient has no history of low fiber diet education. Pt politely declined a Hmong  offered by writer, but family at bedside providing some interpretation. Pt perceives canned foods as unhealthy as he has friends whom eat a lot of these foods and have many health issues.  Nutrition Diagnosis:  Food- and nutrition-related knowledge deficit r/t no previous knowledge of low fiber diet as evidenced by patient report  Interventions:  Provided instruction on low fiber diet. Discussed each food group and foods to eat and avoid. Answered patient's and pt's family's questions regarding diet.   Provided handouts: Low Fiber Nutrition Therapy (both Hmong-translated by [employer] and English version provided)  Goals:   Patient will verbalize at least 5 low fiber foods acceptable on diet and 5 high fiber foods to avoid.  Follow-up:    Patient to ask any further nutrition-related questions before discharge.  In addition, pt may request outpatient RD appointment.  Loyda Monsivais RD, LD  Emergency Department/Weekend Pager: [phone]

## 2023-09-23 NOTE — DISCHARGE SUMMARY
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 Baez , at (location with clinic name or city) [facility],   after 1 month  to discuss surgical options for recurrent SBO.     Appointments on [place] and/or [facility] (with [facility] or [facility]   provider or service). Call [phone] if you haven't heard regarding   these appointments within 7 days of discharge.            Unresulted Labs Ordered in the Past 30 Days of this Admission       Date and Time Order Name Status Description    9/21/2023  2:28 PM Hepatitis B surface antigen In process     9/20/2023 10:33 PM Blood Culture Hand, Right Preliminary     9/20/2023 10:33 PM Blood Culture Hand, Left Preliminary         These results will be followed up by Hepatology team    Discharge Disposition   Discharged to home  Condition at discharge: Stable    Hospital Course   Rishabh Cabrera is a 63 year old male admitted on 9/20/2023. He has a past medical history significant for HCC s/p Y90 and chemoradiation, HBV- associated cirrhosis c/b ascites, EV s/p banding, TVA s/p right hemicolectomy, DM2, and herpes zoster c/b post herpetic neuralgia. Presented with abdominal pain and  imaging concerning for small bowel obstruction. Patient able to pass gas, but continues to have pain with food. Doesn't feel ready for discharge today, plan for tomorrow    Small bowel obstruction   Hx of TVA s/p right hemicolectomy with chronic preanastomotic dilation  - Surgery consulted, signed off   - follow up in clinic to discuss surgery when patient has had time to recover, Dr Baez's contact info provided  - Nutrition consult for diet teaching    Decompensated cirrhosis secondary to HBV  HCC s/p Y90 and chemoembolization   New area of hypotattenuation  Has new area of hypoattentuation superior to prior treated lesion which is indeterminate. Noted to have moderate ascites. Hx of esophageal varices s/p banding. No prior hx of SBP and presentation/exam more consistent with bowel obstruction but will ensure no infection given his report of possible rigors.   - Hepatology consult, appreciate recs   - follow up with Dr. Leventhal in clinic on 9/23  - CAPS consult for paracentesis to r/o SBP    - no pocket identified  - Continue PTA lasix 40 mg daily, spironolactone 100 mg daily, carveilol 6.25 mg BID  - Continue PTA tenofovir     DM2, diet controlled  Prior A1c 6.8 in 1/2023. No recent available. Not on PTA medications - note hyperglycemia on labs in setting partial SBO.   - Low intensity sliding scale insuin, BG checks q4h  - Hypoglycemia protocol   - Follow up with PCP in 2 weeks    Hypophosphatemia  - RN replacement protocol     Thrombocytopenia due to liver disease  Coagulation defect due to liver disease  Pancytopenia due to liver disease  - significant dehydration and hemoconcentration present on admission, following achievement of euvolemia pancytopenia was uncovered, this was likely present on admission  - Trend while inpatient    Chronic microcytic anemia  Hgb 9.5 on admission. Baseline 7-8. In setting of underlying liver disease.  - Trend while inpatient     Multiple pulmonary nodules  Bilateral  loculated pleural effusions   Has been following with Pulmonology for lung nodule monitoring - classified as intermediate risk of cancer with plan for 6 month follow-up. Fluid collection reported on CT as similar to prior.   - Follow-up with Pulmonology for interval monitoring, outpatient    Hx of herpes zoster c/b post herpetic neuralgia  - Continue gabapentin 300 mg TID     Consultations This Hospital Stay   MEDICATION HISTORY IP PHARMACY CONSULT  INTERNAL MEDICINE PROCEDURE TEAM ADULT IP CONSULT [place] - PARACENTESIS  GI HEPATOLOGY ADULT IP CONSULT  NUTRITION SERVICES ADULT IP CONSULT    Code Status   Full Code    Time Spent on this Encounter   I, Jack Melendez MD, personally saw the patient today and spent greater than 30 minutes discharging this patient.       Jack Melendez MD  [facility] [address] [place]  [address] [facility] [address]  Phone: [phone]  ______________________________________________________________________    Physical Exam   Vital Signs: Temp: 99.1  F (37.3  C) Temp src: Oral BP: 97/45 Pulse: 72   Resp: 17 SpO2: 97 % O2 Device: None (Room air)    Weight: 125 lbs 0 oz  Gen: NAD, sitting comfortably in bed  Eyes: EOMI, conjuctiva clear  CV: RRR, no murmurs, 2+ radial pulses  RESP: CTA bilaterally, no w/r/c  Abd: soft, mild distention with tympanic to percussion, nondistended  Ext: no edema bilaterally       Primary Care Physician   Physician No Ref-Primary    Discharge Orders      Adult GI  Referral - Procedure Only      Follow Up ([facility])    Follow up with Dr. Baez , at (location with clinic name or city) [facility], after 1 month  to discuss surgical options for recurrent SBO.     Appointments on [place] and/or [facility] (with [facility] or [facility] provider or service). Call [phone] if you haven't heard regarding these appointments within 7 days of discharge.     Reason for your hospital stay    You were admitted for small bowel obstruction. With bowel  rest and time you have been able to pass gas and advance your diet. You are able to be discharged. Please continue a low fiber diet and follow up in clinic with your PCP and with the surgery team     Activity    Your activity upon discharge: activity as tolerated     Diet    Follow this diet upon discharge: Orders Placed This Encounter      Advance Diet as Tolerated: Regular Diet Adult       Significant Results and Procedures   Most Recent 3 CBC's:  Recent Labs   Lab Test 09/23/23 0628 09/22/23  0538 09/21/23  0547   WBC 3.3* 4.2 3.9*   HGB 7.8* 8.8* 7.7*   MCV 74* 74* 75*   PLT 61* 65* 57*     Most Recent 3 BMP's:  Recent Labs   Lab Test 09/23/23  0628 09/23/23  0619 09/23/23  0344 09/22/23  1118 09/22/23 0538 09/21/23  1024 09/21/23  0547     --   --   --  135*  --  136   POTASSIUM 3.7  --   --   --  3.8  --  4.1   CHLORIDE 107  --   --   --  106  --  104   CO2 24  --   --   --  23  --  24   BUN 13.7  --   --   --  11.3  --  8.2   CR 1.15  --   --   --  0.84  --  0.83   ANIONGAP 6*  --   --   --  6*  --  8   MANDY 7.4*  --   --   --  7.4*  --  7.2*   * 109* 118*   < > 108*  125*   < > 139*    < > = values in this interval not displayed.     Most Recent 2 LFT's:  Recent Labs   Lab Test 09/23/23 0628 09/22/23 0538   AST 34 37   ALT 12 12   ALKPHOS 98 110   BILITOTAL 0.9 1.4*     Most Recent 3 INR's:  Recent Labs   Lab Test 09/23/23 0628 09/22/23 0538 09/21/23  0547   INR 2.00* 2.06* 2.09*   ,   Results for orders placed or performed during the hospital encounter of 09/20/23   CT Abdomen Pelvis w Contrast    Narrative    EXAMINATION: CT ABDOMEN PELVIS W CONTRAST, 9/20/2023 5:45 PM    INDICATION: Hx HCC Hep B and constipation, with recurrent RUQ pain.    COMPARISON STUDY: 1/20/2023    TECHNIQUE: CT scan of the abdomen and pelvis was performed on  multidetector CT scanner using volumetric acquisition technique and  images were reconstructed in multiple planes with variable thickness  and reviewed on  dedicated workstations.     CONTRAST: iopamidol (ISOVUE-370) solution 77 mL injected IV without  oral contrast    CT scan radiation dose is optimized to minimum requisite dose using  automated dose modulation techniques.    FINDINGS:    Lower thorax: Partially visualized area of loculated fluid with  surrounding pleural calcification in the right hemithorax, similar to  prior. Otherwise, unremarkable.    Liver: Partially calcified mass on the hepatic segment 7 with similar  appearance from prior, consistent with a treated tumor. Assessment of  viable tumor tissue cannot be evaluated in this single phase-contrast  study, although stable appearance is reassuring. There is however a  new area of somewhat wedge-shaped hypoattenuation superior to this  lesion which is indeterminate, although possibly representing  transient differences in attenuation (series 3 image 16). Multiple  tiny hypoattenuating foci of the liver are too small to be fully  characterized and similar to prior. Cirrhotic appearance of liver. No  intrahepatic biliary ductal dilation.    Biliary System: Normal gallbladder. No extrahepatic biliary ductal  dilation.    Pancreas: No mass or pancreatic ductal dilation.    Adrenal glands: No mass or nodules    Spleen: Enlarged.    Kidneys: No suspicious mass, obstructing calculus or hydronephrosis.    Gastrointestinal tract: Postsurgical changes of right hemicolectomy  with mild perianastomotic dilation. Prominent loops of small bowel.  This are slightly decreased from prior 1/30/2023. There are multiple  possible transition point such as along the left mid abdomen (series 6  image 43).    Mesentery/peritoneum/retroperitoneum: Moderate ascites. Somewhat  loculated pocket of fluid in the pouch of Alvin, decreased in size  from prior and without substantial enhancement or thickened walls to  suggest abscess. No free air.    Lymph nodes: No significant lymphadenopathy.    Vasculature: Patent major abdominal  vasculature.    Pelvis: Urinary bladder is normal.  Prostate is enlarged.    Osseous structures: No aggressive or acute osseous lesion.  Stable  appearance of lucent focus in the right femoral head neck. Likely  enchondroma of the iliac bone, stable from prior.    Soft tissues: Within normal limits.      Impression    IMPRESSION:   1. Mildly enlarged loops of small bowel with air-fluid levels,  appearance is nonspecific and at least partial obstruction is  possible.  2. Postsurgical changes of right hemicolectomy with chronic  preanastomotic dilation.  3. Cirrhotic liver with a treated tumor in the hepatic segment 7.   4. There is a new area of hypoattenuation superior to the treated  lesion which is indeterminate. Consider nonemergent liver MRI for  further evaluation.    I have personally reviewed the examination and initial interpretation  and I agree with the findings.    DELVIS LOWE MD         SYSTEM ID:  [id number]   XR Abdomen Port 1 View    Narrative    EXAM: XR ABDOMEN PORT 1 VIEW  LOCATION: [facility]  DATE: 9/20/2023    INDICATION: Confirm NG placement.  COMPARISON: Abdominal x-ray on 01/30/2023.      Impression    IMPRESSION: AP view of the abdomen was obtained. Enteric tube tip projects over stomach and sidehole just distal to the GE junction, recommend slight advancement. Contrast within the right renal collecting system which appears dilated. Right basilar   pleural calcification. Basilar pulmonary opacities, likely atelectasis.   POC US GUIDE FOR PARACENTESIS    Impression    US Indication: abdominal pain and hepatitis B related cirrhosis with HCC    Limited abdominal ultrasound was performed to evaluate for ascites and need for paracentesis.     Views/Acquisition: hepatorenal/RUQ, right lower quadrant, parasplenic/LUQ, left lower quadrant, and suprapubic    Findings/Interpretation: There is a very small sliver of simple appear ascites in the far  left lower quadrant  that had dynamic bowl moving through the fluid, was less than 1.5 cm in size, and not safely accessible via paracentesis, even with live guidance. Dilated appearance of bowl segments noted on remainder of exam. There was also a perihepatic hypoechoid fluid pocket that was correlated with CT scan of uncertain etiology. Not typical in appearance for ascites.     Timur Hammer MD    XR Abdomen Port 1 View    Narrative    EXAM: XR ABDOMEN PORT 1 VIEW  LOCATION: [facility]  DATE: 9/20/2023    INDICATION: advanced NG  COMPARISON: 09/20/2023 at 2214 hours      Impression    IMPRESSION: Nasogastric tube terminates over the stomach. Gaseous distention of small and large bowel loops is unchanged. Retained contrast present within a dilated right renal collecting system again noted. Chronic right-sided pleural calcification   again noted.   XR Abdomen Port 1 View    Narrative    EXAM: XR ABDOMEN PORT 1 VIEW  LOCATION: [facility]  DATE: 9/21/2023    INDICATION: re advanced NG after patient pulled out  COMPARISON: 09/20/2023.      Impression    IMPRESSION: Nasogastric tube terminates in the mid body of the stomach. No other significant change.   XR Gastrografin Challenge    Narrative    Examination: Gastrografin challenge 9/21/2023 6:32 PM.    History: Small bowel obstruction    Comparison: X-ray 9/21/2023 at 0434 hours    Technique: 120 mL Gastrografin was administered through the NG tube.    Findings:   Portable supine AP views of the abdomen. Gastric tube tip and sidehole  project over stomach. Scattered contrast visualized throughout the  colon. No abnormally air-filled dilated loops of bowel. No appreciable  pneumatosis or portal venous gas. Refer to same-day CT for chest  findings.       Impression    Impression: Gastrografin visualized throughout the colon, up to the  sigmoid segment.    I have  personally reviewed the examination and initial interpretation  and I agree with the findings.    EAGLE LINARES MD         SYSTEM ID:  [id number]   CT Chest w/o Contrast    Narrative    EXAMINATION: CT CHEST W/O CONTRAST, 9/21/2023 3:42 PM    CLINICAL HISTORY: follow up lung lesion    COMPARISON: CT chest 1/10/2023.    TECHNIQUE: CT imaging obtained through the chest without contrast.  Coronal, sagittal and axial MIP reformatted images obtained and  reviewed.     CONTRAST: None    FINDINGS:    Lines, tubes, devices: Nasogastric tube with tip in the body of the  stomach.    Lungs: Stable right upper lobe scarring with multifocal atelectasis  throughout. Persistent right basilar loculated pleural effusion and  peripheral calcification with associated architectural distortion and  slight rightward mediastinal shift. Slight interval enlargement of now  16 x 15 x 25 mm left upper lobe spiculated nodule with faint  groundglass opacity surrounding (series 4, image 64), previously  measuring 14 x 15 x 24 mm when measured similarly solid 2 mm nodule in  the left upper lobe subpleural space and C6 4, image 124). Small  calcified granuloma in the left upper lobe. Lingular atelectasis.  X-rays without patchy groundglass opacities previously seen. The  central tracheobronchial tree is patent. No pleural effusion,  pulmonary consolidation, or pneumothorax. Mild bibasilar atelectasis.     Mediastinum: The visualized thyroid is unremarkable. Cardiomegaly with  biatrial enlargement. No pericardial effusion. Mild to moderate  coronary artery calcifications. The thoracic aorta and main pulmonary  artery are within normal limits. Thoracic aorta and aortic annulus  calcifications. Standard branching pattern of the great vessels. Few  scattered calcified mediastinal and right hilar lymph nodes without  axillary lymphadenopathy.    Bones and soft tissues: No suspicious osseous lesion. Mild  degenerative changes of the thoracolumbar  spine.    Upper abdomen:  Limited evaluation of the upper abdomen. No acute  pathology of the visualized solid organs. Previous contrast visualized  within the colon. Atherosclerotic calcifications of the splenic artery  and infrarenal abdominal aorta. Cirrhotic morphology of the liver with  splenomegaly. Treatment changes of the segment 6 lesion.      Impression    IMPRESSION:   1. No significant change in the left upper lobe spiculated nodule with  associated architectural distortion, that measures slightly larger on  this exam compared to prior. Consider CT, PET/CT or tissue sampling 3  months as per Fleischner Society guidelines indicate a suspicious  morphology.  2. Stable multifocal pulmonary and pleural scarring.  3. Resolution of left pleural effusion and unchanged appearance of  loculated right peripherally calcified pleural effusion likely sequela  of prior empyema or trauma.  4. Cirrhosis with splenomegaly.    I have personally reviewed the examination and initial interpretation  and I agree with the findings.    RASHEED HENNESSY MD         SYSTEM ID:  [id number]       Discharge Medications   Current Discharge Medication List        START taking these medications    Details   ondansetron (ZOFRAN ODT) 4 MG ODT tab Take 1 tablet (4 mg) by mouth every 6 hours as needed for nausea or vomiting  Qty: 12 tablet, Refills: 0    Associated Diagnoses: Intestinal adhesions with partial obstruction (H)           CONTINUE these medications which have NOT CHANGED    Details   capsaicin (ZOSTRIX) 0.025 % external cream Apply topically 3 times daily      carvedilol (COREG) 6.25 MG tablet Take 1 tablet (6.25 mg) by mouth 2 times daily (with meals)  Qty: 180 tablet, Refills: 3    Associated Diagnoses: Upper GI bleed      diclofenac (VOLTAREN) 1 % topical gel Apply topically as needed for moderate pain      diphenhydrAMINE (BENADRYL) 2 % external cream Apply topically 3 times daily as needed for itching  Qty: 30 g, Refills: 1     Associated Diagnoses: HCC (hepatocellular carcinoma) (H); Type 2 diabetes mellitus without complication, without long-term current use of insulin (H); Chronic hepatitis B with cirrhosis (H); Severe protein-calorie malnutrition (H24); Nasal drainage      Emollient (CERAVE MOISTURIZING) CREA Externally apply 1 Dose topically 2 times daily as needed (for eczema)  Qty: 453 g, Refills: 2    Associated Diagnoses: Eczema, unspecified type      furosemide (LASIX) 40 MG tablet Take 1 tablet (40 mg) by mouth daily  Qty: 30 tablet, Refills: 0    Associated Diagnoses: Other ascites; HCC (hepatocellular carcinoma) (H)      gabapentin (NEURONTIN) 100 MG capsule Take 3 capsules (300 mg) by mouth 3 times daily  Qty: 63 capsule, Refills: 0    Associated Diagnoses: Herpes zoster without complication      loratadine (CLARITIN) 10 MG tablet Take 10 mg by mouth daily      spironolactone (ALDACTONE) 100 MG tablet Take 1 tablet (100 mg) by mouth daily  Qty: 30 tablet, Refills: 0    Associated Diagnoses: Other ascites; HCC (hepatocellular carcinoma) (H)      tenofovir (VIREAD) 300 MG tablet Take 1 tablet (300 mg) by mouth daily  Qty: 90 tablet, Refills: 3    Associated Diagnoses: HCC (hepatocellular carcinoma) (H)      triamcinolone (KENALOG) 0.1 % external cream Apply topically as needed for irritation           Allergies   Allergies   Allergen Reactions    Crabs [Crustaceans]     Pork (Porcine) Protein Itching and Unknown    Seafood Hives and Unknown    Shellfish Allergy Unknown

## 2023-09-27 NOTE — TELEPHONE ENCOUNTER
REFERRAL INFORMATION:  Referring Provider:  Referring Clinic: [facility] - Admission  Reason for Visit/Diagnosis: Recurrent SBO; hospital F/U       FUTURE VISIT INFORMATION:  Appointment Date: 11/20/2023  Appointment Time: 3 PM     NOTES RECORD STATUS  DETAILS   OFFICE NOTE from Referring Provider N/A    OFFICE NOTE from Other Specialists [facility]:  ([facility]) 10/9/23, 11/28/22 - HEP OV with Dr. Leventhal  2/27/23 - HEP OV with KRYSTAL Conrad   HOSPITAL DISCHARGE SUMMARY/ ED VISITS  Internal [facility]:  9/20/23 - Admission with Dr. Blanc  1/30/23 - Admission with Dr. Newton  1/24/23 - Admission with Dr. Zapata   OPERATIVE REPORT Care Everywhere [facility]:  8/5/15 - OP Note for LAPAROSCOPIC RIGHT COLECTOMY with Dr. Chahal   ENDOSCOPY (EGD)  [facility]:  2/3/23 - EGD  1/30/23 - EGD  9/23/22 - EGD   PERTINENT LABS Care Everywhere / Internal    PATHOLOGY REPORTS (RELATED) [facility]:  1/30/23 - Abdomen (Case: [id number])  5/30/22 - Abdomen (Case: [id number])   IMAGING (CT, MRI, US, XR)  [facility]:  9/21/23, 1/10/23, 5/29/22 - CT Chest  9/21/23, 9/20/23, 2/5/23, 1/30/23, 9/27/22 - XR Abdomen  9/20/23, 1/30/23, 1/24/23 - CT Abd/Pelvis  1/24/23, 5/31/22, 5/30/22 - US Abdomen  1/10/23, 9/21/22, 6/15/22 - MRI Abdomen  5/29/22, 5/13/22 - XR Chest

## 2023-11-03 NOTE — TELEPHONE ENCOUNTER
Pre Assessment RN Review    Focused Assessments     relayed to RN that the Pt has been having SOB. Spoke with the Pt, he reports having SOB since having his lung biopsy in 2022. He reports going from a sitting to a standing causes dizziness. No episodes of fainting.     Does not sound like acute SOB, has been going on since 2022. Will send message to ordering provider and scoping provider as FYI.       Scheduling Status & Recommendations    Pt already scheduled, will send as FYI to ensure to issues with having procedure.     Kimberly Zambrano, RN   Endoscopy Procedure Pre Assessment RN

## 2023-11-03 NOTE — TELEPHONE ENCOUNTER
"Endoscopy Scheduling Screen    Have you had a positive Covid test in the last 14 days?  No    Are you active on MyChart?   Yes    What insurance is in the chart?  Other:  MEDICARE    Ordering/Referring Provider:     BRYCE ALEJANDRA      (If ordering provider performs procedure, schedule with ordering provider unless otherwise instructed. )    BMI: Estimated body mass index is 20.8 kg/m  as calculated from the following:    Height as of 9/20/23: 1.651 m (5' 5\").    Weight as of 9/20/23: 56.7 kg (125 lb).     Sedation Ordered  moderate sedation.   If patient BMI > 50 do not schedule in [facility].    If patient BMI > 45 do not schedule at [facility].    Are you taking methadone or Suboxone?  No    Are you taking any prescription medications for pain 3 or more times per week?   No    Do you have a history of malignant hyperthermia or adverse reaction to anesthesia?  No    (Females) Are you currently pregnant?   No     Have you been diagnosed or told you have pulmonary hypertension?   No    Do you have an LVAD?  No    Have you been told you have moderate to severe sleep apnea?  No    Have you been told you have COPD, asthma, or any other lung disease?  No - PT HAS RECENTLY HAD SHORTNESS OF BREATH.    Do you have any heart conditions?  No     Have you ever had an organ transplant?   No    Have you ever had or are you awaiting a heart or lung transplant?   No    Have you had a stroke or transient ischemic attack (TIA aka \"mini stroke\" in the last 6 months?   No    Have you been diagnosed with or been told you have cirrhosis of the liver?   No - PT HAS HEPATOCELLULAR CARCINOMA    Are you currently on dialysis?   No    Do you need assistance transferring?   No    BMI: Estimated body mass index is 20.8 kg/m  as calculated from the following:    Height as of 9/20/23: 1.651 m (5' 5\").    Weight as of 9/20/23: 56.7 kg (125 lb).     Is patients BMI > 40 and scheduling location [facility]?  No    Do you take an injectable medication for weight " loss or diabetes (excluding insulin)?  No    Do you take the medication Naltrexone?  No    Do you take blood thinners?  No       Prep   Are you currently on dialysis or do you have chronic kidney disease?  No    Do you have a diagnosis of diabetes?  Yes (Golytely Prep)    Do you have a diagnosis of cystic fibrosis (CF)?  No    On a regular basis do you go 3 -5 days between bowel movements?  No    BMI > 40?  No    Preferred Pharmacy:    [facility] - [place], [place] - [address]  [address] [facility] [address]  Phone: [phone] Fax: [phone]    [id number] - [place], [place] - [address]  [address] [facility] [address]  Phone: [phone] Fax: [phone]    [id number] - [place], [place] - [address] AT [facility] [address]  [address]  [facility] [place] [facility] [place] [address]  Phone: [phone] Fax: [phone]    -[facility] [place] - [address]  [address]  [place] [address]  Phone: [phone] Fax: [phone]      Final Scheduling Details   Colonoscopy prep sent?  N/A    Procedure scheduled  Upper endoscopy (EGD)    Surgeon:  Chuckie     Date of procedure:  11/16/2023     Pre-OP / PAC:   No - Not required for this site.    Location  [facility] - Per order.    Sedation   MAC/Deep Sedation  PT AVAILABILITY       Patient Reminders:   You will receive a call from a Nurse to review instructions and health history.  This assessment must be completed prior to your procedure.  Failure to complete the Nurse assessment may result in the procedure being cancelled.      On the day of your procedure, please designate an adult(s) who can drive you home stay with you for the next 24 hours. The medicines used in the exam will make you sleepy. You will not be able to drive.      You cannot take public transportation, ride share services, or non-medical taxi service without a  responsible caregiver.  Medical transport services are allowed with the requirement that a responsible caregiver will receive you at your destination.  We require that drivers and caregivers are confirmed prior to your procedure.

## 2023-11-06 NOTE — TELEPHONE ENCOUNTER
EGD only per endoscopy scheduling. Add on.    Attempted to contact patient in order to complete pre assessment questions.     No answer. Left message to return call to [phone] option 4 with assistance of Vaionith edenes .       Procedure details:    Patient scheduled for Upper endoscopy (EGD) on 11/16/23.     Arrival time: 0715. Procedure time 0845    Pre op exam needed? N/A    Facility location: [facility]; [address] [place] [address], [address]rd Floor, [place] [address]    Sedation type: MAC    Indication for procedure:     Variceal screening, confirm eradication         Chart review:     Electronic implanted devices? No    Recent diagnosis of diverticulitis within the last 6 weeks? No    Diabetic?  Yes - Verify if patient is taking DM medication.       Medication review:    Anticoagulants? No    NSAIDS? No    Other medication HOLDING recommendations:  N/A      Prep for procedure:     Bowel prep recommendation: N/A      Prep instructions sent via letter at time of scheduling.    Vira Leggett RN  Endoscopy Procedure Pre Assessment RN

## 2023-11-06 NOTE — TELEPHONE ENCOUNTER
Per Dr. Noguera,     Ok to proceed with MAC, possible PAC eval needed, still communicating with Dr. Noguera via staff message.     Kimberly Zambrano, RN   Endoscopy Procedure Pre Assessment RN

## 2023-11-06 NOTE — LETTER
November 6, 2023      Rishabh Cabrera  [address]  [facility] [address]              Dear Rishabh Keating,     We apologize that we have been unable to contact you by phone. We are calling in regards to your upcoming Upper endoscopy (EGD) procedure that is scheduled on 11/16/23 to complete pre assessment.    Please contact our pre assessment nursing team at [phone] option 4. We are open Monday through Friday, 7:00am to 5:00pm. Note that failure to complete Nurse assessment phone call will result in your procedure being cancelled.     Our schedules fill up quickly and are in high demand. If you know that you need to cancel, please contact us so that we can accommodate scheduling for other patients. Our endoscopy scheduling team can be reached at [phone] option 2.     Thank you,  [facility] Endoscopy Team      Upper endoscopy (EGD)     Procedure date: 11/16/23    Anticipated arrival time: 7:15 AM   (Procedure Times are Subject to Change)    Facility location: [facility]; [address] [place] [address] - Check in location: Main entrance at registration desk. Parking information: Self pay parking available in the Patient & Visitor Ramp on the corner of  [facility] and [facility].  options are available 24 hours for patients with mobility limitations. Self-park and shuttle service from the parking ramp available. The phone number for shuttle requests is [phone].      Important Procedure Reminders:     Prep Instructions:   Instructions on how to prepare for your upcoming procedure are found below. Please read instructions carefully. Deviation from instructions may result in less than desired outcomes and procedure may need to be rescheduled. If you have additional questions regarding how to prepare for your upcoming procedure, please contact our endoscopy pre assessment nurses at [phone] option 4.      Policy:   On the day of your procedure,  please designatean adult(s) who can drive you home stay with you for the next 24 hours. The medicines used in the exam will make you sleepy. You will not be able to drive. You cannot take public transportation, ride share services, or non-medical taxi service without a responsible caregiver.  Medical transport services are allowed with the requirement that a responsible caregiver will receive you at your destination.  We require that drivers and caregivers are confirmed prior to your procedure.    Day of procedure:  Please do not wear jewelry (i.e. earrings, rings, necklaces, watches, etc) . Leave your purse, billfold, credit cards, and other valuables at home.   Bring insurance card and ID.     To cancel or reschedule your procedure:   Please call our endoscopy scheduling team at: [facility]: [phone], option 2. Monday through Friday, 7:00am-5:00pm.      Medication Reminders:    Please note the following medication holding recommendations:   N/A      Instructions for Your Upper Endoscopy      You will receive a call from a Nurse to review instructions and health history.  This assessment must be completed prior to your procedure.  Failure to complete the Nurse assessment phone call may result in the procedure being cancelled.        What is an upper endoscopy?  - This is an exam that checks for problems linked to heartburn, swallowing or belly (abdominal) pain or other symptoms of the upper GI tract. Depending on your symptoms, the doctor will look at your esophagus (food pipe), stomach and the part of the stomach that enters the small intestine.     Getting ready  - Dress in comfortable, loose clothing.  - Bring your insurance card. Leave your purse, billfold, credit cards and other valuables at home.  - Bring a list of your medicines and known allergies. If you have a pacemaker or ICD, please bring your information card.  - We do our best to stay on time, but there may be a delay.  Please bring something to pass the time, such as a newspaper or book.    - Important: You must complete all steps before the exam.     Seven days before the exam   - Talk to your doctor: If you take blood-thinners (such as Coumadin, Plavix, Xarelto), your prescription or schedule may need to change before the test.  - Talk to your prescribing provider: If you take prescription NSAIDS (such as Sulindac, Celebrex, Mobic, Relafen). Your prescribing provider will tell you what to do.   - Continue taking prescribed aspirin; talk to your prescribing doctor with any concerns.    - If you have diabetes: Ask to have your exam early in the morning. Also, ask your doctor if you should change your diet or medicines    One day before the exam   - Stop eating all solid foods 8 hours prior to arrival time. You may drink clear liquids.   **If you have achalasia (treated or untreated) or gastroparesis, please be on a clear liquid diet for 24 hours prior to your exam and stop drinking all liquids at midnight.   - Stop taking sucralfate medication.  - Stop taking NSAID pain relievers, such as Advil, Ibuprofen, Motrin, etc.  You may take Tylenol.    Day of the exam  - You may drink clear liquids until 2 hours before your arrival time.  - You may take all of your morning medicines (except for diabetes pills) as usual with 4 oz. of water up to 2 hours before your arrival time.  - If you take diabetes medicine (pills): do not take them the morning of your test.  - Bring a list of your medicines and known allergies.  - Please do not wear jewelry (i.e. earrings, rings, necklaces, watches, etc) . Leave your purse, billfold, credit cards, and other valuables at home.   - Please arrive with an adult who can take you home after the test: The medicine will make you sleepy. If you do not have a , we may cancel your test.     What are clear liquids?   You may have:  - Water, tea, coffee (no cream)  - Soda pop, Gatorade   - Clear nutrition  drinks (Enlive, Resource Breeze)   - Jell-O, Popsicles (no milk or fruit pieces) or sorbet   - Fat-free soup broth or bouillon  - Plain hard cand, such as clear life savers   - Clear juices and fruit-flavored drinks such as apple juice, white grape juice, Hi-C and Ian-Aid    Do not have:  - Milk or milk products such as ice cream, malts or shakes  - Juices with pulp such as orange, grapefruit, pineapple or tomato juice  - Cream soups of any kind  - Alcohol       During the exam  - The exam lasts from 10 to 20 minutes.   - We will review the risks and benefits of the exam. We will then ask you to sign a consent form.  - We will place a small needle (IV) in your hand or arm. We will give you medicine through the IV to keep you comfortable.   - The endoscope will be advanced through your mouth and the exam completed.  - When we put air into your stomach, you may feel full or have mild cramps. We will remove the air at the end of the exam.  - To reduce discomfort, breathe at a slow, even pace. Try to relax the muscles in your neck and shoulders.  - We may take a small piece of tissue (a biopsy) to test in the lab. It will not hurt. We may also take pictures of your insides.     After the exam  - You will rest in the recovery area until you feel ready to leave. This takes about 30 to 60 minutes.   - We will remove the IV.  - You may burp up air left in your stomach.  - You may feel drowsy or a little dizzy from the medicine.   - Your doctor will discuss your results. You will receive your test results in 7 to 10 business days by letter or MyChart.   - Your throat may feel numb. One hour after the exam, swallow a small amount of cool water. If you can swallow easily, you may go back to your regular diet and medicines.  - Your throat may be sore for the rest of the day. Throat lozenges or ice chips may help.  - Do not drive for 24 hours.    Call us at once if you have:  - Unusual pain or problems swallowing, unusual  stomach or chest pain.  - Vomit that looks like coffee grounds or black or bloody stools (bowel movements).  - A fever above 100.6  F (37.5  C) when taken under the tongue.    Test results  - You will receive your results in 7 to 10 business days by phone, letter or MyChart.    For questions or appointments, call:  [facility]   [phone], option 2  Monday through Friday, 7 a.m. to 5 p.m.  (If it is after hours please reach out to the clinic or provider you were scheduled with.)    You should be aware that your endoscopist may be a part of a study to improve endoscopy procedures.  As part of a study, pictures gathered from your procedure may be stored and analyzed in a de-identified manner.

## 2023-11-08 NOTE — TELEPHONE ENCOUNTER
See scheduling encounter. Pt had reported worsening SOB. I did discuss this with Dr. Noguera and looped ordering provider in. Ok to proceed with procedure with MAC sedation, no PAC eval needed per Dr. Noguera.     Kimberly Zambrano, RN   Endoscopy Procedure Pre Assessment RN

## 2023-11-09 NOTE — TELEPHONE ENCOUNTER
[place]  ID [id number]    Pre assessment completed for upcoming procedure.      Procedure details:    Patient scheduled for Upper endoscopy (EGD) on 11.16.23.     Arrival time: 0715. Procedure time 0845    Pre op exam needed? N/A    Facility location: [facility]; [address], [address], [place] [address]    Sedation type: MAC    Indication for procedure: Variceal screening, confirm eradication     COVID policy reviewed.    Designated  policy reviewed. Instructed to have someone stay 24 hours post procedure.       Chart review:     Electronic implanted devices? No    Recent diagnosis of diverticulitis within the last 6 weeks?  N/A    Diabetic? Yes. See medication holding recommendations     Diabetic medication HOLDING recommendations: (if applicable)  Oral diabetic medications: No  Diabetic injectables: No  Insulin: Yes. Consult with managing provider     Medication review:    Anticoagulants? No    NSAIDS? No    Other medication HOLDING recommendations:  N/A      Prep for procedure:     Prep instructions sent via letter   Reviewed procedure prep instructions.     Patient verbalized understanding and had no questions or concerns at this time.        Krystin Gipson RN  Endoscopy Procedure Pre Assessment RN  [phone] option 4

## 2023-11-16 NOTE — ANESTHESIA POSTPROCEDURE EVALUATION
Patient: Rishabh Cabrera    Procedure: Procedure(s):  Esophagoscopy, gastroscopy, duodenoscopy (EGD), combined       Anesthesia Type:  MAC    Note:  Disposition: Outpatient   Postop Pain Control: Uneventful            Sign Out: Well controlled pain   PONV: No   Neuro/Psych: Uneventful            Sign Out: Acceptable/Baseline neuro status   Airway/Respiratory: Uneventful            Sign Out: Acceptable/Baseline resp. status   CV/Hemodynamics: Uneventful            Sign Out: Acceptable CV status; No obvious hypovolemia; No obvious fluid overload   Other NRE: NONE   DID A NON-ROUTINE EVENT OCCUR? No           Last vitals:  Vitals Value Taken Time   /61 11/16/23 0918   Temp     Pulse 84 11/16/23 0918   Resp 16 11/16/23 0904   SpO2 98 % 11/16/23 0918       Electronically Signed By: Candida Leal MD  November 16, 2023  10:16 AM

## 2023-11-16 NOTE — ANESTHESIA CARE TRANSFER NOTE
Patient: Rishabh Cabrera    Procedure: Procedure(s):  Esophagoscopy, gastroscopy, duodenoscopy (EGD), combined       Diagnosis: Upper GI bleed [K92.2]  Diagnosis Additional Information: No value filed.    Anesthesia Type:   MAC     Note:    Oropharynx: spontaneously breathing  Level of Consciousness: awake  Oxygen Supplementation: nasal cannula  Level of Supplemental Oxygen (L/min / FiO2): 2  Independent Airway: airway patency satisfactory and stable  Dentition: dentition unchanged  Vital Signs Stable: post-procedure vital signs reviewed and stable  Report to RN Given: handoff report given  Patient transferred to: Phase II    Handoff Report: Identifed the Patient, Identified the Reponsible Provider, Reviewed the pertinent medical history, Discussed the surgical course, Reviewed Intra-OP anesthesia mangement and issues during anesthesia, Set expectations for post-procedure period and Allowed opportunity for questions and acknowledgement of understanding      Vitals:  Vitals Value Taken Time   /64 11/16/23 0904   Temp     Pulse 89 11/16/23 0904   Resp 16 11/16/23 0904   SpO2 99 % 11/16/23 0904   Vitals shown include unfiled device data.    Electronically Signed By: JOSIE Plummer CRNA  November 16, 2023  9:05 AM

## 2023-11-16 NOTE — OR NURSING
Pt underwent EGD under MAC. Specimens: none. No interventions.. Pt transferred to recovery and report given to annamaria CURRY.       Helena Stevens RN

## 2023-11-16 NOTE — ANESTHESIA PREPROCEDURE EVALUATION
Anesthesia Pre Eval Note.    Visit Vitals  BP (!) 146/64   Pulse (!) 56   Temp 36.3 °C (97.4 °F) (Temporal)   Resp 15   Ht 5' 11.5\" (1.816 m)   Wt 127.2 kg (280 lb 6.8 oz)   SpO2 97%   BMI 38.57 kg/m²       Anesthesia ROS/Med Hx    Overall Review:  Echo was reviewed     Anesthetic Complication History:  Patient does not have a history of anesthetic complications      Pulmonary Review:    Positive for sleep apnea     Neuro/Psych Review:  Patient does not have a neuro/psych history       Cardiovascular Review:  Exercise tolerance: good (>4 METS)  Positive for hypertension  Positive for hyperlipidemia    GI/HEPATIC/RENAL Review:  Patient does not have a GI/hepatic/renalhistory       End/Other Review:    Positive for obesity class II - 35.00 - 39.99  Positive for anemia  Positive for cancer  Additional Results:  Echo:  No results found for: EF   ALLERGIES:   -- Vancomycin -- WEAKNESS and Other (See Comments)    --  HYPOTENSION- ENDED UP IN ICU   -- Shellfish Allergy   (Food Or Med) -- HIVES       Lab Results       Component                Value               Date                       INR                      1.7                 06/09/2021             Past Medical History:  No date: Anemia  No date: Atrial fibrillation (CMS/HCC)  No date: Cardiomegaly  No date: Essential (primary) hypertension  No date: High cholesterol  No date: Malignant neoplasm (CMS/HCC)      Comment:  multiple mlyeloma  No date: Sleep apnea  No date: Thyroid condition  No date: Venous thrombosis    Past Surgical History:  No date: Removal gallbladder  No date: Shoulder surgery       Prior to Admission medications :  Medication sulfamethoxazole-trimethoprim (BACTRIM DS) 800-160 MG per tablet, Sig 1 tablet 3 days a week. MON, WED, FRIDAY, Start Date , End Date , Taking? Yes, Authorizing Provider Outside Provider    Medication calcium carbonate-vitamin D (CALCIUM 600 + D) 600-400 MG-UNIT per tablet, Sig Take 1 tablet by mouth daily., Start Date , End  Anesthesia Pre-Procedure Evaluation    Patient: Rishabh Cabrera   MRN: [id number] : 1960        Procedure : Procedure(s):  Esophagoscopy, gastroscopy, duodenoscopy (EGD), combined          Past Medical History:   Diagnosis Date    Cancer (H)     Chronic hepatitis B (H)     Diabetes mellitus (H)       Past Surgical History:   Procedure Laterality Date    COLON SURGERY Right 2015    Laparoscopic hand assisted right Hemicolectomy at [facility]    ESOPHAGOSCOPY, GASTROSCOPY, DUODENOSCOPY (EGD), COMBINED N/A 2019    Procedure: ESOPHAGOGASTRODUODENOSCOPY (EGD);  Surgeon: Leventhal, Thomas Michael, MD;  Location:  GI    ESOPHAGOSCOPY, GASTROSCOPY, DUODENOSCOPY (EGD), COMBINED N/A 2/3/2023    Procedure: ESOPHAGOGASTRODUODENOSCOPY, WITH BIOPSY;  Surgeon: Elan Morris MD;  Location:  GI    IR CHEMO EMBOLIZATION  2021    IR PARACENTESIS  2023    IR SIRT (SELECTIVE INTERNAL RADIO THERAPY)  2021    IR VISCERAL ANGIOGRAM  2021    IR VISCERAL EMBOLIZATION  2021      Allergies   Allergen Reactions    Crabs [Crustaceans]     Pork (Porcine) Protein Itching and Unknown    Seafood Hives and Unknown    Shellfish Allergy Unknown      Social History     Tobacco Use    Smoking status: Never    Smokeless tobacco: Never   Substance Use Topics    Alcohol use: No      Wt Readings from Last 1 Encounters:   23 56.7 kg (125 lb)        Anesthesia Evaluation   Pt has had prior anesthetic. Type: General.        ROS/MED HX  ENT/Pulmonary:  - neg pulmonary ROS     Neurologic:  - neg neurologic ROS     Cardiovascular:     (+)  hypertension- -   -  - -                                      METS/Exercise Tolerance:     Hematologic:     (+)      anemia,          Musculoskeletal:  - neg musculoskeletal ROS     GI/Hepatic: Comment: Right hemicolectomy  Bowel obstruction 23, resolved.  Tolerating regular diet now, no nausea or vomiting.  Cirrhosis    (+)           hepatitis type B, liver  Date , Taking? Yes, Authorizing Provider Outside Provider    Medication Cholecalciferol 25 mcg (1,000 units) capsule, Sig Take by mouth daily., Start Date , End Date , Taking? Yes, Authorizing Provider Outside Provider    Medication cyanocobalamin 250 MCG tablet, Sig Take 250 mcg by mouth daily., Start Date , End Date , Taking? Yes, Authorizing Provider Outside Provider    Medication acyclovir (ZOVIRAX) 800 MG tablet, Sig Take 800 mg by mouth 2 times daily. PREVENTATIVE (SHINGLES) DUE TO CHEMO, Start Date , End Date , Taking? Yes, Authorizing Provider Outside Provider    Medication DEXAMETHASONE PO, Sig Take 20 mg by mouth. TAKES ON CHEMO DAYS AND ONE DAY POST CHEMO, Start Date , End Date , Taking? Yes, Authorizing Provider Outside Provider    Medication gabapentin (NEURONTIN) 600 MG tablet, Sig Take 600 mg by mouth 2 times daily., Start Date , End Date , Taking? Yes, Authorizing Provider Outside Provider    Medication gabapentin (NEURONTIN) 300 MG capsule, Sig Take 300 mg by mouth daily. MID DAY, Start Date , End Date , Taking? Yes, Authorizing Provider Outside Provider    Medication HYDROcodone-acetaminophen (NORCO)  MG per tablet, Sig Take 1 tablet by mouth every 6 hours as needed for Pain., Start Date , End Date , Taking? Yes, Authorizing Provider Outside Provider    Medication warfarin (COUMADIN) 1 MG tablet (warfarin), Sig Take 1 mg by mouth daily. 8MG. HAS  INSTRUCTIONS TO CONTINUE PER MD, Start Date , End Date , Taking? Yes, Authorizing Provider Outside Provider    Medication zolpidem (AMBIEN) 5 MG tablet, Sig Take 5 mg by mouth nightly as needed for Sleep., Start Date , End Date , Taking? Yes, Authorizing Provider Outside Provider    Medication amLODIPine (NORVASC) 2.5 MG tablet, Sig Take 2.5 mg by mouth daily., Start Date , End Date , Taking? Yes, Authorizing Provider Outside Provider    Medication levothyroxine 25 MCG tablet, Sig Take 25 mcg by mouth daily., Start Date , End Date , Taking? Yes,  disease,       Renal/Genitourinary:       Endo:     (+) type I DM,    Using insulin, - not using insulin pump.  not previously admitted for DM/DKA.              Psychiatric/Substance Use:  - neg psychiatric ROS     Infectious Disease:  - neg infectious disease ROS     Malignancy: Comment: Hepatocellular Carcinoma      Other:            Physical Exam    Airway        Mallampati: II   TM distance: > 3 FB   Neck ROM: limited   Mouth opening: > 3 cm    Respiratory Devices and Support         Dental       (+) Multiple visibly decayed, broken teeth      Cardiovascular   cardiovascular exam normal          Pulmonary   pulmonary exam normal                OUTSIDE LABS:  CBC:   Lab Results   Component Value Date    WBC 3.3 (L) 09/23/2023    WBC 4.2 09/22/2023    HGB 7.8 (L) 09/23/2023    HGB 8.8 (L) 09/22/2023    HCT 25.6 (L) 09/23/2023    HCT 29.1 (L) 09/22/2023    PLT 61 (L) 09/23/2023    PLT 65 (L) 09/22/2023     BMP:   Lab Results   Component Value Date     09/23/2023     (L) 09/22/2023    POTASSIUM 3.7 09/23/2023    POTASSIUM 3.8 09/22/2023    CHLORIDE 107 09/23/2023    CHLORIDE 106 09/22/2023    CO2 24 09/23/2023    CO2 23 09/22/2023    BUN 13.7 09/23/2023    BUN 11.3 09/22/2023    CR 1.15 09/23/2023    CR 0.84 09/22/2023    GLC 84 11/16/2023     (H) 09/23/2023     COAGS:   Lab Results   Component Value Date    PTT 41 (H) 02/02/2023    INR 2.00 (H) 09/23/2023    FIBR 151 (L) 02/02/2023     POC:   Lab Results   Component Value Date     (H) 05/29/2019     HEPATIC:   Lab Results   Component Value Date    ALBUMIN 2.3 (L) 09/23/2023    PROTTOTAL 6.0 (L) 09/23/2023    ALT 12 09/23/2023    AST 34 09/23/2023    ALKPHOS 98 09/23/2023    BILITOTAL 0.9 09/23/2023     OTHER:   Lab Results   Component Value Date    PH 7.46 (H) 09/24/2022    LACT 1.5 01/30/2023    A1C 6.9 (H) 09/20/2023    MANDY 7.4 (L) 09/23/2023    PHOS 2.8 09/23/2023    MAG 1.8 09/20/2023    LIPASE 29 09/20/2023       Anesthesia  Authorizing Provider Outside Provider    Medication omeprazole (PrilOSEC) 20 MG capsule, Sig Take 20 mg by mouth daily., Start Date , End Date , Taking? Yes, Authorizing Provider Outside Provider    Medication LORazepam (ATIVAN) 1 MG tablet, Sig Take 1 mg by mouth every 8 hours as needed for Anxiety. PRN, Start Date , End Date , Taking? Yes, Authorizing Provider Outside Provider    Medication morphine, IMM REL, (MSIR) 15 MG tablet, Sig Take 30 mg by mouth 2 times daily. , Start Date , End Date , Taking? Yes, Authorizing Provider Outside Provider    Medication atorvastatin (LIPITOR) 20 MG tablet, Sig Take 20 mg by mouth daily., Start Date , End Date , Taking? Yes, Authorizing Provider Outside Provider    Medication mirabegron ER (Myrbetriq) 50 MG 24 hr tablet, Sig Take 50 mg by mouth daily., Start Date , End Date , Taking? Yes, Authorizing Provider Outside Provider    Medication dextrose 5 % Solution 50 mL with carfilzomib 60 MG Recon Soln, Sig Inject into the vein every 14 days. TUESDAY, Start Date , End Date , Taking? Yes, Authorizing Provider Outside Provider    Medication folic acid (FOLVITE) 800 MCG tablet, Sig Take 400 mcg by mouth daily., Start Date , End Date , Taking? Yes, Authorizing Provider Outside Provider    Medication senna (SENOKOT) 8.6 MG tablet, Sig Take 1 tablet by mouth daily., Start Date , End Date , Taking? Yes, Authorizing Provider Outside Provider    Medication Somatropin (ZOMACTON) 5 MG injectable solution, Sig Inject into the skin daily., Start Date , End Date , Taking? , Authorizing Provider Outside Provider    Medication Multiple Vitamins-Minerals (MULTIVITAL PO), Sig Take by mouth daily., Start Date , End Date , Taking? , Authorizing Provider Outside Provider    Medication Omega-3 Fatty Acids (Fish Oil) 1000 MG capsule, Sig Take 2 g by mouth daily. STOP NOW, Start Date , End Date , Taking? , Authorizing Provider Outside Provider            Relevant Problems   No relevant active problems  Plan    ASA Status:  3    NPO Status:  NPO Appropriate    Anesthesia Type: MAC.   Induction: Intravenous.   Maintenance: Balanced.        Consents    Anesthesia Plan(s) and associated risks, benefits, and realistic alternatives discussed. Questions answered and patient/representative(s) expressed understanding.     - Discussed:     - Discussed with:  Patient      - Extended Intubation/Ventilatory Support Discussed: Yes.      - Patient is DNR/DNI Status: No     Use of blood products discussed: No .     Postoperative Care    Pain management: Multi-modal analgesia.   PONV prophylaxis: Ondansetron (or other 5HT-3)     Comments:                Candida Leal MD         Physical Exam     Airway   Mallampati: II  TM Distance: >3 FB  Neck ROM: Full  Neck: Non-tender and Able to place in sniff position  TMJ Mobility: Good    Cardiovascular  Cardiovascular exam normal  Cardio Rhythm: Regular  Cardio Rate: Normal    Head Assessment  Head assessment: Normocephalic and Atraumatic    General Assessment  General Assessment: Alert and oriented and No acute distress    Dental Exam  Dental exam normal    Pulmonary Exam  Pulmonary exam normal  Breath sounds clear to auscultation:  Yes    Abdominal Exam  Abdominal exam normal  Patient Demonstrates:  Obese    Past Medical History:   Diagnosis Date   • Anemia    • Atrial fibrillation (CMS/HCC)    • Cardiomegaly    • Essential (primary) hypertension    • High cholesterol    • Malignant neoplasm (CMS/HCC)     multiple mlyeloma   • Sleep apnea    • Thyroid condition    • Venous thrombosis      Anesthesia Plan:  Anesthesia Plan    ASA Status: 3    Anesthesia Type: MAC    Induction: Intravenous  Preferred Airway Type: Nasal Cannula  Maintenance: TIVA      Post-op Pain Management: Per Surgeon      Checklist  Reviewed: NPO Status, Allergies, Medications, Problem list, Past Med History, Patient Summary, Lab Results, DNR Status and Beta Blocker Status  Consent/Risks Discussed Statement:  The proposed anesthetic plan, including its risks and benefits, have been discussed with the Patient along with the risks and benefits of alternatives. Questions were encouraged and answered and the patient and/or representative understands and agrees to proceed.    I have discussed elements of the patient's history or examination, as noted above and/or as follows, that place the patient at higher risk of complications; age and BMI> 30 (obesity).    I discussed with the patient (and/or patient's legal representative) the risks and benefits of the proposed anesthesia plan, MAC, which may include services performed by other anesthesia providers.    Alternative  anesthesia plans, if available, were reviewed with the patient (and/or patient's legal representative). Discussion has been held with the patient (and/or patient's legal representative) regarding risks of anesthesia, which include Intra-operative Awareness, Nausea, Sore Throat, Vomiting, Dental Injury, Headache, Emergence Delirium and aspiration and emergent situations that may require change in anesthesia plan.    The patient (and/or patient's legal representative) has indicated understanding, his/her questions have been answered, and he/she wishes to proceed with the planned anesthetic.    Informed Consent for Blood: Consented  Blood Products: Not Anticipated

## 2023-12-20 NOTE — TELEPHONE ENCOUNTER
Patient requesting refills for tenofovir 300 mg tab, spironolactone 100 mg daily, and furosemide 40 mg daily be sent to [facility].    Hanny PERALES LPN  Hepatology Clinic     [facility]    Phone Message    May a detailed message be left on voicemail: yes     Reason for Call: The patient called to schedule a follow-up appt with Dr. Leventhal and is scheduled for 7/15. The patient is requesting refills of their medications prior to the appt. Can someone call the patient to advise?    [facility]  [place], [place] - [address] [place] [address].      Action Taken: Message routed to:  [facility] ([facility]):  Hepatology    Travel Screening: Not Applicable

## 2023-12-20 NOTE — TELEPHONE ENCOUNTER
Refills sent to requested pharmacy.    Ketty Morgan, RN Care Coordinator  [facility]  Hepatology Clinic/Specialty Program

## 2024-01-01 ENCOUNTER — APPOINTMENT (OUTPATIENT)
Dept: INTERPRETER SERVICES | Facility: CLINIC | Age: 64
DRG: 432 | End: 2024-01-01
Payer: COMMERCIAL

## 2024-01-01 ENCOUNTER — APPOINTMENT (OUTPATIENT)
Dept: MRI IMAGING | Facility: CLINIC | Age: 64
DRG: 432 | End: 2024-01-01
Attending: INTERNAL MEDICINE
Payer: COMMERCIAL

## 2024-01-01 ENCOUNTER — APPOINTMENT (OUTPATIENT)
Dept: OCCUPATIONAL THERAPY | Facility: CLINIC | Age: 64
DRG: 432 | End: 2024-01-01
Payer: COMMERCIAL

## 2024-01-01 ENCOUNTER — APPOINTMENT (OUTPATIENT)
Dept: INTERPRETER SERVICES | Facility: CLINIC | Age: 64
End: 2024-01-01
Payer: COMMERCIAL

## 2024-01-01 ENCOUNTER — APPOINTMENT (OUTPATIENT)
Dept: INTERVENTIONAL RADIOLOGY/VASCULAR | Facility: CLINIC | Age: 64
DRG: 432 | End: 2024-01-01
Attending: PHYSICIAN ASSISTANT
Payer: COMMERCIAL

## 2024-01-01 ENCOUNTER — APPOINTMENT (OUTPATIENT)
Dept: PHYSICAL THERAPY | Facility: CLINIC | Age: 64
DRG: 432 | End: 2024-01-01
Payer: COMMERCIAL

## 2024-01-01 ENCOUNTER — TELEPHONE (OUTPATIENT)
Dept: NEUROSURGERY | Facility: CLINIC | Age: 64
End: 2024-01-01
Payer: COMMERCIAL

## 2024-01-01 ENCOUNTER — APPOINTMENT (OUTPATIENT)
Dept: OCCUPATIONAL THERAPY | Facility: CLINIC | Age: 64
DRG: 432 | End: 2024-01-01
Attending: INTERNAL MEDICINE
Payer: COMMERCIAL

## 2024-01-01 ENCOUNTER — APPOINTMENT (OUTPATIENT)
Dept: PHYSICAL THERAPY | Facility: CLINIC | Age: 64
DRG: 432 | End: 2024-01-01
Attending: INTERNAL MEDICINE
Payer: COMMERCIAL

## 2024-01-01 ENCOUNTER — APPOINTMENT (OUTPATIENT)
Dept: ULTRASOUND IMAGING | Facility: CLINIC | Age: 64
DRG: 432 | End: 2024-01-01
Attending: PHYSICIAN ASSISTANT
Payer: COMMERCIAL

## 2024-01-01 ENCOUNTER — PATIENT OUTREACH (OUTPATIENT)
Dept: CARE COORDINATION | Facility: CLINIC | Age: 64
End: 2024-01-01
Payer: COMMERCIAL

## 2024-01-01 ENCOUNTER — TELEPHONE (OUTPATIENT)
Dept: GASTROENTEROLOGY | Facility: CLINIC | Age: 64
End: 2024-01-01
Payer: COMMERCIAL

## 2024-01-01 ENCOUNTER — APPOINTMENT (OUTPATIENT)
Dept: CT IMAGING | Facility: CLINIC | Age: 64
DRG: 432 | End: 2024-01-01
Payer: COMMERCIAL

## 2024-01-01 ENCOUNTER — HOSPITAL ENCOUNTER (INPATIENT)
Facility: CLINIC | Age: 64
LOS: 13 days | Discharge: HOSPICE | DRG: 432 | End: 2024-02-18
Attending: EMERGENCY MEDICINE | Admitting: INTERNAL MEDICINE
Payer: COMMERCIAL

## 2024-01-01 ENCOUNTER — APPOINTMENT (OUTPATIENT)
Dept: GENERAL RADIOLOGY | Facility: CLINIC | Age: 64
DRG: 432 | End: 2024-01-01
Attending: INTERNAL MEDICINE
Payer: COMMERCIAL

## 2024-01-01 ENCOUNTER — APPOINTMENT (OUTPATIENT)
Dept: CT IMAGING | Facility: CLINIC | Age: 64
DRG: 432 | End: 2024-01-01
Attending: INTERNAL MEDICINE
Payer: COMMERCIAL

## 2024-01-01 VITALS
SYSTOLIC BLOOD PRESSURE: 115 MMHG | HEART RATE: 92 BPM | BODY MASS INDEX: 21.64 KG/M2 | WEIGHT: 130.07 LBS | DIASTOLIC BLOOD PRESSURE: 62 MMHG | TEMPERATURE: 97.9 F | OXYGEN SATURATION: 95 % | RESPIRATION RATE: 18 BRPM

## 2024-01-01 DIAGNOSIS — N17.9 AKI (ACUTE KIDNEY INJURY) (H): ICD-10-CM

## 2024-01-01 DIAGNOSIS — K59.00 CONSTIPATION, UNSPECIFIED CONSTIPATION TYPE: ICD-10-CM

## 2024-01-01 DIAGNOSIS — E87.6 HYPOKALEMIA: ICD-10-CM

## 2024-01-01 DIAGNOSIS — S06.5XAA SUBDURAL HEMATOMA (H): Primary | ICD-10-CM

## 2024-01-01 DIAGNOSIS — K56.51 INTESTINAL ADHESIONS WITH PARTIAL OBSTRUCTION (H): ICD-10-CM

## 2024-01-01 DIAGNOSIS — K21.00 GASTROESOPHAGEAL REFLUX DISEASE WITH ESOPHAGITIS WITHOUT HEMORRHAGE: ICD-10-CM

## 2024-01-01 DIAGNOSIS — R52 PAIN: ICD-10-CM

## 2024-01-01 DIAGNOSIS — E87.1 HYPONATREMIA: ICD-10-CM

## 2024-01-01 DIAGNOSIS — B02.9 HERPES ZOSTER WITHOUT COMPLICATION: ICD-10-CM

## 2024-01-01 DIAGNOSIS — C22.0 HCC (HEPATOCELLULAR CARCINOMA) (H): ICD-10-CM

## 2024-01-01 LAB
ABSOLUTE NEUTROPHILS, BODY FLUID: 4.3 /UL
AFP SERPL-MCNC: <1.8 NG/ML
ALBUMIN BODY FLUID SOURCE: NORMAL
ALBUMIN FLD-MCNC: 0.2 G/DL
ALBUMIN SERPL BCG-MCNC: 1.3 G/DL (ref 3.5–5.2)
ALBUMIN SERPL BCG-MCNC: 1.4 G/DL (ref 3.5–5.2)
ALBUMIN SERPL BCG-MCNC: 1.5 G/DL (ref 3.5–5.2)
ALBUMIN SERPL BCG-MCNC: 1.7 G/DL (ref 3.5–5.2)
ALBUMIN UR-MCNC: 10 MG/DL
ALP SERPL-CCNC: 134 U/L (ref 40–150)
ALP SERPL-CCNC: 138 U/L (ref 40–150)
ALP SERPL-CCNC: 142 U/L (ref 40–150)
ALP SERPL-CCNC: 145 U/L (ref 40–150)
ALP SERPL-CCNC: 160 U/L (ref 40–150)
ALP SERPL-CCNC: 160 U/L (ref 40–150)
ALP SERPL-CCNC: 162 U/L (ref 40–150)
ALP SERPL-CCNC: 164 U/L (ref 40–150)
ALP SERPL-CCNC: 164 U/L (ref 40–150)
ALP SERPL-CCNC: 184 U/L (ref 40–150)
ALP SERPL-CCNC: 186 U/L (ref 40–150)
ALT SERPL W P-5'-P-CCNC: 15 U/L (ref 0–70)
ALT SERPL W P-5'-P-CCNC: 16 U/L (ref 0–70)
ALT SERPL W P-5'-P-CCNC: 17 U/L (ref 0–70)
ALT SERPL W P-5'-P-CCNC: 18 U/L (ref 0–70)
ALT SERPL W P-5'-P-CCNC: 19 U/L (ref 0–70)
ALT SERPL W P-5'-P-CCNC: 19 U/L (ref 0–70)
ALT SERPL W P-5'-P-CCNC: 21 U/L (ref 0–70)
ANION GAP SERPL CALCULATED.3IONS-SCNC: 1 MMOL/L (ref 7–15)
ANION GAP SERPL CALCULATED.3IONS-SCNC: 11 MMOL/L (ref 7–15)
ANION GAP SERPL CALCULATED.3IONS-SCNC: 3 MMOL/L (ref 7–15)
ANION GAP SERPL CALCULATED.3IONS-SCNC: 4 MMOL/L (ref 7–15)
ANION GAP SERPL CALCULATED.3IONS-SCNC: 4 MMOL/L (ref 7–15)
ANION GAP SERPL CALCULATED.3IONS-SCNC: 5 MMOL/L (ref 7–15)
ANION GAP SERPL CALCULATED.3IONS-SCNC: <1 MMOL/L (ref 7–15)
APPEARANCE FLD: CLEAR
APPEARANCE UR: ABNORMAL
APTT PPP: 53 SECONDS (ref 22–38)
AST SERPL W P-5'-P-CCNC: 33 U/L (ref 0–45)
AST SERPL W P-5'-P-CCNC: 35 U/L (ref 0–45)
AST SERPL W P-5'-P-CCNC: 37 U/L (ref 0–45)
AST SERPL W P-5'-P-CCNC: 39 U/L (ref 0–45)
AST SERPL W P-5'-P-CCNC: 39 U/L (ref 0–45)
AST SERPL W P-5'-P-CCNC: 40 U/L (ref 0–45)
AST SERPL W P-5'-P-CCNC: 42 U/L (ref 0–45)
AST SERPL W P-5'-P-CCNC: 44 U/L (ref 0–45)
AST SERPL W P-5'-P-CCNC: 44 U/L (ref 0–45)
AST SERPL W P-5'-P-CCNC: 47 U/L (ref 0–45)
AST SERPL W P-5'-P-CCNC: ABNORMAL U/L
ATRIAL RATE - MUSE: 78 BPM
BACTERIA #/AREA URNS HPF: ABNORMAL /HPF
BACTERIA BLD CULT: NO GROWTH
BACTERIA BLD CULT: NO GROWTH
BACTERIA FLD CULT: NO GROWTH
BACTERIA UR CULT: ABNORMAL
BASOPHILS # BLD AUTO: 0 10E3/UL (ref 0–0.2)
BASOPHILS NFR BLD AUTO: 0 %
BASOPHILS NFR FLD MANUAL: 1 %
BILIRUB SERPL-MCNC: 2 MG/DL
BILIRUB SERPL-MCNC: 2.1 MG/DL
BILIRUB SERPL-MCNC: 2.2 MG/DL
BILIRUB SERPL-MCNC: 2.3 MG/DL
BILIRUB SERPL-MCNC: 2.4 MG/DL
BILIRUB SERPL-MCNC: 2.5 MG/DL
BILIRUB SERPL-MCNC: 2.8 MG/DL
BILIRUB SERPL-MCNC: 2.8 MG/DL
BILIRUB SERPL-MCNC: 3 MG/DL
BILIRUB SERPL-MCNC: 3.2 MG/DL
BILIRUB SERPL-MCNC: 3.2 MG/DL
BILIRUB UR QL STRIP: ABNORMAL
BUN SERPL-MCNC: 10.4 MG/DL (ref 8–23)
BUN SERPL-MCNC: 10.7 MG/DL (ref 8–23)
BUN SERPL-MCNC: 11.3 MG/DL (ref 8–23)
BUN SERPL-MCNC: 11.8 MG/DL (ref 8–23)
BUN SERPL-MCNC: 12 MG/DL (ref 8–23)
BUN SERPL-MCNC: 13.9 MG/DL (ref 8–23)
BUN SERPL-MCNC: 16.1 MG/DL (ref 8–23)
BUN SERPL-MCNC: 20.1 MG/DL (ref 8–23)
BUN SERPL-MCNC: 29.9 MG/DL (ref 8–23)
BUN SERPL-MCNC: 31.1 MG/DL (ref 8–23)
BUN SERPL-MCNC: 31.3 MG/DL (ref 8–23)
BUN SERPL-MCNC: 31.7 MG/DL (ref 8–23)
CALCIUM SERPL-MCNC: 6.5 MG/DL (ref 8.8–10.2)
CALCIUM SERPL-MCNC: 6.7 MG/DL (ref 8.8–10.2)
CALCIUM SERPL-MCNC: 6.8 MG/DL (ref 8.8–10.2)
CALCIUM SERPL-MCNC: 6.9 MG/DL (ref 8.8–10.2)
CALCIUM SERPL-MCNC: 6.9 MG/DL (ref 8.8–10.2)
CALCIUM SERPL-MCNC: 7 MG/DL (ref 8.8–10.2)
CALCIUM SERPL-MCNC: 7.1 MG/DL (ref 8.8–10.2)
CALCIUM SERPL-MCNC: 7.2 MG/DL (ref 8.8–10.2)
CALCIUM SERPL-MCNC: 7.5 MG/DL (ref 8.8–10.2)
CALCIUM SERPL-MCNC: 7.5 MG/DL (ref 8.8–10.2)
CEA SERPL-MCNC: 2.3 NG/ML
CELL COUNT BODY FLUID SOURCE: NORMAL
CF REDUC 60M P MA LENFR BLD TEG: 0 % (ref 0–15)
CFT BLD TEG: 2.3 MINUTE (ref 1–3)
CHLORIDE SERPL-SCNC: 100 MMOL/L (ref 98–107)
CHLORIDE SERPL-SCNC: 100 MMOL/L (ref 98–107)
CHLORIDE SERPL-SCNC: 101 MMOL/L (ref 98–107)
CHLORIDE SERPL-SCNC: 103 MMOL/L (ref 98–107)
CHLORIDE SERPL-SCNC: 103 MMOL/L (ref 98–107)
CHLORIDE SERPL-SCNC: 104 MMOL/L (ref 98–107)
CHLORIDE SERPL-SCNC: 104 MMOL/L (ref 98–107)
CHLORIDE SERPL-SCNC: 105 MMOL/L (ref 98–107)
CHLORIDE SERPL-SCNC: 107 MMOL/L (ref 98–107)
CHLORIDE SERPL-SCNC: 91 MMOL/L (ref 98–107)
CHLORIDE SERPL-SCNC: 96 MMOL/L (ref 98–107)
CHLORIDE SERPL-SCNC: 98 MMOL/L (ref 98–107)
CI (COAGULATION INDEX)(Z) NON NATIVE: -2.4 (ref -3–3)
CLOT ANGLE BLD TEG: 61 DEGREES (ref 53–72)
CLOT INIT BLD TEG: 6.6 MINUTE (ref 5–10)
CLOT LYSIS 30M P MA LENFR BLD TEG: 0 % (ref 0–8)
CLOT STRENGTH BLD TEG: 4.7 KD/SC (ref 4.5–11)
COLOR FLD: YELLOW
COLOR UR AUTO: YELLOW
CREAT SERPL-MCNC: 1.18 MG/DL (ref 0.67–1.17)
CREAT SERPL-MCNC: 1.2 MG/DL (ref 0.67–1.17)
CREAT SERPL-MCNC: 1.22 MG/DL (ref 0.67–1.17)
CREAT SERPL-MCNC: 1.22 MG/DL (ref 0.67–1.17)
CREAT SERPL-MCNC: 1.25 MG/DL (ref 0.67–1.17)
CREAT SERPL-MCNC: 1.27 MG/DL (ref 0.67–1.17)
CREAT SERPL-MCNC: 1.31 MG/DL (ref 0.67–1.17)
CREAT SERPL-MCNC: 1.36 MG/DL (ref 0.67–1.17)
CREAT SERPL-MCNC: 1.41 MG/DL (ref 0.67–1.17)
CREAT SERPL-MCNC: 1.73 MG/DL (ref 0.67–1.17)
CREAT SERPL-MCNC: 1.89 MG/DL (ref 0.67–1.17)
CREAT SERPL-MCNC: 2.07 MG/DL (ref 0.67–1.17)
CRP SERPL-MCNC: 28.6 MG/L
CRP SERPL-MCNC: 31.2 MG/L
DEPRECATED HCO3 PLAS-SCNC: 23 MMOL/L (ref 22–29)
DEPRECATED HCO3 PLAS-SCNC: 25 MMOL/L (ref 22–29)
DEPRECATED HCO3 PLAS-SCNC: 27 MMOL/L (ref 22–29)
DEPRECATED HCO3 PLAS-SCNC: 28 MMOL/L (ref 22–29)
DIASTOLIC BLOOD PRESSURE - MUSE: NORMAL MMHG
EGFRCR SERPLBLD CKD-EPI 2021: 36 ML/MIN/1.73M2
EGFRCR SERPLBLD CKD-EPI 2021: 40 ML/MIN/1.73M2
EGFRCR SERPLBLD CKD-EPI 2021: 44 ML/MIN/1.73M2
EGFRCR SERPLBLD CKD-EPI 2021: 56 ML/MIN/1.73M2
EGFRCR SERPLBLD CKD-EPI 2021: 59 ML/MIN/1.73M2
EGFRCR SERPLBLD CKD-EPI 2021: 62 ML/MIN/1.73M2
EGFRCR SERPLBLD CKD-EPI 2021: 64 ML/MIN/1.73M2
EGFRCR SERPLBLD CKD-EPI 2021: 65 ML/MIN/1.73M2
EGFRCR SERPLBLD CKD-EPI 2021: 67 ML/MIN/1.73M2
EGFRCR SERPLBLD CKD-EPI 2021: 67 ML/MIN/1.73M2
EGFRCR SERPLBLD CKD-EPI 2021: 68 ML/MIN/1.73M2
EGFRCR SERPLBLD CKD-EPI 2021: 70 ML/MIN/1.73M2
EOSINOPHIL # BLD AUTO: 0 10E3/UL (ref 0–0.7)
EOSINOPHIL NFR BLD AUTO: 1 %
ERYTHROCYTE [DISTWIDTH] IN BLOOD BY AUTOMATED COUNT: 23 % (ref 10–15)
ERYTHROCYTE [DISTWIDTH] IN BLOOD BY AUTOMATED COUNT: 23.4 % (ref 10–15)
ERYTHROCYTE [DISTWIDTH] IN BLOOD BY AUTOMATED COUNT: 24.4 % (ref 10–15)
FIBRINOGEN PPP-MCNC: 131 MG/DL (ref 170–490)
GLUCOSE BLDC GLUCOMTR-MCNC: 101 MG/DL (ref 70–99)
GLUCOSE BLDC GLUCOMTR-MCNC: 104 MG/DL (ref 70–99)
GLUCOSE BLDC GLUCOMTR-MCNC: 110 MG/DL (ref 70–99)
GLUCOSE BLDC GLUCOMTR-MCNC: 132 MG/DL (ref 70–99)
GLUCOSE BLDC GLUCOMTR-MCNC: 147 MG/DL (ref 70–99)
GLUCOSE BLDC GLUCOMTR-MCNC: 173 MG/DL (ref 70–99)
GLUCOSE BLDC GLUCOMTR-MCNC: 208 MG/DL (ref 70–99)
GLUCOSE BLDC GLUCOMTR-MCNC: 80 MG/DL (ref 70–99)
GLUCOSE BLDC GLUCOMTR-MCNC: 89 MG/DL (ref 70–99)
GLUCOSE BLDC GLUCOMTR-MCNC: 94 MG/DL (ref 70–99)
GLUCOSE SERPL-MCNC: 100 MG/DL (ref 70–99)
GLUCOSE SERPL-MCNC: 108 MG/DL (ref 70–99)
GLUCOSE SERPL-MCNC: 111 MG/DL (ref 70–99)
GLUCOSE SERPL-MCNC: 171 MG/DL (ref 70–99)
GLUCOSE SERPL-MCNC: 178 MG/DL (ref 70–99)
GLUCOSE SERPL-MCNC: 192 MG/DL (ref 70–99)
GLUCOSE SERPL-MCNC: 204 MG/DL (ref 70–99)
GLUCOSE SERPL-MCNC: 233 MG/DL (ref 70–99)
GLUCOSE SERPL-MCNC: 253 MG/DL (ref 70–99)
GLUCOSE SERPL-MCNC: 67 MG/DL (ref 70–99)
GLUCOSE SERPL-MCNC: 94 MG/DL (ref 70–99)
GLUCOSE SERPL-MCNC: 95 MG/DL (ref 70–99)
GLUCOSE UR STRIP-MCNC: NEGATIVE MG/DL
GRAM STAIN RESULT: NORMAL
GRAM STAIN RESULT: NORMAL
HBV DNA SERPL NAA+PROBE-ACNC: 287 IU/ML
HBV DNA SERPL NAA+PROBE-LOG IU: 2.5 {LOG_IU}/ML
HCT VFR BLD AUTO: 25 % (ref 40–53)
HCT VFR BLD AUTO: 25.3 % (ref 40–53)
HCT VFR BLD AUTO: 27.7 % (ref 40–53)
HGB BLD-MCNC: 7.8 G/DL (ref 13.3–17.7)
HGB BLD-MCNC: 8.1 G/DL (ref 13.3–17.7)
HGB BLD-MCNC: 8.8 G/DL (ref 13.3–17.7)
HGB UR QL STRIP: ABNORMAL
HOLD SPECIMEN: NORMAL
HYALINE CASTS: 54 /LPF
IMM GRANULOCYTES # BLD: 0 10E3/UL
IMM GRANULOCYTES NFR BLD: 1 %
INR PPP: 2.35 (ref 0.85–1.15)
INR PPP: 2.43 (ref 0.85–1.15)
INR PPP: 2.54 (ref 0.85–1.15)
INR PPP: 2.55 (ref 0.85–1.15)
INR PPP: 2.62 (ref 0.85–1.15)
INR PPP: 2.63 (ref 0.85–1.15)
INR PPP: 2.7 (ref 0.85–1.15)
INR PPP: 2.74 (ref 0.85–1.15)
INR PPP: 2.8 (ref 0.85–1.15)
INR PPP: 3.25 (ref 0.85–1.15)
INTERPRETATION ECG - MUSE: NORMAL
KETONES UR STRIP-MCNC: NEGATIVE MG/DL
LACTATE SERPL-SCNC: 1.9 MMOL/L (ref 0.7–2)
LACTATE SERPL-SCNC: 1.9 MMOL/L (ref 0.7–2)
LACTATE SERPL-SCNC: 2.4 MMOL/L (ref 0.7–2)
LACTATE SERPL-SCNC: 3.7 MMOL/L (ref 0.7–2)
LEUKOCYTE ESTERASE UR QL STRIP: NEGATIVE
LYMPHOCYTES # BLD AUTO: 0.6 10E3/UL (ref 0.8–5.3)
LYMPHOCYTES NFR BLD AUTO: 7 %
LYMPHOCYTES NFR FLD MANUAL: 27 %
MAGNESIUM SERPL-MCNC: 1.9 MG/DL (ref 1.7–2.3)
MAGNESIUM SERPL-MCNC: 2.3 MG/DL (ref 1.7–2.3)
MCF BLD TEG: 48.7 MM (ref 50–70)
MCH RBC QN AUTO: 25.9 PG (ref 26.5–33)
MCH RBC QN AUTO: 25.9 PG (ref 26.5–33)
MCH RBC QN AUTO: 26.7 PG (ref 26.5–33)
MCHC RBC AUTO-ENTMCNC: 31.2 G/DL (ref 31.5–36.5)
MCHC RBC AUTO-ENTMCNC: 31.8 G/DL (ref 31.5–36.5)
MCHC RBC AUTO-ENTMCNC: 32 G/DL (ref 31.5–36.5)
MCV RBC AUTO: 81 FL (ref 78–100)
MCV RBC AUTO: 83 FL (ref 78–100)
MCV RBC AUTO: 84 FL (ref 78–100)
MONOCYTES # BLD AUTO: 0.7 10E3/UL (ref 0–1.3)
MONOCYTES NFR BLD AUTO: 9 %
MONOS+MACROS NFR FLD MANUAL: 56 %
MUCOUS THREADS #/AREA URNS LPF: PRESENT /LPF
NEUTROPHILS # BLD AUTO: 7 10E3/UL (ref 1.6–8.3)
NEUTROPHILS NFR BLD AUTO: 82 %
NEUTS BAND NFR FLD MANUAL: 16 %
NITRATE UR QL: NEGATIVE
NRBC # BLD AUTO: 0 10E3/UL
NRBC BLD AUTO-RTO: 0 /100
P AXIS - MUSE: 55 DEGREES
PATH REPORT.COMMENTS IMP SPEC: NORMAL
PATH REPORT.FINAL DX SPEC: NORMAL
PATH REPORT.GROSS SPEC: NORMAL
PATH REPORT.MICROSCOPIC SPEC OTHER STN: NORMAL
PATH REPORT.RELEVANT HX SPEC: NORMAL
PH UR STRIP: 5 [PH] (ref 5–7)
PHOSPHATE SERPL-MCNC: 2.8 MG/DL (ref 2.5–4.5)
PLATELET # BLD AUTO: 111 10E3/UL (ref 150–450)
PLATELET # BLD AUTO: 83 10E3/UL (ref 150–450)
PLATELET # BLD AUTO: 88 10E3/UL (ref 150–450)
POTASSIUM SERPL-SCNC: 3.2 MMOL/L (ref 3.4–5.3)
POTASSIUM SERPL-SCNC: 3.2 MMOL/L (ref 3.4–5.3)
POTASSIUM SERPL-SCNC: 3.4 MMOL/L (ref 3.4–5.3)
POTASSIUM SERPL-SCNC: 4 MMOL/L (ref 3.4–5.3)
POTASSIUM SERPL-SCNC: 4.2 MMOL/L (ref 3.4–5.3)
POTASSIUM SERPL-SCNC: 4.2 MMOL/L (ref 3.4–5.3)
POTASSIUM SERPL-SCNC: 4.4 MMOL/L (ref 3.4–5.3)
POTASSIUM SERPL-SCNC: 4.5 MMOL/L (ref 3.4–5.3)
POTASSIUM SERPL-SCNC: 4.5 MMOL/L (ref 3.4–5.3)
POTASSIUM SERPL-SCNC: 4.6 MMOL/L (ref 3.4–5.3)
POTASSIUM SERPL-SCNC: 4.6 MMOL/L (ref 3.4–5.3)
POTASSIUM SERPL-SCNC: 7.7 MMOL/L (ref 3.4–5.3)
PR INTERVAL - MUSE: 194 MS
PROT FLD-MCNC: 0.6 G/DL
PROT SERPL-MCNC: 5.7 G/DL (ref 6.4–8.3)
PROT SERPL-MCNC: 5.8 G/DL (ref 6.4–8.3)
PROT SERPL-MCNC: 5.9 G/DL (ref 6.4–8.3)
PROT SERPL-MCNC: 6 G/DL (ref 6.4–8.3)
PROT SERPL-MCNC: 6.1 G/DL (ref 6.4–8.3)
PROT SERPL-MCNC: 6.3 G/DL (ref 6.4–8.3)
PROT SERPL-MCNC: 6.4 G/DL (ref 6.4–8.3)
PROT SERPL-MCNC: 6.4 G/DL (ref 6.4–8.3)
PROT SERPL-MCNC: 6.5 G/DL (ref 6.4–8.3)
PROTEIN BODY FLUID SOURCE: NORMAL
QRS DURATION - MUSE: 106 MS
QT - MUSE: 470 MS
QTC - MUSE: 535 MS
R AXIS - MUSE: 19 DEGREES
RBC # BLD AUTO: 3.01 10E6/UL (ref 4.4–5.9)
RBC # BLD AUTO: 3.13 10E6/UL (ref 4.4–5.9)
RBC # BLD AUTO: 3.3 10E6/UL (ref 4.4–5.9)
RBC URINE: 3 /HPF
SODIUM SERPL-SCNC: 125 MMOL/L (ref 135–145)
SODIUM SERPL-SCNC: 128 MMOL/L (ref 135–145)
SODIUM SERPL-SCNC: 128 MMOL/L (ref 135–145)
SODIUM SERPL-SCNC: 131 MMOL/L (ref 135–145)
SODIUM SERPL-SCNC: 132 MMOL/L (ref 135–145)
SODIUM SERPL-SCNC: 133 MMOL/L (ref 135–145)
SODIUM SERPL-SCNC: 134 MMOL/L (ref 135–145)
SODIUM SERPL-SCNC: 135 MMOL/L (ref 135–145)
SODIUM UR-SCNC: <20 MMOL/L
SP GR UR STRIP: 1.01 (ref 1–1.03)
SQUAMOUS EPITHELIAL: 1 /HPF
SYSTOLIC BLOOD PRESSURE - MUSE: NORMAL MMHG
T AXIS - MUSE: 23 DEGREES
TROPONIN T SERPL HS-MCNC: 17 NG/L
UROBILINOGEN UR STRIP-MCNC: 4 MG/DL
VENTRICULAR RATE- MUSE: 78 BPM
WBC # BLD AUTO: 5.6 10E3/UL (ref 4–11)
WBC # BLD AUTO: 5.9 10E3/UL (ref 4–11)
WBC # BLD AUTO: 8.4 10E3/UL (ref 4–11)
WBC # FLD AUTO: 27 /UL
WBC URINE: 32 /HPF

## 2024-01-01 PROCEDURE — 85610 PROTHROMBIN TIME: CPT

## 2024-01-01 PROCEDURE — 82040 ASSAY OF SERUM ALBUMIN: CPT

## 2024-01-01 PROCEDURE — 89050 BODY FLUID CELL COUNT: CPT | Performed by: INTERNAL MEDICINE

## 2024-01-01 PROCEDURE — 88305 TISSUE EXAM BY PATHOLOGIST: CPT | Mod: TC | Performed by: STUDENT IN AN ORGANIZED HEALTH CARE EDUCATION/TRAINING PROGRAM

## 2024-01-01 PROCEDURE — 258N000003 HC RX IP 258 OP 636: Performed by: PHYSICIAN ASSISTANT

## 2024-01-01 PROCEDURE — 99232 SBSQ HOSP IP/OBS MODERATE 35: CPT | Performed by: INTERNAL MEDICINE

## 2024-01-01 PROCEDURE — 99223 1ST HOSP IP/OBS HIGH 75: CPT | Performed by: NURSE PRACTITIONER

## 2024-01-01 PROCEDURE — 250N000013 HC RX MED GY IP 250 OP 250 PS 637: Performed by: INTERNAL MEDICINE

## 2024-01-01 PROCEDURE — 250N000011 HC RX IP 250 OP 636: Mod: JZ

## 2024-01-01 PROCEDURE — 250N000013 HC RX MED GY IP 250 OP 250 PS 637

## 2024-01-01 PROCEDURE — 120N000002 HC R&B MED SURG/OB UMMC

## 2024-01-01 PROCEDURE — 250N000009 HC RX 250: Performed by: INTERNAL MEDICINE

## 2024-01-01 PROCEDURE — 71046 X-RAY EXAM CHEST 2 VIEWS: CPT

## 2024-01-01 PROCEDURE — 85384 FIBRINOGEN ACTIVITY: CPT | Performed by: STUDENT IN AN ORGANIZED HEALTH CARE EDUCATION/TRAINING PROGRAM

## 2024-01-01 PROCEDURE — 82378 CARCINOEMBRYONIC ANTIGEN: CPT | Performed by: STUDENT IN AN ORGANIZED HEALTH CARE EDUCATION/TRAINING PROGRAM

## 2024-01-01 PROCEDURE — 250N000011 HC RX IP 250 OP 636: Mod: JZ | Performed by: INTERNAL MEDICINE

## 2024-01-01 PROCEDURE — 71250 CT THORAX DX C-: CPT

## 2024-01-01 PROCEDURE — 49083 ABD PARACENTESIS W/IMAGING: CPT | Performed by: INTERNAL MEDICINE

## 2024-01-01 PROCEDURE — 99233 SBSQ HOSP IP/OBS HIGH 50: CPT

## 2024-01-01 PROCEDURE — 250N000009 HC RX 250: Performed by: PHYSICIAN ASSISTANT

## 2024-01-01 PROCEDURE — 250N000011 HC RX IP 250 OP 636

## 2024-01-01 PROCEDURE — 97530 THERAPEUTIC ACTIVITIES: CPT | Mod: GP

## 2024-01-01 PROCEDURE — 97161 PT EVAL LOW COMPLEX 20 MIN: CPT | Mod: GP

## 2024-01-01 PROCEDURE — 84157 ASSAY OF PROTEIN OTHER: CPT | Performed by: INTERNAL MEDICINE

## 2024-01-01 PROCEDURE — 88112 CYTOPATH CELL ENHANCE TECH: CPT | Mod: 26 | Performed by: PATHOLOGY

## 2024-01-01 PROCEDURE — 97116 GAIT TRAINING THERAPY: CPT | Mod: GP

## 2024-01-01 PROCEDURE — P9047 ALBUMIN (HUMAN), 25%, 50ML: HCPCS

## 2024-01-01 PROCEDURE — 74183 MRI ABD W/O CNTR FLWD CNTR: CPT | Mod: 26 | Performed by: RADIOLOGY

## 2024-01-01 PROCEDURE — 86140 C-REACTIVE PROTEIN: CPT

## 2024-01-01 PROCEDURE — 272N000502 HC NEEDLE CR3

## 2024-01-01 PROCEDURE — 36415 COLL VENOUS BLD VENIPUNCTURE: CPT

## 2024-01-01 PROCEDURE — 99232 SBSQ HOSP IP/OBS MODERATE 35: CPT | Mod: FS | Performed by: PHYSICIAN ASSISTANT

## 2024-01-01 PROCEDURE — 99285 EMERGENCY DEPT VISIT HI MDM: CPT | Mod: 25 | Performed by: EMERGENCY MEDICINE

## 2024-01-01 PROCEDURE — P9047 ALBUMIN (HUMAN), 25%, 50ML: HCPCS | Performed by: PHYSICIAN ASSISTANT

## 2024-01-01 PROCEDURE — 87517 HEPATITIS B DNA QUANT: CPT | Performed by: STUDENT IN AN ORGANIZED HEALTH CARE EDUCATION/TRAINING PROGRAM

## 2024-01-01 PROCEDURE — 70450 CT HEAD/BRAIN W/O DYE: CPT

## 2024-01-01 PROCEDURE — 83735 ASSAY OF MAGNESIUM: CPT | Performed by: INTERNAL MEDICINE

## 2024-01-01 PROCEDURE — 84155 ASSAY OF PROTEIN SERUM: CPT | Performed by: PHYSICIAN ASSISTANT

## 2024-01-01 PROCEDURE — 87086 URINE CULTURE/COLONY COUNT: CPT

## 2024-01-01 PROCEDURE — 70450 CT HEAD/BRAIN W/O DYE: CPT | Mod: 26 | Performed by: RADIOLOGY

## 2024-01-01 PROCEDURE — 87205 SMEAR GRAM STAIN: CPT | Performed by: INTERNAL MEDICINE

## 2024-01-01 PROCEDURE — 80053 COMPREHEN METABOLIC PANEL: CPT

## 2024-01-01 PROCEDURE — 99207 PR SC NO CHARGE VISIT: CPT | Mod: FS | Performed by: PHYSICIAN ASSISTANT

## 2024-01-01 PROCEDURE — 99233 SBSQ HOSP IP/OBS HIGH 50: CPT | Performed by: INTERNAL MEDICINE

## 2024-01-01 PROCEDURE — 97535 SELF CARE MNGMENT TRAINING: CPT | Mod: GO | Performed by: OCCUPATIONAL THERAPIST

## 2024-01-01 PROCEDURE — 71046 X-RAY EXAM CHEST 2 VIEWS: CPT | Mod: 26 | Performed by: STUDENT IN AN ORGANIZED HEALTH CARE EDUCATION/TRAINING PROGRAM

## 2024-01-01 PROCEDURE — 80048 BASIC METABOLIC PNL TOTAL CA: CPT

## 2024-01-01 PROCEDURE — A9585 GADOBUTROL INJECTION: HCPCS | Performed by: INTERNAL MEDICINE

## 2024-01-01 PROCEDURE — 93005 ELECTROCARDIOGRAM TRACING: CPT | Performed by: EMERGENCY MEDICINE

## 2024-01-01 PROCEDURE — 87040 BLOOD CULTURE FOR BACTERIA: CPT | Performed by: INTERNAL MEDICINE

## 2024-01-01 PROCEDURE — 81001 URINALYSIS AUTO W/SCOPE: CPT

## 2024-01-01 PROCEDURE — 99223 1ST HOSP IP/OBS HIGH 75: CPT | Mod: AI | Performed by: INTERNAL MEDICINE

## 2024-01-01 PROCEDURE — 85396 CLOTTING ASSAY WHOLE BLOOD: CPT | Performed by: NURSE PRACTITIONER

## 2024-01-01 PROCEDURE — 83605 ASSAY OF LACTIC ACID: CPT | Performed by: INTERNAL MEDICINE

## 2024-01-01 PROCEDURE — 82040 ASSAY OF SERUM ALBUMIN: CPT | Performed by: PHYSICIAN ASSISTANT

## 2024-01-01 PROCEDURE — 74183 MRI ABD W/O CNTR FLWD CNTR: CPT

## 2024-01-01 PROCEDURE — 258N000003 HC RX IP 258 OP 636: Performed by: INTERNAL MEDICINE

## 2024-01-01 PROCEDURE — 99418 PROLNG IP/OBS E/M EA 15 MIN: CPT

## 2024-01-01 PROCEDURE — 36415 COLL VENOUS BLD VENIPUNCTURE: CPT | Performed by: STUDENT IN AN ORGANIZED HEALTH CARE EDUCATION/TRAINING PROGRAM

## 2024-01-01 PROCEDURE — 70450 CT HEAD/BRAIN W/O DYE: CPT | Mod: 76

## 2024-01-01 PROCEDURE — 36415 COLL VENOUS BLD VENIPUNCTURE: CPT | Performed by: PHYSICIAN ASSISTANT

## 2024-01-01 PROCEDURE — 97166 OT EVAL MOD COMPLEX 45 MIN: CPT | Mod: GO

## 2024-01-01 PROCEDURE — 84132 ASSAY OF SERUM POTASSIUM: CPT

## 2024-01-01 PROCEDURE — 250N000013 HC RX MED GY IP 250 OP 250 PS 637: Performed by: PEDIATRICS

## 2024-01-01 PROCEDURE — 82105 ALPHA-FETOPROTEIN SERUM: CPT | Performed by: STUDENT IN AN ORGANIZED HEALTH CARE EDUCATION/TRAINING PROGRAM

## 2024-01-01 PROCEDURE — 250N000013 HC RX MED GY IP 250 OP 250 PS 637: Performed by: PHYSICIAN ASSISTANT

## 2024-01-01 PROCEDURE — 82042 OTHER SOURCE ALBUMIN QUAN EA: CPT | Performed by: INTERNAL MEDICINE

## 2024-01-01 PROCEDURE — 49083 ABD PARACENTESIS W/IMAGING: CPT | Performed by: PHYSICIAN ASSISTANT

## 2024-01-01 PROCEDURE — 49083 ABD PARACENTESIS W/IMAGING: CPT

## 2024-01-01 PROCEDURE — 71250 CT THORAX DX C-: CPT | Mod: 26 | Performed by: RADIOLOGY

## 2024-01-01 PROCEDURE — 99418 PROLNG IP/OBS E/M EA 15 MIN: CPT | Performed by: PHYSICIAN ASSISTANT

## 2024-01-01 PROCEDURE — 88305 TISSUE EXAM BY PATHOLOGIST: CPT | Mod: 26 | Performed by: PATHOLOGY

## 2024-01-01 PROCEDURE — 83735 ASSAY OF MAGNESIUM: CPT | Performed by: EMERGENCY MEDICINE

## 2024-01-01 PROCEDURE — 250N000009 HC RX 250

## 2024-01-01 PROCEDURE — 82962 GLUCOSE BLOOD TEST: CPT

## 2024-01-01 PROCEDURE — 250N000011 HC RX IP 250 OP 636: Performed by: PHYSICIAN ASSISTANT

## 2024-01-01 PROCEDURE — 99223 1ST HOSP IP/OBS HIGH 75: CPT | Mod: 25 | Performed by: INTERNAL MEDICINE

## 2024-01-01 PROCEDURE — 99207 PR NO CHARGE LOS: CPT | Performed by: INTERNAL MEDICINE

## 2024-01-01 PROCEDURE — 97535 SELF CARE MNGMENT TRAINING: CPT | Mod: GO

## 2024-01-01 PROCEDURE — 99223 1ST HOSP IP/OBS HIGH 75: CPT | Mod: GC | Performed by: STUDENT IN AN ORGANIZED HEALTH CARE EDUCATION/TRAINING PROGRAM

## 2024-01-01 PROCEDURE — 250N000013 HC RX MED GY IP 250 OP 250 PS 637: Performed by: STUDENT IN AN ORGANIZED HEALTH CARE EDUCATION/TRAINING PROGRAM

## 2024-01-01 PROCEDURE — 97530 THERAPEUTIC ACTIVITIES: CPT | Mod: GO | Performed by: OCCUPATIONAL THERAPIST

## 2024-01-01 PROCEDURE — 99207 PR NO CHARGE LOS: CPT | Performed by: PEDIATRICS

## 2024-01-01 PROCEDURE — 96360 HYDRATION IV INFUSION INIT: CPT | Performed by: EMERGENCY MEDICINE

## 2024-01-01 PROCEDURE — 99231 SBSQ HOSP IP/OBS SF/LOW 25: CPT | Performed by: INTERNAL MEDICINE

## 2024-01-01 PROCEDURE — 99239 HOSP IP/OBS DSCHRG MGMT >30: CPT | Performed by: INTERNAL MEDICINE

## 2024-01-01 PROCEDURE — 85027 COMPLETE CBC AUTOMATED: CPT

## 2024-01-01 PROCEDURE — 255N000002 HC RX 255 OP 636: Performed by: INTERNAL MEDICINE

## 2024-01-01 PROCEDURE — 76700 US EXAM ABDOM COMPLETE: CPT | Mod: 26 | Performed by: RADIOLOGY

## 2024-01-01 PROCEDURE — 258N000003 HC RX IP 258 OP 636

## 2024-01-01 PROCEDURE — 85025 COMPLETE CBC W/AUTO DIFF WBC: CPT | Performed by: PHYSICIAN ASSISTANT

## 2024-01-01 PROCEDURE — 76700 US EXAM ABDOM COMPLETE: CPT

## 2024-01-01 PROCEDURE — 0W9G3ZZ DRAINAGE OF PERITONEAL CAVITY, PERCUTANEOUS APPROACH: ICD-10-PCS | Performed by: INTERNAL MEDICINE

## 2024-01-01 PROCEDURE — 36415 COLL VENOUS BLD VENIPUNCTURE: CPT | Performed by: NURSE PRACTITIONER

## 2024-01-01 PROCEDURE — 84300 ASSAY OF URINE SODIUM: CPT

## 2024-01-01 PROCEDURE — P9047 ALBUMIN (HUMAN), 25%, 50ML: HCPCS | Performed by: INTERNAL MEDICINE

## 2024-01-01 PROCEDURE — 85014 HEMATOCRIT: CPT | Performed by: INTERNAL MEDICINE

## 2024-01-01 PROCEDURE — 85730 THROMBOPLASTIN TIME PARTIAL: CPT

## 2024-01-01 PROCEDURE — 0W9G3ZZ DRAINAGE OF PERITONEAL CAVITY, PERCUTANEOUS APPROACH: ICD-10-PCS | Performed by: PHYSICIAN ASSISTANT

## 2024-01-01 PROCEDURE — 84484 ASSAY OF TROPONIN QUANT: CPT | Performed by: PHYSICIAN ASSISTANT

## 2024-01-01 PROCEDURE — 84132 ASSAY OF SERUM POTASSIUM: CPT | Performed by: STUDENT IN AN ORGANIZED HEALTH CARE EDUCATION/TRAINING PROGRAM

## 2024-01-01 PROCEDURE — 250N000011 HC RX IP 250 OP 636: Performed by: INTERNAL MEDICINE

## 2024-01-01 PROCEDURE — 99233 SBSQ HOSP IP/OBS HIGH 50: CPT | Mod: FS | Performed by: PHYSICIAN ASSISTANT

## 2024-01-01 PROCEDURE — 99233 SBSQ HOSP IP/OBS HIGH 50: CPT | Mod: 25 | Performed by: INTERNAL MEDICINE

## 2024-01-01 PROCEDURE — 93010 ELECTROCARDIOGRAM REPORT: CPT | Mod: 59 | Performed by: EMERGENCY MEDICINE

## 2024-01-01 PROCEDURE — 36415 COLL VENOUS BLD VENIPUNCTURE: CPT | Performed by: INTERNAL MEDICINE

## 2024-01-01 RX ORDER — CARVEDILOL 6.25 MG/1
6.25 TABLET ORAL 2 TIMES DAILY WITH MEALS
Status: DISCONTINUED | OUTPATIENT
Start: 2024-01-01 | End: 2024-01-01

## 2024-01-01 RX ORDER — LIDOCAINE 40 MG/G
CREAM TOPICAL
Status: DISCONTINUED | OUTPATIENT
Start: 2024-01-01 | End: 2024-01-01 | Stop reason: HOSPADM

## 2024-01-01 RX ORDER — CALCIUM CARBONATE 500 MG/1
1 TABLET, CHEWABLE ORAL 4 TIMES DAILY PRN
Qty: 120 TABLET | Refills: 0 | Status: SHIPPED | OUTPATIENT
Start: 2024-01-01

## 2024-01-01 RX ORDER — CALCIUM CARBONATE 500 MG/1
1000 TABLET, CHEWABLE ORAL 4 TIMES DAILY PRN
Status: DISCONTINUED | OUTPATIENT
Start: 2024-01-01 | End: 2024-01-01 | Stop reason: HOSPADM

## 2024-01-01 RX ORDER — FUROSEMIDE 20 MG
20 TABLET ORAL ONCE
Status: COMPLETED | OUTPATIENT
Start: 2024-01-01 | End: 2024-01-01

## 2024-01-01 RX ORDER — TENOFOVIR DISOPROXIL FUMARATE 300 MG/1
300 TABLET, FILM COATED ORAL EVERY OTHER DAY
Status: DISCONTINUED | OUTPATIENT
Start: 2024-01-01 | End: 2024-01-01

## 2024-01-01 RX ORDER — ACETAMINOPHEN 500 MG
1000 TABLET ORAL ONCE
Status: COMPLETED | OUTPATIENT
Start: 2024-01-01 | End: 2024-01-01

## 2024-01-01 RX ORDER — NALOXONE HYDROCHLORIDE 0.4 MG/ML
0.2 INJECTION, SOLUTION INTRAMUSCULAR; INTRAVENOUS; SUBCUTANEOUS
Status: CANCELLED | OUTPATIENT
Start: 2024-01-01

## 2024-01-01 RX ORDER — TENOFOVIR DISOPROXIL FUMARATE 300 MG/1
300 TABLET, FILM COATED ORAL DAILY
Status: DISCONTINUED | OUTPATIENT
Start: 2024-01-01 | End: 2024-01-01

## 2024-01-01 RX ORDER — ONDANSETRON 4 MG/1
4 TABLET, ORALLY DISINTEGRATING ORAL EVERY 6 HOURS PRN
Qty: 12 TABLET | Refills: 0 | Status: SHIPPED | OUTPATIENT
Start: 2024-01-01

## 2024-01-01 RX ORDER — ALBUTEROL SULFATE 0.83 MG/ML
2.5 SOLUTION RESPIRATORY (INHALATION)
Status: CANCELLED | OUTPATIENT
Start: 2024-01-01

## 2024-01-01 RX ORDER — GABAPENTIN 100 MG/1
300 CAPSULE ORAL 3 TIMES DAILY
Qty: 30 CAPSULE | Refills: 0 | Status: SHIPPED | OUTPATIENT
Start: 2024-01-01

## 2024-01-01 RX ORDER — OXYCODONE HYDROCHLORIDE 5 MG/1
2.5-5 TABLET ORAL EVERY 6 HOURS PRN
Qty: 10 TABLET | Refills: 0 | Status: SHIPPED | OUTPATIENT
Start: 2024-01-01

## 2024-01-01 RX ORDER — AMOXICILLIN 250 MG
1 CAPSULE ORAL 2 TIMES DAILY PRN
Status: DISCONTINUED | OUTPATIENT
Start: 2024-01-01 | End: 2024-01-01 | Stop reason: HOSPADM

## 2024-01-01 RX ORDER — ACETAMINOPHEN 325 MG/1
650 TABLET ORAL EVERY 8 HOURS PRN
Qty: 20 TABLET | Refills: 0 | Status: SHIPPED | OUTPATIENT
Start: 2024-01-01

## 2024-01-01 RX ORDER — POTASSIUM CHLORIDE 750 MG/1
40 TABLET, EXTENDED RELEASE ORAL ONCE
Status: DISCONTINUED | OUTPATIENT
Start: 2024-01-01 | End: 2024-01-01

## 2024-01-01 RX ORDER — TENOFOVIR DISOPROXIL FUMARATE 300 MG/1
300 TABLET, FILM COATED ORAL DAILY
Status: DISCONTINUED | OUTPATIENT
Start: 2024-01-01 | End: 2024-01-01 | Stop reason: HOSPADM

## 2024-01-01 RX ORDER — ALBUMIN (HUMAN) 12.5 G/50ML
25 SOLUTION INTRAVENOUS ONCE
Qty: 100 ML | Refills: 0 | Status: COMPLETED | OUTPATIENT
Start: 2024-01-01 | End: 2024-01-01

## 2024-01-01 RX ORDER — FUROSEMIDE 20 MG/1
10 TABLET ORAL ONCE
Status: COMPLETED | OUTPATIENT
Start: 2024-01-01 | End: 2024-01-01

## 2024-01-01 RX ORDER — ALBUTEROL SULFATE 90 UG/1
1-2 AEROSOL, METERED RESPIRATORY (INHALATION)
Status: CANCELLED
Start: 2024-01-01

## 2024-01-01 RX ORDER — AMOXICILLIN 250 MG
1 CAPSULE ORAL 2 TIMES DAILY PRN
Qty: 60 TABLET | Refills: 0 | Status: SHIPPED | OUTPATIENT
Start: 2024-01-01

## 2024-01-01 RX ORDER — AMOXICILLIN 250 MG
2 CAPSULE ORAL 2 TIMES DAILY PRN
Status: DISCONTINUED | OUTPATIENT
Start: 2024-01-01 | End: 2024-01-01 | Stop reason: HOSPADM

## 2024-01-01 RX ORDER — EPINEPHRINE 1 MG/ML
0.3 INJECTION, SOLUTION, CONCENTRATE INTRAVENOUS EVERY 5 MIN PRN
Status: CANCELLED | OUTPATIENT
Start: 2024-01-01

## 2024-01-01 RX ORDER — HYDROMORPHONE HCL IN WATER/PF 6 MG/30 ML
.2-.4 PATIENT CONTROLLED ANALGESIA SYRINGE INTRAVENOUS
Status: DISCONTINUED | OUTPATIENT
Start: 2024-01-01 | End: 2024-01-01 | Stop reason: HOSPADM

## 2024-01-01 RX ORDER — ALBUMIN (HUMAN) 12.5 G/50ML
50 SOLUTION INTRAVENOUS ONCE
Qty: 200 ML | Refills: 0 | Status: COMPLETED | OUTPATIENT
Start: 2024-01-01 | End: 2024-01-01

## 2024-01-01 RX ORDER — DIPHENHYDRAMINE HYDROCHLORIDE 50 MG/ML
50 INJECTION INTRAMUSCULAR; INTRAVENOUS
Status: CANCELLED
Start: 2024-01-01

## 2024-01-01 RX ORDER — GADOBUTROL 604.72 MG/ML
0.1 INJECTION INTRAVENOUS ONCE
Status: COMPLETED | OUTPATIENT
Start: 2024-01-01 | End: 2024-01-01

## 2024-01-01 RX ORDER — POTASSIUM CHLORIDE 750 MG/1
40 TABLET, EXTENDED RELEASE ORAL ONCE
Status: COMPLETED | OUTPATIENT
Start: 2024-01-01 | End: 2024-01-01

## 2024-01-01 RX ORDER — ALBUMIN (HUMAN) 12.5 G/50ML
25 SOLUTION INTRAVENOUS ONCE
Status: COMPLETED | OUTPATIENT
Start: 2024-01-01 | End: 2024-01-01

## 2024-01-01 RX ORDER — ALBUMIN (HUMAN) 12.5 G/50ML
12.5 SOLUTION INTRAVENOUS
Status: DISCONTINUED | OUTPATIENT
Start: 2024-01-01 | End: 2024-01-01 | Stop reason: HOSPADM

## 2024-01-01 RX ORDER — ACETAMINOPHEN 325 MG/1
650 TABLET ORAL EVERY 8 HOURS PRN
Status: DISCONTINUED | OUTPATIENT
Start: 2024-01-01 | End: 2024-01-01 | Stop reason: HOSPADM

## 2024-01-01 RX ORDER — POLYETHYLENE GLYCOL 3350 17 G/17G
17 POWDER, FOR SOLUTION ORAL DAILY
Status: DISCONTINUED | OUTPATIENT
Start: 2024-01-01 | End: 2024-01-01 | Stop reason: HOSPADM

## 2024-01-01 RX ORDER — POLYETHYLENE GLYCOL 3350 17 G/17G
1 POWDER, FOR SOLUTION ORAL DAILY
Qty: 510 G | Refills: 0 | Status: SHIPPED | OUTPATIENT
Start: 2024-01-01

## 2024-01-01 RX ORDER — LEVETIRACETAM 500 MG/1
500 TABLET ORAL 2 TIMES DAILY
Status: COMPLETED | OUTPATIENT
Start: 2024-01-01 | End: 2024-01-01

## 2024-01-01 RX ORDER — SODIUM CHLORIDE 9 MG/ML
INJECTION, SOLUTION INTRAVENOUS
Status: COMPLETED
Start: 2024-01-01 | End: 2024-01-01

## 2024-01-01 RX ORDER — LIDOCAINE HYDROCHLORIDE 10 MG/ML
20 INJECTION, SOLUTION EPIDURAL; INFILTRATION; INTRACAUDAL; PERINEURAL ONCE
Status: CANCELLED | OUTPATIENT
Start: 2024-01-01 | End: 2024-01-01

## 2024-01-01 RX ORDER — POTASSIUM CHLORIDE 1.5 G/1.58G
40 POWDER, FOR SOLUTION ORAL ONCE
Status: COMPLETED | OUTPATIENT
Start: 2024-01-01 | End: 2024-01-01

## 2024-01-01 RX ORDER — ONDANSETRON 4 MG/1
4 TABLET, ORALLY DISINTEGRATING ORAL EVERY 6 HOURS PRN
Status: DISCONTINUED | OUTPATIENT
Start: 2024-01-01 | End: 2024-01-01 | Stop reason: HOSPADM

## 2024-01-01 RX ORDER — HEPARIN SODIUM (PORCINE) LOCK FLUSH IV SOLN 100 UNIT/ML 100 UNIT/ML
5 SOLUTION INTRAVENOUS
Status: CANCELLED | OUTPATIENT
Start: 2024-01-01

## 2024-01-01 RX ORDER — ALBUMIN (HUMAN) 12.5 G/50ML
12.5 SOLUTION INTRAVENOUS
Status: CANCELLED | OUTPATIENT
Start: 2024-01-01

## 2024-01-01 RX ORDER — GABAPENTIN 300 MG/1
300 CAPSULE ORAL 3 TIMES DAILY
Status: DISCONTINUED | OUTPATIENT
Start: 2024-01-01 | End: 2024-01-01 | Stop reason: HOSPADM

## 2024-01-01 RX ORDER — CEFTRIAXONE 1 G/1
1 INJECTION, POWDER, FOR SOLUTION INTRAMUSCULAR; INTRAVENOUS EVERY 24 HOURS
Status: DISCONTINUED | OUTPATIENT
Start: 2024-01-01 | End: 2024-01-01

## 2024-01-01 RX ORDER — HEPARIN SODIUM,PORCINE 10 UNIT/ML
5 VIAL (ML) INTRAVENOUS
Status: CANCELLED | OUTPATIENT
Start: 2024-01-01

## 2024-01-01 RX ADMIN — GABAPENTIN 300 MG: 300 CAPSULE ORAL at 13:35

## 2024-01-01 RX ADMIN — POLYETHYLENE GLYCOL 3350 17 G: 17 POWDER, FOR SOLUTION ORAL at 08:55

## 2024-01-01 RX ADMIN — GABAPENTIN 300 MG: 300 CAPSULE ORAL at 14:23

## 2024-01-01 RX ADMIN — PHYTONADIONE 10 MG: 10 INJECTION, EMULSION INTRAMUSCULAR; INTRAVENOUS; SUBCUTANEOUS at 08:57

## 2024-01-01 RX ADMIN — LEVETIRACETAM 500 MG: 500 TABLET, FILM COATED ORAL at 08:32

## 2024-01-01 RX ADMIN — GABAPENTIN 300 MG: 300 CAPSULE ORAL at 20:48

## 2024-01-01 RX ADMIN — GABAPENTIN 300 MG: 300 CAPSULE ORAL at 20:50

## 2024-01-01 RX ADMIN — POLYETHYLENE GLYCOL 3350 17 G: 17 POWDER, FOR SOLUTION ORAL at 08:22

## 2024-01-01 RX ADMIN — GABAPENTIN 300 MG: 300 CAPSULE ORAL at 07:58

## 2024-01-01 RX ADMIN — ALBUMIN HUMAN 25 G: 0.25 SOLUTION INTRAVENOUS at 21:34

## 2024-01-01 RX ADMIN — LEVETIRACETAM 500 MG: 500 TABLET, FILM COATED ORAL at 20:14

## 2024-01-01 RX ADMIN — ACETAMINOPHEN 1000 MG: 500 TABLET ORAL at 20:21

## 2024-01-01 RX ADMIN — LEVETIRACETAM 500 MG: 500 TABLET, FILM COATED ORAL at 21:32

## 2024-01-01 RX ADMIN — GABAPENTIN 300 MG: 300 CAPSULE ORAL at 13:54

## 2024-01-01 RX ADMIN — Medication 10 MG: at 14:45

## 2024-01-01 RX ADMIN — LEVETIRACETAM 500 MG: 500 TABLET, FILM COATED ORAL at 20:10

## 2024-01-01 RX ADMIN — Medication 10 MG: at 08:52

## 2024-01-01 RX ADMIN — CEFTRIAXONE SODIUM 1 G: 1 INJECTION, POWDER, FOR SOLUTION INTRAMUSCULAR; INTRAVENOUS at 05:21

## 2024-01-01 RX ADMIN — GABAPENTIN 300 MG: 300 CAPSULE ORAL at 20:14

## 2024-01-01 RX ADMIN — GABAPENTIN 300 MG: 300 CAPSULE ORAL at 08:17

## 2024-01-01 RX ADMIN — GABAPENTIN 300 MG: 300 CAPSULE ORAL at 21:20

## 2024-01-01 RX ADMIN — TENOFOVIR DISOPROXIL FUMARATE 300 MG: 300 TABLET ORAL at 10:17

## 2024-01-01 RX ADMIN — GABAPENTIN 300 MG: 300 CAPSULE ORAL at 14:16

## 2024-01-01 RX ADMIN — GABAPENTIN 300 MG: 300 CAPSULE ORAL at 20:34

## 2024-01-01 RX ADMIN — GABAPENTIN 300 MG: 300 CAPSULE ORAL at 07:59

## 2024-01-01 RX ADMIN — GABAPENTIN 300 MG: 300 CAPSULE ORAL at 20:11

## 2024-01-01 RX ADMIN — LIDOCAINE HYDROCHLORIDE 5 ML: 10 INJECTION, SOLUTION EPIDURAL; INFILTRATION; INTRACAUDAL; PERINEURAL at 10:49

## 2024-01-01 RX ADMIN — POLYETHYLENE GLYCOL 3350 17 G: 17 POWDER, FOR SOLUTION ORAL at 08:44

## 2024-01-01 RX ADMIN — CARVEDILOL 6.25 MG: 6.25 TABLET, FILM COATED ORAL at 17:50

## 2024-01-01 RX ADMIN — SODIUM CHLORIDE 1000 ML: 9 INJECTION, SOLUTION INTRAVENOUS at 19:59

## 2024-01-01 RX ADMIN — GADOBUTROL 5.9 ML: 604.72 INJECTION INTRAVENOUS at 21:06

## 2024-01-01 RX ADMIN — GABAPENTIN 300 MG: 300 CAPSULE ORAL at 08:27

## 2024-01-01 RX ADMIN — POLYETHYLENE GLYCOL 3350 17 G: 17 POWDER, FOR SOLUTION ORAL at 09:38

## 2024-01-01 RX ADMIN — GABAPENTIN 300 MG: 300 CAPSULE ORAL at 08:45

## 2024-01-01 RX ADMIN — FUROSEMIDE 20 MG: 20 TABLET ORAL at 14:36

## 2024-01-01 RX ADMIN — GABAPENTIN 300 MG: 300 CAPSULE ORAL at 08:22

## 2024-01-01 RX ADMIN — POLYETHYLENE GLYCOL 3350 17 G: 17 POWDER, FOR SOLUTION ORAL at 08:15

## 2024-01-01 RX ADMIN — CEFTRIAXONE SODIUM 1 G: 1 INJECTION, POWDER, FOR SOLUTION INTRAMUSCULAR; INTRAVENOUS at 05:08

## 2024-01-01 RX ADMIN — GABAPENTIN 300 MG: 300 CAPSULE ORAL at 15:50

## 2024-01-01 RX ADMIN — CEFTRIAXONE SODIUM 1 G: 1 INJECTION, POWDER, FOR SOLUTION INTRAMUSCULAR; INTRAVENOUS at 05:16

## 2024-01-01 RX ADMIN — LEVETIRACETAM 500 MG: 500 TABLET, FILM COATED ORAL at 20:07

## 2024-01-01 RX ADMIN — POTASSIUM CHLORIDE 40 MEQ: 750 TABLET, EXTENDED RELEASE ORAL at 11:25

## 2024-01-01 RX ADMIN — TENOFOVIR DISOPROXIL FUMARATE 300 MG: 300 TABLET ORAL at 08:27

## 2024-01-01 RX ADMIN — GABAPENTIN 300 MG: 300 CAPSULE ORAL at 13:32

## 2024-01-01 RX ADMIN — POTASSIUM CHLORIDE 40 MEQ: 1.5 POWDER, FOR SOLUTION ORAL at 21:53

## 2024-01-01 RX ADMIN — GABAPENTIN 300 MG: 300 CAPSULE ORAL at 13:46

## 2024-01-01 RX ADMIN — CARVEDILOL 6.25 MG: 6.25 TABLET, FILM COATED ORAL at 17:57

## 2024-01-01 RX ADMIN — LEVETIRACETAM 500 MG: 500 TABLET, FILM COATED ORAL at 20:35

## 2024-01-01 RX ADMIN — LEVETIRACETAM 500 MG: 500 TABLET, FILM COATED ORAL at 08:27

## 2024-01-01 RX ADMIN — PHYTONADIONE 10 MG: 10 INJECTION, EMULSION INTRAMUSCULAR; INTRAVENOUS; SUBCUTANEOUS at 06:46

## 2024-01-01 RX ADMIN — Medication 5 MG: at 02:06

## 2024-01-01 RX ADMIN — LEVETIRACETAM 500 MG: 500 TABLET, FILM COATED ORAL at 08:55

## 2024-01-01 RX ADMIN — CEFTRIAXONE SODIUM 1 G: 1 INJECTION, POWDER, FOR SOLUTION INTRAMUSCULAR; INTRAVENOUS at 04:50

## 2024-01-01 RX ADMIN — GABAPENTIN 300 MG: 300 CAPSULE ORAL at 20:29

## 2024-01-01 RX ADMIN — TENOFOVIR DISOPROXIL FUMARATE 300 MG: 300 TABLET ORAL at 14:53

## 2024-01-01 RX ADMIN — LEVETIRACETAM 500 MG: 500 TABLET, FILM COATED ORAL at 08:47

## 2024-01-01 RX ADMIN — POLYETHYLENE GLYCOL 3350 17 G: 17 POWDER, FOR SOLUTION ORAL at 08:34

## 2024-01-01 RX ADMIN — SENNOSIDES AND DOCUSATE SODIUM 2 TABLET: 8.6; 5 TABLET ORAL at 17:55

## 2024-01-01 RX ADMIN — TENOFOVIR DISOPROXIL FUMARATE 300 MG: 300 TABLET ORAL at 07:59

## 2024-01-01 RX ADMIN — GABAPENTIN 300 MG: 300 CAPSULE ORAL at 08:03

## 2024-01-01 RX ADMIN — TENOFOVIR DISOPROXIL FUMARATE 300 MG: 300 TABLET ORAL at 08:45

## 2024-01-01 RX ADMIN — POLYETHYLENE GLYCOL 3350 17 G: 17 POWDER, FOR SOLUTION ORAL at 08:52

## 2024-01-01 RX ADMIN — TENOFOVIR DISOPROXIL FUMARATE 300 MG: 300 TABLET ORAL at 10:09

## 2024-01-01 RX ADMIN — FUROSEMIDE 20 MG: 20 TABLET ORAL at 09:51

## 2024-01-01 RX ADMIN — LEVETIRACETAM 500 MG: 500 TABLET, FILM COATED ORAL at 08:03

## 2024-01-01 RX ADMIN — ALBUMIN HUMAN 25 G: 0.25 SOLUTION INTRAVENOUS at 10:40

## 2024-01-01 RX ADMIN — Medication 10 MG: at 08:03

## 2024-01-01 RX ADMIN — Medication 10 MG: at 09:51

## 2024-01-01 RX ADMIN — Medication 10 MG: at 08:01

## 2024-01-01 RX ADMIN — CEFTRIAXONE SODIUM 1 G: 1 INJECTION, POWDER, FOR SOLUTION INTRAMUSCULAR; INTRAVENOUS at 04:59

## 2024-01-01 RX ADMIN — POLYETHYLENE GLYCOL 3350 17 G: 17 POWDER, FOR SOLUTION ORAL at 08:47

## 2024-01-01 RX ADMIN — GABAPENTIN 300 MG: 300 CAPSULE ORAL at 15:13

## 2024-01-01 RX ADMIN — GABAPENTIN 300 MG: 300 CAPSULE ORAL at 08:52

## 2024-01-01 RX ADMIN — SODIUM CHLORIDE 1000 ML: 9 INJECTION, SOLUTION INTRAVENOUS at 20:07

## 2024-01-01 RX ADMIN — CEFTRIAXONE SODIUM 1 G: 1 INJECTION, POWDER, FOR SOLUTION INTRAMUSCULAR; INTRAVENOUS at 05:11

## 2024-01-01 RX ADMIN — TENOFOVIR DISOPROXIL FUMARATE 300 MG: 300 TABLET ORAL at 08:34

## 2024-01-01 RX ADMIN — CARVEDILOL 6.25 MG: 6.25 TABLET, FILM COATED ORAL at 18:16

## 2024-01-01 RX ADMIN — GABAPENTIN 300 MG: 300 CAPSULE ORAL at 14:45

## 2024-01-01 RX ADMIN — CARVEDILOL 6.25 MG: 6.25 TABLET, FILM COATED ORAL at 08:47

## 2024-01-01 RX ADMIN — GABAPENTIN 300 MG: 300 CAPSULE ORAL at 08:34

## 2024-01-01 RX ADMIN — LEVETIRACETAM 500 MG: 500 TABLET, FILM COATED ORAL at 21:34

## 2024-01-01 RX ADMIN — ALBUMIN HUMAN 25 G: 0.25 SOLUTION INTRAVENOUS at 16:04

## 2024-01-01 RX ADMIN — POLYETHYLENE GLYCOL 3350 17 G: 17 POWDER, FOR SOLUTION ORAL at 08:32

## 2024-01-01 RX ADMIN — POLYETHYLENE GLYCOL 3350 17 G: 17 POWDER, FOR SOLUTION ORAL at 07:50

## 2024-01-01 RX ADMIN — Medication 10 MG: at 08:17

## 2024-01-01 RX ADMIN — GABAPENTIN 300 MG: 300 CAPSULE ORAL at 21:26

## 2024-01-01 RX ADMIN — POTASSIUM CHLORIDE 40 MEQ: 750 TABLET, EXTENDED RELEASE ORAL at 02:15

## 2024-01-01 RX ADMIN — SODIUM CHLORIDE 1000 ML: 9 INJECTION, SOLUTION INTRAVENOUS at 18:52

## 2024-01-01 RX ADMIN — Medication 10 MG: at 15:19

## 2024-01-01 RX ADMIN — TENOFOVIR DISOPROXIL FUMARATE 300 MG: 300 TABLET ORAL at 08:22

## 2024-01-01 RX ADMIN — TENOFOVIR DISOPROXIL FUMARATE 300 MG: 300 TABLET ORAL at 08:52

## 2024-01-01 RX ADMIN — GABAPENTIN 300 MG: 300 CAPSULE ORAL at 20:59

## 2024-01-01 RX ADMIN — TENOFOVIR DISOPROXIL FUMARATE 300 MG: 300 TABLET ORAL at 08:17

## 2024-01-01 RX ADMIN — LEVETIRACETAM 500 MG: 500 TABLET, FILM COATED ORAL at 10:23

## 2024-01-01 RX ADMIN — GABAPENTIN 300 MG: 300 CAPSULE ORAL at 08:47

## 2024-01-01 RX ADMIN — LEVETIRACETAM 500 MG: 500 TABLET, FILM COATED ORAL at 07:58

## 2024-01-01 RX ADMIN — POLYETHYLENE GLYCOL 3350 17 G: 17 POWDER, FOR SOLUTION ORAL at 08:27

## 2024-01-01 RX ADMIN — LEVETIRACETAM 500 MG: 500 TABLET, FILM COATED ORAL at 21:00

## 2024-01-01 RX ADMIN — GABAPENTIN 300 MG: 300 CAPSULE ORAL at 21:32

## 2024-01-01 RX ADMIN — CARVEDILOL 6.25 MG: 6.25 TABLET, FILM COATED ORAL at 17:58

## 2024-01-01 RX ADMIN — GABAPENTIN 300 MG: 300 CAPSULE ORAL at 14:36

## 2024-01-01 RX ADMIN — CARVEDILOL 6.25 MG: 6.25 TABLET, FILM COATED ORAL at 08:17

## 2024-01-01 RX ADMIN — FUROSEMIDE 20 MG: 20 TABLET ORAL at 08:45

## 2024-01-01 RX ADMIN — GABAPENTIN 300 MG: 300 CAPSULE ORAL at 20:07

## 2024-01-01 RX ADMIN — CARVEDILOL 6.25 MG: 6.25 TABLET, FILM COATED ORAL at 08:52

## 2024-01-01 RX ADMIN — CARVEDILOL 6.25 MG: 6.25 TABLET, FILM COATED ORAL at 17:21

## 2024-01-27 NOTE — ED PROVIDER NOTES
ED Provider Note  [facility]      History   No chief complaint on file.    HPI  Rishabh Cabrera is a 62 year old male who has a history of hepatocellular carcinoma hepatitis C and cirrhosis and upper GI bleed.  He is here with family member who reports that he has had melanotic stools for the last 2 or 3 days and then tonight had heme and hematemesis that was black and dark red.  He has not been feeling well for the last few days and the hematemesis started tonight.      Past Medical History  Past Medical History:   Diagnosis Date     Cancer (H)      Chronic hepatitis B (H)      Diabetes mellitus (H)      Past Surgical History:   Procedure Laterality Date     ESOPHAGOSCOPY, GASTROSCOPY, DUODENOSCOPY (EGD), COMBINED N/A 5/29/2019    Procedure: ESOPHAGOGASTRODUODENOSCOPY (EGD);  Surgeon: Leventhal, Thomas Michael, MD;  Location: [facility]     IR CHEMO EMBOLIZATION  12/2/2021     IR SIRT (SELECTIVE INTERNAL RADIO THERAPY)  9/8/2021     IR VISCERAL ANGIOGRAM  9/8/2021     IR VISCERAL EMBOLIZATION  9/9/2021     capsaicin (ZOSTRIX) 0.025 % external cream  Capsaicin-Menthol (SALONPAS GEL EX)  diclofenac (VOLTAREN) 1 % topical gel  enalapril (VASOTEC) 10 MG tablet  furosemide (LASIX) 20 MG tablet  gabapentin (NEURONTIN) 300 MG capsule  insulin glargine (LANTUS SOLOSTAR) 100 UNIT/ML pen  loratadine (CLARITIN) 10 MG tablet  metoprolol succinate ER (TOPROL XL) 50 MG 24 hr tablet  spironolactone (ALDACTONE) 50 MG tablet  tenofovir (VIREAD) 300 MG tablet  triamcinolone (KENALOG) 0.1 % external cream      Allergies   Allergen Reactions     Crabs [Crustaceans]      Pork (Porcine) Protein Itching and Unknown     Seafood Hives and Unknown     Shellfish Allergy Unknown     Family History  History reviewed. No pertinent family history.  Social History   Social History     Tobacco Use     Smoking status: Never Smoker     Smokeless tobacco: Never Used   Substance Use Topics     Alcohol use: No     Drug use: Not  Currently     Types: Marijuana     Comment: past marijuana use      Past medical history, past surgical history, medications, allergies, family history, and social history were reviewed with the patient. No additional pertinent items.       Review of Systems   Constitutional: Positive for activity change and fatigue.   Respiratory: Negative.    Cardiovascular: Negative.    Gastrointestinal: Positive for abdominal pain, blood in stool and vomiting.   Genitourinary: Negative.    All other systems reviewed and are negative.    A complete review of systems was performed with pertinent positives and negatives noted in the HPI, and all other systems negative.    Physical Exam   BP: 131/69  Pulse: 102  Temp: 97.2  F (36.2  C)  Resp: 22  Weight: 55.3 kg (122 lb)  SpO2: 100 %  Physical Exam  Vitals and nursing note reviewed.   Constitutional:       Appearance: He is ill-appearing and toxic-appearing.   HENT:      Mouth/Throat:      Mouth: Mucous membranes are moist.   Eyes:      Extraocular Movements: Extraocular movements intact.   Cardiovascular:      Rate and Rhythm: Tachycardia present.   Pulmonary:      Effort: Pulmonary effort is normal.      Breath sounds: Normal breath sounds.   Abdominal:      Palpations: Abdomen is soft.      Comments: Vomiting black/bloody material   Neurological:      General: No focal deficit present.      Mental Status: He is alert.   Psychiatric:         Mood and Affect: Mood normal.         Behavior: Behavior normal.         ED Course      Procedures          11:24 PM     2 IVs ordered Protonix ordered octreotide ordered packed cells ordered platelets ordered FFP ordered           No results found for any visits on 09/23/22.  Medications   sodium chloride (PF) 0.9% PF flush 3 mL (has no administration in time range)   sodium chloride (PF) 0.9% PF flush 3 mL (has no administration in time range)   sodium chloride (PF) 0.9% PF flush 3 mL (has no administration in time range)   sodium chloride  (PF) 0.9% PF flush 3 mL (has no administration in time range)   sodium chloride (PF) 0.9% PF flush 3 mL (has no administration in time range)   sodium chloride (PF) 0.9% PF flush 3 mL (has no administration in time range)   sodium chloride (PF) 0.9% PF flush 3 mL (has no administration in time range)   sodium chloride (PF) 0.9% PF flush 3 mL (has no administration in time range)   sodium chloride (PF) 0.9% PF flush 3 mL (has no administration in time range)   sodium chloride (PF) 0.9% PF flush 3 mL (has no administration in time range)   sodium chloride (PF) 0.9% PF flush 3 mL (has no administration in time range)   sodium chloride (PF) 0.9% PF flush 3 mL (has no administration in time range)   pantoprazole (PROTONIX) IV push injection 80 mg (has no administration in time range)   pantoprazole (PROTONIX) 80 mg in sodium chloride 0.9 % 100 mL infusion (has no administration in time range)   octreotide (sandoSTATIN) injection 50 mcg (has no administration in time range)   octreotide (sandoSTATIN) 1,250 mcg in D5W 250 mL (has no administration in time range)        Assessments & Plan (with Medical Decision Making)   62-year-old male with complex past history including hepatitis C hepatocellular carcinoma and cirrhosis now with upper GI bleed and 3 g drop in his hemoglobin over the last couple of days.  He is hemodynamically normal he is not tachycardic he has been given ceftriaxone, Protonix bolus Protonix drip and octreotide bolus and drip.  His lactate is elevated, will give him FFP and packed cells and try not to give excessive crystalloid as this would likely worsen his bleeding.  Medicine is tentatively accepted him to an [facility] bed after recheck of hemoglobin and the lactic acid.  He has had no additional upper or lower bleeding in the last 2 hours.    I have reviewed the nursing notes. I have reviewed the findings, diagnosis, plan and need for follow up with the patient.    New Prescriptions    No medications on  file       Final diagnoses:   Hepatocellular carcinoma (H)   Upper GI bleed       --  Eduardo Mayer MD  [facility] EMERGENCY DEPARTMENT  9/23/2022     Eduardo Mayer MD  09/24/22 0158     No

## 2024-02-01 NOTE — TELEPHONE ENCOUNTER
LVM; no MC; pt to schedule 1-2 with Dorian or Radha SUN; keep July appt with Dr. Leventhal in July- KB 2.1.24//

## 2024-02-05 PROBLEM — N17.9 AKI (ACUTE KIDNEY INJURY) (H): Status: ACTIVE | Noted: 2024-01-01

## 2024-02-05 PROBLEM — E87.1 HYPONATREMIA: Status: ACTIVE | Noted: 2024-01-01

## 2024-02-05 PROBLEM — C22.0 HCC (HEPATOCELLULAR CARCINOMA) (H): Status: ACTIVE | Noted: 2021-07-22

## 2024-02-05 NOTE — LETTER
[facility] [address]  [address] [address]  [phone]    FACSIMILE TRANSMITTAL SHEET    TO: [facility]   COMPANY: [facility]  FAX NUMBER: [phone]  PHONE NUMBER: [phone]     FROM: ZAIN Laurent, JAKE  ED/OBS   MURIEL Elizabeth  PHONE: [phone]      _____URGENT _____REVIEW ONLY _____PLEASE COMMENT____PLEASE REPLY    NOTES/COMMENTS: Discharge orders for TL ( 1960) if able to accept pt into your service    Main Contact: Pt and family want pt to discharge today and his son Ajit Cabrera ([phone]) will coordinate services       Pt is discharging to his brother's home and should arrive there 2024. Please contact [facility] to confirm date:      Li Cabrera ([phone])     [address] [place], [place] [address]     Pt discharged with 7 days of medication        IF YOU DID NOT RECEIVE THE CORRECT NUMBER OF PAGES OR THE FAX DID NOT COME THROUGH CLEARLY, PLEASE CALL THE SENDER     CONFIDENTIALITY STATEMENT: Confidential information that may accompany this transmission contains protected health information under state and federal law and is legally privileged. This information is intended only for the use of the individual or entity named above and may be used only for carrying out treatment, payment or other healthcare operations. The recipient or person responsible for delivering this information is prohibited by law from disclosing this information without proper authorization to any other party, unless required to do so by law or regulation. If you are not the intended recipient, you are hereby notified that any review, dissemination, distribution, or copying of this message is strictly prohibited. If you have received this communication in error, please destroy the materials and contact us immediately by calling the number listed above. No response indicates that the information was received by the appropriate authorized party

## 2024-02-05 NOTE — LETTER
[facility] [address]  [address] [address]  [phone]    FACSIMILE TRANSMITTAL SHEET    TO: Brittany/Admissions   COMPANY: [facility]  FAX NUMBER: [phone]  PHONE NUMBER: [phone]     FROM: ZAIN Laurent, LGSW  ED/OBS   MURIEL Elizabeth  PHONE: [phone]  DATE: 2024      _____URGENT _____REVIEW ONLY _____PLEASE COMMENT____PLEASE REPLY    NOTES/COMMENTS: Hospice Referral for TL ( 1960)    Main contact: son Ajit Cabrera ([phone])    Discharging to his brother's home:    Li Cabrera ([phone])  [address] [place], [place] [address]          IF YOU DID NOT RECEIVE THE CORRECT NUMBER OF PAGES OR THE FAX DID NOT COME THROUGH CLEARLY, PLEASE CALL THE SENDER     CONFIDENTIALITY STATEMENT: Confidential information that may accompany this transmission contains protected health information under state and federal law and is legally privileged. This information is intended only for the use of the individual or entity named above and may be used only for carrying out treatment, payment or other healthcare operations. The recipient or person responsible for delivering this information is prohibited by law from disclosing this information without proper authorization to any other party, unless required to do so by law or regulation. If you are not the intended recipient, you are hereby notified that any review, dissemination, distribution, or copying of this message is strictly prohibited. If you have received this communication in error, please destroy the materials and contact us immediately by calling the number listed above. No response indicates that the information was received by the appropriate authorized party

## 2024-02-05 NOTE — LETTER
Transition Communication Hand-off for Care Transitions to Next Level of Care Provider    Name: Rishabh Cabrera  : 1960  MRN #: [id number]  Primary Care Provider: Fariba George     [facility] [facility] [address]     Reason for Hospitalization:  Hypokalemia [E87.6]  Hyponatremia [E87.1]  HCC (hepatocellular carcinoma) (H) [C22.0]  JUAN C (acute kidney injury) (H24) [N17.9]  Admit Date/Time: 2024  6:52 PM  Discharge Date: 2024    Payor Source: Payor: HU[employer] / Plan: HU[employer]NA MEDICARE ADVANTAGE / Product Type: Medicare /            Reason for Communication Hand-off Referral: Patient is entering Hospice and moving to [place] to live the remainder of his life with his brother    Discharge Plan: Per [facility]     Follow-up plan:    Future Appointments   Date Time Provider Department Center   2024  7:00 PM Michelle Ahn, PT URPT [place]   3/1/2024  7:00 AM [facility]   3/1/2024  4:00 PM Niki Quintero PA-C [facility]   2024  3:00 PM Mk Zhong MD [facility]   7/15/2024 10:30 AM  LAB [facility]   7/15/2024 11:30 AM Leventhal, Thomas Michael, MD [facility]       Any outstanding tests or procedures:        Referrals       Future Labs/Procedures    Home Care Referral     Comments:    Your provider has ordered home health services. If you have not been contacted within 2 days of your discharge please call the selected Home Care agency listed on your Discharge document.  If a Home Care agency is NOT listed, please call [phone].            Supplies       Future Labs/Procedures    Walker Order     Process Instructions:    By signing this order, the Authorizing Provider is attesting that they have completed a face-to-face evaluation on the patient to determine their need for this equipment in the last 60 days. A new face-to-face evaluation is required each time  A new prescription for one of the specified items is ordered.   If an additional provider  completed the evaluation, please indicate their name below.     **As of 2018, an order requisition and face sheet will print for all DME orders. Please give printed order and face sheet to patient if not obtaining product from [facility] DME closet.     Comments:    I, the undersigned, certify that the above prescribed supplies are medically necessary for this patient and is both reasonable and necessary in reference to accepted standards of medical and necessary in reference to accepted standards of medical practice in the treatment of this patient's condition and is not prescribed as a convenience.             Key Recommendations:      ZAIN MOREL    AVS/Discharge Summary is the source of truth; this is a helpful guide for improved communication of patient story

## 2024-02-05 NOTE — LETTER
[facility] [address]  [address] [address]  [phone]    FACSIMILE TRANSMITTAL SHEET    TO: Brittany  COMPANY: [facility]  FAX NUMBER: [phone]  PHONE NUMBER: [phone]     FROM: ZAIN Laurent, JAKE    MURIEL Elizabeth  PHONE: [phone]    DATE: 2024      _____URGENT _____REVIEW ONLY _____PLEASE COMMENT____PLEASE REPLY    NOTES/COMMENTS: Discharge orders for TL ( 1960)     Main contact: son Ajit Cabrera ([phone])        Discharging to his brother's home:      Li Cabrera ([phone])   [address], [facility] [address]                                            IF YOU DID NOT RECEIVE THE CORRECT NUMBER OF PAGES OR THE FAX DID NOT COME THROUGH CLEARLY, PLEASE CALL THE SENDER     CONFIDENTIALITY STATEMENT: Confidential information that may accompany this transmission contains protected health information under state and federal law and is legally privileged. This information is intended only for the use of the individual or entity named above and may be used only for carrying out treatment, payment or other healthcare operations. The recipient or person responsible for delivering this information is prohibited by law from disclosing this information without proper authorization to any other party, unless required to do so by law or regulation. If you are not the intended recipient, you are hereby notified that any review, dissemination, distribution, or copying of this message is strictly prohibited. If you have received this communication in error, please destroy the materials and contact us immediately by calling the number listed above. No response indicates that the information was received by the appropriate authorized party

## 2024-02-05 NOTE — LETTER
[facility] [address]  [address] [address]  [phone]    FACSIMILE TRANSMITTAL SHEET    TO: [employer]   COMPANY: [facility]  FAX NUMBER: [phone]   PHONE NUMBER: [phone]     FROM: ZAIN Laurent, JAKE    MURIEL Elizabeth  PHONE: [phone]         NOTES/COMMENTS: Hospice Referral for TL ( 1960)     Main Contact: Pt and family want pt to discharge today and his son Ajit Cabrera ([phone]) will coordinate services       Pt is discharging to his brother's home and should arrive there 2024. Please contact [facility] to confirm date:      Li Cabrera ([phone]) [address], [place] [address]                                     IF YOU DID NOT RECEIVE THE CORRECT NUMBER OF PAGES OR THE FAX DID NOT COME THROUGH CLEARLY, PLEASE CALL THE SENDER     CONFIDENTIALITY STATEMENT: Confidential information that may accompany this transmission contains protected health information under state and federal law and is legally privileged. This information is intended only for the use of the individual or entity named above and may be used only for carrying out treatment, payment or other healthcare operations. The recipient or person responsible for delivering this information is prohibited by law from disclosing this information without proper authorization to any other party, unless required to do so by law or regulation. If you are not the intended recipient, you are hereby notified that any review, dissemination, distribution, or copying of this message is strictly prohibited. If you have received this communication in error, please destroy the materials and contact us immediately by calling the number listed above. No response indicates that the information was received by the appropriate authorized party

## 2024-02-06 NOTE — CONSULTS
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 Found to have JUAN C, hyponatremia, and subacute/chronic subdural hematoma. Abdominal US concerning for recurrent HCC.       #. Decompensated Cirrhosis 2/2 hepatitis B virus, c/b ascites, variceal bleed, HCC  Primary Hepatologist: Dr. Leventhal  MELD 3.0: 34 at 2/6/2024  6:50 AM  MELD-Na: 32 at 2/6/2024  6:50 AM  Calculated from:  Serum Creatinine: 1.73 mg/dL at 2/6/2024  6:50 AM  Serum Sodium: 128 mmol/L at 2/6/2024  6:50 AM  Total Bilirubin: 2.4 mg/dL at 2/6/2024  6:50 AM  Serum Albumin: 1.3 g/dL (Using min of 1.5 g/dL) at 2/6/2024  6:50 AM  INR(ratio): 3.25 at 2/6/2024  3:22 AM  Age at listing (hypothetical): 63 years  Sex: Male at 2/6/2024  6:50 AM    Ascites: Present   - PTA lasix 40mg daily and spironolactone 100mg daily   - SBP history: none  Hepatic encephalopathy: No history of   EV: EGD 11/2023 - small varices, portal HTN gastropathy   - H/o EVH 1/2023   HCC: see below     # Liver lesions   # H/o HCC   Y90 in September 2021 followed by chemoembolization for residual disease in December 2021. MRI 1/2023 showed slight increase in several LR3 lesions in R lobe. Planned for sort interval 3 month follow up MRI which did not occur. Now with US findings of hepatic lesions (largest 5cm) concerning for HCC recurrence.  Patient also with spiculated lung lesion which he had seen pulmonary before and planned for internal imaging surveillance. Suspect liver lesions are related to HCC recurrence however  metastatic disease also on differential      # Hepatitis B   On tenofovir however unclear if patient consistently taking medication as last Hep B viral load was >600k.     # Hyponatremia   # Acute kidney injury   Likely pre renal given poor PO intake. Improved with IVF on admission.     # Dysphagia   Notes at least 2 months of symptoms of solid food getting caught in lower chest. Recent EGD 11/2023 without concerning findings. If persistent symptoms limiting PO intake, may consider repeating EGD.     RECOMMENDATIONS  - Recommend MRI liver with and without contrast   - Recommend CT chest to follow up on previously noted spiculated lesion.   - Recommend paracentesis with fluid studies, including gram stain, cell count & differential, culture and cytology   - AFP and Hepatitis B PCR added on to labs.   - albumin 25% 50 g today (1g/kg)  - Hold diuretics   - IV vitamin K x 2 days   - Continue tenofovir renally dosed  - Low sodium diet (< 2g) to reduce ascites and high protein calorie intake for malnutrition (1.1-1.5g/kg per day) and nutrition consultation        Thank you for involving us in this patient's care. Please do not hesitate to contact the GI service with any questions or concerns.     Patient care plan discussed with Dr. Noguera, GI staff physician.    Wade Gallagher MD  Gastroenterology Fellow  [facility]                 Chief Complaint:   We were asked by Dr. Vivar  of medicine to evaluate this patient with hx hcc, hyperbiirubinemia and multiple liver masses     History is obtained from the patient and the medical record.          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 Found to have JUAN C, hyponatremia, and subacute/chronic subdural hematoma.     Patient notes decreased PO intake for 2 months related to food getting stuck in lower chest and not tasting well. Also notes he sons are not around to cook for him. No dysphagia to liquids. More recently he had not been eating well due to stress of recent death of his sister.     While at his sister's  he felt very weak, fatigued and near falls. Reported numbness in his face and recent tremors (reports occurred while he was crying at ). Presented to the ED with these symptoms, found to have hypokalemia, hyponatremia, JUAN C. Imaging of liver shows multiple lesions concerning for malignancy. Head CT showed subacute to chronic subdural hematoma with mild mass effect.     This morning we discussed the US findings of the liver which patient was understandably upset by. He feels that his hepatitis B medication is not working well and would like to discuss a different therapy.       Last saw Dorian Levy in clinic 2023. Regarding HCC history he had Y90 in 2021 followed by chemoembolization for residual disease in 2021. MRI 2023 showed slight increase in several LR3 lesions in R lobe. Planned for sort interval 3 month follow up MRI which did not occur.             Past Medical History:   Reviewed and edited as appropriate  Past Medical History:   Diagnosis Date    Cancer (H)     Chronic hepatitis B (H)     Diabetes mellitus (H)             Past Surgical History:   Reviewed and edited as appropriate   Past Surgical History:   Procedure Laterality Date    COLON SURGERY Right 2015    Laparoscopic hand assisted right Hemicolectomy at [facility]    ESOPHAGOSCOPY, GASTROSCOPY, DUODENOSCOPY (EGD), COMBINED N/A 2019    Procedure: ESOPHAGOGASTRODUODENOSCOPY (EGD);  Surgeon: Leventhal, Thomas Michael, MD;  Location:  GI    ESOPHAGOSCOPY,  GASTROSCOPY, DUODENOSCOPY (EGD), COMBINED N/A 2/3/2023    Procedure: ESOPHAGOGASTRODUODENOSCOPY, WITH BIOPSY;  Surgeon: Elan Morris MD;  Location: [facility]    ESOPHAGOSCOPY, GASTROSCOPY, DUODENOSCOPY (EGD), COMBINED N/A 11/16/2023    Procedure: Esophagoscopy, gastroscopy, duodenoscopy (EGD), combined;  Surgeon: Elan Morris MD;  Location: [facility] GI    IR CHEMO EMBOLIZATION  12/02/2021    IR PARACENTESIS  01/24/2023    IR SIRT (SELECTIVE INTERNAL RADIO THERAPY)  09/08/2021    IR VISCERAL ANGIOGRAM  09/08/2021    IR VISCERAL EMBOLIZATION  09/09/2021            Previous Endoscopy:   No results found for this or any previous visit.         Social History:   Reviewed and edited as appropriate  Social History     Socioeconomic History    Marital status:      Spouse name: Not on file    Number of children: Not on file    Years of education: Not on file    Highest education level: Not on file   Occupational History    Not on file   Tobacco Use    Smoking status: Never    Smokeless tobacco: Never   Substance and Sexual Activity    Alcohol use: No    Drug use: Yes     Types: Marijuana    Sexual activity: Not Currently   Other Topics Concern    Parent/sibling w/ CABG, MI or angioplasty before 65F 55M? Not Asked   Social History Narrative    Not on file     Social Determinants of Health     Financial Resource Strain: Not on file   Food Insecurity: Not on file   Transportation Needs: Not on file   Physical Activity: Not on file   Stress: Not on file   Social Connections: Not on file   Interpersonal Safety: Not At Risk (6/20/2022)    Humiliation, Afraid, Rape, and Kick questionnaire     Fear of Current or Ex-Partner: No     Emotionally Abused: No     Physically Abused: No     Sexually Abused: No   Housing Stability: Not on file            Family History:   Reviewed and edited as appropriate  No known history of gastrointestinal/liver disease or  gastrointestinal malignancies       Allergies:   Reviewed and  edited as appropriate     Allergies   Allergen Reactions    Crabs [Crustaceans]     Pork (Porcine) Protein Itching and Unknown    Seafood Hives and Unknown    Shellfish Allergy Unknown            Review of Systems:     A complete review of systems was performed and is negative except as noted in the HPI           Physical Exam:   BP 90/57 (BP Location: Left arm)   Pulse 75   Temp 98.1  F (36.7  C) (Oral)   Resp 13   Wt 56.7 kg (125 lb)   SpO2 98%   BMI 20.80 kg/m    Wt:   Wt Readings from Last 2 Encounters:   02/06/24 56.7 kg (125 lb)   09/20/23 56.7 kg (125 lb)      Constitutional: no acute distress.   Eyes: Sclera anicteric  Ears/nose/mouth/throat: Normal oropharynx without ulcers or exudate, mucus membranes moist, hearing intact  CV: No edema  Respiratory: Unlabored breathing  Lymph: NAD  Abdomen: distended, nontender, no peritonitis   Skin: warm, perfused, no jaundice  Neuro: AAO x 3, No asterixis  Psych: Normal affect  Extremities: pitting edema in lower legs          Data:   Labs and imaging below were independently reviewed and interpreted    MELD 3.0: 34 at 2/6/2024  6:50 AM  MELD-Na: 32 at 2/6/2024  6:50 AM  Calculated from:  Serum Creatinine: 1.73 mg/dL at 2/6/2024  6:50 AM  Serum Sodium: 128 mmol/L at 2/6/2024  6:50 AM  Total Bilirubin: 2.4 mg/dL at 2/6/2024  6:50 AM  Serum Albumin: 1.3 g/dL (Using min of 1.5 g/dL) at 2/6/2024  6:50 AM  INR(ratio): 3.25 at 2/6/2024  3:22 AM  Age at listing (hypothetical): 63 years  Sex: Male at 2/6/2024  6:50 AM       BMP  Recent Labs   Lab 02/06/24  0650 02/06/24  0030 02/05/24  1903   * 128* 125*   POTASSIUM 3.4 3.2* 3.2*   CHLORIDE 98 96* 91*   MANDY 6.7* 6.5* 6.9*   CO2 27 27 23   BUN 31.1* 31.3* 31.7*   CR 1.73* 1.89* 2.07*   GLC 94 111* 233*     CBC  Recent Labs   Lab 02/06/24  0650 02/05/24  1903   WBC 5.6 8.4   RBC 3.13* 3.01*   HGB 8.1* 7.8*   HCT 25.3* 25.0*   MCV 81 83   MCH 25.9* 25.9*   MCHC 32.0 31.2*   RDW 23.4* 23.0*   PLT 83* 88*     INR  Recent  Labs   Lab 02/06/24  0322   INR 3.25*     LFTs  Recent Labs   Lab 02/06/24  0650 02/05/24  1903   ALKPHOS 134 138   AST 44  --    ALT 15 18   BILITOTAL 2.4* 3.0*   PROTTOTAL 5.7* 6.3*   ALBUMIN 1.3* 1.4*

## 2024-02-06 NOTE — PLAN OF CARE
Problem: Adult Inpatient Plan of Care  Goal: Plan of Care Review  Description: The Plan of Care Review/Shift note should be completed every shift.  The Outcome Evaluation is a brief statement about your assessment that the patient is improving, declining, or no change.  This information will be displayed automatically on your shift  note.  Outcome: Progressing  Flowsheets (Taken 2/6/2024 1013)  Outcome Evaluation: see RD note 2/6  Plan of Care Reviewed With: patient  Overall Patient Progress: no change   Goal Outcome Evaluation:      Plan of Care Reviewed With: patient    Overall Patient Progress: no changeOverall Patient Progress: no change    Outcome Evaluation: see RD note 2/6    Darlene Nieves MS, RD, LD, CNSC    6C (beds 6472-2857) + 7C (beds 4000-1345) + ED   RD pager: [phone]  Weekend/Holiday RD pager: [phone]

## 2024-02-06 NOTE — PLAN OF CARE
Goal Outcome Evaluation:      Plan of Care Reviewed With: patient    Overall Patient Progress: no changeOverall Patient Progress: no change    Outcome Evaluation:  available and will continue to follow for discharge planning and supports as needed.  _______________________    CHELLY Valdez, LSW  ED/OBS   [facility]  Phone: [phone]  Pager: [phone]  Fax: [phone]     On-call pager, [phone], 4:00 pm to midnight

## 2024-02-06 NOTE — ED PROVIDER NOTES
ED Provider Note  [facility]      History     Chief Complaint   Patient presents with    Fall    Extremity Weakness     HPI  62yo M pmhx HCC and DM2 p/w generalized weakness and lethargy. Reports acute onset yesterday when he was at his sister's .  States that since then he has felt tired, and like he cannot walk.  He denies focal weakness in single extremities, rather stating that he feels weak throughout his entire body.  He denies acute vision changes, acute paresthesias (he has some degree of neuropathy at baseline) slurred speech, facial droop, fever, chills, abdominal pain, nausea, vomiting, diarrhea, urinary symptoms.  Triage states that patient fell, but he denies this, stating that he felt like he might fall but did not.    Past Medical History  Past Medical History:   Diagnosis Date    Cancer (H)     Chronic hepatitis B (H)     Diabetes mellitus (H)      Past Surgical History:   Procedure Laterality Date    COLON SURGERY Right 2015    Laparoscopic hand assisted right Hemicolectomy at [facility]    ESOPHAGOSCOPY, GASTROSCOPY, DUODENOSCOPY (EGD), COMBINED N/A 2019    Procedure: ESOPHAGOGASTRODUODENOSCOPY (EGD);  Surgeon: Leventhal, Thomas Michael, MD;  Location:  GI    ESOPHAGOSCOPY, GASTROSCOPY, DUODENOSCOPY (EGD), COMBINED N/A 2/3/2023    Procedure: ESOPHAGOGASTRODUODENOSCOPY, WITH BIOPSY;  Surgeon: Elan Morris MD;  Location:  GI    ESOPHAGOSCOPY, GASTROSCOPY, DUODENOSCOPY (EGD), COMBINED N/A 2023    Procedure: Esophagoscopy, gastroscopy, duodenoscopy (EGD), combined;  Surgeon: Elan Morris MD;  Location:  GI    IR CHEMO EMBOLIZATION  2021    IR PARACENTESIS  2023    IR SIRT (SELECTIVE INTERNAL RADIO THERAPY)  2021    IR VISCERAL ANGIOGRAM  2021    IR VISCERAL EMBOLIZATION  2021     capsaicin (ZOSTRIX) 0.025 % external cream  carvedilol (COREG) 6.25 MG tablet  diclofenac (VOLTAREN) 1 %  topical gel  diphenhydrAMINE (BENADRYL) 2 % external cream  Emollient (CERAVE MOISTURIZING) CREA  furosemide (LASIX) 40 MG tablet  gabapentin (NEURONTIN) 100 MG capsule  loratadine (CLARITIN) 10 MG tablet  ondansetron (ZOFRAN ODT) 4 MG ODT tab  spironolactone (ALDACTONE) 100 MG tablet  tenofovir (VIREAD) 300 MG tablet  triamcinolone (KENALOG) 0.1 % external cream      Allergies   Allergen Reactions    Crabs [Crustaceans]     Pork (Porcine) Protein Itching and Unknown    Seafood Hives and Unknown    Shellfish Allergy Unknown     Family History  No family history on file.  Social History   Social History     Tobacco Use    Smoking status: Never    Smokeless tobacco: Never   Substance Use Topics    Alcohol use: No    Drug use: Yes     Types: Marijuana         A medically appropriate review of systems was performed with pertinent positives and negatives noted in the HPI, and all other systems negative.    Physical Exam   BP: 95/63  Pulse: 83  Temp: 97.3  F (36.3  C)  Resp: 18  SpO2: 99 %  Physical Exam  Constitutional:       General: He is not in acute distress.     Appearance: He is well-developed. He is not diaphoretic.   HENT:      Head: Normocephalic and atraumatic.      Nose: No congestion.      Mouth/Throat:      Mouth: Mucous membranes are moist.   Eyes:      Conjunctiva/sclera: Conjunctivae normal.   Cardiovascular:      Rate and Rhythm: Normal rate and regular rhythm.   Pulmonary:      Effort: Pulmonary effort is normal.      Breath sounds: Normal breath sounds.   Abdominal:      Palpations: Abdomen is soft.      Tenderness: There is no abdominal tenderness.   Musculoskeletal:      Cervical back: Normal range of motion and neck supple.   Skin:     General: Skin is warm and dry.      Capillary Refill: Capillary refill takes less than 2 seconds.      Findings: No rash.   Neurological:      Mental Status: He is alert and oriented to person, place, and time.      Cranial Nerves: Cranial nerves 2-12 are intact.       Sensory: Sensation is intact.      Motor: No weakness.      Coordination: Finger-Nose-Finger Test normal.      Gait: Gait is intact.           ED Course, Procedures, & Data      Procedures            EKG Interpretation:      Interpreted by Troy Thorpe PA-C  Time reviewed: 1940  Symptoms at time of EKG: Generalized weakness   Rhythm: normal sinus   Rate: Normal  Axis: Normal  Ectopy: none  Conduction: normal  ST Segments/ T Waves: No ST-T wave changes  Q Waves: none  Comparison to prior: newly prolonged Qtc    Clinical Impression: Sinus rhythm with prolonged QTc (535)                       Results for orders placed or performed during the hospital encounter of 02/05/24   [place] Draw     Status: None    Narrative    The following orders were created for panel order [place] Draw.  Procedure                               Abnormality         Status                     ---------                               -----------         ------                     Extra Blue Top Tube[[id number]]                              Final result               Extra Green Top (Lithium...[[id number]]                      Final result               Extra Purple Top Tube[[id number]]                            Final result                 Please view results for these tests on the individual orders.   Extra Blue Top Tube     Status: None   Result Value Ref Range    Hold Specimen JIC    Extra Green Top (Lithium Heparin) Tube     Status: None   Result Value Ref Range    Hold Specimen JIC    Extra Purple Top Tube     Status: None   Result Value Ref Range    Hold Specimen JIC    Comprehensive metabolic panel     Status: Abnormal   Result Value Ref Range    Sodium 125 (L) 135 - 145 mmol/L    Potassium 3.2 (L) 3.4 - 5.3 mmol/L    Carbon Dioxide (CO2) 23 22 - 29 mmol/L    Anion Gap 11 7 - 15 mmol/L    Urea Nitrogen 31.7 (H) 8.0 - 23.0 mg/dL    Creatinine 2.07 (H) 0.67 - 1.17 mg/dL    GFR Estimate 36 (L) >60 mL/min/1.73m2    Calcium 6.9 (L) 8.8 -  10.2 mg/dL    Chloride 91 (L) 98 - 107 mmol/L    Glucose 233 (H) 70 - 99 mg/dL    Alkaline Phosphatase 138 40 - 150 U/L    AST      ALT 18 0 - 70 U/L    Protein Total 6.3 (L) 6.4 - 8.3 g/dL    Albumin 1.4 (L) 3.5 - 5.2 g/dL    Bilirubin Total 3.0 (H) <=1.2 mg/dL   Troponin T, High Sensitivity     Status: Normal   Result Value Ref Range    Troponin T, High Sensitivity 17 <=22 ng/L   CBC with platelets and differential     Status: Abnormal   Result Value Ref Range    WBC Count 8.4 4.0 - 11.0 10e3/uL    RBC Count 3.01 (L) 4.40 - 5.90 10e6/uL    Hemoglobin 7.8 (L) 13.3 - 17.7 g/dL    Hematocrit 25.0 (L) 40.0 - 53.0 %    MCV 83 78 - 100 fL    MCH 25.9 (L) 26.5 - 33.0 pg    MCHC 31.2 (L) 31.5 - 36.5 g/dL    RDW 23.0 (H) 10.0 - 15.0 %    Platelet Count 88 (L) 150 - 450 10e3/uL    % Neutrophils 82 %    % Lymphocytes 7 %    % Monocytes 9 %    % Eosinophils 1 %    % Basophils 0 %    % Immature Granulocytes 1 %    NRBCs per 100 WBC 0 <1 /100    Absolute Neutrophils 7.0 1.6 - 8.3 10e3/uL    Absolute Lymphocytes 0.6 (L) 0.8 - 5.3 10e3/uL    Absolute Monocytes 0.7 0.0 - 1.3 10e3/uL    Absolute Eosinophils 0.0 0.0 - 0.7 10e3/uL    Absolute Basophils 0.0 0.0 - 0.2 10e3/uL    Absolute Immature Granulocytes 0.0 <=0.4 10e3/uL    Absolute NRBCs 0.0 10e3/uL   Magnesium     Status: Normal   Result Value Ref Range    Magnesium 2.3 1.7 - 2.3 mg/dL   EKG 12 lead     Status: None   Result Value Ref Range    Systolic Blood Pressure  mmHg    Diastolic Blood Pressure  mmHg    Ventricular Rate 78 BPM    Atrial Rate 78 BPM    ID Interval 194 ms    QRS Duration 106 ms     ms    QTc 535 ms    P Axis 55 degrees    R AXIS 19 degrees    T Axis 23 degrees    Interpretation ECG       Sinus rhythm  Prolonged QT  Abnormal ECG  Unconfirmed report - interpretation of this ECG is computer generated - see medical record for final interpretation  Confirmed by - EMERGENCY ROOM, PHYSICIAN ([id number]),  IMANI WETZEL ([id number]) on 2/5/2024 9:16:05 PM      CBC with platelets differential     Status: Abnormal    Narrative    The following orders were created for panel order CBC with platelets differential.  Procedure                               Abnormality         Status                     ---------                               -----------         ------                     CBC with platelets and d...[[id number]]  Abnormal            Final result                 Please view results for these tests on the individual orders.     Medications   potassium chloride (KLOR-CON) Packet 40 mEq (has no administration in time range)   sodium chloride 0.9% BOLUS 1,000 mL (1,000 mLs Intravenous $New Bag 2/5/24 1959)   acetaminophen (TYLENOL) tablet 1,000 mg (1,000 mg Oral $Given 2/5/24 2021)     Labs Ordered and Resulted from Time of ED Arrival to Time of ED Departure   COMPREHENSIVE METABOLIC PANEL - Abnormal       Result Value    Sodium 125 (*)     Potassium 3.2 (*)     Carbon Dioxide (CO2) 23      Anion Gap 11      Urea Nitrogen 31.7 (*)     Creatinine 2.07 (*)     GFR Estimate 36 (*)     Calcium 6.9 (*)     Chloride 91 (*)     Glucose 233 (*)     Alkaline Phosphatase 138      AST        ALT 18      Protein Total 6.3 (*)     Albumin 1.4 (*)     Bilirubin Total 3.0 (*)    CBC WITH PLATELETS AND DIFFERENTIAL - Abnormal    WBC Count 8.4      RBC Count 3.01 (*)     Hemoglobin 7.8 (*)     Hematocrit 25.0 (*)     MCV 83      MCH 25.9 (*)     MCHC 31.2 (*)     RDW 23.0 (*)     Platelet Count 88 (*)     % Neutrophils 82      % Lymphocytes 7      % Monocytes 9      % Eosinophils 1      % Basophils 0      % Immature Granulocytes 1      NRBCs per 100 WBC 0      Absolute Neutrophils 7.0      Absolute Lymphocytes 0.6 (*)     Absolute Monocytes 0.7      Absolute Eosinophils 0.0      Absolute Basophils 0.0      Absolute Immature Granulocytes 0.0      Absolute NRBCs 0.0     TROPONIN T, HIGH SENSITIVITY - Normal    Troponin T, High Sensitivity 17     MAGNESIUM - Normal    Magnesium 2.3        US Renal Complete Non-Vascular    (Results Pending)   US Abdomen Limited    (Results Pending)          Critical care was not performed.     Medical Decision Making  The patient's presentation was of moderate complexity (an undiagnosed new problem with uncertain diagnosis).    The patient's evaluation involved:  review of external note(s) from 3+ sources (clinic, prior hospital)  ordering and/or review of 3+ test(s) in this encounter (see separate area of note for details)    The patient's management necessitated high risk (a decision regarding hospitalization).    Assessment & Plan    62yo M pmhx HCC and DM2 p/w generalized weakness and lethargy with acute onset yesterday while at his sister's .. Denies focal weakness in single extremities, rather stating that he feels weak throughout his entire body.  No acute neurologic symptoms (has baseline neuropathy) or infectious symptoms. Triage note states that patient fell, but he denies this, stating that he felt like he might fall but did not.  In ED patient is HDS/AF (though borderline soft pressures).  He is neurologically intact.  He is able to ambulate.  Suspicion for infectious etiology is low as patient is afebrile here, no urinary symptoms to suggest UTI.  No cough to suggest pneumonia.  CBC similarly without leukocytosis.  Patient is anemic at 7.8, however, this appears to be his baseline on chart review.  Similarly, thrombocytopenia tonight at 88, better than his baseline (50s to 60s).  CMP shows JUAN C with a creatinine of 2.07; receiving IV hydration.  He is also mildly hypokalemic at 3.2 (repleted), hyponatremic 128 (when corrected for hyperglycemia), hyperglycemia (233) without gap.  LFTs unremarkable save for T. bili of 3.0, but patient without abdominal pain to suggest obstruction. Given new JUAN C renal sono ordered to assess postobstructive etiology and results pending at my signout.  Similarly, RUQ sono added to assess for liver and gallbladder and  results pending at my signout.  EKG sinus rhythm without acute changes; prolonged QTc at 435.  Troponin negative.  Low suspicion for acute CVA or TIA.      Given doubling of patient's creatinine, hyponatremia and hypokalemia feel that is best served by admission to medicine.     I have reviewed the nursing notes. I have reviewed the findings, diagnosis, plan and need for follow up with the patient.    New Prescriptions    No medications on file       Final diagnoses:   Hyponatremia   JUAN C (acute kidney injury) (H24)   HCC (hepatocellular carcinoma) (H)   Hypokalemia         Troy Thorpe PA-C  [facility] EMERGENCY DEPARTMENT  2/5/2024     Troy Thorpe PA-C  02/05/24 [id number]

## 2024-02-06 NOTE — PROGRESS NOTES
[facility]    Progress Note - Hospitalist Service, Art TEAM        Date of Admission:  2/5/2024    Assessment & Plan   Rishabh Cabrera is a 63 year old male admitted on 2/5/2024. He has a history of T2DM, Hepatitis B, HCC, cirrhosis, HTN, colon cancer s/p hemicolectomy 2015, gastric ulcer. He presented with weakness and fatigue and was admitted for further evaluation of hyponatremia, hypokalemia, JUAN C, and hyperbilirubinemia. Underlying etiology is most likely decompensated cirrhosis.    # Decompensated cirrhosis 2/2 hepatitis B  # Hx of HCC  # Hepatic hypodensity on CT AP 9/20/2023  # Coagulopathy and thrombocytopenia 2/2 cirrhosis  Pt follows with Dr. Rodriguez with hepatology. On tenofovir for chronic Hep B. Has chronic abdominal pain, usually diffuse, but feels this is now more focal in midline and lower abdomen - none in RUQ on admission. No nausea or vomiting, but does endorse poor tolerance of PO intake and minimal PO nutrition except for juices, water at home. Wt has been fairly stable. Denies worsening abdominal distention, but does have early satiety/increased sensation of bloating after eating. However albumin low at 1.4 on admission and with hypocalcemia, hypokalemia, and hyponatremia, will consult nutrition. Also, with hx of HCC and potential poorly characterized mass on CT 9/2023, with elevated bilirubin, US RUQ obtained on admission with multiple hepatic masses suspicious for HCC. No RUQ pain, no nausea or vomiting, no fevers or chills to suggest cholecystitis, cholangitis, or other acute obstructive pathology.   - MRI abdomen ordered to further characterize hepatic lesions with c/f HCC   - Diagnostic paracentesis   - Albumin replaced  - Vit K replacement 2/6 (PO) and 2/7 (IV)  - PTA tenofovir   - Daily CMP, INR   - GI hepatology consult, appreciate recs   - continue pta carvedilol BID  - hold lasix, spironolactone     #Subdural hematoma, subacute vs chronic    # Weakness and fatigue  # Facial numbness  Repeat had CT stable without evidence of further bleed, likely older lesion. No signs of CVA on imaging. Symptoms may be due to SDH, post-herpetic neuralgia (has hx), hepatic dysfunction.   - Neurosurgery follow-up outpatient in 1 month with repeat CT head     # Hyponatremia  # Hypokalemia  # Hypocalcemia  # JUAN C, suspect pre-renal  Presented with weakness, fatigue x1-2 days, found to have new hypokalemia, hyponatremia, and JUAN C to 2.07 from baseline of ~1. Reporting decreased PO intake due to grief with recent death and  for his sister, suspect pre-renal etiology and low PO intake with ongoing lasix and spironolactone use for his ascites, edema as etiology - suspect this is etiology for symptoms as well as lab derangements. Renal US pending. Will monitor lytes, replete as needed, and give fluids. Consider urine studies if not improved after IV fluids.   - hold lasix and spironolactone  - consider additional fluids  - consider urine studies if not improving  - encourage PO intake  - nutrition consult  - with JUAN C, will dose adjust tenofovir to 300mg every other day given GFR 30-50     # Constipation  Very symptomatic at home, noting frequent feeling of needing to have a BM but being unable to do so, or if able to do so will have very small output and hard stools. No melena or hematochezia. Takes stool softeners intermittently at home without improvement.   - daily miralax  - prn senna  - consider increasing bowel regimen as needed if no relief     # T2DM, diet-controlled  Last Hgb A1c 6.8.   - CTM  - consider LDSSI and hypoglycemia protocol if needing correction  - daily BG in AM  - regular diet for now, consider transitioning to 2g Na restricted diet with edema, ascites if improved renal function and hyponatremia      # Chronic microcytic anemia  Hgb 7.8 on admission. Denies bleeding symptoms.   - CTM     # Pulmonary nodules  # Hx of loculated pulmonary  effusions  Following with pulmonology outpatient, last seen 3/7/2023 and recommended 6 month follow up.  - requires follow up with pulmonary     # Peripheral neuropathy  # Hx of herpes zoster and post-herpetic neuralgia  - holding pta gabapentin overnight with JUAN C - consider resuming vs dose-reducing pending improvement in renal function             Diet: NPO for Medical/Clinical Reasons Except for: Meds, Ice Chips  Snacks/Supplements Pediatric: Glucerna; With Meals    DVT Prophylaxis: none in setting of elevated INR  Davalos Catheter: Not present  Fluids: none  Lines: None     Cardiac Monitoring: None  Code Status: Full Code      Clinically Significant Risk Factors Present on Admission        # Hypokalemia: Lowest K = 3.2 mmol/L in last 2 days, will replace as needed  # Hyperkalemia: Highest K = 7.7 mmol/L in last 2 days, will monitor as appropriate  # Hyponatremia: Lowest Na = 125 mmol/L in last 2 days, will monitor as appropriate      # Hypoalbuminemia: Lowest albumin = 1.3 g/dL at 2/6/2024  6:50 AM, will monitor as appropriate    # Coagulation Defect: INR = 3.25 (Ref range: 0.85 - 1.15) and/or PTT = 53 Seconds (Ref range: 22 - 38 Seconds), will monitor for bleeding  # Thrombocytopenia: Lowest platelets = 83 in last 2 days, will monitor for bleeding       # DMII: A1C = N/A within past 6 months        # Financial/Environmental Concerns: none         Disposition Plan     Expected Discharge Date: 02/07/2024      Destination: home with family          The patient's care was discussed with the Attending Physician, Dr. Martinez .    Jennifer Conteh MD  Hospitalist Service, [facility]  Securely message with [employer] (more info)  Text page via [facility] Paging/Directory   See signed in provider for up to date coverage information  ______________________________________________________________________    Interval History   Admitted overnight. Patient was worried he was  having a stroke given his facial numbness. His brother and children are involved in his care but live remotely.     Physical Exam   Vital Signs: Temp: 97.3  F (36.3  C) Temp src: Axillary BP: 100/53 Pulse: 86   Resp: 18 SpO2: 98 % O2 Device: None (Room air)    Weight: 125 lbs 0 oz    General Appearance:  Appears fatigued, otherwise comfortable.   HEENT: Normocephalic, atraumatic. EOMI. Sclera mildly icteric. MMM.   Respiratory: Normal work of breathing on RA. No cough on exam. CTAB, no wheezes or rhonchi.  Cardiovascular: RRR, no murmurs. Distal pulses in upper and lower extremities present and equal bilaterally.  GI: Soft, distended, no tenderness over RUQ, LUQ, or epigastrium, does have midline tenderness over mid-abdomen and lower abdomen. BS normal. No rebound.  Skin: No rashes. No ecchymoses.   MSK: 2-3+ pitting edema in bilateral lower extremities at least up to level of the knee. No erythema or rashes.   Neuro: CN II-XII intact. Strength in upper extremities 4/5 bilaterally, 4/5 bilaterally at the hips bilaterally, 5/5 at ankles. No focal neurological deficits. Tongue midline. Alert and oriented x3.       Medical Decision Making       Please see A&P for additional details of medical decision making.      Data     I have personally reviewed the following data over the past 24 hrs:    5.6  \   8.1 (L)   / 83 (L)     128 (L) 98 31.1 (H) /  89   7.7 (HH) 27 1.73 (H) \     ALT: 15 AST: 44 AP: 134 TBILI: 2.4 (H)   ALB: 1.3 (L) TOT PROTEIN: 5.7 (L) LIPASE: N/A     Trop: 17 BNP: N/A     Procal: N/A CRP: 28.60 (H) Lactic Acid: N/A       INR:  3.25 (H) PTT:  53 (H)   D-dimer:  N/A Fibrinogen:  131 (L)       Imaging results reviewed over the past 24 hrs:   Recent Results (from the past 24 hour(s))   US Abdomen Complete    Narrative    EXAMINATION: US ABDOMEN COMPLETE,  2/5/2024 11:11 PM     COMPARISON: Ultrasound 1/24/2023 and CT abdomen and pelvis 09/20/2023    HISTORY: Rule out cholelithiasis    TECHNIQUE: The abdomen  was scanned in standard fashion with  specialized ultrasound transducer(s) using both gray-scale and limited  color Doppler techniques.    FINDINGS:  Liver: The liver demonstrates coarsened echotexture with nodular  surface contour. Multiple nodular hepatic masses, the largest of which  is located in the right hepatic lobe and measures 5.1 x 4.1 x 5.3 cm  with presence of internal vascularity by Doppler. Additional right  hepatic lobe lesion measuring 2.7 x 2.7 x 2.1 cm and a peripheral  hypoechoic 1.8 x 1.9 x 2.1 cm right lobe lesion. Presumed perihepatic  pockets of fluid. The main portal vein is patent with antegrade flow,  measuring 0.7 cm in diameter.    Gallbladder: Contracted gallbladder with nonspecific gallbladder wall  thickening measuring up to 6 mm. Layering sludge within the  gallbladder. No definite cholelithiasis. Negative sonographic Watson's  sign. Common duct measures 5 mm.    Pancreas: Obscured due to overlying bowel gas.    Kidneys: Both kidneys are of normal echotexture, without  hydronephrosis.   The craniocaudal dimensions are: right- 10.1 cm,  left- 10.3 cm.    Spleen: The spleen is enlarged,  measuring 14.7 cm in sagittal  dimension.    Aorta and IVC: The visualized portions of the aorta and IVC are  unremarkable. The proximal aorta measures 1.7 cm in diameter.    Fluid: Moderate volume ascites.      Impression    IMPRESSION:   1.  Cirrhotic morphology of the liver with sequelae of portal  hypertension including splenomegaly and moderate volume ascites.  2.  Multiple hepatic masses, the largest which measures up to 5.1 cm.  Recommend contrast enhanced MRI for further evaluation of suspected  hepatocellular carcinoma.  3.  Decompressed gallbladder. No evidence of cholelithiasis.  4.  Splenomegaly.    I have personally reviewed the examination and initial interpretation  and I agree with the findings.    TAMIR ANDERSON MD         SYSTEM ID:  [id number]   CT Head w/o Contrast    Narrative     EXAM: CT HEAD W/O CONTRAST  LOCATION: [facility]  DATE: 2/6/2024    INDICATION: Diffuse numbness and weakness x24-48 hours, hx of HCC with new liver masses; rule out bleed vs mass vs new stroke.  COMPARISON: None.  TECHNIQUE: Routine CT Head without IV contrast. Multiplanar reformats. Dose reduction techniques were used.    FINDINGS:  INTRACRANIAL CONTENTS: Thin, predominantly low-attenuation right convexity subdural collection measuring up to 3 mm likely representing a subacute to chronic subdural hematoma. Mild mass effect and 2-3 mm right-to-left midline shift. Mild volume loss and   presumed chronic small vessel ischemia. No CT evidence of acute infarct.    VISUALIZED ORBITS/SINUSES/MASTOIDS: No intraorbital abnormality. No paranasal sinus mucosal disease. No middle ear or mastoid effusion.    BONES/SOFT TISSUES: No acute abnormality.      Impression    IMPRESSION:  1.  Thin, likely subacute-to-chronic right convexity subdural hematoma. No hyperdense hemorrhage. Mild mass effect and 2-3 mm right-to-left midline shift.    2.  Mild age-related change.      Critical Result: Subdural hemorrhage.    Finding was identified on 2/6/2024 2:30 AM CST.     Dr. James was contacted by me on 2/6/2024 2:35 AM CST and verbalized understanding of the critical result.    CT Head w/o Contrast    Narrative    EXAM: CT HEAD W/O CONTRAST  2/6/2024 8:36 AM     HISTORY:  follow up new subacute to chronic subdural hematoma in 6  hours at 0820       COMPARISON:  Head CT earlier today at 0216 hours.    TECHNIQUE: Using multidetector thin collimation helical acquisition  technique, axial, coronal and sagittal CT images from the skull base  to the vertex were obtained without intravenous contrast.   (topogram) image(s) also obtained and reviewed.    FINDINGS:  No change in thin low-attenuation subdural fluid collection over the  right cerebral convexity measuring up to 3-4 mm in  greatest thickness.  No new hemorrhage. Borderline leftward midline shift, unchanged. No  acute loss of gray-white matter differentiation in the cerebral  hemispheres. Ventricles are proportionate to the cerebral sulci. Clear  basal cisterns.    The bony calvaria and the bones of the skull base are normal. Chronic  osteitis of the right sphenoid locule. Mastoid air cells are clear.  Grossly normal orbits.       Impression    IMPRESSION:   1. No change in chronic right cerebral convexity subdural fluid  collection and borderline leftward midline shift.  2. No new hemorrhage.     TAMIR BASHIR MD         SYSTEM ID:  [id number]   POC US Guide for Paracentesis    Impression    US Indication: abdominal distension    Limited abdominal ultrasound was performed and demonstrated an adequate fluid collection on the left side of the abdomen.     Doppler of the skin demonstrated an area at this site without significant vasculature.  A paracentesis at this site was subsequently performed.    Yaquelin Hess MD       Internal Medicine Staff Addendum  Date of Service: 2/6/2024  I have seen and examined Rishabh Cabrera with the resident team, reviewed the data and discussed the plan of care with the patient and the care team on P&FC Rounds.  I agree with the above documentation     I discussed pt's care with bedside RN, case management/social work today.  I personally reviewed labs, medications and past 24 hr notes.  Assessment/Plan/Diagnoses: plan/dx as above, which contains my edits and reflects our joint medical decision-making.     Patrick Martinez MD  Internal Medicine/Pediatrics Hospitalist & Staff Physician   of Internal Medicine and Pediatrics  [facility]  Pager: [phone]

## 2024-02-06 NOTE — H&P
[facility]    History and Physical - Medicine Service, [facility] TEAM        Date of Admission:  2024    Assessment & Plan      Rishabh Cabrera is a 63 year old male admitted on 2024. Rishabh Cabrera is a 63 year old male who has a history of T2DM, Hepatitis B, HCC, cirrhosis, HTN, colon cancer s/p hemicolectomy 2015, gastric ulcer who presented with weakness and fatigue, admitted for further evaluation of hyponatremia, hypokalemia, JUAN C, and hyperbilirubinemia.      # Weakness and fatigue  # Facial numbness  # Hyponatremia  # Hypokalemia  # Hypocalcemia  # JUAN C, suspect pre-renal  Presented with weakness, fatigue x1-2 days, found to have new hypokalemia, hyponatremia, and JUAN C to 2.07 from baseline of ~1. Reporting decreased PO intake due to grief with recent death and  for his sister, suspect pre-renal etiology and low PO intake with ongoing lasix and spironolactone use for his ascites, edema as etiology - suspect this is etiology for symptoms as well as lab derangements. Renal US pending. Will monitor lytes, replete as needed, and give fluids. Consider urine studies if not improved after IV fluids.   - hold lasix and spironolactone  - s/p IVF in ED, will recheck BMP after fluids and lyte repletion to consider additional fluids (with degree of edema, would be judicious with fluids and encourage PO)  - renal US pending  - consider urine studies if not improving  - add on phosphorous level - WNL  - encourage PO intake  - nutrition consult  - will get head CT to rule out mass or bleed with hx of HCC and acuity of symptoms  - with JUAN C, will dose adjust tenofovir to 300mg every other day given GFR 30-50      # Hyperbilirubinemia  # Hx of HCC  # Hepatic hypodensity on CT AP 2023  # Decompensated cirrhosis 2/2 Hep B  # Ascites  # Coagulopathy 2/2 cirrhosis  # Thrombocytopenia 2/2 cirrhosis  Pt follows with Dr. Rodriguez with hepatology. On tenofovir for chronic Hep B. Has  "chronic abdominal pain, usually diffuse, but feels this is now more focal in midline and lower abdomen - none in RUQ on admission. No nausea or vomiting, but does endorse poor tolerance of PO intake and minimal PO nutrition except for juices, water at home. Wt has been fairly stable. Denies worsening abdominal distention, but does have early satiety/increased sensation of bloating after eating. However albumin low at 1.4 on admission and with hypocalcemia, hypokalemia, and hyponatremia, will consult nutrition. Also, with hx of HCC and potential poorly characterized mass on CT 9/2023, with elevated bilirubin, US RUQ obtained on admission - results pending. No RUQ pain, no nausea or vomiting, no fevers or chills to suggest cholecystitis, cholangitis, or other acute obstructive pathology.   - continue pta carvedilol BID  - hold lasix, spironolactone as above  - continue pta tenofovir   - US RUQ ordered in ED - prelim read with \"multiple hepatic masses... recommend MRI with contrast for eval of suspected HCC\"   - consider CT AP non-contrast due to JUAN C for further eval of abdominal pain and distention, ascites pending US full read results  - pending imaging results, consider GI consult in AM  - repeat LFT and INR in AM      # Constipation  Very symptomatic at home, noting frequent feeling of needing to have a BM but being unable to do so, or if able to do so will have very small output and hard stools. No melena or hematochezia. Takes stool softeners intermittently at home without improvement.   - daily miralax  - prn senna  - consider increasing bowel regimen as needed if no relief      # T2DM, diet-controlled  Last Hgb A1c 6.8.   - CTM  - consider LDSSI and hypoglycemia protocol if needing correction  - daily BG in AM  - regular diet for now, consider transitioning to 2g Na restricted diet with edema, ascites if improved renal function and hyponatremia      # Chronic microcytic anemia  Hgb 7.8 on admission. Denies " bleeding symptoms.   - CTM      # Pulmonary nodules  # Hx of loculated pulmonary effusions  Following with pulmonology outpatient, last seen 3/7/2023 and recommended 6 month follow up.  - requires follow up with pulmonary      # Peripheral neuropathy  # Hx of herpes zoster and post-herpetic neuralgia  - holding pta gabapentin overnight with JUAN C - consider resuming vs dose-reducing in AM pending improvement in renal function            Diet:  Regular diet - consider restricted Na if worsening edema, ascites  DVT Prophylaxis: Pneumatic Compression Devices  Davalos Catheter: Not present  Fluids: s/p 1L NS in ED  Lines: None     Cardiac Monitoring: None  Code Status:  Full Code    Clinically Significant Risk Factors Present on Admission        # Hypokalemia: Lowest K = 3.2 mmol/L in last 2 days, will replace as needed  # Hyponatremia: Lowest Na = 125 mmol/L in last 2 days, will monitor as appropriate      # Hypoalbuminemia: Lowest albumin = 1.4 g/dL at 2/5/2024  7:03 PM, will monitor as appropriate   # Thrombocytopenia: Lowest platelets = 88 in last 2 days, will monitor for bleeding       # DMII: A1C = N/A within past 6 months               Disposition Plan      Expected Discharge Date: 02/07/2024                The patient's care was discussed with the Patient. Staffed with Dr. Simms overnight.       Rosina James MD  Medicine Service, [facility]  Securely message with [employer] (more info)  Text page via [facility] Paging/Directory   See signed in provider for up to date coverage information  ______________________________________________________________________    Chief Complaint   Weakness and fatigue    History is obtained from the patient with assistance of [employer] interpretor over the phone    History of Present Illness   Rishabh Cabrera is a 63 year old male who has a history of T2DM, Hepatitis B, HCC, cirrhosis, HTN, colon cancer s/p hemicolectomy 2015, gastric ulcer  who presented with weakness and fatigue, admitted for further evaluation of hyponatremia, hypokalemia, and JUAN C.     Patient reports that he started feeling diffuse numbness over his scalp and face about 24-48 hours ago, along with diffuse weakness and fatigue. He had an episode of shaking on Monday 2/5 that lasted for a few hours. He tried eating and drinking water and juice, which did not seem to help. His sister also gave him a massage, this also did not seem to help. His shaking eventually improved, but he notes he was very worried about the shaking and the diffuse weakness as he felt it was getting worse, so he came in to the ED for evaluation of his symptoms. Denies any falls or recent injuries, however does state that he feels so weak like he could fall if he tried to stand up unassisted.     Endorses overall poor appetite and nutrition for the past several days, worse over the past several months. Has chronic abdominal pain, notes that he has had the same pain since he was first diagnosed with HCC. However, he has noted that he has started to feel the pain more in the midline and lower abdomen, rather than just in the middle abdomen like where it has been before. Pain usually gets worse after he takes more than a few bites of food, so he notes that he has only been eating a couple of spoonfuls of rice porridge several times a day to avoid feeling worsening pain or bloating. Denies worsening distention. Also reports significant constipation over the past several weeks, where he feels like he has to have multiple bowel movements per day but once he gets to the bathroom he is unable to void, or otherwise passes a very small amount of hard stool. No melena or hematochezia, no white or grey stools.     No falls. No fevers, chills, or night sweats. No nausea or vomiting. No melena or hematochezia. No cough.    Has been taking his medications regularly (gabapentin, lasix and spironolactone, tenofovir)    ED  Course:  - labs significant for CBC with Hgb 7.8 and plts 60s (near baseline), K of 3.2, Na 125, and Cr 2.07, Tbili of 3  - given 40mEq of K, 1L NS  - Renal US and RUQ US pending      Past Medical History    Past Medical History:   Diagnosis Date    Cancer (H)     Chronic hepatitis B (H)     Diabetes mellitus (H)        Past Surgical History   Past Surgical History:   Procedure Laterality Date    COLON SURGERY Right 08/05/2015    Laparoscopic hand assisted right Hemicolectomy at [facility]    ESOPHAGOSCOPY, GASTROSCOPY, DUODENOSCOPY (EGD), COMBINED N/A 05/29/2019    Procedure: ESOPHAGOGASTRODUODENOSCOPY (EGD);  Surgeon: Leventhal, Thomas Michael, MD;  Location: [facility]    ESOPHAGOSCOPY, GASTROSCOPY, DUODENOSCOPY (EGD), COMBINED N/A 2/3/2023    Procedure: ESOPHAGOGASTRODUODENOSCOPY, WITH BIOPSY;  Surgeon: Elan Morris MD;  Location: [facility]    ESOPHAGOSCOPY, GASTROSCOPY, DUODENOSCOPY (EGD), COMBINED N/A 11/16/2023    Procedure: Esophagoscopy, gastroscopy, duodenoscopy (EGD), combined;  Surgeon: Elan Morris MD;  Location: [facility]    IR CHEMO EMBOLIZATION  12/02/2021    IR PARACENTESIS  01/24/2023    IR SIRT (SELECTIVE INTERNAL RADIO THERAPY)  09/08/2021    IR VISCERAL ANGIOGRAM  09/08/2021    IR VISCERAL EMBOLIZATION  09/09/2021       Prior to Admission Medications   Prior to Admission Medications   Prescriptions Last Dose Informant Patient Reported? Taking?   Emollient (CERAVE MOISTURIZING) CREA   No No   Sig: Externally apply 1 Dose topically 2 times daily as needed (for eczema)   capsaicin (ZOSTRIX) 0.025 % external cream   Yes No   Sig: Apply topically 3 times daily   carvedilol (COREG) 6.25 MG tablet   No No   Sig: Take 1 tablet (6.25 mg) by mouth 2 times daily (with meals)   diclofenac (VOLTAREN) 1 % topical gel   Yes No   Sig: Apply topically as needed for moderate pain   diphenhydrAMINE (BENADRYL) 2 % external cream   No No   Sig: Apply topically 3 times daily as needed for itching    furosemide (LASIX) 40 MG tablet   No No   Sig: Take 1 tablet (40 mg) by mouth daily   gabapentin (NEURONTIN) 100 MG capsule   No No   Sig: Take 3 capsules (300 mg) by mouth 3 times daily   loratadine (CLARITIN) 10 MG tablet   Yes No   Sig: Take 10 mg by mouth daily   ondansetron (ZOFRAN ODT) 4 MG ODT tab   No No   Sig: Take 1 tablet (4 mg) by mouth every 6 hours as needed for nausea or vomiting   spironolactone (ALDACTONE) 100 MG tablet   No No   Sig: Take 1 tablet (100 mg) by mouth daily   tenofovir (VIREAD) 300 MG tablet   No No   Sig: Take 1 tablet (300 mg) by mouth daily   triamcinolone (KENALOG) 0.1 % external cream   Yes No   Sig: Apply topically as needed for irritation      Facility-Administered Medications: None        Review of Systems    The 10 point Review of Systems is negative other than noted in the HPI.    Social History   I have reviewed this patient's social history and updated it with pertinent information if needed.  Social History     Tobacco Use    Smoking status: Never    Smokeless tobacco: Never   Substance Use Topics    Alcohol use: No    Drug use: Yes     Types: Marijuana        Physical Exam   Vital Signs: Temp: 98.1  F (36.7  C) Temp src: Oral BP: 102/59 Pulse: 78   Resp: 18 SpO2: 99 % O2 Device: None (Room air)    Weight: 0 lbs 0 oz    General Appearance: Appears fatigued, otherwise comfortable.   HEENT: Normocephalic, atraumatic. EOMI. Sclera mildly icteric. MMM.   Respiratory: Normal work of breathing on RA. No cough on exam. CTAB, no wheezes or rhonchi.  Cardiovascular: RRR, no murmurs. Distal pulses in upper and lower extremities present and equal bilaterally.  GI: Soft, distended, no tenderness over RUQ, LUQ, or epigastrium, does have midline tenderness over mid-abdomen and lower abdomen. BS normal. No rebound.  Skin: No rashes. No ecchymoses.   MSK: 2-3+ pitting edema in bilateral lower extremities at least up to level of the knee. No erythema or rashes.   Neuro: CN II-XII  intact. Strength in upper extremities 4/5 bilaterally, 4/5 bilaterally at the hips bilaterally, 5/5 at ankles. No focal neurological deficits. Tongue midline. Alert and oriented x3.     Medical Decision Making       Please see A&P for additional details of medical decision making.      Data     I have personally reviewed the following data over the past 24 hrs:    8.4  \   7.8 (L)   / 88 (L)     125 (L) 91 (L) 31.7 (H) /  233 (H)   3.2 (L) 23 2.07 (H) \     ALT: 18 AST: N/A AP: 138 TBILI: 3.0 (H)   ALB: 1.4 (L) TOT PROTEIN: 6.3 (L) LIPASE: N/A     Trop: 17 BNP: N/A

## 2024-02-06 NOTE — PROGRESS NOTES
"ED PT Eval     02/06/24 1600   Appointment Info   Signing Clinician's Name / Credentials (PT) Davin Matson, PT, DPT       Present yes   Language Hmong   Living Environment   People in Home alone   Current Living Arrangements house   Home Accessibility stairs to enter home   Living Environment Comments Patient tangential at time of eval and questionable historian. Unable to gather details of home set-up. Does endorse living with adult son who is busy and away during the day.   Self-Care   Usual Activity Tolerance good   Current Activity Tolerance moderate   Regular Exercise No   Equipment Currently Used at Home none   Fall history within last six months yes   Activity/Exercise/Self-Care Comment Patient denies falls (1 noted in chart) and endorses IND but states that for the past 2 days he has felt unstead ambulating.   General Information   Onset of Illness/Injury or Date of Surgery 02/06/24   Referring Physician Patrick Martinez MD   Patient/Family Therapy Goals Statement (PT) To return home   Pertinent History of Current Problem (include personal factors and/or comorbidities that impact the POC) Per EMR \"Rishabh Cabrera is a 63 year old male not on AC/AP but with significant coagulopathy and thrombocytopenia with chronic liver disease in the setting of Hepatitis B, HCC, cirrhosis who presented with weakness, fatigue, electrolyte derangements, acute kidney injury, hyperbilirubinemia with CT head demonstrating a 3mm subacute-to-chronic right convexity subdural hematoma.\"   Existing Precautions/Restrictions fall   General Observations activity: ambulate   Cognition   Cognitive Status Comments tangential   Range of Motion (ROM)   Range of Motion ROM is WFL   Strength (Manual Muscle Testing)   Strength (Manual Muscle Testing) strength is WFL   Strength Comments grossly functional   Bed Mobility   Bed Mobility sit-supine;supine-sit   Supine-Sit [place] (Bed Mobility) independent   Sit-Supine [place] " (Bed Mobility) independent   Comment, (Bed Mobility) initially requesting help but able to problem solve with encouragement   Transfers   Transfers sit-stand transfer   Sit-Stand Transfer   Sit-Stand [place] (Transfers) contact guard   Comment, (Sit-Stand Transfer) unsupported   Gait/Stairs (Locomotion)   [place] Level (Gait) contact guard   Assistive Device (Gait) walker, front-wheeled   Comment, (Gait/Stairs) slow speed, anterior lean, preference to ambualte outside of MAX of device   Clinical Impression   Criteria for Skilled Therapeutic Intervention Yes, treatment indicated   PT Diagnosis (PT) impaired functional mobility   Influenced by the following impairments decreased activity tolerance, gait, stairs   Functional limitations due to impairments transfers, gait, stairs   Clinical Presentation (PT Evaluation Complexity) stable   Clinical Presentation Rationale clinical judgement   Clinical Decision Making (Complexity) low complexity   Planned Therapy Interventions (PT) balance training;gait training;ROM (range of motion);stair training;strengthening;stretching;transfer training   Risk & Benefits of therapy have been explained evaluation/treatment results reviewed;care plan/treatment goals reviewed;risks/benefits reviewed;current/potential barriers reviewed;participants voiced agreement with care plan;participants included;patient   PT Total Evaluation Time   PT Eval, Low Complexity Minutes (65793) 10   Physical Therapy Goals   PT Frequency 5x/week   PT Predicted Duration/Target Date for Goal Attainment 02/20/24   PT Goals Bed Mobility;Transfers;Gait;Stairs   PT: Bed Mobility Supine to/from sit;Independent   PT: Transfers Modified independent;Sit to/from stand;Assistive device   PT: Gait Modified independent;Assistive device;Greater than 200 feet   PT: Stairs Modified independent;Greater than 10 stairs   PT Discharge Planning   PT Plan transfers, gait with LRAD (prefers walker today), stairs   PT  Discharge Recommendation (DC Rec) home with assist   PT Rationale for DC Rec At this time patient with slightly impaired mobility but is compensated well with use of AD. Pening progress anticipate patient will be appropriate for return home when medically stable. Will greatly benefit from OT to weigh-in on recs prior to finalization.   PT Brief overview of current status supervision with FWW for hallway ambulation   PT Equipment Needed at Discharge walker, rolling   Total Session Time   Total Session Time (sum of timed and untimed services) 10         Davin Matson, PT, DPT

## 2024-02-06 NOTE — ED TRIAGE NOTES
Pt arrives to triage s/p fall yesterday w/ c/o back and neck pain following a fall. He also endorses numbness and tingling, and difficulty ambulating. Pt placed in c-collar for precautionary measure.

## 2024-02-06 NOTE — UTILIZATION REVIEW
"  Admission Status; Secondary Review Determination         Under the authority of the Utilization Management Committee, the utilization review process indicated a secondary review on the above patient.  The review outcome is based on review of the medical records, discussions with staff, and applying clinical experience noted on the date of the review.        (xxx)      Inpatient Status Appropriate - This patient's medical care is consistent with medical management for inpatient care and reasonable inpatient medical practice.      () Observation Status Appropriate - This patient does not meet hospital inpatient criteria and is placed in observation status. If this patient's primary payer is Medicare and was admitted as an inpatient, Condition Code 44 should be used and patient status changed to \"observation\".   () Admission Status NOT Appropriate - This patient's medical care is not consistent with medical management for Inpatient or Observation Status.          RATIONALE FOR DETERMINATION     Rishabh Cabrera is a 63 year old male with a history of T2DM, Hepatitis B, HCC, cirrhosis, HTN, colon cancer s/p hemicolectomy 2015, and gastric ulcer who was admitted on 2/5/2024 with weakness, fatigue, decreased po intake, and recent fall.  He was found to have new hyponatremia, hypokalemia, JUAN C, and hyperbilirubinemia.  Head CT showed subdural hematoma.  RUQ ultrasound with findings concerning for recurrent HCC.  He was admitted for close clinical monitoring, IV diuretics, electrolyte replacement, and further evaluation.  GI consulted and recommend diagnostic paracentesis and IV albumin.  It is reasonable to anticipate a hospital stay of at least 2 midnights.  IP status is appropriate.       The severity of illness, intensity of service provided, expected LOS and risk for adverse outcome make the care complex, high risk and appropriate for hospital admission.        The information on this document is developed by the utilization " review team in order for the business office to ensure compliance.  This only denotes the appropriateness of proper admission status and does not reflect the quality of care rendered.         The definitions of Inpatient Status and Observation Status used in making the determination above are those provided in the CMS Coverage Manual, Chapter 1 and Chapter 6, section 70.4.      Sincerely,     Catrachita Mcclendon MD  Physician Advisor   Utilization Review/ Case Management  [facility].

## 2024-02-06 NOTE — CONSULTS
Care Management Initial Consult    General Information  Assessment completed with: Patient, Rishabh  Type of CM/SW Visit: Initial Assessment    Primary Care Provider verified and updated as needed: Yes (Fariba George [phone] [facility] [address])   Readmission within the last 30 days: no previous admission in last 30 days      Reason for Consult: discharge planning, other (see comments) (elevate risk score)  Advance Care Planning: Advance Care Planning Reviewed: questions answered, no concerns identified        Communication Assessment  Patient's communication style: spoken language (English or Bilingual)        Cognitive  Cognitive/Neuro/Behavioral: WDL  Level of Consciousness: alert  Arousal Level: opens eyes spontaneously  Orientation: oriented x 4  Mood/Behavior: calm, cooperative  Best Language: 0 - No aphasia  Speech: clear, logical    Living Environment:   People in home: child(shemar), adult  Nhia- son  Current living Arrangements: house      Able to return to prior arrangements: yes     Family/Social Support:  Care provided by: self  Provides care for: no one  Marital Status:   Children, Sibling(s)          Description of Support System: Supportive, Involved    Support Assessment: Adequate family and caregiver support, Adequate social supports    Current Resources:   Patient receiving home care services: No     Community Resources: None  Equipment currently used at home:    Supplies currently used at home: Diabetic Supplies    Employment/Financial:  Employment Status: disabled        Financial Concerns: none   Referral to Financial Worker: No     Does the patient's insurance plan have a 3 day qualifying hospital stay waiver?  No    Lifestyle & Psychosocial Needs:  Social Determinants of Health     Food Insecurity: Not on file   Depression: Not at risk (4/25/2022)    PHQ-2     PHQ-2 Score: 2   Housing Stability: Not on file   Tobacco Use: Low Risk  (9/20/2023)    Patient History      Smoking Tobacco Use: Never     Smokeless Tobacco Use: Never     Passive Exposure: Not on file   Financial Resource Strain: Not on file   Alcohol Use: Not on file   Transportation Needs: Not on file   Physical Activity: Not on file   Interpersonal Safety: Not At Risk (6/20/2022)    Humiliation, Afraid, Rape, and Kick questionnaire     Fear of Current or Ex-Partner: No     Emotionally Abused: No     Physically Abused: No     Sexually Abused: No   Stress: Not on file   Social Connections: Not on file     Functional Status:  Prior to admission patient needed assistance:   Dependent ADLs:: Ambulation-cane  Dependent IADLs:: Transportation     Mental Health Status:  Mental Health Status: Past Concern  Mental Health Management: Individual Therapy    Chemical Dependency Status:  Chemical Dependency Status: No Current Concerns           Values/Beliefs:  Spiritual, Cultural Beliefs, [employer] Practices, Values that affect care:               Additional Information:  Rishabh Cabrera is a 63 year old male admitted on 2/5/2024. Rishabh Cabrera is a 63 year old male who has a history of T2DM, Hepatitis B, HCC, cirrhosis, HTN, colon cancer s/p hemicolectomy 2015, gastric ulcer who presented with weakness and fatigue, admitted for further evaluation of hyponatremia, hypokalemia, JUAN C, and hyperbilirubinemia.     Care management assessment completed at the bedside with patient due to elevated risk score. SW introduced self and explained the nature of the care management assessment. Pt agreed to speak with SW. Pt lives in a two story home with his eldest son. Pt's room and on the main level of the home next to the bathroom. Pt uses a quad cane for supports as needed. Pt is independent with all ADLs and most IADLs. Pt express he does not want to be burden to his children and he continues to support himself as best as he can. Pt's son works from home and is available if pt needs any assistance. Pt no longer drives, due to health conditions. Pt's  son drives him to all medical appointments and to grocery shop as needed. Pt acknowledge self care routines and shares that he does lightweight dumb bell exercises along with resistant band stretches weekly.     Pt verified address in chart is current and PCP is Dr. Fariba George at [facility] clinic off [address] in [place].    SW called admission desk and ask to add pt's PCP information. PCP updated in chart.      available and will continue to follow for discharge planning and supports as needed.   _______________________    CHELLY Valdez, LSW  ED/OBS   [facility]  Phone: [phone]  Pager: [phone]  Fax: [phone]     On-call pager, [phone], 4:00 pm to midnight

## 2024-02-06 NOTE — PROGRESS NOTES
CLINICAL NUTRITION SERVICES - ASSESSMENT NOTE     Nutrition Prescription    RECOMMENDATIONS FOR MDs/PROVIDERS TO ORDER:  -recommend a soft diet when diet progressed d/t pt preference with missing teeth  -Monitor and replace K+, phos and Mg as neeeded    Malnutrition Status:    Patient does not meet two of the established criteria necessary for diagnosing malnutrition but is at risk for malnutrition    Recommendations already ordered by Registered Dietitian (RD):  Check CRP to evaluate albumin within context of potential inflammation, additionally fluid status could be contributing to low albumin  Encouraged small, frequent meals/snacks with high protein sources when diet progressed  Glucerna Therapeutic Nutrition Shake contains 220 kcal, 10 grams protein, 26 grams carbohydrate, 9 grams fat, 4 grams fiber per 8 fl oz once diet progressed      Future/Additional Recommendations:  Monitor tolerance to oral supplements/adequacy of PO intake/fluid status     REASON FOR ASSESSMENT  Rishabh Cabrera is a/an 63 year old male assessed by the dietitian for Provider Order - concern for malnutrition, low albumin and RN Consult - recommendations for diet suplements    Patient admitted for weakness and fatigue, admitted for further evaluation of hyponatremia, hypokalemia, JUAN C, and hyperbilirubinemia     MEDICAL HISTORY  T2DM, Hepatitis B, HCC, cirrhosis, HTN, colon cancer s/p hemicolectomy 2015, gastric ulcer     NUTRITION HISTORY  Met with pt at bedside. Unable to obtain phone . Pt agreeable to short discussion without  at this time. Reports recent reduced appetite r/t grief and abdominal discomfort/constipation. Denies N/V. Prefers softer foods d/t missing teeth. Typical intake of 3 meals/day without snacks but states he has been eating smaller portions recently. Typical intake includes rice/soup. Confirmed food allergies to seafood, crabs, pork and shellfish. Reports previous UBW of ~185lbs. Current UBW of  ~125lbs. Pt states wt loss occurred years ago and progressively declined over 2 years. Pt agreeable to addition of protein shakes to supplement intake. Denied flavor preference. Encouraged small, frequent PO attempts with high protein sources and monitoring Na.    CURRENT NUTRITION ORDERS  Diet: NPO    Intake/Tolerance: No documented intakes to assess.    LABS  Na 128  BUN 31.1  Cre 1.73  GFR 44  Total bili 2.4    MEDICATIONS  Vitamin k  miralax    ANTHROPOMETRICS  Height: 0 cm (Data Unavailable)  Most Recent Weight: 56.7 kg (125 lb)    IBW: 61.8 kg  Body mass index is 20.8 kg/m . BMI Category: Normal BMI  Weight History: No significant weight loss noted.  Wt Readings from Last 20 Encounters:   02/06/24 56.7 kg (125 lb)   09/20/23 56.7 kg (125 lb)   03/07/23 56.7 kg (125 lb)   02/27/23 56.7 kg (125 lb)   02/05/23 55.2 kg (121 lb 11.2 oz)   01/26/23 60 kg (132 lb 3.2 oz)   11/28/22 57.9 kg (127 lb 11.2 oz)   09/28/22 54.3 kg (119 lb 12.8 oz)   06/20/22 55.3 kg (122 lb)   05/31/22 57 kg (125 lb 9.6 oz)   05/13/22 61.3 kg (135 lb 3.2 oz)   02/22/22 56.7 kg (125 lb)   02/08/22 59.9 kg (132 lb 1.6 oz)   12/02/21 61.2 kg (135 lb)   10/06/21 61.2 kg (135 lb)   09/08/21 61.2 kg (135 lb)   08/04/21 58.7 kg (129 lb 6.4 oz)   07/30/21 59.8 kg (131 lb 12.8 oz)   07/20/21 61.2 kg (135 lb)   07/15/21 60.5 kg (133 lb 4.8 oz)         ASSESSED NUTRITION NEEDS  Dosing Weight: 56.7 kg (Admission weight)   Estimated Energy Needs: 3822-1217 kcals/day (25 - 30 kcals/kg)  Justification: Maintenance  Estimated Protein Needs: 68-85  grams protein/day (1.2 - 1.5 grams of pro/kg)  Justification: Increased needs  Estimated Fluid Needs:  (Per provider pending fluid status)   Justification: Per provider pending fluid status    PHYSICAL FINDINGS  See malnutrition section below.       Per EMR:      MALNUTRITION  % Intake: < 75% for > 7 days (moderate)  % Weight Loss: Weight loss does not meet criteria for malnutrition   Subcutaneous Fat Loss: None  observed   Muscle Loss: None observed  Fluid Accumulation/Edema:  ascites  Malnutrition Diagnosis: Patient does not meet two of the established criteria necessary for diagnosing malnutrition but is at risk for malnutrition    NUTRITION DIAGNOSIS  Unintended weight loss related to reduced appetite as evidenced by pt report      INTERVENTIONS  Implementation  Nutrition Education: will be provided if nutrition education needs arise.    Medical food supplement therapy  Modify composition of meals/snacks     Goals  Patient to consume % of nutritionally adequate meal trays TID, or the equivalent with supplements/snacks.        Monitoring/Evaluation  Progress toward goals will be monitored and evaluated per protocol.  Darlene Nieves MS, RD, LD, CNSC    6C (beds 1613-4945) + 7C (beds 9683-9137) + ED   RD pager: [phone]  Weekend/Holiday RD pager: [phone]

## 2024-02-06 NOTE — PROCEDURES
[facility]  CAPS PROCEDURE NOTE  Date of Admission:  2/5/2024  Consult Requested by: Medicine  Reason for Consult: diagnostic evaluation of ascites and management of symptomatic ascites    Indication/HPI: ascites    Pre-Procedure Diagnosis: Ascites    Post-Procedure Diagnosis: Ascites    Risk Assessment: Average risk, Technically straightforward; patient's anticoagulation has been held according to guidelines based on the agent and platelets and coags are within guidelines    Procedure Outcome:  Diagnostic paracentesis performed and Therapeutic paracentesis performed with 1.6 liters of ascites removed.   See additional procedure details below.    The primary covering service should follow up and address any lab results as appropriate.    Yaquelin Hess MD  [facility]  Securely message with Vocera (more info)  Text page via [employer] Paging/Directory   See signed in provider for up to date coverage information      [facility]    Paracentesis    Date/Time: 2/6/2024 5:32 PM    Performed by: Yaquelin Hess MD  Authorized by: Yaquelin Hess MD    Risks, benefits and alternatives discussed.      UNIVERSAL PROTOCOL   Site Marked: Yes  Prior Images Obtained and Reviewed:  Yes  Required items: Required blood products, implants, devices and special equipment available    Patient identity confirmed:  Verbally with patient and hospital-assigned identification number  NA - No sedation, light sedation, or local anesthesia  Confirmation Checklist:  Patient's identity using two indicators  Time out: Immediately prior to the procedure a time out was called    Universal Protocol: the Joint Commission Universal Protocol was followed    Preparation: Patient was prepped and draped in usual sterile fashion      PRE-PROCEDURE DETAILS     Procedure purpose:  Therapeutic    ANESTHESIA (see MAR  for exact dosages):     Anesthesia method:  Local infiltration    Local anesthetic:  Lidocaine 1% w/o epi    PROCEDURE DETAILS     Ultrasound guidance: yes      Puncture site:  L lower quadrant    Fluid removed amount:  1.6 L    Fluid appearance:  Yellow and clear    PROCEDURE    Patient Tolerance:  Patient tolerated the procedure well with no immediate complications      POC US GUIDE FOR PARACENTESIS     Impression  US Indication: abdominal distension    Limited abdominal ultrasound was performed and demonstrated an adequate fluid collection on the left side of the abdomen.    Doppler of the skin demonstrated an area at this site without significant vasculature.  A paracentesis at this site was subsequently performed.    Yaquelin Hess MD

## 2024-02-06 NOTE — CONSULTS
[facility]       NEUROSURGERY CONSULTATION NOTE    This consultation was requested by Dr. Vivar from the ED service.    Reason for Consultation: R SDH    HPI:  Rishabh Cabrera is a 63 year old male not on AC/AP who has a history of T2DM, Hepatitis B, HCC, cirrhosis, HTN, colon cancer s/p hemicolectomy 2015, gastric ulcer who presented with weakness, fatigue, electrolyte derangements, acute kidney injury, hyperbilirubinemia with CT head obtained as a part of work up demonstrating a 3mm subacute-to-chronic right convexity subdural hematoma. He reports started feeling diffuse numbness over his scalp and face about 24-48 hours ago, along with diffuse weakness and fatigue. He had an episode of shaking on Monday 2/5 that lasted for a few hours. He denies falls or trauma. Beyond the generalized weakness, he denies LOC, numbness/weakness/paresthesias in extremities, changes in sensation, taste, smell, nor trouble speaking or other neurologic symptoms.    PAST MEDICAL HISTORY:   Past Medical History:   Diagnosis Date    Cancer (H)     Chronic hepatitis B (H)     Diabetes mellitus (H)        PAST SURGICAL HISTORY:   Past Surgical History:   Procedure Laterality Date    COLON SURGERY Right 08/05/2015    Laparoscopic hand assisted right Hemicolectomy at [facility]    ESOPHAGOSCOPY, GASTROSCOPY, DUODENOSCOPY (EGD), COMBINED N/A 05/29/2019    Procedure: ESOPHAGOGASTRODUODENOSCOPY (EGD);  Surgeon: Leventhal, Thomas Michael, MD;  Location: [facility]    ESOPHAGOSCOPY, GASTROSCOPY, DUODENOSCOPY (EGD), COMBINED N/A 2/3/2023    Procedure: ESOPHAGOGASTRODUODENOSCOPY, WITH BIOPSY;  Surgeon: Elan Morris MD;  Location:  GI    ESOPHAGOSCOPY, GASTROSCOPY, DUODENOSCOPY (EGD), COMBINED N/A 11/16/2023    Procedure: Esophagoscopy, gastroscopy, duodenoscopy (EGD), combined;  Surgeon: Elan Morris MD;  Location:  GI    IR CHEMO EMBOLIZATION  12/02/2021    IR PARACENTESIS   01/24/2023    IR SIRT (SELECTIVE INTERNAL RADIO THERAPY)  09/08/2021    IR VISCERAL ANGIOGRAM  09/08/2021    IR VISCERAL EMBOLIZATION  09/09/2021       FAMILY HISTORY: No family history on file.    SOCIAL HISTORY:   Social History     Tobacco Use    Smoking status: Never    Smokeless tobacco: Never   Substance Use Topics    Alcohol use: No       MEDICATIONS:  Current Outpatient Medications   Medication Sig Dispense Refill    capsaicin (ZOSTRIX) 0.025 % external cream Apply topically 3 times daily      carvedilol (COREG) 6.25 MG tablet Take 1 tablet (6.25 mg) by mouth 2 times daily (with meals) 180 tablet 3    diclofenac (VOLTAREN) 1 % topical gel Apply topically as needed for moderate pain      diphenhydrAMINE (BENADRYL) 2 % external cream Apply topically 3 times daily as needed for itching 30 g 1    Emollient (CERAVE MOISTURIZING) CREA Externally apply 1 Dose topically 2 times daily as needed (for eczema) 453 g 2    furosemide (LASIX) 40 MG tablet Take 1 tablet (40 mg) by mouth daily 90 tablet 3    gabapentin (NEURONTIN) 100 MG capsule Take 3 capsules (300 mg) by mouth 3 times daily 63 capsule 0    loratadine (CLARITIN) 10 MG tablet Take 10 mg by mouth daily      ondansetron (ZOFRAN ODT) 4 MG ODT tab Take 1 tablet (4 mg) by mouth every 6 hours as needed for nausea or vomiting 12 tablet 0    spironolactone (ALDACTONE) 100 MG tablet Take 1 tablet (100 mg) by mouth daily 90 tablet 3    tenofovir (VIREAD) 300 MG tablet Take 1 tablet (300 mg) by mouth daily 90 tablet 3    triamcinolone (KENALOG) 0.1 % external cream Apply topically as needed for irritation         Allergies:  Allergies   Allergen Reactions    Crabs [Crustaceans]     Pork (Porcine) Protein Itching and Unknown    Seafood Hives and Unknown    Shellfish Allergy Unknown       ROS: 10 point ROS of systems including Constitutional, Eyes, Respiratory, Cardiovascular, Gastroenterology, Genitourinary, Integumentary, Muscularskeletal, Psychiatric were all negative  except for pertinent positives noted in my HPI.    Physical exam:   Blood pressure 90/54, pulse 74, temperature 98.1  F (36.7  C), temperature source Oral, resp. rate 13, weight 56.7 kg (125 lb), SpO2 98%.  General: awake and alert  HEENT: normocephalic  PULM: breathing comfortably on room air  NEUROLOGIC:  -- Awake; Alert; oriented x 3  -- Follows commands briskly  -- +repetition, calculation, and naming  -- Speech fluent, spontaneous. No aphasia or dysarthria.  -- no gaze preference. No apparent hemineglect.  Cranial Nerves:  -- visual fields full to confrontation, PERRL 3-2mm bilat and brisk, extraocular movements intact  -- face symmetrical, tongue midline  -- sensory V1-V3 intact bilaterally  -- palate elevates symmetrically, uvula midline  -- hearing grossly intact bilat  -- Trapezii 5/5 strength bilat symmetric  -- Cerebellar: Finger nose finger without dysmetria, intact rapid alternating motions bilaterally    Motor:  Normal bulk / tone; no tremor, rigidity, or bradykinesia.  No muscle wasting or fasciculations  No Pronator Drift     Delt Bi Tri Hand Flexion/  Extension Iliopsoas Quadriceps Hamstrings Tibialis Anterior Gastroc    C5 C6 C7 C8/T1 L2 L3 L4-S1 L4 S1   R 5 5 5 5 5 5 5 5 5   L 5 5 5 5 5 5 5 5 5     Sensory:  intact to LT x 4 extremities       Reflexes: no clonus       Bi Tri BR Lala Pat Ach Bab     C5-6 C7-8 C6 UMN L2-4 S1 UMN   R 2+ 2+ 2+ Norm 2+ 2+ Norm   L 2+ 2+ 2+ Norm 2+ 2+ Norm      Gait: Deferred      IMAGING:  Recent Results (from the past 24 hour(s))   US Abdomen Complete    Impression    RESIDENT PRELIMINARY INTERPRETATION  IMPRESSION:   1.  Cirrhotic morphology of the liver with sequelae of portal  hypertension including splenomegaly and moderate volume ascites.  2.  Multiple hepatic masses, the largest which measures up to 5.1 cm.  Recommend contrast enhanced MRI for further evaluation of suspected  hepatocellular carcinoma.  3.  Decompressed gallbladder. No evidence of cholelithiasis.    CT Head w/o Contrast    Narrative    EXAM: CT HEAD W/O CONTRAST  LOCATION: [facility]  DATE: 2/6/2024    INDICATION: Diffuse numbness and weakness x24-48 hours, hx of HCC with new liver masses; rule out bleed vs mass vs new stroke.  COMPARISON: None.  TECHNIQUE: Routine CT Head without IV contrast. Multiplanar reformats. Dose reduction techniques were used.    FINDINGS:  INTRACRANIAL CONTENTS: Thin, predominantly low-attenuation right convexity subdural collection measuring up to 3 mm likely representing a subacute to chronic subdural hematoma. Mild mass effect and 2-3 mm right-to-left midline shift. Mild volume loss and   presumed chronic small vessel ischemia. No CT evidence of acute infarct.    VISUALIZED ORBITS/SINUSES/MASTOIDS: No intraorbital abnormality. No paranasal sinus mucosal disease. No middle ear or mastoid effusion.    BONES/SOFT TISSUES: No acute abnormality.      Impression    IMPRESSION:  1.  Thin, likely subacute-to-chronic right convexity subdural hematoma. No hyperdense hemorrhage. Mild mass effect and 2-3 mm right-to-left midline shift.    2.  Mild age-related change.      Critical Result: Subdural hemorrhage.    Finding was identified on 2/6/2024 2:30 AM CST.     Dr. James was contacted by me on 2/6/2024 2:35 AM CST and verbalized understanding of the critical result.          LABS:   Last Comprehensive Metabolic Panel:  Lab Results   Component Value Date     (L) 02/06/2024    POTASSIUM 3.2 (L) 02/06/2024    CHLORIDE 96 (L) 02/06/2024    CO2 27 02/06/2024    ANIONGAP 5 (L) 02/06/2024     (H) 02/06/2024    BUN 31.3 (H) 02/06/2024    CR 1.89 (H) 02/06/2024    GFRESTIMATED 40 (L) 02/06/2024    MANDY 6.5 (L) 02/06/2024         Lab Results   Component Value Date    WBC 8.4 02/05/2024    WBC 3.5 05/10/2021     Lab Results   Component Value Date    RBC 3.01 02/05/2024    RBC 4.55 05/10/2021     Lab Results   Component Value Date    HGB  7.8 02/05/2024    HGB 13.0 05/10/2021     Lab Results   Component Value Date    HCT 25.0 02/05/2024    HCT 39.1 05/10/2021     Lab Results   Component Value Date    MCV 83 02/05/2024    MCV 86 05/10/2021     Lab Results   Component Value Date    MCH 25.9 02/05/2024    MCH 28.6 05/10/2021     Lab Results   Component Value Date    MCHC 31.2 02/05/2024    MCHC 33.2 05/10/2021     Lab Results   Component Value Date    RDW 23.0 02/05/2024    RDW 14.9 05/10/2021     Lab Results   Component Value Date    PLT 88 02/05/2024    PLT 52 05/10/2021     INR   Date Value Ref Range Status   02/06/2024 3.25 (H) 0.85 - 1.15 Final   05/10/2021 1.39 (H) 0.86 - 1.14 Final        ASSESSMENT:  Rishabh Cabrera is a 63 year old male not on AC/AP but with significant coagulopathy and thrombocytopenia with chronic liver disease in the setting of Hepatitis B, HCC, cirrhosis who presented with weakness, fatigue, electrolyte derangements, acute kidney injury, hyperbilirubinemia with CT head demonstrating a 3mm subacute-to-chronic right convexity subdural hematoma.      Clinically Significant Risk Factors Present on Admission        # Hypokalemia: Lowest K = 3.2 mmol/L in last 2 days, will replace as needed  # Hyponatremia: Lowest Na = 125 mmol/L in last 2 days, will monitor as appropriate      # Hypoalbuminemia: Lowest albumin = 1.4 g/dL at 2/5/2024  7:03 PM, will monitor as appropriate  # Coagulation Defect: INR = 3.25 (Ref range: 0.85 - 1.15) and/or PTT = 53 Seconds (Ref range: 22 - 38 Seconds), will monitor for bleeding  # Thrombocytopenia: Lowest platelets = 88 in last 2 days, will monitor for bleeding       # DMII: A1C = N/A within past 6 months            RECOMMENDATIONS:  No neurosurgical intervention indicated at this time   Repeat head CT in 6 hours from the time of first acquisition  Hold all antiplatelets and anticoagulants  HOB > 30 degrees  Q1H neuro exams   Keppra 7d 500 BID for seizure ppx  Maintain SBP < 140  Keep NPO until stability  scan  Platelets > 100,000  INR < 1.5  Hemoglobin > 8  DVT: SCDs while in bed  Neurosurgery will follow closely    Addendum:   Patient was discussed with the Neuro Interventional team regarding consideration of MMA embolization.   Given the small size of SDH, recommend follow-up in clinic in 4 weeks with repeat head CT.   At that time, if patient is deemed a candidate they will schedule patient for procedure.     Angel Mac MD  Neurosurgery Resident  Pager- [phone]    The patient was discussed with Dr. Tillman, neurosurgery chief resident, and Dr. Khanna, neurosurgery staff.    Please contact neurosurgery resident on call with questions.    Dial * * *[phone], enter [phone] when prompted.

## 2024-02-07 NOTE — CARE PLAN
Noticed bulge on right umbllical area, pain with palpation, pain in abdomen. Pain radiates to penis and down through groin/ buttocks and into his back. Bowel sounds present. Abdomen soft tender. Pt talking of dying, saying its his time to die, he is done suffering. Having numbness in face that started after his fall 2 days ago. Pt is very sleepy.       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 Found to have JUAN C, hyponatremia, and subacute/chronic subdural hematoma. Abdominal US concerning for recurrent HCC.     I: Upon arrival to the unit patient was oriented to room, unit, and call light. Patient s weight, and vital signs were obtained. Allergies reviewed and allergy band applied. Provider notified of patient s arrival on the unit. Adult AVS completed. Head to toe assessment completed. Care plan initiated.    A: Vital signs stable upon admission. Two person skin check completed. Significant Skin Findings include abdominal bulge right of umbilicus. Scrape on left knee.   P: Continue to monitor patient s vitals and intervene as needed. Continue with plan of care. Notify provider with any concerns or changes in patient status.

## 2024-02-07 NOTE — CARE PLAN
VS: /45 (BP Location: Left arm)   Pulse 98   Temp 97.4  F (36.3  C) (Oral)   Resp 18   Wt 59 kg (130 lb 1.1 oz)   SpO2 98%   BMI 21.64 kg/m      Orientation Pt is Alert and Oriented x 4   Respiratory Lung sounds clear bilaterally. No cough. Denies SOB, no accessory muscles used. Pt saturations 98 on room air    Mobility/ Activity Pt is assist of 1 with a walker and a gait belt. Pt had a bout of weakness earlier this afternoon where he was hardly able to stand up out of bed but has improved throughout the evening.    Bowel/Bladder: Pt has liquid stool today but feels like he is constipated / abdominal fullness/ needs to poop. Pt has been incontinent of stool and bladder with streaks in his brief.    IV /LDA Pt has IV saline locked.    Diet Pt is on a regular diet but has little appetite and is not eating much. Pt just wants to drink juice.    Gastrointestinal Pt has bulge in right umbilical area, stomach distention and abdominal discomfort.    Skin / Wounds / Dressings: Abdominal bulge.    Pain Pt has pain in his stomach that radiates down into his groin and up into his back.    Equipment: Pt needs gait belt and wheelchair for transfer.    Circulation/ Sensation Pt has numbness in his face since he fell.    PRNS Senna   Plan Continue to monitor, ensure safety. MRI planned for tonight.    Additional info: Pt eyes are jaundiced. Pt is very weak and fatigued. CT of chest done today

## 2024-02-07 NOTE — PLAN OF CARE
Shift 7181-7192    Pt VSS, soft BP, AxO x4. Pt speaks english as a second language, pt SBA to the bathroom overnight, no complaints of pain, RPIV, pt able to make needs known, call light within reach, continue with plan of care.

## 2024-02-07 NOTE — PLAN OF CARE
[id number]  Major Shift Events:  admission from [facility], denies pain and patient had been in phone talking to Significant others.  -Alert and oriented speaks english as a second language  - patient complaining of feeling cold warm blankets provided  2100-soft BP waiting for pharmacy to release albumin requested  Plan: hemodynamic monitoring          : k recheck using protocols  For vital signs and complete assessments, please see documentation flowsheets.   Problem: Pain Acute  Goal: Optimal Pain Control and Function  Outcome: Progressing   Goal Outcome Evaluation:

## 2024-02-08 NOTE — PROGRESS NOTES
Brief Neurosurgery Note:    Rishabh Cabrera is a 63 year old male not on AC/AP but with significant coagulopathy and thrombocytopenia with chronic liver disease in the setting of Hepatitis B, HCC, cirrhosis who presented with weakness, fatigue, electrolyte derangements, acute kidney injury, hyperbilirubinemia with CT head demonstrating a 3mm subacute-to-chronic right convexity subdural hematoma.     Plan:    - Serial Neuro exams  - Please inform Neurosurgery if any change in exam  - Please get out the door head CT for surveillance if being discharged  - Head CT x 1 month with follow up in clinic (arranged for you)  - Neurosurgery to follow peripherally    Plan discussed with staff, Dr. Khanna.    [facility]  Neurosurgery Resident PGY3

## 2024-02-08 NOTE — PLAN OF CARE
Goal Outcome Evaluation:      Plan of Care Reviewed With: patient    Overall Patient Progress: no changeOverall Patient Progress: no change     Patient admitted 02/05/24 He presented with weakness and fatigue and was admitted for further evaluation of hyponatremia, hypokalemia, JUAN C, and hyperbilirubinemia. Underlying etiology is most likely decompensated cirrhosis. CT Scan 2/7 showed hematoma, monitor outpatient in 1 month. L upper lung mass noted on MRI. Monitoring Liver cancer as well.       VS: /63   Pulse 90   Temp 98.6  F (37  C) (Oral)   Resp 16   Wt 59 kg (130 lb 1.1 oz)   SpO2 94%   BMI 21.64 kg/m      Soft Bps  - carvedilol held this AM.    Neuro checks every 4 hours.   O2: >90% on RA no complaints of SOB or chest pain.   Output: Voiding adequate amounts w/o pain or difficulty to bathroom.   Last BM: 02/08/24   Activity: SBA with walker and gait belt to bathroom.    Skin: Visible skin intact - jaundiced, abdominal buldge.   Pain: No pain   CMS: AOX4 neuro checks q4h.   Dressing: None   Diet: Regular, tolerating well. No complaints of nausea or vomiting.   LDA: R PIV   Equipment: Personal belongings.   Plan: Monitor cancer findings outpatient.   Additional Info:

## 2024-02-08 NOTE — PLAN OF CARE
Shift [id number]  VS: Temp: 97.7  F (36.5  C) Temp src: Oral BP: 102/55 Pulse: 84   Resp: 16 SpO2: 98 % O2 Device: None (Room air)      O2: Stable On RA, O2 > 90%.    Output: Cont. B&B with times of bladder incontinence   Activity: Ax1 GB/walker   Skin: Abdominal bulge   Neuro: A&Ox 4.    Diet: Regular diet.    LDA: R PIV SL   Equipment: Personal items,    Plan: Continue POC. Pt able to make needs known, call light within reach,    Additional Info: -Liver MRI completed overnight

## 2024-02-08 NOTE — PROGRESS NOTES
[facility]    Progress Note - Hospitalist Service, GOLD TEAM 16       Date of Admission:  2/5/2024    Assessment & Plan   Rishabh Cabrera is a 63 year old male admitted on 2/5/2024.  He has a h/o T2DM, Hepatitis B, HCC, cirrhosis, HTN, colon cancer s/p hemicolectomy 2015 and gastric ulcer.  He presented on 2/5/24 with weakness, fatigue and facial numbness.  W/u in the ED revealed SDH, decompensated cirrhosis, hyponatremia, hypokalemia, JUAN C, and hyperbilirubinemia.  He was subsequently admitted.    #  Decompensated cirrhosis 2/2 hepatitis B c/b ascites, variceal bleed and HCC  #  Hepatic hypodensity on CT AP 9/20/2023  #  Coagulopathy and thrombocytopenia 2/2 cirrhosis  ---   Pt follows with Dr. Rodriguez with hepatology.   ---   On tenofovir for chronic Hep B.   ---   Has chronic abd pain, usually diffused, but feels this is now more focal in midline and lower abdomen - none in RUQ  ---   No nausea or vomiting, but does endorse poor tolerance of PO intake and minimal PO nutrition except for juices, water at home.   ---   Wt has been fairly stable.   ---   Denied worsening abd distention, but does have early satiety/increased sensation of bloating after eating.    ---   However albumin low at 1.4 on admission and with hypocalcemia, hypokalemia, and hyponatremia  ---   Also, with hx of HCC and potential poorly characterized mass on CT 9/2023, with elevated bilirubin, US RUQ obtained on admission with multiple hepatic masses suspicious for HCC.   ---   No RUQ pain, no nausea or vomiting, no fevers or chills to suggest cholecystitis, cholangitis, or other acute obstructive pathology.   ---   MRI liver to further characterize hepatic lesions c/f worsening HCC performed on 2/7/24, results pending  ---   Diagnostic paracentesis on 2/6 was negative for SBP  ---   S/p albumin 25% 50 g infusion after paracentesis on 2/6  ---   Vit K replacement 2/6 (PO) and 2/7 (IV)  ---   Continue PTA  tenofovir, renally dosed   MELD 3.0: 27 at 2024 11:12 AM  MELD-Na: 26 at 2024 11:12 AM  Calculated from:  Serum Creatinine: 1.31 mg/dL at 2024 11:12 AM  Serum Sodium: 131 mmol/L at 2024 11:12 AM  Total Bilirubin: 2.2 mg/dL at 2024 11:12 AM  Serum Albumin: 1.7 g/dL at 2024 11:12 AM  INR(ratio): 2.35 at 2024 11:12 AM  Age at listing (hypothetical): 63 years  Sex: Male at 2024 11:12 AM  ---   Daily CMP, INR   ---   GI hepatology consult following  ---   Continue PTA carvedilol BID  ---   Continue holding lasix and spironolactone d/t kidney failure  ---   Soft, 2 g Sodium diet      #  Subdural hematoma, subacute vs chronic   #  Weakness and fatigue  #  Facial numbness  ---   Admit CT head w/o contrast  revealed thin, likely subacute-to-chronic right convexity subdural hematoma. No hyperdense hemorrhage.  Mild mass effect and 2-3 mm right-to-left midline shift noted.  ---   F/u CT head on  stable without evidence of further bleed, likely older lesion.  ---   No signs of CVA on imaging.   ---   Avoid chemical anticoagulation  ---   Generalized weakness, fatigue and facial numbness may be due to SDH.   ---   Neurosurgery follow-up outpatient in 1 month with repeat CT head     #  Hyponatremia  #  Hypokalemia  #  Hypocalcemia  #  JUAN C  ---   Presented with weakness, fatigue x1-2 days, found to have new hypokalemia, hyponatremia, and JUAN C   ---   Baseline cr ~ 1.0  ---   Admit cr 2.07 ---> Held PTA Lasix and spironolactone ---> 1.89 ---> 1.73 ---> 1.41   ---   Na was 125 at admit ---> ---> ---> 131 today  ---   K, Mg and Phos levels are back to normal range  ---   Reported decreased PO intake d/t grief w/ recent death and  for his sister  ---   Suspect pre-renal etiology for JUAN C, low PO intake with ongoing lasix and spironolactone use for his ascites, edema   ---   Lytes derangements were also due to decrease oral intake   ---   Continue holding PTA lasix and spironolactone  ---    Encouraged increased oral intake  ---   RD consulted     #  Constipation  ---   Very symptomatic at home, noting frequent feeling of needing to have a BM but being unable to do so, or if able to do so will have very small output and hard stools.   ---   No melena or hematochezia.   ---   Takes stool softeners intermittently at home without improvement.   ---   Continue daily Miralax and prn senna  ---   Add dulcolax prn     #  T2DM  ---   Last Hgb A1c 6.8%  ---   Diet controlled  ---   Monitor     #  Chronic microcytic anemia  ---   Hgb 7.8 on admission ---> 8.1 g today  ---   Denied hematemesis, melena or BRBPR  ---   CTM     #   H/o pulmonary spiculated lesions or nodules  #   Hx of loculated pulmonary effusions  ---   Follows with outpatient pulmonology clinic, last seen 3/7/2023 and recommended 6 month follow up.  ---   CT chest w/o contrast 2/7/24 revealed increased size of left upper lobe spiculated nodule c/f malignancy. Will consider PET/CT or tissue sampling for further evaluation.  Awaiting for MRI liver results.     #  Peripheral neuropathy  #  Hx of herpes zoster and post-herpetic neuralgia  ---   Resume pta gabapentin since kidney function improved    Possible UTI  ---   UA on 2/6 w/ trace blood, negative leukocyte esterase, negative nitrite, 3 to WBC, 3 RBC and few bacteria present  ---   UCx growing 10-50K GNB  ---   NGTD from blood cx collected on 2/6  ---   Continue Ceftriaxone for SBP prophylaxis           Diet: Snacks/Supplements Pediatric: Glucerna; With Meals  Combination Diet Mechanical/Dental Soft Diet; 2 gm NA Diet    DVT Prophylaxis: none in setting of elevated INR  Davalos Catheter: Not present  Fluids: none  Lines: None     Cardiac Monitoring: None  Code Status: Full Code    DISPOSITION:  Anticipate 1 more day of hospitalization for close monitoring of kidney function.  Awaiting for MRI liver and CT chest results.    Ramy Aggarwal MD  Hospitalist Service, GOLD TEAM [address] [place] [address]  [facility]  Securely message with [employer] (more info)  Text page via [facility] Paging/Directory   See signed in provider for up to date coverage information  ______________________________________________________________________    Interval History   No complaints  Uneventful night  Awaiting for Liver MRI result  I discussed CT chest findings with him via an        Physical Exam   Vital Signs: Temp: 98  F (36.7  C) Temp src: Oral BP: 100/58 Pulse: 89   Resp: 16 SpO2: 95 % O2 Device: None (Room air)    Weight: 130 lbs 1.14 oz    General: Cachectic, aao x 3, NAD.  HEENT:  NC/AT, neck supple  CVS:  Tachycardic, nl and s2, no m/r/g.  Lungs:  CTA B/L.   Abd:  Soft, + bs, NT, no rebound or gaurding, no fluid shift.  Ext:  No c/c.  Lymph:  No edema.  Neuro:  Nonfocal.  Musculoskeletal: No calf tenderness to palpation.    Skin:  No rash.  Psychiatry:  Mood and affect appropriate.      Data     I have personally reviewed the following data over the past 24 hrs:    5.9  \   8.8 (L)   / 111 (L)     131 (L) 100 20.1 /  178 (H)   4.2 27 1.31 (H) \     ALT: 19 AST: 42 AP: 184 (H) TBILI: 2.2 (H)   ALB: 1.7 (L) TOT PROTEIN: 6.4 LIPASE: N/A     INR:  2.35 (H) PTT:  N/A   D-dimer:  N/A Fibrinogen:  N/A       Imaging results reviewed over the past 24 hrs:   Recent Results (from the past 24 hour(s))   CT Chest w/o Contrast    Narrative    EXAMINATION: Chest CT  2/7/2024 2:48 PM    CLINICAL HISTORY: h/o pulm nodules    COMPARISON: 9/21/2023.    TECHNIQUE: CT imaging obtained through the chest without contrast.  Axial, coronal, and sagittal reconstructions and axial MIP reformatted  images are reviewed.     CONTRAST: No contrast    FINDINGS:  Lungs: The trachea and central airways are patent. No pneumothorax or  pleural effusion. Increased size of spiculated nodule in the left  upper lobe measuring 2.2 x 1.5 cm, previously 1.9 x 1.1 cm when  measured in similar fashion. New trace left pleural effusion.  Similar  appearance of scarring/atelectasis in the right upper lobe with  pleural thickening and calcification. Right lower lobe pleural based  soft tissue density with peripheral calcifications likely sequela of  prior empyema.    Mediastinum: The visualized thyroid gland is unremarkable. The heart  size is within normal limits. No pericardial effusion. The ascending  aorta and main pulmonary artery diameters are within normal limits.  Normal appearance and configuration of the great vessels off of the  aortic arch. Coronary artery calcification. No suspicious mediastinal,  hilar, or axillary lymph nodes.    Bones and soft tissues: No suspicious bone findings.     Upper Abdomen: Cirrhotic appearance of the liver. Posttreatment  changes in the right lobe of liver. Ascites. Splenomegaly.      Impression    IMPRESSION:   1. Increased size of left upper lobe spiculated nodule with new trace  left pleural effusion. Organizing pneumonia versus malignancy.  Consider PET/CT or tissue sampling for further evaluation.  2. Cirrhosis with evidence of portal hypertension. Stable  posttreatment changes in the liver with no suspicious lesion on this  noncontrast CT.    I have personally reviewed the examination and initial interpretation  and I agree with the findings.    DELVIS LOWE MD         SYSTEM ID:  [id number]

## 2024-02-08 NOTE — PROGRESS NOTES
Brief Hepatology Note    Pt was seen this morning. Continues to endorse weakness. No abdominal pain. JUAN C is improving.     We are waiting for MRI formal reading on possibility of HCC to determine the next steps. Will also determine if he will need lung biopsy for the increased spiculated lesion.     Hepatology team will continue to follow.

## 2024-02-08 NOTE — PROGRESS NOTES
Quick note:    Notified that family would appreciate a resource on free volunteer rides for appointments. Put the below information on [employer]'s free volunteer ride service to and from chemo/radiation appointments. Since he only has 1 qualifying appointment SW will not setup for patient.            [employer]   Road to Recovery Program         Road to Recovery is a program started by the [employer] for people who have limited to no access to transportation for cancer-related appointments. They recommend always calling first to make sure they service the area you are trying to set up rides for.  Drivers are all volunteer based. If you are calling on behalf of a patient to set up rides, what you can do is still give them all the details and have them write down all the appts, but the patient would still need to call themselves to confirm it. You can do it together with the patient or have them call on their own. Otherwise, the patient can call themselves to set it all up once they have access to all their appointment dates, times, and location.           Main Line Contact:     [phone] (then press 3 for Transportation and Lodging)     Eligibility:     Ambulatory- can get up and walk unassisted     If assistance is needed for ambulation, person must bring someone along that will help them     If under 18, must have parent or legal caregiver riding with them      Most cars not wheelchair accessible     If going to a radiation appointment, it must be external beams ONLY! No restrictions on chemotherapy         Requesting Rides:     Must call 25 HOURS in advance to schedule any ride     Can request rides up to 2 months in advance     If a volunteer cancels a ride for whatever reason, the rider should receive a call 24 hours before their appointment, so that they have time to find an alternative ride. If it is a last-minute cancellation, the organization should have an  emergency back-up ride readily available, though it is not guaranteed     All people in the car are required to wear a mask           ZAIN Rowe, LGSW  [facility]   Covering [address] SW  Ph: [phone]

## 2024-02-08 NOTE — DISCHARGE INSTRUCTIONS
[employer]     Road to Recovery Program          Road to Recovery is a program started by the [employer] for people who have limited to no access to transportation for cancer-related appointments. They recommend always calling first to make sure they service the area you are trying to set up rides for.  Drivers are all volunteer based. If you are calling on behalf of a patient to set up rides, what you can do is still give them all the details and have them write down all the appts, but the patient would still need to call themselves to confirm it. You can do it together with the patient or have them call on their own. Otherwise, the patient can call themselves to set it all up once they have access to all their appointment dates, times, and location.           Main Line Contact:     [phone] (then press 3 for Transportation and Lodging)     Eligibility:     Ambulatory- can get up and walk unassisted     If assistance is needed for ambulation, person must bring someone along that will help them     If under 18, must have parent or legal caregiver riding with them      Most cars not wheelchair accessible     If going to a radiation appointment, it must be external beams ONLY! No restrictions on chemotherapy          Requesting Rides:     Must call 25 HOURS in advance to schedule any ride     Can request rides up to 2 months in advance     If a volunteer cancels a ride for whatever reason, the rider should receive a call 24 hours before their appointment, so that they have time to find an alternative ride. If it is a last-minute cancellation, the organization should have an emergency back-up ride readily available, though it is not guaranteed     All people in the car are required to wear a mask

## 2024-02-09 NOTE — PLAN OF CARE
Shift [id number]  VS: Temp: 97.7  F (36.5  C) Temp src: Oral BP: 92/51 Pulse: 85   Resp: 20 SpO2: 96 % O2 Device: None (Room air)      O2: Stable On RA, O2 > 90%.    Output: Cont. B&B with times of bladder incontinence   Activity: Ax1 GB/walker   Skin: Abdominal bulge   Neuro: A&Ox 4, neuro check q4 hours   Diet: Mechanical soft, 2 gm Na diet   LDA: R PIV SL   Equipment: Personal items,    Plan: Continue POC. Pt able to make needs known, call light within reach,    Additional Info:

## 2024-02-09 NOTE — PROGRESS NOTES
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 Found to have JUAN C, hyponatremia, and subacute/chronic subdural hematoma. Abdominal US concerning for recurrent HCC.        #. Decompensated Cirrhosis 2/2 hepatitis B virus, c/b ascites, variceal bleed, HCC  MELD 3.0: 27 at 2/9/2024  7:21 AM  MELD-Na: 26 at 2/9/2024  7:21 AM  Calculated from:  Serum Creatinine: 1.31 mg/dL at 2/8/2024 11:12 AM  Serum Sodium: 131 mmol/L at 2/8/2024 11:12 AM  Total Bilirubin: 2.2 mg/dL at 2/8/2024 11:12 AM  Serum Albumin: 1.7 g/dL at 2/8/2024 11:12 AM  INR(ratio): 2.55 at 2/9/2024  7:21 AM  Age at listing (hypothetical): 63 years  Sex: Male at 2/9/2024  7:21 AM    Ascites: Present   - PTA lasix 40mg daily and spironolactone 100mg daily   - SBP history: none  Hepatic encephalopathy: No history of   EV: EGD 11/2023 - small varices, portal HTN gastropathy                - H/o EVH 1/2023   HCC: see below     # Liver lesions concern for HCC recurrence  # H/o HCC   Y90 in September 2021 followed by chemoembolization for residual disease in December 2021. MRI 1/2023 showed slight increase in several LR3 lesions in R lobe. Planned for sort interval 3 month follow up MRI which did not occur.  US this admission has findings of hepatic lesions (largest 5cm) concerning for HCC recurrence. MRI showed 10 LR-5 lesions concerning for HCC recurrence. He is not a transplant candidate at this point given 10 lesions therefore does not meet Richton criteria.     I had a long discussion with Mr. Cabrera who understandably expressed  feeling sad and scared with the news of recurrent HCC. I asked him multiple time his opinion on chemotherapy which he previously declined. He said that he saw his friends dying while on chemo. He wants to die peacefully without discomfort. I recommended palliative care consult based on his wish. I also called and updated his son Ajit Cabrera about the recurrent HCC. He expressed that his dad may want to go down to [place] to be with patient's brother.  I recommended that he may need palliative care team down there.         # Lung nodule  Has had a spiculated lung lesion which he had seen pulmonary before and planned for internal imaging surveillance. This may represent either a primary lung tumor vs metastatic HCC. In the setting of recurrent multiple HCC, he is not a liver transplant candidate and does not want to pursue chemotherapy. Therefore there is no benefit to biopsy the lung nodule.        RECOMMENDATIONS:  - Consult palliative care for comfort based approach per patient's wish      Hepatology will sign off. The above was communicated to the medicine team via phone.      Thank you for involving us in this patient's care. Please do not hesitate to contact the GI service with any questions or concerns.      Pt care plan discussed with Dr. Lara, GI staff physician.      Sofia Yip MD, PhD  Gastroenterology Fellow  Division of Gastroenterology, Hepatology and Nutrition  [facility]  Pager: [phone]  _______________________________________________________________      Subjective:  Continues to feel weak. He was anxious to know his medical condition and what MRI reading is. He wanted to record my visit with him and I agreed to it.     Objective:  Blood pressure 96/54, pulse 72, temperature 97.6  F (36.4  C), temperature source Oral, resp. rate 18, weight 59 kg (130 lb 1.1 oz), SpO2 98%.    Gen: A&Ox3, NAD  HEENT: sclera anicteric  CV: RRR  Lungs: breathing comfortably on room air  Abd:  soft,  nontender, slightly distended, bowel sounds present  Skin: no jaundice, no stigmata of chronic liver disease  MS: appropriate muscle mass for age  Neuro: non focal       LABS:  BMP  Recent Labs   Lab 02/08/24  1112 02/07/24  1029 02/07/24  0736 02/06/24  1724 02/06/24  1552 02/06/24  0806 02/06/24  0650 02/06/24  0030   *  --  131*  --   --   --  128* 128*   POTASSIUM 4.2  --  4.2 4.0 7.7*  --  3.4 3.2*   CHLORIDE 100  --  100  --   --   --  98 96*   MANDY 7.5*  --  7.1*  --   --   --  6.7* 6.5*   CO2 27  --  28  --   --   --  27 27   BUN 20.1  --  29.9*  --   --   --  31.1* 31.3*   CR 1.31*  --  1.41*  --   --   --  1.73* 1.89*   * 94 67*  --   --  89 94 111*     CBC  Recent Labs   Lab 02/08/24 1112 02/06/24  0650 02/05/24  1903   WBC 5.9 5.6 8.4   RBC 3.30* 3.13* 3.01*   HGB 8.8* 8.1* 7.8*   HCT 27.7* 25.3* 25.0*   MCV 84 81 83   MCH 26.7 25.9* 25.9*   MCHC 31.8 32.0 31.2*   RDW 24.4* 23.4* 23.0*   * 83* 88*     INR  Recent Labs   Lab 02/09/24  0721 02/08/24  1112 02/07/24  0736 02/06/24  0322   INR 2.55* 2.35* 2.43* 3.25*     LFTs  Recent Labs   Lab 02/08/24 1112 02/07/24  0736 02/06/24  0650 02/05/24  1903   ALKPHOS 184* 164* 134 138   AST 42 47* 44  --    ALT 19 21 15 18   BILITOTAL 2.2* 3.2* 2.4* 3.0*   PROTTOTAL 6.4 6.0* 5.7* 6.3*   ALBUMIN 1.7* 1.7* 1.3* 1.4*      PANCNo lab results found in last 7 days.    IMAGING:  Reviewed.     ATTENDING NOTE HEPATOLOGY    I discussed this patient with the fellow and participated in the decision making. I agree with the fellow's note. Lena Lara MD

## 2024-02-09 NOTE — CONSULTS
Medical Oncology  Consult Note   Date of Service: 02/09/2024    Patient: Rishabh Cabrera  MRN: [id number]  Admission Date: 2/5/2024  Hospital Day # 4    Reason for Consult: HCC recurrence, also w/left lung mass.  GI recommended chemo for the HCC and pt is interested in chemo     HISTORY OF PRESENTING ILLNESS:      Rishabh Cabrera is a 63-year-old man with history of HCC s/p local therapies (Y90 in 09/2021 and chemoembolization for residual disease in 12/2021) in background of cirrhosis 2/2 HBV, admitted with fatigue and weakness and admitted for decompensated cirrhosis, JUAN C, and electrolyte derangements.    Briefly, patient just recently lost a sister to a cancer (unclear nature), and was feeling ill with rigors, weakness, and fatigue, and presented to ED for evaluation. Abdominal US showed liver masses confirmed on MRI (seen on 01/2023 CT).    He had radiologic diagnosis of HCC in 2021 and underwent Y90 treatment and later chemoembolization in 12/2021. At diagnosis staging, a RICH lesion was found. Attempts to biopsy resulted in hemoptysis and scant tissue, and no malignancy was diagnosed. He has followed with Dr Lund, and most recently saw him in 06/2022. Systemic therapies were considered but given patient's preference and likely trivial benefit this was not pursued at that time.    Additionally, patient had a R hemicolectomy in 08/2015 for tubulovillous adenoma that could not be treated endoscopically.    Review of Systems:  A comprehensive ROS was performed and found to be negative or non-contributory with the exception of that noted in the HPI above.    PAST MEDICAL HISTORY      Past Medical History:   Diagnosis Date    Cancer (H)     Chronic hepatitis B (H)     Diabetes mellitus (H)        PAST SURGICAL HISTORY:      Past Surgical History:   Procedure Laterality Date    COLON SURGERY Right 08/05/2015    Laparoscopic hand assisted right Hemicolectomy at [facility]    ESOPHAGOSCOPY, GASTROSCOPY, DUODENOSCOPY (EGD),  COMBINED N/A 05/29/2019    Procedure: ESOPHAGOGASTRODUODENOSCOPY (EGD);  Surgeon: Leventhal, Thomas Michael, MD;  Location: [facility]    ESOPHAGOSCOPY, GASTROSCOPY, DUODENOSCOPY (EGD), COMBINED N/A 2/3/2023    Procedure: ESOPHAGOGASTRODUODENOSCOPY, WITH BIOPSY;  Surgeon: Elan Morris MD;  Location: [facility]    ESOPHAGOSCOPY, GASTROSCOPY, DUODENOSCOPY (EGD), COMBINED N/A 11/16/2023    Procedure: Esophagoscopy, gastroscopy, duodenoscopy (EGD), combined;  Surgeon: Elan Morris MD;  Location: [facility]    IR CHEMO EMBOLIZATION  12/02/2021    IR PARACENTESIS  01/24/2023    IR SIRT (SELECTIVE INTERNAL RADIO THERAPY)  09/08/2021    IR VISCERAL ANGIOGRAM  09/08/2021    IR VISCERAL EMBOLIZATION  09/09/2021       SOCIAL HISTORY:      Social History     Socioeconomic History    Marital status:    Tobacco Use    Smoking status: Never    Smokeless tobacco: Never   Substance and Sexual Activity    Alcohol use: No    Drug use: Yes     Types: Marijuana    Sexual activity: Not Currently     Social Determinants of Health     Interpersonal Safety: Not At Risk (6/20/2022)    Humiliation, Afraid, Rape, and Kick questionnaire     Fear of Current or Ex-Partner: No     Emotionally Abused: No     Physically Abused: No     Sexually Abused: No        FAMILY HISTORY:      No family history on file.    MEDICATIONS:      gadobutrol (GADAVIST) injection 7.5 mL    capsaicin (ZOSTRIX) 0.025 % external cream, Apply topically 3 times daily  carvedilol (COREG) 6.25 MG tablet, Take 1 tablet (6.25 mg) by mouth 2 times daily (with meals)  diclofenac (VOLTAREN) 1 % topical gel, Apply topically as needed for moderate pain  diphenhydrAMINE (BENADRYL) 2 % external cream, Apply topically 3 times daily as needed for itching  Emollient (CERAVE MOISTURIZING) CREA, Externally apply 1 Dose topically 2 times daily as needed (for eczema)  furosemide (LASIX) 40 MG tablet, Take 1 tablet (40 mg) by mouth daily  gabapentin (NEURONTIN) 100 MG capsule,  Take 3 capsules (300 mg) by mouth 3 times daily  loratadine (CLARITIN) 10 MG tablet, Take 10 mg by mouth daily  ondansetron (ZOFRAN ODT) 4 MG ODT tab, Take 1 tablet (4 mg) by mouth every 6 hours as needed for nausea or vomiting  spironolactone (ALDACTONE) 100 MG tablet, Take 1 tablet (100 mg) by mouth daily  tenofovir (VIREAD) 300 MG tablet, Take 1 tablet (300 mg) by mouth daily  triamcinolone (KENALOG) 0.1 % external cream, Apply topically as needed for irritation  [DISCONTINUED] clonazePAM (KLONOPIN) 0.5 MG tablet, clonazepam 0.5 mg tablet  [DISCONTINUED] enalapril (VASOTEC) 10 MG tablet, Take 1 tablet (10 mg) by mouth daily (Patient not taking: Reported on 6/27/2022)  [DISCONTINUED] insulin glargine (LANTUS SOLOSTAR) 100 UNIT/ML pen, Inject 5 Units Subcutaneous At Bedtime  [DISCONTINUED] meloxicam (MOBIC) 15 MG tablet, every 24 hours  [DISCONTINUED] metFORMIN (GLUCOPHAGE) 1000 MG tablet, Take 1 tablet by mouth 2 times daily (with meals)  [DISCONTINUED] metoprolol succinate ER (TOPROL XL) 50 MG 24 hr tablet, Take 1 tablet (50 mg) by mouth daily (Patient not taking: No sig reported)  [DISCONTINUED] nortriptyline (PAMELOR) 25 MG capsule, nortriptyline 25 mg capsule         PHYSICAL EXAM:      Blood pressure 96/54, pulse 72, temperature 97.6  F (36.4  C), temperature source Oral, resp. rate 18, weight 59 kg (130 lb 1.1 oz), SpO2 98%.    General: cachectic man, weak appearing  HEENT: sclera anicteric, EOMI, MMM  Neck: no LAD  CV: RRR, no murmurs  Resp: clear anterior fields  GI: soft, distended, bowel sounds present  MSK: warm and well-perfused, normal tone  Skin: no rashes on limited exam, no jaundice  Neuro: Alert and interactive, no focal deficits    LABS:    I personally reviewed the following studies:    ROUTINE LABS (Last four results):  CBC  Recent Labs   Lab 02/08/24  1112 02/06/24  0650 02/05/24  1903   WBC 5.9 5.6 8.4   RBC 3.30* 3.13* 3.01*   HGB 8.8* 8.1* 7.8*   HCT 27.7* 25.3* 25.0*   MCV 84 81 83   MCH  26.7 25.9* 25.9*   MCHC 31.8 32.0 31.2*   RDW 24.4* 23.4* 23.0*   * 83* 88*     CMP  Recent Labs   Lab 24  0721 24  1112 24  1029 24  0736 24  1724 24  0806 24  0650 24  0030 24  1903   * 131*  --  131*  --   --  128*   < > 125*   POTASSIUM 4.4 4.2  --  4.2 4.0   < > 3.4   < > 3.2*   CHLORIDE 103 100  --  100  --   --  98   < > 91*   CO2 28 27  --  28  --   --  27   < > 23   ANIONGAP 3* 4*  --  3*  --   --  3*   < > 11   GLC 95 178* 94 67*  --    < > 94   < > 233*   BUN 13.9 20.1  --  29.9*  --   --  31.1*   < > 31.7*   CR 1.18* 1.31*  --  1.41*  --   --  1.73*   < > 2.07*   GFRESTIMATED 70 62  --  56*  --   --  44*   < > 36*   MANDY 7.5* 7.5*  --  7.1*  --   --  6.7*   < > 6.9*   MAG  --  1.9  --   --   --   --   --   --  2.3   PHOS  --   --   --   --   --   --   --   --  2.8   PROTTOTAL 6.5 6.4  --  6.0*  --   --  5.7*  --  6.3*   ALBUMIN 1.7* 1.7*  --  1.7*  --   --  1.3*  --  1.4*   BILITOTAL 2.5* 2.2*  --  3.2*  --   --  2.4*  --  3.0*   ALKPHOS 186* 184*  --  164*  --   --  134  --  138   AST 40 42  --  47*  --   --  44  --   --    ALT 19 19  --  21  --   --  15  --  18    < > = values in this interval not displayed.       IMAGIN2024 MR liver  IMPRESSION:  1. Cirrhosis with evidence of portal hypertension.  2. Multiple (~10) enlarging LR-5 lesions as described above.   3. Posttreatment changes of segment 6 with new focus of arterial  enhancement along the inferomedial margin of the resection cavity and  an enlarging heterogenous lesion along the inferior margin. Findings  concerning for recurrent disease. LR-TR viable.   4. Persistent dilated lops of small bowel without focal transition  point, when compared to the prior CT on 2023.      2024 CT Chest  IMPRESSION:   1. Increased size of left upper lobe spiculated nodule with new trace  left pleural effusion. Organizing pneumonia versus malignancy.  Consider PET/CT or  tissue sampling for further evaluation.  2. Cirrhosis with evidence of portal hypertension. Stable  posttreatment changes in the liver with no suspicious lesion on this  noncontrast CT.    PATHOLOGY:     No new studies    ASSESSMENT AND PLAN:      Rishabh Cabrera is a 63-year-old man with hepatocellular carcinoma previously treated with local therapies (Y90, chemoembolization), admitted with JUAN C, electrolyte derangements, and decompensated cirrhosis, and found to have radiologic evidence of neoplastic disease progression in the liver and the lungs.    The presence of enlarging liver lesions without concurrent AFP elevation poses the question of potential disease spread to the liver from another primary site, particularly since AFP was as high as 12.3 ug/L in 10/2021, though HCC progression remains a possibility as well.    Patient does have a spiculated RICH lung lesion increased in size from prior, which could be such a primary site (though metastatic HCC has not been ruled out). Given the aborted lung tissue biopsy attempt in 05/2022, diagnostic thoracentesis from new L pleural effusion could be informative if feasible.    While patient has no history of colon cancer and no e/o of colon mass on last CT A/P in 09/2023, he does not appear to have had a colonoscopy following his 08/2015 R hemicolectomy for tubulovillous adenoma.    Given all the above, PET/CT after discharge would be helpful in characterizing extent of possible neoplastic sites. If lung neoplasm is supported, SBRT may be offered for suspected lung cancer despite lack of tissue diagnosis.      HCC s/p Y90 and chemoembolization, now with evidence of progression +/- metastasis  Enlarging RICH spiculated lesion concerning for neoplastic disease, primary vs metastatic  Hx of tubulovillous adenoma s/p R hemicolectomy in 08/2015  Decompensated cirrhosis with MELD-Na 24  Subacute vs chronic subdural hematoma  Chronic now-normocytic anemia   Acute kidney  injury  Electrolyte derangements    Recommendations:    - diagnostic thoracentesis with cytology of L pleural effusion, if safely feasible    - outpatient PET- CT C/A/P    - outpatient follow up with primary Oncologist Dr Lund; please keep Oncology team apprised of discharge plans so scheduling can be optimized.        Discussed with patient, his son Ajit, primary team, and Dr Kent.      Kishor Barrios  PGY-4  Hematology, Oncology, and Transplantation  [phone]

## 2024-02-09 NOTE — PROGRESS NOTES
Care Management Follow Up    Length of Stay (days): 4    Expected Discharge Date: 02/08/2024     Concerns to be Addressed: no discharge needs identified     Patient plan of care discussed at interdisciplinary rounds: Yes    Anticipated Discharge Disposition: Home     Anticipated Discharge Services: None  Anticipated Discharge DME: None    Patient/family educated on Medicare website which has current facility and service quality ratings:    Education Provided on the Discharge Plan:    Patient/Family in Agreement with the Plan:      Referrals Placed by CM/SW:    Private pay costs discussed: Not applicable    Additional Information:  Pt currently has recommendations from OT/PT for discharge home with home care. Writer met with pt and utilizing interpretor services via phone inquired if pt would be interested in home care services. Pt reported that he would. Writer placed referral with [facility] for home care RN/PT/OT/PCA and placed home care referral order. Writer updated unit RNCC.     Adrien Chino RNCC    SEARCHABLE in [facility] - search CARE COORDINATOR      [place] ([phone]) Saturday & Sunday; ([phone]) [facility] Recognized Holidays    Weekend Pager--  Units: 5A, 5B & 5C  Pager: [phone]  Units: 6B, 6C & 6D    Pager: [phone]  Units: 7A, 7B, 7C & 7D    Pager: [phone]  Units: 6A & ICU   Pager: [phone]  Units: 5 Ortho, 5MS & WB ED Pager: [phone]  Units: 6MS, 8A & 10 ICU  Pager [phone]

## 2024-02-09 NOTE — PROGRESS NOTES
[facility]    Progress Note - Hospitalist Service, GOLD TEAM 16       Date of Admission:  2/5/2024    Assessment & Plan   Rishabh Cabrera is a 63 year old male admitted on 2/5/2024.  He has a h/o T2DM, cirrhosis 2/2 hep B infection c/b ascites, variceal bleed and HCC (s/p Y90 9/2021 and chemoembolization for residual disease 12/2021), HTN, colon ca s/p hemicolectomy 2015 and gastric ulcer.  He presented on 2/5/24 with weakness, fatigue and facial numbness.  W/u in the ED revealed SDH, decompensated cirrhosis, hyponatremia, hypokalemia, JUAN C, and hyperbilirubinemia.  He was subsequently admitted.    #  Decompensated cirrhosis 2/2 hepatitis B c/b ascites, variceal bleed and HCC  #  Coagulopathy and thrombocytopenia 2/2 cirrhosis  ---   Pt follows with Dr. Rodriguez with hepatology.   ---   On tenofovir for chronic Hep B.   ---   Has chronic abd pain, usually diffused, but feels this is now more focal in midline and lower abdomen - none in RUQ  ---   No nausea or vomiting at admit, but did endorse poor tolerance of PO intake except for juices and water  ---   Wt has been fairly stable.   ---   Denied worsening abd distention, but does have early satiety/increased sensation of bloating after eating.    ---   Denied nausea or vomiting  ---   However albumin low at 1.4 on admission and with hypocalcemia, hypokalemia, and hyponatremia  ---   Diagnostic paracentesis on 2/6 was negative for SBP  ---   S/p albumin 25% 50 g infusion after paracentesis on 2/6  ---   Vit K replacement 2/6 (PO) and 2/7 (IV)  ---   Continue PTA tenofovir, renally dosed   MELD 3.0: 25 at 2/9/2024  7:21 AM  MELD-Na: 24 at 2/9/2024  7:21 AM  Calculated from:  Serum Creatinine: 1.18 mg/dL at 2/9/2024  7:21 AM  Serum Sodium: 134 mmol/L at 2/9/2024  7:21 AM  Total Bilirubin: 2.5 mg/dL at 2/9/2024  7:21 AM  Serum Albumin: 1.7 g/dL at 2/9/2024  7:21 AM  INR(ratio): 2.55 at 2/9/2024  7:21 AM  Age at listing  (hypothetical): 63 years  Sex: Male at 2/9/2024  7:21 AM  ---   Daily CMP, INR   ---   GI hepatology consult following  ---   Continue PTA carvedilol BID w/ holding parametrs  ---   Continue holding lasix and spironolactone d/t kidney failure  ---   Soft, 2 g Sodium diet     #  Liver lesions concern for HCC recurrence  #  H/o HCC   ---   H/o Y90 9/2021 and chemoembolization for residual disease 12/2021  ---   MRI 1/2023 showed slight increase in several LR3 lesions in R lobe. Planned for sort interval 3 month follow up MRI which did not occur.  Pt did not follow up and f/u MRI was not obtained ---   US RUQ 2/5 this admit with multiple hepatic masses suspicious for HCC.   ---   MRI liver 2/8 showed 10 LR-5 lesions concerning for HCC recurrence.   ---   Pt was informed of the MRI findings  ---   Not a transplant candidate at this point given 10 lesions therefore does not meet [place] criteria per  Dr. Sofia Yip of GI consult service  ---   Pt is requesting chemo therapy.      #  H/o pulmonary spiculated lesions or nodules  ---   Follows with outpatient pulmonology clinic, last seen 3/7/2023 and recommended 6 month follow up.  ---   CT chest w/o contrast 2/7/24 revealed increased size of left upper lobe spiculated nodule c/f malignancy (secondary malignancy vs metastatic dz).   ---   PET/CT or tissue sampling may be needed for further evaluation.  Defer to GI team     #  Subdural hematoma, subacute vs chronic   #  Weakness and fatigue  #  Facial numbness  ---   Admit CT head w/o contrast 2/6 revealed thin, likely subacute-to-chronic right convexity subdural hematoma. No hyperdense hemorrhage.  Mild mass effect and 2-3 mm right-to-left midline shift noted.  ---   F/u CT head on 2/6 stable without evidence of further bleed, likely older lesion.  ---   No signs of CVA on imaging.   ---   Avoid chemical anticoagulation  ---   Generalized weakness, fatigue and facial numbness may be due to SDH.   ---   Neurosurgery  follow-up outpatient in 1 month with repeat CT head     #  Hyponatremia  #  Hypokalemia  #  Hypocalcemia  #  JUAN C  ---   Presented with weakness, fatigue x1-2 days, found to have new hypokalemia, hyponatremia, and JUAN C   ---   Baseline cr ~ 1.0  ---   Admit cr 2.07 ---> Held PTA Lasix and spironolactone ---> ---> ---> 1.18 today  ---   Na was 125 at admit ---> ---> ---> 134 today  ---   K, Mg and Phos levels are back to normal range  ---   Reported decreased PO intake d/t grief w/ recent death and  for his sister  ---   Suspect pre-renal etiology for JUAN C, low PO intake with ongoing lasix and spironolactone use for his ascites, edema   ---   Lytes derangements were also due to decrease oral intake   ---   Continue holding PTA lasix and spironolactone  ---   Encouraged increased oral intake  ---   Continue nutritional supplements     #  Constipation  ---   Very symptomatic at home, noting frequent feeling of needing to have a BM but being unable to do so, or if able to do so will have very small output and hard stools.   ---   No melena or hematochezia.   ---   Takes stool softeners intermittently at home without improvement.   ---   Continue daily Miralax and prn senna and dulcolax prn     #  T2DM  ---   Last Hgb A1c 6.8%  ---   Diet controlled  ---   Monitor     #  Chronic microcytic anemia  ---   Hgb 7.8 on admission ---> 8.8 g on 24  ---   Denied hematemesis, melena or BRBPR  ---   CTM     #  Peripheral neuropathy  #  Hx of herpes zoster and post-herpetic neuralgia  ---   Resume pta gabapentin since kidney function improved    Possible UTI  ---   UA on  w/ trace blood, negative leukocyte esterase, negative nitrite, 3 to WBC, 3 RBC and few bacteria present  ---   UCx growing 10-50K Klebsiella pneumoniae S to ceftriaxone  ---   NGTD from blood cx collected on   ---   Continue Ceftriaxone x 5 days,  - 2/10/24           Diet: Snacks/Supplements Pediatric: Glucerna; With Meals  Combination Diet  Mechanical/Dental Soft Diet; 2 gm NA Diet    DVT Prophylaxis: none in setting of elevated INR  Davalos Catheter: Not present  Fluids: none  Lines: None     Cardiac Monitoring: None  Code Status: Full Code    DISPOSITION:   TBD, potential transfer to [place] for chemo              Ramy Aggarwal MD  Hospitalist Service, GOLD TEAM 16  [facility]  Securely message with Gam[employer] (more info)  Text page via [employer] Paging/Directory   See signed in provider for up to date coverage information  ______________________________________________________________________    Interval History   No complaints  Uneventful night  Pt was informed of the liver MRI findings and CT chest; both revealed cancer.  Pt is requesting chemo       Physical Exam   Vital Signs: Temp: 97.6  F (36.4  C) Temp src: Oral BP: 96/54 Pulse: 72   Resp: 18 SpO2: 98 % O2 Device: None (Room air)    Weight: 130 lbs 1.14 oz    General: Cachectic, aao x 3, NAD.  HEENT:  NC/AT, neck supple  CVS:  Tachycardic, nl and s2, no m/r/g.  Lungs:  CTA B/L.   Abd:  Soft, + bs, NT, no rebound or gaurding, no fluid shift.  Ext:  No c/c.  Lymph:  No edema.  Neuro:  Nonfocal.  Musculoskeletal: No calf tenderness to palpation.    Skin:  No rash.  Psychiatry:  Mood and affect appropriate.      Data     I have personally reviewed the following data over the past 24 hrs:    N/A  \   N/A   / N/A     134 (L) 103 13.9 /  95   4.4 28 1.18 (H) \     ALT: 19 AST: 40 AP: 186 (H) TBILI: 2.5 (H)   ALB: 1.7 (L) TOT PROTEIN: 6.5 LIPASE: N/A     INR:  2.55 (H) PTT:  N/A   D-dimer:  N/A Fibrinogen:  N/A       Imaging results reviewed over the past 24 hrs:   No results found for this or any previous visit (from the past 24 hour(s)).

## 2024-02-09 NOTE — PROGRESS NOTES
VS: /42 (BP Location: Left arm)   Pulse 102   Temp (!) 101.9  F (38.8  C) (Oral)   Resp 20   Wt 59 kg (130 lb 1.1 oz)   SpO2 95%   BMI 21.64 kg/m      O2: >92% on 2L of O2 via NC.   Output: Voiding w/o pain or difficulty to bathroom.   Last BM: 2/8/24   Activity: Assist of 1 with gait belt & walker.    Skin: Visible skin intact, has abdominal buldge.   Pain: Abdominal & back pain, managed with repositioning & rest.   CMS: A&Ox4, has neuro checks q4h.   Dressing: None   Diet: Mechanical soft, 2g of sodium restriction diet.    LDA: R PIV, SL   Equipment: Personal items   Plan: Continue POC.   Additional Info: Triggered sepsis. Protocol was initiated and MD notified. Lactic acid level came back 2.4 and sepsis code called. Fluids initiated per MD order.    O2 down to 87%. MD notified. Patient put on 2L O2 via Nasal Canula.

## 2024-02-09 NOTE — CONSULTS
"Palliative Care Consultation Note  [facility]      Patient: Rishabh Cabrera  Date of Admission:  2/5/2024    Requesting Clinician / Team: Ramy Aggarwal  Reason for consult: Goals of care  Decisional support  Patient and family support       Recommendations & Counseling     GOALS OF CARE:   Comfort focused , Rishabh shares he believes he is dying and does not think he would try any further cancer therapy as there is \"no point.\"  He has  Rishabh initially declines my offer to share the above with his family, but at subsequent visit with Oncology and his son Ajit present he allows me to share the above wishes.  Ajit states he was aware of the above from previous conversation with the nurse.  We discussed the hospice philosophy and about hospice services.  Rishabh states he is interested in this support  for comfort outside of the hospital.  He is interested in care that his family does not have to provide.      ADVANCE CARE PLANNING:  No health care directive on file. Per  informed consent policy, next of kin should be involved if patient becomes unable.  There is no POLST form on file, recommend to complete prior to DC.  Code status: No CPR- Do NOT Intubate - See discussion below.    MEDICAL MANAGEMENT:   #Pain,sub acute abdominal relieved with BM  [place] Board of Pharmacy Data Base Reviewed: Yes:   reviewed - no record of controlled substances prescribed.  Continue constipation management.    Rishabh continues to appear in pain despite saying pain is relieved, side lying and guarded to movement. Certainly there is reason for pain with multiple enhancing masses throughout the liver and ascites.  Continue scheduled Neurontin  Consider oxycodone 2.5-5mg Q4 hours PRN     #General Weakness/Debility   SW involved for support with higher care needs in setting of increasing weakness and debility.    #Constipation  Continue scheduled Miralax and PRN Senna    #Depression   Expressing hopelessness " in the face of worsening cancer,  Doesn't think his family cares.   Not considering suicide.    consulted as he has seen in the past.  Consider adding selective serotonin reuptake inhibitor, I did not speak to him about this today.    PSYCHOSOCIAL/SPIRITUAL SUPPORT:  Family -While Rishabh has concerns about his Family's support, they are present and involved bringing him to appointments.   Rishabh has seen  in the past in May and found supportive, Will place consult.    Palliative Care will continue to follow. Thank you for the consult and allowing us to aid in the care of Rishabh Cabrera.    These recommendations have been discussed with Dr. Barrios and Dr. Aggarwal.    JOSIE Geiger CNP  Securely message with Vocera (more info)  Text page via [facility] Paging/Directory       Assessment      Rishabh Cabrera is a 63 year old male with a past medical history of T2DM, HTN, colon cancer s/p hemicolectomy 2015, gastric ulcer, decompensated cirrhosis due to hepatitis B complicated by HCC (s/p Y90 9/2021 and chemoembolization for residual disease 12/2021), h/o varices hemorrhage (1/2023), ascites, Hx of spiculated lung lesion on surveillance with pulmonology who presented on 2/5 with weakness, numbness and difficulty walking after a fall the prior day.    He is found to have HCC recurrence and also with left lung spiculated nodules concerning for 2nd malignancy or metastic disease.      Today, the patient was seen for:  Goals of care  HCC  New spiculated lung mass concerning for new malignancy or metastasis.     Palliative Care Summary:   Met with Rishabh via professional phone .  Rishabh initially tells me he does not know what the doctors have said or what to expect.  He eventually tells me he thinks the cancer is worse and he is dying.  He has not yet heard from oncology but states there is no point as he doesn't feel there is anything that will help him.  His biggest concern is living but he is very present tot the  "fact that he    I introduced our role as an extra layer of support and how we help patients and families dealing with serious, potentially life-limiting illnesses. I explained the composition of the palliative care team.  Palliative care helps patients and families navigate their care while focusing on the whole person; providing emotional, social and spiritual support  Palliative care often assists with symptom management, information sharing about what to expect from the illness, available treatment options and what effect those options may have on the disease course, and provide effective communication and caring support.    Prognosis, Goals, & Planning:    Functional Status just prior to this current hospitalization:  ECOG3 (Capable of only limited self-care; needs help with ADLs; in bed/chair >50% of waking hours)    Prognosis, Goals, and/or Advance Care Planning:  We discussed general treatment options (full/restorative, selective/conservatives, and comfort only/hospice). We then discussed how these specifically apply to Rishabh.  He has not yet heard what options are available per oncology but does not think any further treatment will \"be worth it.\" Based on this discussion, Rishabh has decided for DNR/DNI.  He agrees I will add this to our chart as an order so it is clear to everyone.     Code Status was addressed today:   Yes, We discussed potential risks and rationale of attempting cardiac resuscitation, intubation, and mechanical ventilation.  We also discussed probability of survival as well as quality of life implications.  Based on this discussion and Rishabh's thoughs about his dying and wish for comfort care I gave recommendation against coding.  Rishabh agrees when his heart and lungs fail he would not want us to try and revive him with CPR or kept alive with machines which would only prolong the dying process.  He is made DNR/DNI    Patient's decision making preferences: independently        Patient has " decision-making capacity today for complex decisions:Intact  He verbalized understanding and asks informed questions.   expressed no concerns about his communication when asked after our meeting.             Coping, Meaning, & Spirituality:   Mood, coping, and/or meaning in the context of serious illness were addressed today: Yes  Rishabh feels hopeless in the face of advancing cancer. He states no one cares about him.   He can recite no support systems. Noted family has been actively involved this hospitalization.    Social:   Live with son who works from home.     Medications:  I have reviewed this patient's medication profile and medications from this hospitalization. Notable medications:capsaicin cream, Voltaren cream gabapentin 300mg TID, Claritin 10mg daily all PTA  Has used no PRN pain medications since admission.     ROS:  Comprehensive ROS is reviewed and is negative except as here & per HPI:   Denies pain, feels mildly short of breath, denies cough.  Denies nausea and vomiting, BM today.  Feels generally ill and feels chilled.     Physical Exam   Vital Signs with Ranges  Temp:  [97.6  F (36.4  C)-98.6  F (37  C)] 97.6  F (36.4  C)  Pulse:  [72-90] 72  Resp:  [16-20] 18  BP: ()/(51-63) 96/54  SpO2:  [94 %-98 %] 98 %  130 lbs 1.14 oz    PHYSICAL EXAM:  GEN:  Alert, oriented x 3, appears comfortable, NAD.  HEENT:  Normocephalic/atraumatic, no scleral icterus, no nasal discharge, mouth moist.  CV:  Regular rate and rhythm, no murmur or JVD.  S1 + S2 noted, no S3 or S4.  LUNGS:  Clear to auscultation bilaterally without rales/rhonchi/wheezing/retractions.  Symmetric chest rise on inhalation noted.  ABD:  Active bowel sounds, soft, non-tender/non-distended.  No rebound/guarding/rigidity.  EXT:  No edema or cyanosis.  No joint synovitis noted.  SKIN:  Dry to touch, no exanthems noted in the visualized areas.     Data reviewed:  Results for orders placed or performed during the hospital encounter of  02/05/24 (from the past 24 hour(s))   Comprehensive metabolic panel   Result Value Ref Range    Sodium 134 (L) 135 - 145 mmol/L    Potassium 4.4 3.4 - 5.3 mmol/L    Carbon Dioxide (CO2) 28 22 - 29 mmol/L    Anion Gap 3 (L) 7 - 15 mmol/L    Urea Nitrogen 13.9 8.0 - 23.0 mg/dL    Creatinine 1.18 (H) 0.67 - 1.17 mg/dL    GFR Estimate 70 >60 mL/min/1.73m2    Calcium 7.5 (L) 8.8 - 10.2 mg/dL    Chloride 103 98 - 107 mmol/L    Glucose 95 70 - 99 mg/dL    Alkaline Phosphatase 186 (H) 40 - 150 U/L    AST 40 0 - 45 U/L    ALT 19 0 - 70 U/L    Protein Total 6.5 6.4 - 8.3 g/dL    Albumin 1.7 (L) 3.5 - 5.2 g/dL    Bilirubin Total 2.5 (H) <=1.2 mg/dL   INR   Result Value Ref Range    INR 2.55 (H) 0.85 - 1.15     Diagnostic paracentesis performed and Therapeutic paracentesis performed with 1.6 liters of ascites removed.   Cytology Pending.    Medical Decision Making       Please see A&P for additional details of medical decision making.  MANAGEMENT DISCUSSED with the following over the past 24 hours: Medicine and Oncology   NOTE(S)/MEDICAL RECORDS REVIEWED over the past 24 hours: medicine, oncology, GI  Tests REVIEWED in the past 24 hours:  - See lab/imaging results included in the data section of the note  SUPPLEMENTAL HISTORY, in addition to the patient's history, over the past 24 hours obtained from:   - Son  Medical complexity over the past 24 hours:  - Code status changed to DNR/DNI

## 2024-02-09 NOTE — PROGRESS NOTES
02/09/24 1430   Appointment Info   Signing Clinician's Name / Credentials (OT) Carri Anderson, OTR/L       Present yes   Language Hmong  (via [employer])   Living Environment   People in Home child(shemar), adult   Current Living Arrangements apartment   Living Environment Comments Per pt, he lives in an apartment with his son who works from home. Pt reports that there are a lot of stairs.   Self-Care   Usual Activity Tolerance good   Current Activity Tolerance moderate   Equipment Currently Used at Home shower chair   Fall history within last six months yes   Number of times patient has fallen within last six months 1   Activity/Exercise/Self-Care Comment Pt reports that he tripped and fell on the stairs in November. Pt reports that he has a shower chair in his bob shower and that is the only equipment that he has.   General Information   Onset of Illness/Injury or Date of Surgery 02/05/24   Referring Physician Dr. Vivar   Patient/Family Therapy Goal Statement (OT) none stated   Additional Occupational Profile Info/Pertinent History of Current Problem per chart, 63 year old male admitted on 2/5/2024.  He has a h/o T2DM, cirrhosis 2/2 hep B infection c/b ascites, variceal bleed and HCC (s/p Y90 9/2021 and chemoembolization for residual disease 12/2021), HTN, colon ca s/p hemicolectomy 2015 and gastric ulcer.  He presented on 2/5/24 with weakness, fatigue and facial numbness.  W/u in the ED revealed SDH, decompensated cirrhosis, hyponatremia, hypokalemia, JUAN C, and hyperbilirubinemia.   Existing Precautions/Restrictions no known precautions/restrictions   Left Upper Extremity (Weight-bearing Status) full weight-bearing (FWB)   Right Upper Extremity (Weight-bearing Status) full weight-bearing (FWB)   Left Lower Extremity (Weight-bearing Status) full weight-bearing (FWB)   Right Lower Extremity (Weight-bearing Status) full weight-bearing (FWB)   Visual Perception   Visual Impairment/Limitations WFL    Sensory   Sensory Quick Adds sensation intact   Pain Assessment   Patient Currently in Pain No   Posture   Posture not impaired   Range of Motion Comprehensive   General Range of Motion no range of motion deficits identified   Strength Comprehensive (MMT)   General Manual Muscle Testing (MMT) Assessment no strength deficits identified   Muscle Tone Assessment   Muscle Tone Quick Adds No deficits were identified   Coordination   Upper Extremity Coordination No deficits were identified   Bed Mobility   Bed Mobility supine-sit;sit-supine   Supine-Sit Brinkley (Bed Mobility) supervision   Sit-Supine Brinkley (Bed Mobility) supervision   Transfers   Transfer Comments not assessed at this time   Balance   Balance Comments not assessed at this time, pt declined all activity   Clinical Impression   Criteria for Skilled Therapeutic Interventions Met (OT) Yes, treatment indicated   OT Diagnosis decreased ADL ind   Influenced by the following impairments HCC   OT Problem List-Impairments impacting ADL problems related to;activity tolerance impaired   Assessment of Occupational Performance 3-5 Performance Deficits   Identified Performance Deficits dressing, func mob, toileting   Planned Therapy Interventions (OT) ADL retraining   Clinical Decision Making Complexity (OT) detailed assessment/moderate complexity   Risk & Benefits of therapy have been explained evaluation/treatment results reviewed;patient   OT Total Evaluation Time   OT Eval, Moderate Complexity Minutes (30269) 15   OT Goals   Therapy Frequency (OT) 4 times/week   OT Predicted Duration/Target Date for Goal Attainment 02/23/24   OT Goals Hygiene/Grooming;Lower Body Dressing;Bed Mobility;Transfers;Toilet Transfer/Toileting   OT: Hygiene/Grooming modified independent;while standing   OT: Lower Body Dressing Modified independent   OT: Bed Mobility Modified independent   OT: Transfer Modified independent   OT: Toilet Transfer/Toileting Modified independent  "  Interventions   Interventions Quick Adds Self-Care/Home Management   Self-Care/Home Management   Self-Care/Home Mgmt/ADL, Compensatory, Meal Prep Minutes (32385) 8   Symptoms Noted During/After Treatment (Meal Preparation/Planning Training) none   Treatment Detail/Skilled Intervention Pt supine in bed upon OT arrival and agreeable to therapy. Educ on role of OT. Pt SBA for supine<>sit bed mob with elevated HOB. When asked about LB dressing, pt reports \"I am tall and it is hard to reach. But I am too cold to get up right now\". Pt declined all further therapy at this time. Pt left supine in bed with needs in reach. Increased time needed for .   OT Discharge Planning   OT Plan LB dressing, func mob, toileting, tub transfer, standing act tolerance   OT Discharge Recommendation (DC Rec) home with assist;home with home care occupational therapy   OT Rationale for DC Rec Pt appears to be below ADL baseline and would benefit from continued skilled OT to increase safety, ADL ind and act tolerance. Pt would benefit from HHOT for a safety eval.   OT Brief overview of current status SBA bed mob   Total Session Time   Timed Code Treatment Minutes 8   Total Session Time (sum of timed and untimed services) 23     "

## 2024-02-10 NOTE — CONSULTS
Care Management Follow Up    Length of Stay (days): 5    Expected Discharge Date: TBD     Concerns to be Addressed: Hospice/plan of care/care conference  Patient plan of care discussed at interdisciplinary rounds: Yes    Anticipated Discharge Disposition: Home     Anticipated Discharge Services: None  Anticipated Discharge DME: None    Private pay costs discussed: Not applicable    Additional Information:  SW acknowledges consult. Per conversation with bedside RN and from recent MD note, pt is expressing conflicting feelings/desires to different providers regarding interest in hospice care vs. Chemotherapy. [facility] is planning for care conference on Monday 02/12.     SW paged Dr. Aggarwal asking about SW involvement but did not receive page or call back.    No immediate needs from SW indicated at this time.    SW will continue to follow for support and discharge planning as needed.    ZAIN Sanz [facility]     Social Work and Care Management Department       SEARCHABLE in [employer] - search SOCIAL WORK       [place] (0800 - 1630) Saturday and Sunday     Units: 4A Vocera, 4C Vocera, & 4E Vocera    Pager: [phone]     Units: 5A [id number] Vocera, 5A 6020-6412 Vocera & 5C Vocera Pager: [phone]     Units: 6A Vocera & 6B Vocera  Pager: [phone]     Units: 6C Vocera Pager: [phone]     Units: 7A Vocera & 7B Vocera  Pager: [phone]     Units: 7C Vocera Pager: [phone]     Unit: [place] ED Vocera & [place] Obs Vocera Pager: [phone]      [facility] ([id number]) Saturday and Sunday      Units: 5 Ortho Vocera, 5 Med Surg Vocera & WB ED Vocera Pager:[phone]     Units: 6 Med Surg Vocera, 8 Med Surg Vocera, & 10 ICU Vocera Pager: [phone]        After hours Vocera [facility] and After Hours Vocera [place]     Saturday & Sunday (1630 - 0000)    Mon-Fri ([id number])     [facility] Recognized Holidays  ([id number])    Units: ALL  Pager: [phone]

## 2024-02-10 NOTE — PROGRESS NOTES
[facility]    Progress Note - Hospitalist Service, GOLD TEAM 16       Date of Admission:  2/5/2024    Assessment & Plan   Rishabh Cabrera is a 63 year old male admitted on 2/5/2024.  He has a h/o T2DM, cirrhosis 2/2 hep B infection c/b ascites, variceal bleed and HCC (s/p Y90 9/2021 and chemoembolization for residual disease 12/2021), HTN, colon ca s/p hemicolectomy 2015 and gastric ulcer.  He presented on 2/5/24 with weakness, fatigue and facial numbness.  W/u in the ED revealed SDH, decompensated cirrhosis, hyponatremia, hypokalemia, JUAN C, and hyperbilirubinemia.  He was subsequently admitted    CHANGES and MAJOR THINGS TODAY:    ---   Pt was seen by GI and Palliative consult services on 2/9   ---   He told both consultant services that he is not interested in chemo therapy and agreeable to hospice care  ---   I spoke with him Mr Cabrera on 2/9 and 2/10 using [place]  phone service.  Mr Cabrera currently not interested in hospice of comfort program.  He is requesting chemo therapy if that is recommended.  ---   Will ask IR to perform left lung biopsy on Monday  ---   Oncology service consulted  ---   I called and left a massage for pt's son Ajit Cabrera  ---   Will arrange for family care conference on Monday    RRT on 2/9 for fever and lactic acidosis  ---   Resolved w/ IVF hydration and O2 supplement    #  Decompensated cirrhosis 2/2 hepatitis B c/b ascites, variceal bleed and HCC  #  Coagulopathy and thrombocytopenia 2/2 cirrhosis  ---   Pt follows with Dr. Rodriguez with hepatology.   ---   On tenofovir for chronic Hep B.   ---   Has chronic abd pain, usually diffused, but feels this is now more focal in midline and lower abdomen - none in RUQ  ---   No nausea or vomiting at admit, but did endorse poor tolerance of PO intake except for juices and water  ---   Wt has been fairly stable.   ---   Denied worsening abd distention, but does have early satiety/increased sensation  of bloating after eating.    ---   Denied nausea or vomiting  ---   However albumin low at 1.4 on admission and with hypocalcemia, hypokalemia, and hyponatremia  ---   Diagnostic paracentesis on 2/6 was negative for SBP  ---   S/p albumin 25% 50 g infusion after paracentesis on 2/6  ---   Vit K replacement 2/6 (PO) and 2/7 (IV)  ---   Continue PTA tenofovir, renally dosed   MELD 3.0: 29 at 2/10/2024  7:28 AM  MELD-Na: 27 at 2/10/2024  7:28 AM  Calculated from:  Serum Creatinine: 1.36 mg/dL at 2/10/2024  7:28 AM  Serum Sodium: 132 mmol/L at 2/10/2024  7:28 AM  Total Bilirubin: 2.8 mg/dL at 2/10/2024  7:28 AM  Serum Albumin: 1.3 g/dL (Using min of 1.5 g/dL) at 2/10/2024  7:28 AM  INR(ratio): 2.80 at 2/10/2024  7:28 AM  Age at listing (hypothetical): 63 years  Sex: Male at 2/10/2024  7:28 AM  ---   Daily CMP, INR   ---   GI hepatology consult following  ---   Continue PTA carvedilol BID w/ holding parametrs  ---   Continue holding lasix and spironolactone d/t kidney failure  ---   Soft, 2 g Sodium diet     #  Liver lesions concern for HCC recurrence  #  H/o HCC   ---   H/o Y90 9/2021 and chemoembolization for residual disease 12/2021  ---   MRI 1/2023 showed slight increase in several LR3 lesions in R lobe. Planned for sort interval 3 month follow up MRI which did not occur.  Pt did not follow up and f/u MRI was not obtained ---   US RUQ 2/5 this admit with multiple hepatic masses suspicious for HCC.   ---   MRI liver 2/8 showed 10 LR-5 lesions concerning for HCC recurrence.   ---   Pt was informed of the MRI findings  ---   Not a transplant candidate at this point given 10 lesions therefore does not meet [place] criteria per  Dr. Sofia Yip of GI consult service  ---   Pt is requesting chemo therapy.  ---   Seen by Palliative consult service following  ---   Oncology service consulted      #  H/o pulmonary spiculated lesions or nodules  ---   Follows with outpatient pulmonology clinic, last seen 3/7/2023 and  recommended 6 month follow up.  ---   CT chest w/o contrast 24 revealed increased size of left upper lobe spiculated nodule c/f malignancy (secondary malignancy vs metastatic dz).   ---   Will ask IR to perform RICH spiculated nodule biopsy on Monday     #  Subdural hematoma, subacute vs chronic   #  Weakness and fatigue  #  Facial numbness  ---   Admit CT head w/o contrast  revealed thin, likely subacute-to-chronic right convexity subdural hematoma. No hyperdense hemorrhage.  Mild mass effect and 2-3 mm right-to-left midline shift noted.  ---   F/u CT head on  stable without evidence of further bleed, likely older lesion.  ---   No signs of CVA on imaging.   ---   Avoid chemical anticoagulation  ---   Generalized weakness, fatigue and facial numbness may be due to SDH.   ---   Neurosurgery follow-up outpatient in 1 month with repeat CT head     #  Hyponatremia  #  Hypokalemia  #  Hypocalcemia  #  JUAN C  ---   Presented with weakness, fatigue x1-2 days, found to have new hypokalemia, hyponatremia, and JUAN C   ---   Baseline cr ~ 1.0  ---   Admit cr 2.07 ---> Held PTA Lasix and spironolactone ---> ---> ---> 1.18 ---. 1.36 today  ---   Na was 125 at admit ---> ---> ---> 132 today  ---   K, Mg and Phos levels are back to normal range  ---   Reported decreased PO intake d/t grief w/ recent death and  for his sister  ---   Suspect pre-renal etiology for JUAN C, low PO intake with ongoing lasix and spironolactone use for his ascites, edema   ---   Lytes derangements were also due to decrease oral intake   ---   Continue holding PTA lasix and spironolactone  ---   Encouraged increased oral intake  ---   Continue nutritional supplements     #  Constipation  ---   Very symptomatic at home, noting frequent feeling of needing to have a BM but being unable to do so, or if able to do so will have very small output and hard stools.   ---   No melena or hematochezia.   ---   Takes stool softeners intermittently at home  without improvement.   ---   Continue daily Miralax and prn senna and dulcolax prn     #  T2DM  ---   Last Hgb A1c 6.8%  ---   Diet controlled  ---   Monitor     #  Chronic microcytic anemia  ---   Hgb 7.8 on admission ---> 8.8 g on 2/8/24  ---   Denied hematemesis, melena or BRBPR  ---   CTM     #  Peripheral neuropathy  #  Hx of herpes zoster and post-herpetic neuralgia  ---   Resume pta gabapentin since kidney function improved    Possible UTI  ---   UA on 2/6 w/ trace blood, negative leukocyte esterase, negative nitrite, 3 to WBC, 3 RBC and few bacteria present  ---   UCx growing 10-50K Klebsiella pneumoniae S to ceftriaxone  ---   NGTD from blood cx collected on 2/6  ---   Continue Ceftriaxone x 5 days, 2/6 - 2/10/24    Hypoxia  ---   Due to anxiety  ---   Breifly required 2 L NC O2 supplement, < 24 hrs  ---   Resolved           Diet: Snacks/Supplements Pediatric: Glucerna; With Meals  Combination Diet Mechanical/Dental Soft Diet; 2 gm NA Diet    DVT Prophylaxis: none in setting of elevated INR  Davalos Catheter: Not present  Fluids: none  Lines: None     Cardiac Monitoring: None  Code Status: No CPR- Do NOT Intubate    DISPOSITION:   TBD, potential transfer to [place] for chemo              Ramy Aggarwal MD  Hospitalist Service, GOLD TEAM 16  [facility]  Securely message with [employer] (more info)  Text page via [facility] Paging/Directory   See signed in provider for up to date coverage information  ______________________________________________________________________    Interval History   RRT was activated last night due to lactic acidosis and fever  Treated w/ IVF w/ resolution of sepsis  No complaints.  On 1 L NC O2 supplement when seen       Physical Exam   Vital Signs: Temp: 97.8  F (36.6  C) Temp src: Oral BP: 109/62 Pulse: 80   Resp: 15 SpO2: 100 % O2 Device: Nasal cannula Oxygen Delivery: 2 LPM  Weight: 130 lbs 1.14 oz    General: Cachectic, aao x 3,  NAD.  HEENT:  NC/AT, neck supple  CVS:  Tachycardic, nl and s2, no m/r/g.  Lungs:  CTA B/L.   Abd:  Soft, + bs, NT, no rebound or gaurding, no fluid shift.  Ext:  No c/c.  Lymph:  No edema.  Neuro:  Nonfocal.  Musculoskeletal: No calf tenderness to palpation.    Skin:  No rash.  Psychiatry:  Mood and affect appropriate.      Data     I have personally reviewed the following data over the past 24 hrs:    N/A  \   N/A   / N/A     132 (L) 101 16.1 /  204 (H)   4.4 28 1.36 (H) \     ALT: 17 AST: 44 AP: 164 (H) TBILI: 2.8 (H)   ALB: 1.3 (L) TOT PROTEIN: 5.8 (L) LIPASE: N/A     Procal: N/A CRP: N/A Lactic Acid: 1.9       INR:  2.80 (H) PTT:  N/A   D-dimer:  N/A Fibrinogen:  N/A       Imaging results reviewed over the past 24 hrs:   No results found for this or any previous visit (from the past 24 hour(s)).

## 2024-02-10 NOTE — PLAN OF CARE
Goal Outcome Evaluation:      Plan of Care Reviewed With: patient    Overall Patient Progress: no changeOverall Patient Progress: no change        Patient admitted 02/05/24 He presented with weakness and fatigue and was admitted for further evaluation of hyponatremia, hypokalemia, JUAN C, and hyperbilirubinemia. Underlying etiology is most likely decompensated cirrhosis from hep B. L upper lung mass and liver cancer has increased. Patient agreed this shift to do chemo. Code status changed to DNR DNI yesterday. Care conference to decide plan on Monday.      VS: /62 (BP Location: Left arm)   Pulse 80   Temp 97.8  F (36.6  C) (Oral)   Resp 15   Wt 59 kg (130 lb 1.1 oz)   SpO2 100%   BMI 21.64 kg/m       O2: >90% on RA no complaints of SOB or chest pain.   Output: Voiding adequate amounts w/o pain or difficulty to bathroom.   Last BM: 02/10/24   Activity: SBA with walker and gait belt to bathroom.    Skin: Visible skin intact - jaundiced, abdominal bulge.    Pain: No pain   CMS: AO X 4 neuro checks q4h   Dressing: None.   Diet: Soft and bite size per patient request. Good appetite.   LDA: R PIV - SL   Equipment: Personal belongings.   Plan: TBD   Additional Info:   Pt triggered sepsis - protocol initiated awaiting labs, MD notified.

## 2024-02-10 NOTE — CODE/RAPID RESPONSE
Rapid Response Team Note    Assessment   In assessment a rapid response was called on Rishabh LUIS Cabrera due to lactic acidosis. This presentation is likely due to dehydration vs infection. He is apparently on comfort measures. Primary team ordered fluids for the patient.     Plan   -  Agree with fluids  -  If not comfort measure, recommend CBC, BMP, lactic acid to be drawn at 2100  -  The Internal Medicine primary team was able to be reached and they are in agreement with the above plan.  -  Disposition: The patient will remain on the current floor.  -  Reassessment and plan follow-up will be performed by the primary team    Bossman Newby PA-C    Hospital Course   Admission Diagnosis:   Hypokalemia [E87.6]  Hyponatremia [E87.1]  HCC (hepatocellular carcinoma) (H) [C22.0]  JUAN C (acute kidney injury) (H24) [N17.9]    Physical Exam   Temp: (!) 101.9  F (38.8  C) Temp  Min: 97.6  F (36.4  C)  Max: 101.9  F (38.8  C)  Resp: 20 Resp  Min: 18  Max: 24  SpO2: 95 % SpO2  Min: 87 %  Max: 98 %  Pulse: 102 Pulse  Min: 72  Max: 105    No data recorded  BP: 104/42 Systolic (24hrs), Av , Min:92 , Max:116   Diastolic (24hrs), Av, Min:42, Max:59     I/Os: No intake/output data recorded.     Exam:   General: chronically ill appearing  Mental Status: AAOx4.    Significant Results and Procedures   Lactic Acid:   Recent Labs   Lab Test 24  1751 23  1348 23  1002   LACT 2.4* 1.5 2.5*     CBC:   Recent Labs   Lab Test 24  1112 24  0650 24  1903   WBC 5.9 5.6 8.4   HGB 8.8* 8.1* 7.8*   HCT 27.7* 25.3* 25.0*   * 83* 88*

## 2024-02-10 NOTE — PLAN OF CARE
Goal Outcome Evaluation: GI Status, Safety    Plan of Care Reviewed With: patient  Overall Patient Progress: no change    A/Ox4, Hmong speaking but able to speak and understand simple  english. VS WNL. Calls appropriately, bed alarm on. Ambulated to bathroom A1 GB/Walker. Abdomen distended from ascites, sclerae icteric and skin jaundiced. Breathing comfortably. O2 @1L NC overnight for comfort with O2sat above 90%. Denied pain, nausea,  SOB,dizziness and light-headedness. Episode of bladder and bowel incontinence. K+ RN-managed with latest result of 4.4. Plan to discharge home with home care services per recommendation of PT/OT. Continue POC.

## 2024-02-11 NOTE — PLAN OF CARE
Goal Outcome Evaluation:Safety    Plan of Care Reviewed With: patient  Overall Patient Progress: no change    A/Ox4, VSS. Ambulatory with steady gait and balance, SBA  with walker. Denied dizziness and light-headedness. Tolerating prescribed diet. Denied pain, CMS intact. O2Sat dips down as low as 85% wile asleep. O2 started at 1L NC to keep O2sat above 90%. Breathing comfortably, no signs of distress. LS CTA.   Lactic acid up to 2.4. MD aware and ordered 1L NaCl 0.9% bolus. Repeat lactic acid was down to 1.9. Care conference on Monday with family and doctors to discuss care plan and code status.

## 2024-02-11 NOTE — PROGRESS NOTES
[facility]    Progress Note - Hospitalist Service, GOLD TEAM 16       Date of Admission:  2/5/2024    Assessment & Plan   Rishabh Cabrera is a 63 year old male admitted on 2/5/2024.  He has a h/o T2DM, cirrhosis 2/2 hep B infection c/b ascites, variceal bleed and HCC (s/p Y90 9/2021 and chemoembolization for residual disease 12/2021), HTN, colon ca s/p hemicolectomy 2015 and gastric ulcer.  He presented on 2/5/24 with weakness, fatigue and facial numbness.  W/u in the ED revealed SDH, decompensated cirrhosis, hyponatremia, hypokalemia, JUAN C, and hyperbilirubinemia.  He was subsequently admitted    CHANGES and MAJOR THINGS TODAY:    ---   Family care conference possibly tomorrow  ---   Pt is not interested in Hospice   ---   He requesting chemo therapy  ---   Will ask IR to perform RICH spiculated lesion biopsy tomorrow  ---   NPO after midnight    RRT on 2/9 for fever and lactic acidosis  ---   Resolved w/ IVF hydration and O2 supplement    #  Decompensated cirrhosis 2/2 hepatitis B c/b ascites, variceal bleed and HCC  ---   Pt follows with Dr. Rodriguez with hepatology.   ---   On tenofovir for chronic Hep B.   ---   Has chronic abd pain, usually diffused, but feels this is now more focal in midline and lower abdomen - none in RUQ  ---   No nausea or vomiting at admit, but did endorse poor tolerance of PO intake except for juices and water  ---   Wt has been fairly stable.   ---   Denied worsening abd distention, but does have early satiety/increased sensation of bloating after eating.    ---   Denied nausea or vomiting  ---   However albumin low at 1.4 on admission and with hypocalcemia, hypokalemia, and hyponatremia  ---   Diagnostic paracentesis on 2/6 was negative for SBP  ---   S/p albumin 25% 50 g infusion after paracentesis on 2/6  ---   Vit K replacement 2/6 (PO) and 2/7 (IV)  ---   Continue PTA tenofovir, renally dosed   MELD 3.0: 27 at 2/11/2024  6:52 AM  MELD-Na: 26  at 2/11/2024  6:52 AM  Calculated from:  Serum Creatinine: 1.25 mg/dL at 2/11/2024  6:52 AM  Serum Sodium: 132 mmol/L at 2/11/2024  6:52 AM  Total Bilirubin: 2.3 mg/dL at 2/11/2024  6:52 AM  Serum Albumin: 1.3 g/dL (Using min of 1.5 g/dL) at 2/11/2024  6:52 AM  INR(ratio): 2.70 at 2/11/2024  6:52 AM  Age at listing (hypothetical): 63 years  Sex: Male at 2/11/2024  6:52 AM  ---   Daily CMP, INR   ---   GI hepatology consult following  ---   Continue PTA carvedilol BID w/ holding parametrs  ---   Continue holding lasix and spironolactone d/t kidney failure  ---   Soft, 2 g Sodium diet     #  Liver lesions concern for HCC recurrence  #  H/o HCC   ---   H/o Y90 9/2021 and chemoembolization for residual disease 12/2021  ---   MRI 1/2023 showed slight increase in several LR3 lesions in R lobe. Planned for sort interval 3 month follow up MRI which did not occur.  Pt did not follow up and f/u MRI was not obtained ---   US RUQ 2/5 this admit with multiple hepatic masses suspicious for HCC.   ---   MRI liver 2/8 showed 10 LR-5 lesions concerning for HCC recurrence.   ---   Pt was informed of the MRI findings  ---   Not a transplant candidate at this point given 10 lesions therefore does not meet [place] criteria per  Dr. Sofia Yip of GI consult service  ---   Pt is requesting chemo therapy  ---   Seen by Palliative consult service following  ---   Oncology service consulted      #  H/o pulmonary spiculated lesions or nodules  ---   Follows with outpatient pulmonology clinic, last seen 3/7/2023 and recommended 6 month follow up.  ---   CT chest w/o contrast 2/7/24 revealed increased size of left upper lobe spiculated nodule c/f malignancy (secondary malignancy vs metastatic dz).   ---   Will ask IR to perform RICH spiculated nodule biopsy tomorrow    #  Coagulopathy and thrombocytopenia   ---   Due to liver cirrhosis  ---   INR 2.7 today  ---   Plt count 111K  ---   Start Vitamin K 10 mg po daily     #  Subdural hematoma,  subacute vs chronic   #  Weakness and fatigue  #  Facial numbness  ---   Admit CT head w/o contrast  revealed thin, likely subacute-to-chronic right convexity subdural hematoma. No hyperdense hemorrhage.  Mild mass effect and 2-3 mm right-to-left midline shift noted.  ---   F/u CT head on  stable without evidence of further bleed, likely older lesion.  ---   No signs of CVA on imaging.   ---   Avoid chemical anticoagulation  ---   Generalized weakness, fatigue and facial numbness may be due to SDH.   ---   Neurosurgery follow-up outpatient in 1 month with repeat CT head     #  Hyponatremia  #  Hypokalemia  #  Hypocalcemia  #  JUAN C  ---   Presented with weakness, fatigue x1-2 days, found to have new hypokalemia, hyponatremia, and JUAN C   ---   Baseline cr ~ 1.0  ---   Admit cr 2.07 ---> Held PTA Lasix and spironolactone ---> ---> ---> 1.18 ---> 1.36 ---> 1.25 today  ---   Na was 125 at admit ---> ---> ---> 132 today  ---   K, Mg and Phos levels are back to normal range  ---   Reported decreased PO intake d/t grief w/ recent death and  for his sister  ---   Suspect pre-renal etiology for JUAN C, low PO intake with ongoing lasix and spironolactone use for his ascites, edema   ---   Lytes derangements were also due to decrease oral intake   ---   Continue holding PTA lasix and spironolactone  ---   Encouraged increased oral intake  ---   Continue nutritional supplements     #  Constipation  ---   Very symptomatic at home, noting frequent feeling of needing to have a BM but being unable to do so, or if able to do so will have very small output and hard stools.   ---   No melena or hematochezia.   ---   Takes stool softeners intermittently at home without improvement.   ---   Continue daily Miralax and prn senna and dulcolax prn     #  T2DM  ---   Last Hgb A1c 6.8%  ---   Diet controlled  ---   Monitor     #  Chronic microcytic anemia  ---   Hgb 7.8 on admission ---> 8.8 g on 24  ---   Denied hematemesis,  melena or BRBPR  ---   CTM     #  Peripheral neuropathy  #  Hx of herpes zoster and post-herpetic neuralgia  ---   Resume pta gabapentin since kidney function improved    Possible UTI  ---   UA on 2/6 w/ trace blood, negative leukocyte esterase, negative nitrite, 3 to WBC, 3 RBC and few bacteria present  ---   UCx growing 10-50K Klebsiella pneumoniae S to ceftriaxone  ---   NGTD from blood cx collected on 2/6  ---   Completed Ceftriaxone x 5 days, 2/6 - 2/10/24    Hypoxia  ---   Due to anxiety  ---   Breifly required 2 L NC O2 supplement, < 24 hrs  ---   Resolved           Diet: Snacks/Supplements Pediatric: Glucerna; With Meals  Combination Diet Mechanical/Dental Soft Diet; 2 gm NA Diet    DVT Prophylaxis: none in setting of elevated INR  Davalos Catheter: Not present  Fluids: none  Lines: None     Cardiac Monitoring: None  Code Status: No CPR- Do NOT Intubate    DISPOSITION:   TBD, potential transfer to [place] for chemo              Ramy Aggarwal MD  Hospitalist Service, GOLD TEAM 16  [facility]  Securely message with [employer] (more info)  Text page via [facility] Paging/Directory   See signed in provider for up to date coverage information  ______________________________________________________________________    Interval History   No complaints  Eating breakfast when seen  He re-itrated that he is not interested in hospice and wants chemotherapy       Physical Exam   Vital Signs: Temp: 98  F (36.7  C) Temp src: Oral BP: 101/49 Pulse: 77   Resp: 20 SpO2: 93 % O2 Device: None (Room air) Oxygen Delivery: 1 LPM  Weight: 130 lbs 1.14 oz    General: Cachectic, aao x 3, NAD.  HEENT:  NC/AT, neck supple  CVS:  Tachycardic, nl and s2, no m/r/g.  Lungs:  CTA B/L.   Abd:  Soft, + bs, NT, no rebound or gaurding, no fluid shift.  Ext:  No c/c.  Lymph:  No edema.  Neuro:  Nonfocal.  Musculoskeletal: No calf tenderness to palpation.    Skin:  No rash.  Psychiatry:  Mood and affect  appropriate.      Data     I have personally reviewed the following data over the past 24 hrs:    N/A  \   N/A   / N/A     132 (L) 104 11.3 /  171 (H)   4.5 27 1.25 (H) \     ALT: 16 AST: 33 AP: 160 (H) TBILI: 2.3 (H)   ALB: 1.3 (L) TOT PROTEIN: 5.9 (L) LIPASE: N/A     Procal: N/A CRP: N/A Lactic Acid: 1.9       INR:  2.70 (H) PTT:  N/A   D-dimer:  N/A Fibrinogen:  N/A       Imaging results reviewed over the past 24 hrs:   No results found for this or any previous visit (from the past 24 hour(s)).

## 2024-02-11 NOTE — PLAN OF CARE
"  VS: /58 (BP Location: Left arm)   Pulse 81   Temp 98.2  F (36.8  C) (Oral)   Resp 18   Wt 59 kg (130 lb 1.1 oz)   SpO2 96%   BMI 21.64 kg/m       O2: 96% on RA. Pt report slight SOB, and chest pain. Provider Jasen aware.    1900 - Pt exhibiting congested cough causing N/V. Lung sounds congested, wheezing, and crackling. Pt O2 dropping to high 80\"s. Pt placed on 1L O2 NC, O2 sats increased to 95%. Hospitalist Toni made aware.   Output: Voiding spontaneously.   Last BM: 2/11   Activity: Ax1 with walker and gb.    Skin: Intact.   Pain: Pt report uncomfort to Mid chest. Provider Jasen aware.   CMS: A&Ox4. Hmong speaking, Able to make needs known. CMS intact.   Dressing: None.   Diet: Regular, tolerating well. Pt exhibiting N/V, Hospitalist Toni aware.   LDA: RPIV SL.   Equipment: IV pole, Walker, gb, Personal belongings.   Plan: Continue with POC.   Additional Info: Pt to be NPO at midnight for biopsy tomorrow.     "

## 2024-02-12 NOTE — PROGRESS NOTES
Care Management Follow Up    Length of Stay (days): 7    Expected Discharge Date: 02/13/2024     Concerns to be Addressed: no discharge needs identified     Patient plan of care discussed at interdisciplinary rounds: Yes    Anticipated Discharge Disposition: Home     Anticipated Discharge Services: None  Anticipated Discharge DME: None    Patient/family educated on Medicare website which has current facility and service quality ratings: no  Education Provided on the Discharge Plan: Yes  Patient/Family in Agreement with the Plan: yes    Referrals Placed by CM/SW:    Private pay costs discussed: Not applicable    Additional Information:    Care conference held this afternoon. Present at the care conference: DANIEL, Dr. Aggarwal (hospitalist), Danielle Santamaria (palliative), several family members - three sons, two daughters, and their significant others. An oncology provider joined via phone for the first portion to explain where patient is at with cancer treatment and what options are left. This conversation was largely with patient's family as patient was in the restroom.    Patient finished in the restroom and joined the group. DANIEL called [employer] (P: [phone]) and connected with a [employer] . The following conversations with patient were completed with the assistance of the .  Dr. Aggarwal explained where patient is at with cancer treatment and what options are available. Patient responded that he trusts the doctors to care for him. Dr. Aggarwal continued to explain his options, stating that chemotherapy is not an option anymore. The only option would be immunotherapy. (Please note, please see provider notes for further medical explanation of options). Dr. Aggarwal states that immunotherapy may give him an additional week or two but that he may be dealing with intense side affects. The other option would be to go home with hospice care and focus on quality of life. The  assisted with answering  "questions to help clarify that patient was fully understanding what the doctor was explaining to him. Patient made it very clear that he would like immunotherapy. He states that he will leave his death up to a higher power and he will \"take his last breath while getting treatment.\" The  clarified that she informed patient that if he chooses immunotherapy, he may remain in the hospital and may have intense side affects. Patient is willing to accept this.  clarified that she informed patient that if he goes home on hospice he will have more quality time with his family members. Patient acknowledged this and maintained that he wants treatment.    It is important to note that patient has repeatedly changed his mind about treatment and course of action. This is okay. Discussions surrounding death and dying, terminal illness, and goals of care are difficult and patients may not know exactly what they want at any given moment. This can be exasperated by a language barrier. For discussions with patient, it is important to remind him that these decisions are his and his alone, but he is welcome to discuss them with his family, care team, and whoever he chooses. It is also extremely important to offer and encourage the use of professional interpretation services in order to ensure that the medical information is being accurately interpreted.   Patient would like to move forward with the possibility of immunotherapy and he is not interested in comfort care or hospice care at this time.          CHELLY Arroyo, LSW  8 Med Surg   [facility]  Phone: [phone]  Pager: [phone]    "

## 2024-02-12 NOTE — PROGRESS NOTES
"PALLIATIVE CARE PROGRESS NOTE  [facility]     Patient Name: Rishabh Cabrera  Date of Admission: 2/5/2024   Today the patient was seen for: Care Conference     Recommendations & Counseling     GOALS OF CARE:   Life-prolonging with limits   Care Conference held today 02/12 lead by primary team Dr. Aggarwal and Oncology.  service was utilized.  Palliative Care was asked to attend this care conference as both Rishabh and his family expressed interest in Hospice.  Oncology presented Rishabh's cancer prognosis and unfortunately due to ongoing liver failure and other medical complications including thrombocytopenia and bleeding, Rishabh is not a candidate for cancer-directed therapy. Oncology offered immunotherapy as being the only option with the understanding of likely whitley of any benefit being 10% or less. Discussed with this treatment, increased side effects including bleeding, infection, worsening liver failure, and decreased LOC.   With Rishabh and his family permission, I have briefly discussed comfort-care based approach and hospice philosophy using  service.   Both Rishabh and his family have heard from the medical providers his cancer prognosis is poor, in the range of weeks to months.  Rishabh expressed interest in pursing cancer-directed therapy and states he would want \"treatment until I die.\" Family is concerned with Rishabh's fluctuating capacity if he true understands (A. the severity of his illness, (B. The side effects of the immunotherapy as Rishabh himself elected to stop chemotherapy in the past.     Take away:  Rishabh has in my opinion demonstrated fluctuating capacity and is deferring medical decision making to his children. Ajit Cabrera is his HCA. At this time, decision was made to purse immunotherapy and family requested to have further discussion with Rishabh ongoing      ADVANCE CARE PLANNING:  No health care directive on file. Per  informed consent policy, next of kin should be " "involved if patient becomes unable.  There is no POLST form on file, defer to patient and/or next of kin for decisions   Code status: No CPR- Do NOT Intubate    PSYCHOSOCIAL/SPIRITUAL:  Family: 2 daughter, 3 sons and their spouses present at bedside during care conference.  Spiritual: Declined abigail at this time    Palliative Care will continue to follow. Thank you for the consult and allowing us to aid in the care of Rishabh Cabrera.    These recommendations have been discussed with Medicine, Oncology, SW, primary RN.    JOSIE Stacy CNP  Securely message with [employer] info)  Text page via [facility] Paging/Directory          Interval History:     Multidisciplinary collaboration:  Patient's care discussed with Leena Sterling PA-C    Patient/family narrative  Met Rishabh and his family at bedside. During care conference Rishbah expressed feeling hopeless, and the he trusts the medical team to make the best decision on his behalf. He also expressed interest in pursing treatment for his cancer. When told Rishabh the prognosis of his cancer; Rishabh stated he would like to purse \"treatment until I die.\" He worries, when he dies his children will not be able to care for him as they are not very traditional. Rishabh fluctuating capacity remains challenging and is not deferring medical decision making to his children while also expressing interest in pursing further treatment for his cancer despite his poor prognosis.      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 CT chest w/o contrast on 2/7/24 revealed " increased size left upper lobe spiculated nodule concerning for malignancy. Per oncology likely secondary to primary lung cancer.             Physical Exam:   Temp:  [98  F (36.7  C)-98.3  F (36.8  C)] 98  F (36.7  C)  Pulse:  [83-89] 86  Resp:  [20-25] 20  BP: ()/(48-68) 94/51  SpO2:  [93 %-97 %] 96 %  130 lbs 1.14 oz    Physical Exam  GEN:  Alert, awake, cachectic  HEENT: Normocephalic/atraumatic, no scleral icterus, mouth moist.  LUNGS: Non-labor, speaking in full sentences  ABD: Soft, non-tender/non-distended.  No rebound/guarding/rigidity.  SKIN: Dry to touch, no edema to BLE           Current Problem List:   Principal Problem:    HCC (hepatocellular carcinoma) (H)  Active Problems:    Hyponatremia    JUAN C (acute kidney injury) (H24)      Allergies   Allergen Reactions    Crabs [Crustaceans]     Pork (Porcine) Protein Itching and Unknown    Seafood Hives and Unknown    Shellfish Allergy Unknown            Data Reviewed:     CMP  Recent Labs   Lab 02/12/24  0814 02/12/24  0602 02/11/24  0652 02/09/24  0721 02/08/24  1112 02/06/24  0030 02/05/24  1903   *  --  132*   < > 131*   < > 125*   POTASSIUM 4.6  --  4.5   < > 4.2   < > 3.2*   CHLORIDE 105  --  104   < > 100   < > 91*   CO2 27  --  27   < > 27   < > 23   ANIONGAP 1*  --  1*   < > 4*   < > 11   * 101* 171*   < > 178*   < > 233*   BUN 11.8  --  11.3   < > 20.1   < > 31.7*   CR 1.27*  --  1.25*   < > 1.31*   < > 2.07*   GFRESTIMATED 64  --  65   < > 62   < > 36*   MANDY 7.0*  --  6.9*   < > 7.5*   < > 6.9*   MAG  --   --   --   --  1.9  --  2.3   PHOS  --   --   --   --   --   --  2.8   PROTTOTAL 6.0*  --  5.9*   < > 6.4   < > 6.3*   ALBUMIN 1.4*  --  1.3*   < > 1.7*   < > 1.4*   BILITOTAL 3.2*  --  2.3*   < > 2.2*   < > 3.0*   ALKPHOS 145  --  160*   < > 184*   < > 138   AST 37  --  33   < > 42   < >  --    ALT 17  --  16   < > 19   < > 18    < > = values in this interval not displayed.     CBC  Recent Labs   Lab 02/08/24  1112 02/06/24  0650    WBC 5.9 5.6   RBC 3.30* 3.13*   HGB 8.8* 8.1*   HCT 27.7* 25.3*   MCV 84 81   MCH 26.7 25.9*   MCHC 31.8 32.0   RDW 24.4* 23.4*   * 83*        Medical Decision Making       MANAGEMENT DISCUSSED with the following over the past 24 hours: Oncology and medicine   NOTE(S)/MEDICAL RECORDS REVIEWED over the past 24 hours: Medicine, Oncology, nursing staff   90 MINUTES SPENT BY ME on the date of service doing chart review, history, exam, documentation & further activities per the note.

## 2024-02-12 NOTE — CONSULTS
SPIRITUAL HEALTH SERVICES - Consult Note [facility] ([facility] 8A     Referral Source/Reason for Visit: Standard Consult request palliative      Summary and Recommendations - Rishabh castillories grief from his service with the [employer] during the [place] era.   He says he's a refugee not an immigrant.   He is confused about being told he can't eat and then being told he can.  He believes his cancer is still treatable.     Plan: [facility] advised Rishabh to spend time speaking to his ancestors for support and guidance, in line with his childhood [employer] practices.  SHS remains available via consult request in Epic.       Lorena Bob   Chaplain Resident  Pager [phone]    SHS available 24/7 for emergent requests/referrals, either by paging the on-call  or by entering an ASAP/STAT consult in [employer], which will also page the on-call .     Assessment     Saw pt Rishabh SMITH Rick     Patient/Family Understanding of Illness and Goals of Care - Rishabh describes having had cancer, remission, and now 2 more cancers. He believes this current cancer is still treatable.     Distress and Loss - Rishabh is confused by being told he can't eat pending a biopsy and then being told he can, with no biopsy happening.     Strengths, Coping, and Resources - Rishabh has family, friends, and community that support him.      Meaning, Beliefs, and Spirituality - Rishabh believes that all religions Orthodoxy the same God, just call him different things.  He describes God as fearful and uncaring, and that heaven and earth are the same.  He talked about his ancestors as caring and guiding and I encouraged him to ask them for their support.  He denied feeling that God was punishing him as he feels he's been a good person.         * [facility] remains available 24/7 for emergent requests/referrals, either by having the switchboard page the on-call  or by entering an ASAP/STAT consult in Epic (this will also page the on-call ). Routine Epic consults receive an initial  response within 24 hours.

## 2024-02-12 NOTE — PROGRESS NOTES
Hematology / Oncology  Daily Progress Note   Date of Service: 02/12/2024  Patient: Rishabh Cabrera  MRN: [id number]  Admission Date: 2/5/2024  Hospital Day # 7  Cancer Diagnosis: HCC  Primary Outpatient Oncologist: Dr Lund  Current Treatment Plan: S/p Y90 in 9/2021 and chemoembolization in 12/2021     Assessment & Plan:   Rishabh Cabrera is a 63-year-old man with history of HCC s/p local therapies (Y90 in 09/2021 and chemoembolization for residual disease in 12/2021) in background of cirrhosis 2/2 HBV, admitted with fatigue and weakness and admitted for decompensated cirrhosis, JUAN C, and electrolyte derangements.     # HCC   # Hep B cirrhosis   # RICH lung nodule  # History of Tubulovillous adenoma s/p R hemicolectomy in 08/2015  Follows with Dr Lund.  Patient with HepB and cirrhosis who originally presented with ascites and was found to have segment 5/6 6 cm HCC and possible focus of HCC in segment 7. Perhaps a separate primary in L lung apex. Otherwise no extrahepatic spread. In fall 2021, Child Olivera B8-9 perhaps. Bili <2, albumin <2.8, INR 1.7-2.2, some ascites, and no encephalopathy noted. Underwent Y90 in 9/2021 with minimal response, so cTACE in 12/2021. Better response.   Known RICH lung nodule that previous biopsies were attempted though complicated by hemoptysis and pneumothorax, with inadequate sampling.  In 6/2022, he was a Child Olivera score C 10-11 with bili<2, albumin <2.8, INR>2.2, + ascites.  Patient last seen by Dr Lund in 6/2022. At that time patient was not a candidate for further local therapies. Addtionally at that time, there was thought to be little benefit of anti-cancer treatment as patient was struggle with recent hemoptysis which prohibited safe usage of VEGFi for HCC treatment. Further more, single agent immunotherapy could have a RR of around 10%. No anti cancer therapy was pursued at that time.   Patient then presented to the ED with new rigors, weakness, and fatigue. Abdominal US showed  liver masses confirmed on MRI (seen on 01/2023 CT).  At this time, patient is Child Olivera score C with Tbili 3.2, albumin <1.4, INR 2.63, + moderate ascites.  - Care conference at 2pm today  - Further discussed overall clinical picture at care conference with patient and family today. Unfortunately patient is not a good candidate for a VEGF therapy given history of bleed. Furthermore, given his current liver function, there would be very little benefit from treatment. Patient has a very poor prognosis. Discussed with family, all questions answered. Please let us know if there are any further questions.         Patient was seen and plan of care was discussed with attending physician Dr. Kent.    Thank you for the opportunity to partake in this patients plan of care. Please do not hesitate to page with questions.     Kimberly Balderrama (Nelson), PACoyC   Hematology/Oncology   Pager: [phone]     I spent >30 minutes face-to-face and/or coordinating or discussing care plan. Over 50% of our time on the unit was spent counseling the patient and/or coordinating care    ___________________________________________________________________    Subjective & Interval History:    Notes thoroughly reviewed.     Did not see patient today    Physical Exam:    Blood pressure 94/51, pulse 86, temperature 98  F (36.7  C), temperature source Oral, resp. rate 20, weight 59 kg (130 lb 1.1 oz), SpO2 96%.    Did not see patient today     Labs & Studies: I personally reviewed the following studies:  ROUTINE LABS (Last four results):  CMP  Recent Labs   Lab 02/12/24  0814 02/12/24  0602 02/11/24  0652 02/11/24  0557 02/11/24  0211 02/10/24  0728 02/10/24  0543 02/09/24  0721 02/08/24  1112 02/06/24  0030 02/05/24  1903   *  --  132*  --   --  132*  --  134* 131*   < > 125*   POTASSIUM 4.6  --  4.5  --   --  4.4  --  4.4 4.2   < > 3.2*   CHLORIDE 105  --  104  --   --  101  --  103 100   < > 91*   CO2 27  --  27  --   --  28  --  28 27   < > 23    ANIONGAP 1*  --  1*  --   --  3*  --  3* 4*   < > 11   * 101* 171* 173*   < > 204*   < > 95 178*   < > 233*   BUN 11.8  --  11.3  --   --  16.1  --  13.9 20.1   < > 31.7*   CR 1.27*  --  1.25*  --   --  1.36*  --  1.18* 1.31*   < > 2.07*   GFRESTIMATED 64  --  65  --   --  59*  --  70 62   < > 36*   MANDY 7.0*  --  6.9*  --   --  7.1*  --  7.5* 7.5*   < > 6.9*   MAG  --   --   --   --   --   --   --   --  1.9  --  2.3   PHOS  --   --   --   --   --   --   --   --   --   --  2.8   PROTTOTAL 6.0*  --  5.9*  --   --  5.8*  --  6.5 6.4   < > 6.3*   ALBUMIN 1.4*  --  1.3*  --   --  1.3*  --  1.7* 1.7*   < > 1.4*   BILITOTAL 3.2*  --  2.3*  --   --  2.8*  --  2.5* 2.2*   < > 3.0*   ALKPHOS 145  --  160*  --   --  164*  --  186* 184*   < > 138   AST 37  --  33  --   --  44  --  40 42   < >  --    ALT 17  --  16  --   --  17  --  19 19   < > 18    < > = values in this interval not displayed.     CBC  Recent Labs   Lab 02/08/24  1112 02/06/24  0650 02/05/24  1903   WBC 5.9 5.6 8.4   RBC 3.30* 3.13* 3.01*   HGB 8.8* 8.1* 7.8*   HCT 27.7* 25.3* 25.0*   MCV 84 81 83   MCH 26.7 25.9* 25.9*   MCHC 31.8 32.0 31.2*   RDW 24.4* 23.4* 23.0*   * 83* 88*     INR  Recent Labs   Lab 02/12/24  0814 02/11/24  0652 02/10/24  0728 02/09/24  0721   INR 2.63* 2.70* 2.80* 2.55*       Medications list for reference:  Current Facility-Administered Medications   Medication    calcium carbonate (TUMS) chewable tablet 1,000 mg    carvedilol (COREG) tablet 6.25 mg    cefTRIAXone (ROCEPHIN) 1 g vial to attach to  mL bag for ADULTS or NS 50 mL bag for PEDS    gabapentin (NEURONTIN) capsule 300 mg    levETIRAcetam (KEPPRA) tablet 500 mg    lidocaine (LMX4) cream    lidocaine 1 % 0.1-1 mL    phytonadione (MEPHYTON/VITAMIN K) 1 MG/ML oral solution 10 mg    polyethylene glycol (MIRALAX) Packet 17 g    senna-docusate (SENOKOT-S/PERICOLACE) 8.6-50 MG per tablet 1 tablet    Or    senna-docusate (SENOKOT-S/PERICOLACE) 8.6-50 MG per tablet 2  tablet    sodium chloride (PF) 0.9% PF flush 3 mL    sodium chloride (PF) 0.9% PF flush 3 mL    tenofovir (VIREAD) tablet 300 mg     Facility-Administered Medications Ordered in Other Encounters   Medication    gadobutrol (GADAVIST) injection 7.5 mL

## 2024-02-12 NOTE — PLAN OF CARE
Goal Outcome Evaluation:    VS: BP 94/51 (BP Location: Left arm, Patient Position: Semi-Martínez's)   Pulse 86   Temp 98  F (36.7  C) (Oral)   Resp 20   Wt 59 kg (130 lb 1.1 oz)   SpO2 96%   BMI 21.64 kg/m      O2: Soft BP, MD aware, denies SOB, sating around mid 90's on RA.   Neuro: A/Ox4   Bowel/Bladder: Occasionally incontinent of bowel/bladder   LBM: 2/12/24   Diet: Regular diet   Skin: intact   Pain: Denies pain   Activity: Ax1 with walker   Dressings: none   LDA: Right AC PIV, SL   Equipment: IV pole, personal belongings   Plan: Oncology/GI/palliative care following, initiation of immunotherapy?   Additional Info:        Ariel Dominguez RN on 2/12/2024 at [phone]

## 2024-02-12 NOTE — CONSULTS
"    Interventional Radiology  [facility] Consult Note  02/12/24   9:22 AM    Consult Requested: \"RICH spiculated nodule biopsy\"    Recommendations/Plan:  -Dr. Gonzalez reviewed this case and consult request. He recommends a PET scan before offering any IR intervention/biopsy. This was relayed with requesting team. They will plan to reach out to IR once this has been done.     Expected date of discharge:  TBD    Vitals:   BP 94/51 (BP Location: Left arm, Patient Position: Semi-Martínez's)   Pulse 86   Temp 98  F (36.7  C) (Oral)   Resp 20   Wt 59 kg (130 lb 1.1 oz)   SpO2 96%   BMI 21.64 kg/m      Pertinent Labs:   Lab Results   Component Value Date    WBC 5.9 02/08/2024    WBC 5.6 02/06/2024    WBC 8.4 02/05/2024    WBC 3.5 (L) 05/10/2021    WBC 6.8 01/21/2020    WBC 5.6 07/17/2019     Lab Results   Component Value Date    HGB 8.8 02/08/2024    HGB 8.1 02/06/2024    HGB 7.8 02/05/2024    HGB 13.0 05/10/2021    HGB 16.3 01/21/2020    HGB 14.8 07/17/2019     Lab Results   Component Value Date     02/08/2024    PLT 83 02/06/2024    PLT 88 02/05/2024    PLT 52 05/10/2021    PLT 74 01/21/2020    PLT 86 07/17/2019     Lab Results   Component Value Date    INR 2.63 (H) 02/12/2024    INR 1.39 (H) 05/10/2021    PTT 53 (H) 02/06/2024     Lab Results   Component Value Date    POTASSIUM 4.6 02/12/2024    POTASSIUM 3.8 06/01/2022    POTASSIUM 4.7 05/10/2021        COVID-19 Antibody Results, Testing for Immunity           No data to display              COVID-19 PCR Results          2/18/2022    15:18 5/9/2022    10:06 5/12/2022    21:19 5/29/2022    23:14 9/24/2022    00:17 1/25/2023    22:48   COVID-19 PCR Results   SARS CoV2 PCR Negative  Negative  Negative  Negative  Negative  Negative        Carmenza Dempsey PA-C  Interventional Radiology    "

## 2024-02-12 NOTE — PROGRESS NOTES
[facility]    Progress Note - Hospitalist Service, GOLD TEAM 16       Date of Admission:  2/5/2024    Assessment & Plan   Rishabh Cabrera is a 63 year old male admitted on 2/5/2024.  He has a h/o T2DM, cirrhosis 2/2 hep B infection c/b ascites, variceal bleed and HCC (s/p Y90 9/2021 and chemoembolization for residual disease 12/2021), HTN, colon ca s/p hemicolectomy 2015 and gastric ulcer.  He presented on 2/5/24 with weakness, fatigue and facial numbness.  W/u in the ED revealed SDH, decompensated cirrhosis, hyponatremia, hypokalemia, JUAN C, and hyperbilirubinemia.  He was subsequently admitted    CHANGES and MAJOR THINGS TODAY:    ---   Family care conference at 2:00 pm today  ---   IR recommended PET CT to look for occult mets prior to performing RICH spiculated lesion biopsy thus biopsy order cancelled  ---   Oncology service feels that pt's HCC is not curable and they feel that pt probably will not tolerate palliative chemo because of underlying cirrhosis, debility and malnutrition    #  Decompensated cirrhosis 2/2 hepatitis B c/b ascites, variceal bleed and HCC  ---   Pt follows with Dr. Rodriguez with hepatology.   ---   On tenofovir for chronic Hep B.   ---   Has chronic abd pain, usually diffused, but feels this is now more focal in midline and lower abdomen - none in RUQ  ---   No nausea or vomiting at admit, but did endorse poor tolerance of PO intake except for juices and water  ---   Wt has been fairly stable.   ---   Denied worsening abd distention, but does have early satiety/increased sensation of bloating after eating.    ---   Denied nausea or vomiting  ---   However albumin low at 1.4 on admission and with hypocalcemia, hypokalemia, and hyponatremia  ---   Diagnostic paracentesis on 2/6 was negative for SBP  ---   S/p albumin 25% 50 g infusion after paracentesis on 2/6  ---   Vit K replacement 2/6 (PO) and 2/7 (IV)  ---   Continue PTA tenofovir, renally dosed    MELD 3.0: 28 at 2/12/2024  8:14 AM  MELD-Na: 26 at 2/12/2024  8:14 AM  Calculated from:  Serum Creatinine: 1.27 mg/dL at 2/12/2024  8:14 AM  Serum Sodium: 133 mmol/L at 2/12/2024  8:14 AM  Total Bilirubin: 3.2 mg/dL at 2/12/2024  8:14 AM  Serum Albumin: 1.4 g/dL (Using min of 1.5 g/dL) at 2/12/2024  8:14 AM  INR(ratio): 2.63 at 2/12/2024  8:14 AM  Age at listing (hypothetical): 63 years  Sex: Male at 2/12/2024  8:14 AM  ---   Daily CMP, INR   ---   GI hepatology consult service following  ---   Continue PTA carvedilol BID w/ holding parametrs  ---   Continue holding lasix and spironolactone d/t kidney failure  ---   Soft, 2 g Sodium diet     #  Liver lesions concern for HCC recurrence  #  H/o HCC   ---   H/o Y90 9/2021 and chemoembolization for residual disease 12/2021  ---   MRI 1/2023 showed slight increase in several LR3 lesions in R lobe. Planned for sort interval 3 month follow up MRI which did not occur.  Pt did not follow up and f/u MRI was not obtained ---   US RUQ 2/5 this admit with multiple hepatic masses suspicious for HCC.   ---   MRI liver 2/8 showed 10 LR-5 lesions concerning for HCC recurrence.   ---   Pt was informed of the MRI findings  ---   Not a transplant candidate at this point given 10 lesions therefore does not meet Caio criteria per Dr. Sofia Yip of GI consult service  ---   Pt is requesting chemo therapy  ---   Palliative consult service following  ---   Oncology consult service following  ---   GI consult service following    #  H/o pulmonary spiculated lesion or nodule  ---   Follows with outpatient pulmonology clinic, last seen 3/7/2023 and recommended 6 month follow up.  ---   Prior biopsy unsuccessful  ---   CT chest w/o contrast 2/7/24 revealed increased size of left upper lobe spiculated nodule c/f malignancy (secondary malignancy vs metastatic dz).   ---   Suspect this spiculated lesions or nodule is primary lung ca  ---   IR recommended PET CT to look for occult mets prior  to performing RICH spiculated lesion biopsy thus biopsy order cancelled  ---   Family care conference at 2 pm today    #  Coagulopathy and thrombocytopenia   ---   Due to liver cirrhosis  ---   INR 2.63 today  ---   Plt count was 111K on   ---   Continue Vitamin K 10 mg po daily, started on     #  Subdural hematoma, subacute vs chronic   #  Weakness and fatigue  #  Facial numbness  ---   Admit CT head w/o contrast  revealed thin, likely subacute-to-chronic right convexity subdural hematoma. No hyperdense hemorrhage.  Mild mass effect and 2-3 mm right-to-left midline shift noted.  ---   F/u CT head on  stable without evidence of further bleed, likely older lesion.  ---   No signs of CVA on imaging.   ---   Avoid chemical anticoagulation  ---   Generalized weakness, fatigue and facial numbness may be due to SDH.   ---   Neurosurgery follow-up outpatient in 1 month with repeat CT head     #  Hyponatremia  #  Hypokalemia  #  Hypocalcemia  #  JUAN C  ---   Presented with weakness, fatigue x1-2 days, found to have new hypokalemia, hyponatremia, and JUAN C   ---   Baseline cr ~ 1.0  ---   Admit cr 2.07 ---> Held PTA Lasix and spironolactone ---> ---> ---> 1.18 ---> 1.36 ---> 1.25 ---> 1.27 today  ---   Na was 125 at admit ---> ---> ---> 133 today  ---   K, Mg and Phos levels are back to normal range  ---   Reported decreased PO intake d/t grief w/ recent death and  for his sister  ---   Suspect pre-renal etiology for JUAN C, low PO intake with ongoing lasix and spironolactone use for his ascites, edema   ---   Lytes derangements were also due to decrease oral intake   ---   Continue holding PTA lasix and spironolactone  ---   Encouraged increased oral intake  ---   Continue nutritional supplements     #  Constipation  ---   Very symptomatic at home, noting frequent feeling of needing to have a BM but being unable to do so, or if able to do so will have very small output and hard stools.   ---   No melena or  hematochezia.   ---   Takes stool softeners intermittently at home without improvement.   ---   Continue daily Miralax and prn senna and dulcolax prn     #  T2DM  ---   Last Hgb A1c 6.8%  ---   Diet controlled  ---   Monitor     #  Chronic microcytic anemia  ---   Hgb 7.8 on admission ---> 8.8 g on 2/8/24  ---   Denied hematemesis, melena or BRBPR  ---   CTM     #  Peripheral neuropathy  #  Hx of herpes zoster and post-herpetic neuralgia  ---   Resume pta gabapentin since kidney function improved    Possible UTI  ---   UA on 2/6 w/ trace blood, negative leukocyte esterase, negative nitrite, 3 to WBC, 3 RBC and few bacteria present  ---   UCx growing 10-50K Klebsiella pneumoniae S to ceftriaxone  ---   NGTD from blood cx collected on 2/6  ---   Completed Ceftriaxone x 5 days, 2/6 - 2/10/24    Hypoxia  ---   Due to anxiety   ---   O2 supplement also to help w/ comfort  ---   Currently on 1 L NC O2 supplement  ---   Wean off O2 supplement as able           Diet: Snacks/Supplements Pediatric: Glucerna; With Meals  Regular Diet Adult    DVT Prophylaxis: none in setting of elevated INR  Davalos Catheter: Not present  Fluids: none  Lines: None     Cardiac Monitoring: None  Code Status: No CPR- Do NOT Intubate    DISPOSITION:   TBD, potential transfer to [place] for chemo              Ramy Aggarwal MD  Hospitalist Service, GOLD TEAM 16  [facility]  Securely message with iCharts (more info)  Text page via [facility] Paging/Directory   See signed in provider for up to date coverage information  ______________________________________________________________________    Interval History   No complaints  Uneventful night  Pt still requesting chemotherapy  On 1 L NC O2 supplement       Physical Exam   Vital Signs: Temp: 98  F (36.7  C) Temp src: Oral BP: 94/51 Pulse: 86   Resp: 20 SpO2: 96 % O2 Device: None (Room air) Oxygen Delivery: 1 LPM  Weight: 130 lbs 1.14 oz    General: Cachectic,  aao x 3, NAD.  HEENT:  NC/AT, neck supple  CVS:  Tachycardic, nl and s2, no m/r/g.  Lungs:  CTA B/L.   Abd:  Soft, + bs, distended.  Ext:  No c/c.  Lymph:  No edema.  Neuro:  Nonfocal.  Musculoskeletal: No calf tenderness to palpation.    Skin:  No rash.  Psychiatry:  Mood and affect appropriate.      Data     I have personally reviewed the following data over the past 24 hrs:    N/A  \   N/A   / N/A     133 (L) 105 11.8 /  100 (H)   4.6 27 1.27 (H) \     ALT: 17 AST: 37 AP: 145 TBILI: 3.2 (H)   ALB: 1.4 (L) TOT PROTEIN: 6.0 (L) LIPASE: N/A     INR:  2.63 (H) PTT:  N/A   D-dimer:  N/A Fibrinogen:  N/A       Imaging results reviewed over the past 24 hrs:   Recent Results (from the past 24 hour(s))   XR Chest 2 Views    Narrative    EXAM: Chest radiograph 2/11/2024 11:51 PM    HISTORY: 63 years Male Cough and congestion.     COMPARISON: Chest CT 2/7/2024.    TECHNIQUE: Portable AP view of the chest.    FINDINGS:   Trachea is slightly tortuous. Cardiac silhouette size is within normal  limits. Pulmonary vasculature is indistinct. Biapical consolidative  patchy opacities, corresponding to spiculated nodules/masses seen on  chest CT 2/7/2024. Streaky perihilar and basilar opacities. No  pneumothorax. No acute focal pulmonary consolidation. Hazy  opacification of the left lateral chest wall, corresponding to  loculated effusion. Pleural-based opacity with peripheral  calcification within the right lower lung chest wall. Blunting of  bilateral costophrenic angles. Stable elevation of the left  hemidiaphragm. The visualized upper abdomen is unremarkable. No acute  osseous or soft tissue abnormality.      Impression    IMPRESSION:   1.  Redemonstration of multifocal spiculated nodular opacities  correspond to consolidative nodules/masses on 2/7/2024 CT exam.   2.  Minimal change in left chest loculated effusion. Persistent  bilateral pleural effusions.  3.  Pleural-based opacity with peripheral calcification within  right  lower lung is not significantly changed.  4.  No acute pulmonary opacity. Persistent perihilar and bibasilar  fibroatelectatic changes.    I have personally reviewed the examination and initial interpretation  and I agree with the findings.    ROXANE CLARK DO         SYSTEM ID:  [id number]

## 2024-02-12 NOTE — PLAN OF CARE
Goal Outcome Evaluation: Safety, Respiratory Status    Plan of Care Reviewed With: patient  Overall Patient Progress: no change    Slept well overnight. No signs of pain or any discomfort. Fine crackles noted on the LLL and RLL with infrequent congested cough with white thick secretions in small amount. Provider aware. CXR ordered and done. O2Sat dips below 90% @ RA when asleep, improved with O2 @1L NC. Stated a little short of breath on exertion. Semi-rees's position maintained. Breathing comfortably. IV Ceftriaxone continued for UTI. NPO since MN for RICH biopsy in IR. Care Conference with family scheduled at 2pm today.

## 2024-02-13 NOTE — PLAN OF CARE
Goal Outcome Evaluation:    Pt is A/Ox4. Vss, afebrile. Denies sob. C/o abdominal discomfort and scrotal edema, MD aware. Up with Ax1 with walker to bathroom. Good appetite, ate 100% of meals. Cooperative with cares. Call light within reach. Able to make needs known. Family agreed to have care conference tomorrow at 1400, note left to SW.

## 2024-02-13 NOTE — PROGRESS NOTES
Care conference 2/12/24: 2 pm - 3:10 pm    Attendees:      Family:  Pt's 3 sons, 2 daughters and their spouses    Staff:   Dr. Mikal Kent of Oncology via phone, JOSIE Slade CNP of palliative team, Madelin Rawls social work and [facility]  service via phone.    Dr. Kent had a long discussion with pt and pt's children.  He discussed pt's current conditions as it related to HCC, possible new left upper lung ca, end stage cirrhosis, debility and poor nutritional status.  Dr. Kent clearly explained to the pt and pt's children that Mr. Cabrera is not a candidate for targeted or localized chemotherapy since his HCC is wide spread within the liver, at least 10 lesions noted on recent MRI.  Systemic chemotherapy also not an option given his end stage cirrhosis w/ poor liver synthetic function coupled w/ debility and poor nutritional status.  He is unlikely to tolerate side effects of chemotherapy as well.  Pt may be a candidate for immunotherapy though very low chance (~ 10%) of responding to treatment.  Immunotherapy may extended his life for few additional weeks though his quality of life may get worse.    Other option is for pt transition to hospice/comfort care w/ poor prognosis, ~ few weeks to few months to live.  Immunotherapy may extend his life by few additional weeks.    Pt and pt's children had ample opportunities to ask question to Dr. Kent, Ms. Rosalio MABRY and myself.  We have answered all of their questions to the best our abilities and to their satisfaction.      Mr. Cabrera via an  opted for Immunotherapy.  He said he will take any treatment offered to him until he last breath.    He wants to start immunotherapy ASAP.  Pt's children are all on the same page and are leaning toward hospice/comfort measures.  The children are planning to have a family meeting with the pt, and explain his options to him in their native language.      Will re-consult w/ Oncology service since pt has  requested immunotherapy.    Per IR, Pt needs further w/u w/ PET CT for eval of occult malignancy prior to performing RICH lung biopsy.  However, Oncology's opinion is that the left upper lobe spiculated nodule is most likely stage I lung ca.  Hold further w/u for now and treat pt's HCC with immunotherapy.        Ramy Aggarwal MD.   Hospitalist.  [phone], pager.

## 2024-02-13 NOTE — PLAN OF CARE
Shift [id number]:  VS: Temp: 97.9  F (36.6  C) Temp src: Oral BP: 117/56 Pulse: 77   Resp: 20 SpO2: 95 % O2 Device: None (Room air)    O2: Stable on RA, no report of SOB   Neuro: A/Ox4   Bowel/Bladder: Occasionally incontinent of bowel/bladder   LBM: 2/12/24   Diet: Regular diet, ate some food from family, ate small bits of food all night   Skin: intact   Pain: Denies pain   Activity: Ax1 with walker   Dressings: none   LDA: Right AC PIV, SL   Equipment: IV pole, personal belongings   Plan: Oncology/GI/palliative care following, initiation of immunotherapy?   Additional Info:

## 2024-02-13 NOTE — PROGRESS NOTES
Hematology / Oncology  Daily Progress Note   Date of Service: 02/13/2024  Patient: Rishabh Cabrera  MRN: [id number]  Admission Date: 2/5/2024  Hospital Day # 8  Cancer Diagnosis: HCC  Primary Outpatient Oncologist: Dr Lund  Current Treatment Plan: S/p Y90 in 9/2021 and chemoembolization in 12/2021     Recommendations:   - At this point, patient is Child Olivera C. Care discussion yesterday, patient is not a candidate for VEG-F directed therapies. Immunotherapy alone would have a response rate of roughly 10% or less. Would not plan to initiate cancer directed therapy (immunotherapy) while inpatient. Concern that patient would not be able to tolerate with his current clinical status with his liver failure, cirrhosis, overall weakness and severe malnutrition.  - Would want to see that patient is stable enough to discharge from the hospital prior to initiating treatment, if he would like to pursue treatment, recommend follow up with outpatient oncologist, Dr Lund.  Will help to arrange close follow up if patient is able to discharge.     Of note, did not see or examine patient today. Chart was thoroughly reviewed    Assessment & Plan:   Rishabh Cabrera is a 63-year-old man with history of HCC s/p local therapies (Y90 in 09/2021 and chemoembolization for residual disease in 12/2021) in background of cirrhosis 2/2 HBV, admitted with fatigue and weakness and admitted for decompensated cirrhosis, JUAN C, and electrolyte derangements.     # HCC   # Hep B cirrhosis   # RICH lung nodule  # History of Tubulovillous adenoma s/p R hemicolectomy in 08/2015  Follows with Dr Lund.  Patient with HepB and cirrhosis who originally presented with ascites and was found to have segment 5/6 6 cm HCC and possible focus of HCC in segment 7. Perhaps a separate primary in L lung apex. Otherwise no extrahepatic spread. In fall 2021, Child Olivera B8-9 perhaps. Bili <2, albumin <2.8, INR 1.7-2.2, some ascites, and no encephalopathy noted. Underwent Y90 in  9/2021 with minimal response, so cTACE in 12/2021. Better response.   Known RICH lung nodule that previous biopsies were attempted though complicated by hemoptysis and pneumothorax, with inadequate sampling.  In 6/2022, he was a Child Olivera score C 10-11 with bili<2, albumin <2.8, INR>2.2, + ascites.  Patient last seen by Dr Lund in 6/2022. At that time patient was not a candidate for further local therapies. Addtionally at that time, there was thought to be little benefit of anti-cancer treatment as patient was struggle with recent hemoptysis which prohibited safe usage of VEGFi for HCC treatment. Further more, single agent immunotherapy could have a RR of around 10%. No anti cancer therapy was pursued at that time.   Patient then presented to the ED with new rigors, weakness, and fatigue. Abdominal US showed liver masses confirmed on MRI (seen on 01/2023 CT).  At this time, patient is Child Olivera score C with Tbili 3.2, albumin <1.4, INR 2.63, + moderate ascites.  - Care conference completed on 2/12. Discussed overall clinical picture at care conference with patient and family on 2/12. Unfortunately patient is not a good candidate for a VEGF therapy given overall clinical status and history of bleed. Furthermore, given his current liver function, there would be very little benefit from treatment. Patient has a very poor prognosis. Discussed with family, all questions answered. Please let us know if there are any further questions.          Patient plan of care was discussed with attending physician Dr. Kent.    Thank you for the opportunity to partake in this patients plan of care. Please do not hesitate to page with questions.     Kimberly Balderrama PA-C ([place])   Hematology/Oncology   Pager: [phone]

## 2024-02-13 NOTE — PROGRESS NOTES
2/13/24  Care Management Follow Up    SW on unit had asked CHW to help coordinate a CC for tomorrow at 1400 per MD's request. MD would like family and Palliative present. CHW paged Palliative the information and requested them to call to confirm that someone will be present. CHW messaged them on [employer] to ensure they got the page, but did not receive a response. CHW also called pt's son Ajit Cabrera ([phone]) to inform him about the CC tomorrow. No one answered and CHW LVM with the CC information and requesting a call back to confirm that they could attend. This was all notified to the SW on the unit.    Mavis Alamo  Inpatient CHW  [facility] 5 Ortho, 8 Med Surge, & ED  PH: [phone]

## 2024-02-13 NOTE — PROGRESS NOTES
Care Management Follow Up    Length of Stay (days): 8    Expected Discharge Date: 02/14/2024     Concerns to be Addressed: no discharge needs identified     Patient plan of care discussed at interdisciplinary rounds: Yes    Anticipated Discharge Disposition: Home     Anticipated Discharge Services: TBD  Anticipated Discharge DME: TBD    Patient/family educated on Medicare website which has current facility and service quality ratings: no  Education Provided on the Discharge Plan: Yes  Patient/Family in Agreement with the Plan: yes    Referrals Placed by CM/SW:    Private pay costs discussed: Not applicable    Additional Information:    SW briefly spoke with hospitalist. She requested care conference for tomorrow as patient may be now interested in hospice.    SW asked CHW to coordinate Care Conference with hospitalist, SW, palliative, and family. Requested possible time of 2pm if works for palliative and family. CHW unable to reach family or palliative.     Will need to get in contact with relevant parties tomorrow morning to secure time for CC.        CHELLY Arroyo, LSW  8 Med Surg   [facility]  Phone: [phone]  Pager: [phone]

## 2024-02-13 NOTE — PROGRESS NOTES
[facility]    Medicine Progress Note - Hospitalist Service, GOLD TEAM 16    Date of Admission:  2/5/2024    Assessment & Plan     Rishabh Cabrera is a 63 year old male admitted on 2/5/2024.  He has a h/o T2DM, cirrhosis 2/2 hep B infection c/b ascites, variceal bleed and HCC (s/p Y90 9/2021 and chemoembolization for residual disease 12/2021), HTN, colon ca s/p hemicolectomy 2015 and gastric ulcer.  He presented on 2/5/24 with weakness, fatigue and facial numbness.  W/u in the ED revealed SDH, decompensated cirrhosis, hyponatremia, hypokalemia, JUAN C, and hyperbilirubinemia.  He was subsequently admitted       events of today     -- I spoke with hematology, per our discursion immunotherapy was not offered while  impatent but they did discuss as option butr patient  needs to get stronger an drecomended out patient  follow up  with Dr. Lund , I did let patient  and son and brother know and now patient  pinto snot want immunotherapy, would like  to go home then out of state to be with siblings , however Im not so sure this would be a great idea as he is weak and would be sceptical of how he could manage the trip and did discuss with them if he went he would not come back and likley die there      #  Decompensated cirrhosis 2/2 hepatitis B c/b ascites, variceal bleed and HCC  -   Pt follows with Dr. Rodriguez with hepatology.   -   On tenofovir for chronic Hep B.   -   Has chronic abd pain, usually diffused, but feels this is now more focal in midline and lower abdomen - none in RUQ  -   No nausea or vomiting at admit, but did endorse poor tolerance of PO intake except for juices and water  -   has abdominal distention , Denied nausea or vomiting  -  However albumin low at 1.4 on admission and with hypocalcemia, hypokalemia, and hyponatremia  - Diagnostic paracentesis on 2/6 was negative for SBP, did receive  albumin 25% 50 g infusion after paracentesis on 2/6  -   Vit K replacement 2/6  "(PO) and 2/7 (IV)  ---   Continue PTA tenofovir, renally dosed   MELD 3.0: 28 at 2/12/2024  , MELD-Na: 26 at 2/12/2024  8:14 AM  -  GI hepatology saw patient    -  Continue PTA carvedilol BID w/ holding parameter,   Continue holding lasix and spironolactone d/t kidney failure  -   Soft, 2 g Sodium diet      #  Liver lesions concern for HCC recurrence  #  H/o HCC   ---   H/o Y90 9/2021 and chemoembolization for residual disease 12/2021  ---   MRI 1/2023 showed slight increase in several LR3 lesions in R lobe. Planned for sort interval 3 month follow up MRI which did not occur.  Pt did not follow up and f/u MRI was not obtained ---   US RUQ 2/5 this admit with multiple hepatic masses suspicious for HCC.   ---   MRI liver 2/8 showed 10 LR-5 lesions concerning for HCC recurrence.   ---   Pt was informed of the MRI findings  ---   Not a transplant candidate at this point given 10 lesions therefore does not meet Caio criteria per Dr. Sofia Yip of GI consult service  ---   Pt is requested chemo therapy, not felt to be a candidate   ---   Palliative consult service following  ---   Oncology consult service following  ---   GI consult service following     #  H/o pulmonary spiculated lesion or nodule  ---   Follows with outpatient pulmonology clinic, last seen 3/7/2023 and recommended 6 month follow up.  ---   Prior biopsy unsuccessful  ---   CT chest w/o contrast 2/7/24 revealed increased size of left upper lobe spiculated nodule c/f malignancy (secondary malignancy vs metastatic dz).   ---   Suspect this spiculated lesions or nodule is primary lung ca  ---   IR recommended PET CT to look for occult mets prior to performing RICH spiculated lesion biopsy thus biopsy order cancelled    Per heme  \" - At this point, patient is Child Olivera C. Care discussion yesterday, patient is not a candidate for VEG-F directed therapies. Immunotherapy alone would have a response rate of roughly 10% or less. Would not plan to initiate " "cancer directed therapy (immunotherapy) while inpatient. Concern that patient would not be able to tolerate with his current clinical status with his liver failure, cirrhosis, overall weakness and severe malnutrition.  - Would want to see that patient is stable enough to discharge from the hospital prior to initiating treatment, if he would like to pursue treatment, recommend follow up with outpatient oncologist, Dr Lund.  Will help to arrange close follow up if patient is able to discharge. \"  - per hematology patient  has a very poor prognosis   - 2/13 patient  now wanted to be comfortable, reached out to palliative cares and etelvina have an other family conference   - I think palliative cares/hospice would be appropriate      #  Coagulopathy and thrombocytopenia   ---   Due to liver cirrhosis  ---   INR  remains  > 2.5   ---   Plt count was 111K on 2/8  ---   Continue Vitamin K 10 mg po daily, started on 2/11  - wlil need to address ongoing blood draws      #  Subdural hematoma, subacute vs chronic   #  Weakness and fatigue  #  Facial numbness  ---   Admit CT head w/o contrast 2/6 revealed thin, likely subacute-to-chronic right convexity subdural hematoma. No hyperdense hemorrhage.  Mild mass effect and 2-3 mm right-to-left midline shift noted.  ---   F/u CT head on 2/6 stable without evidence of further bleed, likely older lesion.  ---   No signs of CVA on imaging.   ---   Avoid chemical anticoagulation  ---   Generalized weakness, fatigue and facial numbness may be due to SDH.   ---   Neurosurgery follow-up outpatient in 1 month with repeat CT head      #  Hyponatremia  #  Hypokalemia  #  Hypocalcemia  #  JUAN C  ---   Presented with weakness, fatigue x1-2 days, found to have new hypokalemia, hyponatremia, and JUAN C   ---   Baseline cr ~ 1.0  ---   Admit cr 2.07 ---> Held PTA Lasix and spironolactone ---> ---> ---> 1.18 ---> 1.36 ---> 1.25 ---> 1.27 today  ---   Na was 125 at admit ---> ---> ---> 133--132   ---   K, Mg " and Phos levels are back to normal range  ---   Reported decreased PO intake d/t grief w/ recent death and  for his sister  ---   Suspect pre-renal etiology for JUAN C, low PO intake with ongoing lasix and spironolactone use for his ascites, edema   ---   Lytes derangements were also due to decrease oral intake   ---   Continue holding PTA lasix and spironolactone  ---   Encouraged increased oral intake  ---   Continue nutritional supplements     #  Constipation  ---   Very symptomatic at home, noting frequent feeling of needing to have a BM but being unable to do so, or if able to do so will have very small output and hard stools.   ---   No melena or hematochezia.   ---   Takes stool softeners intermittently at home without improvement.   ---   Continue daily Miralax and prn senna and dulcolax prn     #  T2DM  ---   Last Hgb A1c 6.8%  ---   Diet controlled  ---   Monitor     #  Chronic microcytic anemia  ---   Hgb 7.8 on admission ---> 8.8 g on 24  ---   Denied hematemesis, melena or BRBPR  ---   CTM     #  Peripheral neuropathy  #  Hx of herpes zoster and post-herpetic neuralgia  ---   Resume pta gabapentin since kidney function improved     Possible UTI  ---   UA on  w/ trace blood, negative leukocyte esterase, negative nitrite, 3 to WBC, 3 RBC and few bacteria present  ---   UCx growing 10-50K Klebsiella pneumoniae S to ceftriaxone  ---   NGTD from blood cx collected on   ---   Completed Ceftriaxone x 5 days,  - 2/10/24     Hypoxia  ---   Due to anxiety   ---   O2 supplement also to help w/ comfort  ---   Currently on 1 L NC O2 supplement  ---   Wean off O2 supplement as able                 Diet: Snacks/Supplements Pediatric: Glucerna; With Meals  Regular Diet Adult    DVT Prophylaxis:  INR > 2 due to liver issues   Davalos Catheter: Not present  Lines: None     Cardiac Monitoring: None  Code Status: No CPR- Do NOT Intubate      Clinically Significant Risk Factors              # Hypoalbuminemia:  Lowest albumin = 1.3 g/dL at 2/11/2024  6:52 AM, will monitor as appropriate  # Coagulation Defect: INR = 2.63 (Ref range: 0.85 - 1.15) and/or PTT = 53 Seconds (Ref range: 22 - 38 Seconds), will monitor for bleeding          # DMII: A1C = N/A within past 6 months       # Financial/Environmental Concerns: none         Disposition Plan     Expected Discharge Date: 02/13/2024      Destination: home with family  Discharge Comments: care conference 2/12            Griselda Barragan MD  Hospitalist Service, GOLD TEAM 16  [facility]  Securely message with [email or web address] Application (more info)  Text page via [facility] Paging/Directory   See signed in provider for up to date coverage information  ______________________________________________________________________    Interval History   Son in room patient  ,did call brother and he was on the phone, we did discuss that there are no plans of inpatent immmunioteharpy and he would need to get stronger to be able to get immunotherapy as well. Today patient  stated he wanted to go ho,e wanted to travel to be with siblings, his children are here and they would like patient  to stay patient  lives with his son who works form home.    Son did ask  what  palliative cares and hospice is   Patient  at ea bit better, no nausea vomiting .     Physical Exam   Vital Signs: Temp: 99.3  F (37.4  C) Temp src: Oral BP: 115/54 Pulse: 85   Resp: 18 SpO2: 96 % O2 Device: None (Room air)    Weight: 130 lbs 1.14 oz  General appearance: awake alert in  no apparent distress       RESPIRATORY: lungs are clear   CARDIOVASCULAR: normal S1S2 regular rate and rhythm,   GASTROINTESTINAL: abdomen is distended but soft , + bowel sounds    SKIN: warm and dry, jaundiced   NEUROLOGIC: awake alert  speech clear   EXTREMITIES: no clubbing cyanosis, some edema and also some scrotal and penile edema     Data     I have personally reviewed the following data over the past 24 hrs:    N/A  \    N/A   / N/A     132 (L) 103 12.0 /  253 (H)   4.4 25 1.22 (H) \     ALT: 17 AST: 35 AP: 162 (H) TBILI: 2.1 (H)   ALB: 1.5 (L) TOT PROTEIN: 6.4 LIPASE: N/A     INR:  2.54 (H) PTT:  N/A   D-dimer:  N/A Fibrinogen:  N/A       Imaging results reviewed over the past 24 hrs:   No results found for this or any previous visit (from the past 24 hour(s)).

## 2024-02-14 NOTE — PROGRESS NOTES
Hematology / Oncology  Daily Progress Note   Date of Service: 02/14/2024  Patient: Rishabh Cabrera  MRN: [id number]  Admission Date: 2/5/2024  Hospital Day # 9  Cancer Diagnosis: HCC  Primary Outpatient Oncologist: Dr Lund  Current Treatment Plan: S/p Y90 in 9/2021 and chemoembolization in 12/2021     Recommendations:   - At this point, patient is Child Olivera C. Care discussion again today, patient is not a candidate for VEG-F directed therapies. Immunotherapy alone would have a response rate of roughly 10% or less. Would not plan to initiate cancer directed therapy (immunotherapy) while inpatient. Concern that patient would not be able to tolerate with his current clinical status with his liver failure, cirrhosis, overall weakness and severe malnutrition. Support patient's decision to pursue comfort focused care.   - If patient were to change his mind, we can arrange follow up with Dr Lund. At this time, patient would like to puruse comfort care and we support this decision.    Patient was seen over video visit. Chart, vitals and labs thoroughly reviewed.     Assessment & Plan:   Rishabh Cabrera is a 63-year-old man with history of HCC s/p local therapies (Y90 in 09/2021 and chemoembolization for residual disease in 12/2021) in background of cirrhosis 2/2 HBV, admitted with fatigue and weakness and admitted for decompensated cirrhosis, JUAN C, and electrolyte derangements.     # HCC   # Hep B cirrhosis   # RICH lung nodule  # History of Tubulovillous adenoma s/p R hemicolectomy in 08/2015  Follows with Dr Lund.  Patient with HepB and cirrhosis who originally presented with ascites and was found to have segment 5/6 6 cm HCC and possible focus of HCC in segment 7. Perhaps a separate primary in L lung apex. Otherwise no extrahepatic spread. In fall 2021, Child Olivera B8-9 perhaps. Bili <2, albumin <2.8, INR 1.7-2.2, some ascites, and no encephalopathy noted. Underwent Y90 in 9/2021 with minimal response, so cTACE in 12/2021.  Better response.   Known RICH lung nodule that previous biopsies were attempted though complicated by hemoptysis and pneumothorax, with inadequate sampling.  In 6/2022, he was a Child Olivera score C 10-11 with bili<2, albumin <2.8, INR>2.2, + ascites.  Patient last seen by Dr Lund in 6/2022. At that time patient was not a candidate for further local therapies. Addtionally at that time, there was thought to be little benefit of anti-cancer treatment as patient was struggle with recent hemoptysis which prohibited safe usage of VEGFi for HCC treatment. Further more, single agent immunotherapy could have a RR of around 10%. No anti cancer therapy was pursued at that time.   Patient then presented to the ED with new rigors, weakness, and fatigue. Abdominal US showed liver masses confirmed on MRI (seen on 01/2023 CT).  At this time, patient is Child Olivera score C with Tbili 3.2, albumin <1.4, INR 2.63, + moderate ascites.  - Care conference completed on 2/12 and 2/14. Discussed overall clinical picture at care conference with patient and family on 2/12 and 2/14. Unfortunately patient is not a good candidate for a VEGF therapy given overall clinical status and history of bleed. Furthermore, given his current liver function, there would be very little benefit from treatment. Patient has a very poor prognosis. Discussed with family, all questions answered. Please let us know if there are any further questions.          Patient plan of care was discussed with attending physician Dr. Kent.    Thank you for the opportunity to partake in this patients plan of care. Please do not hesitate to page with questions.     Kimberly Balderrama (Saul, PACoyC   Hematology/Oncology   Pager: [phone]

## 2024-02-14 NOTE — PROGRESS NOTES
"Care Management Follow Up    Length of Stay (days): 9    Expected Discharge Date: 02/15/2024     Concerns to be Addressed: no discharge needs identified     Patient plan of care discussed at interdisciplinary rounds: Yes    Anticipated Discharge Disposition: Home     Anticipated Discharge Services: Hospice care  Anticipated Discharge DME: None    Patient/family educated on Medicare website which has current facility and service quality ratings: no  Education Provided on the Discharge Plan: Yes  Patient/Family in Agreement with the Plan: yes    Referrals Placed by CM/SW:    Private pay costs discussed: Not applicable    Additional Information:    SW called [facility]  Services [phone]). In-person  scheduled for care conference today at 2pm.    Care conference held with the following: patient, Ajit and Raimundo (sons), Dr. Barragan (hospitalist), Kimberly RICE (oncology, joined via iPad), Danielle GALINDO (palliative), Tank (, in person), additional family members present via phone.  During the course of the care conference, several things were discussed: patient's status, options, and goals. Patient wanted it made clear that he has had a long, healthy life and he has never asked for any government assistance. He was worried that he was being viewed as a person who is just leeching off the hospital. We validated his feelings and thanked him for sharing his concerns. He enjoyed showing the group pictures of himself throughout his life.  Patient alluded to not understanding why we were able to \"cure\" his cancer before, and why we are giving up on him now. Kimberly with oncology shared background on the progression of his disease and why the projection has changed.   When given his two options (A. Discharge with intention of regaining strength in hopes of starting immunotherapy or B. Focus on comfort and go home with family), patient made the decision to move to comfort focused care. We discussed " hospice care and patient is on board.     After the care conference, SW met with patient's two sons who were present at the care conference, including the son (Ajit) who patient lives with. SW shared additional information regarding hospice services. They were appreciative. They asked about a care conference/meeting on Friday to further discuss hospice, possibly once patient's brother is here. Patient had mentioned the desire to go to [place] and live with his brother. However, patient's brother and patient's sons have concerns about him travelling and the brother's ability to provide adequate care. More to come on this conversation.        CHELLY Arroyo, LSW  8 Med Surg   [facility]  Phone: [phone]  Pager: [phone]

## 2024-02-14 NOTE — PROGRESS NOTES
[facility]    Medicine Progress Note - Hospitalist Service, GOLD TEAM 16    Date of Admission:  2/5/2024    Assessment & Plan     Rishabh Cabrera is a 63 year old male admitted on 2/5/2024.  He has a h/o T2DM, cirrhosis 2/2 hep B infection c/b ascites, variceal bleed and HCC (s/p Y90 9/2021 and chemoembolization for residual disease 12/2021), HTN, colon ca s/p hemicolectomy 2015 and gastric ulcer.  He presented on 2/5/24 with weakness, fatigue and facial numbness.  W/u in the ED revealed SDH, decompensated cirrhosis, hyponatremia, hypokalemia, JUAN C, and hyperbilirubinemia.  He was subsequently admitted       2/14 had 1 hour long care conference, oncology, palliative cares,  myself  2 sons present ,  assisted   - we did go through all potential treatment options and risks and unfortunately the only option he would have if he were to get stronger is immunotherapy and response rate is < 10 % .  patient  did express that he wants  to be comfortable and not attempt to try and proceed with immunotherapy       --2/13  I spoke with hematology, per our discursion immunotherapy was not offered while  impatent but they did discuss as option butr patient  needs to get stronger an drecomended out patient  follow up  with Dr. Lund , I did let patient  and son and brother know and now patient  pinto snot want immunotherapy, would like  to go home then out of [place] to be with siblings , however Im not so sure this would be a great idea as he is weak and would be sceptical of how he could manage the trip and did discuss with them if he went he would not come back and likley die there      #  Decompensated cirrhosis 2/2 hepatitis B c/b ascites, variceal bleed and HCC  -   Pt follows with Dr. Rodriguez with hepatology.   -   On tenofovir for chronic Hep B.   -   Has chronic abd pain, usually diffused, but feels this is now more focal in midline and lower abdomen - none in RUQ  -    No nausea or vomiting at admit, but did endorse poor tolerance of PO intake except for juices and water  -   has abdominal distention , Denied nausea or vomiting  -  However albumin low at 1.4 on admission and with hypocalcemia, hypokalemia, and hyponatremia  - Diagnostic paracentesis on 2/6 was negative for SBP, did receive  albumin 25% 50 g infusion after paracentesis on 2/6  -   Vit K replacement 2/6 (PO) and 2/7 (IV)  ---   Continue PTA tenofovir, renally dosed   MELD 3.0: 28 at 2/12/2024  , MELD-Na: 26 at 2/12/2024  8:14 AM  -  GI hepatology saw patient    -  Continue PTA carvedilol BID w/ holding parameter,   Continue holding lasix and spironolactone d/t kidney failure  -   Soft, 2 g Sodium diet   - asked IR to see for therapeutic paracentesis and likely will be done 2/15       #  Liver lesions concern for HCC recurrence  #  H/o HCC   ---   H/o Y90 9/2021 and chemoembolization for residual disease 12/2021  ---   MRI 1/2023 showed slight increase in several LR3 lesions in R lobe. Planned for sort interval 3 month follow up MRI which did not occur.  Pt did not follow up and f/u MRI was not obtained ---   US RUQ 2/5 this admit with multiple hepatic masses suspicious for HCC.   ---   MRI liver 2/8 showed 10 LR-5 lesions concerning for HCC recurrence.   ---   Pt was informed of the MRI findings  ---   Not a transplant candidate at this point given 10 lesions therefore does not meet Caio criteria per Dr. Sofia Yip of GI consult service  ---   Pt is requested chemo therapy, not felt to be a candidate   ---   Palliative consult service following  ---   Oncology consult service following  ---   GI consult service following     #  H/o pulmonary spiculated lesion or nodule  ---   Follows with outpatient pulmonology clinic, last seen 3/7/2023 and recommended 6 month follow up.  ---   Prior biopsy unsuccessful  ---   CT chest w/o contrast 2/7/24 revealed increased size of left upper lobe spiculated nodule c/f  "malignancy (secondary malignancy vs metastatic dz).   ---   Suspect this spiculated lesions or nodule is primary lung ca  ---   IR recommended PET CT to look for occult mets prior to performing RICH spiculated lesion biopsy thus biopsy order cancelled    Per heme  \" - At this point, patient is Child Olivera C. Care discussion yesterday, patient is not a candidate for VEG-F directed therapies. Immunotherapy alone would have a response rate of roughly 10% or less. Would not plan to initiate cancer directed therapy (immunotherapy) while inpatient. Concern that patient would not be able to tolerate with his current clinical status with his liver failure, cirrhosis, overall weakness and severe malnutrition.  - Would want to see that patient is stable enough to discharge from the hospital prior to initiating treatment, if he would like to pursue treatment, recommend follow up with outpatient oncologist, Dr Lund.  Will help to arrange close follow up if patient is able to discharge. \"  - per hematology patient  has a very poor prognosis   - 2/13 patient  now wanted to be comfortable, set up for care conference for 2/14, see above    - I think palliative cares/hospice would be appropriate      #  Coagulopathy and thrombocytopenia   ---   Due to liver cirrhosis  ---   INR  remains  > 2.5   ---   Plt count was 111K on 2/8  ---   Continue Vitamin K 10 mg po daily, started on 2/11  - wlil need to address ongoing blood draws      #  Subdural hematoma, subacute vs chronic   #  Weakness and fatigue  #  Facial numbness  ---   Admit CT head w/o contrast 2/6 revealed thin, likely subacute-to-chronic right convexity subdural hematoma. No hyperdense hemorrhage.  Mild mass effect and 2-3 mm right-to-left midline shift noted.  ---   F/u CT head on 2/6 stable without evidence of further bleed, likely older lesion.  ---   No signs of CVA on imaging.   ---   Avoid chemical anticoagulation  ---   Generalized weakness, fatigue and facial " numbness may be due to SDH.   ---   Neurosurgery follow-up outpatient in 1 month with repeat CT head      #  Hyponatremia  #  Hypokalemia  #  Hypocalcemia  #  JUAN C  ---   Presented with weakness, fatigue x1-2 days, found to have new hypokalemia, hyponatremia, and JUAN C   ---   Baseline cr ~ 1.0  ---   Admit cr 2.07 ---> Held PTA Lasix and spironolactone ---> ---> ---> 1.18 ---> 1.36 ---> 1.25 ---> 1.27 today  ---   Na was 125 at admit ---> ---> ---> 133--132   ---   K, Mg and Phos levels are back to normal range  ---   Reported decreased PO intake d/t grief w/ recent death and  for his sister  ---   Suspect pre-renal etiology for JUAN C, low PO intake with ongoing lasix and spironolactone use for his ascites, edema   ---   Lytes derangements were also due to decrease oral intake   ---   Continue holding PTA lasix and spironolactone  ---   Encouraged increased oral intake  ---   Continue nutritional supplements     #  Constipation  ---   Very symptomatic at home, noting frequent feeling of needing to have a BM but being unable to do so, or if able to do so will have very small output and hard stools.   ---   No melena or hematochezia.   ---   Takes stool softeners intermittently at home without improvement.   ---   Continue daily Miralax and prn senna and dulcolax prn     #  T2DM  ---   Last Hgb A1c 6.8%  ---   Diet controlled  ---   Monitor     #  Chronic microcytic anemia  ---   Hgb 7.8 on admission ---> 8.8 g on 24  ---   Denied hematemesis, melena or BRBPR  ---   CTM     #  Peripheral neuropathy  #  Hx of herpes zoster and post-herpetic neuralgia  ---   Resume pta gabapentin since kidney function improved     Possible UTI  ---   UA on  w/ trace blood, negative leukocyte esterase, negative nitrite, 3 to WBC, 3 RBC and few bacteria present  ---   UCx growing 10-50K Klebsiella pneumoniae S to ceftriaxone  ---   NGTD from blood cx collected on   ---   Completed Ceftriaxone x 5 days,  - 2/10/24      Hypoxia  ---   Due to anxiety   ---   O2 supplement also to help w/ comfort  ---   Currently on 1 L NC O2 supplement  ---   Wean off O2 supplement as able                 Diet: Snacks/Supplements Pediatric: Glucerna; With Meals  Regular Diet Adult    DVT Prophylaxis:  INR > 2 due to liver issues   Davalos Catheter: Not present  Lines: None     Cardiac Monitoring: None  Code Status: No CPR- Do NOT Intubate      Clinically Significant Risk Factors              # Hypoalbuminemia: Lowest albumin = 1.3 g/dL at 2/11/2024  6:52 AM, will monitor as appropriate    # Coagulation Defect: INR = 2.54 (Ref range: 0.85 - 1.15) and/or PTT = 53 Seconds (Ref range: 22 - 38 Seconds), will monitor for bleeding          # DMII: A1C = N/A within past 6 months       # Financial/Environmental Concerns: none         Disposition Plan     Expected Discharge Date: 02/14/2024      Destination: home with family  Discharge Comments: care conference 2/12            Griselda Barragan MD  Hospitalist Service, GOLD TEAM [address] [place] [address]  Securely message with [employer] (more info)  Text page via [employer] Paging/Directory   See signed in provider for up to date coverage information  ______________________________________________________________________    Interval History   He is awake alert, would like  paracentesis, appetite not good  Had care conference today         Physical Exam   Vital Signs: Temp: 98.6  F (37  C) Temp src: Oral BP: 102/50 Pulse: 82   Resp: 18 SpO2: 94 % O2 Device: None (Room air)    Weight: 130 lbs 1.14 oz  General appearance: awake alert in  no apparent distress       RESPIRATORY: lungs are clear   CARDIOVASCULAR: normal S1S2 regular rate and rhythm,   GASTROINTESTINAL: abdomen is distended but soft , + bowel sounds    SKIN: warm and dry, jaundiced   NEUROLOGIC: awake alert  speech clear   EXTREMITIES: no clubbing cyanosis, some edema and also some scrotal and penile edema     Data     I  have personally reviewed the following data over the past 24 hrs:    N/A  \   N/A   / N/A     N/A N/A N/A /  N/A   N/A N/A N/A \     ALT: N/A AST: N/A AP: N/A TBILI: N/A   ALB: N/A TOT PROTEIN: N/A LIPASE: N/A     INR:  N/A PTT:  N/A   D-dimer:  N/A Fibrinogen:  N/A       Imaging results reviewed over the past 24 hrs:   No results found for this or any previous visit (from the past 24 hour(s)).

## 2024-02-14 NOTE — PLAN OF CARE
VS: /50 (BP Location: Left arm)   Pulse 82   Temp 98.6  F (37  C) (Oral)   Resp 18   Wt 59 kg (130 lb 1.1 oz)   SpO2 94%   BMI 21.64 kg/m       O2: Stable @ RA,Denies SOB,Chest pain,   Output: Continent of b/b ,Urgency   Last BM: 02/14/2024   Activity: Assist of 1 wgb   Skin: Scrotal edema   Pain: Denies pain   CMS: AXOX4   Diet: On regular diet,Tolerated meals 100%   LDA: R.PIV SL   Equipment: Personal belongings   Plan: Continue with POC   Additional Info: Albumin infusion on hold,until after Paracentasis per provider  IR Scheduled pt for tomorrow  Care conference held today @ 1400

## 2024-02-14 NOTE — PLAN OF CARE
Goal Outcome Evaluation:      Plan of Care Reviewed With: patient, child    Overall Patient Progress: no changeOverall Patient Progress: no change    Outcome Evaluation: Discussed dicsharge planning during care conference. Patient plans to discharge home with hospice services. Please see this writer's note from today for further details.

## 2024-02-14 NOTE — PROGRESS NOTES
Brief Hepatology Note    Mr. Cabrera and family had a care conference on Monday. Per oncology, he is not a candidate for systemic chemotherapy.  Immunotherapy may be an option in the outpatient setting but with a low response rate.  Patient continues to be indecisive between pursuing immunotherapy versus hospice.  Further family conference with medicine team is planned for today to continue conversation of goals of care.    Hepatology will sign off. Please page us with any questions.

## 2024-02-14 NOTE — CONSULTS
Interventional Radiology  [facility] Consult Service Note  02/14/24   11:17 AM    Consult Requested: Paracentesis    Recommendations/Plan:    Patient will be added to IR schedule on 2/15/24 for a therapeutic paracentesis. Timing of procedure is TBD based on staffing/schedule and triage.    This is a 63 year old male with history of refractory ascites. Patient's team requesting therapeutic paracentesis, with 5 L max, as well as albumin repletion.    Orders for albumin has been entered by requesting team.    Labs WNL for procedure.   Preprocedural orders entered, as well as orders for procedure. No NPO required.    Consent will be done prior to procedure.     Please contact the IR charge RN at [phone] for estimated time of procedure.     Recommendations were reviewed with requesting team (JANETTE Barragan MD).        Pertinent Imaging Reviewed: Ultrasound,     Expected date of discharge:  TBD.    Vitals:   /50 (BP Location: Left arm)   Pulse 82   Temp 98.6  F (37  C) (Oral)   Resp 18   Wt 59 kg (130 lb 1.1 oz)   SpO2 94%   BMI 21.64 kg/m      Pertinent Labs:   Lab Results   Component Value Date    WBC 5.9 02/08/2024    WBC 5.6 02/06/2024    WBC 8.4 02/05/2024    WBC 3.5 (L) 05/10/2021    WBC 6.8 01/21/2020    WBC 5.6 07/17/2019     Lab Results   Component Value Date    HGB 8.8 02/08/2024    HGB 8.1 02/06/2024    HGB 7.8 02/05/2024    HGB 13.0 05/10/2021    HGB 16.3 01/21/2020    HGB 14.8 07/17/2019     Lab Results   Component Value Date     02/08/2024    PLT 83 02/06/2024    PLT 88 02/05/2024    PLT 52 05/10/2021    PLT 74 01/21/2020    PLT 86 07/17/2019     Lab Results   Component Value Date    INR 2.74 (H) 02/14/2024    INR 1.39 (H) 05/10/2021    PTT 53 (H) 02/06/2024     Lab Results   Component Value Date    POTASSIUM 4.5 02/14/2024    POTASSIUM 3.8 06/01/2022    POTASSIUM 4.7 05/10/2021        COVID-19 Antibody Results, Testing for Immunity         No data to display             COVID-19 PCR Results        2/18/2022    15:18 5/9/2022    10:06 5/12/2022    21:19 5/29/2022    23:14 9/24/2022    00:17 1/25/2023    22:48   COVID-19 PCR Results   SARS CoV2 PCR Negative  Negative  Negative  Negative  Negative  Negative        Santiago Tamayo PA-C  Interventional Radiology  Pager: [phone].

## 2024-02-14 NOTE — PROGRESS NOTES
"PALLIATIVE CARE PROGRESS NOTE  [facility]     Patient Name: Rishabh Cabrera  Date of Admission: 2/5/2024   Today the patient was seen for: Care Conference     Recommendations & Counseling     GOALS OF CARE:   Life-prolonging with limits . Transitioned to Comfort Care 02/14 and plan to discharge home with hospice.   Care conference held on 2/14/24.  Kimberly RICE (oncology) Dr. Barragan (Hospitalist), Madelin loera (), Ajit and Raimundo (sons)Tank (in person ). Many family members also on the phone   Rishabh was asked to summaries what he knows about his medical condition. Rishabh states he knows he has liver cancer and he is recently diagnosed with lung cancer. The doctors told him he will receive medication to keep him comfortable and that he will not receive chemotherapy as he did in the past. He is offered immunotherapy and with this treatment alone he would have a response rate of 10% or less. Rishabh expressed interest in immunotherapy and stated \"no body wants to die\".   Oncology expressed concerns that Rishabh would not be able to tolerate immunotherapy with his current clinical status of his liver failure, cirrhosis, overall weakness and with severe malnutrition. With this information, two paths to support Rishabh's goals were discussed. (A). To discharge to rehab for further rehabilitation; knowing if and when his nutrition status and strength improves, he can follow up with oncology as an outpatient in hopes of starting immunotherapy knowing this treatment option has poor prognosis. (B) To focus on keeping Rishabh comfortable and support his wishes to be home with his family. He ultimately wants to move to [place] where his siblings live.   Rishabh made the decision to pursue comfort focused care and discharge home to his family with hospice.     ADVANCE CARE PLANNING:  No health care directive on file. Per  informed consent policy, next of kin should be involved if patient becomes " "unable.  There is no POLST form on file, defer to patient and/or next of kin for decisions   Code status: Comfort Care measures. Plan to discharge home with hospice.     PSYCHOSOCIAL/SPIRITUAL:  Family: 2 daughter, 3 sons and their spouses present at bedside during care conference.  Spiritual: Declined abigail at this time    Palliative Care will continue to follow. Thank you for the consult and allowing us to aid in the care of Rishabh Cabrera.    These recommendations have been discussed with Medicine, Oncology, SW, primary RN.    JOSIE Stacy CNP  Securely message with [employer] info)  Text page via [facility] Paging/Directory          Interval History:     Multidisciplinary collaboration:  Patient's care discussed with Leena Sterling PA-C    Patient/family narrative  Rishabh is laying in bed comfortably. He was fully engaged during the care conference. His two sons Ajit and Raimundo at bedside. He reported feeling hopeless, and was at his kids are not well supporting him with his current medical status. He stated his children are too busy with their lives and that they do not even have time to bring him lunch to the hospital. Rishabh expressed frustrations as to why his cancer cannot be treated now.  He stated in the past, he received chemotherapy which treated his liver cancer. He feels the doctors here are giving up on him. Oncology was able to explain to Rishabh while his liver cancer initially was localized to 1 area, it is now spread, and now has cancer in many areas of his liver thus making treatment option challenging. Rishabh reports for 2 years he did not receive appropriate medical follow-up for his liver cancer, \"the doctors didn't care\", and \"his children's were too busy\". During the care conference, Rishabh pulled up pictures of himself in his cell phone, he talked about his education background, and how good of a father he was to his children. He reports his family and the doctors failed him.     Palliative Summary/HPI "     Rishabh Cabrera is a 63 year old male with a past medical history of T2DM, HTN, colon cancer s/p hemicolectomy 2015, gastric ulcer, decompensated cirrhosis due to hepatitis B complicated by HCC (s/p Y90 9/2021 and chemoembolization for residual disease 12/2021), h/o varices hemorrhage (1/2023), ascites, Hx of spiculated lung lesion on surveillance with pulmonology who presented on 2/5 with weakness, numbness and difficulty walking after a fall the prior day. CT chest w/o contrast on 2/7/24 revealed increased size left upper lobe spiculated nodule concerning for malignancy. Per oncology likely secondary to primary lung cancer.             Physical Exam:   Temp:  [98  F (36.7  C)-98.6  F (37  C)] 98.6  F (37  C)  Pulse:  [82-86] 82  Resp:  [18] 18  BP: (102-124)/(50-62) 102/50  SpO2:  [94 %-100 %] 94 %  130 lbs 1.14 oz    Physical Exam  GEN: Laying in bed comfortably, no acute distress   LUNGS: Breathing comfortably, no respiratory distress  ABD: Mild distention, non-tender  Extremities: Able to move all extremities  Neuro: Alert, awake, appropriate  Psych: Calm           Current Problem List:   Principal Problem:    HCC (hepatocellular carcinoma) (H)  Active Problems:    Hyponatremia    JUAN C (acute kidney injury) (H24)      Allergies   Allergen Reactions    Crabs [Crustaceans]     Pork (Porcine) Protein Itching and Unknown    Seafood Hives and Unknown    Shellfish Allergy Unknown            Data Reviewed:     CMP  Recent Labs   Lab 02/14/24  0717 02/13/24  0725 02/09/24  0721 02/08/24  1112    132*   < > 131*   POTASSIUM 4.5 4.4   < > 4.2   CHLORIDE 107 103   < > 100   CO2 27 25   < > 27   ANIONGAP 1* 4*   < > 4*   * 253*   < > 178*   BUN 10.7 12.0   < > 20.1   CR 1.20* 1.22*   < > 1.31*   GFRESTIMATED 68 67   < > 62   MANDY 7.2* 7.1*   < > 7.5*   MAG  --   --   --  1.9   PROTTOTAL 6.1* 6.4   < > 6.4   ALBUMIN 1.4* 1.5*   < > 1.7*   BILITOTAL 2.0* 2.1*   < > 2.2*   ALKPHOS 160* 162*   < > 184*   AST 39 35   <  > 42   ALT 16 17   < > 19    < > = values in this interval not displayed.     CBC  Recent Labs   Lab 02/08/24  1112   WBC 5.9   RBC 3.30*   HGB 8.8*   HCT 27.7*   MCV 84   MCH 26.7   MCHC 31.8   RDW 24.4*   *      Medical Decision Making       MANAGEMENT DISCUSSED with the following over the past 24 hours: Medicine, oncology, SW, primary RN, family.   NOTE(S)/MEDICAL RECORDS REVIEWED over the past 24 hours: Oncology, medicine, SW, nursing  60 MINUTES SPENT BY ME on the date of service doing chart review, history, exam, documentation & further activities per the note.

## 2024-02-14 NOTE — PLAN OF CARE
Shift 1020-3823:  VS: Temp: 98  F (36.7  C) Temp src: Oral BP: 124/62 Pulse: 86   Resp: 18 SpO2: 100 % O2 Device: None (Room air)      O2: Stable on RA, no report of SOB   Neuro: A/Ox4   Bowel/Bladder: Occasionally incontinent of bowel/bladder   Diet: Regular diet   Skin: Intact,   Pain: Denies pain   Activity: Ax1 with walker   Dressings: none   LDA: Right AC PIV, SL   Equipment: IV pole, personal belongings   Plan: Call light within reach, able to make needs known, care conference today with family at 1400   Additional Info:

## 2024-02-15 NOTE — PROGRESS NOTES
[facility]    Medicine Progress Note - Hospitalist Service, GOLD TEAM 16    Date of Admission:  2/5/2024    Assessment & Plan     Rishabh Cabrera is a 63 year old male admitted on 2/5/2024.  He has a h/o T2DM, cirrhosis 2/2 hep B infection c/b ascites, variceal bleed and HCC (s/p Y90 9/2021 and chemoembolization for residual disease 12/2021), HTN, colon ca s/p hemicolectomy 2015 and gastric ulcer.  He presented on 2/5/24 with weakness, fatigue and facial numbness.  W/u in the ED revealed SDH, decompensated cirrhosis, hyponatremia, hypokalemia, JUAN C, and hyperbilirubinemia.  He was subsequently admitted       2/14 had 1 hour long care conference, oncology, palliative cares,  myself  2 sons present ,  assisted   - we did go through all potential treatment options and risks and unfortunately the only option he would have if he were to get stronger is immunotherapy and response rate is < 10 % .  patient  did express that he wants  to be comfortable and not attempt to try and proceed with immunotherapy   - Mr. Cabrera had expressed that he would like  comfort cares   --2/13  I spoke with hematology, per our discursion immunotherapy was not offered while  impatent but they did discuss as option butr patient  needs to get stronger an drecomended out patient  follow up  with Dr. Lund , I did let patient  and son and brother know and now patient  pinto snot want immunotherapy, would like  to go home then [place] of [place] to be with siblings , however Im not so sure this would be a great idea as he is weak and would be sceptical of how he could manage the trip and did discuss with them if he went he would not come back and likley die there      #  Decompensated cirrhosis 2/2 hepatitis B c/b ascites, variceal bleed and HCC  -   Pt follows with Dr. Rodriguez with hepatology.   -   On tenofovir for chronic Hep B.   -   Has chronic abd pain, usually diffused, but feels this is now  more focal in midline and lower abdomen - none in RUQ  -   No nausea or vomiting at admit, but did endorse poor tolerance of PO intake except for juices and water  -    However albumin low at 1.4 on admission and with hypocalcemia, hypokalemia, and hyponatremia  - Diagnostic paracentesis on 2/6 was negative for SBP, did receive  albumin 25% 50 g infusion after paracentesis on 2/6, again had paracentesis 4/15 and giving total 50 g albumin   -   Vit K replacement 2/6 (PO) and 2/7 (IV)  -   Continue PTA tenofovir, renally dosed   MELD 3.0: 28 at 2/12/2024  , MELD-Na: 26 at 2/12/2024  8:14 AM  -  GI hepatology saw patient    - hold  PTA carvedilol BID with low blood pressure   and also continue to hold lasix and spironolactone d/t kidney failure, 1 x dose lasix 2/15 and see if can discharge  on lasix to help with leg/scrotal edema   -   Soft, 2 g Sodium diet        #  Liver lesions concern for HCC recurrence  #  H/o HCC   ---   H/o Y90 9/2021 and chemoembolization for residual disease 12/2021  ---   MRI 1/2023 showed slight increase in several LR3 lesions in R lobe. Planned for sort interval 3 month follow up MRI which did not occur.  Pt did not follow up and f/u MRI was not obtained ---   US RUQ 2/5 this admit with multiple hepatic masses suspicious for HCC.   ---   MRI liver 2/8 showed 10 LR-5 lesions concerning for HCC recurrence.   ---   Pt was informed of the MRI findings  ---   Not a transplant candidate at this point given 10 lesions therefore does not meet Caio criteria per Dr. Sofia Yip of GI consult service  ---   Pt is requested chemo therapy, not felt to be a candidate   ---   Palliative consult service following  ---   Oncology consult service following  ---   GI consult service was following     #  H/o pulmonary spiculated lesion or nodule  ---   Follows with outpatient pulmonology clinic, last seen 3/7/2023 and recommended 6 month follow up.   ---   Prior biopsy unsuccessful  ---   CT chest w/o  "contrast 2/7/24 revealed increased size of left upper lobe spiculated nodule c/f malignancy (secondary malignancy vs metastatic dz).   ---   Suspect this spiculated lesions or nodule is primary lung ca  ---   IR recommended PET CT to look for occult mets prior to performing RICH spiculated lesion biopsy thus biopsy order cancelled  - now patient  would like to be kept comfortable     Per heme  \" - At this point, patient is Child Olivera C. Care discussion yesterday, patient is not a candidate for VEG-F directed therapies. Immunotherapy alone would have a response rate of roughly 10% or less. Would not plan to initiate cancer directed therapy (immunotherapy) while inpatient. Concern that patient would not be able to tolerate with his current clinical status with his liver failure, cirrhosis, overall weakness and severe malnutrition.  - Would want to see that patient is stable enough to discharge from the hospital prior to initiating treatment, if he would like to pursue treatment, recommend follow up with outpatient oncologist, Dr Lund.  Will help to arrange close follow up if patient is able to discharge. \"  - per hematology patient  has a very poor prognosis   - 2/13 patient  now wanted to be comfortable, set up for care conference for 2/14, see above    - I think palliative cares/hospice would be appropriate      #  Coagulopathy and thrombocytopenia   ---   Due to liver cirrhosis  ---   INR  remains  > 2.5   ---   Plt count was 111K on 2/8  ---   Continue Vitamin K 10 mg po daily, started on 2/11  - wlil need to address ongoing blood draws      #  Subdural hematoma, subacute vs chronic   #  Weakness and fatigue  #  Facial numbness  ---   Admit CT head w/o contrast 2/6 revealed thin, likely subacute-to-chronic right convexity subdural hematoma. No hyperdense hemorrhage.  Mild mass effect and 2-3 mm right-to-left midline shift noted.  ---   F/u CT head on 2/6 stable without evidence of further bleed, likely older " lesion.  ---   No signs of CVA on imaging.   ---   Avoid chemical anticoagulation  ---   Generalized weakness, fatigue and facial numbness may be due to SDH.   ---   Neurosurgery follow-up outpatient in 1 month with repeat CT head      #  Hyponatremia  #  Hypokalemia  #  Hypocalcemia  #  JUAN C  ---   Presented with weakness, fatigue x1-2 days, found to have new hypokalemia, hyponatremia, and JUAN C   ---   Baseline cr ~ 1.0  ---   Admit cr 2.07 ---> Held PTA Lasix and spironolactone ---> ---> ---> 1.18 ---> 1.36 ---> 1.25 ---> 1.27 today  ---   Na was 125 at admit ---> ---> ---> 133--132   ---   K, Mg and Phos levels are back to normal range  ---   Reported decreased PO intake d/t grief w/ recent death and  for his sister  ---   Suspect pre-renal etiology for JUAN C, low PO intake with ongoing lasix and spironolactone use for his ascites, edema   ---   Lytes derangements were also due to decrease oral intake   ---   Continue holding PTA lasix and spironolactone  ---   Encouraged increased oral intake  ---   Continue nutritional supplements     #  Constipation  ---   Very symptomatic at home, noting frequent feeling of needing to have a BM but being unable to do so, or if able to do so will have very small output and hard stools.   ---   No melena or hematochezia.   ---   Takes stool softeners intermittently at home without improvement.   ---   Continue daily Miralax and prn senna and dulcolax prn     #  T2DM  ---   Last Hgb A1c 6.8%  ---   Diet controlled  ---   Monitor     #  Chronic microcytic anemia  ---   Hgb 7.8 on admission ---> 8.8 g on 24  ---   Denied hematemesis, melena or BRBPR  ---   CTM     #  Peripheral neuropathy  #  Hx of herpes zoster and post-herpetic neuralgia  ---   Resume pta gabapentin since kidney function improved     Possible UTI  ---   UA on  w/ trace blood, negative leukocyte esterase, negative nitrite, 3 to WBC, 3 RBC and few bacteria present  ---   UCx growing 10-50K Klebsiella  pneumoniae S to ceftriaxone  ---   NGTD from blood cx collected on 2/6  ---   Completed Ceftriaxone x 5 days, 2/6 - 2/10/24     Hypoxia  ---   Due to anxiety   ---   O2 supplement also to help w/ comfort, now off                    Diet: Snacks/Supplements Pediatric: Glucerna; With Meals  Regular Diet Adult    DVT Prophylaxis:  INR > 2 due to liver issues   Davalos Catheter: Not present  Lines: None     Cardiac Monitoring: None  Code Status: No CPR- Do NOT Intubate      Clinically Significant Risk Factors              # Hypoalbuminemia: Lowest albumin = 1.3 g/dL at 2/15/2024  5:56 AM, will monitor as appropriate    # Coagulation Defect: INR = 2.62 (Ref range: 0.85 - 1.15) and/or PTT = 53 Seconds (Ref range: 22 - 38 Seconds), will monitor for bleeding          # DMII: A1C = N/A within past 6 months       # Financial/Environmental Concerns: none         Disposition Plan     Expected Discharge Date: 02/15/2024      Destination: home with family  Discharge Comments: care conference 2/14            Griselda Barragan MD  Hospitalist Service, GOLD TEAM 16  [facility]  Securely message with [employer] (more info)  Text page via [employer] Paging/Directory   See signed in provider for up to date coverage information  ______________________________________________________________________    Interval History   Has scrotal swelling that bothers him, otherwise ok, he is talking with family  Would like  discharge  soon , had paracentesis this AM     Physical Exam   Vital Signs: Temp: 97.8  F (36.6  C) Temp src: Oral BP: 99/48 Pulse: 80   Resp: 16 SpO2: 94 % O2 Device: None (Room air)    Weight: 130 lbs 1.14 oz  General appearance: awake alert in  no apparent distress       RESPIRATORY: lungs are clear   CARDIOVASCULAR: normal S1S2 regular rate and rhythm,   GASTROINTESTINAL: abdomen is less  distended but soft , + bowel sounds    SKIN: warm and dry, jaundiced   NEUROLOGIC: awake alert  speech  clear   EXTREMITIES: no clubbing cyanosis, some edema and also some scrotal and penile edema     Data     I have personally reviewed the following data over the past 24 hrs:    N/A  \   N/A   / N/A     131 (L) 104 10.4 /  110 (H)   4.6 27 1.22 (H) \     ALT: 16 AST: 39 AP: 142 TBILI: 2.8 (H)   ALB: 1.3 (L) TOT PROTEIN: 6.0 (L) LIPASE: N/A     INR:  2.62 (H) PTT:  N/A   D-dimer:  N/A Fibrinogen:  N/A       Imaging results reviewed over the past 24 hrs:   No results found for this or any previous visit (from the past 24 hour(s)).

## 2024-02-15 NOTE — PROGRESS NOTES
Brief Oncology Note:    Rishabh Cabrera is a 63-year-old man with history of HCC s/p local therapies (Y90 in 09/2021 and chemoembolization for residual disease in 12/2021) in background of cirrhosis 2/2 HBV, admitted with fatigue and weakness and admitted for decompensated cirrhosis, JUAN C, and electrolyte derangements.     See previous note on 2/14 regarding care discussions.     Oncology will sign off at this time. Please do not hesitate to reach out if there are any questions or clinical changes.    Kimberly Balderrama ([place]), PA-C  Hematology/Oncology  Pager: #[phone]

## 2024-02-15 NOTE — PLAN OF CARE
Goal Outcome Evaluation:      Plan of Care Reviewed With: patient    Overall Patient Progress: no changeOverall Patient Progress: no change       VS: BP 99/53 (BP Location: Left arm)   Pulse 77   Temp 97.9  F (36.6  C) (Oral)   Resp 18   Wt 59 kg (130 lb 1.1 oz)   SpO2 99%   BMI 21.64 kg/m      O2: >92% on RA, no complaints of SOB or chest pain.   Output: Voiding w/o pain or difficulty to bathroom.   Activity: Assist of 1 with gait belt    Skin: Intact   Pain: Denies   CMS: A&Ox4   Dressing: None   Diet: Regular. No complaints of nausea or vomiting.   LDA: R PIV, SL   Equipment: IV pole   Plan: Possible discharge tomorrow to home with hospice care.   Additional Info: Patient had a paracentesis today.  Albumin given this shift per MD order, see MAR.

## 2024-02-15 NOTE — PROGRESS NOTES
Care Management Follow Up    Length of Stay (days): 10    Expected Discharge Date: 02/15/2024     Concerns to be Addressed: discharge planning     Patient plan of care discussed at interdisciplinary rounds: Yes    Anticipated Discharge Disposition: Home     Anticipated Discharge Services: Other (see comment) (Hospice Care)    [facility]  Phone [phone]    Anticipated Discharge DME: Hospital bed, walker, wheelchair, commode (Hospice to provide)    Patient/family educated on Medicare website which has current facility and service quality ratings: no  Education Provided on the Discharge Plan: Yes  Patient/Family in Agreement with the Plan: yes    Referrals Placed by CM/SW: Hospice  Private pay costs discussed: Not applicable    Additional Information:    SW met with patient and family at bedside. Answered additional questions about hospice care. Patient and family had no preference for hospice agencies and deferred to SW. Patient would really like to discharge tomorrow. SW explained that there are some services we would like to coordinate and we will try our best to have patient discharge tomorrow. Patient and family in agreement.    DANIEL spoke with hospitalist and palliative, who are both on board with patient discharging once home hospice is established.    DANIEL called and spoke with Jorge with [employer] (P: [phone]). They have potential openings for tomorrow. The representative is going to try to get a liaison to come visit patient today, will call when he knows when liaison can come out. They will also be in contact with patient's son, Ajit. DANIEL informed him that patient will need hospital bed, wheelchair, walker, and commode.    DANIEL spoke with Jorge. He spoke with Ajit and Ajit would like to set up a time with the nurse and family to have a larger conversation about hospice. Ajit states that it is important to him and the family that they do not feel rushed into discharge. Jorge ensured that it was  passed along to the hospice team that patient's [place] culture is very important to him. Jorge will be in touch with DANIEL when the informational meeting is scheduled.     DANIEL spoke with Marcy Alex RN clinical liaison with [facility] (P: [phone]). Marcy will be calling [facility] to schedule an informational meeting. DANIEL gave background information. Marcy will call when meeting is scheduled.    Received call from Marcy. They will be having a meeting on Saturday at 1pm. It sounds like they are really leaning toward patient going to [place]. Marcy will meet with them to explain hospice services and lay eyes on patient. After the assessment, Marcy will be able to share with them what her opinion is of patient being able to travel to [place] with his brother.    DANIEL will continue to follow for safe discharge planning.        CHELLY Arroyo, LSW  8 Med Surg   [facility]  Phone: [phone]  Pager: [phone]

## 2024-02-15 NOTE — PROCEDURES
[facility]    Procedure: IR Procedure Note    Date/Time: 2/15/2024 11:04 AM    Performed by: Santiago Tamayo PA-C  Authorized by: Santiago Tamayo PA-C  IR Fellow Physician:  Other(s) attending procedure: Assist: Pawan Arias PA-C      UNIVERSAL PROTOCOL   Site Marked: NA  Prior Images Obtained and Reviewed:  Yes  Required items: Required blood products, implants, devices and special equipment available    Patient identity confirmed:  Verbally with patient, arm band, provided demographic data and hospital-assigned identification number  Patient was reevaluated immediately before administering moderate or deep sedation or anesthesia  Confirmation Checklist:  Patient's identity using two indicators, relevant allergies, procedure was appropriate and matched the consent or emergent situation and correct equipment/implants were available  Time out: Immediately prior to the procedure a time out was called    Universal Protocol: the Joint Commission Universal Protocol was followed    Preparation: Patient was prepped and draped in usual sterile fashion    ESBL (mL):  0.1     ANESTHESIA    Anesthesia:  Local infiltration  Local Anesthetic:  Lidocaine 1% without epinephrine  Anesthetic Total (mL):  4      SEDATION    Patient Sedated: No    See dictated procedure note for full details.  Findings: U/S guided therapeutic paracentesis performed at [facility]. Return of clear serous fluid. Albumin repletion per plan.    Specimens: none    Complications: None    Condition: Stable    Plan: Follow up per primary team.      PROCEDURE    Patient Tolerance:  Patient tolerated the procedure well with no immediate complications  Length of time physician/provider present for 1:1 monitoring during sedation: 30

## 2024-02-15 NOTE — PLAN OF CARE
Goal Outcome Evaluation:      Plan of Care Reviewed With: patient    Overall Patient Progress: no change    Pt is A&Ox4, mixed continence B/B, Assist x1 w/ GB. Paracentesis tomorrow (2/15) AM with IR.     No acute issues this shift. Call light within reach. Continue POC.    Temp: 99.6  F (37.6  C) Temp src: Oral BP: 99/51 Pulse: 85   Resp: 18 SpO2: 93 % O2 Device: None (Room air)

## 2024-02-15 NOTE — SEDATION DOCUMENTATION
Patient Name: Rishabh Cabrera  Medical Record Number: [id number]  Today's Date: 2/15/2024    Procedure: para  Proceduralist: Soy Tamayo PA-C with student  Pathology present: NA    Procedure Start: 1030  Procedure end: 1110  Sedation medications administered: NA     Report given to: RN  : DON    Other Notes: Pt arrived to IR room 1 from 834. Consent reviewed. Pt denies any questions or concerns regarding procedure. Pt positioned supine and monitored per protocol. Pt tolerated procedure without any noted complications. Pt transferred back to 834.

## 2024-02-16 NOTE — PLAN OF CARE
Goal Outcome Evaluation:      Plan of Care Reviewed With: patient    Overall Patient Progress: improvingOverall Patient Progress: improving     62 y/o male in hospital for treatment of hepatocellular carcinoma, hyponatremia, and JUAN C. Patient reminded to call for assistance before getting out of bed. Bed alarm in use. A/O x 4 and makes all needs known. R PIV patent with no s/s infection or infiltration. Plan is for discharge on 2/17/24.

## 2024-02-16 NOTE — PROGRESS NOTES
[facility]    Medicine Progress Note - Hospitalist Service, GOLD TEAM 16    Date of Admission:  2/5/2024    Assessment & Plan     Rishabh Cabrera is a 63 year old male admitted on 2/5/2024.  He has a h/o T2DM, cirrhosis 2/2 hep B infection c/b ascites, variceal bleed and HCC (s/p Y90 9/2021 and chemoembolization for residual disease 12/2021), HTN, colon ca s/p hemicolectomy 2015 and gastric ulcer.  He presented on 2/5/24 with weakness, fatigue and facial numbness.  W/u in the ED revealed SDH, decompensated cirrhosis, hyponatremia, hypokalemia, JUAN C, and hyperbilirubinemia.  He was subsequently admitted       2/14 had 1 hour long care conference, oncology, palliative cares,  myself  2 sons present ,  assisted   - we did go through all potential treatment options and risks and unfortunately the only option he would have if he were to get stronger is immunotherapy and response rate is < 10 % .  patient  did express that he wants  to be comfortable and not attempt to try and proceed with immunotherapy   - Mr. Cabrera had expressed that he would like  comfort cares   --2/13  I spoke with hematology, per our discursion immunotherapy was not offered while  impatent but they did discuss as option butr patient  needs to get stronger an drecomended out patient  follow up  with Dr. Lund , I did let patient  and son and brother know and now patient  pinto snot want immunotherapy, would like  to go home then out of state to be with siblings , however Im not so sure this would be a great idea as he is weak and would be sceptical of how he could manage the trip and did discuss with them if he went he would not come back and likley die there        patient  wishes to travel to [place] to be with siblings and die there. Need to see if feasible post discharge  if strong enough to make the trip and would need to have services in place pre transfer       #  Decompensated  cirrhosis 2/2 hepatitis B c/b ascites, variceal bleed and HCC  -   Pt follows with Dr. Rodriguez with hepatology.   -   On tenofovir for chronic Hep B.   -   Has chronic abd pain, usually diffused, but feels this is now more focal in midline and lower abdomen - none in RUQ  -   No nausea or vomiting at admit, but did endorse poor tolerance of PO intake except for juices and water  -    However albumin low at 1.4 on admission and with hypocalcemia, hypokalemia, and hyponatremia  - Diagnostic paracentesis on 2/6 was negative for SBP, did receive  albumin 25% 50 g infusion after paracentesis on 2/6, again had paracentesis 4/15 and giving total 50 g albumin   -   Vit K replacement 2/6 (PO) and 2/7 (IV)  -   Continue PTA tenofovir, renally dosed   MELD 3.0: 28 at 2/12/2024  , MELD-Na: 26 at 2/12/2024  8:14 AM  -  GI hepatology saw patient    - hold  PTA carvedilol BID with low blood pressure   and also continue to hold lasix and spironolactone d/t kidney failure, 1 x dose lasix 2/15 and see if can discharge  on lasix to help with leg/scrotal edema      Lower ext edema  and scrotal edema  - multifactorial, diuresis limited by  low blood pressure , giving dose of oral lasix again 2/26      #  Liver lesions concern for HCC recurrence  #  H/o HCC   ---   H/o Y90 9/2021 and chemoembolization for residual disease 12/2021  ---   MRI 1/2023 showed slight increase in several LR3 lesions in R lobe. Planned for sort interval 3 month follow up MRI which did not occur.  Pt did not follow up and f/u MRI was not obtained ---   US RUQ 2/5 this admit with multiple hepatic masses suspicious for HCC.   ---   MRI liver 2/8 showed 10 LR-5 lesions concerning for HCC recurrence.   ---   Pt was informed of the MRI findings  ---   Not a transplant candidate at this point given 10 lesions therefore does not meet [place] criteria per Dr. Sofia Yip of GI consult service  ---   Pt is requested chemo therapy, not felt to be a candidate   ---   Palliative  "consult service following  ---   Oncology consult service following  ---   GI consult service was following     #  H/o pulmonary spiculated lesion or nodule  ---   Follows with outpatient pulmonology clinic, last seen 3/7/2023 and recommended 6 month follow up.   ---   Prior biopsy unsuccessful  ---   CT chest w/o contrast 2/7/24 revealed increased size of left upper lobe spiculated nodule c/f malignancy (secondary malignancy vs metastatic dz).   ---   Suspect this spiculated lesions or nodule is primary lung ca  ---   IR recommended PET CT to look for occult mets prior to performing RICH spiculated lesion biopsy thus biopsy order cancelled  - now patient  would like to be kept comfortable     Per heme  \" - At this point, patient is Child Olivera C. Care discussion yesterday, patient is not a candidate for VEG-F directed therapies. Immunotherapy alone would have a response rate of roughly 10% or less. Would not plan to initiate cancer directed therapy (immunotherapy) while inpatient. Concern that patient would not be able to tolerate with his current clinical status with his liver failure, cirrhosis, overall weakness and severe malnutrition.  - Would want to see that patient is stable enough to discharge from the hospital prior to initiating treatment, if he would like to pursue treatment, recommend follow up with outpatient oncologist, Dr Lund.  Will help to arrange close follow up if patient is able to discharge. \"  - per hematology patient  has a very poor prognosis   - 2/13 patient  now wanted to be comfortable, set up for care conference for 2/14, see above    - I think palliative cares/hospice would be appropriate      #  Coagulopathy and thrombocytopenia   ---   Due to liver cirrhosis  ---   INR  remains  > 2.5   ---   Plt count was 111K on 2/8 and no improvement of INR so stopped       #  Subdural hematoma, subacute vs chronic   #  Weakness and fatigue  #  Facial numbness  ---   Admit CT head w/o contrast 2/6 " revealed thin, likely subacute-to-chronic right convexity subdural hematoma. No hyperdense hemorrhage.  Mild mass effect and 2-3 mm right-to-left midline shift noted.  ---   F/u CT head on  stable without evidence of further bleed, likely older lesion.  ---   No signs of CVA on imaging.   ---   Avoid chemical anticoagulation  ---   Generalized weakness, fatigue and facial numbness may be due to SDH.   ---   Neurosurgery follow-up outpatient in 1 month with repeat CT head      #  Hyponatremia  #  Hypokalemia  #  Hypocalcemia  #  JUAN C  ---   Presented with weakness, fatigue x1-2 days, found to have new hypokalemia, hyponatremia, and JUAN C   ---   Baseline cr ~ 1.0  ---   Admit cr 2.07 ---> Held PTA Lasix and spironolactone ---> ---> ---> 1.18 ---> 1.36 ---> 1.25 ---> 1.27 today  ---   Na was 125 at admit ---> ---> ---> 133--132   ---   K, Mg and Phos levels are back to normal range  ---   Reported decreased PO intake d/t grief w/ recent death and  for his sister  ---   Suspect pre-renal etiology for JUAN C, low PO intake with ongoing lasix and spironolactone use for his ascites, edema   ---   Lytes derangements were also due to decrease oral intake   ---   Continue holding PTA lasix and spironolactone  ---   Encouraged increased oral intake  ---   Continue nutritional supplements     #  Constipation  ---   Very symptomatic at home, noting frequent feeling of needing to have a BM but being unable to do so, or if able to do so will have very small output and hard stools.   ---   No melena or hematochezia.   ---   Takes stool softeners intermittently at home without improvement.   ---   Continue daily Miralax and prn senna and dulcolax prn     #  T2DM  ---   Last Hgb A1c 6.8%  ---   Diet controlled  ---   Monitor     #  Chronic microcytic anemia  ---   Hgb 7.8 on admission ---> 8.8 g on 24  ---   Denied hematemesis, melena or BRBPR  ---   CTM     #  Peripheral neuropathy  #  Hx of herpes zoster and post-herpetic  neuralgia  ---   Resume pta gabapentin since kidney function improved     Possible UTI  ---   UA on 2/6 w/ trace blood, negative leukocyte esterase, negative nitrite, 3 to WBC, 3 RBC and few bacteria present  ---   UCx growing 10-50K Klebsiella pneumoniae S to ceftriaxone  ---   NGTD from blood cx collected on 2/6  ---   Completed Ceftriaxone x 5 days, 2/6 - 2/10/24     Hypoxia  ---   Due to anxiety   ---   O2 supplement also to help w/ comfort, now off                  Diet: Snacks/Supplements Pediatric: Glucerna; With Meals  Regular Diet Adult  Diet    DVT Prophylaxis:  INR > 2 due to liver issues   Davalos Catheter: Not present  Lines: None     Cardiac Monitoring: None  Code Status: No CPR- Do NOT Intubate      Clinically Significant Risk Factors              # Hypoalbuminemia: Lowest albumin = 1.3 g/dL at 2/15/2024  5:56 AM, will monitor as appropriate    # Coagulation Defect: INR = 2.62 (Ref range: 0.85 - 1.15) and/or PTT = 53 Seconds (Ref range: 22 - 38 Seconds), will monitor for bleeding          # DMII: A1C = N/A within past 6 months       # Financial/Environmental Concerns: none         Disposition Plan      Expected Discharge Date: 02/17/2024      Destination: home with family  Discharge Comments: care conference 2/14            Griselda Barragan MD  Hospitalist Service, GOLD TEAM [address] [place] [address]  Securely message with [employer] (more info)  Text page via [facility] Paging/Directory   See signed in provider for up to date coverage information  ______________________________________________________________________    Interval History   Dong ok, does not  eat much, wants  edema gone  in legs and scrotum       Physical Exam   Vital Signs: Temp: 98  F (36.7  C) Temp src: Oral BP: 102/52 Pulse: 77   Resp: 18 SpO2: 100 % O2 Device: None (Room air)    Weight: 130 lbs 1.14 oz  General appearance: awake alert in  no apparent distress       RESPIRATORY: lungs are clear    CARDIOVASCULAR: normal S1S2 regular rate and rhythm,   GASTROINTESTINAL: abdomen is less  distended but soft , + bowel sounds    SKIN: warm and dry, jaundiced   NEUROLOGIC: awake alert  speech clear   EXTREMITIES: no clubbing cyanosis,  + dependent edema and also some scrotal and penile edema     Data         Imaging results reviewed over the past 24 hrs:   No results found for this or any previous visit (from the past 24 hour(s)).

## 2024-02-16 NOTE — PLAN OF CARE
Occupational Therapy Discharge Summary    Reason for therapy discharge:    Transition to hospice    Progress towards therapy goal(s). See goals on Care Plan in [facility] electronic health record for goal details.  Goals discontinued, pt transitioned to hospice.

## 2024-02-16 NOTE — PLAN OF CARE
Goal Outcome Evaluation:      Plan of Care Reviewed With: patient    Overall Patient Progress: improving    Pt is A&Ox4, SBA with walker, Continent B/B. Plan is to discharge home with hospice care today 2/16.    No acute issues this shift. Call light within reach. Continue POC.    Temp: 98.6  F (37  C) Temp src: Oral BP: 101/46 Pulse: 86   Resp: 20 SpO2: 96 % O2 Device: None (Room air)

## 2024-02-16 NOTE — PROGRESS NOTES
Brief Palliative Care Note    Palliative medicine will sign off as Rishabh has he transitioned to comfort care and plans to discharge home with hospice this weekend. [facility] plans to see Rishabh and his family at 1PM today. Appreciate unit SW for establish hospice service for Rishabh. Briefly, visited Rishabh  in his room, he denies any needs at this time. No Palliative Care needs are identified. If there are future changes clinically for which further goals of care conversation or family meeting would be helpful, or new needs for support for this patient and family, please don't hesitate to contact our service or place order for reconsult.    JOSIE Stacy CNP  [facility], Palliative Care  Securely message with the [employer] Web Console (learn more here) or  Text page via [facility] Paging/Directory

## 2024-02-16 NOTE — PROGRESS NOTES
CLINICAL NUTRITION SERVICES    Informed decision made to change pt s status to comfort care. No further nutrition interventions planned at this time. Waiting to hear back from RN if pt is drinking the Glucerna and would like it to continue.  RD can be consulted if needed.    RD signing off on 2/16/2024.

## 2024-02-17 NOTE — PROGRESS NOTES
Care Management Follow Up    Length of Stay (days): 12    Expected Discharge Date: Possibly 02/18/2024     Concerns to be Addressed: discharge planning     Patient plan of care discussed at interdisciplinary rounds: Yes    Anticipated Discharge Disposition: Home with Hospice      Anticipated Discharge Services: Hospice Care  Anticipated Discharge DME: TBD    Patient/family educated on Medicare website which has current facility and service quality ratings: DANIEL provided patient's son Salas with a printed list of hospice agencies to review.   Education Provided on the Discharge Plan: Yes  Patient/Family in Agreement with the Plan: yes    Referrals Placed by CM/SW: Hospice  Private pay costs discussed: Not applicable    Additional Information: DANIEL note from 2/15 indicates that the family is coming to the hospital to do a meeting with [facility].  called and spoke with with intake at [facility] (P: [phone]) Intake transferred writer to Marcy Alex  Clinical Liaison (Phone [phone]). Marcy is planning to coming out a 1 pm out do a consult for Hospice Services. She states she has been working with the patient's son Ajit regarding discharge plans.    DANIEL met with the patient, [facility] Marcy, patient's son Salas, patient's brother Phi, as well as a few other family members at bedside to discuss discharge plans. The patient and family have decided for the patient to discharge to his brother Phi's home in to [facility] with hospice services.    DANIEL provided the patient's son a printed list of hospice agencies that service the [place], [place] area to review. Hussain Fong states he will call the agencies on the list and discuss with family and decide which agency he would like. Marcy the [facility] Clinical Liaison also electronically sent the list of hospice agencies that writer printed out to the patient's son Salas.     DANIEL updated Dr. Barragan, with [place] [id number] team after the meeting  with [facility].      SW will continue to follow for safe discharge planning.     Rupa Rhodes, MSW, LGSW   2/17/2024       Social Work and Care Management Department       SEARCHABLE in [facility] - search SOCIAL WORK       [facility] (0800 - 1630) Saturday and Sunday     Units: 4A Vocera, 4C Vocera, & 4E Vocera    Pager: [phone]     Units: 5A [id number] Vocera, 5A [id number] Vocera & 5C Vocera Pager: [phone]     Units: 6A Vocera & 6B Vocera  Pager: [phone]     Units: 6C Vocera Pager: [phone]     Units: 7A Vocera & 7B Vocera  Pager: [phone]     Units: 7C Vocera Pager: [phone]     Unit: [facility] ED Vocera & [facility] Obs Vocera Pager: [phone]      [facility] ([id number]) Saturday and Sunday      Units: 5 Ortho Vocera, 5 Med Surg Vocera & WB ED Vocera Pager:[phone]     Units: 6 Med Surg Vocera, 8 Med Surg Vocera, & 10 ICU Vocera Pager: [phone]        After hours Vocera [facility] and After Hours Vocera [facility]     Saturday & Sunday (1630 - 0000)    Mon-Fri ([id number])     [facility] Recognized Holidays  ([id number])    Units: ALL  Pager: [phone]

## 2024-02-17 NOTE — PROGRESS NOTES
62 y/o male in hospital for treatment of hepatocellular carcinoma, hyponatremia, and JUAN C. Family and patient spoke with MD, Hospice nurse, and  this shift about plans for potential discharge tomorrow. Denies pain or discomfort this shift. Continue POC.

## 2024-02-17 NOTE — PROGRESS NOTES
Cross cover    Patient appears to be approaching end of life, progress note today indicates comfort based approach.  Paged that patient has pain all over, no prn's ordered.    I ordered:    Voltaren 4 g qid prn joint pain  Tylenol 650 mg q8h prn  Oxycodone 2.5-5 mg q6h prn  Hydromorphone 0.2-0.4 mg q3h prn    Started at low doses for opiates given liver disease, room to go up if side effect profile acceptable.    Matthew Adams DO  Hospitalist    Medicine and Pediatrics  [email or web address]  Pager: [phone]  Available on [facility]

## 2024-02-17 NOTE — PLAN OF CARE
Pt complained of generalized pain, rating pain 7/10. ON call MD notified and received orders for prn pain medications. PRN oxycodone 5 mg was given with relief. Pt appears to be sleeping. Pt to be discharge to home today. Pt also requested to be given IVF's and prescriptions for pain medication since he's planning to travel out of state.  Continue with plan of care.

## 2024-02-17 NOTE — PROGRESS NOTES
[facility]    Medicine Progress Note - Hospitalist Service, GOLD TEAM 16    Date of Admission:  2/5/2024    Assessment & Plan     Rishabh Cabrera is a 63 year old male admitted on 2/5/2024.  He has a h/o T2DM, cirrhosis 2/2 hep B infection c/b ascites, variceal bleed and HCC (s/p Y90 9/2021 and chemoembolization for residual disease 12/2021), HTN, colon ca s/p hemicolectomy 2015 and gastric ulcer.  He presented on 2/5/24 with weakness, fatigue and facial numbness.  W/u in the ED revealed SDH, decompensated cirrhosis, hyponatremia, hypokalemia, JUAN C, and hyperbilirubinemia.  He was subsequently admitted     2/17 extended family here including brothers, patient 's son have kept them informed, we did have about 45 min discussion, patient  wants to go to [place] , we did discuss needing hospice when leaves, hospice met wit pt and  family early afternoon and gave recs , social  worker also helping out as we are recommending hospice to be in place pre discharge  . At this point he is not enrolling in hospice in [facility] as wanting to leave the [facility] but they have [facility] hospice number if patient  ends up staying in [facility]     Has really not required much medications but will discharge  with several day supply     Plan on hopefully discharge  tomorrow  with estabilished hospice       2/14 had 1 hour long care conference, oncology, palliative cares,  myself  2 sons present ,  assisted   - we did go through all potential treatment options and risks and unfortunately the only option he would have if he were to get stronger is immunotherapy and response rate is < 10 % .  patient  did express that he wants  to be comfortable and not attempt to try and proceed with immunotherapy   - Mr. Cabrera had expressed that he would like  comfort cares   --2/13  I spoke with hematology, per our discursion immunotherapy was not offered while  impatent but they did discuss as option butr  patient  needs to get stronger an drecomended out patient  follow up  with Dr. Lund , I did let patient  and son and brother know and now patient  pinto snot want immunotherapy, would like  to go home then out of state to be with siblings , however Im not so sure this would be a great idea as he is weak and would be sceptical of how he could manage the trip and did discuss with them if he went he would not come back and likley die there        patient  wishes to travel to [place] to be with siblings and die there. Need to see if feasible post discharge  if strong enough to make the trip and would need to have services in place pre transfer       Had some abdominal pain over night, state soxycodoen really did not help much, now doing ok     #  Decompensated cirrhosis 2/2 hepatitis B c/b ascites, variceal bleed and HCC  -   Pt follows with Dr. Rodriguez with hepatology.   -   On tenofovir for chronic Hep B.   -   Has chronic abd pain, usually diffused, but feels this is now more focal in midline and lower abdomen - none in RUQ  -   No nausea or vomiting at admit, but did endorse poor tolerance of PO intake except for juices and water  -    However albumin low at 1.4 on admission and with hypocalcemia, hypokalemia, and hyponatremia  - Diagnostic paracentesis on 2/6 was negative for SBP, did receive  albumin 25% 50 g infusion after paracentesis on 2/6, again had paracentesis 4/15 and giving total 50 g albumin   -   Vit K replacement 2/6 (PO) and 2/7 (IV)  -   Continue PTA tenofovir, renally dosed   MELD 3.0: 28 at 2/12/2024  , MELD-Na: 26 at 2/12/2024  8:14 AM  -  GI hepatology saw patient    - hold  PTA carvedilol BID with low blood pressure   and also continue to hold lasix and spironolactone d/t kidney failure, 1 x dose lasix 2/15 and see if can discharge  on lasix to help with leg/scrotal edema      Lower ext edema  and scrotal edema  - multifactorial, diuresis limited by  low blood pressure , giving dose of oral lasix  "again 2/26      #  Liver lesions concern for HCC recurrence  #  H/o HCC   ---   H/o Y90 9/2021 and chemoembolization for residual disease 12/2021  ---   MRI 1/2023 showed slight increase in several LR3 lesions in R lobe. Planned for sort interval 3 month follow up MRI which did not occur.  Pt did not follow up and f/u MRI was not obtained ---   US RUQ 2/5 this admit with multiple hepatic masses suspicious for HCC.   ---   MRI liver 2/8 showed 10 LR-5 lesions concerning for HCC recurrence.   ---   Pt was informed of the MRI findings  ---   Not a transplant candidate at this point given 10 lesions therefore does not meet Caio criteria per Dr. Sofia Yip of GI consult service  ---   Pt is requested chemo therapy, not felt to be a candidate   ---   Palliative consult service following  ---   Oncology consult service following  ---   GI consult service was following     #  H/o pulmonary spiculated lesion or nodule  ---   Follows with outpatient pulmonology clinic, last seen 3/7/2023 and recommended 6 month follow up.   ---   Prior biopsy unsuccessful  ---   CT chest w/o contrast 2/7/24 revealed increased size of left upper lobe spiculated nodule c/f malignancy (secondary malignancy vs metastatic dz).   ---   Suspect this spiculated lesions or nodule is primary lung ca  ---   IR recommended PET CT to look for occult mets prior to performing RICH spiculated lesion biopsy thus biopsy order cancelled  - now patient  would like to be kept comfortable     Per heme  \" - At this point, patient is Child Olivera C. Care discussion yesterday, patient is not a candidate for VEG-F directed therapies. Immunotherapy alone would have a response rate of roughly 10% or less. Would not plan to initiate cancer directed therapy (immunotherapy) while inpatient. Concern that patient would not be able to tolerate with his current clinical status with his liver failure, cirrhosis, overall weakness and severe malnutrition.  - Would want to see " "that patient is stable enough to discharge from the hospital prior to initiating treatment, if he would like to pursue treatment, recommend follow up with outpatient oncologist, Dr Lund.  Will help to arrange close follow up if patient is able to discharge. \"  - per hematology patient  has a very poor prognosis   -  patient  now wanted to be comfortable, set up for care conference for , see above    - I think palliative cares/hospice would be appropriate      #  Coagulopathy and thrombocytopenia   ---   Due to liver cirrhosis  ---   INR  remains  > 2.5   ---   Plt count was 111K on  and no improvement of INR so stopped       #  Subdural hematoma, subacute vs chronic   #  Weakness and fatigue  #  Facial numbness  ---   Admit CT head w/o contrast  revealed thin, likely subacute-to-chronic right convexity subdural hematoma. No hyperdense hemorrhage.  Mild mass effect and 2-3 mm right-to-left midline shift noted.  ---   F/u CT head on  stable without evidence of further bleed, likely older lesion.  ---   No signs of CVA on imaging.   ---   Avoid chemical anticoagulation  ---   Generalized weakness, fatigue and facial numbness may be due to SDH.   ---   Neurosurgery follow-up outpatient in 1 month with repeat CT head      #  Hyponatremia  #  Hypokalemia  #  Hypocalcemia  #  JUAN C  ---   Presented with weakness, fatigue x1-2 days, found to have new hypokalemia, hyponatremia, and JUAN C   ---   Baseline cr ~ 1.0  ---   Admit cr 2.07 ---> Held PTA Lasix and spironolactone ---> ---> ---> 1.18 ---> 1.36 ---> 1.25 ---> 1.27 today  ---   Na was 125 at admit ---> ---> ---> 133--132   ---   K, Mg and Phos levels are back to normal range  ---   Reported decreased PO intake d/t grief w/ recent death and  for his sister  ---   Suspect pre-renal etiology for JUAN C, low PO intake with ongoing lasix and spironolactone use for his ascites, edema   ---   Lytes derangements were also due to decrease oral intake   ---   " Continue holding PTA lasix and spironolactone  ---   Encouraged increased oral intake  ---   Continue nutritional supplements     #  Constipation  ---   Very symptomatic at home, noting frequent feeling of needing to have a BM but being unable to do so, or if able to do so will have very small output and hard stools.   ---   No melena or hematochezia.   ---   Takes stool softeners intermittently at home without improvement.   ---   Continue daily Miralax and prn senna and dulcolax prn     #  T2DM  ---   Last Hgb A1c 6.8%  ---   Diet controlled  ---   Monitor     #  Chronic microcytic anemia  ---   Hgb 7.8 on admission ---> 8.8 g on 2/8/24  ---   Denied hematemesis, melena or BRBPR  ---   CTM     #  Peripheral neuropathy  #  Hx of herpes zoster and post-herpetic neuralgia  ---   Resume pta gabapentin since kidney function improved     Possible UTI  ---   UA on 2/6 w/ trace blood, negative leukocyte esterase, negative nitrite, 3 to WBC, 3 RBC and few bacteria present  ---   UCx growing 10-50K Klebsiella pneumoniae S to ceftriaxone  ---   NGTD from blood cx collected on 2/6  ---   Completed Ceftriaxone x 5 days, 2/6 - 2/10/24     Hypoxia  ---   Due to anxiety   ---   O2 supplement also to help w/ comfort, now off                  Diet: Snacks/Supplements Pediatric: Glucerna; With Meals  Regular Diet Adult  Diet    DVT Prophylaxis:  INR > 2 due to liver issues   Davalos Catheter: Not present  Lines: None     Cardiac Monitoring: None  Code Status: No CPR- Do NOT Intubate      Clinically Significant Risk Factors              # Hypoalbuminemia: Lowest albumin = 1.3 g/dL at 2/15/2024  5:56 AM, will monitor as appropriate    # Coagulation Defect: INR = 2.62 (Ref range: 0.85 - 1.15) and/or PTT = 53 Seconds (Ref range: 22 - 38 Seconds), will monitor for bleeding          # DMII: A1C = N/A within past 6 months       # Financial/Environmental Concerns: none         Disposition Plan      Expected Discharge Date: 02/18/2024       Destination: home with family  Discharge Comments: care conference 2/14            Griselda Barragan MD  Hospitalist Service, GOLD TEAM 16  [facility]  Securely message with [employer] (more info)  Text page via [employer] Paging/Directory   See signed in provider for up to date coverage information  ______________________________________________________________________    Interval History   In bed, extended family by bedside, did have  vua ipad     Now no  abdominal pain, some pain in legs from swelling       Physical Exam   Vital Signs: Temp: 97.5  F (36.4  C) Temp src: Oral BP: 102/58 Pulse: 90   Resp: 19 SpO2: 96 % O2 Device: None (Room air)    Weight: 130 lbs 1.14 oz  General appearance: awake alert in  no apparent distress       RESPIRATORY: lungs are clear   CARDIOVASCULAR: normal S1S2 regular rate and rhythm,   GASTROINTESTINAL: abdomen is less  distended but soft , + bowel sounds    SKIN: warm and dry, jaundiced   NEUROLOGIC: awake alert  speech clear   EXTREMITIES: no clubbing cyanosis,  + dependent edema and also some scrotal and penile edema     Data         Imaging results reviewed over the past 24 hrs:   No results found for this or any previous visit (from the past 24 hour(s)).

## 2024-02-18 NOTE — PLAN OF CARE
Pt is alert and oriented x4, uses call light and able to make needs known. Denies pain or shortness of breath. Appears to be sleeping during rounds and safety checks.  at 0600. Continue with plan of care

## 2024-02-18 NOTE — PROGRESS NOTES
Care Management Discharge Note    Discharge Date: 02/18/2024       Discharge Disposition: Hospice, Home    Discharge Services: (Hospice Care)    Discharge DME: None    Discharge Transportation: family or friend will provide    Private pay costs discussed: Not applicable    Does the patient's insurance plan have a 3 day qualifying hospital stay waiver?  Yes     Which insurance plan 3 day waiver is available? Alternative insurance waiver    Will the waiver be used for post-acute placement? No    PAS Confirmation Code:  N/A    Patient/family educated on Medicare website which has current facility and service quality ratings: no    Education Provided on the Discharge Plan: Yes  Persons Notified of Discharge Plans: Pt, family, care team  Patient/Family in Agreement with the Plan: yes    Handoff Referral Completed: Yes    Additional Information:    1000 SW contacted pt's son Ajit Cabrera ([phone]) to confirm discharge address ( brother Li Cabrera's home in [place]), brother's phone number, and hospice agency choice.    Ajit told SW that they hadn't chosen an agency but was planning to discuss agenciew with his father when he got home. DANIEL explained the need to have a secured referral prior to discharge and Ajit but agreed to consult with his father this afternoon and provide choice. He confirmed pt would discharge to Li Cabrera's home at:    [address], [place], [address]  Ph: [phone] provided update to provider.    125 DANIEL met pt's son Ajit and MD Barragan. Ajit confirmed that he was comfortable coordinating hospice services and provided following agencies for referral: [facility] (Ph: [phone]) and [facility] (Ph: [phone]). MD confirmed that discharge medications were on unit and available for discharge. DANIEL  agreed to submit referrals and update pt, Ajit, and MD as needed      [facility]  Ph: [phone]  Fax: [phone] 1300 DANIEL spoke with Brittany in Admissions  and discussed pt's situation; that pt and family wanted pt to discharge ASAP and that son was comfortable with coordinating hospice placement once accepted into agency's service. Brittany reported that their agency can likely admit pt to their service and explained that the also have 2 inpatient facilities for pt's whose symptoms are uncontrolled at home. SW agreed to submit referral ASAP and to include son's contact information as well as brother's address and contact information in it. No additional questions or concerns were reported. SW provided availability and current contact information ([phone]) and the call was ended.  1322 Initial SNF Referral faxed via [employer] to Agency for review.  1345 Discharge orders faxed via [employer] to Agency      [facility]  Ph: [phone]  Fax: [phone]  133 Initial SNF Referral faxed via [employer] to Agency for review  1416 Discharge orders faxed via [employer] to Agency    SW provided updates to MD during the process of sending referral.    1410 SW met with pt and son to provide update on referrals.No additional questions or concerns were reported and SW excused self from pt's bedside.    Care Management will continue to follow as needed.    Cathryn Nash, ZAIN, [facility]  ED/OBS      Social Work and Care Management Department       SEARCHABLE in [employer] - search SOCIAL WORK       [place] (0800 - 1630) Saturday and Sunday     Units: 4A Vocera, 4C Vocera, & 4E Vocera    Pager: [phone]   Units: 5A [id number] Vocera, 5A 1944-3951 Vocera & 5C Vocera Pager: [phone]   Units: 6A Vocera & 6B Vocera  Pager: [phone]   Units: 6C Vocera Pager: [phone]   Units: 7A Vocera & 7B Vocera  Pager: [phone]   Units: 7C Vocera Pager: [phone]   Unit: [place] ED Vocera & [place] Obs Vocera Pager: [phone]        [facility] (0803-2542) Saturday and Sunday      Units: 5 Ortho Vocera, 5 Med Surg Vocera & WB ED Vocera Pager:[phone]   Units: 6 Med Surg Vocera, 8  Med Surg Vocera, & 10 ICU Vocera Pager: [phone]        After hours Vocera [place] and After Hours Vocera [place]     Saturday & Sunday (1630 - 0000)  Mon-Fri (0301-3465)   [facility] Recognized Holidays  ([id number])  Units: ALL  Pager: [phone]

## 2024-02-18 NOTE — DISCHARGE SUMMARY
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 c/b ascites, variceal bleed and HCC   Liver lesions concern for HCC recurrence  H/o pulmonary spiculated lesion or nodule  Coagulopathy and thrombocytopenia   Subdural hematoma, subacute vs chronic    Weakness and fatigue   Facial numbness   Hyponatremia  Hypokalemia   Hypocalcemia  JUAN C  Constipation  T2DM  Chronic microcytic anemia  Peripheral neuropathy   Hx of herpes zoster and post-herpetic neuralgia  Possible UTI  Hypoxia  Clinically Significant Risk Factors     # DMII: A1C = N/A within past 6 months       Follow-ups Needed After Discharge   Follow-up Appointments     Adult [facility] Follow-up and recommended labs and tests      Follow up with  hospice care provider,  Appointments on [place] and/or [facility] (with [facility] or [facility]   provider or service). Call [phone] if you haven't heard regarding   these appointments within 7 days of discharge.          Discharge Disposition   Discharged to home  Condition at discharge: Stable    Hospital Course   Rishabh Cabrera is a 63 year old male admitted on 2/5/2024.  He has a h/o T2DM, cirrhosis 2/2 hep B infection c/b ascites, variceal bleed and HCC (s/p Y90 9/2021 and chemoembolization for residual disease 12/2021), HTN, colon ca s/p hemicolectomy 2015 and gastric ulcer.  He presented on 2/5/24 with weakness, fatigue and facial numbness.  W/u in the ED revealed SDH, decompensated cirrhosis, hyponatremia, hypokalemia, JUAN C, and hyperbilirubinemia.  He was subsequently admitted        2/17 extended family here including brothers, patient 's son have kept family informed, we did have about 45 min discussion, patient  wants to go to [place] , we did discuss  needing hospice when leaves, hospice met wit patient  and  family early afternoon and gave recommendations . Social  worker also helping out as we are recommending hospice to be in place pre discharge  . At this point he is not enrolling in hospice in [facility] as wanting to leave the state but they have [facility] hospice number if patient  ends up staying in [facility]      2/18 patient  really wants to be discharged and not wait for hospice to be set up prior to discharge ,  family has reached out to 2 hospice agencies and awaiting call back.  came up and will fax information to the hospices.  Again patient  does not wish to stay in the hospital till they here back from hospice and till hospice is established .They do have [facility] phone number if he stays in [facility] and they can enroll him right away.     I am providing patient  with  this  discharge  summary.      Has really not required much medications but will discharge  with several day supply         2/14 had 1 hour long care conference, oncology, palliative cares,  myself  2 sons present ,  assisted   - we did go through all potential treatment options and risks and unfortunately the only option he would have if he were to get stronger is immunotherapy and response rate is < 10 % .  patient  did express that he wants  to be comfortable and not attempt to try and proceed with immunotherapy   - Mr. Cabrera had expressed that he would like  comfort cares        patient  wishes to travel to [place] to be with siblings and die there.         #  Decompensated cirrhosis 2/2 hepatitis B c/b ascites, variceal bleed and HCC  # hypoalbuminemia   -   Pt followed  with Dr. Rodriguez with hepatology.   -   was On tenofovir for chronic Hep B cam stop a sop    -   Has chronic abd pain, usually diffused, but feels this is now more focal in midline and lower abdomen - none in right upper quadrant  has not required any narcotic pain medications   -   No nausea  "or vomiting at admit, but did endorse poor tolerance of PO intake except for juices and water  -    albumin low at 1.4  on admission and with hypocalcemia, hypokalemia, and hyponatremia  - Diagnostic paracentesis on 2/6 was negative for SBP, did receive  albumin 25% 50 g infusion after paracentesis on 2/6, again had paracentesis 4/15 and giving total 50 g albumin   -   received Vit K replacement without improvement on INR so stopped  we had to hold  -  GI hepatology also  saw patient  and had nothing further to add   - we had to hold  PTA carvedilol BID for  low blood pressure   and also continue to hold lasix and spironolactone d/t kidney failure. Had received PRN lasix for leg and scrotal edema with minimal help        #  Liver lesions concern for HCC recurrence  #  H/o HCC   ---   H/o Y90 9/2021 and chemoembolization for residual disease 12/2021  ---   MRI 1/2023 showed slight increase in several LR3 lesions in R lobe. Planned for sort interval 3 month follow up MRI which did not occur.  Pt did not follow up and f/u MRI was not obtained ---   US RUQ 2/5 this admit with multiple hepatic masses suspicious for HCC.   ---   MRI liver 2/8 showed 10 LR-5 lesions concerning for HCC recurrence.   ---   Not a transplant candidate at this point given 10 lesions therefore does not meet [place] criteria per Dr. Sofia Yip of GI consult service  ---   Pt is requested chemo therapy, not felt to be a candidate   ---   Palliative consult service following  ---   Oncology consult service ws following as well as GI      Per  oncology   \" - At this point, patient is Child Olivera C. Care discussion again today, patient is not a candidate for VEG-F directed therapies. Immunotherapy alone would have a response rate of roughly 10% or less. Would not plan to initiate cancer directed therapy (immunotherapy) while inpatient. Concern that patient would not be able to tolerate with his current clinical status with his liver failure, " "cirrhosis, overall weakness and severe malnutrition. Support patient's decision to pursue comfort focused care.   - If patient were to change his mind, we can arrange follow up with Dr Lund. At this time, patient would like to puruse comfort care and we support this decision.\"       #  H/o pulmonary spiculated lesion or nodule  ---   Followed with outpatient pulmonology clinic, last seen 3/7/2023    ---   Prior biopsy unsuccessful  ---   CT chest w/o contrast 2/7/24 revealed increased size of left upper lobe spiculated nodule c/f malignancy (secondary malignancy vs metastatic dz).   ---   Suspect this spiculated lesions or nodule is primary lung ca  ---   IR recommended PET CT to look for occult mets prior to performing RICH spiculated lesion biopsy thus biopsy order cancelled  ---  now patient  would like to be kept comfortable         #  Coagulopathy and thrombocytopenia   ---   Due to liver cirrhosis  ---   INR  remains  > 2.5  Despite vit K so not responsive to Vit K thus hold   ---   Plt count was 111K on 2/8     #  Subdural hematoma, subacute vs chronic   #  Weakness and fatigue  #  Facial numbness  ---   Admit CT head w/o contrast 2/6 revealed thin, likely subacute-to-chronic right convexity subdural hematoma. No hyperdense hemorrhage.  Mild mass effect and 2-3 mm right-to-left midline shift noted.  ---   F/u CT head on 2/6 stable without evidence of further bleed, likely older lesion.  ---   No signs of CVA on imaging.   ---   was to have Neurosurgery follow-up outpatient in 1 month with repeat CT head  But now patient  Decided on comfort cares      #  Hyponatremia  #  Hypokalemia  #  Hypocalcemia  #  JUAN C  ---   Presented with weakness, fatigue x1-2 days, found to have new hypokalemia, hyponatremia, and JUAN C   ---   Baseline cr ~ 1.0  ---   Admit cr 2.07 ---> Held PTA Lasix and spironolactone  And now down to 1.22   ---   Na was 125 at admit , remained hyponatremia and is at 131    ---   K, Mg and Phos levels " are back to normal range  ---   Reported decreased PO intake d/t grief w/ recent death and  for his sister  ---   Suspect pre-renal etiology for JUAN C, low PO intake with ongoing lasix and spironolactone use for his ascites, edema   ---   Lytes derangements were also due to decrease oral intake   ---   Continue holding PTA lasix  and spironolactone  ---   Encouraged increased oral intake        #  Constipation  ---   was Very symptomatic at home, noting frequent feeling of needing to have a BM but being unable to do so, or if able to do so will have very small output and hard stools.   ---   No melena or hematochezia.   ---   Takes stool softeners intermittently at home without improvement.   ---   discharge  On  daily Miralax and prn senna and dulcolax prn     #  T2DM  ---   Last Hgb A1c 6.8%  -- pt is to eat whatever he wants         #  Chronic microcytic anemia  ---   Hgb 7.8 on admission , Hg has been stable in 8 range   ---   Denied hematemesis, melena or BRBPR        #  Peripheral neuropathy  #  Hx of herpes zoster and post-herpetic neuralgia  ---   Resumed pta gabapentin since kidney function improved     Possible UTI  ---   UA on  w/ trace blood, negative leukocyte esterase, negative nitrite, 3 to WBC, 3 RBC and few bacteria present  ---   UCx growing 10-50K Klebsiella pneumoniae S to ceftriaxone  ---   NGTD from blood cx collected on   ---   Completed Ceftriaxone x 5 days,  - 2/10/24     Hypoxia  ---   Due to anxiety   ---   O2 supplement also to help w/ comfort, now off oxygen            Consultations This Hospital Stay   NUTRITION SERVICES ADULT IP CONSULT  NEUROSURGERY ADULT IP CONSULT  GI HEPATOLOGY ADULT IP CONSULT  PHYSICAL THERAPY ADULT IP CONSULT  OCCUPATIONAL THERAPY ADULT IP CONSULT  NUTRITION SERVICES ADULT IP CONSULT  INTERNAL MEDICINE PROCEDURE TEAM ADULT IP CONSULT [place] - PARACENTESIS  CARE MANAGEMENT / SOCIAL WORK IP CONSULT  PALLIATIVE CARE ADULT IP CONSULT  ONCOLOGY ADULT  IP CONSULT  SPIRITUAL HEALTH SERVICES IP CONSULT  SOCIAL WORK IP CONSULT  INTERVENTIONAL RADIOLOGY ADULT/PEDS IP CONSULT  INTERVENTIONAL RADIOLOGY ADULT/PEDS IP CONSULT  ONCOLOGY ADULT IP CONSULT  INTERVENTIONAL RADIOLOGY ADULT/PEDS IP CONSULT    Code Status   No CPR- Do NOT Intubate    Time Spent on this Encounter   IGriselda MD, personally saw the patient today and spent greater than 30 minutes discharging this patient.       Griselda Barragan MD  [facility] [address]  [facility] [address]  Phone: [phone]  Fax: [phone]  ______________________________________________________________________    Physical Exam   Vital Signs: Temp: 97.9  F (36.6  C) Temp src: Oral BP: 115/62 Pulse: 92   Resp: 18 SpO2: 95 % O2 Device: None (Room air)    Weight: 130 lbs 1.14 oz  General appearance: awake alert in  no apparent distress         RESPIRATORY: lungs are clear   CARDIOVASCULAR: normal S1S2 regular rate and rhythm,   GASTROINTESTINAL: abdomen is less  distended but soft , + bowel sounds    SKIN: warm and dry, jaundiced   NEUROLOGIC: awake alert  speech clear   EXTREMITIES: no clubbing cyanosis,  + dependent edema and also some scrotal and penile edema              Primary Care Physician   Fariba George    Discharge Orders      Home Care Referral      Reason for your hospital stay    Weakness, recurrence liver cancer , liver failure     Activity    Your activity upon discharge: activity as tolerated     Adult [facility] Follow-up and recommended labs and tests    Follow up with  hospice care provider,  Appointments on [place] and/or [facility] (with [facility] or [facility] provider or service). Call [phone] if you haven't heard regarding these appointments within 7 days of discharge.     [place] Order    I, the undersigned, certify that the above prescribed supplies are medically necessary for this patient and is both reasonable and necessary in reference to accepted standards of medical and  necessary in reference to accepted standards of medical practice in the treatment of this patient's condition and is not prescribed as a convenience.      Diet    Follow this diet upon discharge: as tolerated       Significant Results and Procedures   Most Recent 3 CBC's:  Recent Labs   Lab Test 02/08/24  1112 02/06/24  0650 02/05/24  1903   WBC 5.9 5.6 8.4   HGB 8.8* 8.1* 7.8*   MCV 84 81 83   * 83* 88*     Most Recent 3 BMP's:  Recent Labs   Lab Test 02/18/24  0628 02/17/24  0832 02/16/24  0645 02/15/24  0617 02/15/24  0556 02/14/24  0717 02/13/24  0725   NA  --   --   --   --  131* 135 132*   POTASSIUM  --   --   --   --  4.6 4.5 4.4   CHLORIDE  --   --   --   --  104 107 103   CO2  --   --   --   --  27 27 25   BUN  --   --   --   --  10.4 10.7 12.0   CR  --   --   --   --  1.22* 1.20* 1.22*   ANIONGAP  --   --   --   --  <1* 1* 4*   MANDY  --   --   --   --  6.8* 7.2* 7.1*   * 80 132*   < > 108* 192* 253*    < > = values in this interval not displayed.     Most Recent 2 LFT's:  Recent Labs   Lab Test 02/15/24  0556 02/14/24  0717   AST 39 39   ALT 16 16   ALKPHOS 142 160*   BILITOTAL 2.8* 2.0*     Most Recent 3 INR's:  Recent Labs   Lab Test 02/15/24  0556 02/14/24  0717 02/13/24  0725   INR 2.62* 2.74* 2.54*   ,   Results for orders placed or performed during the hospital encounter of 02/05/24   US Abdomen Complete    Narrative    EXAMINATION: US ABDOMEN COMPLETE,  2/5/2024 11:11 PM     COMPARISON: Ultrasound 1/24/2023 and CT abdomen and pelvis 09/20/2023    HISTORY: Rule out cholelithiasis    TECHNIQUE: The abdomen was scanned in standard fashion with  specialized ultrasound transducer(s) using both gray-scale and limited  color Doppler techniques.    FINDINGS:  Liver: The liver demonstrates coarsened echotexture with nodular  surface contour. Multiple nodular hepatic masses, the largest of which  is located in the right hepatic lobe and measures 5.1 x 4.1 x 5.3 cm  with presence of internal  vascularity by Doppler. Additional right  hepatic lobe lesion measuring 2.7 x 2.7 x 2.1 cm and a peripheral  hypoechoic 1.8 x 1.9 x 2.1 cm right lobe lesion. Presumed perihepatic  pockets of fluid. The main portal vein is patent with antegrade flow,  measuring 0.7 cm in diameter.    Gallbladder: Contracted gallbladder with nonspecific gallbladder wall  thickening measuring up to 6 mm. Layering sludge within the  gallbladder. No definite cholelithiasis. Negative sonographic Watson's  sign. Common duct measures 5 mm.    Pancreas: Obscured due to overlying bowel gas.    Kidneys: Both kidneys are of normal echotexture, without  hydronephrosis.   The craniocaudal dimensions are: right- 10.1 cm,  left- 10.3 cm.    Spleen: The spleen is enlarged,  measuring 14.7 cm in sagittal  dimension.    Aorta and IVC: The visualized portions of the aorta and IVC are  unremarkable. The proximal aorta measures 1.7 cm in diameter.    Fluid: Moderate volume ascites.      Impression    IMPRESSION:   1.  Cirrhotic morphology of the liver with sequelae of portal  hypertension including splenomegaly and moderate volume ascites.  2.  Multiple hepatic masses, the largest which measures up to 5.1 cm.  Recommend contrast enhanced MRI for further evaluation of suspected  hepatocellular carcinoma.  3.  Decompressed gallbladder. No evidence of cholelithiasis.  4.  Splenomegaly.    I have personally reviewed the examination and initial interpretation  and I agree with the findings.    TAMIR ANDERSON MD         SYSTEM ID:  [id number]   CT Head w/o Contrast    Narrative    EXAM: CT HEAD W/O CONTRAST  LOCATION: [facility]  DATE: 2/6/2024    INDICATION: Diffuse numbness and weakness x24-48 hours, hx of HCC with new liver masses; rule out bleed vs mass vs new stroke.  COMPARISON: None.  TECHNIQUE: Routine CT Head without IV contrast. Multiplanar reformats. Dose reduction techniques were  used.    FINDINGS:  INTRACRANIAL CONTENTS: Thin, predominantly low-attenuation right convexity subdural collection measuring up to 3 mm likely representing a subacute to chronic subdural hematoma. Mild mass effect and 2-3 mm right-to-left midline shift. Mild volume loss and   presumed chronic small vessel ischemia. No CT evidence of acute infarct.    VISUALIZED ORBITS/SINUSES/MASTOIDS: No intraorbital abnormality. No paranasal sinus mucosal disease. No middle ear or mastoid effusion.    BONES/SOFT TISSUES: No acute abnormality.      Impression    IMPRESSION:  1.  Thin, likely subacute-to-chronic right convexity subdural hematoma. No hyperdense hemorrhage. Mild mass effect and 2-3 mm right-to-left midline shift.    2.  Mild age-related change.      Critical Result: Subdural hemorrhage.    Finding was identified on 2/6/2024 2:30 AM CST.     Dr. James was contacted by me on 2/6/2024 2:35 AM CST and verbalized understanding of the critical result.    CT Head w/o Contrast    Narrative    EXAM: CT HEAD W/O CONTRAST  2/6/2024 8:36 AM     HISTORY:  follow up new subacute to chronic subdural hematoma in 6  hours at 0820       COMPARISON:  Head CT earlier today at 0216 hours.    TECHNIQUE: Using multidetector thin collimation helical acquisition  technique, axial, coronal and sagittal CT images from the skull base  to the vertex were obtained without intravenous contrast.   (topogram) image(s) also obtained and reviewed.    FINDINGS:  No change in thin low-attenuation subdural fluid collection over the  right cerebral convexity measuring up to 3-4 mm in greatest thickness.  No new hemorrhage. Borderline leftward midline shift, unchanged. No  acute loss of gray-white matter differentiation in the cerebral  hemispheres. Ventricles are proportionate to the cerebral sulci. Clear  basal cisterns.    The bony calvaria and the bones of the skull base are normal. Chronic  osteitis of the right sphenoid locule. Mastoid air  cells are clear.  Grossly normal orbits.       Impression    IMPRESSION:   1. No change in chronic right cerebral convexity subdural fluid  collection and borderline leftward midline shift.  2. No new hemorrhage.     TAMIR BASHIR MD         SYSTEM ID:  [id number]   POC US Guide for Paracentesis    Impression    US Indication: abdominal distension    Limited abdominal ultrasound was performed and demonstrated an adequate fluid collection on the left side of the abdomen.     Doppler of the skin demonstrated an area at this site without significant vasculature.  A paracentesis at this site was subsequently performed.    Yaquelin Hess MD     MR Liver wo & w Contrast    Narrative    Exam: MR LIVER W/O & W CONTRAST, 2/8/2024 10:40 AM    Indication: eval mass seen on CT scan, pt w/ h/o HCC now w/ worsening  liver masses    Comparison: Ultrasound 2/5/2024 , CT 9/20/2023, MR 1/10/2023    Technique: Images were acquired with and without intravenous contrast  through the abdomen. The following MR images were acquired: TrueFISP,  multiplanar T2 weighted, axial T1 in/out of phase, axial fat-saturated  T1, diffusion-weighted. Multiplanar T1-weighted images with fat  saturation were before contrast administration and at multiple time  points following the administration of intravenous contrast. Contrast  dose: 5.9 ml Gadavist    FINDINGS:    Liver: Cirrhotic morphology of the liver. There are multiple enhancing  masses throughout the liver which demonstrate central washout and  pseudocapsule formation with the largest examples including:    - A 2.9 cm observation in segment 4A (7/35, 10/35), previously 9 mm.  LR-5b    - A 2.3 cm observation in segment 5 (7/46, 10/46), previously 6 mm.  LR-5b    - A 2.3 cm observation in segment 6 (7/44, 10/44), previously 8 mm.  LR-5b    - A 1.7 cm observation in segment 7 (7/30, 10/30), previously 3 mm.  LR-5a    There are multiple smaller enlarging similar appearing  enhancing  observations, predominately in segments 7/8.    Post treatment changes in hepatic segment 6 with the treatment cavity  measuring up to 3.2 cm with small focus of nodular arterial  enhancement along the inferomedial margin (7/54). Additionally, there  is heterogenous enhancing lesion along the in inferior aspect with  associated restricted diffusion (7/52) measuring 1.8 cm, previously 9  mm. Findings suspicious for disease recurrence. LR-TR viable.    Gallbladder/Bile ducts: Cholelithiasis with mild gallbladder wall  thickening, likely related to chronic liver disease.    Spleen: Enlarged up to 13.6 cm.    Pancreas: Normal    Adrenal glands: Normal    Kidneys: Simple left renal cyst. No hydronephrosis.    Bowel: Partial right hemicolectomy. There are persistent dilated loops  of small bowel throughout the abdomen without focal transition point,  similar to prior exam of 9/20/2023.    Lymph nodes: No suspicious lymphadenopathy    Blood vessels: Patent vasculature. Recannulized paraumbilical vein.  Gastroesophageal varices. No abdominal aortic aneurysm.    Lung bases: Bilateral pleural effusions. Chronic loculated right  pleural effusion.    Bones and soft tissues: No suspicious or aggressive appearing bone  lesions.     Mesentery and abdominal wall: Normal    Ascites: Moderate ascites with associated septations.      Impression    IMPRESSION:  1. Cirrhosis with evidence of portal hypertension.  2. Multiple (~10) enlarging LR-5 lesions as described above.   3. Posttreatment changes of segment 6 with new focus of arterial  enhancement along the inferomedial margin of the resection cavity and  an enlarging heterogenous lesion along the inferior margin. Findings  concerning for recurrent disease. LR-TR viable.   4. Persistent dilated lops of small bowel without focal transition  point, when compared to the prior CT on 9/20/2023.    OPTN/LIRADS definitions. LIRADS v2018.    LIRADS 1: Definitely benign.  LIRADS  2: Probably benign.  LIRADS 3: Indeterminate for HCC.  LIRADS 4: Probably HCC.  LIRADS M: Probably malignant. Not specific for HCC.  LIRADS 5TR- nonviable: Previously treated HCC without residual  malignancy identified  LIRADS 5TR- viable: Previously treated HCC with findings indicating  residual viable malignancy  LIRADS 5TR- equivocal: Previously treated HCC with findings that may  be treatment changes or viable malignancy    OPTN 5a: Diagnostic for HCC. < 2 cm.  OPTN 5b: Diagnostic for HCC. > Or = 2 cm and < 5 cm.  OPTN 5x: Diagnostic for HCC. > Or = 5 cm.    Caio criteria for liver transplantation:    1. Presence of no HCCs greater than 5 cm. One HCC measuring 3-5 cm is  allowed if no other HCCs are present.  2. Maximum of 3 HCCs measuring 3 cm or less.  3. No vascular invasion.  4. No extrahepatic metastases.    I have personally reviewed the examination and initial interpretation  and I agree with the findings.    KANDIS SANTAMARIA MD         SYSTEM ID:  [id number]   CT Chest w/o Contrast    Narrative    EXAMINATION: Chest CT  2/7/2024 2:48 PM    CLINICAL HISTORY: h/o pulm nodules    COMPARISON: 9/21/2023.    TECHNIQUE: CT imaging obtained through the chest without contrast.  Axial, coronal, and sagittal reconstructions and axial MIP reformatted  images are reviewed.     CONTRAST: No contrast    FINDINGS:  Lungs: The trachea and central airways are patent. No pneumothorax or  pleural effusion. Increased size of spiculated nodule in the left  upper lobe measuring 2.2 x 1.5 cm, previously 1.9 x 1.1 cm when  measured in similar fashion. New trace left pleural effusion. Similar  appearance of scarring/atelectasis in the right upper lobe with  pleural thickening and calcification. Right lower lobe pleural based  soft tissue density with peripheral calcifications likely sequela of  prior empyema.    Mediastinum: The visualized thyroid gland is unremarkable. The heart  size is within normal limits. No pericardial  effusion. The ascending  aorta and main pulmonary artery diameters are within normal limits.  Normal appearance and configuration of the great vessels off of the  aortic arch. Coronary artery calcification. No suspicious mediastinal,  hilar, or axillary lymph nodes.    Bones and soft tissues: No suspicious bone findings.     Upper Abdomen: Cirrhotic appearance of the liver. Posttreatment  changes in the right lobe of liver. Ascites. Splenomegaly.      Impression    IMPRESSION:   1. Increased size of left upper lobe spiculated nodule with new trace  left pleural effusion. Organizing pneumonia versus malignancy.  Consider PET/CT or tissue sampling for further evaluation.  2. Cirrhosis with evidence of portal hypertension. Stable  posttreatment changes in the liver with no suspicious lesion on this  noncontrast CT.    I have personally reviewed the examination and initial interpretation  and I agree with the findings.    DELVIS LOWE MD         SYSTEM ID:  [id number]   XR Chest 2 Views    Narrative    EXAM: Chest radiograph 2/11/2024 11:51 PM    HISTORY: 63 years Male Cough and congestion.     COMPARISON: Chest CT 2/7/2024.    TECHNIQUE: Portable AP view of the chest.    FINDINGS:   Trachea is slightly tortuous. Cardiac silhouette size is within normal  limits. Pulmonary vasculature is indistinct. Biapical consolidative  patchy opacities, corresponding to spiculated nodules/masses seen on  chest CT 2/7/2024. Streaky perihilar and basilar opacities. No  pneumothorax. No acute focal pulmonary consolidation. Hazy  opacification of the left lateral chest wall, corresponding to  loculated effusion. Pleural-based opacity with peripheral  calcification within the right lower lung chest wall. Blunting of  bilateral costophrenic angles. Stable elevation of the left  hemidiaphragm. The visualized upper abdomen is unremarkable. No acute  osseous or soft tissue abnormality.      Impression    IMPRESSION:   1.  Redemonstration  of multifocal spiculated nodular opacities  correspond to consolidative nodules/masses on 2/7/2024 CT exam.   2.  Minimal change in left chest loculated effusion. Persistent  bilateral pleural effusions.  3.  Pleural-based opacity with peripheral calcification within right  lower lung is not significantly changed.  4.  No acute pulmonary opacity. Persistent perihilar and bibasilar  fibroatelectatic changes.    I have personally reviewed the examination and initial interpretation  and I agree with the findings.    ROXANE CLARK DO         SYSTEM ID:  [id number]       Discharge Medications   Current Discharge Medication List        START taking these medications    Details   acetaminophen (TYLENOL) 325 MG tablet Take 2 tablets (650 mg) by mouth every 8 hours as needed for mild pain or fever  Qty: 20 tablet, Refills: 0    Associated Diagnoses: Pain      calcium carbonate (TUMS) 500 MG chewable tablet Take 1 tablet (500 mg) by mouth 4 times daily as needed for heartburn  Qty: 120 tablet, Refills: 0    Associated Diagnoses: Gastroesophageal reflux disease with esophagitis without hemorrhage      oxyCODONE (ROXICODONE) 5 MG tablet Take 0.5-1 tablets (2.5-5 mg) by mouth every 6 hours as needed for severe pain (pain refractory to tylenol)  Qty: 10 tablet, Refills: 0    Associated Diagnoses: Pain      polyethylene glycol (MIRALAX) 17 GM/Dose powder Take 17 g (1 Capful) by mouth daily  Qty: 510 g, Refills: 0    Associated Diagnoses: Constipation, unspecified constipation type      senna-docusate (SENOKOT-S/PERICOLACE) 8.6-50 MG tablet Take 1 tablet by mouth 2 times daily as needed for constipation  Qty: 60 tablet, Refills: 0    Associated Diagnoses: Constipation, unspecified constipation type           CONTINUE these medications which have CHANGED    Details   gabapentin (NEURONTIN) 100 MG capsule Take 3 capsules (300 mg) by mouth 3 times daily  Qty: 30 capsule, Refills: 0    Associated Diagnoses: Herpes zoster without  complication      ondansetron (ZOFRAN ODT) 4 MG ODT tab Take 1 tablet (4 mg) by mouth every 6 hours as needed for nausea or vomiting  Qty: 12 tablet, Refills: 0    Associated Diagnoses: Intestinal adhesions with partial obstruction (H)           CONTINUE these medications which have NOT CHANGED    Details   diclofenac (VOLTAREN) 1 % topical gel Apply topically as needed for moderate pain      Emollient (CERAVE MOISTURIZING) CREA Externally apply 1 Dose topically 2 times daily as needed (for eczema)  Qty: 453 g, Refills: 2    Associated Diagnoses: Eczema, unspecified type      loratadine (CLARITIN) 10 MG tablet Take 10 mg by mouth daily      tenofovir (VIREAD) 300 MG tablet Take 1 tablet (300 mg) by mouth daily  Qty: 90 tablet, Refills: 3    Associated Diagnoses: HCC (hepatocellular carcinoma) (H)           STOP taking these medications       capsaicin (ZOSTRIX) 0.025 % external cream Comments:   Reason for Stopping:         carvedilol (COREG) 6.25 MG tablet Comments:   Reason for Stopping:         diphenhydrAMINE (BENADRYL) 2 % external cream Comments:   Reason for Stopping:         furosemide (LASIX) 40 MG tablet Comments:   Reason for Stopping:         spironolactone (ALDACTONE) 100 MG tablet Comments:   Reason for Stopping:         triamcinolone (KENALOG) 0.1 % external cream Comments:   Reason for Stopping:             Allergies   Allergies   Allergen Reactions    Crabs [Crustaceans]     Pork (Porcine) Protein Itching and Unknown    Seafood Hives and Unknown    Shellfish Allergy Unknown

## 2024-02-18 NOTE — PROGRESS NOTES
Discharged home at 1500 via private car driven by son. R PIV removed prior to discharge. Medication instructions and discharge paperwork discussed with patient and son. All questions answered.

## 2024-02-19 NOTE — PLAN OF CARE
Physical Therapy Discharge Summary    Reason for therapy discharge:    Discharged to home.    Progress towards therapy goal(s). See goals on Care Plan in [facility] electronic health record for goal details.  Goals partially met.  Barriers to achieving goals:   discharge from facility.    Therapy recommendation(s):    No further therapy is recommended.

## 2024-02-19 NOTE — PROGRESS NOTES
[facility]: [facility]    Background: Transitional Care Management program identified per system criteria and reviewed by [facility] [place] team for possible outreach.    Assessment: Upon chart review, [facility] Team member will not proceed with patient outreach related to this episode of Transitional Care Management program due to reason below:    Patient has been discharged with Hospice Care    Plan: Transitional Care Management episode addressed appropriately per reason noted above.      AG Guzman  [facility]    *Connected Care Resource Team does NOT follow patient ongoing. Referrals are identified based on internal discharge reports and the outreach is to ensure patient has an understanding of their discharge instructions.

## 2024-10-16 NOTE — PROGRESS NOTES
Mr. Ean Burris is here today for anticoagulation monitoring for the diagnosis of atrial fibrillation.  His INR goal is 2.0-3.0 and his current Coumadin dose is 5 mgs on Sun/Tur and 10 mgs all other days.     Today's findings include an INR of 2.0     Considering Mr. Burris's past history, todays findings, and per the coumadin policy/protocol, Mr. Burris was instructed to take Coumadin as follows,  continue taking 5 mgs on Sun/Thur and 10 mgs all other days.  He was also instructed to come back in 1 weeks for an INR check.    A full discussion of the nature of anticoagulants has been carried out.  A full discussion of the need for frequent and regular monitoring, precise dosage adjustment and compliance was stressed.  Side effects of potential bleeding were discussed and Mr. Burris was instructed to call [phone] if there are any signs of abnormal bleeding.  Mr. Burris was instructed to avoid any OTC items containing aspirin or ibuprofen and prior to starting any new OTC products to consult with his physician or pharmacist to ensure no drug interactions are present.  Mr. Burris was instructed to avoid any major changes in his general diet and to avoid alcohol consumption.  .      Mr. Burris verbalized his understanding of all instructions and will call the office with any questions, concerns, or signs of abnormal bleeding or blood clot.     Patient Name: Rishabh Cabrera  Medical Record Number: [id number]  Today's Date: 9/9/2021    Procedure: Image guided radio-embolization delivery to the liver  Proceduralist: Dr. So and Dr. Peterson    Procedure Start: 1407  Procedure end: 1525  Sedation medications administered:    Fentanyl 100MCG   Midazolam 1.5MG       Report given to: Emelina CURRY  : [place]    Other Notes: Pt arrived to IR room 1 from . Consent reviewed. Pt denies any questions or concerns regarding procedure. Pt positioned supine and monitored per protocol. Pt tolerated procedure without any noted complications. Pt transferred back to .    Right groin WDL, distal pulses +3.  Angioseal closure device deployed at 1522. FLAT BEDREST FOR 2HRS UNTIL 1722.
